# Patient Record
Sex: MALE | Race: WHITE | NOT HISPANIC OR LATINO | Employment: OTHER | URBAN - METROPOLITAN AREA
[De-identification: names, ages, dates, MRNs, and addresses within clinical notes are randomized per-mention and may not be internally consistent; named-entity substitution may affect disease eponyms.]

---

## 2023-05-31 ENCOUNTER — HOSPITAL ENCOUNTER (OUTPATIENT)
Dept: DATA CONVERSION | Facility: HOSPITAL | Age: 83
End: 2023-06-01
Attending: ORTHOPAEDIC SURGERY | Admitting: ORTHOPAEDIC SURGERY
Payer: COMMERCIAL

## 2023-05-31 DIAGNOSIS — E53.8 DEFICIENCY OF OTHER SPECIFIED B GROUP VITAMINS: ICD-10-CM

## 2023-05-31 DIAGNOSIS — J44.9 CHRONIC OBSTRUCTIVE PULMONARY DISEASE, UNSPECIFIED (MULTI): ICD-10-CM

## 2023-05-31 DIAGNOSIS — K21.9 GASTRO-ESOPHAGEAL REFLUX DISEASE WITHOUT ESOPHAGITIS: ICD-10-CM

## 2023-05-31 DIAGNOSIS — Z91.041 RADIOGRAPHIC DYE ALLERGY STATUS: ICD-10-CM

## 2023-05-31 DIAGNOSIS — I10 ESSENTIAL (PRIMARY) HYPERTENSION: ICD-10-CM

## 2023-05-31 DIAGNOSIS — E78.5 HYPERLIPIDEMIA, UNSPECIFIED: ICD-10-CM

## 2023-05-31 DIAGNOSIS — M16.12 UNILATERAL PRIMARY OSTEOARTHRITIS, LEFT HIP: ICD-10-CM

## 2023-05-31 DIAGNOSIS — Z87.891 PERSONAL HISTORY OF NICOTINE DEPENDENCE: ICD-10-CM

## 2023-06-01 LAB
ANION GAP IN SER/PLAS: 13 MMOL/L (ref 10–20)
BASOPHILS (10*3/UL) IN BLOOD BY AUTOMATED COUNT: 0.02 X10E9/L (ref 0–0.1)
BASOPHILS/100 LEUKOCYTES IN BLOOD BY AUTOMATED COUNT: 0.1 % (ref 0–2)
CALCIUM (MG/DL) IN SER/PLAS: 8.4 MG/DL (ref 8.6–10.3)
CARBON DIOXIDE, TOTAL (MMOL/L) IN SER/PLAS: 23 MMOL/L (ref 21–32)
CHLORIDE (MMOL/L) IN SER/PLAS: 105 MMOL/L (ref 98–107)
CREATININE (MG/DL) IN SER/PLAS: 1.11 MG/DL (ref 0.5–1.3)
ERYTHROCYTE DISTRIBUTION WIDTH (RATIO) BY AUTOMATED COUNT: 12.1 % (ref 11.5–14.5)
ERYTHROCYTE MEAN CORPUSCULAR HEMOGLOBIN CONCENTRATION (G/DL) BY AUTOMATED: 32.8 G/DL (ref 32–36)
ERYTHROCYTE MEAN CORPUSCULAR VOLUME (FL) BY AUTOMATED COUNT: 91 FL (ref 80–100)
ERYTHROCYTES (10*6/UL) IN BLOOD BY AUTOMATED COUNT: 4.33 X10E12/L (ref 4.5–5.9)
GFR MALE: 66 ML/MIN/1.73M2
GLUCOSE (MG/DL) IN SER/PLAS: 137 MG/DL (ref 74–99)
HEMATOCRIT (%) IN BLOOD BY AUTOMATED COUNT: 39.3 % (ref 41–52)
HEMOGLOBIN (G/DL) IN BLOOD: 12.9 G/DL (ref 13.5–17.5)
IMMATURE GRANULOCYTES/100 LEUKOCYTES IN BLOOD BY AUTOMATED COUNT: 0.8 % (ref 0–0.9)
LEUKOCYTES (10*3/UL) IN BLOOD BY AUTOMATED COUNT: 16.6 X10E9/L (ref 4.4–11.3)
LYMPHOCYTES (10*3/UL) IN BLOOD BY AUTOMATED COUNT: 1.09 X10E9/L (ref 0.8–3)
LYMPHOCYTES/100 LEUKOCYTES IN BLOOD BY AUTOMATED COUNT: 6.6 % (ref 13–44)
MONOCYTES (10*3/UL) IN BLOOD BY AUTOMATED COUNT: 2.09 X10E9/L (ref 0.05–0.8)
MONOCYTES/100 LEUKOCYTES IN BLOOD BY AUTOMATED COUNT: 12.6 % (ref 2–10)
NEUTROPHILS (10*3/UL) IN BLOOD BY AUTOMATED COUNT: 13.29 X10E9/L (ref 1.6–5.5)
NEUTROPHILS/100 LEUKOCYTES IN BLOOD BY AUTOMATED COUNT: 79.9 % (ref 40–80)
PLATELETS (10*3/UL) IN BLOOD AUTOMATED COUNT: 162 X10E9/L (ref 150–450)
POTASSIUM (MMOL/L) IN SER/PLAS: 3.8 MMOL/L (ref 3.5–5.3)
SODIUM (MMOL/L) IN SER/PLAS: 137 MMOL/L (ref 136–145)
UREA NITROGEN (MG/DL) IN SER/PLAS: 23 MG/DL (ref 6–23)

## 2023-06-02 LAB
ALANINE AMINOTRANSFERASE (SGPT) (U/L) IN SER/PLAS: 18 U/L (ref 10–52)
ALBUMIN (G/DL) IN SER/PLAS: 2.8 G/DL (ref 3.4–5)
ALKALINE PHOSPHATASE (U/L) IN SER/PLAS: 44 U/L (ref 33–136)
ANION GAP IN SER/PLAS: 9 MMOL/L (ref 10–20)
ANION GAP IN SER/PLAS: NORMAL
ASPARTATE AMINOTRANSFERASE (SGOT) (U/L) IN SER/PLAS: 23 U/L (ref 9–39)
BASOPHILS (10*3/UL) IN BLOOD BY AUTOMATED COUNT: NORMAL
BASOPHILS/100 LEUKOCYTES IN BLOOD BY AUTOMATED COUNT: NORMAL
BILIRUBIN TOTAL (MG/DL) IN SER/PLAS: 0.5 MG/DL (ref 0–1.2)
CALCIUM (MG/DL) IN SER/PLAS: 8.2 MG/DL (ref 8.6–10.3)
CALCIUM (MG/DL) IN SER/PLAS: NORMAL
CARBON DIOXIDE, TOTAL (MMOL/L) IN SER/PLAS: 25 MMOL/L (ref 21–32)
CARBON DIOXIDE, TOTAL (MMOL/L) IN SER/PLAS: NORMAL
CHLORIDE (MMOL/L) IN SER/PLAS: 105 MMOL/L (ref 98–107)
CHLORIDE (MMOL/L) IN SER/PLAS: NORMAL
CREATININE (MG/DL) IN SER/PLAS: 0.86 MG/DL (ref 0.5–1.3)
CREATININE (MG/DL) IN SER/PLAS: NORMAL
EOSINOPHILS (10*3/UL) IN BLOOD BY AUTOMATED COUNT: NORMAL
EOSINOPHILS/100 LEUKOCYTES IN BLOOD BY AUTOMATED COUNT: NORMAL
ERYTHROCYTE DISTRIBUTION WIDTH (RATIO) BY AUTOMATED COUNT: 12.3 % (ref 11.5–14.5)
ERYTHROCYTE DISTRIBUTION WIDTH (RATIO) BY AUTOMATED COUNT: NORMAL
ERYTHROCYTE MEAN CORPUSCULAR HEMOGLOBIN CONCENTRATION (G/DL) BY AUTOMATED: 33.9 G/DL (ref 32–36)
ERYTHROCYTE MEAN CORPUSCULAR HEMOGLOBIN CONCENTRATION (G/DL) BY AUTOMATED: NORMAL
ERYTHROCYTE MEAN CORPUSCULAR VOLUME (FL) BY AUTOMATED COUNT: 89 FL (ref 80–100)
ERYTHROCYTE MEAN CORPUSCULAR VOLUME (FL) BY AUTOMATED COUNT: NORMAL
ERYTHROCYTES (10*6/UL) IN BLOOD BY AUTOMATED COUNT: 3.94 X10E12/L (ref 4.5–5.9)
ERYTHROCYTES (10*6/UL) IN BLOOD BY AUTOMATED COUNT: NORMAL
GFR FEMALE: NORMAL
GFR MALE: 86 ML/MIN/1.73M2
GFR MALE: NORMAL
GLUCOSE (MG/DL) IN SER/PLAS: 97 MG/DL (ref 74–99)
GLUCOSE (MG/DL) IN SER/PLAS: NORMAL
HEMATOCRIT (%) IN BLOOD BY AUTOMATED COUNT: 35.1 % (ref 41–52)
HEMATOCRIT (%) IN BLOOD BY AUTOMATED COUNT: NORMAL
HEMOGLOBIN (G/DL) IN BLOOD: 11.9 G/DL (ref 13.5–17.5)
HEMOGLOBIN (G/DL) IN BLOOD: NORMAL
IMMATURE GRANULOCYTES/100 LEUKOCYTES IN BLOOD BY AUTOMATED COUNT: NORMAL
LEUKOCYTES (10*3/UL) IN BLOOD BY AUTOMATED COUNT: 13 X10E9/L (ref 4.4–11.3)
LEUKOCYTES (10*3/UL) IN BLOOD BY AUTOMATED COUNT: NORMAL
LYMPHOCYTES (10*3/UL) IN BLOOD BY AUTOMATED COUNT: NORMAL
LYMPHOCYTES/100 LEUKOCYTES IN BLOOD BY AUTOMATED COUNT: NORMAL
MANUAL DIFFERENTIAL Y/N: NORMAL
MONOCYTES (10*3/UL) IN BLOOD BY AUTOMATED COUNT: NORMAL
MONOCYTES/100 LEUKOCYTES IN BLOOD BY AUTOMATED COUNT: NORMAL
NEUTROPHILS (10*3/UL) IN BLOOD BY AUTOMATED COUNT: NORMAL
NEUTROPHILS/100 LEUKOCYTES IN BLOOD BY AUTOMATED COUNT: NORMAL
NRBC (PER 100 WBCS) BY AUTOMATED COUNT: NORMAL
PLATELETS (10*3/UL) IN BLOOD AUTOMATED COUNT: 151 X10E9/L (ref 150–450)
PLATELETS (10*3/UL) IN BLOOD AUTOMATED COUNT: NORMAL
POTASSIUM (MMOL/L) IN SER/PLAS: 4 MMOL/L (ref 3.5–5.3)
POTASSIUM (MMOL/L) IN SER/PLAS: NORMAL
PROTEIN TOTAL: 5.1 G/DL (ref 6.4–8.2)
SODIUM (MMOL/L) IN SER/PLAS: 135 MMOL/L (ref 136–145)
SODIUM (MMOL/L) IN SER/PLAS: NORMAL
UREA NITROGEN (MG/DL) IN SER/PLAS: 25 MG/DL (ref 6–23)
UREA NITROGEN (MG/DL) IN SER/PLAS: NORMAL

## 2023-06-08 LAB
APPEARANCE, URINE: CLEAR
BILIRUBIN, URINE: NEGATIVE
BLOOD, URINE: NEGATIVE
COLOR, URINE: NORMAL
GLUCOSE, URINE: NEGATIVE MG/DL
KETONES, URINE: NEGATIVE MG/DL
LEUKOCYTE ESTERASE, URINE: NEGATIVE
NITRITE, URINE: NEGATIVE
PH, URINE: 7 (ref 5–8)
PROTEIN, URINE: NEGATIVE MG/DL
SPECIFIC GRAVITY, URINE: 1.01 (ref 1–1.03)
UROBILINOGEN, URINE: <2 MG/DL (ref 0–1.9)

## 2023-09-07 VITALS — WEIGHT: 191.14 LBS | HEIGHT: 71 IN | BODY MASS INDEX: 26.76 KG/M2

## 2023-09-14 PROBLEM — N52.9 ORGANIC IMPOTENCE: Status: ACTIVE | Noted: 2023-09-14

## 2023-09-14 PROBLEM — Z79.01 CHRONIC ANTICOAGULATION: Status: ACTIVE | Noted: 2023-09-14

## 2023-09-14 PROBLEM — M54.42 LUMBAGO WITH SCIATICA, LEFT SIDE: Status: ACTIVE | Noted: 2023-09-14

## 2023-09-14 PROBLEM — M25.50 JOINT PAIN: Status: ACTIVE | Noted: 2023-09-14

## 2023-09-14 PROBLEM — I26.99 BILATERAL PULMONARY EMBOLISM (MULTI): Status: ACTIVE | Noted: 2023-09-14

## 2023-09-14 PROBLEM — N40.0 BENIGN PROSTATIC HYPERPLASIA: Status: ACTIVE | Noted: 2023-09-14

## 2023-09-14 PROBLEM — Q28.3 CAVERNOUS MALFORMATION (HHS-HCC): Status: ACTIVE | Noted: 2023-09-14

## 2023-09-14 PROBLEM — I10 PRIMARY HYPERTENSION: Status: ACTIVE | Noted: 2023-09-14

## 2023-09-14 PROBLEM — G89.29 CHRONIC BACK PAIN: Status: ACTIVE | Noted: 2023-09-14

## 2023-09-14 PROBLEM — R60.0 PEDAL EDEMA: Status: ACTIVE | Noted: 2023-09-14

## 2023-09-14 PROBLEM — I82.409 DVT (DEEP VENOUS THROMBOSIS) (MULTI): Status: ACTIVE | Noted: 2023-09-14

## 2023-09-14 PROBLEM — J34.2 DEVIATED SEPTUM: Status: ACTIVE | Noted: 2023-09-14

## 2023-09-14 PROBLEM — M79.605 PAIN OF LEFT LOWER EXTREMITY: Status: ACTIVE | Noted: 2023-09-14

## 2023-09-14 PROBLEM — M54.16 LUMBAR RADICULOPATHY: Status: ACTIVE | Noted: 2023-09-14

## 2023-09-14 PROBLEM — M47.816 LUMBAR SPONDYLOSIS: Status: ACTIVE | Noted: 2023-09-14

## 2023-09-14 PROBLEM — M54.12 RIGHT CERVICAL RADICULOPATHY: Status: ACTIVE | Noted: 2023-09-14

## 2023-09-14 PROBLEM — R23.4 FISSURE IN SKIN OF FOOT: Status: ACTIVE | Noted: 2023-09-14

## 2023-09-14 PROBLEM — M16.12 DEGENERATIVE JOINT DISEASE OF LEFT HIP: Status: ACTIVE | Noted: 2023-09-14

## 2023-09-14 PROBLEM — M75.122 COMPLETE TEAR OF LEFT ROTATOR CUFF: Status: ACTIVE | Noted: 2023-09-14

## 2023-09-14 PROBLEM — I25.10 CORONARY ATHEROSCLEROSIS OF NATIVE CORONARY ARTERY: Status: ACTIVE | Noted: 2023-09-14

## 2023-09-14 PROBLEM — R20.0 NUMBNESS: Status: ACTIVE | Noted: 2023-09-14

## 2023-09-14 PROBLEM — M46.1 SACROILIITIS (CMS-HCC): Status: ACTIVE | Noted: 2023-09-14

## 2023-09-14 PROBLEM — R60.9 EDEMA: Status: ACTIVE | Noted: 2023-09-14

## 2023-09-14 PROBLEM — M54.41 LUMBAGO WITH SCIATICA, RIGHT SIDE: Status: ACTIVE | Noted: 2023-09-14

## 2023-09-14 PROBLEM — M50.30 DDD (DEGENERATIVE DISC DISEASE), CERVICAL: Status: ACTIVE | Noted: 2023-09-14

## 2023-09-14 PROBLEM — M47.812 FACET ARTHROPATHY, CERVICAL: Status: ACTIVE | Noted: 2023-09-14

## 2023-09-14 PROBLEM — I50.30 (HFPEF) HEART FAILURE WITH PRESERVED EJECTION FRACTION (MULTI): Status: ACTIVE | Noted: 2023-09-14

## 2023-09-14 PROBLEM — M25.512 CHRONIC LEFT SHOULDER PAIN: Status: ACTIVE | Noted: 2023-09-14

## 2023-09-14 PROBLEM — L23.9 ALLERGIC DERMATITIS: Status: ACTIVE | Noted: 2023-09-14

## 2023-09-14 PROBLEM — G89.4 CHRONIC PAIN SYNDROME: Status: ACTIVE | Noted: 2023-09-14

## 2023-09-14 PROBLEM — M75.82 TENDINITIS OF LEFT ROTATOR CUFF: Status: ACTIVE | Noted: 2023-09-14

## 2023-09-14 PROBLEM — M20.5X1 HALLUX LIMITUS, RIGHT: Status: ACTIVE | Noted: 2023-09-14

## 2023-09-14 PROBLEM — R25.2 MUSCLE CRAMPING: Status: ACTIVE | Noted: 2023-09-14

## 2023-09-14 PROBLEM — M51.369 DEGENERATION OF INTERVERTEBRAL DISC OF LUMBAR REGION: Status: ACTIVE | Noted: 2023-09-14

## 2023-09-14 PROBLEM — M79.18 MYOFASCIAL PAIN: Status: ACTIVE | Noted: 2023-09-14

## 2023-09-14 PROBLEM — R91.8 LUNG INFILTRATE: Status: ACTIVE | Noted: 2023-09-14

## 2023-09-14 PROBLEM — L89.311 PRESSURE INJURY OF RIGHT BUTTOCK, STAGE 1: Status: ACTIVE | Noted: 2023-09-14

## 2023-09-14 PROBLEM — R20.0 NUMBNESS AND TINGLING OF LOWER EXTREMITY: Status: ACTIVE | Noted: 2023-09-14

## 2023-09-14 PROBLEM — M23.41 LOOSE BODY OF RIGHT KNEE: Status: ACTIVE | Noted: 2023-09-14

## 2023-09-14 PROBLEM — M25.561 RIGHT KNEE PAIN: Status: ACTIVE | Noted: 2023-09-14

## 2023-09-14 PROBLEM — R73.9 HYPERGLYCEMIA: Status: ACTIVE | Noted: 2023-09-14

## 2023-09-14 PROBLEM — J32.9 CHRONIC SINUSITIS: Status: ACTIVE | Noted: 2023-09-14

## 2023-09-14 PROBLEM — J61 ASBESTOSIS (MULTI): Status: ACTIVE | Noted: 2023-09-14

## 2023-09-14 PROBLEM — M25.562 KNEE PAIN, LEFT: Status: ACTIVE | Noted: 2023-09-14

## 2023-09-14 PROBLEM — M54.9 CHRONIC BACK PAIN: Status: ACTIVE | Noted: 2023-09-14

## 2023-09-14 PROBLEM — M25.552 HIP PAIN, LEFT: Status: ACTIVE | Noted: 2023-09-14

## 2023-09-14 PROBLEM — J44.9 COPD, MILD (MULTI): Status: ACTIVE | Noted: 2023-09-14

## 2023-09-14 PROBLEM — R20.2 NUMBNESS AND TINGLING OF LOWER EXTREMITY: Status: ACTIVE | Noted: 2023-09-14

## 2023-09-14 PROBLEM — M25.511 RIGHT SHOULDER PAIN: Status: ACTIVE | Noted: 2023-09-14

## 2023-09-14 PROBLEM — M65.4 DE QUERVAIN'S TENOSYNOVITIS, RIGHT: Status: ACTIVE | Noted: 2023-09-14

## 2023-09-14 PROBLEM — Z86.711 HISTORY OF PULMONARY EMBOLUS (PE): Status: ACTIVE | Noted: 2023-09-14

## 2023-09-14 PROBLEM — R06.09 DYSPNEA ON EXERTION: Status: ACTIVE | Noted: 2023-09-14

## 2023-09-14 PROBLEM — M51.36 DEGENERATION OF INTERVERTEBRAL DISC OF LUMBAR REGION: Status: ACTIVE | Noted: 2023-09-14

## 2023-09-14 PROBLEM — M79.89 RIGHT LEG SWELLING: Status: ACTIVE | Noted: 2023-09-14

## 2023-09-14 PROBLEM — M54.12 CERVICAL RADICULITIS: Status: ACTIVE | Noted: 2023-09-14

## 2023-09-14 PROBLEM — E78.5 DYSLIPIDEMIA: Status: ACTIVE | Noted: 2023-09-14

## 2023-09-14 PROBLEM — M54.30 SCIATICA: Status: ACTIVE | Noted: 2023-09-14

## 2023-09-14 PROBLEM — E53.8 VITAMIN B 12 DEFICIENCY: Status: ACTIVE | Noted: 2023-09-14

## 2023-09-14 PROBLEM — I87.2 VENOUS INSUFFICIENCY OF BOTH LOWER EXTREMITIES: Status: ACTIVE | Noted: 2023-09-14

## 2023-09-14 PROBLEM — Z96.641 HISTORY OF TOTAL RIGHT HIP ARTHROPLASTY: Status: ACTIVE | Noted: 2023-09-14

## 2023-09-14 PROBLEM — I10 BENIGN ESSENTIAL HYPERTENSION: Status: ACTIVE | Noted: 2023-09-14

## 2023-09-14 PROBLEM — M17.11 ARTHRITIS OF RIGHT KNEE: Status: ACTIVE | Noted: 2023-09-14

## 2023-09-14 PROBLEM — G89.29 CHRONIC LEFT SHOULDER PAIN: Status: ACTIVE | Noted: 2023-09-14

## 2023-09-14 PROBLEM — G60.9 IDIOPATHIC PERIPHERAL NEUROPATHY: Status: ACTIVE | Noted: 2023-09-14

## 2023-09-14 PROBLEM — Z96.641 STATUS POST RIGHT HIP REPLACEMENT: Status: ACTIVE | Noted: 2023-09-14

## 2023-09-14 PROBLEM — M17.11 PRIMARY OSTEOARTHRITIS OF RIGHT KNEE: Status: ACTIVE | Noted: 2023-09-14

## 2023-09-14 PROBLEM — M48.062 LUMBAR STENOSIS WITH NEUROGENIC CLAUDICATION: Status: ACTIVE | Noted: 2023-09-14

## 2023-09-14 PROBLEM — M12.9 ARTHRITIS, MULTIPLE JOINT INVOLVEMENT: Status: ACTIVE | Noted: 2023-09-14

## 2023-09-14 PROBLEM — M17.12 ARTHRITIS OF KNEE, LEFT: Status: ACTIVE | Noted: 2023-09-14

## 2023-09-14 PROBLEM — M54.50 LUMBAGO: Status: ACTIVE | Noted: 2023-09-14

## 2023-09-14 PROBLEM — M53.3 SACROILIAC JOINT DYSFUNCTION: Status: ACTIVE | Noted: 2023-09-14

## 2023-09-14 PROBLEM — M19.90 OSTEOARTHRITIS: Status: ACTIVE | Noted: 2023-09-14

## 2023-09-14 PROBLEM — M54.9 BACKACHE WITH RADIATION: Status: ACTIVE | Noted: 2023-09-14

## 2023-09-14 PROBLEM — K21.9 ESOPHAGEAL REFLUX: Status: ACTIVE | Noted: 2023-09-14

## 2023-09-14 PROBLEM — J30.9 ALLERGIC RHINITIS: Status: ACTIVE | Noted: 2023-09-14

## 2023-09-14 PROBLEM — M23.91 INTERNAL DERANGEMENT OF KNEE, RIGHT: Status: ACTIVE | Noted: 2023-09-14

## 2023-09-14 PROBLEM — G57.30 NEUROPATHY, PERONEAL NERVE: Status: ACTIVE | Noted: 2023-09-14

## 2023-09-14 PROBLEM — E78.2 MIXED HYPERLIPIDEMIA: Status: ACTIVE | Noted: 2023-09-14

## 2023-09-14 PROBLEM — M54.2 CERVICALGIA: Status: ACTIVE | Noted: 2023-09-14

## 2023-09-14 PROBLEM — I25.10 ASHD (ARTERIOSCLEROTIC HEART DISEASE): Status: ACTIVE | Noted: 2023-09-14

## 2023-09-14 PROBLEM — M54.16 LUMBAR RADICULITIS: Status: ACTIVE | Noted: 2023-09-14

## 2023-09-14 PROBLEM — M79.604 PAIN OF RIGHT LOWER EXTREMITY: Status: ACTIVE | Noted: 2023-09-14

## 2023-09-14 PROBLEM — M20.42 HAMMER TOE OF SECOND TOE OF LEFT FOOT: Status: ACTIVE | Noted: 2023-09-14

## 2023-09-14 PROBLEM — E87.6 HYPOKALEMIA: Status: ACTIVE | Noted: 2023-09-14

## 2023-09-14 PROBLEM — J98.4 RESTRICTIVE LUNG DISEASE: Status: ACTIVE | Noted: 2023-09-14

## 2023-09-14 RX ORDER — FINASTERIDE 5 MG/1
5 TABLET, FILM COATED ORAL DAILY
COMMUNITY
End: 2023-11-29 | Stop reason: ALTCHOICE

## 2023-09-14 RX ORDER — NALOXONE HYDROCHLORIDE 4 MG/.1ML
SPRAY NASAL AS NEEDED
COMMUNITY
Start: 2022-06-16

## 2023-09-14 RX ORDER — DEXLANSOPRAZOLE 60 MG/1
60 CAPSULE, DELAYED RELEASE ORAL DAILY
COMMUNITY

## 2023-09-14 RX ORDER — TORSEMIDE 20 MG/1
1 TABLET ORAL DAILY PRN
Status: ON HOLD | COMMUNITY
End: 2024-04-24 | Stop reason: WASHOUT

## 2023-09-14 RX ORDER — METOPROLOL SUCCINATE 50 MG/1
1 TABLET, EXTENDED RELEASE ORAL DAILY
COMMUNITY
End: 2024-05-01

## 2023-09-14 RX ORDER — FUROSEMIDE 20 MG/1
1 TABLET ORAL DAILY
COMMUNITY
End: 2023-11-21 | Stop reason: SDUPTHER

## 2023-09-14 RX ORDER — CHLORHEXIDINE GLUCONATE ORAL RINSE 1.2 MG/ML
15 SOLUTION DENTAL AS NEEDED
COMMUNITY
Start: 2023-05-10 | End: 2023-10-24 | Stop reason: WASHOUT

## 2023-09-14 RX ORDER — IRBESARTAN 150 MG/1
150 TABLET ORAL DAILY
COMMUNITY

## 2023-09-14 RX ORDER — TAMSULOSIN HYDROCHLORIDE 0.4 MG/1
0.4 CAPSULE ORAL
COMMUNITY
Start: 2016-05-13 | End: 2023-11-29 | Stop reason: ALTCHOICE

## 2023-09-14 RX ORDER — OXYCODONE AND ACETAMINOPHEN 7.5; 325 MG/1; MG/1
1 TABLET ORAL 3 TIMES DAILY PRN
COMMUNITY
Start: 2023-05-05 | End: 2023-10-11 | Stop reason: SDUPTHER

## 2023-09-30 NOTE — DISCHARGE SUMMARY
Send Summary:   Discharge Summary Providers:  Provider Role Provider Name   · Attending TRICIA BOSWELL   · Referring TRICIA BOSWELL   · Consulting Lynn Feliz   · Consulting Gil Paredes   · Primary Pearl Perez       Note Recipients: Pearl Perez,  - [phone]  []  TRICIA BOSWELL DO       Discharge:    Summary:   Admission Date: .31-May-2023 05:56:00   Discharge Date: 01-Jun-2023   Attending Physician at Discharge: TRICIA BOSWELL   Admission Reason: Left Hip Osteoarthritis (1)   Final Discharge Diagnoses: Osteoarthritis of hip   Procedures: Date: 31-May-2023 10:26:00  Procedure Name: Left DAA ISH   Condition at Discharge: Satisfactory   Disposition at Discharge: Inpatient Rehab Facility  (IRF)   Vital Signs:        T   P  R  BP   MAP  SpO2   Value  36  89  19  129/71   107  94%  Date/Time 6/1 5:12 6/1 5:12 6/1 5:12 6/1 5:12  5/31 18:04 6/1 5:12  Range  (36C - 36.1C )  (86 - 99 )  (14 - 19 )  (129 - 164 )/ (71 - 93 )  (107 - 113 )  (93% - 97% )   As of 31-May-2023 13:32:00, patient is on 2 L/min of oxygen via room air.    Date:            Weight/Scale Type:  Height:   31-May-2023 13:27  86.7  kg / standing  180.1  cm  Physical Exam:    Constitutional: NAD, resting comfortably in bed  Skin: Warm and dry, no rashes   Eyes: EOMI, clear sclera   ENMT: MMM   HEENT: Neck supple without apparent injury, EOMI, MMM  Respiratory: NWOB on RA   CV: RRR per peripheral pulses, limbs wwp  GI: soft, non-distended   Lymph: No apparent LAD  Neuro: KOENIG spontaneously, CNs II - XII grossly intact   Psych: Appropriate mood and behavior   MSK:   - SILT s/s/sp/dp/t  - fires PF/DF/EHL  - Toes WWP, 2+ DP pulses  - Calf soft and supple bilat  -dressing is c/d/i        A full secondary survey was conducted. Patient did not have any acute pain with ROM or palpation of other extremities other than that which is mentioned below.    Hospital Course:    82 year-old male who presented with left hip osteoarthritis. Patient is now s/p left  total hip arthroplasty on 5/31/23 by Dr. Arora. On the day of surgery, patient  was identified in the pre-operative holding area and agreeable to proceed with surgery. Written consent was obtained.  Please see operative note for further details of this procedure. Patient received 24 hours of conchis-operative antibiotics. Patient recovered  in the PACU before transfer to a regular nursing floor. Patient was started on oxycodone, tylenol, and tramadol for pain control and eliquis 2.5mg bid for DVT prophylaxis start the evening of POD1. Physical therapy recommended continued recovery at acute  rehab with continued physical therapy and wound care. On the day of discharge, patient was afebrile with stable vital signs. Patient was neurovascularly intact at time of discharge. Patient was discharged with prescription of eliquis 2.5mg bid for DVT  prophylaxis for 4 weeks. Patient will follow-up with Dr. Arora in 2 weeks for post-operative visit.      Immunizations:    Immunizations:  17-Apr-2020   .Influenza- Influenza Virus: Immunizations  17-Apr-2020   .Pneumonia- Pneumococcal polysaccharide vaccine: Immunizations      Discharge Information:    and Continuing Care:   Lab Results - Pending:    None  Radiology Results - Pending: None   Discharge Instructions:    Activity:           activity as tolerated.          May shower..            May not drive.            Weight-bearing Instructions: weight-bearing as tolerated left leg.            Total Hip Precautions Anterior approach: FOR 2 WEEKS: NO straight leg raises, NO backward kicks and NO bending over to put your shoes/socks or reach the floor. No crossing your legs or ankles.  Use ice frequently day and night for 2 weeks.   Continue to wear compression stockings everyday. May take stockings off at night to sleep but reapply each morning for the next 2 weeks until seen by your surgeon at your follow up appt.    Nutrition/Diet:           resume normal diet    Wound Care:            Wound Site:   Left Total Hip Replacement          Wound Type:   surgical incision          Cover With:   Mepilex          Instructions:   no lotions, creams, or tub soaks          Other Instructions:   Do not remove Mepilex AG dressing from the hip  until follow up visit in 2 weeks with doctor. If dressing becomes saturated and starts leaking  notify your surgeon.   Call the office if having increased redness, pain, swelling,  drainage at incision or if you have fever and chills.  May shower. pat dressing dry, no soap, no lotions and  no soaking.    Rehab Services:           Occupational Therapy Orders:   Eval and Treat ([facility])          Physical Therapy Orders:   Eval and Treat ([facility])    Home Care Certification:           Home Care Agency:    Home Team [phone]          Skilled Disciplines Ordered:   PT    Home Care Services:           Home Care Skilled Service:   Rehab (PT/OT/SP eval and treat)    Care Recommendation:           I recommend that INPATIENT care is required at::   Acute rehab          Estimated Stay:   Convalescent stay < 30 days    Discharge Medications: Home Medication   Dexilant 60 mg oral delayed release capsule - 1 cap(s) orally once a day  irbesartan 150 mg oral tablet - 1 tab(s) orally once a day  Metoprolol Succinate ER 50 mg oral tablet, extended release - 1 tab(s) orally once a day  dicyclomine 20 mg oral tablet - 1 tab(s) orally 4 times a day   ascorbic acid 500 mg oral tablet - 1 tab(s) orally 2 times a day  carisoprodol 350 mg oral tablet - 1 tab(s) orally every 8 hours as needed for muscles spasms  CeleBREX 200 mg oral capsule - 1 cap(s) orally 2 times a day   gabapentin 300 mg oral capsule - 1 cap(s) orally 3 times a day  apixaban 2.5 mg oral tablet - 1 tab(s) orally 2 times a day for 30 days for DVT ppx   oxycodone-acetaminophen 5 mg-325 mg oral tablet - 1 tab(s) orally every 4 to 6 hours as needed for pain x 7 days  Eliquis 2.5 mg  "oral tablet - 1 tab(s) orally 2 times a day starting POD1 in the evening for 30 days for dvt ppx   tamsulosin 0.4 mg oral capsule - 2 cap(s) orally once a day (at bedtime)  finasteride 5 mg oral tablet - 1 tab(s) orally once a day     PRN Medication   torsemide 20 mg oral tablet - 1 tab(s) orally once a day, As Needed  oxyCODONE 10 mg oral tablet - orally 3 times a day, As Needed  fluticasone 50 mcg/inh nasal spray - 2 spray(s) in each nostril 2 times a day, As Needed     DNR Status:   ·  Code Status Code Status order at time of discharge: Full Code     Attestation:   Note Completion:  I am a:  Resident/Fellow   Attending Attestation I saw and evaluated the patient.  I personally obtained the key and critical portions of the history and physical exam or was physically present for key and  critical portions performed by the resident/fellow. I reviewed the resident/fellow?s documentation and discussed the patient with the resident/fellow.  I agree with the resident/fellow?s medical decision making as documented in the note.     I personally evaluated the patient on 01-Jun-2023         Electronic Signatures:  TRICIA BOSWELL (DO)  (Signed 02-Jun-2023 19:15)   Authored: Note Completion   Co-Signer: Send Summary, Summary Content, Immunizations, Ongoing Care, DNR Status, Note Completion  Cody Tracy (DO (Resident))  (Signed 01-Jun-2023 13:39)   Authored: Send Summary, Summary Content, Immunizations,  Ongoing Care, DNR Status, Note Completion      Last Updated: 02-Jun-2023 19:15 by TRICIA BOSWELL (DO)    References:  1.  Data Referenced From \"Consult-Medicine\" 31-May-2023 14:25   "

## 2023-09-30 NOTE — H&P
History & Physical Reviewed:   I have reviewed the History and Physical dated:  04-May-2023   History and Physical reviewed and relevant findings noted. Patient examined to review pertinent physical  findings.: No significant changes   Home Medications Reviewed: no changes noted   Allergies Reviewed: no changes noted       ERAS (Enhanced Recovery After Surgery):  ·  ERAS Patient: no     Consent:   COVID-19 Consent:  ·  COVID-19 Risk Consent Surgeon has reviewed key risks related to the risk of anthony COVID-19 and if they contract COVID-19 what the risks are.     Attestation:   Note Completion:  I am a:  Resident/Fellow   Attending Attestation I saw and evaluated the patient.  I personally obtained the key and critical portions of the history and physical exam or was physically present for key and  critical portions performed by the resident/fellow. I reviewed the resident/fellow?s documentation and discussed the patient with the resident/fellow.  I agree with the resident/fellow?s medical decision making as documented in the note.     I personally evaluated the patient on 31-May-2023         Electronic Signatures:  TRICIA BOSWELL ()  (Signed 31-May-2023 07:35)   Authored: Note Completion   Co-Signer: History & Physical Reviewed, ERAS, Consent, Note Completion  Cody Tracy ( (Resident))  (Signed 31-May-2023 06:58)   Authored: History & Physical Reviewed, ERAS, Consent,  Note Completion      Last Updated: 31-May-2023 07:35 by TRICIA BOSWELL ()

## 2023-09-30 NOTE — PROGRESS NOTES
Consult Type: subsequent visit/care     Service: Medicine     Subjective Data:   ARTEMIO MORALES is a 82 year old Male who is Hospital Day # 2 and POD #1 for Left DAA ISH.     Seen and examined in his room this AM. Awake and alert. Up in chair. Pain adequately controlled at this time. Is constipated. No BM x 4 days. He denies chest pain, breathing difficulties, abdominal pain,  N/V/D. No fever or chills.    Objective Data:     Objective Information:      T   P  R  BP   MAP  SpO2   Value  36  89  19  129/71   107  94%  Date/Time 6/1 5:12 6/1 5:12 6/1 5:12 6/1 5:12  5/31 18:04 6/1 5:12  Range  (36C - 36.1C )  (86 - 99 )  (14 - 19 )  (129 - 164 )/ (71 - 93 )  (107 - 113 )  (93% - 97% )   As of 31-May-2023 13:32:00, patient is on 2 L/min of oxygen via room air.      Pain reported at 5/31 22:41: sleeping    Physical Exam Narrative:  ·  Physical Exam:    General: A&O x 3; NAD; calm and cooperative  Eyes: EOM's intact. Clear sclera.   HEENT: Atraumatic. Normocephalic. Mucous membranes moist.   Lungs: CTAB; respirations even and unlabored on room air.   Heart: Regular rate  Abdomen:  Soft, non-tender, non-distended; normoactive bowel sounds  Extremities: KOENIG with LLE pain/weakness; no edema; peripheral pulses intact. Left hip surgical incision covered with primary surgical dressing - C/D/I.   Neuro: A&O x 3; gross motor and sensation intact; no focal deficits  Skin: Warm, dry, and intact  Psych: Appropriate mood and behavior     Medication:    Medications:          Continuous Medications       --------------------------------    1. Lactated Ringers Infusion:  1000  mL  IntraVenous  <Continuous>         Scheduled Medications       --------------------------------    1. Acetaminophen:  975  mg  Oral  Every 8 Hours    2. Apixaban:  2.5  mg  Oral  Every 12 Hours    3. Ascorbic Acid:  500  mg  Oral  2 Times a Day    4. Dicyclomine:  20  mg  Oral  4 Times a Day Before Meals    5. Docusate:  100  mg  Oral  2 Times a Day    6.  Finasteride:  5  mg  Oral  Daily    7. Fluticasone 50 microgram/ Nasal Inhalation:  2  spray(s)  Each Nostril  Daily    8. Gabapentin:  300  mg  Oral  3 Times a Day    9. Ketorolac Injectable:  15  mg  IntraVenous Push  Every 6 Hours    10. Loratadine:  10  mg  Oral  Daily    11. Losartan:  50  mg  Oral  Daily    12. Metoprolol Succinate Extended Release:  50  mg  Oral  Daily    13. Pantoprazole:  40  mg  Oral  Daily    14. Polyethylene Glycol:  17  gram(s)  Oral  2 Times a Day    15. Tamsulosin:  0.8  mg  Oral  Daily         PRN Medications       --------------------------------    1. HYDROmorphone Injectable:  0.2  mg  IntraVenous Push  Every 2 Hours    2. Ondansetron Injectable:  4  mg  IntraVenous Push  Every 6 Hours    3. oxyCODONE Immediate Release:  5  mg  Oral  Every 4 Hours    4. oxyCODONE Immediate Release:  10  mg  Oral  Every 4 Hours    5. Sore Throat Lozenge:  1  lozenge(s)  Oral  Every 4 Hours    6. traMADol:  50  mg  Oral  Every 6 Hours        Recent Lab Results:    Results:    CBC: 6/1/2023 05:37              \     Hgb     /                              \     12.9 L    /  WBC  ----------------  Plt               16.6 H    ----------------    162              /     Hct     \                              /     39.3 L    \            RBC: 4.33 L    MCV: 91     Neutrophil %: 79.9      BMP: 6/1/2023 05:37  NA+        Cl-     BUN  /                         137    105    23  /  --------------------------------  Glucose                ---------------------------  137 H    K+     HCO3-   Creat \                         3.8  23    1.11  \  Calcium : 8.4 L    Anion Gap : 13      Radiology Results:    Results:        Impression:    Status post  total hip arthroplasty.     Xray Pelvis 1 or 2 View [May 31 2023 11:38AM]      Assessment and Plan:   Code Status:  ·  Code Status Full Code     Assessment:    82 year old Male with a medical history of OA, HTN, COPD, PE/DVT, who presented to [facility] [place] for an elective  left ISH by Dr. Arora. Operative course uneventful to this point.   ml.     CV: HTN, CAD  -Resumed home regimen: Metoprolol, Losartan (formulary interchange for Irbesartan)  -Hold Torsemide in perioperative state. May resume with discharge.   -Monitor BP and maintain hold parameters  -BP stable.     Left Hip Osteoarthritis  -s/p left ISH by Dr. Arora on 5/31.   -Incisional care, dressing changes, prophylactic antibiotics, and pain management per Orthopedics.   -PT/OT consulted. WBAT.   -Medicate for pain as needed.   -Advised IS use, mobilization.   -Maintain bowel regimen  -DVT prophylaxis per surgery  -Monitor H&H for ABLA. Hgb stable at 12.9.   -Afebrile. No leukocytosis.     Seasonal Allergies/Allergic Rhinitis  -Resumed Fluticasone  -Added Loratadine    Constipation  -On laxatives and stool softeners  -Added dose of Lactulose.     Hyperlipidemia  -Statin intolerance/allergy    GERD  -On PPI, Dicyclomine    BPH  -On Tamsulosin, Finasteride    DVT Prophylaxis   -Apixaban   -Discussed with orthopedic resident.     Fluids/Electrolytes/Nutrition  -Laboratory data reviewed.  -Electrolytes stable.   -No nutritional concerns at this time.     Disposition  -Plan of care discussed with medicine, Dr. Feliz.   -Discharge per surgery. Medically stable.         Attestation:   Note Completion:  I am a:  Advanced Practice Provider   Attending Only - Shared Visit with Advanced Practice Provider This is a shared visit.  I have reviewed the Advanced Practice Provider?s encounter note, approve the Advanced Practice Provider?s documentation,  and provide the following additional information from my personal encounter.    Comments/ Additional Findings    I saw and evaluated the patient and discussed the care with NP above on 6/1/23. I agree with the findings and plan as documented in the note above  with changes noted below     Pt says he still has pain. No overnight events.     Constitutional: Awake, alert, NAD.  Eyes: PERRLA,  EOMI. No erythema or exudate. No proptosis or lid lag.   ENMT: MMM, no nasal congestion, no oral lesions, oropharynx clear without tonsillar erythema or exudate.  Head/Neck: NCAT, neck supple. Full active ROM of the neck. No thyromegaly or mass. No JVP.  Respiratory/Thorax: CTAB, no increased work of breathing, no increased respiratory effort, no wheeze, rales, or rhonchi.  Cardiovascular: Regular rate and rhythm, normal S1 and S2, no murmurs, rubs, or gallops.  Gastrointestinal: Soft, nontender to palpation, nondistended, no guarding or rebound, normoactive bowel sounds  Musculoskeletal: left hip: surgical site: clean/dry   Neurological: Aox3, intact sensation, limited ROM of lower extremities due to pain  Lymphatic: No lymphadenopathy.  Skin: Warm and well perfused.     Left hip OA sp left ISH  -management per primary  - pain control  - bowel regimen  - start eliquis for OAC    BPH  - home meds    allergic rhinitis  - home meds    GERD  - ppi    HTN  - can consider resuming torsemide    continue home meds for chronic conditions    Dispo: monitor clinically   awaiting placement          Electronic Signatures:  Lynn Feliz)  (Signed 01-Jun-2023 13:08)   Authored: Note Completion   Co-Signer: Service, Subjective Data, Objective Data, Assessment and Plan, Note Completion  Senia Barrett (APRN-CNP)  (Signed 01-Jun-2023 12:52)   Authored: Service, Subjective Data, Objective Data, Assessment  and Plan, Note Completion      Last Updated: 01-Jun-2023 13:08 by Lynn Feliz)

## 2023-10-02 NOTE — OP NOTE
Post Operative Note:     PreOp Diagnosis: Left Hip DJD   Post-Procedure Diagnosis: same   Procedure: Left DAA ISH   Surgeon: Ulysses   Resident/Fellow/Other Assistant: CARMELO Mcclain/ DO Octavio (PGY-2 Resident)   Anesthesia: ET General   I.V. Fluids: See Anesthesia Record   Estimated Blood Loss (mL): 150cc   Blood Replacement: None   Specimen: no   Complications: None   Findings: See OP Report   Patient Returned To/Condition: Stable to PACU   Urine Output: See Anesthesia Record   Drains and/or Catheters: None   Tourniquet Times: None used   Implants: Sahra     Operative Report Dictated:  Dictation: yes   Date of Dictation: 31-May-2023     Attestation:   Note Completion:  Attending Attestation I was present for the entire procedure         Electronic Signatures:  TRICIA BOSWELL ()  (Signed 31-May-2023 10:28)   Authored: Post Operative Note, Note Completion      Last Updated: 31-May-2023 10:28 by TRICIA BOSWELL ()

## 2023-10-04 ENCOUNTER — APPOINTMENT (OUTPATIENT)
Dept: PRIMARY CARE | Facility: CLINIC | Age: 83
End: 2023-10-04
Payer: COMMERCIAL

## 2023-10-11 DIAGNOSIS — M51.36 DEGENERATION OF INTERVERTEBRAL DISC OF LUMBAR REGION: ICD-10-CM

## 2023-10-11 DIAGNOSIS — M54.16 LUMBAR RADICULITIS: ICD-10-CM

## 2023-10-11 RX ORDER — OXYCODONE AND ACETAMINOPHEN 7.5; 325 MG/1; MG/1
1 TABLET ORAL 3 TIMES DAILY PRN
Qty: 84 TABLET | Refills: 0 | Status: SHIPPED | OUTPATIENT
Start: 2023-10-11 | End: 2023-10-24 | Stop reason: SDUPTHER

## 2023-10-23 NOTE — PROGRESS NOTES
New Pt   Location of Pain:  Low Back, both legs  Pain worse with: walking  Pain better with: sitting  Pain medication prescribed by us:  Percocet 7.5 mg TID  Pain medication prescribed by other provider:none  Any adverse effects from medication: none  Average pain score with medication (0-10):   6       Percent effective: taking edge off  Do you take medicine exactly as prescribed? yes  Functional status (work, disability, retired, etc): Retired  Ability to manage activities of daily living: yes  Overall quality of family/social life with the current treatment (below average, average or above average):  avereage  ER visit since last office visit: 08/09/2023  to 08/13/2023  Colitis   New medical issues since last office visit: see above  Participating in (PT, Chiropractor, Tens Unit, Acupuncture, Aqua Therapy, Home Exercise): none    Last Controlled Substance Contract date: 01/10/2023  Last toxicology date:  01/10/2023                 Consistent with prescribed meds: yes  OARRS reviewed: yes  Daily MME:  33.75  Yearly Narcan prescribed: 2023

## 2023-10-24 ENCOUNTER — OFFICE VISIT (OUTPATIENT)
Dept: PAIN MEDICINE | Facility: CLINIC | Age: 83
End: 2023-10-24
Payer: MEDICARE

## 2023-10-24 VITALS — DIASTOLIC BLOOD PRESSURE: 69 MMHG | RESPIRATION RATE: 20 BRPM | SYSTOLIC BLOOD PRESSURE: 150 MMHG | HEART RATE: 71 BPM

## 2023-10-24 DIAGNOSIS — M17.11 ARTHRITIS OF RIGHT KNEE: ICD-10-CM

## 2023-10-24 DIAGNOSIS — M51.36 DEGENERATION OF INTERVERTEBRAL DISC OF LUMBAR REGION: ICD-10-CM

## 2023-10-24 DIAGNOSIS — M54.16 LUMBAR RADICULITIS: ICD-10-CM

## 2023-10-24 PROCEDURE — 99203 OFFICE O/P NEW LOW 30 MIN: CPT | Performed by: ANESTHESIOLOGY

## 2023-10-24 PROCEDURE — 99213 OFFICE O/P EST LOW 20 MIN: CPT | Performed by: ANESTHESIOLOGY

## 2023-10-24 PROCEDURE — 1125F AMNT PAIN NOTED PAIN PRSNT: CPT | Performed by: ANESTHESIOLOGY

## 2023-10-24 PROCEDURE — 3077F SYST BP >= 140 MM HG: CPT | Performed by: ANESTHESIOLOGY

## 2023-10-24 PROCEDURE — 1159F MED LIST DOCD IN RCRD: CPT | Performed by: ANESTHESIOLOGY

## 2023-10-24 PROCEDURE — 3078F DIAST BP <80 MM HG: CPT | Performed by: ANESTHESIOLOGY

## 2023-10-24 RX ORDER — OXYCODONE AND ACETAMINOPHEN 7.5; 325 MG/1; MG/1
1 TABLET ORAL 3 TIMES DAILY PRN
Qty: 84 TABLET | Refills: 0 | Status: SHIPPED | OUTPATIENT
Start: 2023-12-06 | End: 2023-11-29 | Stop reason: SDUPTHER

## 2023-10-24 RX ORDER — OXYCODONE AND ACETAMINOPHEN 7.5; 325 MG/1; MG/1
1 TABLET ORAL 3 TIMES DAILY PRN
Qty: 84 TABLET | Refills: 0 | Status: SHIPPED | OUTPATIENT
Start: 2023-11-08 | End: 2023-12-06 | Stop reason: SDUPTHER

## 2023-10-24 RX ORDER — OXYCODONE AND ACETAMINOPHEN 7.5; 325 MG/1; MG/1
1 TABLET ORAL 3 TIMES DAILY PRN
Qty: 84 TABLET | Refills: 0 | Status: SHIPPED | OUTPATIENT
Start: 2023-11-08 | End: 2023-11-29 | Stop reason: SDUPTHER

## 2023-10-24 ASSESSMENT — ENCOUNTER SYMPTOMS
DEPRESSION: 0
OCCASIONAL FEELINGS OF UNSTEADINESS: 0

## 2023-10-24 NOTE — PROGRESS NOTES
"Subjective   Patient is a 83-year-old male who presents as a new patient in pain clinic today for knee and leg pain on the right.  Patient states that he has deep aching throbbing pain in his right knee that is worse with ambulating and prolonged standing.  He also however states he does have numbness and tingling going down the anterior aspect of his thigh as well as his shin.  Patient does take Percocet 7.5 mg 3 times a day which he says \"takes the edge off\".  Patient retired [employer] .    Past Medical History:   Diagnosis Date    Allergic rhinitis due to pollen 10/23/2014    Hay fever    Encounter for other preprocedural examination 06/24/2014    Pre-procedural examination    Encounter for screening for malignant neoplasm of prostate     Encounter for screening for malignant neoplasm of prostate    Localized edema 05/11/2020    Pedal edema    Other conditions influencing health status     Carcinoma Of The Tongue    Pain in unspecified knee 12/22/2014    Joint pain, knee    Personal history of diseases of the skin and subcutaneous tissue 04/23/2013    History of eczema    Personal history of other diseases of the musculoskeletal system and connective tissue 06/19/2014    Personal history of arthritis    Personal history of other diseases of the nervous system and sense organs 08/10/2021    History of Bell's palsy    Personal history of other diseases of the respiratory system 11/12/2014    History of asbestosis    Personal history of other diseases of the respiratory system 08/13/2014    History of chronic obstructive lung disease    Personal history of other specified conditions 08/20/2013    History of edema    Plantar fascial fibromatosis 07/22/2013    Plantar fasciitis    Polyp of colon     Polyp of sigmoid colon    Syncope and collapse 01/23/2015    Syncope and collapse    Unspecified abdominal pain 12/22/2014    Stomach pain    Unspecified disorder of eyelid 10/23/2014    Eyelid disorder     Past Surgical " History:   Procedure Laterality Date    CHOLECYSTECTOMY  04/23/2013    Cholecystectomy Laparoscopic    OTHER SURGICAL HISTORY  04/27/2021    Meniscus repair    OTHER SURGICAL HISTORY  04/27/2021    Hip replacement    OTHER SURGICAL HISTORY  04/23/2013    Biopsy Tongue    SHOULDER SURGERY  04/23/2013    Shoulder Surgery       I have reviewed the nurses notes and am aware of family/social history.     Review of Systems  A 13 point comprehensive review of system was negative except for specific complaints as listed in the HPI.    Objective   Physical Exam  PHYSICAL EXAM  Vitals signs reviewed  Constitutional:    General: Not in acute distress   Appearance: Normal appearance. Not ill-appearing.  HENT:   Head: Normocephalic and atraumatic  Eyes:   Conjunctiva/sclera normal  Cardiovascular:  No jugular venous distention bilaterally  No gross edema in lower extremities  Pulmonary:   Effort: No respiratory distress  Abdominal:  Abdomen appears nondistended  Musculoskeletal:   Patellar grind test was negative on the right  Left and right knees were nonedematous, nonerythematous, nontender to palpation.  Anterior drawer test was negative bilaterally  Skin:   General: Skin is warm and dry  Neurological:   General: No focal deficit present  Psychiatric:    Mood and Affect: Mood normal    Behavior: Behavior normal    ASSESSMENT:   Patient is a 83-year-old male who presents as a new patient in pain clinic today for right-sided knee and right-sided leg pain.  MRI reviewed from 2022, notable for multilevel degenerative disc disease with loss of disc height and posterior osteophytes, causing mild/moderate neuroforaminal stenosis worse at L1/L2 and L2/L3 bilaterally, as well as mild neuroforaminal stenosis L3/L4, L4/L5, L5/S1; no significant central stenosis in the lumbar region.  Plain films of bilateral knees reviewed, overall mild degenerative changes but no substantive osteoarthritis bilaterally.  Counseled patient that we could  trial genicular nerve block on the right, to see if this alleviates his discomfort.     PLAN:   - Schedule for right-sided genicular nerve test block  - PDMP reviewed, will refill medications.  - If pain refractory to radiofrequency ablation, can consider right-sided transforaminal epidural injections given paresthesias.    The patient was invited to contact us back anytime with any questions or concerns and follow-up with us in the office as needed.    Mike Reyez MD

## 2023-11-01 NOTE — PROGRESS NOTES
Patient ID: Abraham Rodriguez is a 83 y.o. male.    Nerve block    Date/Time: 11/1/2023 8:04 AM    Performed by: Macho Elizabeth MD  Authorized by: Macho Elizabeth MD    Consent:     Consent obtained:  Written    Consent given by:  Patient    Risks, benefits, and alternatives were discussed: yes      Risks discussed:  Nerve damage, allergic reaction, infection, swelling, bleeding, pain and unsuccessful block  Universal protocol:     Procedure explained and questions answered to patient or proxy's satisfaction: yes      Relevant documents present and verified: yes      Test results available: yes      Imaging studies available: yes      Required blood products, implants, devices, and special equipment available: yes      Site/side marked: yes      Immediately prior to procedure, a time out was called: yes      Patient identity confirmed:  Arm band  Indications:     Indications:  Pain relief  Location:     Body area:  Lower extremity    Lower extremity nerve blocked: GENICULAR.    Laterality:  Right  Pre-procedure details:     Skin preparation:  Chlorhexidine    Preparation: Patient was prepped and draped in usual sterile fashion    Skin anesthesia:     Skin anesthesia method:  Local infiltration    Local anesthetic: LIDOCAINE 0.5%  Procedure details:     Block needle gauge:  25 G    Guidance: fluoroscopy      Block anesthetic: ROPIVICAINE 0.5%

## 2023-11-01 NOTE — PROGRESS NOTES
History Of Present Illness  Abraham Rodriguez is a 83 y.o. male presents for procedure state below. Endorses no changes in past medical history or medical health since last seen in clinic.     Past Medical History  He has a past medical history of Allergic rhinitis due to pollen (10/23/2014), Encounter for other preprocedural examination (06/24/2014), Encounter for screening for malignant neoplasm of prostate, Localized edema (05/11/2020), Other conditions influencing health status, Pain in unspecified knee (12/22/2014), Personal history of diseases of the skin and subcutaneous tissue (04/23/2013), Personal history of other diseases of the musculoskeletal system and connective tissue (06/19/2014), Personal history of other diseases of the nervous system and sense organs (08/10/2021), Personal history of other diseases of the respiratory system (11/12/2014), Personal history of other diseases of the respiratory system (08/13/2014), Personal history of other specified conditions (08/20/2013), Plantar fascial fibromatosis (07/22/2013), Polyp of colon, Syncope and collapse (01/23/2015), Unspecified abdominal pain (12/22/2014), and Unspecified disorder of eyelid (10/23/2014).    Surgical History  He has a past surgical history that includes Other surgical history (04/27/2021); Other surgical history (04/27/2021); Cholecystectomy (04/23/2013); Shoulder surgery (04/23/2013); and Other surgical history (04/23/2013).     Social History  He reports that he has never smoked. He does not have any smokeless tobacco history on file. He reports that he does not drink alcohol and does not use drugs.    Family History  Family History   Problem Relation Name Age of Onset    Hypothyroidism Brother          Allergies  Atorvastatin and Iodinated contrast media    Review of Symptoms:   Constitutional: Negative for chills, diaphoresis or fever  HENT: Negative for neck swelling  Eyes:.  Negative for eye pain  Respiratory:.  Negative for cough,  shortness of breath or wheezing    Cardiovascular:.  Negative for chest pain or palpitations  Gastrointestinal:.  Negative for abdominal pain, nausea and vomiting  Genitourinary:.  Negative for urgency  Musculoskeletal: Positive for right knee joint pain. Denies falls within the past 3 months.  Skin: Negative for wounds or itching   Neurological: Negative for dizziness, seizures, loss of consciousness and weakness  Endo/Heme/Allergies: Does not bruise/bleed easily  Psychiatric/Behavioral: Negative for depression. The patient does not appear anxious.       PHYSICAL EXAM  Vitals signs reviewed  Constitutional:       General: Not in acute distress     Appearance: Normal appearance. Not ill-appearing.  HENT:     Head: Normocephalic and atraumatic  Eyes:     Conjunctiva/sclera: Conjunctivae normal  Cardiovascular:     Rate and Rhythm: Normal rate and regular rhythm  Pulmonary:     Effort: No respiratory distress  Abdominal:     Palpations: Abdomen is soft  Musculoskeletal: KOENIG  Skin:     General: Skin is warm and dry  Neurological:     General: No focal deficit present  Psychiatric:         Mood and Affect: Mood normal         Behavior: Behavior normal     Last Recorded Vitals  /75   Pulse 65   Resp 20     Relevant Results  Current Outpatient Medications   Medication Instructions    dexlansoprazole (DEXILANT) 60 mg, oral, Daily    finasteride (PROSCAR) 5 mg, oral, Daily    furosemide (Lasix) 20 mg tablet 1 tablet, oral, Daily    irbesartan (AVAPRO) 150 mg, oral, Daily    metoprolol succinate XL (Toprol-XL) 50 mg 24 hr tablet 1 tablet, oral, Daily    naloxone (Narcan) 4 mg/0.1 mL nasal spray nasal, As needed,  ADMINISTER A SINGLE SPRAY IN ONE NOSTRIL UPON SIGNS OF OPIOID OVERDOSE. CALL 911. REPEAT AFTER 3 MINUTES IF NO RESPONSE.<BR>    [START ON 11/8/2023] oxyCODONE-acetaminophen (Percocet) 7.5-325 mg tablet 1 tablet, oral, 3 times daily PRN    [START ON 11/8/2023] oxyCODONE-acetaminophen (Percocet) 7.5-325 mg  tablet 1 tablet, oral, 3 times daily PRN    [START ON 12/6/2023] oxyCODONE-acetaminophen (Percocet) 7.5-325 mg tablet 1 tablet, oral, 3 times daily PRN    tamsulosin (FLOMAX) 0.4 mg, oral, Daily RT    torsemide (DEMADEX) 20 mg, oral, Daily         XR hip right 2 or 3 views     Narrative  PROCEDURE:         HIP RT 2 VIEW W OR WO AP PELVIS - OXR  0057  REASON FOR EXAM: pain, history of total replacement    RESULT: MRN: [id number]  Patient Name: ARTEMIO MORALES    STUDY:  HIP RT 2 VIEW W OR WO AP PELVIS; 8/18/2022 9:47 am    INDICATION:  pain, history of total replacement.    COMPARISON:  None.    ACCESSION NUMBER(S):  [id number]    ORDERING CLINICIAN:  JACKELIN LANGE    TECHNIQUE:  Right hip two views    FINDINGS:  No fractures or destructive lesions are identified. There is no evidence for  loosening or infection of the patient's right hip prosthesis.    Impression  No acute pathologic findings are identified.  Total right hip prosthesis.  Dictation workstation:   [id number]    Original Interpreting Physician:   JOSEP WEST M.D.  Original Transcribed by/Date: MMODAL Aug 18 2022  9:26A  Original Electronically Signed by/Date: JOSEP WEST M.D. Aug 18 2022  9:55A    Addendum Interpreting Physician:  Addendum Transcribed by/Date: NO ADDENDUM  Addendum Electronically Signed by/Date:      MR LUMBAR SPINE WO IV CONTRAST 01/28/2022    Narrative  MRN: [id number]  Patient Name: ARTEMIO MORALES    STUDY:  MRI L-SPINE WO;  1/28/2022 3:14 pm    INDICATION:  low back pain  M47.816: Lumbar spondylosis M54.16: Lumbar  radiculopathy.    COMPARISON:  10/08/2021 MR    ACCESSION NUMBER(S):  [id number]    ORDERING CLINICIAN:  KAYLEIGH MORSE    TECHNIQUE:  Sagittal T1, T2, STIR, and axial T1 weighted images of the lumbar  spine were acquired. Patient was unable to tolerate further images  due to severe pain.    FINDINGS:  Alignment: There are 5 lumbar type vertebrae. The lumbar spine is  straightened.    Vertebrae/Intervertebral Discs: The  vertebral bodies demonstrate  expected height.The marrow has patchy bands of fatty transformation  most prominent adjacent to the endplates anteriorly at L1-2 and along  the L5-S1 endplates. The discs have near complete loss of height  throughout the lumbar region with degenerative desiccation as well,  unchanged.    Conus: The lower thoracic cord appears unremarkable. The conus  terminates at T12-L1.    T10-11: No stenosis is noted on the sagittal images.    T11-12: The thecal sac is mildly indented by disc bulge on the  sagittal images.    T12-L1: The facet joints are moderately arthritic, unchanged. No  stenosis is noted.    L1-2: The lateral recesses are mildly stenosed more so on the right  by facet hypertrophy and ligament thickening as well as disc bulge  more so on the right. The facet joints are moderately arthritic.    L2-3: The spinal canal and lateral recesses are mildly stenosed by  ligament thickening and facet hypertrophy with disc bulge similar to  the prior study. The left neural foramen is mildly stenosed by  endplate spurring and disc bulge. The facet joints are moderately  arthritic.    L3-4: The lateral recesses are moderately stenosed more so on the  left with mild-to-moderate spinal canal stenosis secondary to disc  bulge, endplate spurring, ligament thickening and facet hypertrophy.  The left neural foramen is mildly stenosed. The facet joints are  moderately arthritic.    L4-5: The lateral recesses and spinal canal are moderately stenosed  secondary to ligament thickening, facet hypertrophy and disc  protrusion similar to the prior exam. The facet joints are moderately  arthritic. The neural foramina are mildly stenosed more so on the  left by endplate spurring, disc bulge and facet hypertrophy.    L5-S1: Left lateral/far lateral disc bulge abuts the exiting left L5  nerve, unchanged. The facet joints are mildly to moderately  arthritic. No central stenosis is noted.      The prevertebral  and posterior paraspinous soft tissues are  unremarkable.    Impression  1. The T2 axial images were not obtained as the patient could not  tolerate additional imaging due to pain.    2. Multilevel degenerative changes are present as noted similar to  the prior examination.      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    COMPARISON:  MRI lumbar spine dated 12/19/2016.    ACCESSION NUMBER(S):  [id number]    ORDERING CLINICIAN:  JOSE PHILIP    TECHNIQUE:  Multiplanar multisequence MR imaging of the lumbar spine performed  without intravenous contrast. Sagittal T1, T2, STIR, axial T1 and T2  weighted images of the lumbar spine were acquired.    FINDINGS:  Segmentation: Normal.    Conus: The lower thoracic cord appears unremarkable. The conus  terminates at the level of the superior endplate of L1. The cauda  equina within the upper lumbar regions are normal in appearance.  Within the lower lumbar regions (L4 level inferiorly), the cauda  equina appear adherent to the periphery of the thecal sac. Previously  the cauda equina were normal in appearance in these regions.  Epidural fluid: None.    Alignment: Slight rotatory levoscoliosis.  Vertebral bodies: Normal.  Marrow signal: Marrow heterogeneity and multilevel type 2 Modic  degenerative endplate signal change. No abnormal marrow edema.  Intervertebral discs: Severe degenerative intervertebral disc space  height loss at L2-L3 with additional moderate diffuse involvement of  the lumbar regions.    Degenerative change:    T12-L1: Mild facet arthropathy. Minimal disc bulge. No spinal canal  stenosis or neural foraminal narrowing.  L1-2: Mild facet arthropathy. Mild disc bulge and hypertrophic  endplate spurring. No spinal canal  stenosis. No substantial neural  foraminal narrowing.  L2-3: Mild facet arthropathy. Mild disc bulge with hypertrophic  endplate spurring, worse along the left lateral aspect. There is  encroachment on the left lateral recess and descending left L3 nerve  root. No additional spinal canal stenosis. Mild bilateral neural  foraminal narrowing.  L3-4: Mild-to-moderate facet arthropathy with slight ligamentum  flavum thickening. Mild to moderate disc bulge with hypertrophic  endplate spurring. No spinal canal stenosis. Mild bilateral neural  foraminal narrowing.  L4-5: Mild-to-moderate facet arthropathy with slight ligamentum  flavum thickening. Encroachment on the lateral recesses without  additional spinal canal stenosis. Mild-to-moderate neural foraminal  narrowing.  L5-S1: Mild facet arthropathy. Mild to moderate disc bulge and  hypertrophic endplate spurring. No spinal canal stenosis. Mild  bilateral neural foraminal narrowing.    Soft tissues: Diffuse fatty atrophy of the posterior paraspinal  musculature. Subcentimeter cystic appearing right renal lesion,  likely a simple renal cyst. Mild symmetric renal atrophy.    Impression  In comparison to previous examination there are findings suggestive  of arachnoiditis with the cauda equina appearing adherent to the  periphery of the thecal sac within the inferior lumbar regions.    Similar appearance of multilevel degenerative change. No spinal canal  stenosis. Mild-to-moderate neural foraminal narrowing as above.     No image results found.       1. Osteoarthritis of right knee, unspecified osteoarthritis type  FL pain management TC    Nerve block      2. Arthritis of right knee  Nerve block           ASSESSMENT/PLAN  Abraham Rodriguez is a 83 y.o. male presents for right knee genicular nerve block    Our plan is as follows:  - Follow In pain clinic  - Continue to participate in physical therapy as well as physician directed home exercises  - Continue pain medications as  prescribed       Sung Simmons MD

## 2023-11-02 ENCOUNTER — HOSPITAL ENCOUNTER (OUTPATIENT)
Dept: RADIOLOGY | Facility: HOSPITAL | Age: 83
Discharge: HOME | End: 2023-11-02
Payer: MEDICARE

## 2023-11-02 ENCOUNTER — OFFICE VISIT (OUTPATIENT)
Dept: PAIN MEDICINE | Facility: CLINIC | Age: 83
End: 2023-11-02
Payer: MEDICARE

## 2023-11-02 VITALS
OXYGEN SATURATION: 97 % | RESPIRATION RATE: 16 BRPM | SYSTOLIC BLOOD PRESSURE: 138 MMHG | HEART RATE: 67 BPM | DIASTOLIC BLOOD PRESSURE: 77 MMHG

## 2023-11-02 DIAGNOSIS — M17.11 OSTEOARTHRITIS OF RIGHT KNEE, UNSPECIFIED OSTEOARTHRITIS TYPE: ICD-10-CM

## 2023-11-02 DIAGNOSIS — M17.11 ARTHRITIS OF RIGHT KNEE: ICD-10-CM

## 2023-11-02 PROCEDURE — 3074F SYST BP LT 130 MM HG: CPT | Performed by: ANESTHESIOLOGY

## 2023-11-02 PROCEDURE — 1125F AMNT PAIN NOTED PAIN PRSNT: CPT | Performed by: ANESTHESIOLOGY

## 2023-11-02 PROCEDURE — 64454 NJX AA&/STRD GNCLR NRV BRNCH: CPT | Performed by: ANESTHESIOLOGY

## 2023-11-02 PROCEDURE — 1159F MED LIST DOCD IN RCRD: CPT | Performed by: ANESTHESIOLOGY

## 2023-11-02 PROCEDURE — 2500000004 HC RX 250 GENERAL PHARMACY W/ HCPCS (ALT 636 FOR OP/ED)

## 2023-11-02 PROCEDURE — 77003 FLUOROGUIDE FOR SPINE INJECT: CPT

## 2023-11-02 PROCEDURE — 3078F DIAST BP <80 MM HG: CPT | Performed by: ANESTHESIOLOGY

## 2023-11-02 PROCEDURE — 2500000005 HC RX 250 GENERAL PHARMACY W/O HCPCS

## 2023-11-02 ASSESSMENT — PAIN SCALES - GENERAL
PAINLEVEL_OUTOF10: 8
PAINLEVEL_OUTOF10: 8

## 2023-11-02 ASSESSMENT — PAIN DESCRIPTION - DESCRIPTORS: DESCRIPTORS: ACHING

## 2023-11-02 ASSESSMENT — ENCOUNTER SYMPTOMS
LOSS OF SENSATION IN FEET: 0
OCCASIONAL FEELINGS OF UNSTEADINESS: 1
DEPRESSION: 0

## 2023-11-02 ASSESSMENT — PAIN - FUNCTIONAL ASSESSMENT: PAIN_FUNCTIONAL_ASSESSMENT: 0-10

## 2023-11-02 NOTE — PROGRESS NOTES
Patient ID: Abraham Rodriguez is a 83 y.o. male.    Nerve block    Date/Time: 11/2/2023 1:41 PM    Performed by: Macho Elizabeth MD  Authorized by: Macho Elizabeth MD    Consent:     Consent obtained:  Written    Consent given by:  Patient    Risks, benefits, and alternatives were discussed: yes      Risks discussed:  Allergic reaction, infection, nerve damage, unsuccessful block and pain  Universal protocol:     Procedure explained and questions answered to patient or proxy's satisfaction: yes      Relevant documents present and verified: yes      Test results available: yes      Imaging studies available: yes      Required blood products, implants, devices, and special equipment available: yes      Site/side marked: yes      Immediately prior to procedure, a time out was called: yes      Patient identity confirmed:  Arm band  Indications:     Indications:  Pain relief  Location:     Laterality:  Right  Pre-procedure details:     Skin preparation:  Chlorhexidine  Skin anesthesia:     Skin anesthesia method:  Local infiltration    Local anesthetic:  Bupivacaine 0.5% w/o epi (lidocaine o.5%)  Procedure details:     Block needle gauge:  25 G    Guidance: fluoroscopy      Block anesthetic: ropivicaine 0.5%    Steroid injected:  None    Additive injected:  Sodium bicarbonate    Injection procedure:  Anatomic landmarks identified  Post-procedure details:     Dressing:  Sterile dressing    Procedure completion:  Tolerated  Comments:      History reviewed patient interviewed and examined.  Risks and benefits of procedure discussed patient agreed to proceed and consent was signed.  Second identification was done in the operating room.  With the patient in the supine position the right knee was propped in a 30 degrees flexed position with 2 pillows underneath.  The right knee prepped and draped in a sterile fashion.  Under fluoroscopic guidance and in an AP view the junction of superior lateral and medial epicondyles and the  distal femur were identified.  Moreover the junction of medial aspect of the tibial plateau and the tibia were identified.  Lidocaine 0.5% was used for local infiltration.  A  3-1/2 inch 25-gauge spinal needles were inserted towards the superior lateral and medial epicondyles and and inferior medial plateau.  Position was confirmed in a lateral view.  Ropivacaine 0.5% mixed with the dexamethasone 1 mg 0.5 cc were injected at each level.    The recurrent fibular nerve was blocked at the  1 cm inferior to Gerdi tubercle was infiltrated with 2 cc of 0.5% ropivacaine.  The infrapatellar branch of the saphanous nerve was also infiltrated along the lateral aspect of the inferior patella and tibial tuberosity with 4 cc of 0.5% ropivacaine.  Patient tolerated the procedure without any complication and was transferred in stable condition to the recovery room.

## 2023-11-02 NOTE — PATIENT INSTRUCTIONS
Written instructions provided to patient    Post-injection instructions:    Your pain may not be gone immediately after the procedure--it usually takes the steroid 3-5 days to start working.   It may take several weeks for the medicine to reach its' full effect.   Pay attention to how much pain relief (what percentage compared to before the procedure) you get and for how long it lasts. We will ask you for this at the follow up visit.     Activity: Avoid strenuous activity for 24 hours. After that return to your normal activity level.     Bandages: Remove after 24 hours     Showering/Bathing: You may shower after bandage is removed     Follow up: With Dr. Elizabeth over the phone in two weeks to discuss how you are doing  Call [facility] to Schedule.   Phone     Call the doctor immediately: if you notice:     Excessive bleeding from procedure site (brisk bright red bleeding from the site or     bleeding that soaks the bandages or does not stop)   Severe headache  Inability to walk, leg or arm weakness or numbness that is worse after the procedure   Uncontrolled pain   New urinary or fecal incontinence   Signs of infection: Fever above 101.5F, redness, swelling, pus or drainage from the site

## 2023-11-13 ENCOUNTER — TELEPHONE (OUTPATIENT)
Dept: PAIN MEDICINE | Facility: CLINIC | Age: 83
End: 2023-11-13
Payer: COMMERCIAL

## 2023-11-13 NOTE — TELEPHONE ENCOUNTER
Patient had a genicular knee injection 11-2. He has stated that the bone above the knee is in extreme pain and he cannot touch it. His FU injection is 11-20. Would like a call back before then. Thanks

## 2023-11-16 ENCOUNTER — OFFICE VISIT (OUTPATIENT)
Dept: PAIN MEDICINE | Facility: CLINIC | Age: 83
End: 2023-11-16
Payer: MEDICARE

## 2023-11-16 VITALS — SYSTOLIC BLOOD PRESSURE: 148 MMHG | DIASTOLIC BLOOD PRESSURE: 79 MMHG | RESPIRATION RATE: 20 BRPM | HEART RATE: 79 BPM

## 2023-11-16 DIAGNOSIS — M79.18 MYOFASCIAL PAIN ON LEFT SIDE: Primary | ICD-10-CM

## 2023-11-16 PROCEDURE — 99213 OFFICE O/P EST LOW 20 MIN: CPT | Performed by: ANESTHESIOLOGY

## 2023-11-16 PROCEDURE — 3077F SYST BP >= 140 MM HG: CPT | Performed by: ANESTHESIOLOGY

## 2023-11-16 PROCEDURE — 1125F AMNT PAIN NOTED PAIN PRSNT: CPT | Performed by: ANESTHESIOLOGY

## 2023-11-16 PROCEDURE — 3078F DIAST BP <80 MM HG: CPT | Performed by: ANESTHESIOLOGY

## 2023-11-16 PROCEDURE — 1159F MED LIST DOCD IN RCRD: CPT | Performed by: ANESTHESIOLOGY

## 2023-11-16 PROCEDURE — 1036F TOBACCO NON-USER: CPT | Performed by: ANESTHESIOLOGY

## 2023-11-16 ASSESSMENT — PAIN SCALES - GENERAL: PAINLEVEL_OUTOF10: 8

## 2023-11-16 ASSESSMENT — PAIN - FUNCTIONAL ASSESSMENT: PAIN_FUNCTIONAL_ASSESSMENT: 0-10

## 2023-11-16 ASSESSMENT — ENCOUNTER SYMPTOMS
OCCASIONAL FEELINGS OF UNSTEADINESS: 0
DEPRESSION: 0

## 2023-11-16 NOTE — PROGRESS NOTES
History Of Present Illness  Abraham Rodriguez is a 83 y.o. male presenting with right knee pain secondary to osteoarthritis.  He underwent right genicular nerve injections on November 2, 2023.  Patient did well after the injection however he felt that he got less than 50% relief of his knee pain.  Today patient is complaining of mid thigh pain localized at the medial aspect on the medial aspect and radiates to medial aspect below the knee.  Patient describes this pain as electrical in nature.  There is no history of any numbness or weakness in the right lower extremity.  Does not recall any incident that may have precipitated such pain.  No history of any swelling or edema.     Past Medical History  He has a past medical history of Allergic rhinitis due to pollen (10/23/2014), Encounter for other preprocedural examination (06/24/2014), Encounter for screening for malignant neoplasm of prostate, Localized edema (05/11/2020), Other conditions influencing health status, Pain in unspecified knee (12/22/2014), Personal history of diseases of the skin and subcutaneous tissue (04/23/2013), Personal history of other diseases of the musculoskeletal system and connective tissue (06/19/2014), Personal history of other diseases of the nervous system and sense organs (08/10/2021), Personal history of other diseases of the respiratory system (11/12/2014), Personal history of other diseases of the respiratory system (08/13/2014), Personal history of other specified conditions (08/20/2013), Plantar fascial fibromatosis (07/22/2013), Polyp of colon, Syncope and collapse (01/23/2015), Unspecified abdominal pain (12/22/2014), and Unspecified disorder of eyelid (10/23/2014).    Surgical History  He has a past surgical history that includes Other surgical history (04/27/2021); Other surgical history (04/27/2021); Cholecystectomy (04/23/2013); Shoulder surgery (04/23/2013); and Other surgical history (04/23/2013).     Social History  He  reports that he has never smoked. He has never used smokeless tobacco. He reports that he does not drink alcohol and does not use drugs.    Family History  Family History   Problem Relation Name Age of Onset    Hypothyroidism Brother          Allergies  Atorvastatin and Iodinated contrast media    Review of systems:   13-point review of systems done and negative except as noted in HPI      Physical Exam   Pertinent findings: Right thigh tenderness on palpation quadriceps muscle.  No erythema or edema noted.    Vital signs: Reviewed  Constitutional: No acute distress, well appearing and well nourished. Patient appears stated age.   Eyes: Conjunctiva non-icteric and eye lids are without obvious rash or drooping. Pupils are symmetric.   Ears, Nose, Mouth, and Throat: External ears and nose appear to be without deformity or rash. No lesions or masses noted. Hearing is grossly intact.   Neck:. No JVD noted, tracheal position is midline.   Head and Face: Examination of the head and face revealed no abnormalities.   Respiratory: No gasping or shortness of breath noted, no use of accessory muscles noted.   Cardiovascular: Examination for edema is normal.   GI: Abdomen nontender to palpation.   Skin: No rashes or open lesions/ulcers identified on skin.   Musk: Digits/nails show no clubbing or cyanosis. No asymmetry or masses noted of the musculature. Examination of the muscles/joints/bones show normal range of motion. Gait is grossly normal.  Able to walk on toes and heels.   Neurologic: Cranial nerves II-XII intact, motor strength 5/5 muscle strength of the lower extremities bilaterally and equal. 5/5 muscle strength of the upper extremities bilaterally and equal.   Reflexes: normal.   Sensation: Normal to touch and pinprick on medial aspect of thigh and leg         Last Recorded Vitals  /79   Pulse 79   Resp 20     Relevant Results            Last OARRS Review: No data recorded    I have personally reviewed the  OARRS report for Abraham Rodriguez I have considered the risks of abuse, dependence, addiction and diversion       Assessment/Plan   Patient was reassured that this is not related to the genicular nerve block.  I discussed the patient the most likely etiology of his pain which could be musculoskeletal in nature.  I recommended lidocaine patches and follow-up in 1 week    Plan  Lidocaine 5% patch  Follow-up in 1 week       Macho Elizabeth MD

## 2023-11-20 ENCOUNTER — APPOINTMENT (OUTPATIENT)
Dept: PAIN MEDICINE | Facility: CLINIC | Age: 83
End: 2023-11-20
Payer: MEDICARE

## 2023-11-21 ENCOUNTER — OFFICE VISIT (OUTPATIENT)
Dept: CARDIOLOGY | Facility: CLINIC | Age: 83
End: 2023-11-21
Payer: MEDICARE

## 2023-11-21 VITALS
BODY MASS INDEX: 27.12 KG/M2 | RESPIRATION RATE: 18 BRPM | OXYGEN SATURATION: 96 % | HEART RATE: 83 BPM | SYSTOLIC BLOOD PRESSURE: 143 MMHG | WEIGHT: 189 LBS | DIASTOLIC BLOOD PRESSURE: 78 MMHG

## 2023-11-21 DIAGNOSIS — I50.32 CHRONIC HEART FAILURE WITH PRESERVED EJECTION FRACTION (MULTI): Primary | ICD-10-CM

## 2023-11-21 DIAGNOSIS — E78.5 DYSLIPIDEMIA: ICD-10-CM

## 2023-11-21 DIAGNOSIS — I10 PRIMARY HYPERTENSION: ICD-10-CM

## 2023-11-21 PROBLEM — Z79.01 CHRONIC ANTICOAGULATION: Status: RESOLVED | Noted: 2023-09-14 | Resolved: 2023-11-21

## 2023-11-21 PROBLEM — I82.409 DVT (DEEP VENOUS THROMBOSIS) (MULTI): Status: RESOLVED | Noted: 2023-09-14 | Resolved: 2023-11-21

## 2023-11-21 PROBLEM — E78.2 MIXED HYPERLIPIDEMIA: Status: RESOLVED | Noted: 2023-09-14 | Resolved: 2023-11-21

## 2023-11-21 PROCEDURE — 1036F TOBACCO NON-USER: CPT | Performed by: NURSE PRACTITIONER

## 2023-11-21 PROCEDURE — 1159F MED LIST DOCD IN RCRD: CPT | Performed by: NURSE PRACTITIONER

## 2023-11-21 PROCEDURE — 1126F AMNT PAIN NOTED NONE PRSNT: CPT | Performed by: NURSE PRACTITIONER

## 2023-11-21 PROCEDURE — 3077F SYST BP >= 140 MM HG: CPT | Performed by: NURSE PRACTITIONER

## 2023-11-21 PROCEDURE — 3078F DIAST BP <80 MM HG: CPT | Performed by: NURSE PRACTITIONER

## 2023-11-21 PROCEDURE — 99213 OFFICE O/P EST LOW 20 MIN: CPT | Performed by: NURSE PRACTITIONER

## 2023-11-21 RX ORDER — ATORVASTATIN CALCIUM 40 MG/1
40 TABLET, FILM COATED ORAL DAILY
COMMUNITY
End: 2023-11-29 | Stop reason: ALTCHOICE

## 2023-11-21 ASSESSMENT — PAIN SCALES - GENERAL: PAINLEVEL: 0-NO PAIN

## 2023-11-21 NOTE — PROGRESS NOTES
Primary Care Physician: SHAD Piedra-CNP  Primary Cardiologist:       Date of Visit: 11/21/2023 10:00 AM EST  Location of visit:  [facility]   Type of Visit: Follow up             Chief Complaint   Patient presents with    Chest Pain     Year follow up per pt          HPI / Summary:   Abraham Rodriguez is a 83 y.o. male  with  with ASCVD s/p remote MI without PCI, HTN, HLD, remote VT/PE, Chronic lumbar radiculopathy and chronic asbestosis    who returns for routine follow up     Feels well and is staying active, despite osteoarthritis     12 system review is negative except as noted above        Medical History:   Past Medical History:   Diagnosis Date    Allergic rhinitis due to pollen 10/23/2014    Hay fever    Encounter for other preprocedural examination 06/24/2014    Pre-procedural examination    Encounter for screening for malignant neoplasm of prostate     Encounter for screening for malignant neoplasm of prostate    Localized edema 05/11/2020    Pedal edema    Other conditions influencing health status     Carcinoma Of The Tongue    Pain in unspecified knee 12/22/2014    Joint pain, knee    Personal history of diseases of the skin and subcutaneous tissue 04/23/2013    History of eczema    Personal history of other diseases of the musculoskeletal system and connective tissue 06/19/2014    Personal history of arthritis    Personal history of other diseases of the nervous system and sense organs 08/10/2021    History of Bell's palsy    Personal history of other diseases of the respiratory system 11/12/2014    History of asbestosis    Personal history of other diseases of the respiratory system 08/13/2014    History of chronic obstructive lung disease    Personal history of other specified conditions 08/20/2013    History of edema    Plantar fascial fibromatosis 07/22/2013    Plantar fasciitis    Polyp of colon     Polyp of sigmoid colon    Syncope and collapse 01/23/2015    Syncope and collapse     Unspecified abdominal pain 12/22/2014    Stomach pain    Unspecified disorder of eyelid 10/23/2014    Eyelid disorder       Social History:   Tobacco Use: Low Risk  (11/16/2023)    Patient History     Smoking Tobacco Use: Never     Smokeless Tobacco Use: Never     Passive Exposure: Not on file         MEDICATIONS:   Current Outpatient Medications   Medication Instructions    atorvastatin (LIPITOR) 40 mg, oral, Daily    dexlansoprazole (DEXILANT) 60 mg, oral, Daily    finasteride (PROSCAR) 5 mg, oral, Daily    irbesartan (AVAPRO) 150 mg, oral, Daily    metoprolol succinate XL (Toprol-XL) 50 mg 24 hr tablet 1 tablet, oral, Daily    naloxone (Narcan) 4 mg/0.1 mL nasal spray nasal, As needed,  ADMINISTER A SINGLE SPRAY IN ONE NOSTRIL UPON SIGNS OF OPIOID OVERDOSE. CALL 911. REPEAT AFTER 3 MINUTES IF NO RESPONSE.<BR>    oxyCODONE-acetaminophen (Percocet) 7.5-325 mg tablet 1 tablet, oral, 3 times daily PRN    oxyCODONE-acetaminophen (Percocet) 7.5-325 mg tablet 1 tablet, oral, 3 times daily PRN    [START ON 12/6/2023] oxyCODONE-acetaminophen (Percocet) 7.5-325 mg tablet 1 tablet, oral, 3 times daily PRN    tamsulosin (FLOMAX) 0.4 mg, oral, Daily RT    torsemide (DEMADEX) 20 mg, oral, Daily         IMAGING REVIEWED:           LABS:  CBC with Differential:    Lab Results   Component Value Date    WBC 7.4 08/13/2023    RBC 4.38 (L) 08/13/2023    HGB 13.4 (L) 08/13/2023    HCT 41.0 08/13/2023     08/13/2023    MCV 94 08/13/2023    MCH 31.0 10/20/2022    MCHC 32.7 08/13/2023    RDW 12.7 08/13/2023    NRBC CANCELED 06/02/2023    BANDSPCT 4.0 03/13/2020    LYMPHOPCT 9.7 08/09/2023    LYMPHOPCT 20.40 07/06/2021    MONOPCT 5.0 08/09/2023    MONOPCT 7.60 07/06/2021    EOSPCT 0.6 08/09/2023    EOSPCT 0.0 03/13/2020    BASOPCT 0.2 08/09/2023    BASOPCT 0.0 03/13/2020    MONOSABS 0.55 08/09/2023    LYMPHSABS 1.07 08/09/2023    EOSABS 0.07 08/09/2023    EOSABS 0.00 03/13/2020    BASOSABS 0.02 08/09/2023    BASOSABS 0.00  03/13/2020     CMP:    Lab Results   Component Value Date     08/13/2023    K 3.9 08/13/2023     (H) 08/13/2023    CO2 24 08/13/2023    BUN 13 08/13/2023    CREATININE 0.98 08/13/2023    GLUCOSE 91 08/13/2023    PROT 7.4 08/09/2023    CALCIUM 9.2 08/13/2023    BILITOT 0.7 08/09/2023    ALKPHOS 70 08/09/2023    AST 23 08/09/2023    ALT 8 (L) 08/09/2023     BMP:    Lab Results   Component Value Date     08/13/2023    K 3.9 08/13/2023     (H) 08/13/2023    CO2 24 08/13/2023    BUN 13 08/13/2023    CREATININE 0.98 08/13/2023    CALCIUM 9.2 08/13/2023    GLUCOSE 91 08/13/2023     Magnesium:  Lab Results   Component Value Date    MG 2.12 08/11/2023     Troponin:    Lab Results   Component Value Date    TROPHS 6 08/15/2022    TROPHS 7 08/15/2022    TROPHS 5 08/14/2022     BNP:   Lab Results   Component Value Date     (H) 07/03/2023         Lipid Panel:  Lab Results   Component Value Date    HDL 39 (L) 10/20/2022    CHHDL 4.4 10/20/2022    VLDL 10 08/16/2022    TRIG 116 10/20/2022        Lab work and imaging results independently reviewed by me     Visit Vitals  /78 (BP Location: Right arm)   Pulse 83   Resp 18   Wt 85.7 kg (189 lb)   SpO2 96%   BMI 27.12 kg/m²   Smoking Status Never   BSA 2.06 m²         Constitutional:       Appearance: Healthy appearance. Not in distress.   Eyes:      Conjunctiva/sclera: Conjunctivae normal.   Neck:      Vascular: JVD normal.   Pulmonary:      Effort: Pulmonary effort is normal.      Breath sounds: Normal breath sounds.   Cardiovascular:      PMI at left midclavicular line. Normal rate. Regular rhythm. Normal S1. Normal S2.       Murmurs: There is no murmur.      No rub.   Pulses:     Intact distal pulses.   Edema:     Peripheral edema absent.   Abdominal:      General: Bowel sounds are normal.   Musculoskeletal:      Cervical back: Neck supple. Skin:     General: Skin is warm and dry.   Neurological:      Mental Status: Alert and oriented to person,  place and time.           Problem List Items Addressed This Visit             ICD-10-CM    Primary hypertension I10       BP is in acceptable range on current RX which He is tolerating well and agrees to continue    Toprol XL 50 mg daily   Torsemide 20 mg daily            Dyslipidemia E78.5     Most recent  mg daily   Atorvastatin 40 mg HS          (HFpEF) heart failure with preserved ejection fraction (CMS/HCC) - Primary I50.30     Clinically euvolemic, NYHA class II                     11/21/23 at 10:22 AM - ARCHANA Restrepo      Orders:  No orders of the defined types were placed in this encounter.        Followup Appts:  Future Appointments   Date Time Provider Department Center   12/8/2023 10:00 AM ARCHANA Restrepo [id number]   12/28/2023  9:00 AM ARCHANA Cortés [id number] [facility]   1/29/2024  1:15 PM Macho Elizabeth MD [id number]   11/22/2024 10:00 AM ARCHANA Restrepo [id number] [facility]

## 2023-11-21 NOTE — ASSESSMENT & PLAN NOTE
BP is in acceptable range on current RX which He is tolerating well and agrees to continue    Toprol XL 50 mg daily   Torsemide 20 mg daily

## 2023-11-29 ENCOUNTER — OFFICE VISIT (OUTPATIENT)
Dept: PRIMARY CARE | Facility: CLINIC | Age: 83
End: 2023-11-29
Payer: MEDICARE

## 2023-11-29 ENCOUNTER — LAB (OUTPATIENT)
Dept: LAB | Facility: LAB | Age: 83
End: 2023-11-29
Payer: MEDICARE

## 2023-11-29 VITALS
HEART RATE: 83 BPM | HEIGHT: 70 IN | DIASTOLIC BLOOD PRESSURE: 72 MMHG | RESPIRATION RATE: 18 BRPM | BODY MASS INDEX: 26.37 KG/M2 | OXYGEN SATURATION: 98 % | SYSTOLIC BLOOD PRESSURE: 126 MMHG | WEIGHT: 184.2 LBS

## 2023-11-29 DIAGNOSIS — I25.118 ATHEROSCLEROSIS OF NATIVE CORONARY ARTERY OF NATIVE HEART WITH OTHER FORM OF ANGINA PECTORIS (CMS-HCC): ICD-10-CM

## 2023-11-29 DIAGNOSIS — E78.5 DYSLIPIDEMIA: ICD-10-CM

## 2023-11-29 DIAGNOSIS — G89.4 CHRONIC PAIN SYNDROME: ICD-10-CM

## 2023-11-29 DIAGNOSIS — J44.9 COPD, MILD (MULTI): ICD-10-CM

## 2023-11-29 DIAGNOSIS — I10 BENIGN ESSENTIAL HYPERTENSION: ICD-10-CM

## 2023-11-29 DIAGNOSIS — Z00.00 WELLNESS EXAMINATION: Primary | ICD-10-CM

## 2023-11-29 LAB
ALBUMIN SERPL BCP-MCNC: 4.5 G/DL (ref 3.4–5)
ALP SERPL-CCNC: 55 U/L (ref 33–136)
ALT SERPL W P-5'-P-CCNC: 10 U/L (ref 10–52)
ANION GAP SERPL CALC-SCNC: 12 MMOL/L (ref 10–20)
AST SERPL W P-5'-P-CCNC: 14 U/L (ref 9–39)
BASOPHILS # BLD AUTO: 0.04 X10*3/UL (ref 0–0.1)
BASOPHILS NFR BLD AUTO: 0.5 %
BILIRUB SERPL-MCNC: 0.9 MG/DL (ref 0–1.2)
BUN SERPL-MCNC: 20 MG/DL (ref 6–23)
CALCIUM SERPL-MCNC: 9.4 MG/DL (ref 8.6–10.3)
CHLORIDE SERPL-SCNC: 103 MMOL/L (ref 98–107)
CHOLEST SERPL-MCNC: 183 MG/DL (ref 0–199)
CHOLESTEROL/HDL RATIO: 4.8
CO2 SERPL-SCNC: 28 MMOL/L (ref 21–32)
CREAT SERPL-MCNC: 0.95 MG/DL (ref 0.5–1.3)
EOSINOPHIL # BLD AUTO: 0.1 X10*3/UL (ref 0–0.4)
EOSINOPHIL NFR BLD AUTO: 1.3 %
ERYTHROCYTE [DISTWIDTH] IN BLOOD BY AUTOMATED COUNT: 13 % (ref 11.5–14.5)
GFR SERPL CREATININE-BSD FRML MDRD: 79 ML/MIN/1.73M*2
GLUCOSE SERPL-MCNC: 85 MG/DL (ref 74–99)
HCT VFR BLD AUTO: 45.9 % (ref 41–52)
HDLC SERPL-MCNC: 38 MG/DL
HGB BLD-MCNC: 14.7 G/DL (ref 13.5–17.5)
IMM GRANULOCYTES # BLD AUTO: 0.01 X10*3/UL (ref 0–0.5)
IMM GRANULOCYTES NFR BLD AUTO: 0.1 % (ref 0–0.9)
LDLC SERPL CALC-MCNC: 123 MG/DL
LYMPHOCYTES # BLD AUTO: 1.52 X10*3/UL (ref 0.8–3)
LYMPHOCYTES NFR BLD AUTO: 20.4 %
MCH RBC QN AUTO: 29.9 PG (ref 26–34)
MCHC RBC AUTO-ENTMCNC: 32 G/DL (ref 32–36)
MCV RBC AUTO: 93 FL (ref 80–100)
MONOCYTES # BLD AUTO: 0.54 X10*3/UL (ref 0.05–0.8)
MONOCYTES NFR BLD AUTO: 7.2 %
NEUTROPHILS # BLD AUTO: 5.24 X10*3/UL (ref 1.6–5.5)
NEUTROPHILS NFR BLD AUTO: 70.5 %
NON HDL CHOLESTEROL: 145 MG/DL (ref 0–149)
NRBC BLD-RTO: 0 /100 WBCS (ref 0–0)
PLATELET # BLD AUTO: 206 X10*3/UL (ref 150–450)
POTASSIUM SERPL-SCNC: 4 MMOL/L (ref 3.5–5.3)
PROT SERPL-MCNC: 7 G/DL (ref 6.4–8.2)
RBC # BLD AUTO: 4.92 X10*6/UL (ref 4.5–5.9)
SODIUM SERPL-SCNC: 139 MMOL/L (ref 136–145)
TRIGL SERPL-MCNC: 111 MG/DL (ref 0–149)
VLDL: 22 MG/DL (ref 0–40)
WBC # BLD AUTO: 7.5 X10*3/UL (ref 4.4–11.3)

## 2023-11-29 PROCEDURE — 1036F TOBACCO NON-USER: CPT | Performed by: NURSE PRACTITIONER

## 2023-11-29 PROCEDURE — 80053 COMPREHEN METABOLIC PANEL: CPT

## 2023-11-29 PROCEDURE — 85025 COMPLETE CBC W/AUTO DIFF WBC: CPT

## 2023-11-29 PROCEDURE — 1160F RVW MEDS BY RX/DR IN RCRD: CPT | Performed by: NURSE PRACTITIONER

## 2023-11-29 PROCEDURE — 36415 COLL VENOUS BLD VENIPUNCTURE: CPT

## 2023-11-29 PROCEDURE — 3074F SYST BP LT 130 MM HG: CPT | Performed by: NURSE PRACTITIONER

## 2023-11-29 PROCEDURE — 3078F DIAST BP <80 MM HG: CPT | Performed by: NURSE PRACTITIONER

## 2023-11-29 PROCEDURE — 80061 LIPID PANEL: CPT

## 2023-11-29 PROCEDURE — 1159F MED LIST DOCD IN RCRD: CPT | Performed by: NURSE PRACTITIONER

## 2023-11-29 PROCEDURE — 1126F AMNT PAIN NOTED NONE PRSNT: CPT | Performed by: NURSE PRACTITIONER

## 2023-11-29 PROCEDURE — 99397 PER PM REEVAL EST PAT 65+ YR: CPT | Performed by: NURSE PRACTITIONER

## 2023-11-29 RX ORDER — DICYCLOMINE HYDROCHLORIDE 20 MG/1
TABLET ORAL
Status: ON HOLD | COMMUNITY
End: 2024-03-21 | Stop reason: SDUPTHER

## 2023-11-29 NOTE — PROGRESS NOTES
"Subjective   Patient ID: Abraham Rodriguez is a 83 y.o. male who presents for New Patient Visit (NP.OV. here today to [facility]. States that he was seeing Pearl Perez, but she left the practice. States that in April he had a total hip replacement (right) and left completed in August 2022.).    83-year-old retired but here to establish care with Vivi Tomlinson nurse practitioner however she is out on DWAINE and I am covering for her.  Patient here because his previous primary care provider is no longer seeing patients.  He has no acute concerns at this time pulm- Dr Leiva. Asbestos.   CV- Giselle Elizabeth- on PRN Demedex only for edema; HTN- BB and ARB  Chronic RT knee pain- ortho-Dr Arora, Pain management- on opiates. Pain in the knee, swelling and thigh pain. Right now its just in the knee.   IBS- dexilant and dicyclomine. Sees Dr Nguyen.  [facility].   Hearing ok  V[facility]- [facility]acts [facility]. Wears reading glasses  Dentist-  2 teeth extractions.   ETOH- none  Nicotene-none  Occasional MJ to help with pain.   No refills needed             Review of Systems   All other systems reviewed and are negative.      Objective   /72   Pulse 83   Resp 18   Ht 1.778 m (5' 10\")   Wt 83.6 kg (184 lb 3.2 oz)   SpO2 98%   BMI 26.43 kg/m²     Physical Exam  Vitals and nursing note reviewed.   Constitutional:       General: He is not in acute distress.     Appearance: Normal appearance.   HENT:      Head: Normocephalic and atraumatic.   Neck:      Vascular: No carotid bruit.   Cardiovascular:      Rate and Rhythm: Normal rate and regular rhythm.      Pulses: Normal pulses.      Heart sounds: Normal heart sounds.   Pulmonary:      Effort: Pulmonary effort is normal.      Breath sounds: Normal breath sounds.   Lymphadenopathy:      Cervical: No cervical adenopathy.   Skin:     General: Skin is warm and dry.   Neurological:      General: No focal deficit present.      Mental Status: He is alert and oriented to person, " place, and time.   Psychiatric:         Mood and Affect: Mood normal.         Behavior: Behavior normal.         Assessment/Plan   Diagnoses and all orders for this visit:  Wellness examination  Benign essential hypertension  Comments:  Well-controlled managed by cardiology he follows up with q. year.  Orders:  -     CBC and Auto Differential; Future  -     Comprehensive Metabolic Panel; Future  Atherosclerosis of native coronary artery of native heart with other form of angina pectoris (CMS/HCC)  Comments:  Doing well update CMP CBC and fasting lipid panel  Orders:  -     CBC and Auto Differential; Future  Dyslipidemia  Comments:  Not on statin and used to take atorvastatin no longer on this prescription  Orders:  -     Lipid Panel; Future  COPD, mild (CMS/HCC)  Comments:  Managed by pulmonology for asbestos exposure  Chronic pain syndrome  Comments:  Managed by pain management on chronic opiates.  Defer all treatment to pain management  Other orders  -     Follow Up In Primary Care - Other; Future

## 2023-12-04 ENCOUNTER — TELEPHONE (OUTPATIENT)
Dept: PAIN MEDICINE | Facility: CLINIC | Age: 83
End: 2023-12-04
Payer: COMMERCIAL

## 2023-12-04 NOTE — TELEPHONE ENCOUNTER
The lidocaine patches that were prescribed by Dr. Elizabeth,  for his knee have not provided relief at all. Please call patient and advise. Thanks

## 2023-12-05 NOTE — TELEPHONE ENCOUNTER
VM left for pt to advise him to come in earlier than end of January if this continues to be painful, to be evaluated by provider.

## 2023-12-06 DIAGNOSIS — M54.16 LUMBAR RADICULITIS: ICD-10-CM

## 2023-12-06 DIAGNOSIS — M51.36 DEGENERATION OF INTERVERTEBRAL DISC OF LUMBAR REGION: ICD-10-CM

## 2023-12-06 RX ORDER — OXYCODONE AND ACETAMINOPHEN 7.5; 325 MG/1; MG/1
1 TABLET ORAL 3 TIMES DAILY PRN
Qty: 84 TABLET | Refills: 0 | Status: SHIPPED | OUTPATIENT
Start: 2023-12-06 | End: 2023-12-08 | Stop reason: SDUPTHER

## 2023-12-08 DIAGNOSIS — M54.16 LUMBAR RADICULITIS: ICD-10-CM

## 2023-12-08 DIAGNOSIS — M51.36 DEGENERATION OF INTERVERTEBRAL DISC OF LUMBAR REGION: ICD-10-CM

## 2023-12-08 RX ORDER — OXYCODONE AND ACETAMINOPHEN 7.5; 325 MG/1; MG/1
1 TABLET ORAL 3 TIMES DAILY PRN
Qty: 84 TABLET | Refills: 0 | Status: SHIPPED | OUTPATIENT
Start: 2024-01-03 | End: 2024-01-29 | Stop reason: SDUPTHER

## 2023-12-28 ENCOUNTER — APPOINTMENT (OUTPATIENT)
Dept: PRIMARY CARE | Facility: CLINIC | Age: 83
End: 2023-12-28
Payer: COMMERCIAL

## 2024-01-18 ENCOUNTER — APPOINTMENT (OUTPATIENT)
Dept: RADIOLOGY | Facility: HOSPITAL | Age: 84
End: 2024-01-18
Payer: MEDICARE

## 2024-01-22 ENCOUNTER — HOSPITAL ENCOUNTER (OUTPATIENT)
Dept: RADIOLOGY | Facility: HOSPITAL | Age: 84
Discharge: HOME | End: 2024-01-22
Payer: MEDICARE

## 2024-01-22 DIAGNOSIS — M25.561 PAIN IN RIGHT KNEE: ICD-10-CM

## 2024-01-22 PROCEDURE — 73700 CT LOWER EXTREMITY W/O DYE: CPT | Mod: RIGHT SIDE | Performed by: RADIOLOGY

## 2024-01-22 PROCEDURE — 73700 CT LOWER EXTREMITY W/O DYE: CPT | Mod: RT

## 2024-01-29 ENCOUNTER — OFFICE VISIT (OUTPATIENT)
Dept: PAIN MEDICINE | Facility: CLINIC | Age: 84
End: 2024-01-29
Payer: COMMERCIAL

## 2024-01-29 DIAGNOSIS — M54.16 LUMBAR RADICULITIS: ICD-10-CM

## 2024-01-29 DIAGNOSIS — M51.36 DEGENERATION OF INTERVERTEBRAL DISC OF LUMBAR REGION: ICD-10-CM

## 2024-01-29 PROCEDURE — 1036F TOBACCO NON-USER: CPT | Performed by: ANESTHESIOLOGY

## 2024-01-29 PROCEDURE — 1126F AMNT PAIN NOTED NONE PRSNT: CPT | Performed by: ANESTHESIOLOGY

## 2024-01-29 PROCEDURE — 99213 OFFICE O/P EST LOW 20 MIN: CPT | Performed by: ANESTHESIOLOGY

## 2024-01-29 RX ORDER — OXYCODONE AND ACETAMINOPHEN 7.5; 325 MG/1; MG/1
1 TABLET ORAL 3 TIMES DAILY PRN
Qty: 84 TABLET | Refills: 0 | Status: SHIPPED | OUTPATIENT
Start: 2024-01-31 | End: 2024-03-22 | Stop reason: HOSPADM

## 2024-01-29 RX ORDER — OXYCODONE AND ACETAMINOPHEN 7.5; 325 MG/1; MG/1
1 TABLET ORAL 3 TIMES DAILY PRN
Qty: 84 TABLET | Refills: 0 | Status: SHIPPED | OUTPATIENT
Start: 2024-03-27 | End: 2024-03-22 | Stop reason: HOSPADM

## 2024-01-29 RX ORDER — OXYCODONE AND ACETAMINOPHEN 7.5; 325 MG/1; MG/1
1 TABLET ORAL 3 TIMES DAILY PRN
Qty: 84 TABLET | Refills: 0 | Status: SHIPPED | OUTPATIENT
Start: 2024-02-28 | End: 2024-03-12 | Stop reason: SDUPTHER

## 2024-01-29 NOTE — PROGRESS NOTES
Subjective   Patient ID: Abraham Rodriguez is a 83 y.o. male who presents for No chief complaint on file..  HPI        Abraham Rodriguez is a 83 y.o. male presenting with right knee pain secondary to osteoarthritis.  He underwent right genicular nerve injections on November 2, 2023.  Patient did well after the injection however he felt that he got less than 50% relief of his knee pain.  He is scheduled for total knee replacement on March 20 with his orthopedic surgeon.  Today he reports that his pain is moderately controlled with Percocet 7.5 mg 3 times daily and he would like to increase his dose.  We discussed the risks of increasing his dose and the possibility of hyperalgesia after surgery and possible side effects, the patient is agreeable to continue on the same dose he was advised but tried to titrate down his Percocet to 1 to 2 pills daily prior to surgery.  Patient reports no changes in his symptoms since last visit.                Review of Systems     Current Outpatient Medications   Medication Instructions    dexlansoprazole (DEXILANT) 60 mg, oral, Daily    dicyclomine (Bentyl) 20 mg tablet oral, 4 times daily before meals and nightly    irbesartan (AVAPRO) 150 mg, oral, Daily    metoprolol succinate XL (Toprol-XL) 50 mg 24 hr tablet 1 tablet, oral, Daily    naloxone (Narcan) 4 mg/0.1 mL nasal spray nasal, As needed,  ADMINISTER A SINGLE SPRAY IN ONE NOSTRIL UPON SIGNS OF OPIOID OVERDOSE. CALL 911. REPEAT AFTER 3 MINUTES IF NO RESPONSE.<BR>    oxyCODONE-acetaminophen (Percocet) 7.5-325 mg tablet 1 tablet, oral, 3 times daily PRN    torsemide (DEMADEX) 20 mg, oral, Daily        Past Medical History:   Diagnosis Date    Allergic rhinitis due to pollen 10/23/2014    Hay fever    Encounter for other preprocedural examination 06/24/2014    Pre-procedural examination    Encounter for screening for malignant neoplasm of prostate     Encounter for screening for malignant neoplasm of prostate    Localized edema  05/11/2020    Pedal edema    Other conditions influencing health status     Carcinoma Of The Tongue    Pain in unspecified knee 12/22/2014    Joint pain, knee    Personal history of diseases of the skin and subcutaneous tissue 04/23/2013    History of eczema    Personal history of other diseases of the musculoskeletal system and connective tissue 06/19/2014    Personal history of arthritis    Personal history of other diseases of the nervous system and sense organs 08/10/2021    History of Bell's palsy    Personal history of other diseases of the respiratory system 11/12/2014    History of asbestosis    Personal history of other diseases of the respiratory system 08/13/2014    History of chronic obstructive lung disease    Personal history of other specified conditions 08/20/2013    History of edema    Plantar fascial fibromatosis 07/22/2013    Plantar fasciitis    Polyp of colon     Polyp of sigmoid colon    Syncope and collapse 01/23/2015    Syncope and collapse    Unspecified abdominal pain 12/22/2014    Stomach pain    Unspecified disorder of eyelid 10/23/2014    Eyelid disorder        Past Surgical History:   Procedure Laterality Date    CHOLECYSTECTOMY  04/23/2013    Cholecystectomy Laparoscopic    OTHER SURGICAL HISTORY  04/27/2021    Meniscus repair    OTHER SURGICAL HISTORY  04/27/2021    Hip replacement    OTHER SURGICAL HISTORY  04/23/2013    Biopsy Tongue    SHOULDER SURGERY  04/23/2013    Shoulder Surgery        Family History   Problem Relation Name Age of Onset    Hypothyroidism Brother          Allergies   Allergen Reactions    Iodinated Contrast Media Unknown and Anaphylaxis    Tobramycin-Dexamethasone Itching and Rash    Lisinopril Unknown        Objective     There were no vitals filed for this visit.     Physical Exam    GENERAL EXAM  Vital Signs: Vital signs to include heart rate, respiration rate, blood pressure, and temperature were reviewed.  General Appearance:  Awake, alert, healthy  appearing, well developed, No acute distress.  Head: Normocephalic without evidence of head injury.  Neck: The appearance of the neck was normal without swelling with a midline trachea.  Eyes: The eyelids and eyebrows exhibited no abnormalities.  Pupils were not pin-point.  Sclera was without icterus.  Lungs: Respiration rhythm and depth was normal.  Respiratory movements were normal without labored breathing.  Cardiovascular: No peripheral edema was present.    Neurological: Patient was oriented to time, place, and person.  Speech was normal.  Balance, and stance were unremarkable.    Psychiatric: Appearance was normal with appropriate dress.  Mood was euthymic and affect was normal.  Skin: Affected regions were without ecchymosis or skin lesions.        Physical exam as above except:        Assessment/Plan     83-year-old male presenting with right knee pain secondary to osteoarthritis.  He is scheduled for right total knee replacement on March 20 May is here today for prescription refill.  Plan:  refill Percocet 7.5 mg 3 times daily.  Patient is invited to call us back or schedule an appointment for any changes in his symptoms.       This note was generated with the aid of dictation software, there may be typos despite my attempts at proofreading.

## 2024-03-06 NOTE — CONSULTS
Service:   Service: Medicine     Consult:  Consult requested by (Attending Name): TRICIA ARROA   Reason: co medical management     History of Present Illness:   Admission Reason: Left Hip Osteoarthritis   HPI:    ARTEMIO MORALES is a 82 year old Male with a medical history of OA, HTN, COPD, PE/DVT, who presented to [facility] for an elective left ISH by Dr. Arora. Operative course  uneventful to this point.  ml. Consulted for medical management. Seen and examined in his room this afternoon. Very uncomfortable. States has always had difficulty finding a pain medication to work for him. He denies chest pain, breathing difficulties,  abdominal pain, N/V/D/C. No fever or chills. Does have allergy symptoms of nasal/sinus congestion and asking for Fluticasone. He denies chest pain, breathing difficulties, abdominal pain, N/V/D/C. No fever or chills.     Past Medical History   Osteoarthritis  Hyperlipidemia  Hypertension  Obstructive Sleep Apnea  GERD  PE/DVT - 2020  Asbestosis  Myocardial Infarction  Coronary Artery Disease  Chronic Obstructive Pulmonary Disease/Former Smoker    Past Surgical History   Tongue Biopsy  Left Shoulder RTC  Right Knee Arthroscopy  Right Total Hip Arthroplasty  EGD  Cardiac Cath    Review Family/Social History and ROS:   Family History:  CAD: no   Diabetes: yes   Hyperlipidemia: no   Hypertension: no   Stroke: no     Social History:    Smoking Status: former smoker  (1)   Alcohol Use: denies   Drug Use: weekly  Marijuana - sleep aide   Occupation: Retired   Social History:     x 2. Lives alone. Independent in ADL's.     Constitutional: NEGATIVE: Fever, Chills     Eyes: NEGATIVE: Blurry Vision, Vision Loss/ Change     ENMT: POSITIVE: Nasal Discharge, Nasal Congestion ; NEGATIVE: Ear Pain, Mouth Pain, Throat Pain     Respiratory: NEGATIVE: Dry Cough, Productive Cough,  Hemoptysis, Wheezing, Shortness of Breath     Cardiac: NEGATIVE: Chest Pain, Dyspnea on Exertion,  Orthopnea,  Palpitations, Syncope     Gastrointestinal: NEGATIVE: Nausea, Vomiting, Diarrhea,  Constipation, Abdominal Pain     Genitourinary: NEGATIVE: Dysuria, Frequency, Hematuria     Musculoskeletal: COMMENTS: See HPI     Neurological: NEGATIVE: Dizziness, Confusion, Headache,  Seizures, Syncope     Psychiatric: NEGATIVE: Mood Changes, Anxiety     Skin: NEGATIVE: Rash, Ulcer     Endocrine: NEGATIVE: Sweat     Hematologic/Lymph: NEGATIVE: Anemia, Bruising              Allergies:  ·  Ionic contrasts : Other, Rash, Itching  ·  contrast  (specific type unknown): Unknown  ·  Lipitor : Unknown    Objective:   Physical Exam Narrative:  ·  Physical Exam:    General: A&O x 3; NAD; calm and cooperative  Eyes: EOM's intact. Clear sclera.   HEENT: Atraumatic. Normocephalic. Mucous membranes moist.   Lungs: CTAB; respirations even and unlabored on 2 liters.   Heart: Regular rate  Abdomen:  Soft, non-tender, non-distended; normoactive bowel sounds  Extremities: KOENIG with LLE pain/weakness; no edema; peripheral pulses intact. Left hip surgical incision covered with primary surgical dressing - C/D/I.   Neuro: A&O x 3; gross motor and sensation intact; no focal deficits  Skin: Warm, dry, and intact  Psych: Appropriate mood and behavior     Refresh Home Medications List:  ·  Select to Refresh Home Medication List Refresh Home Medications     Medications:    Medications:          Continuous Medications       --------------------------------    1. Lactated Ringers Infusion:  1000  mL  IntraVenous  <Continuous>    2. Lactated Ringers Infusion:  1000  mL  IntraVenous  <Continuous>         Scheduled Medications       --------------------------------    1. Acetaminophen:  975  mg  Oral  Every 8 Hours    2. Ascorbic Acid:  500  mg  Oral  2 Times a Day    3. Dicyclomine:  20  mg  Oral  4 Times a Day Before Meals    4. Docusate:  100  mg  Oral  2 Times a Day    5. Finasteride:  5  mg  Oral  Daily    6. Fluticasone 50 microgram/ Nasal Inhalation:  2   spray(s)  Each Nostril  Daily    7. Gabapentin:  300  mg  Oral  3 Times a Day    8. Ketorolac Injectable:  15  mg  IntraVenous Push  Every 6 Hours    9. Losartan:  50  mg  Oral  Daily    10. Metoprolol Succinate Extended Release:  50  mg  Oral  Daily    11. Pantoprazole:  40  mg  Oral  Daily    12. Polyethylene Glycol:  17  gram(s)  Oral  2 Times a Day    13. Tamsulosin:  0.8  mg  Oral  Daily         PRN Medications       --------------------------------    1. HYDROmorphone Injectable:  0.2  mg  IntraVenous Push  Every 2 Hours    2. Ondansetron Injectable:  4  mg  IntraVenous Push  Every 6 Hours    3. oxyCODONE Immediate Release:  5  mg  Oral  Every 4 Hours    4. oxyCODONE Immediate Release:  10  mg  Oral  Every 4 Hours    5. Sore Throat Lozenge:  1  lozenge(s)  Oral  Every 4 Hours    6. traMADol:  50  mg  Oral  Every 6 Hours        Radiology Results:    Results:        Impression:    Status post  total hip arthroplasty.     Xray Pelvis 1 or 2 View [May 31 2023 11:38AM]        Assessment:    82 year old Male with a medical history of OA, HTN, COPD, PE/DVT, who presented to [facility] for an elective left ISH by Dr. Arora. Operative course uneventful to this point.   ml.     CV: HTN, CAD  -Resume home regimen: Metoprolol, Losartan (formulary interchange for Irbesartan)  -Hold Torsemide in perioperative state  -Monitor BP and maintain hold parameters    Left Hip Osteoarthritis  -s/p left ISH by Dr. Arora today  -Incisional care, dressing changes, prophylactic antibiotics, and pain management per Orthopedics.   -PT/OT consulted. WBAT.   -Medicate for pain as needed.   -Advised IS use, mobilization.   -Maintain bowel regimen  -DVT prophylaxis per surgery  -Monitor H&H for ABLA.  -CBC in AM.     Seasonal Allergies/Allergic Rhinitis  -Resume Fluticasone  -Add Loratadine    Hyperlipidemia  -Statin intolerance/allergy    GERD  -On PPI, Dicyclomine    BPH  -On Tamsulosin, Finasteride    DVT Prophylaxis   -Defer to surgery but  would recommend Apixaban given his history of DVT/PE  -Message sent to orthopedic resident.     Disposition  -Plan of care discussed with medicine, Dr. Feliz.   -Discharge per surgery.     Thank you very much for the consult. Please call/doc halo with any medical issues.       Consult Status:  Consult Status    (select all that apply): initial  consult complete, will follow   Consult Order ID: [id number]     Attestation:   Note Completion:  I am a:  Advanced Practice Provider   Attending Only - Shared Visit with Advanced Practice Provider This is a shared visit.  I have reviewed the Advanced Practice Provider?s encounter note, approve the Advanced Practice Provider?s documentation,  and provide the following additional information from my personal encounter.   Comments/ Additional Findings    I saw and evaluated the patient and discussed the care with NP above on 5/31/23. I agree with the findings and plan as documented in the note above  with changes noted below     Pt says he has pain. He admits he has high pain tolerance.     Constitutional: Awake, alert, NAD.  Eyes: PERRLA, EOMI. No erythema or exudate. No proptosis or lid lag.   ENMT: MMM, no nasal congestion, no oral lesions, oropharynx clear without tonsillar erythema or exudate.  Head/Neck: NCAT, neck supple. Full active ROM of the neck. No thyromegaly or mass. No JVP.  Respiratory/Thorax: CTAB, no increased work of breathing, no increased respiratory effort, no wheeze, rales, or rhonchi.  Cardiovascular: Regular rate and rhythm, normal S1 and S2, no murmurs, rubs, or gallops.  Gastrointestinal: Soft, nontender to palpation, nondistended, no guarding or rebound, normoactive bowel sounds  Musculoskeletal: left hip: surgical site: clean/dry   Neurological: Aox3, intact sensation, limited ROM of lower extremities due to pain  Lymphatic: No lymphadenopathy.  Skin: Warm and well perfused.     Left hip OA sp left ISH  -management per primary  - pain control  - bowel  "regimen    BPH  - home meds    allergic rhinitis  - home meds    GERD  - ppi    HTN  - holding torsemide    Dispo: monitor clinically     55 minutes were spent on consult  Thank you for consult, please dochalo with any questions.         Electronic Signatures:  Lynn Feliz)  (Signed 31-May-2023 15:49)   Authored: Note Completion   Co-Signer: Service, History of Present Illness, Review Family/Social History and ROS, Allergies, Objective, Assessment/Recommendations,  Note Completion  Senia Barrett (APRN-CNP)  (Signed 31-May-2023 14:54)   Authored: Service, History of Present Illness, Review  Family/Social History and ROS, Allergies, Objective, Assessment/Recommendations, Note Completion      Last Updated: 31-May-2023 15:49 by Lynn Feliz)    References:  1.  Data Referenced From \"Patient Profile - Adult v2\" 31-May-2023 13:27   "

## 2024-03-12 ENCOUNTER — PRE-ADMISSION TESTING (OUTPATIENT)
Dept: PREADMISSION TESTING | Facility: HOSPITAL | Age: 84
End: 2024-03-12
Payer: MEDICARE

## 2024-03-12 VITALS
HEIGHT: 70 IN | RESPIRATION RATE: 16 BRPM | WEIGHT: 190.04 LBS | DIASTOLIC BLOOD PRESSURE: 73 MMHG | TEMPERATURE: 97.9 F | SYSTOLIC BLOOD PRESSURE: 160 MMHG | HEART RATE: 66 BPM | OXYGEN SATURATION: 96 % | BODY MASS INDEX: 27.21 KG/M2

## 2024-03-12 DIAGNOSIS — Z01.818 PRE-OPERATIVE EXAMINATION: Primary | ICD-10-CM

## 2024-03-12 LAB
ALBUMIN SERPL BCP-MCNC: 4 G/DL (ref 3.4–5)
ALP SERPL-CCNC: 49 U/L (ref 33–136)
ALT SERPL W P-5'-P-CCNC: 12 U/L (ref 10–52)
ANION GAP SERPL CALC-SCNC: 10 MMOL/L (ref 10–20)
APPEARANCE UR: CLEAR
AST SERPL W P-5'-P-CCNC: 12 U/L (ref 9–39)
BASOPHILS # BLD AUTO: 0.02 X10*3/UL (ref 0–0.1)
BASOPHILS NFR BLD AUTO: 0.4 %
BILIRUB SERPL-MCNC: 0.5 MG/DL (ref 0–1.2)
BILIRUB UR STRIP.AUTO-MCNC: NEGATIVE MG/DL
BUN SERPL-MCNC: 20 MG/DL (ref 6–23)
CALCIUM SERPL-MCNC: 8.8 MG/DL (ref 8.6–10.3)
CHLORIDE SERPL-SCNC: 105 MMOL/L (ref 98–107)
CO2 SERPL-SCNC: 28 MMOL/L (ref 21–32)
COLOR UR: YELLOW
CREAT SERPL-MCNC: 0.91 MG/DL (ref 0.5–1.3)
EGFRCR SERPLBLD CKD-EPI 2021: 84 ML/MIN/1.73M*2
EOSINOPHIL # BLD AUTO: 0.05 X10*3/UL (ref 0–0.4)
EOSINOPHIL NFR BLD AUTO: 0.9 %
ERYTHROCYTE [DISTWIDTH] IN BLOOD BY AUTOMATED COUNT: 12.2 % (ref 11.5–14.5)
GLUCOSE SERPL-MCNC: 142 MG/DL (ref 74–99)
GLUCOSE UR STRIP.AUTO-MCNC: NEGATIVE MG/DL
HCT VFR BLD AUTO: 43 % (ref 41–52)
HGB BLD-MCNC: 14.4 G/DL (ref 13.5–17.5)
IMM GRANULOCYTES # BLD AUTO: 0.01 X10*3/UL (ref 0–0.5)
IMM GRANULOCYTES NFR BLD AUTO: 0.2 % (ref 0–0.9)
KETONES UR STRIP.AUTO-MCNC: NEGATIVE MG/DL
LEUKOCYTE ESTERASE UR QL STRIP.AUTO: NEGATIVE
LYMPHOCYTES # BLD AUTO: 1.46 X10*3/UL (ref 0.8–3)
LYMPHOCYTES NFR BLD AUTO: 26.2 %
MCH RBC QN AUTO: 30.3 PG (ref 26–34)
MCHC RBC AUTO-ENTMCNC: 33.5 G/DL (ref 32–36)
MCV RBC AUTO: 91 FL (ref 80–100)
MONOCYTES # BLD AUTO: 0.44 X10*3/UL (ref 0.05–0.8)
MONOCYTES NFR BLD AUTO: 7.9 %
MUCOUS THREADS #/AREA URNS AUTO: NORMAL /LPF
NEUTROPHILS # BLD AUTO: 3.6 X10*3/UL (ref 1.6–5.5)
NEUTROPHILS NFR BLD AUTO: 64.4 %
NITRITE UR QL STRIP.AUTO: NEGATIVE
NRBC BLD-RTO: 0 /100 WBCS (ref 0–0)
PH UR STRIP.AUTO: 5 [PH]
PLATELET # BLD AUTO: 166 X10*3/UL (ref 150–450)
POTASSIUM SERPL-SCNC: 3.9 MMOL/L (ref 3.5–5.3)
PROT SERPL-MCNC: 6.3 G/DL (ref 6.4–8.2)
PROT UR STRIP.AUTO-MCNC: ABNORMAL MG/DL
RBC # BLD AUTO: 4.75 X10*6/UL (ref 4.5–5.9)
RBC # UR STRIP.AUTO: NEGATIVE /UL
RBC #/AREA URNS AUTO: NORMAL /HPF
SODIUM SERPL-SCNC: 139 MMOL/L (ref 136–145)
SP GR UR STRIP.AUTO: 1.03
UROBILINOGEN UR STRIP.AUTO-MCNC: <2 MG/DL
WBC # BLD AUTO: 5.6 X10*3/UL (ref 4.4–11.3)
WBC #/AREA URNS AUTO: NORMAL /HPF

## 2024-03-12 PROCEDURE — 93010 ELECTROCARDIOGRAM REPORT: CPT | Performed by: INTERNAL MEDICINE

## 2024-03-12 PROCEDURE — 87081 CULTURE SCREEN ONLY: CPT | Mod: GEALAB | Performed by: REGISTERED NURSE

## 2024-03-12 PROCEDURE — 80053 COMPREHEN METABOLIC PANEL: CPT | Performed by: REGISTERED NURSE

## 2024-03-12 PROCEDURE — 85025 COMPLETE CBC W/AUTO DIFF WBC: CPT | Performed by: REGISTERED NURSE

## 2024-03-12 PROCEDURE — 99214 OFFICE O/P EST MOD 30 MIN: CPT | Performed by: REGISTERED NURSE

## 2024-03-12 PROCEDURE — 81001 URINALYSIS AUTO W/SCOPE: CPT | Performed by: REGISTERED NURSE

## 2024-03-12 PROCEDURE — 93005 ELECTROCARDIOGRAM TRACING: CPT

## 2024-03-12 PROCEDURE — 36415 COLL VENOUS BLD VENIPUNCTURE: CPT

## 2024-03-12 RX ORDER — FINASTERIDE 5 MG/1
5 TABLET, FILM COATED ORAL EVERY EVENING
COMMUNITY

## 2024-03-12 RX ORDER — TAMSULOSIN HYDROCHLORIDE 0.4 MG/1
0.8 CAPSULE ORAL EVERY EVENING
COMMUNITY

## 2024-03-12 RX ORDER — LORATADINE 10 MG/1
10 TABLET ORAL DAILY
Status: ON HOLD | COMMUNITY
End: 2024-04-24 | Stop reason: WASHOUT

## 2024-03-12 RX ORDER — CHLORHEXIDINE GLUCONATE ORAL RINSE 1.2 MG/ML
15 SOLUTION DENTAL DAILY
Qty: 120 ML | Refills: 0 | Status: SHIPPED | OUTPATIENT
Start: 2024-03-12 | End: 2024-03-22 | Stop reason: HOSPADM

## 2024-03-12 RX ORDER — FLUTICASONE PROPIONATE 50 MCG
1 SPRAY, SUSPENSION (ML) NASAL DAILY PRN
Status: ON HOLD | COMMUNITY
End: 2024-04-24 | Stop reason: WASHOUT

## 2024-03-12 ASSESSMENT — ENCOUNTER SYMPTOMS
CARDIOVASCULAR NEGATIVE: 1
LIMITED RANGE OF MOTION: 1
EYES NEGATIVE: 1
NECK STIFFNESS: 1
RESPIRATORY NEGATIVE: 1
ARTHRALGIAS: 1
CONSTITUTIONAL NEGATIVE: 1
NEUROLOGICAL NEGATIVE: 1
GASTROINTESTINAL NEGATIVE: 1

## 2024-03-12 ASSESSMENT — DUKE ACTIVITY SCORE INDEX (DASI)
TOTAL_SCORE: 7.2
CAN YOU DO LIGHT WORK AROUND THE HOUSE LIKE DUSTING OR WASHING DISHES: YES
DASI METS SCORE: 3.6
CAN YOU HAVE SEXUAL RELATIONS: NO
CAN YOU TAKE CARE OF YOURSELF (EAT, DRESS, BATHE, OR USE TOILET): YES
CAN YOU WALK INDOORS, SUCH AS AROUND YOUR HOUSE: YES
CAN YOU DO HEAVY WORK AROUND THE HOUSE LIKE SCRUBBING FLOORS OR LIFTING AND MOVING HEAVY FURNITURE: NO
CAN YOU PARTICIPATE IN MODERATE RECREATIONAL ACTIVITIES LIKE GOLF, BOWLING, DANCING, DOUBLES TENNIS OR THROWING A BASEBALL OR FOOTBALL: NO
CAN YOU DO MODERATE WORK AROUND THE HOUSE LIKE VACUUMING, SWEEPING FLOORS OR CARRYING GROCERIES: NO
CAN YOU RUN A SHORT DISTANCE: NO
CAN YOU DO YARD WORK LIKE RAKING LEAVES, WEEDING OR PUSHING A MOWER: NO
CAN YOU PARTICIPATE IN STRENOUS SPORTS LIKE SWIMMING, SINGLES TENNIS, FOOTBALL, BASKETBALL, OR SKIING: NO
CAN YOU WALK A BLOCK OR TWO ON LEVEL GROUND: NO
CAN YOU CLIMB A FLIGHT OF STAIRS OR WALK UP A HILL: NO

## 2024-03-12 ASSESSMENT — CHADS2 SCORE
DIABETES: NO
AGE GREATER THAN OR EQUAL TO 75: YES
HYPERTENSION: YES
CHF: YES
CHADS2 SCORE: 5
PRIOR STROKE OR TIA OR THROMBOEMBOLISM: YES

## 2024-03-12 ASSESSMENT — PAIN - FUNCTIONAL ASSESSMENT: PAIN_FUNCTIONAL_ASSESSMENT: 0-10

## 2024-03-12 ASSESSMENT — LIFESTYLE VARIABLES: SMOKING_STATUS: NONSMOKER

## 2024-03-12 ASSESSMENT — PAIN SCALES - GENERAL: PAINLEVEL_OUTOF10: 8

## 2024-03-12 NOTE — H&P (VIEW-ONLY)
CPM/PAT Evaluation       Name: Abraham Rodriguez (Abraham Rodriguez)  /Age: 1940/ y.o.     In-Person       Chief Complaint: Evaluation prior to surgery    HPI  83 year old male scheduled for ARTHROPLASTY RESURFACING TOTAL KNEE - Right on 3/20/24 with Dr. Arora secondary to Primary osteoarthritis of right knee. PMHx includes HTN, HLD, heart failure with preserved ejection fraction ASCVD s/p remote MI without PCI, remote VT/PE (), COPD (mild), Chronic lumbar radiculopathy and chronic asbestosis, GERD, YOSEF. Presents to CPM today for preoperative risk stratification and optimization.   Past Medical History:   Diagnosis Date    Allergic rhinitis due to pollen 10/23/2014    Hay fever    Arthritis     Chronic pain disorder     Clotting disorder (CMS/HCC)     dvt 2020     P.E. 2020    COPD (chronic obstructive pulmonary disease) (CMS/HCC)     hx asbestos    Coronary artery disease     Encounter for other preprocedural examination 2014    Pre-procedural examination    Encounter for screening for malignant neoplasm of prostate     Encounter for screening for malignant neoplasm of prostate    Hyperlipidemia     Hypertension     Localized edema 2020    Pedal edema    Myocardial infarction (CMS/[facility])     Other conditions influencing health status     Carcinoma Of The Tongue    Pain in unspecified knee 2014    Joint pain, knee    Personal history of diseases of the skin and subcutaneous tissue 2013    History of eczema    Personal history of other diseases of the musculoskeletal system and connective tissue 2014    Personal history of arthritis    Personal history of other diseases of the nervous system and sense organs 08/10/2021    History of Bell's palsy    Personal history of other diseases of the respiratory system 2014    History of asbestosis    Personal history of other diseases of the respiratory system 2014    History of chronic obstructive lung disease     Personal history of other specified conditions 08/20/2013    History of edema    Plantar fascial fibromatosis 07/22/2013    Plantar fasciitis    Polyp of colon     Polyp of sigmoid colon    Syncope and collapse 01/23/2015    Syncope and collapse    Unspecified abdominal pain 12/22/2014    Stomach pain    Unspecified disorder of eyelid 10/23/2014    Eyelid disorder       Past Surgical History:   Procedure Laterality Date    CARDIAC CATHETERIZATION      CHOLECYSTECTOMY  04/23/2013    Cholecystectomy Laparoscopic    OTHER SURGICAL HISTORY  04/27/2021    Meniscus repair    OTHER SURGICAL HISTORY Left 05/31/2023    Hip replacement    OTHER SURGICAL HISTORY  04/23/2013    Biopsy Tongue    SHOULDER SURGERY  04/23/2013    Shoulder Surgery       Patient  has no history on file for sexual activity.    Family History   Problem Relation Name Age of Onset    Hypothyroidism Brother         Allergies   Allergen Reactions    Iodinated Contrast Media Unknown and Anaphylaxis    Tobramycin-Dexamethasone Itching and Rash    Lisinopril Unknown       Prior to Admission medications    Medication Sig Start Date End Date Taking? Authorizing Provider   dexlansoprazole (Dexilant) 60 mg DR capsule Take 1 capsule (60 mg) by mouth once daily.    Historical Provider, MD   dicyclomine (Bentyl) 20 mg tablet Take by mouth 4 times a day before meals.    Historical Provider, MD   irbesartan (Avapro) 150 mg tablet Take 1 tablet (150 mg) by mouth once daily.    Historical Provider, MD   metoprolol succinate XL (Toprol-XL) 50 mg 24 hr tablet Take 1 tablet (50 mg) by mouth once daily.    Historical Provider, MD   naloxone (Narcan) 4 mg/0.1 mL nasal spray Administer into affected nostril(s) if needed for opioid reversal or respiratory depression.  ADMINISTER A SINGLE SPRAY IN ONE NOSTRIL UPON SIGNS OF OPIOID OVERDOSE. CALL 911. REPEAT AFTER 3 MINUTES IF NO RESPONSE. 6/16/22   Historical Provider, MD   oxyCODONE-acetaminophen (Percocet) 7.5-325 mg tablet  Take 1 tablet by mouth 3 times a day as needed for moderate pain (4 - 6) or severe pain (7 - 10) for up to 28 days. Do not start before January 31, 2024. 1/31/24 2/28/24  Macho Elizabeth MD   oxyCODONE-acetaminophen (Percocet) 7.5-325 mg tablet Take 1 tablet by mouth 3 times a day as needed for severe pain (7 - 10) for up to 28 days. Do not start before February 28, 2024. 2/28/24 3/27/24  Macho Elizabeth MD   oxyCODONE-acetaminophen (Percocet) 7.5-325 mg tablet Take 1 tablet by mouth 3 times a day as needed for severe pain (7 - 10) for up to 28 days. Do not start before March 27, 2024. 3/27/24 4/24/24  Macho Elizabeth MD   torsemide (Demadex) 20 mg tablet Take 1 tablet (20 mg) by mouth once daily.    Historical Provider, MD RING ROS:   Constitutional:   neg    Neuro/Psych:   neg    Eyes:   neg    Ears:   neg    Nose:   Mouth:   neg    Throat:   neg    Neck:    Stiffness with ROM   neck stiffness  Cardio:   neg    Respiratory:   neg    Endocrine:   GI:   neg    :   neg    Musculoskeletal:    Pain right knee 8/10   arthralgias   decreased ROM  Hematologic:   neg    Skin:      Physical Exam  Vitals reviewed.   Constitutional:       Appearance: Normal appearance.   HENT:      Head: Normocephalic and atraumatic.      Nose: Nose normal.      Mouth/Throat:      Mouth: Mucous membranes are moist.   Eyes:      Extraocular Movements: Extraocular movements intact.      Pupils: Pupils are equal, round, and reactive to light.   Neck:      Vascular: No carotid bruit.      Comments: Stiffness with ROM  Cardiovascular:      Rate and Rhythm: Normal rate and regular rhythm.      Pulses: Normal pulses.      Heart sounds: Normal heart sounds.   Pulmonary:      Effort: Pulmonary effort is normal.      Breath sounds: Normal breath sounds.   Abdominal:      Palpations: Abdomen is soft.   Musculoskeletal:         General: Tenderness present.      Cervical back: Neck supple.      Comments: Right knee   Skin:     General: Skin  is warm and dry.      Capillary Refill: Capillary refill takes less than 2 seconds.   Neurological:      General: No focal deficit present.      Mental Status: He is alert and oriented to person, place, and time.   Psychiatric:         Mood and Affect: Mood normal.         Behavior: Behavior normal.         Thought Content: Thought content normal.         Judgment: Judgment normal.          PAT AIRWAY:   Airway:     Mallampati::  II    TM distance::  >3 FB    Neck ROM::  Limited  normal        There were no vitals taken for this visit.    DASI Risk Score    No data to display       Caprini DVT Assessment    No data to display       Modified Frailty Index    No data to display       CHADS2 Stroke Risk  Current as of today        N/A 3 - 100%: High Risk   2 - 3%: Medium Risk   0 - 2%: Low Risk     Last Change: N/A          This score determines the patient's risk of having a stroke if the patient has atrial fibrillation.        This score is not applicable to this patient. Components are not calculated.          Revised Cardiac Risk Index    No data to display       Apfel Simplified Score    No data to display       Risk Analysis Index Results This Encounter    No data found in the last 1 encounters.         Assessment and Plan:     Anesthesia:  The patient denies problems with anesthesia in the past such as PONV, prolonged sedation, awareness, dental damage, aspiration, cardiac arrest, difficult intubation, or unexpected hospital admissions.     Neuro:   The patient has no neurological diagnoses or significant findings on chart review, clinical presentation, and evaluation. No grossly apparent perioperative risk. The patient is at increased risk for postoperative delirium secondary to age 65 or older. The patient is at increased risk for perioperative stroke secondary to increased age, hyperlipidemia.    HEENT/Airway  The patient has diagnoses, significant findings on chart review, clinical presentation or evaluation  of YOSEF    Cardiovascular  HLD on atorvastatin. 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  RCRI  The patient meets 3 or more RCRI criteria and therefore is at high risk for major adverse cardiac complications.  METS  The patient's functional capacity capacity is less than 4 METS.  EKG  3/12/24  Sinus Rhythm with 1st degree AV block  Right bundle branch block  Left anterior fascicular block  Bifascicular block  Abnormal ECG  Rate 64    EKG  7/3/23  Normal Sinus Rhythm  Left Axis Deviation  Right Bundle Branch Block  Abnormal ECG    Heart Failure  heart failure with preserved ejection fraction  Hypertension Evaluation  The patient has a known history of hypertension that is controlled on toprol and torsemide.  Heart Rhythm Evaluation  The patient has no history of arrhythmias.    CARDS EVAL  The patient follows with cardiology, Elizabeth MAGAÑA. Patient was last seen 11/21/23.     EDWARD score which indicates a 0.9% risk of intraoperative or 30-day postoperative.    Pulmonary   The patient has findings on chart review, clinical presentation and evaluation significant for YOSEF. Patient educated to bring CPAP/BIPAP day of surgery. History of VT/PE, mild COPD, chronic asbestosis.    The patient has a stop bang score of 4, which places patient at intermediate risk for having YOSEF.    ARISCAT 16, low, 1.6% risk of in-hospital postoperative pulmonary complications  PRODIGY 36, high risk of respiratory depression episode.     Hematology  Antiplatelet management   The patient is not currently receiving antiplatelet therapy.  Anticoagulation management  The patient is not currently receiving anticoagulation therapy.    Caprini score 17, high risk of perioperative VTE.   Patient instructed to ambulate as soon as possible postoperatively to decrease thromboembolic risk. Initiate mechanical DVT prophylaxis as soon as possible and initiate chemical prophylaxis when deemed safe from a bleeding standpoint post surgery.     Gastrointestinal  The  patient has diagnoses or significant findings on chart review or clinical presentation and evaluation significant for GERD.  Eat 10- 0,  self-perceived oropharyngeal dysphagia scale (0-40)     Genitourinary  No diagnoses or significant findings on chart review or clinical presentation and evaluation.    Renal  The patient has no known history of chronic kidney disease..     Musculoskeletal  The patient has diagnoses or significant findings on chart review or clinical presentation and evaluation significant for Primary osteoarthritis of right knee, Chronic lumbar radiculopathy.  Sees Pain management     Endocrine  Diabetes Evaluation  The patient has no history of diabetes mellitus.  Thyroid Disease Evaluation  The patient has no history of thyroid disease.    ID  MRSA screening obtained. Instructions given for Hibiclens and Peridex. Prescription given for Peridex.    -Preoperative medication instructions were provided and reviewed with the patient.  Any additional testing or evaluation was explained to the patient.  NPO Instructions were discussed, and the patient's questions were answered prior to conclusion of this encounter.

## 2024-03-12 NOTE — PREPROCEDURE INSTRUCTIONS
Medication List            Accurate as of March 12, 2024  1:12 PM. Always use your most recent med list.                chlorhexidine 0.12 % solution  Commonly known as: Peridex  Use 15 mL in the mouth or throat once daily for 2 days. Use 15ml once the night before surgery and 15ml once the morning of surgery     Dexilant 60 mg DR capsule  Generic drug: dexlansoprazole  Medication Adjustments for Surgery: Take morning of surgery with sip of water, no other fluids     dicyclomine 20 mg tablet  Commonly known as: Bentyl  Medication Adjustments for Surgery: Take morning of surgery with sip of water, no other fluids     finasteride 5 mg tablet  Commonly known as: Proscar  Medication Adjustments for Surgery: Continue until night before surgery     fluticasone 50 mcg/actuation nasal spray  Commonly known as: Flonase  Medication Adjustments for Surgery: Take morning of surgery with sip of water, no other fluids     irbesartan 150 mg tablet  Commonly known as: Avapro  Medication Adjustments for Surgery: Stop 1 day before surgery     loratadine 10 mg tablet  Commonly known as: Claritin  Medication Adjustments for Surgery: Stop 1 day before surgery     metoprolol succinate XL 50 mg 24 hr tablet  Commonly known as: Toprol-XL  Medication Adjustments for Surgery: Take morning of surgery with sip of water, no other fluids     naloxone 4 mg/0.1 mL nasal spray  Commonly known as: Narcan     * oxyCODONE-acetaminophen 7.5-325 mg tablet  Commonly known as: Percocet  Take 1 tablet by mouth 3 times a day as needed for moderate pain (4 - 6) or severe pain (7 - 10) for up to 28 days. Do not start before January 31, 2024.  Notes to patient: If needed     * oxyCODONE-acetaminophen 7.5-325 mg tablet  Commonly known as: Percocet  Take 1 tablet by mouth 3 times a day as needed for severe pain (7 - 10) for up to 28 days. Do not start before March 27, 2024.  Start taking on: March 27, 2024  Medication Adjustments for Surgery: Take morning of  surgery with sip of water, no other fluids  Notes to patient: If needed     tamsulosin 0.4 mg 24 hr capsule  Commonly known as: Flomax  Medication Adjustments for Surgery: Continue until night before surgery     torsemide 20 mg tablet  Commonly known as: Demadex  Medication Adjustments for Surgery: Stop 1 day before surgery           * This list has 2 medication(s) that are the same as other medications prescribed for you. Read the directions carefully, and ask your doctor or other care provider to review them with you.                            SURGERY PRE-OPERATIVE INSTRUCTIONS    *You will receive a phone call the day before your procedure  after 2pm, (or the Friday before your surgery if scheduled on a Monday.) Generally the hospital will be calling you with this information after that time.    *You are not to eat after midnight the night before the surgery. You may have 8oz of a clear liquid up until 2 hours prior to arriving to the hospital. The exception is with medications you were instructed to take day of surgery.    *You may take tylenol for pain/discomfort as needed.     *Stop taking all aspirin products, ibuprofen (motrin/advil), naproxen (aleve/naprosyn) for one week prior to surgery.    *Stop taking all vitamins and supplements one week prior to surgery.     *You should not have alcoholic beverages for 24 hours before surgery.     *You should not smoke 24 hours prior to surgery.     *To help prevent surgical infections bathe/shower with Dial soap the evening before surgery.    *You can wear deodorant but no lotion, powder, or perfume/cologne. You should remove all make-up and nail polish at home.    *If you wear glasses, please bring a case for the glasses with you.    *You will be asked to remove dentures and contacts.     *Please leave all valuables at home.    *You should wear loose, comfortable clothing that will accommodate bandages and/or casts.    *You should notify your doctor of any change  in your condition (fever, cold, rash, etc). Surgery may need to be re-scheduled until a time you are in better health.    *A responsible adult is required to accompany you to and from the hospital if you are receiving anesthesia or a sedative. Patients are not permitted to drive for 24 hours after anesthesia.     *You can use the [employer] if you wish.     *If you have any further questions please call [phone].       NPO Instructions:        Additional Instructions:         CHG BODY WASH INSTRUCTIONS    *Begin using your CHG soap five days prior to your scheduled surgery.  Allow the CHG soap to sit on skin for 3 minutes. Do not wash with regular soap after you have used the CHG soap. Pat yourself dry with a clean, fresh towel.    *Wash your face with normal soap and water. Apply the CHG solution to a clean, wet washcloth. Firmly lather your entire body from the neck down. Do not use on your face.     *Do not apply powders, deodorants, or lotions after using CHG wash.    *Dress in clean, freshly laundered night clothes.    *Be sure to sleep with clean, freshly laundered sheets.     *Be aware CHG wash may cause stains on fabrics. Rinse your washcloth and other linens that come in contact with CHG completely. Use non-chlorine detergents to launder items used.     *The morning of surgery is the fifth day, repeat the CHG wash and wear fresh laundered clothes.     *If you have any questions about the CHG soap, call [phone].                  CHG oral rinse is used to kill a bacteria in the mouth known as Staphylococcus aureus. This reduces the risks of surgical site infections.         Using dental rinse: use the CHG oral rinse after you brush your teeth the night before and the morning of the surgery.     Use 1 capful (15ml), swish and gargle for at least 30 seconds. Do not swallow. Spit rinse out.         Do not rinse mouth with water, eat or drink after using CHG mouth rinse.         Possible side  effects: CHG rinse will stick to plaque on teeth. Brush and floss just before use. Teeth brushing will help to avoid staining of plaque during use.         Any questions, please call [phone]

## 2024-03-13 LAB
ATRIAL RATE: 64 BPM
HOLD SPECIMEN: NORMAL
P AXIS: 87 DEGREES
P OFFSET: 126 MS
P ONSET: 85 MS
PR INTERVAL: 244 MS
Q ONSET: 207 MS
QRS COUNT: 10 BEATS
QRS DURATION: 152 MS
QT INTERVAL: 456 MS
QTC CALCULATION(BAZETT): 470 MS
QTC FREDERICIA: 465 MS
R AXIS: -52 DEGREES
T AXIS: 25 DEGREES
T OFFSET: 435 MS
VENTRICULAR RATE: 64 BPM

## 2024-03-14 LAB — STAPHYLOCOCCUS SPEC CULT: NORMAL

## 2024-03-19 ENCOUNTER — TELEPHONE (OUTPATIENT)
Dept: INPATIENT UNIT | Facility: HOSPITAL | Age: 84
End: 2024-03-19
Payer: COMMERCIAL

## 2024-03-19 ENCOUNTER — ANESTHESIA EVENT (OUTPATIENT)
Dept: OPERATING ROOM | Facility: HOSPITAL | Age: 84
End: 2024-03-19
Payer: MEDICARE

## 2024-03-20 ENCOUNTER — ANESTHESIA (OUTPATIENT)
Dept: OPERATING ROOM | Facility: HOSPITAL | Age: 84
End: 2024-03-20
Payer: MEDICARE

## 2024-03-20 ENCOUNTER — HOSPITAL ENCOUNTER (OUTPATIENT)
Facility: HOSPITAL | Age: 84
Discharge: HOME | End: 2024-03-22
Attending: ORTHOPAEDIC SURGERY | Admitting: ORTHOPAEDIC SURGERY
Payer: MEDICARE

## 2024-03-20 ENCOUNTER — HOME HEALTH ADMISSION (OUTPATIENT)
Dept: HOME HEALTH SERVICES | Facility: HOME HEALTH | Age: 84
End: 2024-03-20
Payer: MEDICARE

## 2024-03-20 ENCOUNTER — APPOINTMENT (OUTPATIENT)
Dept: RADIOLOGY | Facility: HOSPITAL | Age: 84
End: 2024-03-20
Payer: MEDICARE

## 2024-03-20 DIAGNOSIS — Z96.651 STATUS POST TOTAL KNEE REPLACEMENT, RIGHT: Primary | ICD-10-CM

## 2024-03-20 DIAGNOSIS — K21.9 GASTROESOPHAGEAL REFLUX DISEASE WITHOUT ESOPHAGITIS: ICD-10-CM

## 2024-03-20 PROCEDURE — 7100000002 HC RECOVERY ROOM TIME - EACH INCREMENTAL 1 MINUTE: Performed by: ORTHOPAEDIC SURGERY

## 2024-03-20 PROCEDURE — 2500000004 HC RX 250 GENERAL PHARMACY W/ HCPCS (ALT 636 FOR OP/ED)

## 2024-03-20 PROCEDURE — 7100000011 HC EXTENDED STAY RECOVERY HOURLY - NURSING UNIT

## 2024-03-20 PROCEDURE — 2780000003 HC OR 278 NO HCPCS: Performed by: ORTHOPAEDIC SURGERY

## 2024-03-20 PROCEDURE — 2500000004 HC RX 250 GENERAL PHARMACY W/ HCPCS (ALT 636 FOR OP/ED): Performed by: ANESTHESIOLOGY

## 2024-03-20 PROCEDURE — 3600000005 HC OR TIME - INITIAL BASE CHARGE - PROCEDURE LEVEL FIVE: Performed by: ORTHOPAEDIC SURGERY

## 2024-03-20 PROCEDURE — 64447 NJX AA&/STRD FEMORAL NRV IMG: CPT | Performed by: NURSE ANESTHETIST, CERTIFIED REGISTERED

## 2024-03-20 PROCEDURE — 2500000005 HC RX 250 GENERAL PHARMACY W/O HCPCS: Performed by: NURSE ANESTHETIST, CERTIFIED REGISTERED

## 2024-03-20 PROCEDURE — 2500000001 HC RX 250 WO HCPCS SELF ADMINISTERED DRUGS (ALT 637 FOR MEDICARE OP)

## 2024-03-20 PROCEDURE — 2500000004 HC RX 250 GENERAL PHARMACY W/ HCPCS (ALT 636 FOR OP/ED): Performed by: NURSE ANESTHETIST, CERTIFIED REGISTERED

## 2024-03-20 PROCEDURE — A6213 FOAM DRG >16<=48 SQ IN W/BDR: HCPCS | Performed by: ORTHOPAEDIC SURGERY

## 2024-03-20 PROCEDURE — A27447 PR TOTAL KNEE ARTHROPLASTY: Performed by: NURSE ANESTHETIST, CERTIFIED REGISTERED

## 2024-03-20 PROCEDURE — 7100000001 HC RECOVERY ROOM TIME - INITIAL BASE CHARGE: Performed by: ORTHOPAEDIC SURGERY

## 2024-03-20 PROCEDURE — 3700000002 HC GENERAL ANESTHESIA TIME - EACH INCREMENTAL 1 MINUTE: Performed by: ORTHOPAEDIC SURGERY

## 2024-03-20 PROCEDURE — 9420000001 HC RT PATIENT EDUCATION 5 MIN

## 2024-03-20 PROCEDURE — 73560 X-RAY EXAM OF KNEE 1 OR 2: CPT | Mod: RIGHT SIDE | Performed by: RADIOLOGY

## 2024-03-20 PROCEDURE — 64447 NJX AA&/STRD FEMORAL NRV IMG: CPT | Performed by: ANESTHESIOLOGY

## 2024-03-20 PROCEDURE — 2720000007 HC OR 272 NO HCPCS: Performed by: ORTHOPAEDIC SURGERY

## 2024-03-20 PROCEDURE — C1776 JOINT DEVICE (IMPLANTABLE): HCPCS | Performed by: ORTHOPAEDIC SURGERY

## 2024-03-20 PROCEDURE — 73560 X-RAY EXAM OF KNEE 1 OR 2: CPT | Mod: RT

## 2024-03-20 PROCEDURE — C1713 ANCHOR/SCREW BN/BN,TIS/BN: HCPCS | Performed by: ORTHOPAEDIC SURGERY

## 2024-03-20 PROCEDURE — A4216 STERILE WATER/SALINE, 10 ML: HCPCS | Performed by: ORTHOPAEDIC SURGERY

## 2024-03-20 PROCEDURE — 99222 1ST HOSP IP/OBS MODERATE 55: CPT | Performed by: NURSE PRACTITIONER

## 2024-03-20 PROCEDURE — 3600000010 HC OR TIME - EACH INCREMENTAL 1 MINUTE - PROCEDURE LEVEL FIVE: Performed by: ORTHOPAEDIC SURGERY

## 2024-03-20 PROCEDURE — 2500000002 HC RX 250 W HCPCS SELF ADMINISTERED DRUGS (ALT 637 FOR MEDICARE OP, ALT 636 FOR OP/ED): Mod: MUE

## 2024-03-20 PROCEDURE — 3700000001 HC GENERAL ANESTHESIA TIME - INITIAL BASE CHARGE: Performed by: ORTHOPAEDIC SURGERY

## 2024-03-20 PROCEDURE — 2500000004 HC RX 250 GENERAL PHARMACY W/ HCPCS (ALT 636 FOR OP/ED): Performed by: ORTHOPAEDIC SURGERY

## 2024-03-20 DEVICE — CRUCIATE RETAINING FEMORAL
Type: IMPLANTABLE DEVICE | Site: KNEE | Status: FUNCTIONAL
Brand: TRIATHLON

## 2024-03-20 DEVICE — TIBIAL COMPONENT
Type: IMPLANTABLE DEVICE | Site: KNEE | Status: FUNCTIONAL
Brand: TRIATHLON

## 2024-03-20 DEVICE — PATELLA
Type: IMPLANTABLE DEVICE | Site: KNEE | Status: FUNCTIONAL
Brand: TRIATHLON

## 2024-03-20 DEVICE — TIBIAL BEARING INSERT - CS
Type: IMPLANTABLE DEVICE | Site: KNEE | Status: FUNCTIONAL
Brand: TRIATHLON

## 2024-03-20 RX ORDER — CEFAZOLIN 1 G/1
INJECTION, POWDER, FOR SOLUTION INTRAVENOUS AS NEEDED
Status: DISCONTINUED | OUTPATIENT
Start: 2024-03-20 | End: 2024-03-20

## 2024-03-20 RX ORDER — ONDANSETRON HYDROCHLORIDE 2 MG/ML
4 INJECTION, SOLUTION INTRAVENOUS EVERY 8 HOURS PRN
Status: DISCONTINUED | OUTPATIENT
Start: 2024-03-20 | End: 2024-03-22 | Stop reason: HOSPADM

## 2024-03-20 RX ORDER — LOSARTAN POTASSIUM 50 MG/1
50 TABLET ORAL DAILY
Status: DISCONTINUED | OUTPATIENT
Start: 2024-03-20 | End: 2024-03-22 | Stop reason: HOSPADM

## 2024-03-20 RX ORDER — PROPOFOL 10 MG/ML
INJECTION, EMULSION INTRAVENOUS CONTINUOUS PRN
Status: DISCONTINUED | OUTPATIENT
Start: 2024-03-20 | End: 2024-03-20

## 2024-03-20 RX ORDER — SODIUM CHLORIDE 9 MG/ML
INJECTION, SOLUTION INTRAMUSCULAR; INTRAVENOUS; SUBCUTANEOUS AS NEEDED
Status: DISCONTINUED | OUTPATIENT
Start: 2024-03-20 | End: 2024-03-20 | Stop reason: HOSPADM

## 2024-03-20 RX ORDER — CELECOXIB 200 MG/1
200 CAPSULE ORAL 2 TIMES DAILY PRN
Status: ON HOLD | COMMUNITY
Start: 2024-03-20 | End: 2024-04-24 | Stop reason: WASHOUT

## 2024-03-20 RX ORDER — HYDRALAZINE HYDROCHLORIDE 20 MG/ML
10 INJECTION INTRAMUSCULAR; INTRAVENOUS EVERY 6 HOURS PRN
Status: DISCONTINUED | OUTPATIENT
Start: 2024-03-20 | End: 2024-03-22 | Stop reason: HOSPADM

## 2024-03-20 RX ORDER — FINASTERIDE 5 MG/1
5 TABLET, FILM COATED ORAL EVERY EVENING
Status: DISCONTINUED | OUTPATIENT
Start: 2024-03-20 | End: 2024-03-22 | Stop reason: HOSPADM

## 2024-03-20 RX ORDER — SODIUM CHLORIDE, SODIUM LACTATE, POTASSIUM CHLORIDE, CALCIUM CHLORIDE 600; 310; 30; 20 MG/100ML; MG/100ML; MG/100ML; MG/100ML
100 INJECTION, SOLUTION INTRAVENOUS CONTINUOUS
Status: ACTIVE | OUTPATIENT
Start: 2024-03-20 | End: 2024-03-21

## 2024-03-20 RX ORDER — IPRATROPIUM BROMIDE AND ALBUTEROL SULFATE 2.5; .5 MG/3ML; MG/3ML
3 SOLUTION RESPIRATORY (INHALATION) EVERY 6 HOURS PRN
Status: DISCONTINUED | OUTPATIENT
Start: 2024-03-20 | End: 2024-03-20

## 2024-03-20 RX ORDER — TRAMADOL HYDROCHLORIDE 50 MG/1
50 TABLET ORAL EVERY 8 HOURS PRN
Status: DISCONTINUED | OUTPATIENT
Start: 2024-03-20 | End: 2024-03-22 | Stop reason: HOSPADM

## 2024-03-20 RX ORDER — DOCUSATE SODIUM 100 MG/1
100 CAPSULE, LIQUID FILLED ORAL 2 TIMES DAILY
Status: DISCONTINUED | OUTPATIENT
Start: 2024-03-20 | End: 2024-03-22 | Stop reason: HOSPADM

## 2024-03-20 RX ORDER — PANTOPRAZOLE SODIUM 40 MG/1
40 TABLET, DELAYED RELEASE ORAL
Status: DISCONTINUED | OUTPATIENT
Start: 2024-03-21 | End: 2024-03-22 | Stop reason: HOSPADM

## 2024-03-20 RX ORDER — ASPIRIN 325 MG
325 TABLET, DELAYED RELEASE (ENTERIC COATED) ORAL 2 TIMES DAILY
COMMUNITY
Start: 2024-03-20

## 2024-03-20 RX ORDER — MORPHINE SULFATE 0.5 MG/ML
INJECTION, SOLUTION EPIDURAL; INTRATHECAL; INTRAVENOUS AS NEEDED
Status: DISCONTINUED | OUTPATIENT
Start: 2024-03-20 | End: 2024-03-20 | Stop reason: HOSPADM

## 2024-03-20 RX ORDER — DIPHENHYDRAMINE HYDROCHLORIDE 50 MG/ML
12.5 INJECTION, SOLUTION INTRAMUSCULAR; INTRAVENOUS EVERY 6 HOURS PRN
Status: DISCONTINUED | OUTPATIENT
Start: 2024-03-20 | End: 2024-03-22 | Stop reason: HOSPADM

## 2024-03-20 RX ORDER — DICYCLOMINE HYDROCHLORIDE 20 MG/1
20 TABLET ORAL
Status: DISCONTINUED | OUTPATIENT
Start: 2024-03-20 | End: 2024-03-22 | Stop reason: HOSPADM

## 2024-03-20 RX ORDER — MIDAZOLAM HYDROCHLORIDE 1 MG/ML
INJECTION INTRAMUSCULAR; INTRAVENOUS AS NEEDED
Status: DISCONTINUED | OUTPATIENT
Start: 2024-03-20 | End: 2024-03-20

## 2024-03-20 RX ORDER — TRANEXAMIC ACID 10 MG/ML
INJECTION, SOLUTION INTRAVENOUS AS NEEDED
Status: DISCONTINUED | OUTPATIENT
Start: 2024-03-20 | End: 2024-03-20

## 2024-03-20 RX ORDER — KETOROLAC TROMETHAMINE 30 MG/ML
INJECTION, SOLUTION INTRAMUSCULAR; INTRAVENOUS AS NEEDED
Status: DISCONTINUED | OUTPATIENT
Start: 2024-03-20 | End: 2024-03-20 | Stop reason: HOSPADM

## 2024-03-20 RX ORDER — OXYCODONE AND ACETAMINOPHEN 5; 325 MG/1; MG/1
1 TABLET ORAL EVERY 6 HOURS PRN
Qty: 5 TABLET | Refills: 0 | COMMUNITY
Start: 2024-03-20 | End: 2024-03-29 | Stop reason: HOSPADM

## 2024-03-20 RX ORDER — EPINEPHRINE 1 MG/ML
INJECTION INTRAMUSCULAR; INTRAVENOUS; SUBCUTANEOUS AS NEEDED
Status: DISCONTINUED | OUTPATIENT
Start: 2024-03-20 | End: 2024-03-20 | Stop reason: HOSPADM

## 2024-03-20 RX ORDER — TAMSULOSIN HYDROCHLORIDE 0.4 MG/1
0.4 CAPSULE ORAL EVERY EVENING
Status: DISCONTINUED | OUTPATIENT
Start: 2024-03-20 | End: 2024-03-22 | Stop reason: HOSPADM

## 2024-03-20 RX ORDER — DIPHENHYDRAMINE HYDROCHLORIDE 50 MG/ML
INJECTION INTRAMUSCULAR; INTRAVENOUS AS NEEDED
Status: DISCONTINUED | OUTPATIENT
Start: 2024-03-20 | End: 2024-03-20

## 2024-03-20 RX ORDER — GABAPENTIN 300 MG/1
300 CAPSULE ORAL 3 TIMES DAILY PRN
Status: DISCONTINUED | OUTPATIENT
Start: 2024-03-20 | End: 2024-03-22 | Stop reason: HOSPADM

## 2024-03-20 RX ORDER — ONDANSETRON HYDROCHLORIDE 2 MG/ML
INJECTION, SOLUTION INTRAVENOUS AS NEEDED
Status: DISCONTINUED | OUTPATIENT
Start: 2024-03-20 | End: 2024-03-20

## 2024-03-20 RX ORDER — ACETAMINOPHEN 325 MG/1
650 TABLET ORAL EVERY 4 HOURS PRN
Status: DISCONTINUED | OUTPATIENT
Start: 2024-03-20 | End: 2024-03-20 | Stop reason: HOSPADM

## 2024-03-20 RX ORDER — CEFAZOLIN SODIUM 2 G/100ML
2 INJECTION, SOLUTION INTRAVENOUS ONCE
Status: CANCELLED | OUTPATIENT
Start: 2024-03-20 | End: 2024-03-20

## 2024-03-20 RX ORDER — IPRATROPIUM BROMIDE AND ALBUTEROL SULFATE 2.5; .5 MG/3ML; MG/3ML
3 SOLUTION RESPIRATORY (INHALATION) EVERY 2 HOUR PRN
Status: DISCONTINUED | OUTPATIENT
Start: 2024-03-20 | End: 2024-03-22 | Stop reason: HOSPADM

## 2024-03-20 RX ORDER — OXYCODONE HYDROCHLORIDE 5 MG/1
5 TABLET ORAL EVERY 6 HOURS PRN
Status: DISCONTINUED | OUTPATIENT
Start: 2024-03-20 | End: 2024-03-22

## 2024-03-20 RX ORDER — ASPIRIN 325 MG
325 TABLET, DELAYED RELEASE (ENTERIC COATED) ORAL 2 TIMES DAILY
Status: DISCONTINUED | OUTPATIENT
Start: 2024-03-21 | End: 2024-03-22 | Stop reason: HOSPADM

## 2024-03-20 RX ORDER — CEFAZOLIN SODIUM 2 G/100ML
2 INJECTION, SOLUTION INTRAVENOUS EVERY 8 HOURS
Status: COMPLETED | OUTPATIENT
Start: 2024-03-20 | End: 2024-03-21

## 2024-03-20 RX ORDER — ACETAMINOPHEN 325 MG/1
975 TABLET ORAL EVERY 6 HOURS SCHEDULED
Status: DISCONTINUED | OUTPATIENT
Start: 2024-03-20 | End: 2024-03-22 | Stop reason: HOSPADM

## 2024-03-20 RX ORDER — FENTANYL CITRATE 50 UG/ML
INJECTION, SOLUTION INTRAMUSCULAR; INTRAVENOUS AS NEEDED
Status: DISCONTINUED | OUTPATIENT
Start: 2024-03-20 | End: 2024-03-20

## 2024-03-20 RX ORDER — NALOXONE HYDROCHLORIDE 0.4 MG/ML
0.2 INJECTION, SOLUTION INTRAMUSCULAR; INTRAVENOUS; SUBCUTANEOUS EVERY 5 MIN PRN
Status: DISCONTINUED | OUTPATIENT
Start: 2024-03-20 | End: 2024-03-22 | Stop reason: HOSPADM

## 2024-03-20 RX ORDER — TORSEMIDE 20 MG/1
20 TABLET ORAL DAILY
Status: DISCONTINUED | OUTPATIENT
Start: 2024-03-20 | End: 2024-03-22 | Stop reason: HOSPADM

## 2024-03-20 RX ORDER — BUPIVACAINE HYDROCHLORIDE 7.5 MG/ML
INJECTION, SOLUTION INTRASPINAL AS NEEDED
Status: DISCONTINUED | OUTPATIENT
Start: 2024-03-20 | End: 2024-03-20

## 2024-03-20 RX ORDER — DOCUSATE SODIUM 100 MG/1
100 CAPSULE, LIQUID FILLED ORAL 2 TIMES DAILY PRN
COMMUNITY
Start: 2024-03-20

## 2024-03-20 RX ORDER — SODIUM CHLORIDE, SODIUM LACTATE, POTASSIUM CHLORIDE, CALCIUM CHLORIDE 600; 310; 30; 20 MG/100ML; MG/100ML; MG/100ML; MG/100ML
100 INJECTION, SOLUTION INTRAVENOUS CONTINUOUS
Status: DISCONTINUED | OUTPATIENT
Start: 2024-03-20 | End: 2024-03-22 | Stop reason: HOSPADM

## 2024-03-20 RX ORDER — CARISOPRODOL 350 MG/1
350 TABLET ORAL 3 TIMES DAILY PRN
Status: DISCONTINUED | OUTPATIENT
Start: 2024-03-20 | End: 2024-03-22 | Stop reason: HOSPADM

## 2024-03-20 RX ORDER — OXYCODONE HYDROCHLORIDE 10 MG/1
10 TABLET ORAL EVERY 6 HOURS PRN
Status: DISCONTINUED | OUTPATIENT
Start: 2024-03-20 | End: 2024-03-22

## 2024-03-20 RX ORDER — GABAPENTIN 300 MG/1
300 CAPSULE ORAL 3 TIMES DAILY PRN
Status: ON HOLD | COMMUNITY
Start: 2024-03-20 | End: 2024-04-15

## 2024-03-20 RX ORDER — ONDANSETRON HYDROCHLORIDE 2 MG/ML
8 INJECTION, SOLUTION INTRAVENOUS ONCE
Status: DISCONTINUED | OUTPATIENT
Start: 2024-03-20 | End: 2024-03-20 | Stop reason: HOSPADM

## 2024-03-20 RX ORDER — ONDANSETRON 4 MG/1
4 TABLET, ORALLY DISINTEGRATING ORAL EVERY 8 HOURS PRN
Status: DISCONTINUED | OUTPATIENT
Start: 2024-03-20 | End: 2024-03-22 | Stop reason: HOSPADM

## 2024-03-20 RX ORDER — CARISOPRODOL 350 MG/1
350 TABLET ORAL 3 TIMES DAILY PRN
Refills: 0 | Status: ON HOLD | COMMUNITY
Start: 2024-03-20 | End: 2024-04-23 | Stop reason: ALTCHOICE

## 2024-03-20 RX ORDER — METOPROLOL SUCCINATE 50 MG/1
50 TABLET, EXTENDED RELEASE ORAL DAILY
Status: DISCONTINUED | OUTPATIENT
Start: 2024-03-20 | End: 2024-03-22 | Stop reason: HOSPADM

## 2024-03-20 RX ORDER — MORPHINE SULFATE 2 MG/ML
2 INJECTION, SOLUTION INTRAMUSCULAR; INTRAVENOUS
Status: DISCONTINUED | OUTPATIENT
Start: 2024-03-20 | End: 2024-03-22

## 2024-03-20 RX ORDER — ALBUTEROL SULFATE 0.83 MG/ML
2.5 SOLUTION RESPIRATORY (INHALATION) ONCE AS NEEDED
Status: DISCONTINUED | OUTPATIENT
Start: 2024-03-20 | End: 2024-03-20 | Stop reason: HOSPADM

## 2024-03-20 RX ORDER — ASCORBIC ACID 500 MG
500 TABLET ORAL 2 TIMES DAILY
Status: ON HOLD | COMMUNITY
Start: 2024-03-20 | End: 2024-04-24 | Stop reason: WASHOUT

## 2024-03-20 RX ADMIN — DIPHENHYDRAMINE HYDROCHLORIDE 12.5 MG: 50 INJECTION, SOLUTION INTRAMUSCULAR; INTRAVENOUS at 11:09

## 2024-03-20 RX ADMIN — MORPHINE SULFATE 2 MG: 2 INJECTION, SOLUTION INTRAMUSCULAR; INTRAVENOUS at 17:27

## 2024-03-20 RX ADMIN — CEFAZOLIN 2 G: 1 INJECTION, POWDER, FOR SOLUTION INTRAMUSCULAR; INTRAVENOUS at 10:50

## 2024-03-20 RX ADMIN — TRANEXAMIC ACID 1000 MG: 10 INJECTION, SOLUTION INTRAVENOUS at 11:12

## 2024-03-20 RX ADMIN — PROPOFOL 50 MCG/KG/MIN: 10 INJECTION, EMULSION INTRAVENOUS at 11:04

## 2024-03-20 RX ADMIN — DICYCLOMINE HYDROCHLORIDE 20 MG: 20 TABLET ORAL at 15:59

## 2024-03-20 RX ADMIN — OXYCODONE HYDROCHLORIDE 10 MG: 10 TABLET ORAL at 20:56

## 2024-03-20 RX ADMIN — ACETAMINOPHEN 975 MG: 325 TABLET ORAL at 17:24

## 2024-03-20 RX ADMIN — SODIUM CHLORIDE, POTASSIUM CHLORIDE, SODIUM LACTATE AND CALCIUM CHLORIDE 100 ML/HR: 600; 310; 30; 20 INJECTION, SOLUTION INTRAVENOUS at 21:47

## 2024-03-20 RX ADMIN — TAMSULOSIN HYDROCHLORIDE 0.4 MG: 0.4 CAPSULE ORAL at 20:55

## 2024-03-20 RX ADMIN — MORPHINE SULFATE 2 MG: 2 INJECTION, SOLUTION INTRAMUSCULAR; INTRAVENOUS at 23:18

## 2024-03-20 RX ADMIN — BUPIVACAINE HYDROCHLORIDE IN DEXTROSE 15 MG: 7.5 INJECTION, SOLUTION SUBARACHNOID at 11:00

## 2024-03-20 RX ADMIN — CEFAZOLIN SODIUM 2 G: 2 INJECTION, SOLUTION INTRAVENOUS at 18:30

## 2024-03-20 RX ADMIN — ACETAMINOPHEN 975 MG: 325 TABLET ORAL at 23:39

## 2024-03-20 RX ADMIN — FENTANYL CITRATE 100 MCG: 50 INJECTION, SOLUTION INTRAMUSCULAR; INTRAVENOUS at 10:57

## 2024-03-20 RX ADMIN — FINASTERIDE 5 MG: 5 TABLET, FILM COATED ORAL at 20:56

## 2024-03-20 RX ADMIN — SODIUM CHLORIDE, POTASSIUM CHLORIDE, SODIUM LACTATE AND CALCIUM CHLORIDE: 600; 310; 30; 20 INJECTION, SOLUTION INTRAVENOUS at 12:11

## 2024-03-20 RX ADMIN — OXYCODONE HYDROCHLORIDE 10 MG: 10 TABLET ORAL at 14:53

## 2024-03-20 RX ADMIN — MIDAZOLAM HYDROCHLORIDE 2 MG: 1 INJECTION, SOLUTION INTRAMUSCULAR; INTRAVENOUS at 10:56

## 2024-03-20 RX ADMIN — ONDANSETRON 4 MG: 2 INJECTION, SOLUTION INTRAMUSCULAR; INTRAVENOUS at 13:20

## 2024-03-20 RX ADMIN — DICYCLOMINE HYDROCHLORIDE 20 MG: 20 TABLET ORAL at 21:35

## 2024-03-20 RX ADMIN — DOCUSATE SODIUM 100 MG: 100 CAPSULE, LIQUID FILLED ORAL at 23:43

## 2024-03-20 RX ADMIN — SODIUM CHLORIDE, POTASSIUM CHLORIDE, SODIUM LACTATE AND CALCIUM CHLORIDE 100 ML/HR: 600; 310; 30; 20 INJECTION, SOLUTION INTRAVENOUS at 08:58

## 2024-03-20 RX ADMIN — TRANEXAMIC ACID 1000 MG: 10 INJECTION, SOLUTION INTRAVENOUS at 12:47

## 2024-03-20 RX ADMIN — SODIUM CHLORIDE, POTASSIUM CHLORIDE, SODIUM LACTATE AND CALCIUM CHLORIDE 100 ML/HR: 600; 310; 30; 20 INJECTION, SOLUTION INTRAVENOUS at 14:59

## 2024-03-20 SDOH — SOCIAL STABILITY: SOCIAL INSECURITY: HAS ANYONE EVER THREATENED TO HURT YOUR FAMILY OR YOUR PETS?: NO

## 2024-03-20 SDOH — HEALTH STABILITY: MENTAL HEALTH: CURRENT SMOKER: 0

## 2024-03-20 SDOH — SOCIAL STABILITY: SOCIAL INSECURITY: ARE THERE ANY APPARENT SIGNS OF INJURIES/BEHAVIORS THAT COULD BE RELATED TO ABUSE/NEGLECT?: NO

## 2024-03-20 SDOH — SOCIAL STABILITY: SOCIAL INSECURITY: ARE YOU OR HAVE YOU BEEN THREATENED OR ABUSED PHYSICALLY, EMOTIONALLY, OR SEXUALLY BY ANYONE?: NO

## 2024-03-20 SDOH — SOCIAL STABILITY: SOCIAL INSECURITY: WERE YOU ABLE TO COMPLETE ALL THE BEHAVIORAL HEALTH SCREENINGS?: YES

## 2024-03-20 SDOH — SOCIAL STABILITY: SOCIAL INSECURITY: DO YOU FEEL UNSAFE GOING BACK TO THE PLACE WHERE YOU ARE LIVING?: NO

## 2024-03-20 SDOH — SOCIAL STABILITY: SOCIAL INSECURITY: DO YOU FEEL ANYONE HAS EXPLOITED OR TAKEN ADVANTAGE OF YOU FINANCIALLY OR OF YOUR PERSONAL PROPERTY?: NO

## 2024-03-20 SDOH — SOCIAL STABILITY: SOCIAL INSECURITY: HAVE YOU HAD THOUGHTS OF HARMING ANYONE ELSE?: NO

## 2024-03-20 SDOH — SOCIAL STABILITY: SOCIAL INSECURITY: ABUSE: ADULT

## 2024-03-20 SDOH — SOCIAL STABILITY: SOCIAL INSECURITY: DOES ANYONE TRY TO KEEP YOU FROM HAVING/CONTACTING OTHER FRIENDS OR DOING THINGS OUTSIDE YOUR HOME?: NO

## 2024-03-20 ASSESSMENT — COGNITIVE AND FUNCTIONAL STATUS - GENERAL
TOILETING: A LITTLE
MOVING FROM LYING ON BACK TO SITTING ON SIDE OF FLAT BED WITH BEDRAILS: A LITTLE
CLIMB 3 TO 5 STEPS WITH RAILING: A LITTLE
TOILETING: A LITTLE
HELP NEEDED FOR BATHING: A LITTLE
MOVING FROM LYING ON BACK TO SITTING ON SIDE OF FLAT BED WITH BEDRAILS: A LITTLE
DRESSING REGULAR UPPER BODY CLOTHING: A LITTLE
DAILY ACTIVITIY SCORE: 20
HELP NEEDED FOR BATHING: A LITTLE
MOVING TO AND FROM BED TO CHAIR: A LITTLE
CLIMB 3 TO 5 STEPS WITH RAILING: A LITTLE
MOBILITY SCORE: 18
MOVING TO AND FROM BED TO CHAIR: A LITTLE
TURNING FROM BACK TO SIDE WHILE IN FLAT BAD: A LITTLE
DRESSING REGULAR UPPER BODY CLOTHING: A LITTLE
MOBILITY SCORE: 18
DRESSING REGULAR LOWER BODY CLOTHING: A LITTLE
WALKING IN HOSPITAL ROOM: A LITTLE
WALKING IN HOSPITAL ROOM: A LITTLE
TURNING FROM BACK TO SIDE WHILE IN FLAT BAD: A LITTLE
DRESSING REGULAR LOWER BODY CLOTHING: A LITTLE
PATIENT BASELINE BEDBOUND: NO
STANDING UP FROM CHAIR USING ARMS: A LITTLE
STANDING UP FROM CHAIR USING ARMS: A LITTLE
DAILY ACTIVITIY SCORE: 20

## 2024-03-20 ASSESSMENT — LIFESTYLE VARIABLES
AUDIT-C TOTAL SCORE: 0
HOW OFTEN DO YOU HAVE 6 OR MORE DRINKS ON ONE OCCASION: NEVER
AUDIT-C TOTAL SCORE: 0
SKIP TO QUESTIONS 9-10: 1
HOW OFTEN DO YOU HAVE A DRINK CONTAINING ALCOHOL: NEVER
HOW MANY STANDARD DRINKS CONTAINING ALCOHOL DO YOU HAVE ON A TYPICAL DAY: PATIENT DOES NOT DRINK

## 2024-03-20 ASSESSMENT — ACTIVITIES OF DAILY LIVING (ADL)
TOILETING: NEEDS ASSISTANCE
ASSISTIVE_DEVICE: WALKER
PATIENT'S MEMORY ADEQUATE TO SAFELY COMPLETE DAILY ACTIVITIES?: YES
FEEDING YOURSELF: NEEDS ASSISTANCE
LACK_OF_TRANSPORTATION: NO
WALKS IN HOME: NEEDS ASSISTANCE
LACK_OF_TRANSPORTATION: NO
ADEQUATE_TO_COMPLETE_ADL: YES
BATHING: NEEDS ASSISTANCE
GROOMING: NEEDS ASSISTANCE
DRESSING YOURSELF: NEEDS ASSISTANCE
HEARING - RIGHT EAR: FUNCTIONAL
JUDGMENT_ADEQUATE_SAFELY_COMPLETE_DAILY_ACTIVITIES: YES
HEARING - LEFT EAR: FUNCTIONAL
LACK_OF_TRANSPORTATION: NO

## 2024-03-20 ASSESSMENT — PATIENT HEALTH QUESTIONNAIRE - PHQ9
2. FEELING DOWN, DEPRESSED OR HOPELESS: NOT AT ALL
SUM OF ALL RESPONSES TO PHQ9 QUESTIONS 1 & 2: 0
1. LITTLE INTEREST OR PLEASURE IN DOING THINGS: NOT AT ALL

## 2024-03-20 ASSESSMENT — COLUMBIA-SUICIDE SEVERITY RATING SCALE - C-SSRS
1. IN THE PAST MONTH, HAVE YOU WISHED YOU WERE DEAD OR WISHED YOU COULD GO TO SLEEP AND NOT WAKE UP?: NO
2. HAVE YOU ACTUALLY HAD ANY THOUGHTS OF KILLING YOURSELF?: NO
6. HAVE YOU EVER DONE ANYTHING, STARTED TO DO ANYTHING, OR PREPARED TO DO ANYTHING TO END YOUR LIFE?: NO

## 2024-03-20 ASSESSMENT — PAIN SCALES - GENERAL
PAINLEVEL_OUTOF10: 9
PAINLEVEL_OUTOF10: 9
PAINLEVEL_OUTOF10: 10 - WORST POSSIBLE PAIN
PAINLEVEL_OUTOF10: 0 - NO PAIN
PAINLEVEL_OUTOF10: 0 - NO PAIN
PAINLEVEL_OUTOF10: 10 - WORST POSSIBLE PAIN
PAINLEVEL_OUTOF10: 0 - NO PAIN
PAINLEVEL_OUTOF10: 7
PAINLEVEL_OUTOF10: 10 - WORST POSSIBLE PAIN
PAINLEVEL_OUTOF10: 7

## 2024-03-20 ASSESSMENT — PAIN DESCRIPTION - ORIENTATION: ORIENTATION: RIGHT

## 2024-03-20 ASSESSMENT — PAIN - FUNCTIONAL ASSESSMENT
PAIN_FUNCTIONAL_ASSESSMENT: 0-10

## 2024-03-20 ASSESSMENT — PAIN DESCRIPTION - LOCATION: LOCATION: KNEE

## 2024-03-20 NOTE — DISCHARGE INSTRUCTIONS
[facility]                          Scott Arora,   Phone: [phone]    POSTOPERATIVE INSTRUCTIONS: TOTAL HIP & TOTAL KNEE ARTHROPLASTY    General/highlights: Flex/tighten the muscles in the buttocks, thighs and calves often when in chair or bed.  Utilize the incentive spirometer often especially the next 3 days.  Walk at least 10 steps every hour that you are awake.  Take stairs 1 at a time, good leg leads up, bed leg legs down, use the handrails.  You may be weightbearing as tolerated, in general the walker is used for 2 weeks.    PAIN, SWELLING & BRUISING  Some pain, stiffness and swelling is normal for up to 1 year after surgery.  Pain will start to let up over time depending on your activity level.  It is preferable to rest for 24 hours following your surgery  Pain is often delayed for 24-48 hours after surgery.  Pain may be dull/achy, throbbing, or even sharp/nerve sensations  It is normal for swelling and bruising to worsen before it gets better.  These symptoms usually peak 1 week after surgery  Swelling and bruising may appear throughout the leg, all the way down to your toes  Wear your compression stockings every day during the day for 14 days and remove them at night.  This will help control swelling    WOUND CARE INSTRUCTIONS  Your surgical bandage will be removed 2 weeks after surgery at your post-operative visit.  If your bandage becomes compromised, begins to come off before then, or soaks through with drainage call the office immediately.  You may have sutures under the skin that dissolve on their own over time.    As the sutures absorb occasionally a small suture abscess can develop, this is not uncommon for up to 6 weeks, if this occurs please notify your surgeon immediately.    HYGIENE  You may shower 24 hours after your surgery, provided the Mepilex silver dressing is in place.  No tub bathing or submerging underwater.  Do not scrub directly over the  surgical bandage  Do not use any creams, lotions or ointments on the surgical leg for 4 weeks after surgery, or until you have been cleared to do so by your surgeon    GENERAL INSTRUCTIONS  Do not drink alcoholic beverages (beer and wine included) for 24 hours following your surgery, or while you are taking narcotic pain medications  Delay making important decisions until you are fully recovered  You cannot swim or submerge in water for at least 6 weeks after surgery, or until you are cleared by your surgeon.  You may start kneeling 3 months after knee replacement surgery, once you have been cleared by your surgeon.  This may not ever feel “normal” or comfortable    HOME DIET  Resume your normal diet after surgery. If you are on a specific type of diet for your condition, resume that instead.    Choose foods that help promote good bowel habits and prevent constipation, such as foods high in fiber.          POSTOPERATIVE MEDICATIONS    Pain medications have been ordered to help manage pain throughout recovery.  While you are using narcotic pain medication, you should be using a stool softener or laxative to prevent constipation.  My preference is parallax powder once or twice a day when taking the narcotic pain medicine  It is important to eat a small meal or snack before taking pain medications to avoid nausea or stomach upset.    MEDICATION REFILLS - [phone]    If you need to request a medication refill, please call the office between 8:30am-4:30pm, Monday through Friday.    Any calls received outside of this timeframe will be handled on the next business day.    Medication requests received on Saturday or Sunday will be handled on Monday.    Please allow 3-5 business days for all medication requests to be processed.    RESTARTING HOME MEDICATIONS  You may restart your home medications the following day after your surgery UNLESS you have been given alternate instructions.    Follow the instructions given to  "you on your hospital discharge instructions for more information regarding your home medications.    ASSISTIVE DEVICE & MOVEMENT  Initially, you will use a walker or crutches to walk for the first 2 weeks.  Once your therapist feels you are ready, you will wean to one crutch or cane followed by no assistive device.  It is important to keep moving throughout recovery.  Walk at least 10 steps every hour that you are awake.  Stairs are part of your recovery, the \"good \"leg leads on the way up, the \"bad \"leg leads on the way down to, use the handrails and not the walker or crutches.  You should be up and walking around several times per day as well as bending your knee and making sure your knee is going completely straight (for knee replacements).  For anterior hip replacements avoid straight leg raise.    NUMBNESS/CLICKING    Decreased sensation or numbness is common on or around the area of the incision due to sensory nerves that are affected at the time of surgery.  The area of numbness will be lateral to the incision  You might always have numbness but the size of the area of numbness should decrease with time.  It is common for patients to have a “click” in the knee with movement.  This is usually nothing to be concerned about.  There are several reasons why your knee can be making these noises, including the implant components rubbing against each other, or a tendons going over a bony prominence.  Grinding can also occur and is not out of the ordinary.  Grinding can be caused by scar tissue formation  Noises will often settle over time once muscle strength improves.    DIFFICULTY SLEEPING    It is very common for patients to have difficulty sleeping at night which can be caused pain, medication, or feeling of anxiety.  Sleep disturbance typically worsens 4-6 weeks after surgery.  You are permitted to sleep on your back or side with a  pillow between your legs for comfort            DRIVING & TRAVEL  Your surgeon " will address this at your post-op appointment.  You must not be taking narcotic pain medication to be cleared to drive.  LEFT LEG JOINT REPLACMENT:  You may drive once you have regained full control of your leg, typically around 2 week after surgery  RIGHT LEG JOINT REPLACEMENT:  You must speak with your surgeon before you resume driving.  Driving can typically be resumed by 4-6 weeks after surgery.  During long distance travel, you should attempt to change position or stand every hour.  You should complete ankle pumps throughout your travel if you are sitting for long periods of time.  If traveling within the first 2 weeks after surgery, you should wear your compression stockings.    DENTAL & OTHER PROCEDURES    All patients must wait a minimum of 3 months for elective procedures, including routine dental cleanings.  For any dental appointment - cleaning or dental procedures - patients must take a prophylactic antibiotic 1 hour before the appointment.  You will also need to call for an antibiotic prior to any other invasive test, procedure, or surgery.  These prophylactic antibiotics will be needed for the rest of your life, in order to prevent infections.  Please call your surgeons office at [phone] to request the antibiotic.      PHYSICAL THERAPY    Following surgery it is important to progress through recovery with in-home or outpatient physical therapy.  You should continue to complete home exercises provided from the hospital on days that you are not working with a physical therapist.    It is common to have a temporary increase in pain and swelling upon starting outpatient physical therapy and/or changing your exercise routine.  Continue to use ice to help with symptoms.     FOLLOW-UP APPOINTMENT - [phone]    Your post-surgical appointments will take place approximately 2 weeks, 6 weeks, 3 months and 1 year following surgery.  Joint replacements are monitored thereafter every 2-5 years for life.  If  you have any questions or need to make changes to this appointment, please contact the office:    EMERGENCIES & WHEN TO CALL YOUR SURGEON  When to contact our office immediately:  Any falls or injury to the joint replacement  Fever >101.5 for at least 48 hours after surgery or chills.  Excessive bleeding from incision(s). A small amount of drainage is normal and expected.  Signs of infection of incision(s)-excessive drainage that is soaking through your dressing (especially if it is pus-like), redness that is spreading out from the edges of your incision, or increased warmth around the area.  Excruciating pain for which the pain medication, taken as instructed, is not helping.  Severe calf pain.  Go directly to the emergency room or call 911, if you are experiencing chest pain or difficulty breathing.              ICE/COLD THERAPY  Ice is most important during the first 2 weeks after surgery, but should be used for several weeks as needed.  Never place ice, or cold therapy devices directly on the skin.  You should always have a protective layer between your skin and the cold.  You have been prescribed to ice your total joint at a minimum of twice per hour for 20 minutes while awake during the first 6 weeks after surgery if you are using ice packs. This will help with pain control.  If you are using an ice machine, please follow ice machine instructions.  After knee replacement is extremely important to elevate the leg straight on an incline, not flat.    COLD THERAPY MACHINE RECOMMENDATIONS      Cold therapy devices can be used before and after surgery to assist in comfort and help to reduce pain and swelling.  These devices differ from ice or ice packs whereas the mechanism circulates water through tubing and a pad to provide longer periods of cold therapy to the desired site.  While in the hospital, you can use your cold devices around the clock for optimal comfort.  We recommend using cold therapy after working  with therapy or completing exercises on your own.  Once you are discharged home, there is no set schedule in which you must follow while using cold therapy.  Below are a few points to remember when using a cold therapy device:    Read the 's instructions prior to first the use.  Follow instructions for filling the cooler (water first, then ice).  Always make sure there is a layer of protection between the cold pad and your skin (Clothing, Towel, Ace Bandage, etc.)  Allow the device to circulate cold water throughout the pad prior wrapping the pad around your leg (approximately 10 minutes).  Place the pad on your leg in the desired position to meet your pain management needs and use the wraps provided to secure the pad to your body.  The purpose of this device is to use consistently throughout the day.  You do not need to need to use the 20 on, 20 off method when using an ice machine.  During waking hours, remove the cold pad every 1-2 hours to perform a skin check  Detach the pad from the cooler and ambulate at least once every hour  After removing the pad, allow at least 30 minutes before resuming cold therapy  You may wear the cold therapy device during periods of sleep including overnight    If you wake up during the night, you can check the skin at this time.  You do not need to wake up specifically to perform skin checks.  Empty the cooler and pad when device is not in use.  Follow 's instructions for cleaning your cold therapy device.    Critical access hospital can assist with problems related to products purchased through the hospital  [phone] - [place]   or   [phone] - [place]    Sample Medication Schedule  [facility]    Medication Refills - [phone] - Monday through Friday 8:30am-4:30pm  Please allow 3-5 days for medication refill requests to be processed.

## 2024-03-20 NOTE — BRIEF OP NOTE
Date: 3/20/2024  OR Location: [facility] OR    Name: Abraham Rodriguez, : 1940, Age: 83 y.o., MRN: [id number], Sex: male    Diagnosis  Pre-op Diagnosis     * Primary osteoarthritis of right knee [M17.11] Post-op Diagnosis     * Primary osteoarthritis of right knee [M17.11]     Procedures  CHAD ARTHROPLASTY  TOTAL KNEE  75991 - IL ARTHRP KNE CONDYLE&PLATU MEDIAL&LAT COMPARTMENTS      Surgeons      * Scott Arora - Primary    Resident/Fellow/Other Assistant:  Surgeon(s) and Role:  Narendra Mcclain PA-C    Procedure Summary  Anesthesia: Regional Block, Spinal, MAC ASA: III  Anesthesia Staff: CRNA: Hakan Archuleta APRN-CRNA; SHAD Ann-CRNA  Estimated Blood Loss: 50mL  Intra-op Medications:   Administrations occurring from 1030 to 1330 on 24:   Medication Name Total Dose   sodium chloride (PF) 0.9% solution 20 mL   ketorolac (Toradol) injection 30 mg   EPINEPHrine (Adrenalin) injection 0.2 mg   morphine PF (Duramorph) injection 5 mg   lactated Ringer's infusion 131.67 mL              Anesthesia Record               Intraprocedure I/O Totals          Intake    Tranexamic Acid 0.00 mL    The total shown is the total volume documented since Anesthesia Start was filed.    Propofol Drip 0.00 mL    The total shown is the total volume documented since Anesthesia Start was filed.    Total Intake 0 mL       Output    Est. Blood Loss 50 mL    Total Output 50 mL       Net    Net Volume -50 mL          Specimen: No specimens collected     Staff:   Circulator: Ramon Baker RN; Norma Rosales RN  Scrub Person: Magali Vitale RN  RNFA: Vik Alvarado RN          Findings: see dictation    Complications:  None; patient tolerated the procedure well.     Disposition: PACU - hemodynamically stable.  Condition: stable  Specimens Collected: No specimens collected  Attending Attestation: I performed the procedure.    Scott Arora  Phone Number: [phone]

## 2024-03-20 NOTE — ANESTHESIA PREPROCEDURE EVALUATION
Patient: Abraham Rodriguez    Procedure Information       Date/Time: 03/20/24 1030    Procedure: CATRACHO ARTHROPLASTY  TOTAL KNEE (Right: Knee) - catracho right total knee arthroplasty    Location: [facility] / [facility]    Surgeons: Scott Arora DO          There were no vitals filed for this visit.    Past Surgical History:   Procedure Laterality Date    CARDIAC CATHETERIZATION      CHOLECYSTECTOMY  04/23/2013    Cholecystectomy Laparoscopic    OTHER SURGICAL HISTORY  04/27/2021    Meniscus repair    OTHER SURGICAL HISTORY Left 05/31/2023    Hip replacement    OTHER SURGICAL HISTORY  04/23/2013    Biopsy Tongue    SHOULDER SURGERY  04/23/2013    Shoulder Surgery     Past Medical History:   Diagnosis Date    Allergic rhinitis due to pollen 10/23/2014    Hay fever    Arthritis     Chronic pain disorder     Clotting disorder (CMS/HCC)     dvt april 2020     P.E. april 2020    COPD (chronic obstructive pulmonary disease) (CMS/HCC)     hx asbestos    Coronary artery disease     Encounter for other preprocedural examination 06/24/2014    Pre-procedural examination    Encounter for screening for malignant neoplasm of prostate     Encounter for screening for malignant neoplasm of prostate    Hyperlipidemia     Hypertension     Localized edema 05/11/2020    Pedal edema    Myocardial infarction (CMS/HCC)     Other conditions influencing health status     Carcinoma Of The Tongue    Pain in unspecified knee 12/22/2014    Joint pain, knee    Personal history of diseases of the skin and subcutaneous tissue 04/23/2013    History of eczema    Personal history of other diseases of the musculoskeletal system and connective tissue 06/19/2014    Personal history of arthritis    Personal history of other diseases of the nervous system and sense organs 08/10/2021    History of Bell's palsy    Personal history of other diseases of the respiratory system 11/12/2014    History of asbestosis    Personal history of other diseases of the  respiratory system 08/13/2014    History of chronic obstructive lung disease    Personal history of other specified conditions 08/20/2013    History of edema    Plantar fascial fibromatosis 07/22/2013    Plantar fasciitis    Polyp of colon     Polyp of sigmoid colon    Syncope and collapse 01/23/2015    Syncope and collapse    Unspecified abdominal pain 12/22/2014    Stomach pain    Unspecified disorder of eyelid 10/23/2014    Eyelid disorder       Current Facility-Administered Medications:     lactated Ringer's infusion, 100 mL/hr, intravenous, Continuous, Vik Ramirez MD  Prior to Admission medications    Medication Sig Start Date End Date Taking? Authorizing Provider   chlorhexidine (Peridex) 0.12 % solution Use 15 mL in the mouth or throat once daily for 2 days. Use 15ml once the night before surgery and 15ml once the morning of surgery 3/12/24 3/14/24  Marika Diaz, APRN-CNP   dexlansoprazole (Dexilant) 60 mg DR capsule Take 1 capsule (60 mg) by mouth once daily.    Historical Provider, MD   dicyclomine (Bentyl) 20 mg tablet Take by mouth 4 times a day before meals.    Historical Provider, MD   finasteride (Proscar) 5 mg tablet Take 1 tablet (5 mg) by mouth once daily in the evening. Do not crush, chew, or split.    Historical Provider, MD   fluticasone (Flonase) 50 mcg/actuation nasal spray Administer into each nostril once daily. Shake gently. Before first use, prime pump. After use, clean tip and replace cap. As needed    Historical Provider, MD   irbesartan (Avapro) 150 mg tablet Take 1 tablet (150 mg) by mouth once daily.    Historical Provider, MD   loratadine (Claritin) 10 mg tablet Take 1 tablet (10 mg) by mouth once daily.    Historical Provider, MD   metoprolol succinate XL (Toprol-XL) 50 mg 24 hr tablet Take 1 tablet (50 mg) by mouth once daily.    Historical Provider, MD   naloxone (Narcan) 4 mg/0.1 mL nasal spray Administer into affected nostril(s) if needed for opioid reversal or  respiratory depression.  ADMINISTER A SINGLE SPRAY IN ONE NOSTRIL UPON SIGNS OF OPIOID OVERDOSE. CALL 911. REPEAT AFTER 3 MINUTES IF NO RESPONSE. 22   Historical Provider, MD   oxyCODONE-acetaminophen (Percocet) 7.5-325 mg tablet Take 1 tablet by mouth 3 times a day as needed for moderate pain (4 - 6) or severe pain (7 - 10) for up to 28 days. Do not start before 2024. 24  Macho Elizabeth MD   oxyCODONE-acetaminophen (Percocet) 7.5-325 mg tablet Take 1 tablet by mouth 3 times a day as needed for severe pain (7 - 10) for up to 28 days. Do not start before 2024. 3/27/24 4/24/24  Macho Elizabeth MD   tamsulosin (Flomax) 0.4 mg 24 hr capsule Take by mouth once daily in the evening.    Historical Provider, MD   torsemide (Demadex) 20 mg tablet Take 1 tablet (20 mg) by mouth once daily.    Historical Provider, MD     Allergies   Allergen Reactions    Iodinated Contrast Media Unknown and Anaphylaxis    Tobramycin-Dexamethasone Itching and Rash    Lisinopril Unknown     Social History     Tobacco Use    Smoking status: Former     Types: Cigarettes     Quit date:      Years since quittin.2    Smokeless tobacco: Never   Substance Use Topics    Alcohol use: Never         Chemistry    Lab Results   Component Value Date/Time     2024 1333    K 3.9 2024 1333     2024 1333    CO2 28 2024 1333    BUN 20 2024 1333    CREATININE 0.91 2024 1333    Lab Results   Component Value Date/Time    CALCIUM 8.8 2024 1333    ALKPHOS 49 2024 1333    AST 12 2024 1333    ALT 12 2024 1333    BILITOT 0.5 2024 1333          Lab Results   Component Value Date/Time    WBC 5.6 2024 1333    HGB 14.4 2024 1333    HCT 43.0 2024 1333     2024 1333     Lab Results   Component Value Date/Time    PROTIME 15.1 (H) 08/15/2022 0506    INR 1.3 (H) 08/15/2022 0506       No results found for this or any  previous visit from the past 1095 days.    Relevant Problems   Cardiovascular   (+) (HFpEF) heart failure with preserved ejection fraction (CMS/HCC)   (+) ASHD (arteriosclerotic heart disease)   (+) Benign essential hypertension   (+) Bilateral pulmonary embolism (CMS/HCC)   (+) Coronary atherosclerosis of native coronary artery   (+) Primary hypertension      GI   (+) Esophageal reflux      Neuro/Psych   (+) Cervical radiculitis   (+) Idiopathic peripheral neuropathy   (+) Lumbago with sciatica, left side   (+) Lumbago with sciatica, right side   (+) Lumbar radiculitis   (+) Lumbar radiculopathy   (+) Neuropathy, peroneal nerve   (+) Right cervical radiculopathy   (+) Sciatica      Pulmonary   (+) Bilateral pulmonary embolism (CMS/HCC)   (+) COPD, mild (CMS/HCC)   (+) Dyspnea on exertion   (+) Restrictive lung disease      Musculoskeletal   (+) Chronic pain syndrome   (+) DDD (degenerative disc disease), cervical   (+) Degeneration of intervertebral disc of lumbar region   (+) Degenerative joint disease of left hip   (+) Lumbar spondylosis   (+) Lumbar stenosis with neurogenic claudication   (+) Osteoarthritis   (+) Primary osteoarthritis of right knee      Other   (+) Arthritis of knee, left   (+) Arthritis of right knee   (+) Sacroiliitis (CMS/HCC)       Clinical information reviewed:    Allergies                NPO Detail:  No data recorded     Physical Exam    Airway  Mallampati: II     Cardiovascular - normal exam     Dental    Pulmonary    Abdominal            Anesthesia Plan    History of general anesthesia?: yes  History of complications of general anesthesia?: no    ASA 3     spinal and regional     The patient is not a current smoker.  Patient was not previously instructed to abstain from smoking on day of procedure.  Patient did not smoke on day of procedure.  Education provided regarding risk of obstructive sleep apnea.  intravenous induction   Anesthetic plan and risks discussed with patient.    Plan  discussed with CRNA.

## 2024-03-20 NOTE — ANESTHESIA POSTPROCEDURE EVALUATION
Patient: Abraham Rodriguez    Procedure Summary       Date: 03/20/24 Room / Location: [facility]    Anesthesia Start: 1049 Anesthesia Stop: 1325    Procedure: CHAD ARTHROPLASTY  TOTAL KNEE (Right: Knee) Diagnosis:       Primary osteoarthritis of right knee      (Primary osteoarthritis of right knee [M17.11])    Surgeons: Scott Arora DO Responsible Provider: MAC Ann    Anesthesia Type: spinal, regional ASA Status: 3            Anesthesia Type: spinal, regional    Vitals Value Taken Time   /86 03/20/24 1410   Temp  03/20/24 1442   Pulse 77 03/20/24 1410   Resp 16 03/20/24 1410   SpO2 98 % 03/20/24 1340       Anesthesia Post Evaluation    Patient location during evaluation: PACU  Patient participation: complete - patient participated  Level of consciousness: awake  Pain management: adequate  Multimodal analgesia pain management approach  Airway patency: patent  Two or more strategies used to mitigate risk of obstructive sleep apnea  Cardiovascular status: acceptable  Respiratory status: acceptable  Hydration status: acceptable  Postoperative Nausea and Vomiting: none        No notable events documented.

## 2024-03-20 NOTE — CONSULTS
Inpatient consult to Medicine  Consult performed by: SHAD Rivera-CNP  Consult ordered by: Narendra Mcclain PA-C  Reason for consult: Medical management          Reason For Consult  Medical Management    History Of Present Illness  Abraham Rodriguez is a 83 y.o. male with a medical history of CAD, HTN, and HFpEF, who presented for an elective right TKA by Dr. Arora today. Has osteoarthritis and has been having progressive right knee pain for many years. Denies injury or trauma. Surgical course uneventful to this point. EBL 50ml. Consulted for medical management. Seen and examined in his room this afternoon. Awake and alert. Pain in his operative knee is increasing as nerve block wears off. He denies chest pain, breathing difficulties, abdominal pain, N/V/D/C, fever, or chills.       Past Medical History  Hypertension  Hyperlipidemia  Coronary Artery Disease  Myocardial Infarction  HFpEF  COPD  DVT/PE (patient denies)  Chronic Low Back Pain with Radiculopathy  Chronic Asbestosis  Obstructive Sleep Apnea  GERD  Tongue Cancer  Squamous Cell Carcinoma - Nose  Bell's Palsy  Planar Fascitis  Colon Polyps    Surgical History  Cholecystectomy  Cardiac Catheterization  Meniscus Repair - Right  Total Hip Replacement - bilateral  Tongue Biopsy  Shoulder Surgery x2 - left  Cataract Excision - Bilateral     Social History  He reports that he quit smoking about 41 years ago. His smoking use included cigarettes. He has never used smokeless tobacco. He reports that he does not currently use drugs after having used the following drugs: Marijuana. He reports that he does not drink alcohol.  Lives alone with family nearby. Independent in ADL's.     Family History  Family History   Problem Relation Name Age of Onset    Hypothyroidism Brother          Allergies  Iodinated contrast media, Tobramycin-dexamethasone, Lipitor [atorvastatin], and Lisinopril    Review of Systems  Constitutional: Denies fever, chills, fatigue, weight  loss/gain  HEENT: Denies ear ache, sore throat, nasal drainage  Eyes: Denies blurred or double vision  Respiratory: Denies cough, shortness or breath, wheezing  Cardiovascular: Denies chest pain, palpitations, shortness of breath with exertion, edema  GI: Denies abdominal pain, nausea, vomiting, diarrhea, constipation, bloody stools  : Denies urinary burning, urgency, frequency, hematuria  Musculoskeletal: See HPI. Chronic back pain.   Endocrine: Denies cold/heat intolerance, excessive thirst, excessive hunger  Neuro: Denies dizziness, lightheadedness, seizures, headaches  Psychiatric: Denies anxiety, depression, self-harm  Skin: Denies wounds, rashes, lesions  Hematologic: Denies easy bruising, easy bleeding, clotting disorder      Physical Exam  Constitutional: A&O x 3; NAD; calm and cooperative  Eyes: EOM's intact  HEENT: Normocephalic, Atraumatic. Oral mucosa moist.   Neck: Supple. No JVD, lymphadenopathy.   Lungs: CTAB with fair air movement. Respirations even and unlabored on room air.   Heart: RRR  Abdomen: Soft, non-tender, non-distended, +BS  MS/Extremities: KOENIG with RLE weakness/pain. No edema. Peripheral pulses intact bilaterally. Right knee incision is covered with primary/surgical dressing - C/D/I.   Neuro: A&O x3; no focal deficits; gross motor and sensation intact.   Skin: Warm and dry. No rashes or lesions  Psych: Normal affect.       Last Recorded Vitals  BP (!) 173/93   Pulse 71   Temp 35.9 °C (96.6 °F)   Resp 16   Wt 86.2 kg (190 lb 0.6 oz)   SpO2 93%     Relevant Results  Scheduled medications  acetaminophen, 975 mg, oral, q6h GUERO  [START ON 3/21/2024] aspirin, 325 mg, oral, BID  ceFAZolin, 2 g, intravenous, q8h  dicyclomine, 20 mg, oral, Before meals & nightly  docusate sodium, 100 mg, oral, BID  finasteride, 5 mg, oral, q PM  losartan, 50 mg, oral, Daily  metoprolol succinate XL, 50 mg, oral, Daily  [START ON 3/21/2024] pantoprazole, 40 mg, oral, Daily before breakfast  tamsulosin, 0.4  mg, oral, q PM  torsemide, 20 mg, oral, Daily      Continuous medications  lactated Ringer's, 100 mL/hr, Last Rate: 100 mL/hr (03/20/24 1459)  lactated Ringer's, 100 mL/hr  oxygen, 2 L/min      PRN medications  PRN medications: carisoprodol, diphenhydrAMINE, gabapentin, hydrALAZINE, ipratropium-albuteroL, morphine, naloxone, ondansetron ODT **OR** ondansetron, oxyCODONE, oxyCODONE, traMADol      Assessment/Plan   83 y.o. male with a medical history of CAD, HTN, and HFpEF, who presented for an elective right TKA secondary to osteoarthritis. Consulted for medical management.     CV: HTN, HFpEF, CAD  -Resume home regimen: Metoprolol, Losartan, Torsemide  -Accelerated HTN postoperatively - will monitor and add Hydralazine for SBP>160  -May be related to held medications, pain, surgery, etc.   -No clinical signs of volume overload    Right Knee Osteoarthritis  -S/P TKA  by Dr. Arora today  -Incisional care, antibiotics, activity restrictions per orthopedics surgery.  -PT/OT to follow. WBAT.   -Medicate for pain  -Maintain bowel regimen  -Advised IS use, mobilization.   -Monitor H&H for ABLA. EBL 50 ml. CBC in AM.  -DVT prophylaxis per surgery:     GERD  -On PPI/Bentyl    COPD/YOSEF  -No clinical signs of COPD exacerbation  -Will monitor  -Add Duonebs for PRN use    BPH  -On Tamsulosin, Finasteride    DVT Prophylaxis  -H/O DVT/PE  -Patient refutes diagnosis and is not on OAC.   -Defer to surgery    Disposition  -Plan of care discussed with medicine, Dr. Bradshaw.  -Discharge per attending/surgery,  ->60 minutes spent in evaluation of patient, including physical examination, review of medical history, medication reconciliation.      Thank you for the consult. Please call/message via secure chat with medical questions.     SHAD Rivera-CNP

## 2024-03-20 NOTE — DISCHARGE SUMMARY
Discharge Diagnosis  PO R TKA using CHAD    Issues Requiring Follow-Up  PO R TKA using CHAD    Test Results Pending At Discharge  Pending Labs       No current pending labs.            Hospital Course   The patient is a pleasant 83 year old male who underwent an elective right total knee arthroplasty using CHAD performed by Dr. Arora at [facility] on 3/20/2024.  Patient had a routine uncomplicated preoperative, intraoperative and immediate postoperative surgical course.  As planned postoperatively the patient was admitted to the regular nursing floor at [facility].  While in the regular nursing floor patient received consultations from both internal medicine as well as physical therapy.  Patient received preoperative and postoperative intravenous antibiotics.  Pain control is with a combination of both oral and IV narcotic and nonnarcotic medications.  DVT prophylaxis was with sequentials early ambulation and aspirin therapy.  Anticoagulation medications will be continued for minimum of 30 days postsurgery.  Patient was discharged home with home physical therapy and home health care outpatient follow-up is being arranged for 2 weeks in the outpatient clinic postdischarge.      Pertinent Physical Exam At Time of Discharge  Physical Exam  Cardiovascular:      Rate and Rhythm: Normal rate.   Abdominal:      Palpations: Abdomen is soft.   Musculoskeletal:         General: Swelling and tenderness present.      Right lower leg: Edema present.      Comments: PO bandages appreciated on operative side.   Neurological:      Mental Status: He is alert.         Home Medications     Medication List      CHANGE how you take these medications     oxyCODONE-acetaminophen 5-325 mg tablet; Commonly known as: Percocet;   What changed: Another medication with the same name was removed. Continue   taking this medication, and follow the directions you see here.     CONTINUE taking these medications     ascorbic acid  500 mg tablet; Commonly known as: Vitamin C   aspirin 325 mg EC tablet   carisoprodol 350 mg tablet; Commonly known as: Soma   celecoxib 200 mg capsule; Commonly known as: CeleBREX   Dexilant 60 mg DR capsule; Generic drug: dexlansoprazole   dicyclomine 20 mg tablet; Commonly known as: Bentyl   docusate sodium 100 mg capsule; Commonly known as: Colace   finasteride 5 mg tablet; Commonly known as: Proscar   fluticasone 50 mcg/actuation nasal spray; Commonly known as: Flonase   gabapentin 300 mg capsule; Commonly known as: Neurontin   irbesartan 150 mg tablet; Commonly known as: Avapro   loratadine 10 mg tablet; Commonly known as: Claritin   metoprolol succinate XL 50 mg 24 hr tablet; Commonly known as: Toprol-XL   naloxone 4 mg/0.1 mL nasal spray; Commonly known as: Narcan   tamsulosin 0.4 mg 24 hr capsule; Commonly known as: Flomax   torsemide 20 mg tablet; Commonly known as: Demadex     STOP taking these medications     chlorhexidine 0.12 % solution; Commonly known as: Peridex       Outpatient Follow-Up  Future Appointments   Date Time Provider Department Center   4/22/2024 11:45 AM Macho Elizabeth MD [email or web address]   5/30/2024 11:00 AM ARCHANA Roque [id number] [facility]   11/22/2024 10:00 AM ARCHANA Restrepo [id number] [facility]   FU in 2 weeks at [facility].    Narendra Mcclain PA-C

## 2024-03-20 NOTE — OP NOTE
CHAD ARTHROPLASTY  TOTAL KNEE (R) Operative Note     Date: 3/20/2024  OR Location: [facility] OR    Name: Abraham Rodriguez, : 1940, Age: 83 y.o., MRN: [id number], Sex: male    Diagnosis  Pre-op Diagnosis     * Primary osteoarthritis of right knee [M17.11] Post-op Diagnosis     * Primary osteoarthritis of right knee [M17.11]     Procedures  CHAD ARTHROPLASTY  TOTAL KNEE  85322 - [place] ARTHRP KNE CONDYLE&PLATU MEDIAL&LAT COMPARTMENTS      Surgeons      * Scott Arora - Primary    Resident/Fellow/Other Assistant:  Surgeon(s) and Role: Narendra Mcclain PA-C    Procedure Summary  Anesthesia: Regional nerve blocks, spinal anesthetic, MAC sedation ASA: III  Anesthesia Staff: CRNA: SHAD Aparicio-CRNA; SHAD Ann-CRNA  Estimated Blood Loss: 50 mL  Intra-op Medications:   Administrations occurring from 1030 to 1330 on 24:   Medication Name Total Dose   sodium chloride (PF) 0.9% solution 20 mL   ketorolac (Toradol) injection 30 mg   EPINEPHrine (Adrenalin) injection 0.2 mg   morphine PF (Duramorph) injection 5 mg   lactated Ringer's infusion 111.67 mL              Anesthesia Record               Intraprocedure I/O Totals          Intake    Tranexamic Acid 0.00 mL    The total shown is the total volume documented since Anesthesia Start was filed.    Propofol Drip 0.00 mL    The total shown is the total volume documented since Anesthesia Start was filed.    lactated Ringer's infusion 1400.00 mL    Total Intake 1400 mL       Output    Urine 200 mL    Est. Blood Loss 50 mL    Total Output 250 mL       Net    Net Volume 1150 mL          Specimen: No specimens collected     Staff:   Circulator: Ramon Baker RN; Norma Rosales RN  Scrub Person: Magali Vitale RN  RNFA: Vik Alvarado RN         Drains and/or Catheters: * None in log *    Tourniquet Times:   * Missing tourniquet times found for documented tourniquets in lo *     Implants:  Implants       Type Name Action Serial No.      Joint  BASEPLATE, TRIATHLON TRITANIUM, SIZE 6, 52MM - -E-460 - [id number] Implanted [id number]     Screw INSERT, TIBIAL, X3 TRIATHLON CS, SZ-6 11MM - -A-549-P - [id number] Implanted [id number]     Joint PATELLA, TRIATHLON, ASYMMETRIC,  SIZE A35 - -L-350 - [id number] Implanted [id number]     Joint COMPONENT, CR FEMORAL, P6, BEADED W/PA, RIGHT - -B-934 - [id number] Implanted [id number]              Findings: See dictation below    Indications: Abraham Rodriguez is an 83 y.o. male who is having surgery for Primary osteoarthritis of right knee [M17.11].  In the form of a right Cecil assisted total knee arthroplasty    The patient was seen in the preoperative area. The risks, benefits, complications, treatment options, non-operative alternatives, expected recovery and outcomes were discussed with the patient. The possibilities of reaction to medication, pulmonary aspiration, injury to surrounding structures, bleeding, recurrent infection, the need for additional procedures, failure to diagnose a condition, and creating a complication requiring transfusion or operation were discussed with the patient. The patient concurred with the proposed plan, giving informed consent.  The site of surgery was properly noted/marked if necessary per policy. The patient has been actively warmed in preoperative area. Preoperative antibiotics have been ordered and given within 1 hours of incision. Venous thrombosis prophylaxis have been ordered including unilateral sequential compression device    Procedure Details: Date of surgery: 3/20/2024    Location: [facility]    Pre-Operative Diagnosis: Right knee osteoarthritis    Post-Operative Diagnosis: Right knee osteoarthritis    Procedure: Right Cecil assisted total knee arthroplasty    Implants:  #1.  Picacho triathlon press-fit cruciate-retaining femur-size 6  2.  [place] triathlon TriTanium press-fit tibial baseplate-size 6  3.  Sahra X.3, cruciate stabilized  polyethylene-size 6 x 11 mm thickness  4.  Sahra X.3 TriTanium metal-backed press-fit patella-size A35    Surgeon: Scott Arora DO    First Assistant: Narendra Mcclain PA-C    Intraoperative pathology and findings/operative indications:  The patient is a 83-year-old male longstanding history of worsening right knee pain.  He has failed numerous attempts at conservative management over several years.  Pain progressed to the point that greatly interfered with his quality of life.  Preoperative radiographs revealed bone-on-bone apposition in the patellofemoral compartment calcific meniscal tissue medial and lateral with significant medial joint space narrowing bone-on-bone medial compartment within the flexion gap.  Physical exam revealed severely antalgic gait use of ambulatory devices recurrent effusion significant pain and limited range of motion of the right knee.  With continued pain and disability related to his right knee having failed conservative management ultimately the patient did choose to move forward with total knee arthroplasty.  At the time of the procedure exam under anesthesia revealed reasonably well-preserved arc of motion 0 degrees of extension flexion 135 degrees.  Intraoperatively patient was found to have hypertrophic arthritic related synovitis throughout the joint complete articular cartilage loss noted within the patellofemoral and medial compartments mucoid degeneration of the cruciates degenerative meniscus tissue medial and lateral.  Small marginal osteophytic formation tricompartmentally.  Bone quality reasonably well-preserved especially for his age of 83 years old.  There was no indication of infection at the time of the procedure.    Procedure in detail:  Patient was correctly identified and marked in preoperative holding risks benefits alternatives and reasonable expectations for outcomes have previously been discussed.  Also in preoperative holding the patient received a  regional block by the Department of Anesthesia.  The patient was escorted to operative suite #4 at [facility], once in the operative suite surgical pause/time-out was performed preoperative antibiotics were administered and spinal anesthetic was provided by the department of anesthesia.  Patient is positioned supine on a standard operative table bony prominences well padded nonsterile tourniquet applied to right proximal thigh.  The right lower extremity was then sterilely prepped and draped in standard fashion.  Anatomic landmarks were identified and marked with sterile marking pen.  Esmarch bandage was applied knee was flexed and tourniquet was raised to 250 millimeters of mercury.  A standard anterior incision was then created with a 10 blade beginning 3 fingerbreadths proximal to the superior pole of the patella carried distally towards the medial aspect of the tibial tubercle.  Careful dissection through the subcutaneous tissues deep to level of Stulberg's fascia was performed creating a medial flap only.  Once the extensor mechanism was adequately exposed a fresh 10 blade was used to make a standard medial parapatellar arthrotomy.  The deep MCL and medial capsule were then elevated off the medial tibial plateau with Bovie cautery and MCL retractor was inserted.  The knee was taken to full extension suprapatellar synovitis was then sharply excised with Bovie cautery.  The patella was everted and the knee was hyperflexed.  Hoffa's fat pad was sharply excised with a 10 blade.  The anterior cruciate ligament and posterior cruciate ligament were also sacrificed with a 10 blade.  Appropriate collateral retractors were then positioned.  At this time tibial and femoral checkpoints were inserted along with Cecil array pins.  Arrays were appropriately oriented.  Hip center was checked.  Registration was then performed on both the femur and the tibia in standard fashion.  Medial tibial plateau and medial  femoral condyle osteophytes were removed with a curved osteotome and rongeur.  Soft tissue balancing was then performed in extension and in flexion utilizing spoons.  Intraoperative Cecil templating and component positioning was then performed excellent balance was achieved in the simulation.  At this time the Cecil arm assist was brought into the operative field and sequential cuts were completed in standard fashion excess bone then removed with curved osteotome and rongeur.  Medial and lateral meniscus were sharply excised with Bovie cautery.  At this point the knee was taken to 90 degrees of flexion a lamina  was placed between the tibia and the femur and posterior femoral osteophytes were removed with a curved osteotome.  The tibia was once again subluxed anteriorly and sized a size 6 tibial trial was found to be most appropriate this was then provisionally pinned into place with rotation oriented towards the medial 1/3 of the tibial tubercle.  Trial polyethylene was inserted followed by trial femoral component.  The knee was then taken through range of motion varus valgus stability was assessed.  At this point the knee was found to achieve 0 degrees of extension to 142 degrees of flexion and the varus valgus stable at 0 degrees, 30 degrees, 60 degrees no pistoning noted at 90 degrees and the patella was found to track midline.  The knee was taken to full extension patellae everted native patella thickness was measured at 20 millimeters.  A patellar cutting clamp was then used to resect 8 millimeters of patellar bone 3 lug holes were created through the appropriate guide and a trial polyethylene button was attached.  The knee was again taken through range of motion the patella was again found to track midline.  At this point the trial polyethylene was removed 2 lug holes were created through the distal aspect of the trial femoral component which was then removed and the tibia was again subluxed anteriorly.   The smokestack guide was attached onto the trial tibial tray and a keel punch was passed trial tibial tray was then removed and the press-fit tibial lug hole guide was then attached and lug holes were drilled.  At this point the knee was taken to full extension and copiously irrigated with sterile saline via Simpulse lavage.  A pericapsular pain injection was then provided.  At this point implants were opened sequentially impacted into place and found of excellent fixation.  The knee was once again taken into full extension and copiously irrigated with dilute sterile betadine followed by repeat irrigation with sterile saline via Simpulse lavage.  The knee was taken through range of motion stability range of motion were found to be equal to that of the trial components.  At this point the knee was hyperflexed and the tourniquet was dropped at 59 minutes.  Hemostasis was achieved with Bovie cautery.  The knee was then taken into full extension in the medial parapatellar arthrotomy was closed with a 0 Vicryl ligature in interrupted figure-of-eight and running locked fashion.  Deep subcutaneous tissues were closed with 2-O Vicryl ligature in buried interrupted fashion skin was reapproximated with 4 Monocryl in running subcuticular fashion followed by application of Dermabond.  A self adherent Mepilex Silver dressing was applied applied over top of the wound after the Dermabond was completely dry.  A Thigh high compressive stocking was then applied along with a iceman cooler.  Patient was then woken transferred to a hospital bed and returned to PACU in stable condition.  Counts were correct case was clean elective complications were none, tourniquet time was 59 minutes, blood loss was 50 cc post tourniquet dropped.          Complications:  None; patient tolerated the procedure well.    Disposition: PACU - hemodynamically stable.  Condition: stable         Additional Details:     Attending Attestation: I performed the  procedure.    Scott Arora  Phone Number: [phone]

## 2024-03-20 NOTE — ANESTHESIA PROCEDURE NOTES
Peripheral Block    Patient location during procedure: pre-op  Reason for block: at surgeon's request and post-op pain management  Staffing  Performed: attending   Authorized by: Zander Anderson MD    Performed by: Zander Anderson MD  Preanesthetic Checklist  Completed: patient identified, IV checked, site marked, risks and benefits discussed, surgical consent, monitors and equipment checked, pre-op evaluation and timeout performed   Timeout performed at:   Peripheral Block  Patient position: laying flat  Prep: ChloraPrep and site prepped and draped  Patient monitoring: heart rate and continuous pulse ox  Block type: adductor canal  Laterality: right  Injection technique: single-shot  Guidance: ultrasound guided  Needle  Needle type: short-bevel   Needle gauge: 22 G  Needle length: 8 cm  Needle localization: anatomical landmarks, ultrasound guidance and nerve stimulator  Assessment  Injection assessment: negative aspiration for heme, no paresthesia on injection, incremental injection and local visualized surrounding nerve on ultrasound  Paresthesia pain: none  Heart rate change: no  Slow fractionated injection: yes  Additional Notes  30mL 0.5% Marcaine 1:300k epi 4mg decadron

## 2024-03-20 NOTE — ANESTHESIA PROCEDURE NOTES
Spinal Block    Patient location during procedure: OR  Start time: 3/20/2024 10:55 AM  End time: 3/20/2024 11:00 AM  Reason for block: primary anesthetic and at surgeon's request  Staffing  Performed: CRNA   Authorized by: MAC Ann    Performed by: MAC Ann    Preanesthetic Checklist  Completed: patient identified, IV checked, site marked, risks and benefits discussed, surgical consent, monitors and equipment checked, pre-op evaluation, timeout performed and sterile techniques followed  Block Timeout  RN/Licensed healthcare professional reads aloud to the Anesthesia provider and entire team: Patient identity, procedure with side and site, patient position, and as applicable the availability of implants/special equipment/special requirements.  Patient on coagulant treatment: no  Timeout performed at: 3/20/2024 10:48 AM  Spinal Block  Patient position: sitting  Prep: Betadine  Sterility prep: cap, drape, gloves, gown, hand hygiene and mask  Sedation level: moderate sedation  Patient monitoring: continuous pulse oximetry  Approach: midline  Vertebral space: L3-4  Injection technique: single-shot  Needle  Needle type: pencil-point   Needle gauge: 24 G  Needle length: 4 in  Free flowing CSF: yes    Assessment  Sensory level: T10  Block outcome: patient comfortable  Procedure assessment: patient tolerated procedure well with no immediate complications

## 2024-03-21 ENCOUNTER — DOCUMENTATION (OUTPATIENT)
Dept: HOME HEALTH SERVICES | Facility: HOME HEALTH | Age: 84
End: 2024-03-21
Payer: COMMERCIAL

## 2024-03-21 LAB
ANION GAP SERPL CALC-SCNC: 9 MMOL/L (ref 10–20)
BUN SERPL-MCNC: 20 MG/DL (ref 6–23)
CALCIUM SERPL-MCNC: 7.8 MG/DL (ref 8.6–10.3)
CHLORIDE SERPL-SCNC: 104 MMOL/L (ref 98–107)
CO2 SERPL-SCNC: 26 MMOL/L (ref 21–32)
CREAT SERPL-MCNC: 1.13 MG/DL (ref 0.5–1.3)
EGFRCR SERPLBLD CKD-EPI 2021: 64 ML/MIN/1.73M*2
ERYTHROCYTE [DISTWIDTH] IN BLOOD BY AUTOMATED COUNT: 12.1 % (ref 11.5–14.5)
GLUCOSE SERPL-MCNC: 127 MG/DL (ref 74–99)
HCT VFR BLD AUTO: 35.2 % (ref 41–52)
HGB BLD-MCNC: 11.9 G/DL (ref 13.5–17.5)
MCH RBC QN AUTO: 30.4 PG (ref 26–34)
MCHC RBC AUTO-ENTMCNC: 33.8 G/DL (ref 32–36)
MCV RBC AUTO: 90 FL (ref 80–100)
NRBC BLD-RTO: 0 /100 WBCS (ref 0–0)
PLATELET # BLD AUTO: 166 X10*3/UL (ref 150–450)
POTASSIUM SERPL-SCNC: 3.9 MMOL/L (ref 3.5–5.3)
RBC # BLD AUTO: 3.91 X10*6/UL (ref 4.5–5.9)
SODIUM SERPL-SCNC: 135 MMOL/L (ref 136–145)
WBC # BLD AUTO: 11.9 X10*3/UL (ref 4.4–11.3)

## 2024-03-21 PROCEDURE — 99232 SBSQ HOSP IP/OBS MODERATE 35: CPT | Performed by: NURSE PRACTITIONER

## 2024-03-21 PROCEDURE — 97161 PT EVAL LOW COMPLEX 20 MIN: CPT | Mod: GP

## 2024-03-21 PROCEDURE — 97116 GAIT TRAINING THERAPY: CPT | Mod: GP

## 2024-03-21 PROCEDURE — 80048 BASIC METABOLIC PNL TOTAL CA: CPT

## 2024-03-21 PROCEDURE — 7100000011 HC EXTENDED STAY RECOVERY HOURLY - NURSING UNIT

## 2024-03-21 PROCEDURE — 97110 THERAPEUTIC EXERCISES: CPT | Mod: GP

## 2024-03-21 PROCEDURE — RXMED WILLOW AMBULATORY MEDICATION CHARGE

## 2024-03-21 PROCEDURE — 36415 COLL VENOUS BLD VENIPUNCTURE: CPT

## 2024-03-21 PROCEDURE — 85027 COMPLETE CBC AUTOMATED: CPT

## 2024-03-21 PROCEDURE — 2500000004 HC RX 250 GENERAL PHARMACY W/ HCPCS (ALT 636 FOR OP/ED)

## 2024-03-21 PROCEDURE — 2500000001 HC RX 250 WO HCPCS SELF ADMINISTERED DRUGS (ALT 637 FOR MEDICARE OP)

## 2024-03-21 PROCEDURE — 97530 THERAPEUTIC ACTIVITIES: CPT | Mod: GP

## 2024-03-21 PROCEDURE — 2500000002 HC RX 250 W HCPCS SELF ADMINISTERED DRUGS (ALT 637 FOR MEDICARE OP, ALT 636 FOR OP/ED)

## 2024-03-21 RX ORDER — DICYCLOMINE HYDROCHLORIDE 20 MG/1
20 TABLET ORAL
Qty: 120 TABLET | Refills: 0 | Status: SHIPPED | OUTPATIENT
Start: 2024-03-21 | End: 2024-04-15 | Stop reason: HOSPADM

## 2024-03-21 RX ADMIN — OXYCODONE HYDROCHLORIDE 10 MG: 10 TABLET ORAL at 08:18

## 2024-03-21 RX ADMIN — OXYCODONE HYDROCHLORIDE 10 MG: 10 TABLET ORAL at 03:01

## 2024-03-21 RX ADMIN — ACETAMINOPHEN 975 MG: 325 TABLET ORAL at 12:17

## 2024-03-21 RX ADMIN — DICYCLOMINE HYDROCHLORIDE 20 MG: 20 TABLET ORAL at 06:29

## 2024-03-21 RX ADMIN — DOCUSATE SODIUM 100 MG: 100 CAPSULE, LIQUID FILLED ORAL at 08:19

## 2024-03-21 RX ADMIN — TORSEMIDE 20 MG: 20 TABLET ORAL at 08:18

## 2024-03-21 RX ADMIN — DICYCLOMINE HYDROCHLORIDE 20 MG: 20 TABLET ORAL at 16:23

## 2024-03-21 RX ADMIN — ACETAMINOPHEN 975 MG: 325 TABLET ORAL at 17:58

## 2024-03-21 RX ADMIN — DICYCLOMINE HYDROCHLORIDE 20 MG: 20 TABLET ORAL at 12:13

## 2024-03-21 RX ADMIN — ASPIRIN 325 MG: 325 TABLET, COATED ORAL at 20:23

## 2024-03-21 RX ADMIN — OXYCODONE HYDROCHLORIDE 10 MG: 10 TABLET ORAL at 16:25

## 2024-03-21 RX ADMIN — FINASTERIDE 5 MG: 5 TABLET, FILM COATED ORAL at 20:23

## 2024-03-21 RX ADMIN — DOCUSATE SODIUM 100 MG: 100 CAPSULE, LIQUID FILLED ORAL at 20:23

## 2024-03-21 RX ADMIN — ASPIRIN 325 MG: 325 TABLET, COATED ORAL at 08:18

## 2024-03-21 RX ADMIN — TAMSULOSIN HYDROCHLORIDE 0.4 MG: 0.4 CAPSULE ORAL at 20:23

## 2024-03-21 RX ADMIN — CEFAZOLIN SODIUM 2 G: 2 INJECTION, SOLUTION INTRAVENOUS at 02:13

## 2024-03-21 RX ADMIN — MORPHINE SULFATE 2 MG: 2 INJECTION, SOLUTION INTRAMUSCULAR; INTRAVENOUS at 18:34

## 2024-03-21 RX ADMIN — ACETAMINOPHEN 975 MG: 325 TABLET ORAL at 06:29

## 2024-03-21 RX ADMIN — DICYCLOMINE HYDROCHLORIDE 20 MG: 20 TABLET ORAL at 20:23

## 2024-03-21 RX ADMIN — PANTOPRAZOLE SODIUM 40 MG: 40 TABLET, DELAYED RELEASE ORAL at 06:29

## 2024-03-21 RX ADMIN — METOPROLOL SUCCINATE 50 MG: 50 TABLET, EXTENDED RELEASE ORAL at 08:18

## 2024-03-21 RX ADMIN — LOSARTAN POTASSIUM 50 MG: 50 TABLET, FILM COATED ORAL at 08:17

## 2024-03-21 ASSESSMENT — COGNITIVE AND FUNCTIONAL STATUS - GENERAL
MOBILITY SCORE: 18
MOVING FROM LYING ON BACK TO SITTING ON SIDE OF FLAT BED WITH BEDRAILS: A LITTLE
WALKING IN HOSPITAL ROOM: A LITTLE
WALKING IN HOSPITAL ROOM: A LITTLE
MOVING TO AND FROM BED TO CHAIR: A LITTLE
CLIMB 3 TO 5 STEPS WITH RAILING: A LITTLE
TURNING FROM BACK TO SIDE WHILE IN FLAT BAD: A LITTLE
CLIMB 3 TO 5 STEPS WITH RAILING: A LITTLE
MOBILITY SCORE: 18
MOVING TO AND FROM BED TO CHAIR: A LITTLE
STANDING UP FROM CHAIR USING ARMS: A LITTLE
STANDING UP FROM CHAIR USING ARMS: A LITTLE
TURNING FROM BACK TO SIDE WHILE IN FLAT BAD: A LITTLE
MOVING FROM LYING ON BACK TO SITTING ON SIDE OF FLAT BED WITH BEDRAILS: A LITTLE

## 2024-03-21 ASSESSMENT — ACTIVITIES OF DAILY LIVING (ADL)
ADLS_ADDRESSED: YES
LACK_OF_TRANSPORTATION: NO
ADL_ASSISTANCE: INDEPENDENT

## 2024-03-21 ASSESSMENT — PAIN DESCRIPTION - LOCATION
LOCATION: KNEE

## 2024-03-21 ASSESSMENT — PAIN SCALES - GENERAL
PAINLEVEL_OUTOF10: 8
PAINLEVEL_OUTOF10: 7
PAINLEVEL_OUTOF10: 10 - WORST POSSIBLE PAIN
PAINLEVEL_OUTOF10: 8
PAINLEVEL_OUTOF10: 10 - WORST POSSIBLE PAIN
PAINLEVEL_OUTOF10: 9
PAINLEVEL_OUTOF10: 8
PAINLEVEL_OUTOF10: 8

## 2024-03-21 ASSESSMENT — PAIN - FUNCTIONAL ASSESSMENT
PAIN_FUNCTIONAL_ASSESSMENT: 0-10

## 2024-03-21 ASSESSMENT — PAIN DESCRIPTION - ORIENTATION
ORIENTATION: RIGHT

## 2024-03-21 NOTE — CARE PLAN
The patient's goals for the shift include  pain management    Problem: Pain  Goal: Turns in bed with improved pain control throughout the shift  Outcome: Progressing     Problem: Safety  Goal: Patient will be injury free during hospitalization  Outcome: Progressing     Problem: Daily Care  Goal: Daily care needs are met  Outcome: Progressing     Problem: Psychosocial Needs  Goal: Demonstrates ability to cope with hospitalization/illness  Outcome: Progressing     Problem: Discharge Barriers  Goal: My discharge needs are met  Outcome: Progressing       The clinical goals for the shift include Pain management

## 2024-03-21 NOTE — PROGRESS NOTES
Physical Therapy    Physical Therapy Treatment    Patient Name: Abraham Rodriguez  MRN: [id number]  Today's Date: 3/21/2024  Time Calculation  Start Time: 1344  Stop Time: 1407  Time Calculation (min): 23 min       Assessment/Plan   PT Assessment  PT Assessment Results: Decreased strength, Decreased range of motion, Decreased endurance, Impaired balance, Decreased mobility  Rehab Prognosis: Excellent  Barriers to Discharge: Pain control  Evaluation/Treatment Tolerance: Patient limited by pain  Medical Staff Made Aware: Yes  Strengths: Ability to acquire knowledge, Housing layout, Rehab experience  Barriers to Participation:  (Pain control)  End of Session Communication: Bedside nurse  Assessment Comment: Patient pleasant, cooperative, continues to be limited d/t pain. Discussed with surgeon, plan for patient to remain overnight. Rec low intensity PT intervention.  End of Session Patient Position: Bed, 3 rail up, Alarm on (all needs within reach)     PT Plan  Treatment/Interventions: Bed mobility, Transfer training, Gait training, Stair training, Balance training, Strengthening, Endurance training  PT Plan: Skilled PT  PT Frequency: 3 times per week  PT Discharge Recommendations: Low intensity level of continued care  Equipment Recommended upon Discharge: Wheeled walker (owns)  PT Recommended Transfer Status: Assist x1  PT - OK to Discharge: Yes (per PT POC)      General Visit Information:   PT  Visit  PT Received On: 03/21/24  Response to Previous Treatment: Other (Comment) (Patient continues with high levels of pain)  General  Reason for Referral: Impaired functional mobility; R TKA  Referred By: Scott Arora  Past Medical History Relevant to Rehab: Hypertension  Hyperlipidemia  Coronary Artery Disease  Myocardial Infarction  HFpEF  COPD  DVT/PE (patient denies)  Chronic Low Back Pain with Radiculopathy  Chronic Asbestosis  Obstructive Sleep Apnea  GERD  Tongue Cancer  Squamous Cell Carcinoma - Nose  Bell's Palsy  Planar  Fascitis  Colon Polyps  Family/Caregiver Present: No  Prior to Session Communication: Bedside nurse  Patient Position Received: Up in chair, Alarm off, not on at start of session  General Comment: Patient cooperative, continues with high levels of pain, agreeable to PT tx    Subjective   Precautions:  Precautions  LE Weight Bearing Status: Weight Bearing as Tolerated  Medical Precautions: Fall precautions  Post-Surgical Precautions: Right total knee precautions  Vital Signs:       Objective   Pain:  Pain Assessment  Pain Assessment: 0-10  Pain Score: 10 - Worst possible pain  Pain Type: Surgical pain  Pain Location: Knee  Pain Orientation: Right  Pain Interventions: Cold applied, Repositioned  Cognition:  Cognition  Overall Cognitive Status: Within Functional Limits  Orientation Level: Oriented X4  Postural Control:  Postural Control  Postural Control: Within Functional Limits  Static Sitting Balance  Static Sitting-Balance Support: No upper extremity supported, Feet supported  Static Sitting-Level of Assistance: Distant supervision  Static Standing Balance  Static Standing-Balance Support: Bilateral upper extremity supported  Static Standing-Level of Assistance: Contact guard    Activity Tolerance:  Activity Tolerance  Endurance: Tolerates 10 - 20 min exercise with multiple rests  Treatments:  Therapeutic Exercise  Therapeutic Exercise Performed: Yes  Therapeutic Exercise Activity 1: Patient encouraged to complete supine ther ex 3x/day. Reviewed 15  reps each: ankle pumps, quad sets, SLR, hip abd/add, heel slides, LAQ. AA required for SLR and LAQ.   Encouraged to take short duration ambulation bouts hourly during waking hours with the use of 2WW, focusing on gait pattern.   Educated on the importance of avoiding remaining in one position for extended period of time. Encouraged pain and edema control with use of ice, elevation and activity pacing. Patient verbalized understanding.   Reiterated no pillows/towels etc  under the knee   Reviewed TKA precautions   Home going packet reviewed and provided to patient. Patient verbalized understanding.       Bed Mobility  Bed Mobility: Yes  Bed Mobility 1  Bed Mobility 1: Sitting to supine  Level of Assistance 1: Minimum assistance (R LE)    Ambulation/Gait Training  Ambulation/Gait Training Performed: Yes  Ambulation/Gait Training 1  Surface 1: Level tile  Device 1: Rolling walker  Assistance 1: Close supervision  Quality of Gait 1:  (decreased step length, decreased weight bearing RLE, decreased R kene flexion during swing)  Comments/Distance (ft) 1: 20'  Transfers  Transfer: Yes  Transfer 1  Transfer From 1: Sit to, Stand to  Transfer to 1: Sit, Stand  Technique 1: Sit to stand, Stand to sit  Transfer Device 1: Walker  Transfer Level of Assistance 1: Close supervision    Stairs  Stairs: Yes  Stairs  Curb Step 1: Yes  Device 1: Wheeled walker  Assistance 1: Contact guard  Comment/Number of Steps 1: 1 (demonstration provided prior to compleition, verbal cues for sequencing)    Outcome Measures:  [facility] Basic Mobility  Turning from your back to your side while in a flat bed without using bedrails: A little  Moving from lying on your back to sitting on the side of a flat bed without using bedrails: A little  Moving to and from bed to chair (including a wheelchair): A little  Standing up from a chair using your arms (e.g. wheelchair or bedside chair): A little  To walk in hospital room: A little  Climbing 3-5 steps with railing: A little  Basic Mobility - Total Score: 18      Encounter Problems       Encounter Problems (Active)       Mobility       Patient will demonstrate good understanding of bed mobility, transfers, ambulation, stair negotiation and HEP for safe home going.   (Progressing)       Start:  03/21/24    Expected End:  03/23/24

## 2024-03-21 NOTE — PROGRESS NOTES
03/21/24 1159   Discharge Planning   Living Arrangements Alone   Support Systems Children;Family members;Friends/neighbors   Assistance Needed Patient is A&O X3, on room air, independent with ADLs and uses no DME at baseline but has a rollator, walker, cane and crutches if they are needed.. Patient will use walker post-op. Patient was driving prior to surgery. Patient voices no other DC needs other than new [facility] that referral was previously sent for prior to surgery.   Type of Residence Private residence   Number of Stairs to Enter Residence 0   Number of Stairs Within Residence 0   Do you have animals or pets at home? No   Who is requesting discharge planning? Provider   Home or Post Acute Services In home services   Type of Home Care Services Home PT;Home OT   Patient expects to be discharged to: Home with new [facility]   Does the patient need discharge transport arranged? No   Financial Resource Strain   How hard is it for you to pay for the very basics like food, housing, medical care, and heating? Not hard   Housing Stability   In the last 12 months, was there a time when you were not able to pay the mortgage or rent on time? N   In the last 12 months, how many places have you lived? 1   In the last 12 months, was there a time when you did not have a steady place to sleep or slept in a shelter (including now)? N   Transportation Needs   In the past 12 months, has lack of transportation kept you from medical appointments or from getting medications? no   In the past 12 months, has lack of transportation kept you from meetings, work, or from getting things needed for daily living? No   Patient Choice   Provider Choice list and CMS website (https://medicare.gov/care-compare#search) for post-acute Quality and Resource Measure Data were provided and reviewed with: Patient   Patient / Family choosing to utilize agency / facility established prior to hospitalization No

## 2024-03-21 NOTE — HH CARE COORDINATION
Home Care received a Referral for Physical Therapy. We have processed the referral for a Start of Care on 03/22.     If you have any questions or concerns, please feel free to contact us at [phone]. Follow the prompts, enter your five digit zip code, and you will be directed to your care team on [place] [address].

## 2024-03-21 NOTE — PROGRESS NOTES
Physical Therapy    Physical Therapy Evaluation & Treatment    Patient Name: Abraham Rodriguez  MRN: [id number]  Today's Date: 3/21/2024   Time Calculation  Start Time: 0900  Stop Time: 0952  Time Calculation (min): 52 min    Assessment/Plan   PT Assessment  PT Assessment Results: Decreased strength, Decreased range of motion, Decreased endurance, Impaired balance, Decreased mobility  Rehab Prognosis: Excellent  Barriers to Discharge: Pain control  Evaluation/Treatment Tolerance: Patient limited by pain  Medical Staff Made Aware: Yes  Strengths: Ability to acquire knowledge, Housing layout, Support of Caregivers  Barriers to Participation:  (Pain control)  End of Session Communication: Bedside nurse, Charge Nurse, Physician  Assessment Comment: Patient completes assessment despite elevated pain levels. Discussed plan with surgeon and NP, anticipate patient to remain in house over night. Will tx BID while admitted. Rec low intensity upon d/c.  End of Session Patient Position: Up in chair, Alarm off, not on at start of session (B LE elevated, all needs within reach, confirmed no alarm necessry)      PT Plan  Treatment/Interventions: Bed mobility, Transfer training, Gait training, Stair training, Balance training, Strengthening, Endurance training, Range of motion, Therapeutic exercise  PT Plan: Skilled PT  PT Frequency: 3 times per week  PT Discharge Recommendations: Low intensity level of continued care  Equipment Recommended upon Discharge: Wheeled walker  PT Recommended Transfer Status: Assist x1  PT - OK to Discharge: Yes (per PT POC)      Subjective     General Visit Information:  General  Reason for Referral: Impaired functional mobility; R TKA  Referred By: Scott Arora  Past Medical History Relevant to Rehab: Hypertension  Hyperlipidemia  Coronary Artery Disease  Myocardial Infarction  HFpEF  COPD  DVT/PE (patient denies)  Chronic Low Back Pain with Radiculopathy  Chronic Asbestosis  Obstructive Sleep Apnea  GERD   Tongue Cancer  Squamous Cell Carcinoma - Nose  Bell's Palsy  Planar Fascitis  Colon Polyps  Family/Caregiver Present: No  Prior to Session Communication: Bedside nurse  Patient Position Received: Up in chair, Alarm off, not on at start of session  General Comment: Patient pleasant, cooperative and agreeable to PT eval  Home Living:  Home Living  Type of Home: House  Lives With: Alone (Dtr plans to stay with patient initially)  Home Adaptive Equipment: Walker rolling or standard, Cane, Crutches (2WW and rollator)  Home Layout: One level  Home Access: Stairs to enter without rails  Entrance Stairs-Rails: None  Entrance Stairs-Number of Steps: 1  Bathroom Shower/Tub: Tub/shower unit  Home Living Comments: (+) driving  Prior Level of Function:  Prior Function Per Pt/Caregiver Report  Level of Divide: Independent with ADLs and functional transfers, Independent with homemaking with ambulation  ADL Assistance: Independent  Homemaking Assistance: Independent  Ambulatory Assistance: Independent  Vocational: Retired  Precautions:  Precautions  LE Weight Bearing Status: Weight Bearing as Tolerated  Medical Precautions: Fall precautions  Vital Signs:       Objective   Pain:  Pain Assessment  Pain Assessment: 0-10  Pain Score: 10 - Worst possible pain  Pain Type: Surgical pain  Pain Location: Knee  Pain Orientation: Right  Pain Interventions: Cold applied, Repositioned  Cognition:  Cognition  Overall Cognitive Status: Within Functional Limits  Orientation Level: Oriented X4    General Assessments:    Activity Tolerance  Endurance: Tolerates 30 min exercise with multiple rests    Sensation  Light Touch:  (Patient reports no sensation over patella, all other sensation intact)    Strength  Strength Comments: L LE 5/5, R hip 4/5, R knee 3/5, R ankle 4/5    Coordination  Movements are Fluid and Coordinated: Yes    Postural Control  Postural Control: Within Functional Limits    Static Sitting Balance  Static Sitting-Balance  Support: No upper extremity supported, Feet supported  Static Sitting-Level of Assistance: Distant supervision    Static Standing Balance  Static Standing-Balance Support: Bilateral upper extremity supported  Static Standing-Level of Assistance: Contact guard  Functional Assessments:  ADL  ADL's Addressed: Yes (Restroom use: SBA for toilet transfer and clothing management)         Transfers  Transfer: Yes  Transfer 1  Transfer From 1: Sit to, Stand to  Transfer to 1: Sit, Stand  Technique 1: Sit to stand, Stand to sit  Transfer Device 1: Walker  Transfer Level of Assistance 1: Contact guard    Ambulation/Gait Training  Ambulation/Gait Training Performed: Yes  Ambulation/Gait Training 1  Surface 1: Level tile  Device 1: Rolling walker  Assistance 1: Contact guard  Quality of Gait 1:  (step to pattern, decreased weight bearing R LE, decreased R knee flexion during swing)  Comments/Distance (ft) 1: 15' x x 2; 10' x 2    Stairs  Stairs: Yes  Stairs  Curb Step 1: Yes  Device 1: Wheeled walker  Assistance 1: Contact guard  Comment/Number of Steps 1: 1 (demonstration provided prior to compleition, verbal cues for sequencing)    Treatments:   Patient encouraged to complete supine ther ex 3x/day. Reviewed 15  reps each: ankle pumps, quad sets, SLR, hip abd/add, heel slides, SAQ. Patient advised home care will advance ther ex as tolerated   Encouraged to take short duration ambulation bouts hourly during waking hours with the use of 2WW, focusing on gait pattern.   Educated on the importance of avoiding remaining in one position for extended period of time. Encouraged pain and edema control with use of ice, elevation and activity pacing. Patient verbalized understanding.   Reiterated no pillows/towels etc under the knee   Reviewed TKA precautions   Home going packet reviewed and provided to patient. Patient verbalized understanding.     Reviewed car transfer, patient verbalized understanding. Recommended pushing seat back as  far as possible, don't use door as support, recline seat to provide increased space for hip mobility. Sit first and then swivel into vehicle. Plans to d/c into a SUV. Completed min A with car simulator in gym.      Ambulation/Gait Training  Ambulation/Gait Training Performed: Yes  Ambulation/Gait Training 1  Surface 1: Level tile  Device 1: Rolling walker  Assistance 1: Contact guard  Quality of Gait 1:  (step to pattern, decreased weight bearing R LE, decreased R knee flexion during swing)  Comments/Distance (ft) 1: 15' x x 2; 10' x 2  Transfers  Transfer: Yes  Transfer 1  Transfer From 1: Sit to, Stand to  Transfer to 1: Sit, Stand  Technique 1: Sit to stand, Stand to sit  Transfer Device 1: Walker  Transfer Level of Assistance 1: Contact guard    Stairs  Stairs: Yes  Stairs  Curb Step 1: Yes  Device 1: Wheeled walker  Assistance 1: Contact guard  Comment/Number of Steps 1: 1 (demonstration provided prior to compleition, verbal cues for sequencing)  Outcome Measures:  [facility] Basic Mobility  Turning from your back to your side while in a flat bed without using bedrails: A little  Moving from lying on your back to sitting on the side of a flat bed without using bedrails: A little  Moving to and from bed to chair (including a wheelchair): A little  Standing up from a chair using your arms (e.g. wheelchair or bedside chair): A little  To walk in hospital room: A little  Climbing 3-5 steps with railing: A little  Basic Mobility - Total Score: 18    Encounter Problems       Encounter Problems (Active)       Mobility       Patient will demonstrate good understanding of bed mobility, transfers, ambulation, stair negotiation and HEP for safe home going.   (Progressing)       Start:  03/21/24    Expected End:  03/23/24                   Education Documentation  Handouts, taught by Claudette Barbosa PT at 3/21/2024 10:21 AM.  Learner: Patient  Readiness: Acceptance  Method: Explanation, Handout  Response: Verbalizes  Understanding, Demonstrated Understanding    Precautions, taught by Claudette Barbosa, PT at 3/21/2024 10:21 AM.  Learner: Patient  Readiness: Acceptance  Method: Explanation, Handout  Response: Verbalizes Understanding, Demonstrated Understanding    Body Mechanics, taught by Claudette Barbosa PT at 3/21/2024 10:21 AM.  Learner: Patient  Readiness: Acceptance  Method: Explanation, Handout  Response: Verbalizes Understanding, Demonstrated Understanding    Home Exercise Program, taught by Claudette Barbosa PT at 3/21/2024 10:21 AM.  Learner: Patient  Readiness: Acceptance  Method: Explanation, Handout  Response: Verbalizes Understanding, Demonstrated Understanding    Mobility Training, taught by Claudette Barbosa PT at 3/21/2024 10:21 AM.  Learner: Patient  Readiness: Acceptance  Method: Explanation, Handout  Response: Verbalizes Understanding, Demonstrated Understanding    Education Comments  No comments found.

## 2024-03-21 NOTE — PROGRESS NOTES
Patient: Abraham Rodriguez  Room/bed: 147/147-A  Admitted on: 3/20/2024    Age: 83 y.o.   Gender: male  Code Status:  Full Code   Admitting Dx: Primary osteoarthritis of right knee [M17.11]  Status post total knee replacement, right [Z96.651]    MRN: [id number]  PCP: Vivi Coto, APRN-CNP       Subjective   Seen and examined in his room this AM. Up in chair. States having a lot of postop pain and was limited in ability to work with PT/OT this AM due to intensity of pain. He denies chest pain, breathing difficulties, abdominal pain, N/V/D/C, fever, or chills.  Minimal sleep due to pain.     Objective    Physical Exam   Constitutional: A&O x 3; NAD; calm and cooperative  Eyes: EOM's intact  HEENT: Normocephalic, Atraumatic. Oral mucosa moist.   Neck: Supple. No JVD, lymphadenopathy.   Lungs: CTAB with fair air movement. Respirations even and unlabored on room air.   Heart: RRR  Abdomen: Soft, non-tender, non-distended, +BS  MS/Extremities: KOENIG with RLE weakness/pain. No edema. Peripheral pulses intact bilaterally. Right knee incision is covered with primary/surgical dressing - C/D/I.   Neuro: A&O x3; no focal deficits; gross motor and sensation intact.   Skin: Warm and dry. No rashes or lesions  Psych: Normal affect.      Temp:  [35.9 °C (96.6 °F)-36.7 °C (98.1 °F)] 36.3 °C (97.3 °F)  Heart Rate:  [67-84] 73  Resp:  [16-19] 19  BP: (129-184)/(66-93) 130/66    Vitals:    03/20/24 0822   Weight: 86.2 kg (190 lb 0.6 oz)     I/Os    Intake/Output Summary (Last 24 hours) at 3/21/2024 1324  Last data filed at 3/21/2024 0832  Gross per 24 hour   Intake 840 ml   Output --   Net 840 ml       Labs:   Results from last 72 hours   Lab Units 03/21/24  0545   SODIUM mmol/L 135*   POTASSIUM mmol/L 3.9   CHLORIDE mmol/L 104   CO2 mmol/L 26   BUN mg/dL 20   CREATININE mg/dL 1.13   GLUCOSE mg/dL 127*   CALCIUM mg/dL 7.8*   ANION GAP mmol/L 9*   EGFR mL/min/1.73m*2 64      Results from last 72 hours   Lab Units 03/21/24  0514    WBC AUTO x10*3/uL 11.9*   HEMOGLOBIN g/dL 11.9*   HEMATOCRIT % 35.2*   PLATELETS AUTO x10*3/uL 166      Lab Results   Component Value Date    CALCIUM 7.8 (L) 03/21/2024    PHOS 1.9 (L) 12/17/2020      Lab Results   Component Value Date    CRP 4.29 (A) 08/15/2022        Micro/ID:   No results found for the last 90 days.    .ID  Lab Results   Component Value Date    URINECULTURE NO GROWTH 07/06/2023       Images:  XR knee right 1-2 views  Narrative: Interpreted By:  Yennifer Jimenez,   STUDY:  Right knee, 2 views.      INDICATION:  Signs/Symptoms:right TKR      COMPARISON:  07/03/2023.      ACCESSION NUMBER(S):  [id number]      ORDERING CLINICIAN:  TRICIA BOSWELL      FINDINGS:  No acute fracture or malalignment.  Immediate postsurgical changes of right total knee arthroplasty.  Hardware is intact without perihardware fractures or lucencies.  Expected postsurgical soft tissue gas within the knee joint.      Impression: 1. Immediate postsurgical changes of right total knee arthroplasty  without hardware complication.      MACRO:  None.      Signed by: Yennifer Jimenez 3/20/2024 3:00 PM  Dictation workstation:   [id number]       Meds    Scheduled medications  acetaminophen, 975 mg, oral, q6h GUERO  aspirin, 325 mg, oral, BID  dicyclomine, 20 mg, oral, Before meals & nightly  docusate sodium, 100 mg, oral, BID  finasteride, 5 mg, oral, q PM  losartan, 50 mg, oral, Daily  metoprolol succinate XL, 50 mg, oral, Daily  pantoprazole, 40 mg, oral, Daily before breakfast  tamsulosin, 0.4 mg, oral, q PM  torsemide, 20 mg, oral, Daily      Continuous medications  lactated Ringer's, 100 mL/hr, Last Rate: 100 mL/hr (03/20/24 7629)  lactated Ringer's, 100 mL/hr, Last Rate: 100 mL/hr (03/20/24 8957)  oxygen, 2 L/min      PRN medications  PRN medications: carisoprodol, diphenhydrAMINE, gabapentin, hydrALAZINE, ipratropium-albuteroL, morphine, naloxone, ondansetron ODT **OR** ondansetron, oxyCODONE, oxyCODONE, traMADol     Assessment  and Plan    83 y.o. male with a medical history of CAD, HTN, and HFpEF, who presented for an elective right TKA secondary to osteoarthritis. Consulted for medical management.      CV: HTN, HFpEF, CAD  -Resumed home regimen: Metoprolol, Losartan, Torsemide  -Accelerated HTN postoperatively but has improved in past 12 hours  -May continue with Hydralazine for SBP>160  -No clinical signs of volume overload     Right Knee Osteoarthritis  -S/P TKA by Dr. Arora 3/20/24  -Incisional care, antibiotics, activity restrictions per orthopedics surgery.  -PT/OT to follow. WBAT.   -Medicate for pain  -Maintain bowel regimen  -Advised IS use, mobilization.   -Monitor H&H for ABLA. EBL 50 ml. Hgb stable at 11.9.  -DVT prophylaxis per surgery:  ASA 325mg BID  -Afebrile. No leukocytosis.      GERD  -On PPI/Bentyl     COPD/YOSEF  -No clinical signs of COPD exacerbation  -Will monitor  -Added Duonebs for PRN use     BPH  -On Tamsulosin, Finasteride     DVT Prophylaxis  -H/O DVT/PE  -Patient refutes diagnosis and is not on OAC.   -Defer to surgery    Fluids/Electrolytes/Nutrition  -Laboratory data reviewed.   -Electrolytes stable.   -No nutritional concerns at this time.       Disposition  -Plan of care discussed with medicine, Dr. Bradshaw.  -Discharge per attending/surgery. Stable from medical standpoint.       Senia Barrett APRN-CNP

## 2024-03-22 ENCOUNTER — PHARMACY VISIT (OUTPATIENT)
Dept: PHARMACY | Facility: CLINIC | Age: 84
End: 2024-03-22
Payer: COMMERCIAL

## 2024-03-22 VITALS
DIASTOLIC BLOOD PRESSURE: 73 MMHG | HEIGHT: 71 IN | BODY MASS INDEX: 26.6 KG/M2 | RESPIRATION RATE: 18 BRPM | SYSTOLIC BLOOD PRESSURE: 142 MMHG | TEMPERATURE: 97.7 F | OXYGEN SATURATION: 93 % | HEART RATE: 84 BPM | WEIGHT: 190.04 LBS

## 2024-03-22 LAB
ANION GAP SERPL CALC-SCNC: 8 MMOL/L (ref 10–20)
BUN SERPL-MCNC: 21 MG/DL (ref 6–23)
CALCIUM SERPL-MCNC: 7.9 MG/DL (ref 8.6–10.3)
CHLORIDE SERPL-SCNC: 104 MMOL/L (ref 98–107)
CO2 SERPL-SCNC: 30 MMOL/L (ref 21–32)
CREAT SERPL-MCNC: 1.05 MG/DL (ref 0.5–1.3)
EGFRCR SERPLBLD CKD-EPI 2021: 70 ML/MIN/1.73M*2
ERYTHROCYTE [DISTWIDTH] IN BLOOD BY AUTOMATED COUNT: 12.4 % (ref 11.5–14.5)
ERYTHROCYTE [DISTWIDTH] IN BLOOD BY AUTOMATED COUNT: 12.4 % (ref 11.5–14.5)
GLUCOSE SERPL-MCNC: 106 MG/DL (ref 74–99)
HCT VFR BLD AUTO: 31.7 % (ref 41–52)
HCT VFR BLD AUTO: 35.9 % (ref 41–52)
HGB BLD-MCNC: 10.6 G/DL (ref 13.5–17.5)
HGB BLD-MCNC: 12 G/DL (ref 13.5–17.5)
MCH RBC QN AUTO: 30.1 PG (ref 26–34)
MCH RBC QN AUTO: 30.5 PG (ref 26–34)
MCHC RBC AUTO-ENTMCNC: 33.4 G/DL (ref 32–36)
MCHC RBC AUTO-ENTMCNC: 33.4 G/DL (ref 32–36)
MCV RBC AUTO: 90 FL (ref 80–100)
MCV RBC AUTO: 91 FL (ref 80–100)
NRBC BLD-RTO: 0 /100 WBCS (ref 0–0)
NRBC BLD-RTO: 0 /100 WBCS (ref 0–0)
PLATELET # BLD AUTO: 132 X10*3/UL (ref 150–450)
PLATELET # BLD AUTO: 162 X10*3/UL (ref 150–450)
POTASSIUM SERPL-SCNC: 3.3 MMOL/L (ref 3.5–5.3)
RBC # BLD AUTO: 3.52 X10*6/UL (ref 4.5–5.9)
RBC # BLD AUTO: 3.93 X10*6/UL (ref 4.5–5.9)
SODIUM SERPL-SCNC: 139 MMOL/L (ref 136–145)
WBC # BLD AUTO: 8.4 X10*3/UL (ref 4.4–11.3)
WBC # BLD AUTO: 9.6 X10*3/UL (ref 4.4–11.3)

## 2024-03-22 PROCEDURE — 85027 COMPLETE CBC AUTOMATED: CPT | Performed by: NURSE PRACTITIONER

## 2024-03-22 PROCEDURE — 2500000004 HC RX 250 GENERAL PHARMACY W/ HCPCS (ALT 636 FOR OP/ED): Performed by: ORTHOPAEDIC SURGERY

## 2024-03-22 PROCEDURE — 80048 BASIC METABOLIC PNL TOTAL CA: CPT | Performed by: NURSE PRACTITIONER

## 2024-03-22 PROCEDURE — 86140 C-REACTIVE PROTEIN: CPT | Performed by: EMERGENCY MEDICINE

## 2024-03-22 PROCEDURE — 97530 THERAPEUTIC ACTIVITIES: CPT | Mod: GP

## 2024-03-22 PROCEDURE — 97110 THERAPEUTIC EXERCISES: CPT | Mod: GP

## 2024-03-22 PROCEDURE — 36415 COLL VENOUS BLD VENIPUNCTURE: CPT | Performed by: NURSE PRACTITIONER

## 2024-03-22 PROCEDURE — 2500000004 HC RX 250 GENERAL PHARMACY W/ HCPCS (ALT 636 FOR OP/ED)

## 2024-03-22 PROCEDURE — 2500000004 HC RX 250 GENERAL PHARMACY W/ HCPCS (ALT 636 FOR OP/ED): Performed by: NURSE PRACTITIONER

## 2024-03-22 PROCEDURE — 2500000002 HC RX 250 W HCPCS SELF ADMINISTERED DRUGS (ALT 637 FOR MEDICARE OP, ALT 636 FOR OP/ED): Performed by: NURSE PRACTITIONER

## 2024-03-22 PROCEDURE — 97165 OT EVAL LOW COMPLEX 30 MIN: CPT | Mod: GO

## 2024-03-22 PROCEDURE — 99232 SBSQ HOSP IP/OBS MODERATE 35: CPT | Performed by: NURSE PRACTITIONER

## 2024-03-22 PROCEDURE — 2500000001 HC RX 250 WO HCPCS SELF ADMINISTERED DRUGS (ALT 637 FOR MEDICARE OP): Performed by: NURSE PRACTITIONER

## 2024-03-22 PROCEDURE — 7100000011 HC EXTENDED STAY RECOVERY HOURLY - NURSING UNIT

## 2024-03-22 PROCEDURE — 2500000001 HC RX 250 WO HCPCS SELF ADMINISTERED DRUGS (ALT 637 FOR MEDICARE OP)

## 2024-03-22 RX ORDER — POTASSIUM CHLORIDE 20 MEQ/1
40 TABLET, EXTENDED RELEASE ORAL ONCE
Status: COMPLETED | OUTPATIENT
Start: 2024-03-22 | End: 2024-03-22

## 2024-03-22 RX ORDER — POLYETHYLENE GLYCOL 3350 17 G/17G
17 POWDER, FOR SOLUTION ORAL DAILY
Status: DISCONTINUED | OUTPATIENT
Start: 2024-03-22 | End: 2024-03-22 | Stop reason: HOSPADM

## 2024-03-22 RX ORDER — OXYCODONE HYDROCHLORIDE 5 MG/1
5 TABLET ORAL EVERY 4 HOURS PRN
Status: DISCONTINUED | OUTPATIENT
Start: 2024-03-22 | End: 2024-03-22 | Stop reason: HOSPADM

## 2024-03-22 RX ORDER — OXYCODONE HYDROCHLORIDE 10 MG/1
10 TABLET ORAL EVERY 4 HOURS PRN
Status: DISCONTINUED | OUTPATIENT
Start: 2024-03-22 | End: 2024-03-22 | Stop reason: HOSPADM

## 2024-03-22 RX ORDER — DEXAMETHASONE SODIUM PHOSPHATE 10 MG/ML
10 INJECTION INTRAMUSCULAR; INTRAVENOUS EVERY 6 HOURS
Status: DISCONTINUED | OUTPATIENT
Start: 2024-03-22 | End: 2024-03-22 | Stop reason: HOSPADM

## 2024-03-22 RX ADMIN — DICYCLOMINE HYDROCHLORIDE 20 MG: 20 TABLET ORAL at 11:25

## 2024-03-22 RX ADMIN — ACETAMINOPHEN 975 MG: 325 TABLET ORAL at 12:22

## 2024-03-22 RX ADMIN — OXYCODONE HYDROCHLORIDE 10 MG: 10 TABLET ORAL at 06:01

## 2024-03-22 RX ADMIN — ACETAMINOPHEN 975 MG: 325 TABLET ORAL at 00:00

## 2024-03-22 RX ADMIN — POTASSIUM CHLORIDE 40 MEQ: 1500 TABLET, EXTENDED RELEASE ORAL at 08:31

## 2024-03-22 RX ADMIN — ASPIRIN 325 MG: 325 TABLET, COATED ORAL at 08:31

## 2024-03-22 RX ADMIN — DEXAMETHASONE SODIUM PHOSPHATE 10 MG: 10 INJECTION INTRAMUSCULAR; INTRAVENOUS at 13:31

## 2024-03-22 RX ADMIN — GABAPENTIN 300 MG: 300 CAPSULE ORAL at 14:34

## 2024-03-22 RX ADMIN — DOCUSATE SODIUM 100 MG: 100 CAPSULE, LIQUID FILLED ORAL at 08:31

## 2024-03-22 RX ADMIN — PANTOPRAZOLE SODIUM 40 MG: 40 TABLET, DELAYED RELEASE ORAL at 05:54

## 2024-03-22 RX ADMIN — ACETAMINOPHEN 975 MG: 325 TABLET ORAL at 05:54

## 2024-03-22 RX ADMIN — TORSEMIDE 20 MG: 20 TABLET ORAL at 08:31

## 2024-03-22 RX ADMIN — OXYCODONE HYDROCHLORIDE 10 MG: 10 TABLET ORAL at 16:53

## 2024-03-22 RX ADMIN — LOSARTAN POTASSIUM 50 MG: 50 TABLET, FILM COATED ORAL at 08:31

## 2024-03-22 RX ADMIN — OXYCODONE HYDROCHLORIDE 10 MG: 10 TABLET ORAL at 00:00

## 2024-03-22 RX ADMIN — DICYCLOMINE HYDROCHLORIDE 20 MG: 20 TABLET ORAL at 05:54

## 2024-03-22 RX ADMIN — METOPROLOL SUCCINATE 50 MG: 50 TABLET, EXTENDED RELEASE ORAL at 08:31

## 2024-03-22 RX ADMIN — OXYCODONE HYDROCHLORIDE 10 MG: 10 TABLET ORAL at 12:24

## 2024-03-22 RX ADMIN — POLYETHYLENE GLYCOL 3350 17 G: 17 POWDER, FOR SOLUTION ORAL at 08:31

## 2024-03-22 RX ADMIN — POTASSIUM CHLORIDE 40 MEQ: 1500 TABLET, EXTENDED RELEASE ORAL at 09:22

## 2024-03-22 RX ADMIN — DEXAMETHASONE SODIUM PHOSPHATE 10 MG: 10 INJECTION INTRAMUSCULAR; INTRAVENOUS at 08:31

## 2024-03-22 RX ADMIN — MORPHINE SULFATE 2 MG: 2 INJECTION, SOLUTION INTRAMUSCULAR; INTRAVENOUS at 03:50

## 2024-03-22 ASSESSMENT — COGNITIVE AND FUNCTIONAL STATUS - GENERAL
TOILETING: A LITTLE
MOBILITY SCORE: 17
CLIMB 3 TO 5 STEPS WITH RAILING: A LITTLE
STANDING UP FROM CHAIR USING ARMS: A LITTLE
CLIMB 3 TO 5 STEPS WITH RAILING: A LOT
TURNING FROM BACK TO SIDE WHILE IN FLAT BAD: A LITTLE
HELP NEEDED FOR BATHING: A LOT
WALKING IN HOSPITAL ROOM: A LITTLE
MOVING FROM LYING ON BACK TO SITTING ON SIDE OF FLAT BED WITH BEDRAILS: A LITTLE
MOBILITY SCORE: 18
PERSONAL GROOMING: A LITTLE
TURNING FROM BACK TO SIDE WHILE IN FLAT BAD: A LITTLE
DRESSING REGULAR LOWER BODY CLOTHING: A LOT
MOVING TO AND FROM BED TO CHAIR: A LITTLE
DRESSING REGULAR LOWER BODY CLOTHING: A LITTLE
TOILETING: A LITTLE
DAILY ACTIVITIY SCORE: 18
WALKING IN HOSPITAL ROOM: A LITTLE
HELP NEEDED FOR BATHING: A LITTLE
STANDING UP FROM CHAIR USING ARMS: A LITTLE
MOVING TO AND FROM BED TO CHAIR: A LITTLE
MOVING FROM LYING ON BACK TO SITTING ON SIDE OF FLAT BED WITH BEDRAILS: A LITTLE
DAILY ACTIVITIY SCORE: 21

## 2024-03-22 ASSESSMENT — PAIN SCALES - GENERAL
PAINLEVEL_OUTOF10: 8
PAINLEVEL_OUTOF10: 10 - WORST POSSIBLE PAIN
PAINLEVEL_OUTOF10: 8
PAINLEVEL_OUTOF10: 10 - WORST POSSIBLE PAIN
PAINLEVEL_OUTOF10: 9
PAINLEVEL_OUTOF10: 8
PAINLEVEL_OUTOF10: 9

## 2024-03-22 ASSESSMENT — PAIN - FUNCTIONAL ASSESSMENT
PAIN_FUNCTIONAL_ASSESSMENT: 0-10

## 2024-03-22 ASSESSMENT — PAIN DESCRIPTION - LOCATION
LOCATION: KNEE

## 2024-03-22 ASSESSMENT — PAIN DESCRIPTION - ORIENTATION
ORIENTATION: RIGHT

## 2024-03-22 ASSESSMENT — ACTIVITIES OF DAILY LIVING (ADL): ADL_ASSISTANCE: INDEPENDENT

## 2024-03-22 NOTE — PROGRESS NOTES
Patient: Abraham Rodriguez  Room/bed: 147/147-A  Admitted on: 3/20/2024    Age: 83 y.o.   Gender: male  Code Status:  Full Code   Admitting Dx: Primary osteoarthritis of right knee [M17.11]  Status post total knee replacement, right [Z96.651]    MRN: [id number]  PCP: Vivi Coto, APRN-CNP       Subjective   Seen and examined in his room this AM. Up in chair. Still reporting pain 10/10 with movement and increased swelling that he feels up to his hip. He denies chest pain, breathing difficulties, abdominal pain, N/V/D/C, fever, or chills.  Minimal sleep due to pain. BM x 3 this AM.     Objective    Physical Exam   Constitutional: A&O x 3; NAD; calm and cooperative  Eyes: EOM's intact  HEENT: Normocephalic, Atraumatic. Oral mucosa moist.   Neck: Supple. No JVD, lymphadenopathy.   Lungs: CTAB with fair air movement. Respirations even and unlabored on room air.   Heart: RRR  Abdomen: Softly distended, + tenderness,+BS  MS/Extremities: KOENIG with RLE weakness/pain. No edema. Peripheral pulses intact bilaterally. Right knee incision is covered with primary/surgical dressing - C/D/I.   Neuro: A&O x3; no focal deficits; gross motor and sensation intact.   Skin: Warm and dry. No rashes or lesions  Psych: Normal affect.      Temp:  [36.2 °C (97.2 °F)-36.7 °C (98.1 °F)] 36.2 °C (97.2 °F)  Heart Rate:  [65-87] 87  Resp:  [16-19] 18  BP: (129-162)/(64-78) 162/78    Vitals:    03/20/24 0822   Weight: 86.2 kg (190 lb 0.6 oz)     I/Os    Intake/Output Summary (Last 24 hours) at 3/22/2024 1217  Last data filed at 3/22/2024 0411  Gross per 24 hour   Intake 1640 ml   Output --   Net 1640 ml         Labs:   Results from last 72 hours   Lab Units 03/22/24  0554 03/21/24  0545   SODIUM mmol/L 139 135*   POTASSIUM mmol/L 3.3* 3.9   CHLORIDE mmol/L 104 104   CO2 mmol/L 30 26   BUN mg/dL 21 20   CREATININE mg/dL 1.05 1.13   GLUCOSE mg/dL 106* 127*   CALCIUM mg/dL 7.9* 7.8*   ANION GAP mmol/L 8* 9*   EGFR mL/min/1.73m*2 70 64         Results from last 72 hours   Lab Units 03/22/24  1147 03/22/24  0554 03/21/24  0545   WBC AUTO x10*3/uL 9.6 8.4 11.9*   HEMOGLOBIN g/dL 12.0* 10.6* 11.9*   HEMATOCRIT % 35.9* 31.7* 35.2*   PLATELETS AUTO x10*3/uL 162 132* 166        Lab Results   Component Value Date    CALCIUM 7.9 (L) 03/22/2024    PHOS 1.9 (L) 12/17/2020      Lab Results   Component Value Date    CRP 4.29 (A) 08/15/2022        Micro/ID:   No results found for the last 90 days.    .ID  Lab Results   Component Value Date    URINECULTURE NO GROWTH 07/06/2023       Images:  XR knee right 1-2 views  Narrative: Interpreted By:  Yennifer Jimenez,   STUDY:  Right knee, 2 views.      INDICATION:  Signs/Symptoms:right TKR      COMPARISON:  07/03/2023.      ACCESSION NUMBER(S):  [id number]      ORDERING CLINICIAN:  TRICIA BOSWELL      FINDINGS:  No acute fracture or malalignment.  Immediate postsurgical changes of right total knee arthroplasty.  Hardware is intact without perihardware fractures or lucencies.  Expected postsurgical soft tissue gas within the knee joint.      Impression: 1. Immediate postsurgical changes of right total knee arthroplasty  without hardware complication.      MACRO:  None.      Signed by: Yennifer Jimenez 3/20/2024 3:00 PM  Dictation workstation:   [id number]       Meds    Scheduled medications  acetaminophen, 975 mg, oral, q6h GUERO  aspirin, 325 mg, oral, BID  dexAMETHasone, 10 mg, intravenous, q6h  dicyclomine, 20 mg, oral, Before meals & nightly  docusate sodium, 100 mg, oral, BID  finasteride, 5 mg, oral, q PM  losartan, 50 mg, oral, Daily  metoprolol succinate XL, 50 mg, oral, Daily  pantoprazole, 40 mg, oral, Daily before breakfast  polyethylene glycol, 17 g, oral, Daily  tamsulosin, 0.4 mg, oral, q PM  torsemide, 20 mg, oral, Daily      Continuous medications  lactated Ringer's, 100 mL/hr, Last Rate: 100 mL/hr (03/22/24 0411)  oxygen, 2 L/min      PRN medications  PRN medications: carisoprodol, diphenhydrAMINE,  gabapentin, hydrALAZINE, ipratropium-albuteroL, morphine, naloxone, ondansetron ODT **OR** ondansetron, oxyCODONE, oxyCODONE, traMADol     Assessment and Plan    83 y.o. male with a medical history of CAD, HTN, and HFpEF, who presented for an elective right TKA secondary to osteoarthritis. Consulted for medical management.      CV: HTN, HFpEF, CAD  -Resumed home regimen: Metoprolol, Losartan, Torsemide  -Accelerated HTN postoperatively but has improved in past 24 hours  -May continue with Hydralazine for SBP>160  -No clinical signs of volume overload     Right Knee Osteoarthritis  -S/P TKA by Dr. Arora 3/20/24  -Incisional care, antibiotics, activity restrictions per orthopedics surgery.  -PT/OT to follow. WBAT.   -Medicate for pain  -Maintain bowel regimen  -Advised IS use, mobilization.   -Monitor H&H for ABLA. EBL 50 ml. Hgb stable at 11.9.  -DVT prophylaxis per surgery:  ASA 325mg BID  -Afebrile. No leukocytosis.   -Pain is not controlled which is limiting his mobility. PT/OT following.      GERD  -On PPI/Bentyl     COPD/YOSEF  -No clinical signs of COPD exacerbation  -Will monitor  -Added Duonebs for PRN use     BPH  -On Tamsulosin, Finasteride     DVT Prophylaxis  -H/O DVT/PE  -Patient refutes diagnosis and is not on OAC.   -Defer to surgery    Fluids/Electrolytes/Nutrition  -Laboratory data reviewed.   -Electrolytes stable. Hypokalemia - replaced.   -No nutritional concerns at this time.       Disposition  -Plan of care discussed with medicine, Dr. Bradshaw.  -Discharge per attending/surgery. Stable from medical standpoint.       Senia Barrett, APRN-CNP

## 2024-03-22 NOTE — PROGRESS NOTES
Physical Therapy    Physical Therapy Treatment    Patient Name: Abraham Rodriguez  MRN: [id number]  Today's Date: 3/22/2024  Time Calculation  Start Time: 1054  Stop Time: 1122  Time Calculation (min): 28 min       Assessment/Plan   PT Assessment  PT Assessment Results: Decreased strength, Decreased range of motion, Decreased endurance, Impaired balance, Decreased mobility  Rehab Prognosis: Good  Barriers to Discharge: Pain control  Evaluation/Treatment Tolerance: Patient limited by pain  Medical Staff Made Aware: Yes  Strengths: Ability to acquire knowledge, Housing layout, Support of Caregivers  Barriers to Participation: Comorbidities (Pain control)  End of Session Communication: Bedside nurse  Assessment Comment: Patient pleasant, cooperative, continues to be limited d/t pain. Was seen for ortho NP and primary care team, no restrictions at this time, no additional testing required. OT will plan to see following PT tx. Frequency increased to 5x/week to accomodate ortho need.  End of Session Patient Position: Up in chair, Alarm off, not on at start of session (no alarm necessary per staff, B LE elevated, ice to R knee and hip)     PT Plan  Treatment/Interventions: Bed mobility, Transfer training, Gait training, Stair training, Balance training, Strengthening, Range of motion, Endurance training  PT Plan: Skilled PT  PT Frequency: 5 times per week  PT Discharge Recommendations: Low intensity level of continued care  Equipment Recommended upon Discharge: Wheeled walker (owns)  PT Recommended Transfer Status: Assist x1  PT - OK to Discharge: Yes (per PT POC)      General Visit Information:   PT  Visit  PT Received On: 03/22/24  Response to Previous Treatment:  (Patient with continuous high levels of R knee pain)  General  Reason for Referral: Impaired functional mobility; R TKA  Referred By: Scott Arora  Past Medical History Relevant to Rehab: Hypertension  Hyperlipidemia  Coronary Artery Disease  Myocardial Infarction   "HFpEF  COPD  DVT/PE (patient denies)  Chronic Low Back Pain with Radiculopathy  Chronic Asbestosis  Obstructive Sleep Apnea  GERD  Tongue Cancer  Squamous Cell Carcinoma - Nose  Bell's Palsy  Planar Fascitis  Colon Polyps  Family/Caregiver Present: No  Prior to Session Communication: Bedside nurse  Patient Position Received: Up in chair, Alarm off, not on at start of session  General Comment: Patient cooperative, continues with high levels of pain, agreeable to PT tx    Subjective   Precautions:  Precautions  LE Weight Bearing Status: Weight Bearing as Tolerated  Medical Precautions: Fall precautions  Post-Surgical Precautions: Right total knee precautions    Objective   Pain:  Pain Assessment  Pain Assessment: 0-10  Pain Score: 10 - Worst possible pain  Pain Type: Acute pain, Surgical pain  Pain Location: Knee (R hip)  Pain Orientation: Right  Pain Interventions: Cold applied, Repositioned (Patient encouraged to have R LE elevated throughout day, no towels/pillows under knee)  Cognition:  Cognition  Overall Cognitive Status: Within Functional Limits  Orientation Level: Oriented X4  Postural Control:  Postural Control  Postural Control: Within Functional Limits  Static Sitting Balance  Static Sitting-Balance Support: No upper extremity supported, Feet supported  Static Sitting-Level of Assistance: Distant supervision  Static Standing Balance  Static Standing-Balance Support: Bilateral upper extremity supported  Static Standing-Level of Assistance: Close supervision    Activity Tolerance:  Activity Tolerance  Endurance: Tolerates 10 - 20 min exercise with multiple rests  Treatments:  Therapeutic Exercise  Therapeutic Exercise Performed: Yes  Therapeutic Exercise Activity 1: long sitting: ankle pumps B x 20; quad set R with 2\" hold x 20. Ther ex terminated d/t need for restroom use    Bed Mobility  Bed Mobility: Yes  Bed Mobility 1  Bed Mobility 1: Sitting to supine  Level of Assistance 1: Minimum assistance (R " LE)    Ambulation/Gait Training  Ambulation/Gait Training Performed: Yes  Ambulation/Gait Training 1  Surface 1: Level tile  Device 1: Rolling walker  Assistance 1: Close supervision  Quality of Gait 1:  (decreased step length, decreased weight bearing RLE, decreased R kene flexion during swing)  Comments/Distance (ft) 1: 20'  Transfers  Transfer: Yes  Transfer 1  Transfer From 1: Sit to, Stand to  Transfer to 1: Sit, Stand  Technique 1: Sit to stand, Stand to sit  Transfer Device 1: Walker  Transfer Level of Assistance 1: Close supervision         Outcome Measures:  [facility] Basic Mobility  Turning from your back to your side while in a flat bed without using bedrails: A little  Moving from lying on your back to sitting on the side of a flat bed without using bedrails: A little  Moving to and from bed to chair (including a wheelchair): A little  Standing up from a chair using your arms (e.g. wheelchair or bedside chair): A little  To walk in hospital room: A little  Climbing 3-5 steps with railing: A lot  Basic Mobility - Total Score: 17    Education Documentation  Handouts, taught by Claudette Barbosa PT at 3/22/2024 11:43 AM.  Learner: Patient  Readiness: Acceptance  Method: Explanation  Response: Verbalizes Understanding    Precautions, taught by Claudette Barbosa PT at 3/22/2024 11:43 AM.  Learner: Patient  Readiness: Acceptance  Method: Explanation  Response: Verbalizes Understanding    Body Mechanics, taught by Claudette Barbosa PT at 3/22/2024 11:43 AM.  Learner: Patient  Readiness: Acceptance  Method: Explanation  Response: Verbalizes Understanding    Home Exercise Program, taught by Claudette Barbosa PT at 3/22/2024 11:43 AM.  Learner: Patient  Readiness: Acceptance  Method: Explanation  Response: Verbalizes Understanding    Mobility Training, taught by Claudette Barbosa PT at 3/22/2024 11:43 AM.  Learner: Patient  Readiness: Acceptance  Method: Explanation  Response: Verbalizes Understanding    Education Comments  No  comments found.        OP EDUCATION:       Encounter Problems       Encounter Problems (Active)       Mobility       Patient will demonstrate good understanding of bed mobility, transfers, ambulation, stair negotiation and HEP for safe home going.   (Progressing)       Start:  03/21/24    Expected End:  03/22/24

## 2024-03-22 NOTE — PROGRESS NOTES
Occupational Therapy                 Therapy Communication Note    Patient Name: Abraham Rodriguez  MRN: [id number]  Today's Date: 3/22/2024     Discipline: Occupational Therapy    Missed Visit Reason: Missed Visit Reason: Cancel (Per PT pt doesn't have any OT needs as he has support from family and is mobilizing well. Cancel OT orders)    Missed Time: Cancel

## 2024-03-22 NOTE — CARE PLAN
Uneventful night. Pt continues to complain of constant pain. Received oxycodone and morphine throughout the shift. Pt appeared to sleep for a quite a few hours as well. Pt continues to progress towards meeting goals. Probable DC home today. VSS. Will continue to monitor.

## 2024-03-22 NOTE — PROGRESS NOTES
03/22/24 0949   Discharge Planning   Living Arrangements Alone   Support Systems Children;Family members;Friends/neighbors   Assistance Needed A&Ox3, independent with ADLs, does not use DME at baseline but has a rollator, walker, cane, and crutches if needed; drives, room air at baseline   Type of Residence Private residence   Home or Post Acute Services Post acute facilities (Rehab/SNF/etc)   Type of Post Acute Facility Services Skilled nursing   Patient expects to be discharged to: new [facility] vs new acute rehab, patient feels he is too weak to return home. Referrals sent to 1. [facility] 2. [facility] in [place]   Does the patient need discharge transport arranged? Yes   RoundTrip coordination needed? Yes   Has discharge transport been arranged? No   Patient Choice   Provider Choice list and CMS website (https://medicare.gov/care-compare#search) for post-acute Quality and Resource Measure Data were provided and reviewed with: Patient     03/22/2024 1253: [facility] and  Acute Rehab in [place] are unable to accept the patient.

## 2024-03-22 NOTE — PROGRESS NOTES
"Abraham Rodriguez is a 83 y.o. male on day 0 of admission presenting with Status post total knee replacement, right.    Subjective   No acute overnight events.  Pain not well controlled.  Patient seen and examined sitting up in chair.  Rates pain 9/10.      Objective     Physical Exam  Vitals reviewed.   HENT:      Head: Normocephalic and atraumatic.   Eyes:      Extraocular Movements: Extraocular movements intact.      Conjunctiva/sclera: Conjunctivae normal.      Pupils: Pupils are equal, round, and reactive to light.   Cardiovascular:      Rate and Rhythm: Normal rate and regular rhythm.      Pulses: Normal pulses.   Pulmonary:      Breath sounds: Normal breath sounds.   Abdominal:      General: Bowel sounds are normal.      Palpations: Abdomen is soft.   Musculoskeletal:      Comments: Right knee dressing C/D/I, calf soft and compressible, leg and foot warm and well perfused, 4/5 RLE strength, sensations intact distally   Skin:     General: Skin is warm and dry.      Capillary Refill: Capillary refill takes less than 2 seconds.   Neurological:      General: No focal deficit present.      Mental Status: He is alert and oriented to person, place, and time.         Last Recorded Vitals  Blood pressure 133/67, pulse 78, temperature 36.3 °C (97.3 °F), temperature source Temporal, resp. rate 18, height 1.803 m (5' 11\"), weight 86.2 kg (190 lb 0.6 oz), SpO2 93 %.  Intake/Output last 3 Shifts:  I/O last 3 completed shifts:  In: 2280 (26.5 mL/kg) [P.O.:680; I.V.:1400 (16.2 mL/kg); IV Piggyback:200]  Out: - (0 mL/kg)   Weight: 86.2 kg     Relevant Results  XR knee right 1-2 views    Result Date: 3/20/2024  Interpreted By:  Yennifer Jimenez, STUDY: Right knee, 2 views.   INDICATION: Signs/Symptoms:right TKR   COMPARISON: 07/03/2023.   ACCESSION NUMBER(S): [id number]   ORDERING CLINICIAN: TRICIA BOSWELL   FINDINGS: No acute fracture or malalignment. Immediate postsurgical changes of right total knee arthroplasty. Hardware is " intact without perihardware fractures or lucencies. Expected postsurgical soft tissue gas within the knee joint.       1. Immediate postsurgical changes of right total knee arthroplasty without hardware complication.   MACRO: None.   Signed by: Yennifer Jimenez 3/20/2024 3:00 PM Dictation workstation:   [id number]    Scheduled medications  acetaminophen, 975 mg, oral, q6h GUERO  aspirin, 325 mg, oral, BID  dexAMETHasone, 10 mg, intravenous, q6h  dicyclomine, 20 mg, oral, Before meals & nightly  docusate sodium, 100 mg, oral, BID  finasteride, 5 mg, oral, q PM  losartan, 50 mg, oral, Daily  metoprolol succinate XL, 50 mg, oral, Daily  pantoprazole, 40 mg, oral, Daily before breakfast  tamsulosin, 0.4 mg, oral, q PM  torsemide, 20 mg, oral, Daily      Continuous medications  lactated Ringer's, 100 mL/hr, Last Rate: 100 mL/hr (03/22/24 0411)  oxygen, 2 L/min      PRN medications  PRN medications: carisoprodol, diphenhydrAMINE, gabapentin, hydrALAZINE, ipratropium-albuteroL, morphine, naloxone, ondansetron ODT **OR** ondansetron, oxyCODONE, oxyCODONE, traMADol  Results for orders placed or performed during the hospital encounter of 03/20/24 (from the past 24 hour(s))   CBC   Result Value Ref Range    WBC 8.4 4.4 - 11.3 x10*3/uL    nRBC 0.0 0.0 - 0.0 /100 WBCs    RBC 3.52 (L) 4.50 - 5.90 x10*6/uL    Hemoglobin 10.6 (L) 13.5 - 17.5 g/dL    Hematocrit 31.7 (L) 41.0 - 52.0 %    MCV 90 80 - 100 fL    MCH 30.1 26.0 - 34.0 pg    MCHC 33.4 32.0 - 36.0 g/dL    RDW 12.4 11.5 - 14.5 %    Platelets 132 (L) 150 - 450 x10*3/uL   Basic Metabolic Panel   Result Value Ref Range    Glucose 106 (H) 74 - 99 mg/dL    Sodium 139 136 - 145 mmol/L    Potassium 3.3 (L) 3.5 - 5.3 mmol/L    Chloride 104 98 - 107 mmol/L    Bicarbonate 30 21 - 32 mmol/L    Anion Gap 8 (L) 10 - 20 mmol/L    Urea Nitrogen 21 6 - 23 mg/dL    Creatinine 1.05 0.50 - 1.30 mg/dL    eGFR 70 >60 mL/min/1.73m*2    Calcium 7.9 (L) 8.6 - 10.3 mg/dL     Assessment/Plan    Principal Problem:    Status post total knee replacement, right    83 year old male POD 1 from right total knee arthroplasty  - AM labs reviewed - HgB stable  - WBAT RLE, pending PT evaluation  - post op dressing to remain on until follow up with Dr. Arora  - regular diet with BR  - On Oxycodone, Tylenol, Morphine for breakthrough pain, and Tramadol for which patient continues to endorse 9/10 pain - continue to monitor  - hep-lock IVF    Discussed with Dr. Arora           I spent 30 minutes in the professional and overall care of this patient.      Vivi Garcias, APRN-CNP

## 2024-03-22 NOTE — CARE PLAN
The patient's goals for the shift include pain control and sleep    The clinical goals for the shift include pain control and sleep

## 2024-03-22 NOTE — PROGRESS NOTES
"Abraham Rodriguez is a 83 y.o. male on day 0 of admission presenting with Status post total knee replacement, right.    Subjective   Reports pain levels 9/10 overnight despite medication.  Describes pain as constant burning pain in thigh and knee.  Denies numbness/tingling of leg or foot.        Objective     Physical Exam  Vitals reviewed.   HENT:      Head: Normocephalic and atraumatic.   Eyes:      Extraocular Movements: Extraocular movements intact.      Conjunctiva/sclera: Conjunctivae normal.      Pupils: Pupils are equal, round, and reactive to light.   Cardiovascular:      Rate and Rhythm: Normal rate and regular rhythm.      Pulses: Normal pulses.   Pulmonary:      Breath sounds: Normal breath sounds.   Abdominal:      General: Bowel sounds are normal.      Palpations: Abdomen is soft.   Musculoskeletal:      Comments: Right knee dressing C/D/I, some firmness to right lateral distal thigh but compressible, calf soft and compressible, moderate swelling to right posterior knee, leg and foot warm and well perfused, 4/5 RLE strength, sensations intact distally   Skin:     General: Skin is warm and dry.      Capillary Refill: Capillary refill takes less than 2 seconds.   Neurological:      General: No focal deficit present.      Mental Status: He is alert and oriented to person, place, and time.         Last Recorded Vitals  Blood pressure 133/67, pulse 78, temperature 36.3 °C (97.3 °F), temperature source Temporal, resp. rate 18, height 1.803 m (5' 11\"), weight 86.2 kg (190 lb 0.6 oz), SpO2 93 %.  Intake/Output last 3 Shifts:  I/O last 3 completed shifts:  In: 2280 (26.5 mL/kg) [P.O.:680; I.V.:1400 (16.2 mL/kg); IV Piggyback:200]  Out: - (0 mL/kg)   Weight: 86.2 kg     Relevant Results  XR knee right 1-2 views    Result Date: 3/20/2024  Interpreted By:  Yennifer Jimenez, STUDY: Right knee, 2 views.   INDICATION: Signs/Symptoms:right TKR   COMPARISON: 07/03/2023.   ACCESSION NUMBER(S): [id number]   ORDERING " CLINICIAN: TRICIA BOSWELL   FINDINGS: No acute fracture or malalignment. Immediate postsurgical changes of right total knee arthroplasty. Hardware is intact without perihardware fractures or lucencies. Expected postsurgical soft tissue gas within the knee joint.       1. Immediate postsurgical changes of right total knee arthroplasty without hardware complication.   MACRO: None.   Signed by: Yennifer Jimenez 3/20/2024 3:00 PM Dictation workstation:   [id number]    Scheduled medications  acetaminophen, 975 mg, oral, q6h GUERO  aspirin, 325 mg, oral, BID  dexAMETHasone, 10 mg, intravenous, q6h  dicyclomine, 20 mg, oral, Before meals & nightly  docusate sodium, 100 mg, oral, BID  finasteride, 5 mg, oral, q PM  losartan, 50 mg, oral, Daily  metoprolol succinate XL, 50 mg, oral, Daily  pantoprazole, 40 mg, oral, Daily before breakfast  polyethylene glycol, 17 g, oral, Daily  potassium chloride CR, 40 mEq, oral, Once  tamsulosin, 0.4 mg, oral, q PM  torsemide, 20 mg, oral, Daily      Continuous medications  lactated Ringer's, 100 mL/hr, Last Rate: 100 mL/hr (03/22/24 0411)  oxygen, 2 L/min      PRN medications  PRN medications: carisoprodol, diphenhydrAMINE, gabapentin, hydrALAZINE, ipratropium-albuteroL, morphine, naloxone, ondansetron ODT **OR** ondansetron, oxyCODONE, oxyCODONE, traMADol  Results for orders placed or performed during the hospital encounter of 03/20/24 (from the past 24 hour(s))   CBC   Result Value Ref Range    WBC 8.4 4.4 - 11.3 x10*3/uL    nRBC 0.0 0.0 - 0.0 /100 WBCs    RBC 3.52 (L) 4.50 - 5.90 x10*6/uL    Hemoglobin 10.6 (L) 13.5 - 17.5 g/dL    Hematocrit 31.7 (L) 41.0 - 52.0 %    MCV 90 80 - 100 fL    MCH 30.1 26.0 - 34.0 pg    MCHC 33.4 32.0 - 36.0 g/dL    RDW 12.4 11.5 - 14.5 %    Platelets 132 (L) 150 - 450 x10*3/uL   Basic Metabolic Panel   Result Value Ref Range    Glucose 106 (H) 74 - 99 mg/dL    Sodium 139 136 - 145 mmol/L    Potassium 3.3 (L) 3.5 - 5.3 mmol/L    Chloride 104 98 - 107 mmol/L     Bicarbonate 30 21 - 32 mmol/L    Anion Gap 8 (L) 10 - 20 mmol/L    Urea Nitrogen 21 6 - 23 mg/dL    Creatinine 1.05 0.50 - 1.30 mg/dL    eGFR 70 >60 mL/min/1.73m*2    Calcium 7.9 (L) 8.6 - 10.3 mg/dL     Assessment/Plan   Principal Problem:    Status post total knee replacement, right    83 year old male POD 2 from right total knee arthroplasty  - AM labs reviewed - HgB drop from 11.9 to 10.6 this AM, thrombocytopenia noted 132.  Will recheck CBC this afternoon  - WBAT RLE, will work with PT again this morning  - post op dressing to remain on until follow up with Dr. Arora  - regular diet with BR  - On Oxycodone, Tylenol, Morphine for breakthrough pain, and Tramadol for which patient continues to endorse 9/10 pain - continue to monitor  - added Decadron 10mg q8h x 3 doses for pain and swelling  - hep-lock IVF    Discussed with Dr. Arora           I spent 30 minutes in the professional and overall care of this patient.      Vivi Garcias, APRN-CNP

## 2024-03-22 NOTE — PROGRESS NOTES
Occupational Therapy    Evaluation    Patient Name: Abraham Rodriguez  MRN: [id number]  Today's Date: 3/22/2024  Time Calculation  Start Time: 1125  Stop Time: 1144  Time Calculation (min): 19 min    Assessment  IP OT Assessment  OT Assessment: Pt presents with pain, decreased balance, and endurance. Pt to benefit from skilled OT services to increase independence with ADL's, transfers, and mobility  Prognosis: Excellent  Barriers to Discharge: Decreased caregiver support  Evaluation/Treatment Tolerance: Patient limited by pain  Medical Staff Made Aware: Yes  End of Session Communication: Bedside nurse  End of Session Patient Position: Up in chair, Alarm off, not on at start of session (No alarm necessary per staff)  Plan:  Treatment Interventions: ADL retraining, Functional transfer training, Endurance training  OT Frequency: 4 times per week  OT Discharge Recommendations: High intensity level of continued care  Equipment Recommended upon Discharge: Wheeled walker  OT Recommended Transfer Status: Minimal assist, Assist of 1  OT - OK to Discharge: Yes (per OT POC)    Subjective   Current Problem:  1. Status post total knee replacement, right  Referral to Home Care      2. Gastroesophageal reflux disease without esophagitis  dicyclomine (Bentyl) 20 mg tablet        General:  General  Reason for Referral: Pt is POD #2 from a R TKA with [facility]  Past Medical History Relevant to Rehab: Hypertension  Hyperlipidemia  Coronary Artery Disease  Myocardial Infarction  HFpEF  COPD  DVT/PE (patient denies)  Chronic Low Back Pain with Radiculopathy  Chronic Asbestosis  Obstructive Sleep Apnea  GERD  Tongue Cancer  Squamous Cell Carcinoma - Nose  Bell's Palsy  Planar Fascitis  Colon Polyps  Family/Caregiver Present: No  Prior to Session Communication: Bedside nurse  Patient Position Received: Up in chair, Alarm off, not on at start of session  General Comment: Pt pleasant and agreeable to OT eval. Pt in a lot of pain which limited the  session.  Precautions:  LE Weight Bearing Status: Weight Bearing as Tolerated  Medical Precautions: Fall precautions  Post-Surgical Precautions: Right total knee precautions     Pain:  Pain Assessment  Pain Assessment: 0-10  Pain Score: 10 - Worst possible pain  Pain Type: Acute pain, Surgical pain  Pain Location: Knee  Pain Orientation: Right  Pain Interventions: Cold applied, Elevated, Repositioned    Objective   Cognition:  Overall Cognitive Status: Within Functional Limits  Orientation Level: Oriented X4     Home Living:  Type of Home: House  Lives With: Alone  Home Adaptive Equipment: Walker rolling or standard, Cane, Crutches (rollator)  Home Layout: One level  Home Access: Stairs to enter without rails  Entrance Stairs-Rails: None  Entrance Stairs-Number of Steps: 1  Bathroom Shower/Tub: Tub/shower unit  Bathroom Equipment: Grab bars in shower, Shower chair with back   Prior Function:  Level of [place]: Independent with ADLs and functional transfers, Independent with homemaking with ambulation  ADL Assistance: Independent  Homemaking Assistance: Independent  Ambulatory Assistance: Independent  Prior Function Comments: Pt was using a rollator. +driving     ADL:  Toileting Assistance with Device:  (CGA)  Toileting Deficit: Supervison/safety, Steadying  ADL Comments: Anticipate min-mod A for LB dressing/bathing d/t pain  Activity Tolerance:  Endurance: Tolerates 10 - 20 min exercise with multiple rests  Bed Mobility/Transfers:   Transfers  Transfer: Yes  Transfer 1  Transfer From 1: Sit to  Transfer to 1: Stand  Technique 1: Sit to stand  Transfer Device 1: Walker  Transfer Level of Assistance 1: Minimum assistance  Trials/Comments 1: from chair  Transfers 2  Transfer From 2: Sit to, Stand to  Transfer to 2: Sit, Stand  Technique 2: Sit to stand, Stand to sit  Transfer Device 2: Walker  Transfer Level of Assistance 2: Contact guard  Trials/Comments 2: from toilet with use of rails on both  sides    Functional Mobility:  Functional Mobility  Functional Mobility Performed: Yes  Functional Mobility 1  Surface 1: Level tile  Device 1: Rolling walker  Assistance 1: Contact guard  Comments 1: Pt ambulated to/from the bathroom with FWW at [facility]  Sitting Balance:  Static Sitting Balance  Static Sitting-Balance Support: Feet supported, No upper extremity supported  Static Sitting-Level of Assistance: Independent  Dynamic Sitting Balance  Dynamic Sitting-Balance Support: Feet supported, No upper extremity supported  Dynamic Sitting-Comments: supervision  Standing Balance:  Static Standing Balance  Static Standing-Balance Support: Bilateral upper extremity supported  Static Standing-Level of Assistance: Contact guard  Dynamic Standing Balance  Dynamic Standing-Balance Support: Bilateral upper extremity supported  Dynamic Standing-Comments: CGA with FWW    Sensation:  Light Touch: No apparent deficits  Strength:  Strength Comments: B UE's: 5/5     Extremities: RUE   RUE : Within Functional Limits and LUE   LUE: Within Functional Limits    Outcome Measures: [facility] Daily Activity  Putting on and taking off regular lower body clothing: A lot  Bathing (including washing, rinsing, drying): A lot  Putting on and taking off regular upper body clothing: None  Toileting, which includes using toilet, bedpan or urinal: A little  Taking care of personal grooming such as brushing teeth: A little  Eating Meals: None  Daily Activity - Total Score: 18      Education Documentation  Body Mechanics, taught by Corazon Jamil OT at 3/22/2024 12:07 PM.  Learner: Patient  Readiness: Acceptance  Method: Explanation  Response: Verbalizes Understanding, Demonstrated Understanding    ADL Training, taught by Corazon Jamil OT at 3/22/2024 12:07 PM.  Learner: Patient  Readiness: Acceptance  Method: Explanation  Response: Verbalizes Understanding, Demonstrated Understanding    Education Comments  No comments found.      Goals:    Encounter Problems       Encounter Problems (Active)       OT Goals       Pt to demonstrate transfers with FWW at [facility]        Start:  03/22/24    Expected End:  04/05/24            Pt will demo increased functional mobility to tolerate tasks necessary to complete ADL routine with FWW at [facility]        Start:  03/22/24    Expected End:  04/05/24            Pt will increase endurance to tolerate 15 min of activity with no more than 1 rest break in order to increase ability to engage in ADL completion.        Start:  03/22/24    Expected End:  04/05/24            Pt to demonstrate LB dressing, bathing, and toileting with AE as needed at [facility]        Start:  03/22/24    Expected End:  04/05/24            Pt will demo good static/dynamic standing balance during ADL and functional activity with FWW for improved independence and participation in ADL        Start:  03/22/24    Expected End:  04/05/24

## 2024-03-23 ENCOUNTER — HOME CARE VISIT (OUTPATIENT)
Dept: HOME HEALTH SERVICES | Facility: HOME HEALTH | Age: 84
End: 2024-03-23
Payer: MEDICARE

## 2024-03-23 VITALS — OXYGEN SATURATION: 95 % | HEART RATE: 81 BPM

## 2024-03-23 PROCEDURE — 1090000001 HH PPS REVENUE CREDIT

## 2024-03-23 PROCEDURE — 1090000002 HH PPS REVENUE DEBIT

## 2024-03-23 PROCEDURE — 0023 HH SOC

## 2024-03-23 PROCEDURE — 169592 NO-PAY CLAIM PROCEDURE

## 2024-03-23 PROCEDURE — G0151 HHCP-SERV OF PT,EA 15 MIN: HCPCS | Mod: HHH

## 2024-03-23 ASSESSMENT — ENCOUNTER SYMPTOMS
LOWEST PAIN SEVERITY IN PAST 24 HOURS: 10/10
PAIN LOCATION: RIGHT KNEE
HIGHEST PAIN SEVERITY IN PAST 24 HOURS: 10/10
PAIN: 1
PAIN SEVERITY GOAL: 4/10
SUBJECTIVE PAIN PROGRESSION: GRADUALLY IMPROVING
PERSON REPORTING PAIN: PATIENT
PAIN LOCATION - PAIN SEVERITY: 10/10

## 2024-03-23 ASSESSMENT — ACTIVITIES OF DAILY LIVING (ADL)
OASIS_M1830: 02
AMBULATION ASSISTANCE: 1
TRANSPORTATION: NEEDS ASSISTANCE
AMBULATION ASSISTANCE: ONE PERSON
TRANSPORTATION ASSESSED: 1
AMBULATION ASSISTANCE: MINIMUM ASSIST

## 2024-03-23 NOTE — HOME HEALTH
3 20 24.  R TKA Dr. Scott Arora M.D.  Will call for follow up in about 2 weeks.  Bandage will remain on until follow up with [facility].  Left [facility] by [facility] last year.  R [facility] August 2020.    Taught patient written copy of exercises of  1.  Ankle pumps  2.  Quad sets  3.  Gluteal sets  4.  Heel slides  5.  Hip abduction  6.  Straight leg raises  7.  Short arc quads over the recliner legs which were lowered to about 45 degrees.         Patient performed all of these while seated and somewhat reclined back on his recliner.

## 2024-03-24 PROCEDURE — 1090000002 HH PPS REVENUE DEBIT

## 2024-03-24 PROCEDURE — 1090000001 HH PPS REVENUE CREDIT

## 2024-03-25 ENCOUNTER — HOME CARE VISIT (OUTPATIENT)
Dept: HOME HEALTH SERVICES | Facility: HOME HEALTH | Age: 84
End: 2024-03-25
Payer: MEDICARE

## 2024-03-25 VITALS
SYSTOLIC BLOOD PRESSURE: 132 MMHG | DIASTOLIC BLOOD PRESSURE: 58 MMHG | TEMPERATURE: 99.3 F | HEART RATE: 93 BPM | OXYGEN SATURATION: 96 %

## 2024-03-25 PROCEDURE — 1090000001 HH PPS REVENUE CREDIT

## 2024-03-25 PROCEDURE — 1090000002 HH PPS REVENUE DEBIT

## 2024-03-25 PROCEDURE — G0157 HHC PT ASSISTANT EA 15: HCPCS | Mod: HHH

## 2024-03-25 PROCEDURE — 1090000003 HH PPS REVENUE ADJ

## 2024-03-25 ASSESSMENT — ENCOUNTER SYMPTOMS
LOWEST PAIN SEVERITY IN PAST 24 HOURS: 10/10
PAIN: 1
PAIN LOCATION: RIGHT KNEE
PAIN LOCATION - PAIN QUALITY: BURNING
PAIN LOCATION - EXACERBATING FACTORS: ACTIVITY
PAIN LOCATION - RELIEVING FACTORS: MEDICATION, REST
PAIN LOCATION - PAIN SEVERITY: 10/10
HIGHEST PAIN SEVERITY IN PAST 24 HOURS: 10/10
PAIN LOCATION - PAIN FREQUENCY: CONSTANT

## 2024-03-26 ENCOUNTER — APPOINTMENT (OUTPATIENT)
Dept: RADIOLOGY | Facility: HOSPITAL | Age: 84
DRG: 605 | End: 2024-03-26
Payer: MEDICARE

## 2024-03-26 ENCOUNTER — HOSPITAL ENCOUNTER (INPATIENT)
Facility: HOSPITAL | Age: 84
LOS: 1 days | Discharge: NURSING, REHAB OR LONG-TERM CARE | DRG: 605 | End: 2024-03-29
Attending: EMERGENCY MEDICINE | Admitting: FAMILY MEDICINE
Payer: MEDICARE

## 2024-03-26 DIAGNOSIS — J61 ASBESTOSIS (MULTI): ICD-10-CM

## 2024-03-26 DIAGNOSIS — E78.5 DYSLIPIDEMIA: ICD-10-CM

## 2024-03-26 DIAGNOSIS — M65.4 DE QUERVAIN'S TENOSYNOVITIS, RIGHT: ICD-10-CM

## 2024-03-26 DIAGNOSIS — I50.30 HEART FAILURE WITH PRESERVED EJECTION FRACTION, UNSPECIFIED HF CHRONICITY (MULTI): ICD-10-CM

## 2024-03-26 DIAGNOSIS — M54.50 LOW BACK PAIN, UNSPECIFIED BACK PAIN LATERALITY, UNSPECIFIED CHRONICITY, UNSPECIFIED WHETHER SCIATICA PRESENT: ICD-10-CM

## 2024-03-26 DIAGNOSIS — G89.4 CHRONIC PAIN SYNDROME: ICD-10-CM

## 2024-03-26 DIAGNOSIS — M54.9 BACKACHE WITH RADIATION: ICD-10-CM

## 2024-03-26 DIAGNOSIS — R23.4 FISSURE IN SKIN OF FOOT: ICD-10-CM

## 2024-03-26 DIAGNOSIS — E53.8 VITAMIN B 12 DEFICIENCY: ICD-10-CM

## 2024-03-26 DIAGNOSIS — R20.2 NUMBNESS AND TINGLING OF LOWER EXTREMITY: ICD-10-CM

## 2024-03-26 DIAGNOSIS — I25.10 ATHEROSCLEROSIS OF NATIVE CORONARY ARTERY, UNSPECIFIED WHETHER ANGINA PRESENT, UNSPECIFIED WHETHER NATIVE OR TRANSPLANTED HEART: ICD-10-CM

## 2024-03-26 DIAGNOSIS — I10 PRIMARY HYPERTENSION: ICD-10-CM

## 2024-03-26 DIAGNOSIS — Z86.711 HISTORY OF PULMONARY EMBOLUS (PE): ICD-10-CM

## 2024-03-26 DIAGNOSIS — G60.9 IDIOPATHIC PERIPHERAL NEUROPATHY: ICD-10-CM

## 2024-03-26 DIAGNOSIS — Q28.3 CAVERNOUS MALFORMATION (HHS-HCC): ICD-10-CM

## 2024-03-26 DIAGNOSIS — M47.812 FACET ARTHROPATHY, CERVICAL: ICD-10-CM

## 2024-03-26 DIAGNOSIS — Z96.641 HISTORY OF TOTAL RIGHT HIP ARTHROPLASTY: ICD-10-CM

## 2024-03-26 DIAGNOSIS — M54.16 LUMBAR RADICULITIS: ICD-10-CM

## 2024-03-26 DIAGNOSIS — M79.605 PAIN OF LEFT LOWER EXTREMITY: ICD-10-CM

## 2024-03-26 DIAGNOSIS — I87.2 VENOUS INSUFFICIENCY OF BOTH LOWER EXTREMITIES: ICD-10-CM

## 2024-03-26 DIAGNOSIS — M23.41 LOOSE BODY OF RIGHT KNEE: ICD-10-CM

## 2024-03-26 DIAGNOSIS — M17.11 PRIMARY OSTEOARTHRITIS OF RIGHT KNEE: ICD-10-CM

## 2024-03-26 DIAGNOSIS — N52.9 ORGANIC IMPOTENCE: ICD-10-CM

## 2024-03-26 DIAGNOSIS — L23.9 ALLERGIC DERMATITIS: ICD-10-CM

## 2024-03-26 DIAGNOSIS — N40.0 BENIGN PROSTATIC HYPERPLASIA, UNSPECIFIED WHETHER LOWER URINARY TRACT SYMPTOMS PRESENT: ICD-10-CM

## 2024-03-26 DIAGNOSIS — G57.30 PERONEAL NEUROPATHY, UNSPECIFIED LATERALITY: ICD-10-CM

## 2024-03-26 DIAGNOSIS — M46.1 SACROILIITIS (CMS-HCC): ICD-10-CM

## 2024-03-26 DIAGNOSIS — M54.41 LOW BACK PAIN WITH RIGHT-SIDED SCIATICA, UNSPECIFIED BACK PAIN LATERALITY, UNSPECIFIED CHRONICITY: ICD-10-CM

## 2024-03-26 DIAGNOSIS — M47.816 LUMBAR SPONDYLOSIS: ICD-10-CM

## 2024-03-26 DIAGNOSIS — J34.2 DEVIATED SEPTUM: ICD-10-CM

## 2024-03-26 DIAGNOSIS — M19.90 OSTEOARTHRITIS, UNSPECIFIED OSTEOARTHRITIS TYPE, UNSPECIFIED SITE: ICD-10-CM

## 2024-03-26 DIAGNOSIS — M54.30 SCIATICA, UNSPECIFIED LATERALITY: ICD-10-CM

## 2024-03-26 DIAGNOSIS — J98.4 RESTRICTIVE LUNG DISEASE: ICD-10-CM

## 2024-03-26 DIAGNOSIS — M53.3 SACROILIAC JOINT DYSFUNCTION: ICD-10-CM

## 2024-03-26 DIAGNOSIS — M20.42 HAMMER TOE OF SECOND TOE OF LEFT FOOT: ICD-10-CM

## 2024-03-26 DIAGNOSIS — M23.91 INTERNAL DERANGEMENT OF KNEE, RIGHT: ICD-10-CM

## 2024-03-26 DIAGNOSIS — R06.09 DYSPNEA ON EXERTION: ICD-10-CM

## 2024-03-26 DIAGNOSIS — M25.50 ARTHRALGIA, UNSPECIFIED JOINT: ICD-10-CM

## 2024-03-26 DIAGNOSIS — S80.11XA HEMATOMA OF RIGHT LOWER LEG: ICD-10-CM

## 2024-03-26 DIAGNOSIS — R25.2 MUSCLE CRAMPING: ICD-10-CM

## 2024-03-26 DIAGNOSIS — M20.5X1 HALLUX LIMITUS, RIGHT: ICD-10-CM

## 2024-03-26 DIAGNOSIS — I10 BENIGN ESSENTIAL HYPERTENSION: ICD-10-CM

## 2024-03-26 DIAGNOSIS — M79.604 PAIN OF RIGHT LOWER EXTREMITY: ICD-10-CM

## 2024-03-26 DIAGNOSIS — M54.9 CHRONIC BACK PAIN, UNSPECIFIED BACK LOCATION, UNSPECIFIED BACK PAIN LATERALITY: ICD-10-CM

## 2024-03-26 DIAGNOSIS — R20.0 NUMBNESS: ICD-10-CM

## 2024-03-26 DIAGNOSIS — M79.18 MYOFASCIAL PAIN: ICD-10-CM

## 2024-03-26 DIAGNOSIS — M54.42 LOW BACK PAIN WITH LEFT-SIDED SCIATICA, UNSPECIFIED BACK PAIN LATERALITY, UNSPECIFIED CHRONICITY: ICD-10-CM

## 2024-03-26 DIAGNOSIS — R20.0 NUMBNESS AND TINGLING OF LOWER EXTREMITY: ICD-10-CM

## 2024-03-26 DIAGNOSIS — J32.9 CHRONIC SINUSITIS, UNSPECIFIED LOCATION: ICD-10-CM

## 2024-03-26 DIAGNOSIS — G89.29 CHRONIC LEFT SHOULDER PAIN: ICD-10-CM

## 2024-03-26 DIAGNOSIS — M54.2 CERVICALGIA: ICD-10-CM

## 2024-03-26 DIAGNOSIS — M51.36 DEGENERATION OF INTERVERTEBRAL DISC OF LUMBAR REGION: ICD-10-CM

## 2024-03-26 DIAGNOSIS — J30.9 ALLERGIC RHINITIS, UNSPECIFIED SEASONALITY, UNSPECIFIED TRIGGER: ICD-10-CM

## 2024-03-26 DIAGNOSIS — M54.12 CERVICAL RADICULITIS: ICD-10-CM

## 2024-03-26 DIAGNOSIS — M50.30 DDD (DEGENERATIVE DISC DISEASE), CERVICAL: ICD-10-CM

## 2024-03-26 DIAGNOSIS — R73.9 HYPERGLYCEMIA: ICD-10-CM

## 2024-03-26 DIAGNOSIS — M17.11 ARTHRITIS OF RIGHT KNEE: ICD-10-CM

## 2024-03-26 DIAGNOSIS — M12.9 ARTHRITIS, MULTIPLE JOINT INVOLVEMENT: ICD-10-CM

## 2024-03-26 DIAGNOSIS — M25.561 RIGHT KNEE PAIN, UNSPECIFIED CHRONICITY: ICD-10-CM

## 2024-03-26 DIAGNOSIS — I26.99 BILATERAL PULMONARY EMBOLISM (MULTI): ICD-10-CM

## 2024-03-26 DIAGNOSIS — M54.12 RIGHT CERVICAL RADICULOPATHY: ICD-10-CM

## 2024-03-26 DIAGNOSIS — E87.6 HYPOKALEMIA: ICD-10-CM

## 2024-03-26 DIAGNOSIS — R60.9 EDEMA, UNSPECIFIED TYPE: ICD-10-CM

## 2024-03-26 DIAGNOSIS — R91.8 LUNG INFILTRATE: ICD-10-CM

## 2024-03-26 DIAGNOSIS — J44.9 COPD, MILD (MULTI): ICD-10-CM

## 2024-03-26 DIAGNOSIS — M16.12 PRIMARY OSTEOARTHRITIS OF LEFT HIP: ICD-10-CM

## 2024-03-26 DIAGNOSIS — M25.511 RIGHT SHOULDER PAIN, UNSPECIFIED CHRONICITY: ICD-10-CM

## 2024-03-26 DIAGNOSIS — M79.89 RIGHT LEG SWELLING: Primary | ICD-10-CM

## 2024-03-26 DIAGNOSIS — M25.562 LEFT KNEE PAIN, UNSPECIFIED CHRONICITY: ICD-10-CM

## 2024-03-26 DIAGNOSIS — M75.122 COMPLETE TEAR OF LEFT ROTATOR CUFF, UNSPECIFIED WHETHER TRAUMATIC: ICD-10-CM

## 2024-03-26 DIAGNOSIS — Z96.641 STATUS POST RIGHT HIP REPLACEMENT: ICD-10-CM

## 2024-03-26 DIAGNOSIS — G89.29 CHRONIC BACK PAIN, UNSPECIFIED BACK LOCATION, UNSPECIFIED BACK PAIN LATERALITY: ICD-10-CM

## 2024-03-26 DIAGNOSIS — M25.552 HIP PAIN, LEFT: ICD-10-CM

## 2024-03-26 DIAGNOSIS — K21.00 GASTROESOPHAGEAL REFLUX DISEASE WITH ESOPHAGITIS, UNSPECIFIED WHETHER HEMORRHAGE: ICD-10-CM

## 2024-03-26 DIAGNOSIS — M75.82 TENDINITIS OF LEFT ROTATOR CUFF: ICD-10-CM

## 2024-03-26 DIAGNOSIS — M48.062 LUMBAR STENOSIS WITH NEUROGENIC CLAUDICATION: ICD-10-CM

## 2024-03-26 DIAGNOSIS — M25.512 CHRONIC LEFT SHOULDER PAIN: ICD-10-CM

## 2024-03-26 DIAGNOSIS — M54.16 LUMBAR RADICULOPATHY: ICD-10-CM

## 2024-03-26 DIAGNOSIS — I25.10 ASHD (ARTERIOSCLEROTIC HEART DISEASE): ICD-10-CM

## 2024-03-26 DIAGNOSIS — L89.311 PRESSURE INJURY OF RIGHT BUTTOCK, STAGE 1: ICD-10-CM

## 2024-03-26 DIAGNOSIS — R60.0 PEDAL EDEMA: ICD-10-CM

## 2024-03-26 DIAGNOSIS — M17.12 ARTHRITIS OF KNEE, LEFT: ICD-10-CM

## 2024-03-26 DIAGNOSIS — Z96.651 STATUS POST TOTAL KNEE REPLACEMENT, RIGHT: ICD-10-CM

## 2024-03-26 LAB
ALBUMIN SERPL BCP-MCNC: 3.5 G/DL (ref 3.4–5)
ALP SERPL-CCNC: 51 U/L (ref 33–136)
ALT SERPL W P-5'-P-CCNC: 14 U/L (ref 10–52)
ANION GAP SERPL CALC-SCNC: 14 MMOL/L (ref 10–20)
AST SERPL W P-5'-P-CCNC: 11 U/L (ref 9–39)
BASOPHILS # BLD AUTO: 0.02 X10*3/UL (ref 0–0.1)
BASOPHILS NFR BLD AUTO: 0.2 %
BILIRUB SERPL-MCNC: 1.1 MG/DL (ref 0–1.2)
BUN SERPL-MCNC: 18 MG/DL (ref 6–23)
CALCIUM SERPL-MCNC: 8.9 MG/DL (ref 8.6–10.3)
CHLORIDE SERPL-SCNC: 101 MMOL/L (ref 98–107)
CO2 SERPL-SCNC: 28 MMOL/L (ref 21–32)
CREAT SERPL-MCNC: 0.89 MG/DL (ref 0.5–1.3)
CRP SERPL-MCNC: 12.98 MG/DL
EGFRCR SERPLBLD CKD-EPI 2021: 85 ML/MIN/1.73M*2
EOSINOPHIL # BLD AUTO: 0.08 X10*3/UL (ref 0–0.4)
EOSINOPHIL NFR BLD AUTO: 0.9 %
ERYTHROCYTE [DISTWIDTH] IN BLOOD BY AUTOMATED COUNT: 13 % (ref 11.5–14.5)
ERYTHROCYTE [SEDIMENTATION RATE] IN BLOOD BY WESTERGREN METHOD: 24 MM/H (ref 0–20)
GLUCOSE SERPL-MCNC: 119 MG/DL (ref 74–99)
HCT VFR BLD AUTO: 37 % (ref 41–52)
HGB BLD-MCNC: 12.2 G/DL (ref 13.5–17.5)
IMM GRANULOCYTES # BLD AUTO: 0.06 X10*3/UL (ref 0–0.5)
IMM GRANULOCYTES NFR BLD AUTO: 0.7 % (ref 0–0.9)
LYMPHOCYTES # BLD AUTO: 1.43 X10*3/UL (ref 0.8–3)
LYMPHOCYTES NFR BLD AUTO: 15.9 %
MCH RBC QN AUTO: 30.4 PG (ref 26–34)
MCHC RBC AUTO-ENTMCNC: 33 G/DL (ref 32–36)
MCV RBC AUTO: 92 FL (ref 80–100)
MONOCYTES # BLD AUTO: 0.92 X10*3/UL (ref 0.05–0.8)
MONOCYTES NFR BLD AUTO: 10.2 %
NEUTROPHILS # BLD AUTO: 6.5 X10*3/UL (ref 1.6–5.5)
NEUTROPHILS NFR BLD AUTO: 72.1 %
NRBC BLD-RTO: 0 /100 WBCS (ref 0–0)
PLATELET # BLD AUTO: 254 X10*3/UL (ref 150–450)
POTASSIUM SERPL-SCNC: 4 MMOL/L (ref 3.5–5.3)
PROT SERPL-MCNC: 6.8 G/DL (ref 6.4–8.2)
RBC # BLD AUTO: 4.01 X10*6/UL (ref 4.5–5.9)
SODIUM SERPL-SCNC: 139 MMOL/L (ref 136–145)
WBC # BLD AUTO: 9 X10*3/UL (ref 4.4–11.3)

## 2024-03-26 PROCEDURE — 93971 EXTREMITY STUDY: CPT | Performed by: RADIOLOGY

## 2024-03-26 PROCEDURE — 2500000002 HC RX 250 W HCPCS SELF ADMINISTERED DRUGS (ALT 637 FOR MEDICARE OP, ALT 636 FOR OP/ED): Mod: MUE | Performed by: INTERNAL MEDICINE

## 2024-03-26 PROCEDURE — 2500000004 HC RX 250 GENERAL PHARMACY W/ HCPCS (ALT 636 FOR OP/ED): Performed by: INTERNAL MEDICINE

## 2024-03-26 PROCEDURE — 1090000001 HH PPS REVENUE CREDIT

## 2024-03-26 PROCEDURE — 85025 COMPLETE CBC W/AUTO DIFF WBC: CPT | Performed by: EMERGENCY MEDICINE

## 2024-03-26 PROCEDURE — 36415 COLL VENOUS BLD VENIPUNCTURE: CPT | Performed by: EMERGENCY MEDICINE

## 2024-03-26 PROCEDURE — 85652 RBC SED RATE AUTOMATED: CPT | Performed by: EMERGENCY MEDICINE

## 2024-03-26 PROCEDURE — 99222 1ST HOSP IP/OBS MODERATE 55: CPT | Performed by: NURSE PRACTITIONER

## 2024-03-26 PROCEDURE — 80053 COMPREHEN METABOLIC PANEL: CPT | Performed by: EMERGENCY MEDICINE

## 2024-03-26 PROCEDURE — 87040 BLOOD CULTURE FOR BACTERIA: CPT | Mod: GEALAB | Performed by: EMERGENCY MEDICINE

## 2024-03-26 PROCEDURE — 99222 1ST HOSP IP/OBS MODERATE 55: CPT | Performed by: INTERNAL MEDICINE

## 2024-03-26 PROCEDURE — 93971 EXTREMITY STUDY: CPT

## 2024-03-26 PROCEDURE — G0378 HOSPITAL OBSERVATION PER HR: HCPCS

## 2024-03-26 PROCEDURE — 99285 EMERGENCY DEPT VISIT HI MDM: CPT | Mod: 25

## 2024-03-26 PROCEDURE — 1090000002 HH PPS REVENUE DEBIT

## 2024-03-26 PROCEDURE — 2500000001 HC RX 250 WO HCPCS SELF ADMINISTERED DRUGS (ALT 637 FOR MEDICARE OP): Performed by: INTERNAL MEDICINE

## 2024-03-26 RX ORDER — OXYCODONE HYDROCHLORIDE 5 MG/1
2.5 TABLET ORAL EVERY 6 HOURS PRN
Status: DISCONTINUED | OUTPATIENT
Start: 2024-03-26 | End: 2024-03-26

## 2024-03-26 RX ORDER — IPRATROPIUM BROMIDE AND ALBUTEROL SULFATE 2.5; .5 MG/3ML; MG/3ML
3 SOLUTION RESPIRATORY (INHALATION) EVERY 6 HOURS PRN
Status: DISCONTINUED | OUTPATIENT
Start: 2024-03-26 | End: 2024-03-27

## 2024-03-26 RX ORDER — FINASTERIDE 5 MG/1
5 TABLET, FILM COATED ORAL EVERY EVENING
Status: DISCONTINUED | OUTPATIENT
Start: 2024-03-26 | End: 2024-03-29 | Stop reason: HOSPADM

## 2024-03-26 RX ORDER — OXYCODONE HYDROCHLORIDE 10 MG/1
10 TABLET ORAL EVERY 6 HOURS PRN
Status: DISCONTINUED | OUTPATIENT
Start: 2024-03-26 | End: 2024-03-27

## 2024-03-26 RX ORDER — ONDANSETRON HYDROCHLORIDE 2 MG/ML
4 INJECTION, SOLUTION INTRAVENOUS EVERY 4 HOURS PRN
Status: DISCONTINUED | OUTPATIENT
Start: 2024-03-26 | End: 2024-03-29 | Stop reason: HOSPADM

## 2024-03-26 RX ORDER — ASPIRIN 325 MG
325 TABLET, DELAYED RELEASE (ENTERIC COATED) ORAL 2 TIMES DAILY
Status: DISCONTINUED | OUTPATIENT
Start: 2024-03-26 | End: 2024-03-26

## 2024-03-26 RX ORDER — IRBESARTAN 150 MG/1
150 TABLET ORAL DAILY
Status: DISCONTINUED | OUTPATIENT
Start: 2024-03-27 | End: 2024-03-26

## 2024-03-26 RX ORDER — CARISOPRODOL 350 MG/1
350 TABLET ORAL 3 TIMES DAILY PRN
Status: DISCONTINUED | OUTPATIENT
Start: 2024-03-26 | End: 2024-03-29 | Stop reason: HOSPADM

## 2024-03-26 RX ORDER — DICYCLOMINE HYDROCHLORIDE 10 MG/1
10 CAPSULE ORAL 4 TIMES DAILY PRN
Status: DISCONTINUED | OUTPATIENT
Start: 2024-03-26 | End: 2024-03-29 | Stop reason: HOSPADM

## 2024-03-26 RX ORDER — PANTOPRAZOLE SODIUM 40 MG/1
40 TABLET, DELAYED RELEASE ORAL
Status: DISCONTINUED | OUTPATIENT
Start: 2024-03-27 | End: 2024-03-29 | Stop reason: HOSPADM

## 2024-03-26 RX ORDER — TAMSULOSIN HYDROCHLORIDE 0.4 MG/1
0.4 CAPSULE ORAL EVERY EVENING
Status: DISCONTINUED | OUTPATIENT
Start: 2024-03-26 | End: 2024-03-29 | Stop reason: HOSPADM

## 2024-03-26 RX ORDER — OXYCODONE HYDROCHLORIDE 5 MG/1
7.5 CAPSULE ORAL EVERY 6 HOURS PRN
COMMUNITY
End: 2024-03-29 | Stop reason: HOSPADM

## 2024-03-26 RX ORDER — AMOXICILLIN 250 MG
2 CAPSULE ORAL 2 TIMES DAILY
Status: DISCONTINUED | OUTPATIENT
Start: 2024-03-26 | End: 2024-03-29 | Stop reason: HOSPADM

## 2024-03-26 RX ORDER — METOPROLOL SUCCINATE 50 MG/1
50 TABLET, EXTENDED RELEASE ORAL DAILY
Status: DISCONTINUED | OUTPATIENT
Start: 2024-03-27 | End: 2024-03-29 | Stop reason: HOSPADM

## 2024-03-26 RX ORDER — ACETAMINOPHEN 325 MG/1
975 TABLET ORAL EVERY 8 HOURS
Status: DISCONTINUED | OUTPATIENT
Start: 2024-03-26 | End: 2024-03-29 | Stop reason: HOSPADM

## 2024-03-26 RX ORDER — GABAPENTIN 300 MG/1
300 CAPSULE ORAL 3 TIMES DAILY PRN
Status: DISCONTINUED | OUTPATIENT
Start: 2024-03-26 | End: 2024-03-29 | Stop reason: HOSPADM

## 2024-03-26 RX ORDER — SIMETHICONE 80 MG
80 TABLET,CHEWABLE ORAL 4 TIMES DAILY PRN
Status: DISCONTINUED | OUTPATIENT
Start: 2024-03-26 | End: 2024-03-29 | Stop reason: HOSPADM

## 2024-03-26 RX ORDER — POLYETHYLENE GLYCOL 3350 17 G/17G
17 POWDER, FOR SOLUTION ORAL DAILY
Status: DISCONTINUED | OUTPATIENT
Start: 2024-03-26 | End: 2024-03-26

## 2024-03-26 RX ORDER — OXYCODONE HYDROCHLORIDE 5 MG/1
5 TABLET ORAL EVERY 6 HOURS PRN
Status: DISCONTINUED | OUTPATIENT
Start: 2024-03-26 | End: 2024-03-27

## 2024-03-26 RX ORDER — OXYCODONE HYDROCHLORIDE 5 MG/1
5 TABLET ORAL EVERY 6 HOURS PRN
Status: DISCONTINUED | OUTPATIENT
Start: 2024-03-26 | End: 2024-03-26

## 2024-03-26 RX ORDER — LORATADINE 10 MG/1
10 TABLET ORAL DAILY
Status: DISCONTINUED | OUTPATIENT
Start: 2024-03-27 | End: 2024-03-29 | Stop reason: HOSPADM

## 2024-03-26 RX ORDER — LOSARTAN POTASSIUM 50 MG/1
50 TABLET ORAL DAILY
Status: DISCONTINUED | OUTPATIENT
Start: 2024-03-27 | End: 2024-03-29 | Stop reason: HOSPADM

## 2024-03-26 RX ORDER — ACETAMINOPHEN 500 MG
5 TABLET ORAL NIGHTLY
Status: DISCONTINUED | OUTPATIENT
Start: 2024-03-26 | End: 2024-03-29 | Stop reason: HOSPADM

## 2024-03-26 RX ORDER — POLYETHYLENE GLYCOL 3350 17 G/17G
17 POWDER, FOR SOLUTION ORAL 2 TIMES DAILY
Status: DISCONTINUED | OUTPATIENT
Start: 2024-03-26 | End: 2024-03-29 | Stop reason: HOSPADM

## 2024-03-26 RX ORDER — ASPIRIN 325 MG
325 TABLET, DELAYED RELEASE (ENTERIC COATED) ORAL 2 TIMES DAILY
Status: DISCONTINUED | OUTPATIENT
Start: 2024-03-26 | End: 2024-03-29 | Stop reason: HOSPADM

## 2024-03-26 RX ADMIN — HYDROMORPHONE HYDROCHLORIDE 0.2 MG: 1 INJECTION, SOLUTION INTRAMUSCULAR; INTRAVENOUS; SUBCUTANEOUS at 22:52

## 2024-03-26 RX ADMIN — SIMETHICONE 80 MG: 80 TABLET, CHEWABLE ORAL at 23:33

## 2024-03-26 RX ADMIN — OXYCODONE HYDROCHLORIDE 10 MG: 10 TABLET ORAL at 21:48

## 2024-03-26 RX ADMIN — ONDANSETRON 4 MG: 2 INJECTION INTRAMUSCULAR; INTRAVENOUS at 21:48

## 2024-03-26 RX ADMIN — TAMSULOSIN HYDROCHLORIDE 0.4 MG: 0.4 CAPSULE ORAL at 22:54

## 2024-03-26 RX ADMIN — Medication 5 MG: at 21:48

## 2024-03-26 RX ADMIN — POLYETHYLENE GLYCOL 3350 17 G: 17 POWDER, FOR SOLUTION ORAL at 23:34

## 2024-03-26 RX ADMIN — SENNOSIDES AND DOCUSATE SODIUM 2 TABLET: 8.6; 5 TABLET ORAL at 21:48

## 2024-03-26 RX ADMIN — FINASTERIDE 5 MG: 5 TABLET, FILM COATED ORAL at 22:54

## 2024-03-26 RX ADMIN — DICYCLOMINE HYDROCHLORIDE 10 MG: 10 CAPSULE ORAL at 22:55

## 2024-03-26 SDOH — SOCIAL STABILITY: SOCIAL INSECURITY: ARE THERE ANY APPARENT SIGNS OF INJURIES/BEHAVIORS THAT COULD BE RELATED TO ABUSE/NEGLECT?: NO

## 2024-03-26 SDOH — SOCIAL STABILITY: SOCIAL INSECURITY: ABUSE: ADULT

## 2024-03-26 SDOH — SOCIAL STABILITY: SOCIAL INSECURITY: WERE YOU ABLE TO COMPLETE ALL THE BEHAVIORAL HEALTH SCREENINGS?: YES

## 2024-03-26 SDOH — SOCIAL STABILITY: SOCIAL INSECURITY: HAVE YOU HAD THOUGHTS OF HARMING ANYONE ELSE?: NO

## 2024-03-26 SDOH — SOCIAL STABILITY: SOCIAL INSECURITY: ARE YOU OR HAVE YOU BEEN THREATENED OR ABUSED PHYSICALLY, EMOTIONALLY, OR SEXUALLY BY ANYONE?: NO

## 2024-03-26 SDOH — SOCIAL STABILITY: SOCIAL INSECURITY: DOES ANYONE TRY TO KEEP YOU FROM HAVING/CONTACTING OTHER FRIENDS OR DOING THINGS OUTSIDE YOUR HOME?: NO

## 2024-03-26 SDOH — SOCIAL STABILITY: SOCIAL INSECURITY: DO YOU FEEL ANYONE HAS EXPLOITED OR TAKEN ADVANTAGE OF YOU FINANCIALLY OR OF YOUR PERSONAL PROPERTY?: NO

## 2024-03-26 SDOH — SOCIAL STABILITY: SOCIAL INSECURITY: HAS ANYONE EVER THREATENED TO HURT YOUR FAMILY OR YOUR PETS?: NO

## 2024-03-26 SDOH — SOCIAL STABILITY: SOCIAL INSECURITY: DO YOU FEEL UNSAFE GOING BACK TO THE PLACE WHERE YOU ARE LIVING?: NO

## 2024-03-26 ASSESSMENT — PAIN DESCRIPTION - LOCATION
LOCATION: LEG
LOCATION: LEG
LOCATION: KNEE

## 2024-03-26 ASSESSMENT — ACTIVITIES OF DAILY LIVING (ADL)
FEEDING YOURSELF: INDEPENDENT
HEARING - RIGHT EAR: FUNCTIONAL
JUDGMENT_ADEQUATE_SAFELY_COMPLETE_DAILY_ACTIVITIES: YES
GROOMING: INDEPENDENT
HEARING - LEFT EAR: FUNCTIONAL
ADEQUATE_TO_COMPLETE_ADL: YES
TOILETING: INDEPENDENT
ASSISTIVE_DEVICE: WALKER
LACK_OF_TRANSPORTATION: NO
DRESSING YOURSELF: INDEPENDENT
BATHING: INDEPENDENT
WALKS IN HOME: INDEPENDENT
PATIENT'S MEMORY ADEQUATE TO SAFELY COMPLETE DAILY ACTIVITIES?: YES

## 2024-03-26 ASSESSMENT — COGNITIVE AND FUNCTIONAL STATUS - GENERAL
WALKING IN HOSPITAL ROOM: A LITTLE
DRESSING REGULAR LOWER BODY CLOTHING: A LITTLE
CLIMB 3 TO 5 STEPS WITH RAILING: A LOT
MOVING TO AND FROM BED TO CHAIR: A LITTLE
MOBILITY SCORE: 19
DAILY ACTIVITIY SCORE: 23
PATIENT BASELINE BEDBOUND: NO
STANDING UP FROM CHAIR USING ARMS: A LITTLE

## 2024-03-26 ASSESSMENT — PAIN DESCRIPTION - ORIENTATION
ORIENTATION: RIGHT

## 2024-03-26 ASSESSMENT — LIFESTYLE VARIABLES
SKIP TO QUESTIONS 9-10: 1
HOW OFTEN DO YOU HAVE A DRINK CONTAINING ALCOHOL: NEVER
HAVE YOU EVER FELT YOU SHOULD CUT DOWN ON YOUR DRINKING: NO
SUBSTANCE_ABUSE_PAST_12_MONTHS: NO
AUDIT-C TOTAL SCORE: 0
HOW MANY STANDARD DRINKS CONTAINING ALCOHOL DO YOU HAVE ON A TYPICAL DAY: PATIENT DOES NOT DRINK
SKIP TO QUESTIONS 9-10: 1
PRESCIPTION_ABUSE_PAST_12_MONTHS: NO
HOW MANY STANDARD DRINKS CONTAINING ALCOHOL DO YOU HAVE ON A TYPICAL DAY: PATIENT DOES NOT DRINK
AUDIT-C TOTAL SCORE: 0
EVER HAD A DRINK FIRST THING IN THE MORNING TO STEADY YOUR NERVES TO GET RID OF A HANGOVER: NO
AUDIT-C TOTAL SCORE: 0
SUBSTANCE_ABUSE_PAST_12_MONTHS: NO
HOW OFTEN DO YOU HAVE 6 OR MORE DRINKS ON ONE OCCASION: NEVER
TOTAL SCORE: 0
AUDIT-C TOTAL SCORE: 0
PRESCIPTION_ABUSE_PAST_12_MONTHS: NO
HOW OFTEN DO YOU HAVE A DRINK CONTAINING ALCOHOL: NEVER
EVER FELT BAD OR GUILTY ABOUT YOUR DRINKING: NO
HAVE PEOPLE ANNOYED YOU BY CRITICIZING YOUR DRINKING: NO
HOW OFTEN DO YOU HAVE 6 OR MORE DRINKS ON ONE OCCASION: NEVER

## 2024-03-26 ASSESSMENT — PAIN SCALES - GENERAL
PAINLEVEL_OUTOF10: 10 - WORST POSSIBLE PAIN
PAINLEVEL_OUTOF10: 9
PAINLEVEL_OUTOF10: 10 - WORST POSSIBLE PAIN

## 2024-03-26 ASSESSMENT — PAIN DESCRIPTION - PAIN TYPE: TYPE: ACUTE PAIN

## 2024-03-26 ASSESSMENT — COLUMBIA-SUICIDE SEVERITY RATING SCALE - C-SSRS
2. HAVE YOU ACTUALLY HAD ANY THOUGHTS OF KILLING YOURSELF?: NO
1. IN THE PAST MONTH, HAVE YOU WISHED YOU WERE DEAD OR WISHED YOU COULD GO TO SLEEP AND NOT WAKE UP?: NO
6. HAVE YOU EVER DONE ANYTHING, STARTED TO DO ANYTHING, OR PREPARED TO DO ANYTHING TO END YOUR LIFE?: NO

## 2024-03-26 ASSESSMENT — PATIENT HEALTH QUESTIONNAIRE - PHQ9
1. LITTLE INTEREST OR PLEASURE IN DOING THINGS: NOT AT ALL
2. FEELING DOWN, DEPRESSED OR HOPELESS: NOT AT ALL
SUM OF ALL RESPONSES TO PHQ9 QUESTIONS 1 & 2: 0

## 2024-03-26 ASSESSMENT — PAIN - FUNCTIONAL ASSESSMENT
PAIN_FUNCTIONAL_ASSESSMENT: 0-10

## 2024-03-26 NOTE — CONSULTS
Reason For Consult  Right leg swelling and pain, post-op day 6 from right total knee arthroplasty    History Of Present Illness  Abraham Rodriguez is a 83 y.o. male with a medical history of CAD, HTN, and HFpEF presenting with right leg pain, swelling and ecchymosis.  Patient underwent a right total knee arthroplasty on 3/20/24 with Dr. Arora.  There were no intra-operative complications but post operative course was complicated by pain.  He was essentially discharged home on POD 2 with daughter.  He states he has been taking Aspirin 325 mg PO BID for DVT prophylaxis but also has not been moving much due to pain in right knee.  He denies paresthesia pain in right leg/foot.  He is also endorsing constipation and has not moved his bowels since his surgery and has abdominal cramping.  Rates pain in right knee and leg 10/10.  Denies fever/chills.       Past Medical History  He has a past medical history of Allergic rhinitis due to pollen (10/23/2014), Arthritis, Chronic pain disorder, Clotting disorder (CMS/[facility]), COPD (chronic obstructive pulmonary disease) (CMS/[facility]), Coronary artery disease, Encounter for other preprocedural examination (06/24/2014), Encounter for screening for malignant neoplasm of prostate, Hyperlipidemia, Hypertension, Localized edema (05/11/2020), Myocardial infarction (CMS/[facility]), Other conditions influencing health status, Pain in unspecified knee (12/22/2014), Personal history of diseases of the skin and subcutaneous tissue (04/23/2013), Personal history of other diseases of the musculoskeletal system and connective tissue (06/19/2014), Personal history of other diseases of the nervous system and sense organs (08/10/2021), Personal history of other diseases of the respiratory system (11/12/2014), Personal history of other diseases of the respiratory system (08/13/2014), Personal history of other specified conditions (08/20/2013), Plantar fascial fibromatosis (07/22/2013), Polyp of colon, Syncope and  collapse (01/23/2015), Unspecified abdominal pain (12/22/2014), and Unspecified disorder of eyelid (10/23/2014).    Surgical History  He has a past surgical history that includes Other surgical history (04/27/2021); Other surgical history (Left, 05/31/2023); Cholecystectomy (04/23/2013); Shoulder surgery (04/23/2013); Other surgical history (04/23/2013); and Cardiac catheterization.     Social History  He reports that he quit smoking about 41 years ago. His smoking use included cigarettes. He has never used smokeless tobacco. He reports that he does not currently use drugs after having used the following drugs: Marijuana. He reports that he does not drink alcohol.    Family History  Family History   Problem Relation Name Age of Onset    Hypothyroidism Brother          Allergies  Iodinated contrast media, Tobramycin-dexamethasone, Lipitor [atorvastatin], and Lisinopril    Review of Systems  12 point ROS completed and no additional findings noted aside from what is listed in the HPI     Physical Exam  Physical Exam  HENT:      Head: Normocephalic and atraumatic.   Eyes:      Extraocular Movements: Extraocular movements intact.      Conjunctiva/sclera: Conjunctivae normal.      Pupils: Pupils are equal, round, and reactive to light.   Cardiovascular:      Rate and Rhythm: Normal rate and regular rhythm.      Pulses: Normal pulses.   Pulmonary:      Effort: Pulmonary effort is normal.   Abdominal:      General: There is distension.      Palpations: Abdomen is soft.   Musculoskeletal:      Comments: Right knee post op dressing C/D/I, moderate swelling of right knee that extends distally to toes.  There is moderate ecchymosis that extends from distal medial thigh to toes, 2+ DP/PT pulses on right.  Right knee extension 0 deg, flexion 20 degrees, no pain with passive flexion of toes, leg and foot are warm and well perfused, negative neetu's   Skin:     General: Skin is warm and dry.      Capillary Refill: Capillary refill  "takes less than 2 seconds.   Neurological:      General: No focal deficit present.      Mental Status: He is alert and oriented to person, place, and time.            Last Recorded Vitals  Blood pressure 144/80, pulse 97, temperature 37.1 °C (98.8 °F), temperature source Temporal, resp. rate 18, height 1.803 m (5' 11\"), weight 86.2 kg (190 lb), SpO2 98 %.    Relevant Results      Scheduled medications    Continuous medications    PRN medications    No results found for this or any previous visit (from the past 24 hour(s)).     Assessment/Plan   83 year old male, POD 6 from right total knee arthroplasty presenting to the ER for increased right knee/leg pain, swelling, and ecchymosis, rule out DVT versus hematoma   - insert PIV  - obtain CBC, CMP, coags  - obtain duplex US to rule out DVT  - pain control per ED  - ortho will continue to follow   - further orders pending results of labs and US    Discussed with Dr. Ulysses Garcias, APRN-CNP    "

## 2024-03-27 ENCOUNTER — HOME CARE VISIT (OUTPATIENT)
Dept: HOME HEALTH SERVICES | Facility: HOME HEALTH | Age: 84
End: 2024-03-27
Payer: MEDICARE

## 2024-03-27 ENCOUNTER — APPOINTMENT (OUTPATIENT)
Dept: RADIOLOGY | Facility: HOSPITAL | Age: 84
DRG: 605 | End: 2024-03-27
Payer: MEDICARE

## 2024-03-27 LAB
ALBUMIN SERPL BCP-MCNC: 3.1 G/DL (ref 3.4–5)
ANION GAP SERPL CALC-SCNC: 12 MMOL/L (ref 10–20)
APTT PPP: 29 SECONDS (ref 27–38)
BUN SERPL-MCNC: 21 MG/DL (ref 6–23)
CALCIUM SERPL-MCNC: 8.4 MG/DL (ref 8.6–10.3)
CHLORIDE SERPL-SCNC: 103 MMOL/L (ref 98–107)
CO2 SERPL-SCNC: 25 MMOL/L (ref 21–32)
CREAT SERPL-MCNC: 0.87 MG/DL (ref 0.5–1.3)
EGFRCR SERPLBLD CKD-EPI 2021: 86 ML/MIN/1.73M*2
ERYTHROCYTE [DISTWIDTH] IN BLOOD BY AUTOMATED COUNT: 13 % (ref 11.5–14.5)
GLUCOSE SERPL-MCNC: 110 MG/DL (ref 74–99)
HCT VFR BLD AUTO: 32.3 % (ref 41–52)
HGB BLD-MCNC: 10.7 G/DL (ref 13.5–17.5)
INR PPP: 1.1 (ref 0.9–1.1)
MAGNESIUM SERPL-MCNC: 2.11 MG/DL (ref 1.6–2.4)
MCH RBC QN AUTO: 30.5 PG (ref 26–34)
MCHC RBC AUTO-ENTMCNC: 33.1 G/DL (ref 32–36)
MCV RBC AUTO: 92 FL (ref 80–100)
NRBC BLD-RTO: 0 /100 WBCS (ref 0–0)
PHOSPHATE SERPL-MCNC: 3.5 MG/DL (ref 2.5–4.9)
PLATELET # BLD AUTO: 222 X10*3/UL (ref 150–450)
POTASSIUM SERPL-SCNC: 3.9 MMOL/L (ref 3.5–5.3)
PROTHROMBIN TIME: 12.6 SECONDS (ref 9.8–12.8)
RBC # BLD AUTO: 3.51 X10*6/UL (ref 4.5–5.9)
SODIUM SERPL-SCNC: 136 MMOL/L (ref 136–145)
WBC # BLD AUTO: 7.8 X10*3/UL (ref 4.4–11.3)

## 2024-03-27 PROCEDURE — 2500000001 HC RX 250 WO HCPCS SELF ADMINISTERED DRUGS (ALT 637 FOR MEDICARE OP): Performed by: INTERNAL MEDICINE

## 2024-03-27 PROCEDURE — 99233 SBSQ HOSP IP/OBS HIGH 50: CPT | Performed by: NURSE PRACTITIONER

## 2024-03-27 PROCEDURE — G0378 HOSPITAL OBSERVATION PER HR: HCPCS

## 2024-03-27 PROCEDURE — 83735 ASSAY OF MAGNESIUM: CPT | Performed by: INTERNAL MEDICINE

## 2024-03-27 PROCEDURE — 2500000004 HC RX 250 GENERAL PHARMACY W/ HCPCS (ALT 636 FOR OP/ED): Performed by: NURSE PRACTITIONER

## 2024-03-27 PROCEDURE — 80069 RENAL FUNCTION PANEL: CPT | Performed by: INTERNAL MEDICINE

## 2024-03-27 PROCEDURE — 99232 SBSQ HOSP IP/OBS MODERATE 35: CPT | Performed by: NURSE PRACTITIONER

## 2024-03-27 PROCEDURE — 36415 COLL VENOUS BLD VENIPUNCTURE: CPT | Performed by: INTERNAL MEDICINE

## 2024-03-27 PROCEDURE — 2500000004 HC RX 250 GENERAL PHARMACY W/ HCPCS (ALT 636 FOR OP/ED): Performed by: INTERNAL MEDICINE

## 2024-03-27 PROCEDURE — 1090000001 HH PPS REVENUE CREDIT

## 2024-03-27 PROCEDURE — 85610 PROTHROMBIN TIME: CPT | Performed by: INTERNAL MEDICINE

## 2024-03-27 PROCEDURE — 2500000001 HC RX 250 WO HCPCS SELF ADMINISTERED DRUGS (ALT 637 FOR MEDICARE OP): Performed by: NURSE PRACTITIONER

## 2024-03-27 PROCEDURE — 2500000002 HC RX 250 W HCPCS SELF ADMINISTERED DRUGS (ALT 637 FOR MEDICARE OP, ALT 636 FOR OP/ED): Mod: MUE | Performed by: INTERNAL MEDICINE

## 2024-03-27 PROCEDURE — 85027 COMPLETE CBC AUTOMATED: CPT | Performed by: INTERNAL MEDICINE

## 2024-03-27 PROCEDURE — 1090000002 HH PPS REVENUE DEBIT

## 2024-03-27 PROCEDURE — 73700 CT LOWER EXTREMITY W/O DYE: CPT | Mod: RT

## 2024-03-27 RX ORDER — PREDNISONE 20 MG/1
20 TABLET ORAL DAILY
Status: DISCONTINUED | OUTPATIENT
Start: 2024-04-10 | End: 2024-03-29 | Stop reason: HOSPADM

## 2024-03-27 RX ORDER — PREDNISONE 20 MG/1
60 TABLET ORAL DAILY
Status: DISCONTINUED | OUTPATIENT
Start: 2024-03-27 | End: 2024-03-29 | Stop reason: HOSPADM

## 2024-03-27 RX ORDER — PREDNISONE 20 MG/1
40 TABLET ORAL DAILY
Status: DISCONTINUED | OUTPATIENT
Start: 2024-04-03 | End: 2024-03-29 | Stop reason: HOSPADM

## 2024-03-27 RX ORDER — HYDROMORPHONE HYDROCHLORIDE 1 MG/ML
1 INJECTION, SOLUTION INTRAMUSCULAR; INTRAVENOUS; SUBCUTANEOUS EVERY 6 HOURS PRN
Status: DISCONTINUED | OUTPATIENT
Start: 2024-03-27 | End: 2024-03-29

## 2024-03-27 RX ORDER — LACTULOSE 10 G/15ML
20 SOLUTION ORAL DAILY
Status: DISCONTINUED | OUTPATIENT
Start: 2024-03-27 | End: 2024-03-29 | Stop reason: HOSPADM

## 2024-03-27 RX ORDER — BISACODYL 10 MG/1
10 SUPPOSITORY RECTAL DAILY
Status: DISCONTINUED | OUTPATIENT
Start: 2024-03-27 | End: 2024-03-29 | Stop reason: HOSPADM

## 2024-03-27 RX ORDER — HYDROMORPHONE HYDROCHLORIDE 2 MG/1
4 TABLET ORAL EVERY 6 HOURS PRN
Status: DISCONTINUED | OUTPATIENT
Start: 2024-03-27 | End: 2024-03-29

## 2024-03-27 RX ORDER — IPRATROPIUM BROMIDE AND ALBUTEROL SULFATE 2.5; .5 MG/3ML; MG/3ML
3 SOLUTION RESPIRATORY (INHALATION) EVERY 2 HOUR PRN
Status: DISCONTINUED | OUTPATIENT
Start: 2024-03-27 | End: 2024-03-29 | Stop reason: HOSPADM

## 2024-03-27 RX ADMIN — METOPROLOL SUCCINATE 50 MG: 50 TABLET, EXTENDED RELEASE ORAL at 14:39

## 2024-03-27 RX ADMIN — POLYETHYLENE GLYCOL 3350 17 G: 17 POWDER, FOR SOLUTION ORAL at 20:05

## 2024-03-27 RX ADMIN — LOSARTAN POTASSIUM 50 MG: 50 TABLET, FILM COATED ORAL at 14:39

## 2024-03-27 RX ADMIN — OXYCODONE HYDROCHLORIDE 10 MG: 10 TABLET ORAL at 04:00

## 2024-03-27 RX ADMIN — CARISOPRODOL 350 MG: 350 TABLET ORAL at 14:39

## 2024-03-27 RX ADMIN — OXYCODONE HYDROCHLORIDE 10 MG: 10 TABLET ORAL at 11:48

## 2024-03-27 RX ADMIN — SIMETHICONE 80 MG: 80 TABLET, CHEWABLE ORAL at 14:35

## 2024-03-27 RX ADMIN — SENNOSIDES AND DOCUSATE SODIUM 2 TABLET: 8.6; 5 TABLET ORAL at 20:04

## 2024-03-27 RX ADMIN — HYDROMORPHONE HYDROCHLORIDE 0.5 MG: 1 INJECTION, SOLUTION INTRAMUSCULAR; INTRAVENOUS; SUBCUTANEOUS at 10:01

## 2024-03-27 RX ADMIN — LACTULOSE 20 G: 20 SOLUTION ORAL at 11:42

## 2024-03-27 RX ADMIN — TAMSULOSIN HYDROCHLORIDE 0.4 MG: 0.4 CAPSULE ORAL at 20:04

## 2024-03-27 RX ADMIN — ACETAMINOPHEN 975 MG: 325 TABLET ORAL at 14:39

## 2024-03-27 RX ADMIN — HYDROMORPHONE HYDROCHLORIDE 0.2 MG: 1 INJECTION, SOLUTION INTRAMUSCULAR; INTRAVENOUS; SUBCUTANEOUS at 05:01

## 2024-03-27 RX ADMIN — ACETAMINOPHEN 975 MG: 325 TABLET ORAL at 04:00

## 2024-03-27 RX ADMIN — FINASTERIDE 5 MG: 5 TABLET, FILM COATED ORAL at 20:05

## 2024-03-27 RX ADMIN — HYDROMORPHONE HYDROCHLORIDE 4 MG: 2 TABLET ORAL at 18:33

## 2024-03-27 RX ADMIN — PREDNISONE 60 MG: 20 TABLET ORAL at 18:33

## 2024-03-27 RX ADMIN — HYDROMORPHONE HYDROCHLORIDE 1 MG: 1 INJECTION, SOLUTION INTRAMUSCULAR; INTRAVENOUS; SUBCUTANEOUS at 14:35

## 2024-03-27 RX ADMIN — BISACODYL 10 MG: 10 SUPPOSITORY RECTAL at 11:42

## 2024-03-27 RX ADMIN — LORATADINE 10 MG: 10 TABLET ORAL at 14:39

## 2024-03-27 ASSESSMENT — COGNITIVE AND FUNCTIONAL STATUS - GENERAL
WALKING IN HOSPITAL ROOM: A LOT
STANDING UP FROM CHAIR USING ARMS: A LITTLE
HELP NEEDED FOR BATHING: A LITTLE
TOILETING: A LOT
DAILY ACTIVITIY SCORE: 19
CLIMB 3 TO 5 STEPS WITH RAILING: A LOT
MOBILITY SCORE: 19
DRESSING REGULAR LOWER BODY CLOTHING: A LOT

## 2024-03-27 ASSESSMENT — PAIN SCALES - GENERAL
PAINLEVEL_OUTOF10: 9
PAINLEVEL_OUTOF10: 10 - WORST POSSIBLE PAIN
PAINLEVEL_OUTOF10: 8
PAINLEVEL_OUTOF10: 8
PAINLEVEL_OUTOF10: 10 - WORST POSSIBLE PAIN
PAINLEVEL_OUTOF10: 9
PAINLEVEL_OUTOF10: 10 - WORST POSSIBLE PAIN

## 2024-03-27 ASSESSMENT — PAIN - FUNCTIONAL ASSESSMENT
PAIN_FUNCTIONAL_ASSESSMENT: 0-10

## 2024-03-27 ASSESSMENT — PAIN DESCRIPTION - ORIENTATION
ORIENTATION: RIGHT
ORIENTATION: RIGHT

## 2024-03-27 ASSESSMENT — PAIN DESCRIPTION - LOCATION
LOCATION: LEG
LOCATION: LEG

## 2024-03-27 ASSESSMENT — ACTIVITIES OF DAILY LIVING (ADL): ENTERING_EXITING_HOME: MINIMUM ASSIST

## 2024-03-27 NOTE — PROGRESS NOTES
03/27/24 1001   Discharge Planning   Living Arrangements Alone   Support Systems Children;Family members   Assistance Needed A&Ox3, prior to knee replacement independent with ADLs, has a rollator, walker, cane, and crutches if needed; room air at baseline, active with [facility] (SN/PT/OT)   Type of Residence Private residence   Number of Stairs to Enter Residence 0   Number of Stairs Within Residence 0   Do you have animals or pets at home? No   Home or Post Acute Services In home services   Type of Home Care Services Home nursing visits;Home OT;Home PT   Patient expects to be discharged to: Home with resumed [facility] (SN/PT/OT)   Does the patient need discharge transport arranged? No

## 2024-03-27 NOTE — PROGRESS NOTES
"Abraham Rodriguez is a 83 y.o. male on day 0 of admission presenting with Hematoma of right lower leg.      Subjective   The patient is awaiting right knee CT. There is no plan for surgical intervention today. He complains of severe right knee pain and abdominal \"gas\". No BM for 6 days per patient.       Objective     Last Recorded Vitals  /76   Pulse 90   Temp 35.8 °C (96.4 °F) (Temporal)   Resp 18   Wt 86.2 kg (190 lb)   SpO2 91%   Intake/Output last 3 Shifts:    Intake/Output Summary (Last 24 hours) at 3/27/2024 1027  Last data filed at 3/27/2024 0431  Gross per 24 hour   Intake 0 ml   Output 200 ml   Net -200 ml       Admission Weight  Weight: 86.2 kg (190 lb) (03/26/24 1402)    Daily Weight  03/26/24 : 86.2 kg (190 lb)    Image Results  Lower extremity venous duplex right  Narrative: Interpreted By:  Jose G Mariscal,   STUDY:  [facility] US LOWER EXTREMITY VENOUS DUPLEX RIGHT  3/26/2024 6:13 pm      INDICATION:  84 y/o   M with  Signs/Symptoms:swelling, post op. LMP:  Unknown.      COMPARISON:  None.      ACCESSION NUMBER(S):  [id number]      ORDERING CLINICIAN:  SHYANNE CHAMPION      TECHNIQUE:  Routine ultrasound of the  right lower extremity was performed with  duplex Doppler (color and spectral) evaluation.   Static images were  obtained for remote interpretation.      FINDINGS:  THIGH VEINS:  The common femoral, femoral, popliteal, proximal medial  saphenous, and deep femoral veins are patent and free of thrombus.  The veins are normally compressible.  They demonstrate normal phasic  flow and augmentation response.      CALF VEINS:  The paired peroneal and posterior tibial calf veins are  patent.      Impression: No deep venous thrombosis of the  right lower extremity.      MACRO:  None      Signed by: Jose G Mariscal 3/26/2024 6:45 PM  Dictation workstation:   [id number]      Physical Exam  Constitutional:       Appearance: He is ill-appearing.   HENT:      Mouth/Throat:      Mouth: Mucous " membranes are dry.   Eyes:      Extraocular Movements: Extraocular movements intact.   Pulmonary:      Breath sounds: Normal breath sounds.   Abdominal:      General: Abdomen is flat. There is no distension.      Tenderness: There is no abdominal tenderness.   Musculoskeletal:         General: Swelling present.      Comments: RLE +3   Skin:     Coloration: Skin is pale.   Neurological:      Mental Status: He is alert and oriented to person, place, and time.   Psychiatric:         Mood and Affect: Mood is anxious.         Relevant Results             Results for orders placed or performed during the hospital encounter of 03/26/24 (from the past 24 hour(s))   Comprehensive metabolic panel   Result Value Ref Range    Glucose 119 (H) 74 - 99 mg/dL    Sodium 139 136 - 145 mmol/L    Potassium 4.0 3.5 - 5.3 mmol/L    Chloride 101 98 - 107 mmol/L    Bicarbonate 28 21 - 32 mmol/L    Anion Gap 14 10 - 20 mmol/L    Urea Nitrogen 18 6 - 23 mg/dL    Creatinine 0.89 0.50 - 1.30 mg/dL    eGFR 85 >60 mL/min/1.73m*2    Calcium 8.9 8.6 - 10.3 mg/dL    Albumin 3.5 3.4 - 5.0 g/dL    Alkaline Phosphatase 51 33 - 136 U/L    Total Protein 6.8 6.4 - 8.2 g/dL    AST 11 9 - 39 U/L    Bilirubin, Total 1.1 0.0 - 1.2 mg/dL    ALT 14 10 - 52 U/L   CBC and Auto Differential   Result Value Ref Range    WBC 9.0 4.4 - 11.3 x10*3/uL    nRBC 0.0 0.0 - 0.0 /100 WBCs    RBC 4.01 (L) 4.50 - 5.90 x10*6/uL    Hemoglobin 12.2 (L) 13.5 - 17.5 g/dL    Hematocrit 37.0 (L) 41.0 - 52.0 %    MCV 92 80 - 100 fL    MCH 30.4 26.0 - 34.0 pg    MCHC 33.0 32.0 - 36.0 g/dL    RDW 13.0 11.5 - 14.5 %    Platelets 254 150 - 450 x10*3/uL    Neutrophils % 72.1 40.0 - 80.0 %    Immature Granulocytes %, Automated 0.7 0.0 - 0.9 %    Lymphocytes % 15.9 13.0 - 44.0 %    Monocytes % 10.2 2.0 - 10.0 %    Eosinophils % 0.9 0.0 - 6.0 %    Basophils % 0.2 0.0 - 2.0 %    Neutrophils Absolute 6.50 (H) 1.60 - 5.50 x10*3/uL    Immature Granulocytes Absolute, Automated 0.06 0.00 - 0.50  x10*3/uL    Lymphocytes Absolute 1.43 0.80 - 3.00 x10*3/uL    Monocytes Absolute 0.92 (H) 0.05 - 0.80 x10*3/uL    Eosinophils Absolute 0.08 0.00 - 0.40 x10*3/uL    Basophils Absolute 0.02 0.00 - 0.10 x10*3/uL   Sedimentation rate, automated   Result Value Ref Range    Sedimentation Rate 24 (H) 0 - 20 mm/h   Blood Culture    Specimen: Peripheral Venipuncture; Blood culture   Result Value Ref Range    Blood Culture Loaded on Instrument - Culture in progress    CBC   Result Value Ref Range    WBC 7.8 4.4 - 11.3 x10*3/uL    nRBC 0.0 0.0 - 0.0 /100 WBCs    RBC 3.51 (L) 4.50 - 5.90 x10*6/uL    Hemoglobin 10.7 (L) 13.5 - 17.5 g/dL    Hematocrit 32.3 (L) 41.0 - 52.0 %    MCV 92 80 - 100 fL    MCH 30.5 26.0 - 34.0 pg    MCHC 33.1 32.0 - 36.0 g/dL    RDW 13.0 11.5 - 14.5 %    Platelets 222 150 - 450 x10*3/uL   Renal Function Panel   Result Value Ref Range    Glucose 110 (H) 74 - 99 mg/dL    Sodium 136 136 - 145 mmol/L    Potassium 3.9 3.5 - 5.3 mmol/L    Chloride 103 98 - 107 mmol/L    Bicarbonate 25 21 - 32 mmol/L    Anion Gap 12 10 - 20 mmol/L    Urea Nitrogen 21 6 - 23 mg/dL    Creatinine 0.87 0.50 - 1.30 mg/dL    eGFR 86 >60 mL/min/1.73m*2    Calcium 8.4 (L) 8.6 - 10.3 mg/dL    Phosphorus 3.5 2.5 - 4.9 mg/dL    Albumin 3.1 (L) 3.4 - 5.0 g/dL   Magnesium   Result Value Ref Range    Magnesium 2.11 1.60 - 2.40 mg/dL   Coagulation Screen   Result Value Ref Range    Protime 12.6 9.8 - 12.8 seconds    INR 1.1 0.9 - 1.1    aPTT 29 27 - 38 seconds      Assessment/Plan        Principal Problem:    Hematoma of right lower leg    Abraham Rodriguez is a 83 y.o. male with PMHx of CAD, HTN, who presented to [facility] ED due to worsening right lower extremity pain, swelling, ecchymosis.  Patient underwent a right total knee arthroplasty on 3- with Dr. Arora.     # Right Knee/Leg Pain s/p Right TKA  # Post-op RLE Hematoma vs ecchymosis  - POD #6 Right TKA performed on 3/20/24 by Dr. Arora  - No signs of compartment syndrome, distal pulses  intact & intact ROM  - RLE Doppler: Negative  - Senna-Tea BID, Miralax daily, PO Oxy PRN  - IS, Bronchial Hygiene, DuoNebs PRN  - Monitor H&H, AM Coags  - Regular diet  - HOLD  mg BID (ortho recs prophylaxis) given concerns  - Ortho consulted (Dr. Arora)  -There is no plan for surgical intervention today.   -Pending right knee CT this morning     #GERD: PPI    #COPD/YOSEF: No signs of exacerbation, DuoNebs PRN    #BPH: Tamsulosin & Finasteride     Dispo: Pending CT right knee. Surgery to reevaluate the plan after receiving the results.             Ann Gonzalez, APRN-CNP

## 2024-03-27 NOTE — PROGRESS NOTES
Abraham Rodriguez is a 83 y.o. male on day 0 of admission presenting with Hematoma of right lower leg.    Subjective   Patient seen and examined.  Patient has 7 days postoperative from right total knee arthroplasty.  Patient had significant pain and limited mobility postoperative however he did perform reasonably well with physical therapy despite complaints of significant pain.  He was kept in the hospital for 2 postoperative nights and discharged to home the past Friday.  Patient admits while at home he had significant pain severe difficulty ambulating.  He is staying with his daughter was unable to help him mobilize.  He also complains of not having a bowel movement since before his surgical procedure.  Patient was sent to the emergency department yesterday when his daughter called stating severe abdominal pain and inability to have a bowel movement.  He was evaluated ultrasound obtained negative for DVT subsequently admitted for constipation and severe pain.  Constipation has not improved he has had a significant bowel movement earlier today abdominal pain is significantly improved.  He continues to have profound knee pain and very limited mobility he has not yet worked with physical therapy he is a very heavy maximum two-person assist to mobilize out of bed.  CT scan with metal subtraction was obtained to rule out hematoma.  Patient does have postoperative hemarthrosis which is to be expected only 1 week postoperative soft tissue stranding consistent with postoperative edema.  No indication of extra-articular hematoma formation no indication of periprosthetic fracture components well-fixed well aligned.  Pain medication has been adjusted [facility] he does have an extraordinarily high narcotic tolerance.  Oral prednisone was started today and transition to oral Dilaudid which has yielded very minimal improvement thus far but he just started taking those.       Objective  "    Physical Exam  On physical exam he is a 83-year-old male alert and oriented x 3 no apparent distress.  Right lower extremity demonstrating postoperative swelling bruising ecchymosis as to be expected dependent edema down to the ankle is noted.  He is able to dorsi and plantarflex his foot however complains of his toes \"locking up\" with dorsiflexion.  Active assisted heel slide to 45 degrees patient does complain of pain.  Calf is soft and supple.  Essentially normal postoperative appearance at 1 week status post total knee arthroplasty.    Last Recorded Vitals  Blood pressure 132/63, pulse 88, temperature 36.3 °C (97.3 °F), temperature source Temporal, resp. rate 14, height 1.803 m (5' 11\"), weight 86.2 kg (190 lb), SpO2 95 %.  Intake/Output last 3 Shifts:  I/O last 3 completed shifts:  In: 0 (0 mL/kg)   Out: 900 (10.4 mL/kg) [Urine:900 (0.3 mL/kg/hr)]  Weight: 86.2 kg     Relevant Results  Duplex ultrasound is reviewed: No indication of DVT right lower extremity    CT scan with metal subtraction right lower extremity is reviewed: Status post right total knee arthroplasty components well-fixed well aligned no indication periprosthetic fracture.  Postoperative hemarthrosis appreciated which is to be expected only 1 week postoperative.  Soft tissue stranding and edema also appreciated, also to be expected only 1 week postoperative.    Scheduled medications  acetaminophen, 975 mg, oral, q8h  [Held by provider] aspirin, 325 mg, oral, BID  bisacodyl, 10 mg, rectal, Daily  finasteride, 5 mg, oral, q PM  lactulose, 20 g, oral, Daily  loratadine, 10 mg, oral, Daily  losartan, 50 mg, oral, Daily  melatonin, 5 mg, oral, Nightly  metoprolol succinate XL, 50 mg, oral, Daily  pantoprazole, 40 mg, oral, Daily before breakfast  polyethylene glycol, 17 g, oral, BID  predniSONE, 60 mg, oral, Daily   Followed by  [START ON 4/3/2024] predniSONE, 40 mg, oral, Daily   Followed by  [START ON 4/10/2024] predniSONE, 20 mg, oral, " Daily  sennosides-docusate sodium, 2 tablet, oral, BID  tamsulosin, 0.4 mg, oral, q PM      Continuous medications     PRN medications  PRN medications: carisoprodol, dicyclomine, gabapentin, HYDROmorphone, HYDROmorphone, ipratropium-albuteroL, ondansetron, simethicone  Results for orders placed or performed during the hospital encounter of 03/26/24 (from the past 24 hour(s))   CBC   Result Value Ref Range    WBC 7.8 4.4 - 11.3 x10*3/uL    nRBC 0.0 0.0 - 0.0 /100 WBCs    RBC 3.51 (L) 4.50 - 5.90 x10*6/uL    Hemoglobin 10.7 (L) 13.5 - 17.5 g/dL    Hematocrit 32.3 (L) 41.0 - 52.0 %    MCV 92 80 - 100 fL    MCH 30.5 26.0 - 34.0 pg    MCHC 33.1 32.0 - 36.0 g/dL    RDW 13.0 11.5 - 14.5 %    Platelets 222 150 - 450 x10*3/uL   Renal Function Panel   Result Value Ref Range    Glucose 110 (H) 74 - 99 mg/dL    Sodium 136 136 - 145 mmol/L    Potassium 3.9 3.5 - 5.3 mmol/L    Chloride 103 98 - 107 mmol/L    Bicarbonate 25 21 - 32 mmol/L    Anion Gap 12 10 - 20 mmol/L    Urea Nitrogen 21 6 - 23 mg/dL    Creatinine 0.87 0.50 - 1.30 mg/dL    eGFR 86 >60 mL/min/1.73m*2    Calcium 8.4 (L) 8.6 - 10.3 mg/dL    Phosphorus 3.5 2.5 - 4.9 mg/dL    Albumin 3.1 (L) 3.4 - 5.0 g/dL   Magnesium   Result Value Ref Range    Magnesium 2.11 1.60 - 2.40 mg/dL   Coagulation Screen   Result Value Ref Range    Protime 12.6 9.8 - 12.8 seconds    INR 1.1 0.9 - 1.1    aPTT 29 27 - 38 seconds                            Assessment/Plan   Principal Problem:    Hematoma of right lower leg    #1.  1 week status post right total knee arthroplasty  2.  Inability to ambulate  3.  Poor pain control secondary to significant narcotic tolerance    Recommendations:  Patient had a total hip arthroplasty performed by myself last year after which she was placed in a rehab facility for roughly 1 week after the first week he was mobile by himself discharged home and went on to do quite well.  We attempted to have him placed in a rehab facility postoperative from his total  knee unfortunately he did not qualify.  My suspicion however is that if he were to reassess right now he would certainly qualify as he is a maximum two-person assist for mobilization.  I have consulted social work, Occupational Therapy and physical therapy for reassessment of placement into rehab facility.  Currently it is not medically appropriate for this patient to return to home as they do not have the capacity for mobilization and he would be at extremely high risk for other issues.  I have ordered a CPM to begin tonight it should run continuously while he is in bed range of motion 0 to 30 degrees overnight starting at 7 AM increase by 10 degrees every 4 hours until 90 degrees and maintain 0 to 90 degrees.  Continue with prednisone taper continue with oral Dilaudid medications.  Patient is currently under the medicine service as observation conversion to inpatient due to inability to ambulate will be my recommendation and attempted placement in skilled rehab center.  Orthopedically he is stable however rather deconditioned.  We will continue to follow.       I spent 60 minutes in the professional and overall care of this patient.      Scott Arora, DO

## 2024-03-27 NOTE — CARE PLAN
Problem: Pain  Goal: My pain/discomfort is manageable  Outcome: Progressing     Problem: Safety  Goal: Patient will be injury free during hospitalization  Outcome: Progressing  Goal: I will remain free of falls  Outcome: Progressing     Problem: Daily Care  Goal: Daily care needs are met  Outcome: Progressing     Problem: Psychosocial Needs  Goal: Demonstrates ability to cope with hospitalization/illness  Outcome: Progressing  Goal: Collaborate with me, my family, and caregiver to identify my specific goals  Outcome: Progressing  Flowsheets  Taken 3/26/2024 2231  Cultural Requests During Hospitalization: none  Spiritual Requests During Hospitalization: none  Taken 3/26/2024 2230  Cultural Requests During Hospitalization: none  Spiritual Requests During Hospitalization: none     Problem: Discharge Barriers  Goal: My discharge needs are met  Outcome: Progressing   The patient's goals for the shift include      The clinical goals for the shift include pain control

## 2024-03-27 NOTE — CARE PLAN
The patient's goals for the shift include pain control and have bowel movement    The clinical goals for the shift include pain control, work with providers for plan of care, promote bowel movement      Problem: Pain  Goal: My pain/discomfort is manageable  Outcome: Progressing     Problem: Safety  Goal: Patient will be injury free during hospitalization  Outcome: Progressing  Goal: I will remain free of falls  Outcome: Progressing     Problem: Daily Care  Goal: Daily care needs are met  Outcome: Progressing

## 2024-03-27 NOTE — H&P
HPI    Abraham Rodriguez is a 83 y.o. male with PMHx of CAD, HTN, who presented to [facility] ED due to worsening right lower extremity pain, swelling, ecchymosis.  Patient underwent a right total knee arthroplasty on 3- with Dr. Arora.  There were no intraoperative complications but postoperatively was complicated by severe pain.  Patient was discharged on postop day 2 with daughter.  Patient has been compliant with aspirin 325 mg p.o. twice daily for DVT prophylaxis but also has not been ambulating very well and more sedentary due to constant right knee pain.  He denies paresthesias or loss of sensation in the right lower extremity.  He also endorses constipation and has not moved his bowels since the surgery on 3- and mild abdominal cramping.  Rates the knee pain 10 out of 10, constant, worsened upon movement.    ED Course   Vitals - /80   Pulse 97   Temp 37.1 °C (98.8 °F) (Temporal)   Resp 18   Wt 86.2 kg (190 lb)   SpO2 94%     Labs:  CBC: Hgn 12.2  CMP: WNL  Trop: 6  BNP: 135  D-Dimer: 643    Imaging:  RLE Doppler; Negative for DVT     Interventions - None    ROS: 12 points review of system is negative except as stated in the HPI above.     Past Medical History   He has a past medical history of Allergic rhinitis due to pollen (10/23/2014), Arthritis, Chronic pain disorder, Clotting disorder (CMS/[facility]), COPD (chronic obstructive pulmonary disease) (CMS/[facility]), Coronary artery disease, Encounter for other preprocedural examination (06/24/2014), Encounter for screening for malignant neoplasm of prostate, Hyperlipidemia, Hypertension, Localized edema (05/11/2020), Myocardial infarction (CMS/[facility]), Other conditions influencing health status, Pain in unspecified knee (12/22/2014), Personal history of diseases of the skin and subcutaneous tissue (04/23/2013), Personal history of other diseases of the musculoskeletal system and connective tissue (06/19/2014), Personal history of other diseases of the  "nervous system and sense organs (08/10/2021), Personal history of other diseases of the respiratory system (11/12/2014), Personal history of other diseases of the respiratory system (08/13/2014), Personal history of other specified conditions (08/20/2013), Plantar fascial fibromatosis (07/22/2013), Polyp of colon, Syncope and collapse (01/23/2015), Unspecified abdominal pain (12/22/2014), and Unspecified disorder of eyelid (10/23/2014).  Surgical History     Past Surgical History:   Procedure Laterality Date    CARDIAC CATHETERIZATION      CHOLECYSTECTOMY  04/23/2013    Cholecystectomy Laparoscopic    OTHER SURGICAL HISTORY  04/27/2021    Meniscus repair    OTHER SURGICAL HISTORY Left 05/31/2023    Hip replacement    OTHER SURGICAL HISTORY  04/23/2013    Biopsy Tongue    SHOULDER SURGERY  04/23/2013    Shoulder Surgery     Family History     Family History   Problem Relation Name Age of Onset    Hypothyroidism Brother       Social History       Tobacco Use: Medium Risk (3/20/2024)    Patient History     Smoking Tobacco Use: Former     Smokeless Tobacco Use: Never     Passive Exposure: Not on file        Social History     Substance and Sexual Activity   Alcohol Use Never      Allergies     Allergies   Allergen Reactions    Iodinated Contrast Media Unknown and Anaphylaxis    Tobramycin-Dexamethasone Itching and Rash    Lipitor [Atorvastatin] Other and Unknown     Felt like I was floating after taking    Lisinopril Unknown      Meds    Scheduled medications    Continuous medications    PRN medications     Objective     Vitals  Visit Vitals  /80   Pulse 97   Temp 37.1 °C (98.8 °F) (Temporal)   Resp 18   Ht 1.803 m (5' 11\")   Wt 86.2 kg (190 lb)   SpO2 94%   BMI 26.50 kg/m²   Smoking Status Former   BSA 2.08 m²        Physical Examination:  Gen: well appearing, in NAD  HEENT: AT/NC, no conjunctival pallor, anicteric sclera, EOMI   Neck: supple, no LAD/JVD  Chest: CTAB, no wheezing / labored respirations  CVS: RRR, " no murmurs  Abd: soft, flat, NT, BS+. Distension present  Ext: no cyanosis/clubbing or pedal edema. Right knee post op dressing C/D/I, moderate swelling of right knee that extends distally to toes. There is moderate ecchymosis that extends from distal medial thigh to toes, 2+ DP/PT pulses on right. Leg & foot are warm and well perfused.   Neuro: AOx3, motor 5/5 globally, sensation intact    Labs:   Results from last 72 hours   Lab Units 03/26/24  1458   SODIUM mmol/L 139   POTASSIUM mmol/L 4.0   CHLORIDE mmol/L 101   CO2 mmol/L 28   BUN mg/dL 18   CREATININE mg/dL 0.89   GLUCOSE mg/dL 119*   CALCIUM mg/dL 8.9   ANION GAP mmol/L 14   EGFR mL/min/1.73m*2 85      Results from last 72 hours   Lab Units 03/26/24  1458   WBC AUTO x10*3/uL 9.0   HEMOGLOBIN g/dL 12.2*   HEMATOCRIT % 37.0*   PLATELETS AUTO x10*3/uL 254   NEUTROS PCT AUTO % 72.1   LYMPHS PCT AUTO % 15.9   MONOS PCT AUTO % 10.2   EOS PCT AUTO % 0.9      Lab Results   Component Value Date    CALCIUM 8.9 03/26/2024    PHOS 1.9 (L) 12/17/2020      Lab Results   Component Value Date    CRP 12.98 (H) 03/22/2024        Lab Results   Component Value Date    URINECULTURE NO GROWTH 07/06/2023     Images    Lower extremity venous duplex right  Narrative: Interpreted By:  Jose G Mariscal,   STUDY:  [facility] US LOWER EXTREMITY VENOUS DUPLEX RIGHT  3/26/2024 6:13 pm      INDICATION:  84 y/o   M with  Signs/Symptoms:swelling, post op. LMP:  Unknown.      COMPARISON:  None.      ACCESSION NUMBER(S):  [id number]      ORDERING CLINICIAN:  SHYANNE CHAMPION      TECHNIQUE:  Routine ultrasound of the  right lower extremity was performed with  duplex Doppler (color and spectral) evaluation.   Static images were  obtained for remote interpretation.      FINDINGS:  THIGH VEINS:  The common femoral, femoral, popliteal, proximal medial  saphenous, and deep femoral veins are patent and free of thrombus.  The veins are normally compressible.  They demonstrate normal phasic  flow and  augmentation response.      CALF VEINS:  The paired peroneal and posterior tibial calf veins are  patent.      Impression: No deep venous thrombosis of the  right lower extremity.      MACRO:  None      Signed by: Jose G Mariscal 3/26/2024 6:45 PM  Dictation workstation:   [id number]    Assessment and Plan      Acute Medical Issues   # Right Knee/Leg Pain s/p Right TKA  # Post-op RLE Hematoma vs ecchymosis  - POD #6 Right TKA performed on 3/20/24 by Dr. Arora  - No signs of compartment syndrome, distal pulses intact & intact ROM  - RLE Doppler: Negative  - Senna-Tea BID, Miralax daily, PO Oxy PRN  - IS, Bronchial Hygiene, DuoNebs PRN  - Monitor H&H, AM Coags  - NPO @ midnight  - HOLD  mg BID (ortho recs prophylaxis) given concerns  - Ortho consulted (Dr. Arora)-> possible OR 3/27    Chronic Medical Issues   #GERD: PPI  #COPD/YOSEF: No signs of exacerbation, DuoNebs PRN  #BPH: Tamsulosin & Finasteride    F: PO intake & IVF PRN   E: Replete PRN  N: NPO @ midnight  GI ppx: Protonix 40 mg daily  DVT ppx:  mg BID (per Ortho recs)  Antibiotics: None  Tubes/Lines/Drains: PIVs  Code Status: Full Code    Disposition: 83 y.o.male admitted for RLE ecchymosis vs hematoma. Ortho consulted, potential OR 3/27. NPO @ midnight. Anticipate LOS > 2 Midnights.    Lars Garcia MD  Internal Medicine, PGY-3      Disclaimer: Documentation completed with the information available at the time of input. The times in the chart may not be reflective of actual patient care times, interventions, or procedures. Documentation occurs after the physical care of the patient. Dictation technology was used during documentation.

## 2024-03-27 NOTE — PROGRESS NOTES
"Abraham Rodriguez is a 83 y.o. male on day 0 of admission presenting with Hematoma of right lower leg.    Subjective   83 year old male POD 7 from right total knee arthoplasty now admitted for hematoma of right lower extremity and constipation.  Rates pain RLE 10/10.  Medication reduces pain to 8/10.  Now reporting numbness of right foot (new overnight) but sensations intact when examined.  Denies nausea/vomiting.      Objective     Physical Exam  Vitals reviewed.   HENT:      Head: Normocephalic and atraumatic.   Eyes:      Extraocular Movements: Extraocular movements intact.      Conjunctiva/sclera: Conjunctivae normal.      Pupils: Pupils are equal, round, and reactive to light.   Cardiovascular:      Rate and Rhythm: Normal rate and regular rhythm.      Pulses: Normal pulses.   Pulmonary:      Effort: Pulmonary effort is normal.   Abdominal:      General: There is distension (mild).      Palpations: Abdomen is soft.   Musculoskeletal:      Comments: Right knee post op dressing C/D/I, moderate swelling of right knee that extends distally to toes.  There is moderate ecchymosis that extends from distal medial thigh to toes, 2+ DP/PT pulses on right.  Right knee extension 0 deg, flexion 20 degrees, no pain with passive flexion of toes, leg and foot are warm and well perfused, negative neetu's    Skin:     General: Skin is warm and dry.      Capillary Refill: Capillary refill takes less than 2 seconds.   Neurological:      General: No focal deficit present.      Mental Status: He is alert and oriented to person, place, and time.       Last Recorded Vitals  Blood pressure 137/71, pulse 90, temperature 35.8 °C (96.4 °F), temperature source Temporal, resp. rate 18, height 1.803 m (5' 11\"), weight 86.2 kg (190 lb), SpO2 91 %.  Intake/Output last 3 Shifts:  I/O last 3 completed shifts:  In: 0 (0 mL/kg)   Out: 200 (2.3 mL/kg) [Urine:200 (0.1 mL/kg/hr)]  Weight: 86.2 kg     Relevant Results  Lower extremity venous duplex " right    Result Date: 3/26/2024  Interpreted By:  Jose G Mariscal, STUDY: VAS US LOWER EXTREMITY VENOUS DUPLEX RIGHT  3/26/2024 6:13 pm   INDICATION: 82 y/o   M with  Signs/Symptoms:swelling, post op. LMP:  Unknown.   COMPARISON: None.   ACCESSION NUMBER(S): [id number]   ORDERING CLINICIAN: SHYANNE CHAMPION   TECHNIQUE: Routine ultrasound of the  right lower extremity was performed with duplex Doppler (color and spectral) evaluation.   Static images were obtained for remote interpretation.   FINDINGS: THIGH VEINS:  The common femoral, femoral, popliteal, proximal medial saphenous, and deep femoral veins are patent and free of thrombus. The veins are normally compressible.  They demonstrate normal phasic flow and augmentation response.   CALF VEINS:  The paired peroneal and posterior tibial calf veins are patent.       No deep venous thrombosis of the  right lower extremity.   MACRO: None   Signed by: Jose G Mariscal 3/26/2024 6:45 PM Dictation workstation:   [id number]    XR knee right 1-2 views    Result Date: 3/20/2024  Interpreted By:  Yennifer Jimenez, STUDY: Right knee, 2 views.   INDICATION: Signs/Symptoms:right TKR   COMPARISON: 07/03/2023.   ACCESSION NUMBER(S): [id number]   ORDERING CLINICIAN: TRICIA BOSWELL   FINDINGS: No acute fracture or malalignment. Immediate postsurgical changes of right total knee arthroplasty. Hardware is intact without perihardware fractures or lucencies. Expected postsurgical soft tissue gas within the knee joint.       1. Immediate postsurgical changes of right total knee arthroplasty without hardware complication.   MACRO: None.   Signed by: Yennifer Jimenez 3/20/2024 3:00 PM Dictation workstation:   [id number]    Scheduled medications  acetaminophen, 975 mg, oral, q8h  [Held by provider] aspirin, 325 mg, oral, BID  finasteride, 5 mg, oral, q PM  loratadine, 10 mg, oral, Daily  losartan, 50 mg, oral, Daily  melatonin, 5 mg, oral, Nightly  metoprolol succinate XL, 50 mg,  oral, Daily  pantoprazole, 40 mg, oral, Daily before breakfast  polyethylene glycol, 17 g, oral, BID  sennosides-docusate sodium, 2 tablet, oral, BID  tamsulosin, 0.4 mg, oral, q PM      Continuous medications     PRN medications  PRN medications: carisoprodol, dicyclomine, gabapentin, HYDROmorphone, ipratropium-albuteroL, ondansetron, oxyCODONE, oxyCODONE, simethicone  Results for orders placed or performed during the hospital encounter of 03/26/24 (from the past 24 hour(s))   Comprehensive metabolic panel   Result Value Ref Range    Glucose 119 (H) 74 - 99 mg/dL    Sodium 139 136 - 145 mmol/L    Potassium 4.0 3.5 - 5.3 mmol/L    Chloride 101 98 - 107 mmol/L    Bicarbonate 28 21 - 32 mmol/L    Anion Gap 14 10 - 20 mmol/L    Urea Nitrogen 18 6 - 23 mg/dL    Creatinine 0.89 0.50 - 1.30 mg/dL    eGFR 85 >60 mL/min/1.73m*2    Calcium 8.9 8.6 - 10.3 mg/dL    Albumin 3.5 3.4 - 5.0 g/dL    Alkaline Phosphatase 51 33 - 136 U/L    Total Protein 6.8 6.4 - 8.2 g/dL    AST 11 9 - 39 U/L    Bilirubin, Total 1.1 0.0 - 1.2 mg/dL    ALT 14 10 - 52 U/L   CBC and Auto Differential   Result Value Ref Range    WBC 9.0 4.4 - 11.3 x10*3/uL    nRBC 0.0 0.0 - 0.0 /100 WBCs    RBC 4.01 (L) 4.50 - 5.90 x10*6/uL    Hemoglobin 12.2 (L) 13.5 - 17.5 g/dL    Hematocrit 37.0 (L) 41.0 - 52.0 %    MCV 92 80 - 100 fL    MCH 30.4 26.0 - 34.0 pg    MCHC 33.0 32.0 - 36.0 g/dL    RDW 13.0 11.5 - 14.5 %    Platelets 254 150 - 450 x10*3/uL    Neutrophils % 72.1 40.0 - 80.0 %    Immature Granulocytes %, Automated 0.7 0.0 - 0.9 %    Lymphocytes % 15.9 13.0 - 44.0 %    Monocytes % 10.2 2.0 - 10.0 %    Eosinophils % 0.9 0.0 - 6.0 %    Basophils % 0.2 0.0 - 2.0 %    Neutrophils Absolute 6.50 (H) 1.60 - 5.50 x10*3/uL    Immature Granulocytes Absolute, Automated 0.06 0.00 - 0.50 x10*3/uL    Lymphocytes Absolute 1.43 0.80 - 3.00 x10*3/uL    Monocytes Absolute 0.92 (H) 0.05 - 0.80 x10*3/uL    Eosinophils Absolute 0.08 0.00 - 0.40 x10*3/uL    Basophils Absolute  0.02 0.00 - 0.10 x10*3/uL   Sedimentation rate, automated   Result Value Ref Range    Sedimentation Rate 24 (H) 0 - 20 mm/h   Blood Culture    Specimen: Peripheral Venipuncture; Blood culture   Result Value Ref Range    Blood Culture Loaded on Instrument - Culture in progress    CBC   Result Value Ref Range    WBC 7.8 4.4 - 11.3 x10*3/uL    nRBC 0.0 0.0 - 0.0 /100 WBCs    RBC 3.51 (L) 4.50 - 5.90 x10*6/uL    Hemoglobin 10.7 (L) 13.5 - 17.5 g/dL    Hematocrit 32.3 (L) 41.0 - 52.0 %    MCV 92 80 - 100 fL    MCH 30.5 26.0 - 34.0 pg    MCHC 33.1 32.0 - 36.0 g/dL    RDW 13.0 11.5 - 14.5 %    Platelets 222 150 - 450 x10*3/uL   Renal Function Panel   Result Value Ref Range    Glucose 110 (H) 74 - 99 mg/dL    Sodium 136 136 - 145 mmol/L    Potassium 3.9 3.5 - 5.3 mmol/L    Chloride 103 98 - 107 mmol/L    Bicarbonate 25 21 - 32 mmol/L    Anion Gap 12 10 - 20 mmol/L    Urea Nitrogen 21 6 - 23 mg/dL    Creatinine 0.87 0.50 - 1.30 mg/dL    eGFR 86 >60 mL/min/1.73m*2    Calcium 8.4 (L) 8.6 - 10.3 mg/dL    Phosphorus 3.5 2.5 - 4.9 mg/dL    Albumin 3.1 (L) 3.4 - 5.0 g/dL   Magnesium   Result Value Ref Range    Magnesium 2.11 1.60 - 2.40 mg/dL   Coagulation Screen   Result Value Ref Range    Protime 12.6 9.8 - 12.8 seconds    INR 1.1 0.9 - 1.1    aPTT 29 27 - 38 seconds       Assessment/Plan   Principal Problem:    Hematoma of right lower leg    83 year old male, POD 7 from right total knee arthroplasty with right lower extremity pain, swelling, and ecchymosis and constipation    - imaging and labs reviewed, HgB 10.7 from 12.2, recommend continue to hold ASA  - Duplex US negative for DVT  -  no acute orthopedic intervention at this time  - CT scan right knee to further evaluate posterior knee swelling, rule out hematoma - no contrast due to anaphylactic reaction in past   - added Lactulose to bowel regimen  - can have diet today no plan for OR at this time  - PT/OT evaluation   - ortho will continue to follow    Discussed with   Ulysses SO spent 30 minutes in the professional and overall care of this patient.    Vivi Garcias, APRN-CNP

## 2024-03-27 NOTE — ED PROVIDER NOTES
HPI   Chief Complaint   Patient presents with    Knee Pain     Right knee replaced last Wednesday and is having increased pain and has not had BM since Wednesday.        83-year-old male here for chief complaint of right leg pain and swelling.  Patient had a total knee replacement done by Dr. Arora and is now having severe pain and swelling.  He denies any shortness of breath fevers chills or night sweats.  He denies any other numbness weakness or tingling or complaints at this time.  He was sent in by Dr. Arora.    10 point systems reviewed and is negative unless otherwise stated                          [place] Coma Scale Score: 15                     Patient History   Past Medical History:   Diagnosis Date    Allergic rhinitis due to pollen 10/23/2014    Hay fever    Arthritis     Chronic pain disorder     Clotting disorder (CMS/HCC)     dvt april 2020     P.E. april 2020    COPD (chronic obstructive pulmonary disease) (CMS/[facility])     hx asbestos    Coronary artery disease     Encounter for other preprocedural examination 06/24/2014    Pre-procedural examination    Encounter for screening for malignant neoplasm of prostate     Encounter for screening for malignant neoplasm of prostate    Hyperlipidemia     Hypertension     Localized edema 05/11/2020    Pedal edema    Myocardial infarction (CMS/[facility])     Other conditions influencing health status     Carcinoma Of The Tongue    Pain in unspecified knee 12/22/2014    Joint pain, knee    Personal history of diseases of the skin and subcutaneous tissue 04/23/2013    History of eczema    Personal history of other diseases of the musculoskeletal system and connective tissue 06/19/2014    Personal history of arthritis    Personal history of other diseases of the nervous system and sense organs 08/10/2021    History of Bell's palsy    Personal history of other diseases of the respiratory system 11/12/2014    History of asbestosis    Personal history of other diseases of the  respiratory system 2014    History of chronic obstructive lung disease    Personal history of other specified conditions 2013    History of edema    Plantar fascial fibromatosis 2013    Plantar fasciitis    Polyp of colon     Polyp of sigmoid colon    Syncope and collapse 2015    Syncope and collapse    Unspecified abdominal pain 2014    Stomach pain    Unspecified disorder of eyelid 10/23/2014    Eyelid disorder     Past Surgical History:   Procedure Laterality Date    CARDIAC CATHETERIZATION      CHOLECYSTECTOMY  2013    Cholecystectomy Laparoscopic    OTHER SURGICAL HISTORY  2021    Meniscus repair    OTHER SURGICAL HISTORY Left 2023    Hip replacement    OTHER SURGICAL HISTORY  2013    Biopsy Tongue    SHOULDER SURGERY  2013    Shoulder Surgery     Family History   Problem Relation Name Age of Onset    Hypothyroidism Brother       Social History     Tobacco Use    Smoking status: Former     Types: Cigarettes     Quit date:      Years since quittin.2    Smokeless tobacco: Never   Vaping Use    Vaping Use: Never used   Substance Use Topics    Alcohol use: Never    Drug use: Not Currently     Types: Marijuana     Comment: smokes 1-2 x's per week       Physical Exam   ED Triage Vitals   Temp Heart Rate Resp BP   24 1402 24 1402 24 1402 24 1402   37.1 °C (98.8 °F) 97 18 144/80      SpO2 Temp Source Heart Rate Source Patient Position   24 1402 24 1402 24 2144 24 2144   98 % Temporal Monitor Lying      BP Location FiO2 (%)     24 2144 --     Right arm        Physical Exam  Vitals and nursing note reviewed.   Constitutional:       Appearance: Normal appearance.   HENT:      Head: Normocephalic and atraumatic.      Nose: Nose normal.      Mouth/Throat:      Mouth: Mucous membranes are moist.   Eyes:      Extraocular Movements: Extraocular movements intact.      Pupils: Pupils are equal, round, and  reactive to light.   Cardiovascular:      Rate and Rhythm: Normal rate and regular rhythm.   Pulmonary:      Effort: Pulmonary effort is normal.      Breath sounds: Normal breath sounds.   Abdominal:      General: Abdomen is flat.      Palpations: Abdomen is soft.   Musculoskeletal:         General: Swelling and tenderness present.      Cervical back: Normal range of motion.      Comments: The right lower extremity is very swollen with what lower appears to be a possible hematoma present from the knee down.  There are 2+ pulses bilaterally in the lower extremities.   Skin:     General: Skin is warm and dry.      Capillary Refill: Capillary refill takes less than 2 seconds.   Neurological:      General: No focal deficit present.      Mental Status: He is alert.   Psychiatric:         Mood and Affect: Mood normal.         ED Course & MDM   Diagnoses as of 03/27/24 1333   Right leg swelling       Medical Decision Making      Medical Decision Making: Patient was worked up with lab work which is reassuring at this time and ultrasound shows no DVT.  Patient was given pain control and will be hospitalized for further management and treatment at this time.      [unfilled]     Differential Diagnoses Considered: DVT hematoma    Chronic Medical Conditions Significantly Affecting Care: Patient has chronic debilitation    External Records Reviewed: I reviewed recent and relevant outside records including: Previous x-rays    Social Determinants of Health Significantly Affecting Care: Lives with family    Diagnostic testing considered: CT leg    Angella Castillo D.O.  Emergency Medicine          Procedure  Procedures     Angella Castillo,   03/27/24 [id number]

## 2024-03-28 LAB
ALBUMIN SERPL BCP-MCNC: 3.8 G/DL (ref 3.4–5)
ANION GAP SERPL CALC-SCNC: 13 MMOL/L (ref 10–20)
BUN SERPL-MCNC: 27 MG/DL (ref 6–23)
CALCIUM SERPL-MCNC: 9.4 MG/DL (ref 8.6–10.3)
CHLORIDE SERPL-SCNC: 101 MMOL/L (ref 98–107)
CO2 SERPL-SCNC: 24 MMOL/L (ref 21–32)
CREAT SERPL-MCNC: 0.88 MG/DL (ref 0.5–1.3)
EGFRCR SERPLBLD CKD-EPI 2021: 85 ML/MIN/1.73M*2
ERYTHROCYTE [DISTWIDTH] IN BLOOD BY AUTOMATED COUNT: 12.9 % (ref 11.5–14.5)
GLUCOSE SERPL-MCNC: 144 MG/DL (ref 74–99)
HCT VFR BLD AUTO: 40.8 % (ref 41–52)
HGB BLD-MCNC: 13.1 G/DL (ref 13.5–17.5)
MAGNESIUM SERPL-MCNC: 2.32 MG/DL (ref 1.6–2.4)
MCH RBC QN AUTO: 30.6 PG (ref 26–34)
MCHC RBC AUTO-ENTMCNC: 32.1 G/DL (ref 32–36)
MCV RBC AUTO: 95 FL (ref 80–100)
NRBC BLD-RTO: 0 /100 WBCS (ref 0–0)
PHOSPHATE SERPL-MCNC: 3.6 MG/DL (ref 2.5–4.9)
PLATELET # BLD AUTO: 300 X10*3/UL (ref 150–450)
POTASSIUM SERPL-SCNC: 4.4 MMOL/L (ref 3.5–5.3)
RBC # BLD AUTO: 4.28 X10*6/UL (ref 4.5–5.9)
SODIUM SERPL-SCNC: 134 MMOL/L (ref 136–145)
WBC # BLD AUTO: 9.5 X10*3/UL (ref 4.4–11.3)

## 2024-03-28 PROCEDURE — 2500000001 HC RX 250 WO HCPCS SELF ADMINISTERED DRUGS (ALT 637 FOR MEDICARE OP): Performed by: NURSE PRACTITIONER

## 2024-03-28 PROCEDURE — 1100000001 HC PRIVATE ROOM DAILY

## 2024-03-28 PROCEDURE — 85027 COMPLETE CBC AUTOMATED: CPT | Performed by: INTERNAL MEDICINE

## 2024-03-28 PROCEDURE — 2500000001 HC RX 250 WO HCPCS SELF ADMINISTERED DRUGS (ALT 637 FOR MEDICARE OP): Performed by: INTERNAL MEDICINE

## 2024-03-28 PROCEDURE — 36415 COLL VENOUS BLD VENIPUNCTURE: CPT | Performed by: INTERNAL MEDICINE

## 2024-03-28 PROCEDURE — 97535 SELF CARE MNGMENT TRAINING: CPT | Mod: GO

## 2024-03-28 PROCEDURE — 99232 SBSQ HOSP IP/OBS MODERATE 35: CPT | Performed by: FAMILY MEDICINE

## 2024-03-28 PROCEDURE — 1090000002 HH PPS REVENUE DEBIT

## 2024-03-28 PROCEDURE — 97110 THERAPEUTIC EXERCISES: CPT | Mod: GP

## 2024-03-28 PROCEDURE — 97161 PT EVAL LOW COMPLEX 20 MIN: CPT | Mod: GP

## 2024-03-28 PROCEDURE — 97116 GAIT TRAINING THERAPY: CPT | Mod: GP

## 2024-03-28 PROCEDURE — 1090000001 HH PPS REVENUE CREDIT

## 2024-03-28 PROCEDURE — 2500000004 HC RX 250 GENERAL PHARMACY W/ HCPCS (ALT 636 FOR OP/ED): Performed by: NURSE PRACTITIONER

## 2024-03-28 PROCEDURE — 2500000002 HC RX 250 W HCPCS SELF ADMINISTERED DRUGS (ALT 637 FOR MEDICARE OP, ALT 636 FOR OP/ED): Performed by: INTERNAL MEDICINE

## 2024-03-28 PROCEDURE — 97165 OT EVAL LOW COMPLEX 30 MIN: CPT | Mod: GO

## 2024-03-28 PROCEDURE — 99232 SBSQ HOSP IP/OBS MODERATE 35: CPT | Performed by: NURSE PRACTITIONER

## 2024-03-28 PROCEDURE — 80069 RENAL FUNCTION PANEL: CPT | Performed by: INTERNAL MEDICINE

## 2024-03-28 PROCEDURE — 2500000004 HC RX 250 GENERAL PHARMACY W/ HCPCS (ALT 636 FOR OP/ED): Performed by: INTERNAL MEDICINE

## 2024-03-28 PROCEDURE — 83735 ASSAY OF MAGNESIUM: CPT | Performed by: INTERNAL MEDICINE

## 2024-03-28 RX ADMIN — SIMETHICONE 80 MG: 80 TABLET, CHEWABLE ORAL at 00:14

## 2024-03-28 RX ADMIN — LOSARTAN POTASSIUM 50 MG: 50 TABLET, FILM COATED ORAL at 08:34

## 2024-03-28 RX ADMIN — ASPIRIN 325 MG: 325 TABLET, COATED ORAL at 20:28

## 2024-03-28 RX ADMIN — HYDROMORPHONE HYDROCHLORIDE 4 MG: 2 TABLET ORAL at 23:07

## 2024-03-28 RX ADMIN — PREDNISONE 60 MG: 20 TABLET ORAL at 08:35

## 2024-03-28 RX ADMIN — HYDROMORPHONE HYDROCHLORIDE 1 MG: 1 INJECTION, SOLUTION INTRAMUSCULAR; INTRAVENOUS; SUBCUTANEOUS at 20:28

## 2024-03-28 RX ADMIN — HYDROMORPHONE HYDROCHLORIDE 1 MG: 1 INJECTION, SOLUTION INTRAMUSCULAR; INTRAVENOUS; SUBCUTANEOUS at 12:51

## 2024-03-28 RX ADMIN — ACETAMINOPHEN 975 MG: 325 TABLET ORAL at 03:34

## 2024-03-28 RX ADMIN — ACETAMINOPHEN 975 MG: 325 TABLET ORAL at 12:50

## 2024-03-28 RX ADMIN — Medication 5 MG: at 00:08

## 2024-03-28 RX ADMIN — HYDROMORPHONE HYDROCHLORIDE 4 MG: 2 TABLET ORAL at 16:50

## 2024-03-28 RX ADMIN — ACETAMINOPHEN 975 MG: 325 TABLET ORAL at 20:28

## 2024-03-28 RX ADMIN — METOPROLOL SUCCINATE 50 MG: 50 TABLET, EXTENDED RELEASE ORAL at 08:34

## 2024-03-28 RX ADMIN — SENNOSIDES AND DOCUSATE SODIUM 2 TABLET: 8.6; 5 TABLET ORAL at 20:28

## 2024-03-28 RX ADMIN — HYDROMORPHONE HYDROCHLORIDE 4 MG: 2 TABLET ORAL at 10:31

## 2024-03-28 RX ADMIN — HYDROMORPHONE HYDROCHLORIDE 1 MG: 1 INJECTION, SOLUTION INTRAMUSCULAR; INTRAVENOUS; SUBCUTANEOUS at 06:09

## 2024-03-28 RX ADMIN — SIMETHICONE 80 MG: 80 TABLET, CHEWABLE ORAL at 10:32

## 2024-03-28 RX ADMIN — HYDROMORPHONE HYDROCHLORIDE 4 MG: 2 TABLET ORAL at 03:34

## 2024-03-28 RX ADMIN — LORATADINE 10 MG: 10 TABLET ORAL at 08:34

## 2024-03-28 RX ADMIN — SENNOSIDES AND DOCUSATE SODIUM 2 TABLET: 8.6; 5 TABLET ORAL at 08:36

## 2024-03-28 RX ADMIN — HYDROMORPHONE HYDROCHLORIDE 1 MG: 1 INJECTION, SOLUTION INTRAMUSCULAR; INTRAVENOUS; SUBCUTANEOUS at 00:07

## 2024-03-28 RX ADMIN — FINASTERIDE 5 MG: 5 TABLET, FILM COATED ORAL at 20:28

## 2024-03-28 RX ADMIN — POLYETHYLENE GLYCOL 3350 17 G: 17 POWDER, FOR SOLUTION ORAL at 20:28

## 2024-03-28 RX ADMIN — Medication 5 MG: at 20:29

## 2024-03-28 RX ADMIN — TAMSULOSIN HYDROCHLORIDE 0.4 MG: 0.4 CAPSULE ORAL at 20:28

## 2024-03-28 RX ADMIN — PANTOPRAZOLE SODIUM 40 MG: 40 TABLET, DELAYED RELEASE ORAL at 06:09

## 2024-03-28 ASSESSMENT — COGNITIVE AND FUNCTIONAL STATUS - GENERAL
DAILY ACTIVITIY SCORE: 18
DRESSING REGULAR LOWER BODY CLOTHING: A LOT
MOVING TO AND FROM BED TO CHAIR: A LITTLE
TOILETING: A LOT
TOILETING: A LOT
HELP NEEDED FOR BATHING: A LOT
WALKING IN HOSPITAL ROOM: A LITTLE
STANDING UP FROM CHAIR USING ARMS: A LITTLE
HELP NEEDED FOR BATHING: A LITTLE
MOVING TO AND FROM BED TO CHAIR: A LITTLE
DRESSING REGULAR LOWER BODY CLOTHING: A LOT
CLIMB 3 TO 5 STEPS WITH RAILING: A LOT
MOVING TO AND FROM BED TO CHAIR: A LITTLE
MOBILITY SCORE: 19
TOILETING: A LOT
CLIMB 3 TO 5 STEPS WITH RAILING: A LITTLE
DAILY ACTIVITIY SCORE: 19
MOBILITY SCORE: 20
CLIMB 3 TO 5 STEPS WITH RAILING: A LOT
MOBILITY SCORE: 19
WALKING IN HOSPITAL ROOM: A LITTLE
CLIMB 3 TO 5 STEPS WITH RAILING: A LOT
STANDING UP FROM CHAIR USING ARMS: A LITTLE
WALKING IN HOSPITAL ROOM: A LOT
DAILY ACTIVITIY SCORE: 18
DRESSING REGULAR LOWER BODY CLOTHING: A LOT
MOBILITY SCORE: 19
WALKING IN HOSPITAL ROOM: A LITTLE
STANDING UP FROM CHAIR USING ARMS: A LITTLE
STANDING UP FROM CHAIR USING ARMS: A LITTLE
HELP NEEDED FOR BATHING: A LOT

## 2024-03-28 ASSESSMENT — PAIN SCALES - GENERAL
PAINLEVEL_OUTOF10: 8
PAINLEVEL_OUTOF10: 7
PAINLEVEL_OUTOF10: 9
PAINLEVEL_OUTOF10: 8
PAINLEVEL_OUTOF10: 10 - WORST POSSIBLE PAIN
PAINLEVEL_OUTOF10: 10 - WORST POSSIBLE PAIN
PAINLEVEL_OUTOF10: 5 - MODERATE PAIN
PAINLEVEL_OUTOF10: 9
PAINLEVEL_OUTOF10: 8
PAINLEVEL_OUTOF10: 10 - WORST POSSIBLE PAIN
PAINLEVEL_OUTOF10: 8
PAINLEVEL_OUTOF10: 10 - WORST POSSIBLE PAIN

## 2024-03-28 ASSESSMENT — PAIN DESCRIPTION - ORIENTATION
ORIENTATION: RIGHT

## 2024-03-28 ASSESSMENT — PAIN - FUNCTIONAL ASSESSMENT
PAIN_FUNCTIONAL_ASSESSMENT: 0-10

## 2024-03-28 ASSESSMENT — PAIN DESCRIPTION - LOCATION
LOCATION: KNEE

## 2024-03-28 ASSESSMENT — ACTIVITIES OF DAILY LIVING (ADL)
ADL_ASSISTANCE: INDEPENDENT
HOME_MANAGEMENT_TIME_ENTRY: 15
BATHING_ASSISTANCE: MODERATE
ADL_ASSISTANCE: INDEPENDENT

## 2024-03-28 NOTE — PROGRESS NOTES
"Abraham Rodriguez is a 83 y.o. male on day 0 of admission presenting with Hematoma of right lower leg.    Subjective   No acute overnight events.  Rates pain 8/10.  States CPM machine has caused him more pain and he is declining to use it.  He states his ROM of right knee is slowly improving and he appears to be in less pain today.    Objective     Physical Exam  Vitals reviewed.   HENT:      Head: Normocephalic and atraumatic.   Eyes:      Extraocular Movements: Extraocular movements intact.      Conjunctiva/sclera: Conjunctivae normal.      Pupils: Pupils are equal, round, and reactive to light.   Cardiovascular:      Rate and Rhythm: Normal rate and regular rhythm.      Pulses: Normal pulses.   Pulmonary:      Effort: Pulmonary effort is normal.   Abdominal:      General: Bowel sounds are normal.      Palpations: Abdomen is soft.   Musculoskeletal:      Comments: Right knee post op dressing C/D/I, moderate swelling of right knee that extends distally to toes.  There is moderate ecchymosis that extends from distal medial thigh to toes, 2+ DP/PT pulses on right.  Right knee extension 0 deg, active flexion 30 degrees, no pain with passive flexion of toes, leg and foot are warm and well perfused, negative neetu's     Skin:     General: Skin is warm and dry.      Capillary Refill: Capillary refill takes less than 2 seconds.   Neurological:      General: No focal deficit present.      Mental Status: He is alert and oriented to person, place, and time.       Last Recorded Vitals  Blood pressure 139/76, pulse 84, temperature 36.2 °C (97.2 °F), resp. rate 18, height 1.803 m (5' 11\"), weight 86.2 kg (190 lb), SpO2 96 %.  Intake/Output last 3 Shifts:  I/O last 3 completed shifts:  In: 60 (0.7 mL/kg) [P.O.:60]  Out: 1700 (19.7 mL/kg) [Urine:1700 (0.5 mL/kg/hr)]  Weight: 86.2 kg     Relevant Results  CT knee right wo IV contrast    Result Date: 3/27/2024  Interpreted By:  Rima Lemus, STUDY: CT KNEE RIGHT WO IV CONTRAST   " INDICATION: Signs/Symptoms:with metal reduction - swelling posterior knee, pain, ecchymosis POD 7 from right total knee arthroplasty   COMPARISON: CT right knee without contrast dated 01/22/2024.   ACCESSION NUMBER(S): [id number]   ORDERING CLINICIAN: GRAHAM BARNETT   TECHNIQUE: Contiguous axial CT images were obtained at  2 mm slice thickness through the right knee without intravenous contrast administration. Coronal and sagittal reformatted images were performed. 3D reformatted images were created and reviewed.   FINDINGS: OSSEOUS STRUCTURES: Postsurgical changes of right total knee arthroplasty are noted. Hardware appears grossly intact without significant lucency at the bone metal interface within limits of background metal related artifacts. There is no evidence of periprosthetic fractures. Visualized distal femur and proximal tibia and fibula are grossly intact without evidence of displaced fracture deformities. There is an oval circumscribed peripherally calcified osseous fragment along the posterolateral aspect of the joint space measuring approximately 1.8 x 1.6 x 1 cm in transverse, cc and AP dimensions respectively (best seen on sagittal image 37/100). 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   SOFT TISSUES: Evaluation is limited by the lack of intravenous contrast. Within this limitation, there is large volume suprapatellar joint effusion with layering hyperdense contents, likely favored to represent hemarthrosis and could be related to recent postsurgical status. There is also linearly oriented calcific densities along the anterior aspect of the suprapatellar fossa just above the level of superior margin of patella, likely favored to be sequela of prior synovitis with residual synovial calcifications. There is mild reticular subcutaneous soft tissue edema about the knee joint, predominantly involving the prepatellar soft tissues and extending along the  medial and lateral aspect of the knee. This is most likely favored to be reactive secondary to recent postsurgical status. No large confluent fluid collection is noted in the posterior soft tissues of the knee and proximal leg included in the visualized field of view. Visualized musculature appears grossly unremarkable without evidence of intramuscular collections or mass lesions within limits of noncontrast technique.       1. Status post recent right total knee arthroplasty with intact hardware. Redemonstration of a peripherally calcified intracapsular synovial body projecting over the posterolateral joint space. 2. Large volume suprapatellar joint effusion with layering densities/hemarthrosis is most likely favored to be related to recent postsurgical status. However this is of indeterminate sterility and superimposed infection can not be excluded in appropriate clinical setting. Recommend surgical correlation. 3. Reticular subcutaneous soft tissue edema about the knee joint is most likely favored to be reactive related to recent postsurgical status.         MACRO: None.   Signed by: Rima Lemus 3/27/2024 1:23 PM Dictation workstation:   [id number]    Lower extremity venous duplex right    Result Date: 3/26/2024  Interpreted By:  Jose G Mariscal, STUDY: [facility] LOWER EXTREMITY VENOUS DUPLEX RIGHT  3/26/2024 6:13 pm   INDICATION: 82 y/o   M with  Signs/Symptoms:swelling, post op. LMP:  Unknown.   COMPARISON: None.   ACCESSION NUMBER(S): [id number]   ORDERING CLINICIAN: SHYANNE CHAMPION   TECHNIQUE: Routine ultrasound of the  right lower extremity was performed with duplex Doppler (color and spectral) evaluation.   Static images were obtained for remote interpretation.   FINDINGS: THIGH VEINS:  The common femoral, femoral, popliteal, proximal medial saphenous, and deep femoral veins are patent and free of thrombus. The veins are normally compressible.  They demonstrate normal phasic flow and  augmentation response.   CALF VEINS:  The paired peroneal and posterior tibial calf veins are patent.       No deep venous thrombosis of the  right lower extremity.   MACRO: None   Signed by: Jose G Mariscal 3/26/2024 6:45 PM Dictation workstation:   [id number]    XR knee right 1-2 views    Result Date: 3/20/2024  Interpreted By:  Yennifer Jimenez, STUDY: Right knee, 2 views.   INDICATION: Signs/Symptoms:right TKR   COMPARISON: 07/03/2023.   ACCESSION NUMBER(S): [id number]   ORDERING CLINICIAN: TRICIA BOSWELL   FINDINGS: No acute fracture or malalignment. Immediate postsurgical changes of right total knee arthroplasty. Hardware is intact without perihardware fractures or lucencies. Expected postsurgical soft tissue gas within the knee joint.       1. Immediate postsurgical changes of right total knee arthroplasty without hardware complication.   MACRO: None.   Signed by: Yennifer Jimenez 3/20/2024 3:00 PM Dictation workstation:   [id number]     Scheduled medications  acetaminophen, 975 mg, oral, q8h  [Held by provider] aspirin, 325 mg, oral, BID  bisacodyl, 10 mg, rectal, Daily  finasteride, 5 mg, oral, q PM  lactulose, 20 g, oral, Daily  loratadine, 10 mg, oral, Daily  losartan, 50 mg, oral, Daily  melatonin, 5 mg, oral, Nightly  metoprolol succinate XL, 50 mg, oral, Daily  pantoprazole, 40 mg, oral, Daily before breakfast  polyethylene glycol, 17 g, oral, BID  predniSONE, 60 mg, oral, Daily   Followed by  [START ON 4/3/2024] predniSONE, 40 mg, oral, Daily   Followed by  [START ON 4/10/2024] predniSONE, 20 mg, oral, Daily  sennosides-docusate sodium, 2 tablet, oral, BID  tamsulosin, 0.4 mg, oral, q PM      Continuous medications     PRN medications  PRN medications: carisoprodol, dicyclomine, gabapentin, HYDROmorphone, HYDROmorphone, ipratropium-albuteroL, ondansetron, simethicone  Results for orders placed or performed during the hospital encounter of 03/26/24 (from the past 24 hour(s))   CBC   Result Value Ref  Range    WBC 9.5 4.4 - 11.3 x10*3/uL    nRBC 0.0 0.0 - 0.0 /100 WBCs    RBC 4.28 (L) 4.50 - 5.90 x10*6/uL    Hemoglobin 13.1 (L) 13.5 - 17.5 g/dL    Hematocrit 40.8 (L) 41.0 - 52.0 %    MCV 95 80 - 100 fL    MCH 30.6 26.0 - 34.0 pg    MCHC 32.1 32.0 - 36.0 g/dL    RDW 12.9 11.5 - 14.5 %    Platelets 300 150 - 450 x10*3/uL   Renal Function Panel   Result Value Ref Range    Glucose 144 (H) 74 - 99 mg/dL    Sodium 134 (L) 136 - 145 mmol/L    Potassium 4.4 3.5 - 5.3 mmol/L    Chloride 101 98 - 107 mmol/L    Bicarbonate 24 21 - 32 mmol/L    Anion Gap 13 10 - 20 mmol/L    Urea Nitrogen 27 (H) 6 - 23 mg/dL    Creatinine 0.88 0.50 - 1.30 mg/dL    eGFR 85 >60 mL/min/1.73m*2    Calcium 9.4 8.6 - 10.3 mg/dL    Phosphorus 3.6 2.5 - 4.9 mg/dL    Albumin 3.8 3.4 - 5.0 g/dL   Magnesium   Result Value Ref Range    Magnesium 2.32 1.60 - 2.40 mg/dL       Assessment/Plan   Principal Problem:    Hematoma of right lower leg    83 year old male, POD 8 from right total knee arthroplasty with right lower extremity pain, swelling, and ecchymosis and constipation (resolved)    - no acute orthopedic intervention  - imaging and labs reviewed:  HgB this AM 13.1  - was ordered CPM but patient is declining to have machine put back on  - on Prednisone taper course  - Oxycodone changed to oral Dilaudid, some improvement in pain  - WBAT RLE with walker and PT.  He is currently a two person assist due to progressive pain and weakness of right leg for mobilization  - Ok to resume Aspirin for DVT prophylaxis   - Patient will benefit from rehab stay due to inability to mobilize due to severity of pain and daughter at home is unable to assist    Discussed with Dr. Arora         I spent 30 minutes in the professional and overall care of this patient.    Vivi Garcias, APRN-CNP

## 2024-03-28 NOTE — CARE PLAN
The patient's goals for the shift include      The clinical goals for the shift include pain control      Problem: Pain  Goal: My pain/discomfort is manageable  Outcome: Progressing     Problem: Safety  Goal: Patient will be injury free during hospitalization  Outcome: Progressing     Problem: Daily Care  Goal: Daily care needs are met  Outcome: Progressing

## 2024-03-28 NOTE — CARE PLAN
Problem: Pain  Goal: My pain/discomfort is manageable  Outcome: Progressing     Problem: Safety  Goal: Patient will be injury free during hospitalization  Outcome: Progressing     Problem: Daily Care  Goal: Daily care needs are met  Outcome: Progressing     Problem: Psychosocial Needs  Goal: Demonstrates ability to cope with hospitalization/illness  Outcome: Progressing     Problem: Discharge Barriers  Goal: My discharge needs are met  Outcome: Progressing   The patient's goals for the shift include      The clinical goals for the shift include pain control and have a bowel movement

## 2024-03-28 NOTE — PROGRESS NOTES
Physical Therapy    Physical Therapy Treatment    Patient Name: Abraham Rodriguez  MRN: [id number]  Today's Date: 3/28/2024  Time Calculation  Start Time: 1249  Stop Time: 1312  Time Calculation (min): 23 min       Assessment/Plan   PT Assessment  PT Assessment Results: Decreased strength, Decreased endurance, Decreased range of motion, Impaired balance, Decreased mobility, Decreased coordination  Rehab Prognosis: Good  Barriers to Discharge: Pain control  Evaluation/Treatment Tolerance: Patient tolerated treatment well  Medical Staff Made Aware: Yes  Strengths: Ability to acquire knowledge, Housing layout, Support of Caregivers  Barriers to Participation: Comorbidities  End of Session Communication: Bedside nurse  Assessment Comment: Patient tolerated session well, encouraged to utilize restroom in room with 2WW and staff assist. Rec mod intensity PT intervention d/t recently failed attempt to d/c home.  End of Session Patient Position: Bed, 3 rail up, Alarm on  PT Plan  Inpatient/Swing Bed or Outpatient: Inpatient  PT Plan  Treatment/Interventions: Bed mobility, Transfer training, Gait training, Stair training, Balance training, Strengthening, Endurance training, Range of motion, Therapeutic exercise  PT Plan: Skilled PT  PT Frequency: 5 times per week  PT Discharge Recommendations: Moderate intensity level of continued care  Equipment Recommended upon Discharge: Wheeled walker (owns)  PT Recommended Transfer Status: Assist x1  PT - OK to Discharge: Yes (per PT POC)      General Visit Information:   PT  Visit  PT Received On: 03/28/24  Response to Previous Treatment: Patient with no complaints from previous session.  General  Reason for Referral: Impaired functional mobility; R TKA  Referred By: Scott Arora  Past Medical History Relevant to Rehab: CAD, HTN  Family/Caregiver Present: No  Prior to Session Communication: Bedside nurse  Patient Position Received: Up in chair, Alarm on  General Comment: Patient pleasant,  "cooperative and agreeable to tx    Subjective   Precautions:  Precautions  LE Weight Bearing Status: Weight Bearing as Tolerated  Medical Precautions: Fall precautions  Post-Surgical Precautions: Right total knee precautions  Precautions Comment: Ultarsound (-) DVT, all imagining typical 1 week post op    Objective   Pain:  Pain Assessment  Pain Assessment: 0-10  Pain Score: 8  Pain Type: Surgical pain  Pain Location: Knee  Pain Orientation: Right  Pain Interventions: Cold applied, Repositioned  Cognition:  Cognition  Overall Cognitive Status: Within Functional Limits  Orientation Level: Oriented X4  Postural Control:  Postural Control  Postural Control: Within Functional Limits  Static Sitting Balance  Static Sitting-Balance Support: No upper extremity supported, Feet supported  Static Sitting-Level of Assistance: Independent  Static Standing Balance  Static Standing-Balance Support: Bilateral upper extremity supported  Static Standing-Level of Assistance: Close supervision  Extremity/Trunk Assessments:  RLE   RLE :  (R knee AROM in supone 0-65 deg)  Activity Tolerance:  Activity Tolerance  Endurance: Tolerates 10 - 20 min exercise with multiple rests  Treatments:  Therapeutic Exercise  Therapeutic Exercise Performed: Yes  Therapeutic Exercise Activity 1: Long sitting in bed: R knee flexion with 2\" hold at end range x 10, R quad sets with 2\" hold x 10 (Patient reports he has been completing HEP (ankle pumps, quad sets, heel slides, hip abd, SLR, LAQ) 3x/day)         Bed Mobility  Bed Mobility: Yes  Bed Mobility 1  Bed Mobility 1: Sitting to supine  Level of Assistance 1: Modified independent  Bed Mobility Comments 1: with use of rail    Ambulation/Gait Training  Ambulation/Gait Training Performed: Yes  Ambulation/Gait Training 1  Surface 1: Level tile  Device 1: Rolling walker  Assistance 1: Close supervision  Quality of Gait 1:  (verbal cues for R knee flexion during swing and R heel srike in " stance)  Comments/Distance (ft) 1: 15' x 2  Transfers  Transfer: Yes  Transfer 1  Transfer From 1: Sit to, Stand to  Transfer to 1: Sit, Stand  Technique 1: Sit to stand, Stand to sit  Transfer Device 1: Walker  Transfer Level of Assistance 1: Close supervision         Outcome Measures:  [facility] Basic Mobility  Turning from your back to your side while in a flat bed without using bedrails: None  Moving from lying on your back to sitting on the side of a flat bed without using bedrails: None  Moving to and from bed to chair (including a wheelchair): A little  Standing up from a chair using your arms (e.g. wheelchair or bedside chair): A little  To walk in hospital room: A little  Climbing 3-5 steps with railing: A little  Basic Mobility - Total Score: 20      Encounter Problems       Encounter Problems (Active)       Mobility       STG - Patient will ambulate with 2WW 50' mod I  (Progressing)       Start:  03/28/24    Expected End:  04/11/24            STG - Patient will ambulate up and down a curb/step with 2WW CGA  (Progressing)       Start:  03/28/24    Expected End:  04/11/24            Patient will achieve R knee ROM in supine 0-90 deg  (Progressing)       Start:  03/28/24    Expected End:  04/11/24               PT Transfers       STG - Patient will perform bed mobility independently with HOB flat  (Progressing)       Start:  03/28/24    Expected End:  04/11/24            STG - Patient will transfer sit to and from stand mod I  (Progressing)       Start:  03/28/24    Expected End:  04/11/24               Pain - Adult

## 2024-03-28 NOTE — PROGRESS NOTES
Abraham Rodriguez is a 83 y.o. male on day 0 of admission presenting with Hematoma of right lower leg.      Subjective   Patient resting in chair, reports right knee pain but responding to pain meds    Objective     Last Recorded Vitals  /82   Pulse 93   Temp 36.4 °C (97.5 °F)   Resp 18   Wt 86.2 kg (190 lb)   SpO2 94%   Intake/Output last 3 Shifts:    Intake/Output Summary (Last 24 hours) at 3/28/2024 1411  Last data filed at 3/28/2024 1021  Gross per 24 hour   Intake 300 ml   Output 1500 ml   Net -1200 ml       Admission Weight  Weight: 86.2 kg (190 lb) (03/26/24 1402)    Daily Weight  03/26/24 : 86.2 kg (190 lb)      Physical Exam    Relevant Results  Scheduled medications  acetaminophen, 975 mg, oral, q8h  [Held by provider] aspirin, 325 mg, oral, BID  bisacodyl, 10 mg, rectal, Daily  finasteride, 5 mg, oral, q PM  lactulose, 20 g, oral, Daily  loratadine, 10 mg, oral, Daily  losartan, 50 mg, oral, Daily  melatonin, 5 mg, oral, Nightly  metoprolol succinate XL, 50 mg, oral, Daily  pantoprazole, 40 mg, oral, Daily before breakfast  polyethylene glycol, 17 g, oral, BID  predniSONE, 60 mg, oral, Daily   Followed by  [START ON 4/3/2024] predniSONE, 40 mg, oral, Daily   Followed by  [START ON 4/10/2024] predniSONE, 20 mg, oral, Daily  sennosides-docusate sodium, 2 tablet, oral, BID  tamsulosin, 0.4 mg, oral, q PM      Continuous medications     PRN medications  PRN medications: carisoprodol, dicyclomine, gabapentin, HYDROmorphone, HYDROmorphone, ipratropium-albuteroL, ondansetron, simethicone    Results for orders placed or performed during the hospital encounter of 03/26/24 (from the past 24 hour(s))   CBC   Result Value Ref Range    WBC 9.5 4.4 - 11.3 x10*3/uL    nRBC 0.0 0.0 - 0.0 /100 WBCs    RBC 4.28 (L) 4.50 - 5.90 x10*6/uL    Hemoglobin 13.1 (L) 13.5 - 17.5 g/dL    Hematocrit 40.8 (L) 41.0 - 52.0 %    MCV 95 80 - 100 fL    MCH 30.6 26.0 - 34.0 pg    MCHC 32.1 32.0 - 36.0 g/dL    RDW 12.9 11.5 - 14.5 %     Platelets 300 150 - 450 x10*3/uL   Renal Function Panel   Result Value Ref Range    Glucose 144 (H) 74 - 99 mg/dL    Sodium 134 (L) 136 - 145 mmol/L    Potassium 4.4 3.5 - 5.3 mmol/L    Chloride 101 98 - 107 mmol/L    Bicarbonate 24 21 - 32 mmol/L    Anion Gap 13 10 - 20 mmol/L    Urea Nitrogen 27 (H) 6 - 23 mg/dL    Creatinine 0.88 0.50 - 1.30 mg/dL    eGFR 85 >60 mL/min/1.73m*2    Calcium 9.4 8.6 - 10.3 mg/dL    Phosphorus 3.6 2.5 - 4.9 mg/dL    Albumin 3.8 3.4 - 5.0 g/dL   Magnesium   Result Value Ref Range    Magnesium 2.32 1.60 - 2.40 mg/dL     CT knee right wo IV contrast    1. Status post recent right total knee arthroplasty with intact hardware. Redemonstration of a peripherally calcified intracapsular synovial body projecting over the posterolateral joint space.   2. Large volume suprapatellar joint effusion with layering densities/hemarthrosis is most likely favored to be related to recent postsurgical status. However this is of indeterminate sterility and superimposed infection can not be excluded in appropriate clinical setting. Recommend surgical correlation. 3. Reticular subcutaneous soft tissue edema about the knee joint is most likely favored to be reactive related to recent postsurgical status.           Signed by: Rima Lemus 3/27/2024 1:23 PM         Assessment/Plan   84yo CM with PMH of CAD, HTN, GERD, BPH, and COPD/YOSEF, and recent right TKA on 3/20 that presented to the ED with right knee pain and was admitted for same reason.    Right knee pain with hematoma  - s/p right TKA on 3/20/24 with Dr. Arora  - CT extremity  - ortho following, rec steroid taper, oral dilaudid  - ok for WBAT to RLE with walker  - resume ASA for DVT prophylaxis  - PT/OT consulted, appreciate recs    CAD, HTN  - continue home ARB, metoprolol, and torsemide    GERD  - continue home PPI    BPH  - continue home tamsulosin and finasteride    DVT Proph: ASA    Dispo: Patient requires close inpatient monitoring in setting  of right knee pain, unable to care for self at home and needs placement, discharge planning pending PT/OT recommendations.             Charlee Davis, DO

## 2024-03-28 NOTE — PROGRESS NOTES
Physical Therapy    Physical Therapy Evaluation    Patient Name: Abraham Rodriguez  MRN: [id number]  Today's Date: 3/28/2024   Time Calculation  Start Time: 0849  Stop Time: 0906  Time Calculation (min): 17 min    Assessment/Plan   PT Assessment  PT Assessment Results: Decreased strength, Decreased endurance, Decreased range of motion, Impaired balance, Decreased mobility, Decreased coordination  Rehab Prognosis: Good  Barriers to Discharge: Pain control  Evaluation/Treatment Tolerance: Patient limited by pain  Medical Staff Made Aware: Yes  Strengths: Ability to acquire knowledge, Housing layout, Support of Caregivers  Barriers to Participation: Comorbidities  End of Session Communication: Bedside nurse  Assessment Comment: Patient with limited tolerance for weight bearing activities secondary to pain. Pt with failed d/c home d/t decreased activity tolerance and increased care needs. Patient would benefit from mod intensity PT intervention.  End of Session Patient Position: Up in chair, Alarm on (B LE elevated, ice to R knee)  IP OR SWING BED PT PLAN  Inpatient or Swing Bed: Inpatient  PT Plan  Treatment/Interventions: Bed mobility, Transfer training, Gait training, Stair training, Balance training, Strengthening, Endurance training, Range of motion  PT Plan: Skilled PT  PT Frequency: 5 times per week  PT Discharge Recommendations: Moderate intensity level of continued care  Equipment Recommended upon Discharge: Wheeled walker  PT Recommended Transfer Status: Assist x1  PT - OK to Discharge: Yes (per PT POC)      Subjective   General Visit Information:  General  Reason for Referral: Impaired functional mobility; R TKA 3/20  Referred By: Scott Arora  Past Medical History Relevant to Rehab: CAD, HTN,  Family/Caregiver Present: No  Prior to Session Communication: Bedside nurse  Patient Position Received: Bed, 3 rail up, Alarm off, not on at start of session  General Comment: Patient cooperative, agreeable to PT eval  Home  Living:  Home Living  Type of Home: House  Lives With: Alone  Home Adaptive Equipment: Walker rolling or standard  Home Layout: One level  Home Access: Stairs to enter without rails  Entrance Stairs-Rails: None  Entrance Stairs-Number of Steps: 1  Home Living Comments: (+) driving  Prior Level of Function:  Prior Function Per Pt/Caregiver Report  Level of [place]: Independent with ADLs and functional transfers, Independent with homemaking with ambulation  Receives Help From: Family  ADL Assistance: Independent  Homemaking Assistance: Independent  Ambulatory Assistance: Independent  Vocational: Retired  Precautions:  Precautions  LE Weight Bearing Status: Weight Bearing as Tolerated  Medical Precautions: Fall precautions  Post-Surgical Precautions: Right knee precautions  Precautions Comment: Ultarsound (-) DVT, all imagining typical 1 week post op  Vital Signs:       Objective   Pain:  Pain Assessment  Pain Assessment: 0-10  Pain Score: 8  Pain Type: Surgical pain  Pain Location: Knee  Pain Orientation: Right  Pain Interventions: Repositioned, Cold applied  Cognition:  Cognition  Overall Cognitive Status: Within Functional Limits  Orientation Level: Oriented X4    General Assessments:  General Observation  General Observation: Distal edema noted in R ankle and foot               Activity Tolerance  Endurance: Tolerates less than 10 min exercise, no significant change in vital signs    Sensation  Light Touch: No apparent deficits    Strength  Strength Comments: L LE 4+/5, R hip 4-/5, R knee 3+/5, R ankle 3+/5  Strength  Strength Comments: L LE 4+/5, R hip 4-/5, R knee 3+/5, R ankle 3+/5           Coordination  Movements are Fluid and Coordinated: Yes    Postural Control  Postural Control: Within Functional Limits    Static Sitting Balance  Static Sitting-Balance Support: No upper extremity supported, Feet supported  Static Sitting-Level of Assistance: Independent    Static Standing Balance  Static  Standing-Balance Support: Bilateral upper extremity supported  Static Standing-Level of Assistance: Contact guard  Functional Assessments:       Bed Mobility  Bed Mobility: Yes  Bed Mobility 1  Bed Mobility 1: Supine to sitting  Level of Assistance 1: Modified independent (HOB elevated and use of rail)    Transfers  Transfer: Yes  Transfer 1  Transfer From 1: Sit to, Stand to  Transfer to 1: Sit, Stand  Technique 1: Sit to stand, Stand to sit  Transfer Device 1: Walker  Transfer Level of Assistance 1: Contact guard    Ambulation/Gait Training  Ambulation/Gait Training Performed: Yes  Ambulation/Gait Training 1  Surface 1: Level tile  Device 1: Rolling walker  Assistance 1: Contact guard  Quality of Gait 1:  (limited weight bearing R LE, verbal cues for heel strike in stance and knee flexion during swing)  Comments/Distance (ft) 1: 5'    Outcome Measures:  [facility] Basic Mobility  Turning from your back to your side while in a flat bed without using bedrails: None  Moving from lying on your back to sitting on the side of a flat bed without using bedrails: None  Moving to and from bed to chair (including a wheelchair): A little  Standing up from a chair using your arms (e.g. wheelchair or bedside chair): A little  To walk in hospital room: A little  Climbing 3-5 steps with railing: A lot  Basic Mobility - Total Score: 19    Encounter Problems       Encounter Problems (Active)       Mobility       STG - Patient will ambulate with 2WW 50' mod I  (Progressing)       Start:  03/28/24    Expected End:  04/11/24            STG - Patient will ambulate up and down a curb/step with 2WW CGA  (Progressing)       Start:  03/28/24    Expected End:  04/11/24            Patient will achieve R knee ROM in supine 0-90 deg  (Progressing)       Start:  03/28/24    Expected End:  04/11/24               PT Transfers       STG - Patient will perform bed mobility independently with HOB flat  (Progressing)       Start:  03/28/24    Expected  End:  04/11/24            STG - Patient will transfer sit to and from stand mod I  (Progressing)       Start:  03/28/24    Expected End:  04/11/24               Pain - Adult              Education Documentation  Handouts, taught by Claudette Barbosa PT at 3/28/2024  9:21 AM.  Learner: Patient  Readiness: Acceptance  Method: Explanation, Handout  Response: Verbalizes Understanding, Demonstrated Understanding    Precautions, taught by Claudette Barbosa PT at 3/28/2024  9:21 AM.  Learner: Patient  Readiness: Acceptance  Method: Explanation, Handout  Response: Verbalizes Understanding, Demonstrated Understanding    Body Mechanics, taught by Claudette Barbosa PT at 3/28/2024  9:21 AM.  Learner: Patient  Readiness: Acceptance  Method: Explanation, Handout  Response: Verbalizes Understanding, Demonstrated Understanding    Home Exercise Program, taught by Claudette Barbosa PT at 3/28/2024  9:21 AM.  Learner: Patient  Readiness: Acceptance  Method: Explanation, Handout  Response: Verbalizes Understanding, Demonstrated Understanding    Mobility Training, taught by Claudette Barbosa PT at 3/28/2024  9:21 AM.  Learner: Patient  Readiness: Acceptance  Method: Explanation, Handout  Response: Verbalizes Understanding, Demonstrated Understanding    Education Comments  No comments found.

## 2024-03-28 NOTE — PROGRESS NOTES
Occupational Therapy    Evaluation/Treatment    Patient Name: Abraham Rodriguez  MRN: [id number]  : 1940  Today's Date: 24  Time Calculation  Start Time: 953  Stop Time:   Time Calculation (min): 35 min       Assessment:  OT Assessment: Pt presents with decreased ADL, decreased functional transfers, decreased endurance secondary to R TKA/pain. Pt unsuccessful with home-going previously, would benefit from continued OT to maximize pt safety and independence.  Prognosis: Good  Barriers to Discharge: None  Evaluation/Treatment Tolerance: Patient limited by pain  Medical Staff Made Aware: Yes  End of Session Communication: Bedside nurse  End of Session Patient Position: Up in chair, Alarm on (B LE elevated, ice to R knee)  OT Assessment Results: Decreased ADL status, Decreased safe judgment during ADL, Decreased endurance, Decreased functional mobility  Prognosis: Good  Barriers to Discharge: None  Evaluation/Treatment Tolerance: Patient limited by pain  Medical Staff Made Aware: Yes  Strengths: Ability to acquire knowledge, Housing layout, Support of Caregivers  Barriers to Participation: Comorbidities  Plan:  Treatment Interventions: ADL retraining, Functional transfer training, Equipment evaluation/education, Compensatory technique education, Endurance training  OT Frequency: 3 times per week  OT Discharge Recommendations: Moderate intensity level of continued care  OT Recommended Transfer Status: Assist of 1  OT - OK to Discharge: Yes (per OT POC)  Treatment Interventions: ADL retraining, Functional transfer training, Equipment evaluation/education, Compensatory technique education, Endurance training    Subjective   Current Problem:  1. Right leg swelling          General:   OT Received On: 24  General  Reason for Referral: 84 yo male referred to OT for R TKA, impaired ADL, impaired mobility  Referred By: Scott Arora  Past Medical History Relevant to Rehab: CAD, HTN,  Prior to Session  Communication: Bedside nurse  Patient Position Received: Up in chair, Alarm on  General Comment: Patient cooperative, agreeable to OT eval  Precautions:  LE Weight Bearing Status: Weight Bearing as Tolerated  Medical Precautions: Fall precautions  Post-Surgical Precautions: Right knee precautions  Precautions Comment: Ultarsound (-) DVT, all imagining typical 1 week post op     Pain:  Pain Assessment  Pain Assessment: 0-10  Pain Score: 8  Pain Type: Surgical pain  Pain Location: Knee  Pain Orientation: Right  Pain Interventions: Cold applied    Objective   Cognition:  Overall Cognitive Status: Within Functional Limits  Orientation Level: Oriented X4     Home Living:  Type of Home: House  Lives With: Alone  Home Adaptive Equipment: Walker rolling or standard  Home Layout: One level  Home Access: Stairs to enter without rails  Entrance Stairs-Rails: None  Entrance Stairs-Number of Steps: 1  Bathroom Shower/Tub: Tub/shower unit  Bathroom Equipment: Grab bars in shower, Shower chair with back  Home Living Comments: (+) driving  Prior Function:  Level of [place]: Independent with ADLs and functional transfers, Independent with homemaking with ambulation  Receives Help From: Family  ADL Assistance: Independent  Homemaking Assistance: Independent  Ambulatory Assistance: Independent     ADL:  Eating Assistance: Independent  Grooming Assistance: Independent  Bathing Assistance: Moderate  UE Dressing Assistance: Independent  LE Dressing Assistance: Moderate  Toileting Assistance with Device: Minimal  Functional Assistance: Minimal  ADL Comments: Pt able to manage feet through pant legs and arrange over hips with increased time/CGA. Other ADL performance anticipated d/t current clinical presentation, pain  Activities of Daily Living: LE Dressing  LE Dressing: Yes  Pants Level of Assistance: Contact guard  LE Dressing Where Assessed: Chair  Activity Tolerance:  Endurance: Tolerates less than 10 min exercise, no significant  change in vital signs  Functional Standing Tolerance:  Time: ~10 mins  Activity: during ADL and MMT/AROM assessment  Functional Standing Tolerance Comments: pt demo'd ability to shift weight to RLE with good balance  Bed Mobility/Transfers: Transfers  Transfer: Yes  Transfer 1  Transfer From 1: Sit to  Transfer to 1: Stand  Technique 1: Sit to stand, Stand to sit  Transfer Device 1: Walker  Transfer Level of Assistance 1: Contact guard  Trials/Comments 1: 2 trials during ADL, assist for safety with increased time, decreased endurance    Sitting Balance:  Static Sitting Balance  Static Sitting-Balance Support: Feet supported  Static Sitting-Level of Assistance: Independent  Standing Balance:  Static Standing Balance  Static Standing-Balance Support: Bilateral upper extremity supported  Static Standing-Level of Assistance: Contact guard    Strength:  Strength Comments: TAVO 5/5    Hand Function:  Hand Function  Gross Grasp: Functional  Coordination: Functional  Extremities: RUE   RUE : Within Functional Limits and LUE   LUE: Within Functional Limits    Outcome Measures: [facility] Daily Activity  Putting on and taking off regular lower body clothing: A lot  Bathing (including washing, rinsing, drying): A lot  Putting on and taking off regular upper body clothing: None  Toileting, which includes using toilet, bedpan or urinal: A lot  Taking care of personal grooming such as brushing teeth: None  Eating Meals: None  Daily Activity - Total Score: 18      Education Documentation  Handouts, taught by Gordon Joshau OT at 3/28/2024 11:58 AM.  Learner: Patient  Readiness: Acceptance  Method: Explanation, Demonstration, Handout  Response: Verbalizes Understanding, Demonstrated Understanding  Comment: Handout provided on types of AE/DME, where to purchase, benefits of owning for post-discharge use    Body Mechanics, taught by Gordon Joshua OT at 3/28/2024 11:58 AM.  Learner: Patient  Readiness: Acceptance  Method:  Explanation, Demonstration, Handout  Response: Verbalizes Understanding, Demonstrated Understanding  Comment: Handout provided on types of AE/DME, where to purchase, benefits of owning for post-discharge use    Precautions, taught by Gordon Joshua OT at 3/28/2024 11:58 AM.  Learner: Patient  Readiness: Acceptance  Method: Explanation, Demonstration, Handout  Response: Verbalizes Understanding, Demonstrated Understanding  Comment: Handout provided on types of AE/DME, where to purchase, benefits of owning for post-discharge use    ADL Training, taught by Gordon Joshua OT at 3/28/2024 11:58 AM.  Learner: Patient  Readiness: Acceptance  Method: Explanation, Demonstration, Handout  Response: Verbalizes Understanding, Demonstrated Understanding  Comment: Handout provided on types of AE/DME, where to purchase, benefits of owning for post-discharge use    Body Mechanics, taught by Gordon Joshua OT at 3/28/2024 11:40 AM.  Learner: Patient  Readiness: Acceptance  Method: Explanation  Response: Verbalizes Understanding    Precautions, taught by Gordon Joshua OT at 3/28/2024 11:40 AM.  Learner: Patient  Readiness: Acceptance  Method: Explanation  Response: Verbalizes Understanding    ADL Training, taught by Gordon Joshua OT at 3/28/2024 11:40 AM.  Learner: Patient  Readiness: Acceptance  Method: Explanation  Response: Verbalizes Understanding    Education Comments  No comments found.        Goals:  Encounter Problems       Encounter Problems (Active)       OT Goals       Pt will demo LE ADL completion with modified independence, using AE if needed.  (Progressing)       Start:  03/28/24    Expected End:  04/11/24            Pt will complete [id number] transfers using LRD in preparation for ADLs with supervision  (Progressing)       Start:  03/28/24    Expected End:  04/11/24            Pt will increase endurance to tolerate 20 min of OOB activity with no more than 1 rest break in order to increase  ability to engage in ADL completion.  (Progressing)       Start:  03/28/24    Expected End:  04/11/24            Pt will tolerate 15 min stand during functional task completion with no more than 1 rest break in order to increase endurance for functional task completion.  (Progressing)       Start:  03/28/24    Expected End:  04/11/24            Pt will demo and/or verbalize 2-3 energy conservation techniques to incorporate into functional mobility or ADL to improve performance and increase independence.  (Progressing)       Start:  03/28/24    Expected End:  04/11/24

## 2024-03-28 NOTE — PROGRESS NOTES
03/28/24 1623   Discharge Planning   Patient expects to be discharged to: [facility], plan for dc 3/29. Call placed to patient's daughter, Keila, she is agreeable.

## 2024-03-29 ENCOUNTER — HOME CARE VISIT (OUTPATIENT)
Dept: HOME HEALTH SERVICES | Facility: HOME HEALTH | Age: 84
End: 2024-03-29
Payer: MEDICARE

## 2024-03-29 VITALS
TEMPERATURE: 97.6 F | OXYGEN SATURATION: 93 % | RESPIRATION RATE: 18 BRPM | BODY MASS INDEX: 26.6 KG/M2 | WEIGHT: 190 LBS | HEART RATE: 89 BPM | SYSTOLIC BLOOD PRESSURE: 158 MMHG | HEIGHT: 71 IN | DIASTOLIC BLOOD PRESSURE: 75 MMHG

## 2024-03-29 PROBLEM — S80.11XA HEMATOMA OF RIGHT LOWER LEG: Status: RESOLVED | Noted: 2024-03-26 | Resolved: 2024-03-29

## 2024-03-29 PROBLEM — M79.89 RIGHT LEG SWELLING: Status: RESOLVED | Noted: 2023-09-14 | Resolved: 2024-03-29

## 2024-03-29 LAB
ALBUMIN SERPL BCP-MCNC: 3.5 G/DL (ref 3.4–5)
ANION GAP SERPL CALC-SCNC: 11 MMOL/L (ref 10–20)
BUN SERPL-MCNC: 33 MG/DL (ref 6–23)
CALCIUM SERPL-MCNC: 9.1 MG/DL (ref 8.6–10.3)
CHLORIDE SERPL-SCNC: 103 MMOL/L (ref 98–107)
CO2 SERPL-SCNC: 27 MMOL/L (ref 21–32)
CREAT SERPL-MCNC: 0.8 MG/DL (ref 0.5–1.3)
EGFRCR SERPLBLD CKD-EPI 2021: 88 ML/MIN/1.73M*2
ERYTHROCYTE [DISTWIDTH] IN BLOOD BY AUTOMATED COUNT: 13.1 % (ref 11.5–14.5)
GLUCOSE SERPL-MCNC: 106 MG/DL (ref 74–99)
HCT VFR BLD AUTO: 35.5 % (ref 41–52)
HGB BLD-MCNC: 11.7 G/DL (ref 13.5–17.5)
MAGNESIUM SERPL-MCNC: 2.31 MG/DL (ref 1.6–2.4)
MCH RBC QN AUTO: 30.9 PG (ref 26–34)
MCHC RBC AUTO-ENTMCNC: 33 G/DL (ref 32–36)
MCV RBC AUTO: 94 FL (ref 80–100)
NRBC BLD-RTO: 0 /100 WBCS (ref 0–0)
PHOSPHATE SERPL-MCNC: 3 MG/DL (ref 2.5–4.9)
PLATELET # BLD AUTO: 278 X10*3/UL (ref 150–450)
POTASSIUM SERPL-SCNC: 3.7 MMOL/L (ref 3.5–5.3)
RBC # BLD AUTO: 3.79 X10*6/UL (ref 4.5–5.9)
SODIUM SERPL-SCNC: 137 MMOL/L (ref 136–145)
WBC # BLD AUTO: 12.7 X10*3/UL (ref 4.4–11.3)

## 2024-03-29 PROCEDURE — 1090000001 HH PPS REVENUE CREDIT

## 2024-03-29 PROCEDURE — 2500000004 HC RX 250 GENERAL PHARMACY W/ HCPCS (ALT 636 FOR OP/ED): Performed by: NURSE PRACTITIONER

## 2024-03-29 PROCEDURE — 2500000001 HC RX 250 WO HCPCS SELF ADMINISTERED DRUGS (ALT 637 FOR MEDICARE OP): Performed by: NURSE PRACTITIONER

## 2024-03-29 PROCEDURE — 2500000001 HC RX 250 WO HCPCS SELF ADMINISTERED DRUGS (ALT 637 FOR MEDICARE OP): Performed by: INTERNAL MEDICINE

## 2024-03-29 PROCEDURE — 83735 ASSAY OF MAGNESIUM: CPT | Performed by: INTERNAL MEDICINE

## 2024-03-29 PROCEDURE — 2500000004 HC RX 250 GENERAL PHARMACY W/ HCPCS (ALT 636 FOR OP/ED): Performed by: INTERNAL MEDICINE

## 2024-03-29 PROCEDURE — 84100 ASSAY OF PHOSPHORUS: CPT | Performed by: INTERNAL MEDICINE

## 2024-03-29 PROCEDURE — 1090000002 HH PPS REVENUE DEBIT

## 2024-03-29 PROCEDURE — 99232 SBSQ HOSP IP/OBS MODERATE 35: CPT | Performed by: NURSE PRACTITIONER

## 2024-03-29 PROCEDURE — 99239 HOSP IP/OBS DSCHRG MGMT >30: CPT | Performed by: NURSE PRACTITIONER

## 2024-03-29 PROCEDURE — 85027 COMPLETE CBC AUTOMATED: CPT | Performed by: INTERNAL MEDICINE

## 2024-03-29 PROCEDURE — 36415 COLL VENOUS BLD VENIPUNCTURE: CPT | Performed by: INTERNAL MEDICINE

## 2024-03-29 RX ORDER — ACETAMINOPHEN 325 MG/1
975 TABLET ORAL EVERY 8 HOURS
Qty: 126 TABLET | Refills: 0
Start: 2024-03-29 | End: 2024-04-15 | Stop reason: HOSPADM

## 2024-03-29 RX ORDER — PREDNISONE 20 MG/1
60 TABLET ORAL DAILY
Qty: 15 TABLET | Refills: 0
Start: 2024-03-29 | End: 2024-04-15 | Stop reason: HOSPADM

## 2024-03-29 RX ORDER — HYDROMORPHONE HYDROCHLORIDE 4 MG/1
2 TABLET ORAL EVERY 4 HOURS PRN
Qty: 10 TABLET | Refills: 0 | Status: SHIPPED | OUTPATIENT
Start: 2024-03-29 | End: 2024-04-01

## 2024-03-29 RX ORDER — PREDNISONE 20 MG/1
20 TABLET ORAL DAILY
Qty: 7 TABLET | Refills: 0
Start: 2024-04-10 | End: 2024-04-15 | Stop reason: HOSPADM

## 2024-03-29 RX ORDER — POLYETHYLENE GLYCOL 3350 17 G/17G
17 POWDER, FOR SOLUTION ORAL 2 TIMES DAILY
Qty: 60 PACKET | Refills: 0 | Status: ON HOLD | OUTPATIENT
Start: 2024-03-29 | End: 2024-04-24 | Stop reason: WASHOUT

## 2024-03-29 RX ORDER — ACETAMINOPHEN 500 MG
5 TABLET ORAL NIGHTLY
Qty: 30 TABLET | Refills: 0 | Status: ON HOLD
Start: 2024-03-29 | End: 2024-04-24 | Stop reason: WASHOUT

## 2024-03-29 RX ORDER — HYDROMORPHONE HYDROCHLORIDE 4 MG/1
4 TABLET ORAL EVERY 6 HOURS PRN
Qty: 10 TABLET | Refills: 0 | Status: SHIPPED | OUTPATIENT
Start: 2024-03-29 | End: 2024-03-29 | Stop reason: SDUPTHER

## 2024-03-29 RX ORDER — DICYCLOMINE HYDROCHLORIDE 10 MG/1
10 CAPSULE ORAL 4 TIMES DAILY PRN
Qty: 120 CAPSULE | Refills: 0
Start: 2024-03-29 | End: 2024-04-15 | Stop reason: HOSPADM

## 2024-03-29 RX ORDER — HYDROMORPHONE HYDROCHLORIDE 4 MG/1
4 TABLET ORAL EVERY 6 HOURS PRN
Qty: 10 TABLET | Refills: 0 | Status: SHIPPED | OUTPATIENT
Start: 2024-03-29 | End: 2024-03-29 | Stop reason: HOSPADM

## 2024-03-29 RX ORDER — SIMETHICONE 80 MG
80 TABLET,CHEWABLE ORAL 4 TIMES DAILY PRN
Qty: 30 TABLET | Refills: 0 | Status: ON HOLD | OUTPATIENT
Start: 2024-03-29 | End: 2024-04-24 | Stop reason: WASHOUT

## 2024-03-29 RX ORDER — AMOXICILLIN 250 MG
2 CAPSULE ORAL 2 TIMES DAILY
Qty: 120 TABLET | Refills: 0 | Status: ON HOLD | OUTPATIENT
Start: 2024-03-29 | End: 2024-04-24 | Stop reason: WASHOUT

## 2024-03-29 RX ORDER — PANTOPRAZOLE SODIUM 40 MG/1
40 TABLET, DELAYED RELEASE ORAL
Qty: 30 TABLET | Refills: 0 | Status: ON HOLD | OUTPATIENT
Start: 2024-03-29 | End: 2024-04-24 | Stop reason: WASHOUT

## 2024-03-29 RX ORDER — OXYCODONE HYDROCHLORIDE 5 MG/1
5 TABLET ORAL EVERY 4 HOURS PRN
Status: DISCONTINUED | OUTPATIENT
Start: 2024-03-29 | End: 2024-03-29 | Stop reason: HOSPADM

## 2024-03-29 RX ORDER — OXYCODONE HYDROCHLORIDE 10 MG/1
10 TABLET ORAL EVERY 4 HOURS PRN
Status: DISCONTINUED | OUTPATIENT
Start: 2024-03-29 | End: 2024-03-29 | Stop reason: HOSPADM

## 2024-03-29 RX ORDER — PREDNISONE 20 MG/1
40 TABLET ORAL DAILY
Qty: 14 TABLET | Refills: 0
Start: 2024-04-03 | End: 2024-04-15 | Stop reason: HOSPADM

## 2024-03-29 RX ORDER — BISACODYL 10 MG/1
10 SUPPOSITORY RECTAL DAILY
Qty: 30 SUPPOSITORY | Refills: 0 | Status: ON HOLD
Start: 2024-03-29 | End: 2024-04-24 | Stop reason: WASHOUT

## 2024-03-29 RX ADMIN — ACETAMINOPHEN 975 MG: 325 TABLET ORAL at 04:04

## 2024-03-29 RX ADMIN — PANTOPRAZOLE SODIUM 40 MG: 40 TABLET, DELAYED RELEASE ORAL at 09:06

## 2024-03-29 RX ADMIN — SIMETHICONE 80 MG: 80 TABLET, CHEWABLE ORAL at 04:11

## 2024-03-29 RX ADMIN — OXYCODONE HYDROCHLORIDE 10 MG: 10 TABLET ORAL at 09:05

## 2024-03-29 RX ADMIN — ACETAMINOPHEN 975 MG: 325 TABLET ORAL at 12:16

## 2024-03-29 RX ADMIN — SENNOSIDES AND DOCUSATE SODIUM 2 TABLET: 8.6; 5 TABLET ORAL at 09:11

## 2024-03-29 RX ADMIN — METOPROLOL SUCCINATE 50 MG: 50 TABLET, EXTENDED RELEASE ORAL at 09:07

## 2024-03-29 RX ADMIN — LORATADINE 10 MG: 10 TABLET ORAL at 09:07

## 2024-03-29 RX ADMIN — ASPIRIN 325 MG: 325 TABLET, COATED ORAL at 09:00

## 2024-03-29 RX ADMIN — PREDNISONE 60 MG: 20 TABLET ORAL at 09:07

## 2024-03-29 RX ADMIN — LOSARTAN POTASSIUM 50 MG: 50 TABLET, FILM COATED ORAL at 09:07

## 2024-03-29 RX ADMIN — HYDROMORPHONE HYDROCHLORIDE 1 MG: 1 INJECTION, SOLUTION INTRAMUSCULAR; INTRAVENOUS; SUBCUTANEOUS at 04:04

## 2024-03-29 ASSESSMENT — PAIN - FUNCTIONAL ASSESSMENT
PAIN_FUNCTIONAL_ASSESSMENT: 0-10
PAIN_FUNCTIONAL_ASSESSMENT: 0-10

## 2024-03-29 ASSESSMENT — COGNITIVE AND FUNCTIONAL STATUS - GENERAL
WALKING IN HOSPITAL ROOM: A LITTLE
HELP NEEDED FOR BATHING: A LOT
DAILY ACTIVITIY SCORE: 18
CLIMB 3 TO 5 STEPS WITH RAILING: A LOT
TOILETING: A LOT
MOBILITY SCORE: 19
STANDING UP FROM CHAIR USING ARMS: A LITTLE
DRESSING REGULAR LOWER BODY CLOTHING: A LOT
MOVING TO AND FROM BED TO CHAIR: A LITTLE

## 2024-03-29 ASSESSMENT — PAIN SCALES - GENERAL
PAINLEVEL_OUTOF10: 8
PAINLEVEL_OUTOF10: 9
PAINLEVEL_OUTOF10: 10 - WORST POSSIBLE PAIN

## 2024-03-29 ASSESSMENT — PAIN DESCRIPTION - ORIENTATION: ORIENTATION: RIGHT

## 2024-03-29 ASSESSMENT — PAIN DESCRIPTION - LOCATION: LOCATION: KNEE

## 2024-03-29 NOTE — CARE PLAN
Problem: Pain  Goal: My pain/discomfort is manageable  Outcome: Progressing     Problem: Safety  Goal: Patient will be injury free during hospitalization  Outcome: Progressing     Problem: Daily Care  Goal: Daily care needs are met  Outcome: Progressing     Problem: Psychosocial Needs  Goal: Demonstrates ability to cope with hospitalization/illness  Outcome: Progressing     Problem: Pain - Adult  Goal: Verbalizes/displays adequate comfort level or baseline comfort level  Outcome: Progressing   The patient's goals for the shift include      The clinical goals for the shift include pain control

## 2024-03-29 NOTE — PROGRESS NOTES
"Abraham Rodriguez is a 83 y.o. male on day 1 of admission presenting with Hematoma of right lower leg.    Subjective   No acute overnight events.  Rates pain 7/10.     Objective     Physical Exam  Vitals reviewed.   HENT:      Head: Normocephalic and atraumatic.   Eyes:      Extraocular Movements: Extraocular movements intact.      Conjunctiva/sclera: Conjunctivae normal.      Pupils: Pupils are equal, round, and reactive to light.   Cardiovascular:      Rate and Rhythm: Normal rate and regular rhythm.      Pulses: Normal pulses.   Pulmonary:      Effort: Pulmonary effort is normal.      Breath sounds: Normal breath sounds.   Abdominal:      General: Bowel sounds are normal.      Palpations: Abdomen is soft.   Musculoskeletal:      Comments: Right knee post op dressing C/D/I, moderate swelling of right knee that extends distally to toes, improving.  There is moderate ecchymosis that extends from distal medial thigh to toes, 2+ DP/PT pulses on right.  Right knee extension 0 deg, active flexion 40 degrees, no pain with passive flexion of toes, leg and foot are warm and well perfused, negative neetu's     Skin:     General: Skin is warm and dry.      Capillary Refill: Capillary refill takes less than 2 seconds.   Neurological:      General: No focal deficit present.      Mental Status: He is alert and oriented to person, place, and time.       Last Recorded Vitals  Blood pressure 158/75, pulse 89, temperature 36.4 °C (97.6 °F), resp. rate 18, height 1.803 m (5' 11\"), weight 86.2 kg (190 lb), SpO2 93 %.  Intake/Output last 3 Shifts:  I/O last 3 completed shifts:  In: 540 (6.3 mL/kg) [P.O.:540]  Out: 800 (9.3 mL/kg) [Urine:800 (0.3 mL/kg/hr)]  Weight: 86.2 kg     Relevant Results  CT knee right wo IV contrast    Result Date: 3/27/2024  Interpreted By:  Rima Lemus, STUDY: CT KNEE RIGHT WO IV CONTRAST   INDICATION: Signs/Symptoms:with metal reduction - swelling posterior knee, pain, ecchymosis POD 7 from right total knee " arthroplasty   COMPARISON: CT right knee without contrast dated 01/22/2024.   ACCESSION NUMBER(S): [id number]   ORDERING CLINICIAN: GRAHAM BARNETT   TECHNIQUE: Contiguous axial CT images were obtained at  2 mm slice thickness through the right knee without intravenous contrast administration. Coronal and sagittal reformatted images were performed. 3D reformatted images were created and reviewed.   FINDINGS: OSSEOUS STRUCTURES: Postsurgical changes of right total knee arthroplasty are noted. Hardware appears grossly intact without significant lucency at the bone metal interface within limits of background metal related artifacts. There is no evidence of periprosthetic fractures. Visualized distal femur and proximal tibia and fibula are grossly intact without evidence of displaced fracture deformities. There is an oval circumscribed peripherally calcified osseous fragment along the posterolateral aspect of the joint space measuring approximately 1.8 x 1.6 x 1 cm in transverse, cc and AP dimensions respectively (best seen on sagittal image 37/100). 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   SOFT TISSUES: Evaluation is limited by the lack of intravenous contrast. Within this limitation, there is large volume suprapatellar joint effusion with layering hyperdense contents, likely favored to represent hemarthrosis and could be related to recent postsurgical status. There is also linearly oriented calcific densities along the anterior aspect of the suprapatellar fossa just above the level of superior margin of patella, likely favored to be sequela of prior synovitis with residual synovial calcifications. There is mild reticular subcutaneous soft tissue edema about the knee joint, predominantly involving the prepatellar soft tissues and extending along the medial and lateral aspect of the knee. This is most likely favored to be reactive secondary to recent postsurgical status.  No large confluent fluid collection is noted in the posterior soft tissues of the knee and proximal leg included in the visualized field of view. Visualized musculature appears grossly unremarkable without evidence of intramuscular collections or mass lesions within limits of noncontrast technique.       1. Status post recent right total knee arthroplasty with intact hardware. Redemonstration of a peripherally calcified intracapsular synovial body projecting over the posterolateral joint space. 2. Large volume suprapatellar joint effusion with layering densities/hemarthrosis is most likely favored to be related to recent postsurgical status. However this is of indeterminate sterility and superimposed infection can not be excluded in appropriate clinical setting. Recommend surgical correlation. 3. Reticular subcutaneous soft tissue edema about the knee joint is most likely favored to be reactive related to recent postsurgical status.         MACRO: None.   Signed by: Rima Lemus 3/27/2024 1:23 PM Dictation workstation:   [id number]    Lower extremity venous duplex right    Result Date: 3/26/2024  Interpreted By:  Jose G Mariscal, STUDY: [facility] LOWER EXTREMITY VENOUS DUPLEX RIGHT  3/26/2024 6:13 pm   INDICATION: 82 y/o   M with  Signs/Symptoms:swelling, post op. LMP:  Unknown.   COMPARISON: None.   ACCESSION NUMBER(S): [id number]   ORDERING CLINICIAN: SHYANNE CHAMPION   TECHNIQUE: Routine ultrasound of the  right lower extremity was performed with duplex Doppler (color and spectral) evaluation.   Static images were obtained for remote interpretation.   FINDINGS: THIGH VEINS:  The common femoral, femoral, popliteal, proximal medial saphenous, and deep femoral veins are patent and free of thrombus. The veins are normally compressible.  They demonstrate normal phasic flow and augmentation response.   CALF VEINS:  The paired peroneal and posterior tibial calf veins are patent.       No deep venous  thrombosis of the  right lower extremity.   MACRO: None   Signed by: Jose G Mariscal 3/26/2024 6:45 PM Dictation workstation:   [id number]    XR knee right 1-2 views    Result Date: 3/20/2024  Interpreted By:  Yennifer Jimenez, STUDY: Right knee, 2 views.   INDICATION: Signs/Symptoms:right TKR   COMPARISON: 07/03/2023.   ACCESSION NUMBER(S): [id number]   ORDERING CLINICIAN: TRICIA BOSWELL   FINDINGS: No acute fracture or malalignment. Immediate postsurgical changes of right total knee arthroplasty. Hardware is intact without perihardware fractures or lucencies. Expected postsurgical soft tissue gas within the knee joint.       1. Immediate postsurgical changes of right total knee arthroplasty without hardware complication.   MACRO: None.   Signed by: Yennifer Jimenez 3/20/2024 3:00 PM Dictation workstation:   [id number]     Scheduled medications  acetaminophen, 975 mg, oral, q8h  aspirin, 325 mg, oral, BID  bisacodyl, 10 mg, rectal, Daily  finasteride, 5 mg, oral, q PM  lactulose, 20 g, oral, Daily  loratadine, 10 mg, oral, Daily  losartan, 50 mg, oral, Daily  melatonin, 5 mg, oral, Nightly  metoprolol succinate XL, 50 mg, oral, Daily  pantoprazole, 40 mg, oral, Daily before breakfast  polyethylene glycol, 17 g, oral, BID  predniSONE, 60 mg, oral, Daily   Followed by  [START ON 4/3/2024] predniSONE, 40 mg, oral, Daily   Followed by  [START ON 4/10/2024] predniSONE, 20 mg, oral, Daily  sennosides-docusate sodium, 2 tablet, oral, BID  tamsulosin, 0.4 mg, oral, q PM      Continuous medications     PRN medications  PRN medications: carisoprodol, dicyclomine, gabapentin, HYDROmorphone, HYDROmorphone, ipratropium-albuteroL, ondansetron, simethicone  Results for orders placed or performed during the hospital encounter of 03/26/24 (from the past 24 hour(s))   CBC   Result Value Ref Range    WBC 12.7 (H) 4.4 - 11.3 x10*3/uL    nRBC 0.0 0.0 - 0.0 /100 WBCs    RBC 3.79 (L) 4.50 - 5.90 x10*6/uL    Hemoglobin 11.7 (L) 13.5 -  17.5 g/dL    Hematocrit 35.5 (L) 41.0 - 52.0 %    MCV 94 80 - 100 fL    MCH 30.9 26.0 - 34.0 pg    MCHC 33.0 32.0 - 36.0 g/dL    RDW 13.1 11.5 - 14.5 %    Platelets 278 150 - 450 x10*3/uL   Renal Function Panel   Result Value Ref Range    Glucose 106 (H) 74 - 99 mg/dL    Sodium 137 136 - 145 mmol/L    Potassium 3.7 3.5 - 5.3 mmol/L    Chloride 103 98 - 107 mmol/L    Bicarbonate 27 21 - 32 mmol/L    Anion Gap 11 10 - 20 mmol/L    Urea Nitrogen 33 (H) 6 - 23 mg/dL    Creatinine 0.80 0.50 - 1.30 mg/dL    eGFR 88 >60 mL/min/1.73m*2    Calcium 9.1 8.6 - 10.3 mg/dL    Phosphorus 3.0 2.5 - 4.9 mg/dL    Albumin 3.5 3.4 - 5.0 g/dL   Magnesium   Result Value Ref Range    Magnesium 2.31 1.60 - 2.40 mg/dL       Assessment/Plan   Principal Problem:    Hematoma of right lower leg  Active Problems:    Right leg swelling    83 year old male, POD 9 from right total knee arthroplasty with right lower extremity pain, swelling, and ecchymosis and constipation (resolved)    - no acute orthopedic intervention  - imaging and labs reviewed:  HgB this AM 11.7  - on Prednisone taper course  - Oxycodone changed to oral Dilaudid, some improvement in pain  - WBAT RLE with walker and PT.  He is currently a two person assist due to progressive pain and weakness of right leg for mobilization  - Aspirin 325 mg oral BID x 4 weeks for DVT prophylaxis   - Patient will benefit from rehab stay due to inability to mobilize due to severity of pain and daughter at home is unable to assist   - orthopaedically clear for DC to rehab    Discussed with Dr. Arora         I spent 30 minutes in the professional and overall care of this patient.    Vivi Garcias, APRN-CNP

## 2024-03-29 NOTE — PROGRESS NOTES
03/29/24 1103   Discharge Planning   Living Arrangements Alone   Support Systems Children;Family members   Assistance Needed A&Ox3, prior to knee replacement independent with ADLs, has a rollator, walker, cane, and crutches if needed; room air at baseline, active with  HHC (SN/PT/OT)   Type of Residence Private residence   Home or Post Acute Services Post acute facilities (Rehab/SNF/etc)   Type of Post Acute Facility Services Skilled nursing   Patient expects to be discharged to: new  Acute Rehab, patient to discharge 3/29, transport confirmed for 1400; CNC made aware to send final documents; call placed to daughter, no answer VM left   Does the patient need discharge transport arranged? Yes   RoundTrip coordination needed? Yes   Has discharge transport been arranged? Yes   What day is the transport expected? 03/29/24   What time is the transport expected? 1400

## 2024-03-29 NOTE — PROGRESS NOTES
Occupational Therapy                 Therapy Communication Note    Patient Name: Abraham Rodriguez  MRN: [id number]  Today's Date: 3/29/2024     Discipline: Occupational Therapy    Missed Visit Reason: Missed Visit Reason: Patient refused (Pt declined OT this date, stating he is discharging later today and prefers to rest at this time.)    Missed Time: Attempt    Comment: [id number]

## 2024-03-29 NOTE — DISCHARGE SUMMARY
Discharge Diagnosis  Hematoma of right lower leg    Issues Requiring Follow-Up  Right knee post op follow up    Discharge Meds     Your medication list        START taking these medications        Instructions Last Dose Given Next Dose Due   acetaminophen 325 mg tablet  Commonly known as: Tylenol      Take 3 tablets (975 mg) by mouth every 8 hours for 14 days.       bisacodyl 10 mg suppository  Commonly known as: Dulcolax      Insert 1 suppository (10 mg) into the rectum once daily.       HYDROmorphone 4 mg tablet  Commonly known as: Dilaudid      Take 0.5 tablets (2 mg) by mouth every 4 hours if needed for severe pain (7 - 10) for up to 3 days.       melatonin 5 mg tablet      Take 1 tablet (5 mg) by mouth once daily at bedtime.       pantoprazole 40 mg EC tablet  Commonly known as: ProtoNix      Take 1 tablet (40 mg) by mouth once daily in the morning. Take before meals. Do not crush, chew, or split.       polyethylene glycol 17 gram packet  Commonly known as: Glycolax, Miralax      Take 17 g by mouth 2 times a day.       predniSONE 20 mg tablet  Commonly known as: Deltasone      Take 3 tablets (60 mg) by mouth once daily for 5 doses.       predniSONE 20 mg tablet  Commonly known as: Deltasone  Start taking on: April 3, 2024      Take 2 tablets (40 mg) by mouth once daily for 7 doses. Do not start before April 3, 2024.       predniSONE 20 mg tablet  Commonly known as: Deltasone  Start taking on: April 10, 2024      Take 1 tablet (20 mg) by mouth once daily for 7 doses. Do not start before April 10, 2024.       sennosides-docusate sodium 8.6-50 mg tablet  Commonly known as: Jesenia-Colace      Take 2 tablets by mouth 2 times a day.       simethicone 80 mg chewable tablet  Commonly known as: Mylicon      Chew 1 tablet (80 mg) 4 times a day as needed for flatulence.              CHANGE how you take these medications        Instructions Last Dose Given Next Dose Due   dicyclomine 20 mg tablet  Commonly known as:  Bentyl  What changed: Another medication with the same name was added. Make sure you understand how and when to take each.      Take 1 tablet (20 mg) by mouth 4 times a day before meals.       dicyclomine 10 mg capsule  Commonly known as: Bentyl  What changed: You were already taking a medication with the same name, and this prescription was added. Make sure you understand how and when to take each.      Take 1 capsule (10 mg) by mouth 4 times a day as needed (Abdominal cramping).              CONTINUE taking these medications        Instructions Last Dose Given Next Dose Due   ascorbic acid 500 mg tablet  Commonly known as: Vitamin C           aspirin 325 mg EC tablet           carisoprodol 350 mg tablet  Commonly known as: Soma           celecoxib 200 mg capsule  Commonly known as: CeleBREX           Dexilant 60 mg DR capsule  Generic drug: dexlansoprazole           docusate sodium 100 mg capsule  Commonly known as: Colace           finasteride 5 mg tablet  Commonly known as: Proscar           fluticasone 50 mcg/actuation nasal spray  Commonly known as: Flonase           gabapentin 300 mg capsule  Commonly known as: Neurontin           irbesartan 150 mg tablet  Commonly known as: Avapro           loratadine 10 mg tablet  Commonly known as: Claritin           metoprolol succinate XL 50 mg 24 hr tablet  Commonly known as: Toprol-XL           naloxone 4 mg/0.1 mL nasal spray  Commonly known as: Narcan           tamsulosin 0.4 mg 24 hr capsule  Commonly known as: Flomax           torsemide 20 mg tablet  Commonly known as: Demadex                  STOP taking these medications      oxyCODONE 5 mg immediate release capsule  Commonly known as: Oxy-IR        oxyCODONE-acetaminophen 5-325 mg tablet  Commonly known as: Percocet                  Where to Get Your Medications        These medications were sent to [id number], [place] - [address] [facility]  [address] [facility] [address]      Phone:  [phone]   pantoprazole 40 mg EC tablet  polyethylene glycol 17 gram packet  sennosides-docusate sodium 8.6-50 mg tablet  simethicone 80 mg chewable tablet       You can get these medications from any pharmacy    Bring a paper prescription for each of these medications  HYDROmorphone 4 mg tablet       Information about where to get these medications is not yet available    Ask your nurse or doctor about these medications  acetaminophen 325 mg tablet  bisacodyl 10 mg suppository  dicyclomine 10 mg capsule  melatonin 5 mg tablet  predniSONE 20 mg tablet  predniSONE 20 mg tablet  predniSONE 20 mg tablet         Test Results Pending At Discharge  Pending Labs       Order Current Status    Blood Culture Preliminary result            Hospital Course   82yo CM with PMH of CAD, HTN, GERD, BPH, and COPD/YOSEF, and recent right TKA on 3/20 that presented to the ED with right knee pain and was admitted for same reason.     Right knee pain with hematoma  - s/p right TKA on 3/20/24 with Dr. Arora  - CT extremity  - ortho following, rec steroid taper, oral dilaudid  - ok for WBAT to RLE with walker  - resume ASA for DVT prophylaxis  - PT/OT consulted, appreciate recs     CAD, HTN  - continue home ARB, metoprolol, and torsemide     BPH  - continue home tamsulosin and finasteride     DVT Proph: ASA BID    Dispo: The patient is medically stable for discharge to SNF.       Pertinent Physical Exam At Time of Discharge  Physical Exam  HENT:      Nose: Nose normal.      Mouth/Throat:      Mouth: Mucous membranes are moist.   Eyes:      Extraocular Movements: Extraocular movements intact.   Cardiovascular:      Rate and Rhythm: Regular rhythm.   Pulmonary:      Breath sounds: Normal breath sounds.   Chest:      Chest wall: No tenderness.   Abdominal:      Palpations: Abdomen is soft.   Musculoskeletal:         General: Normal range of motion.   Skin:     General: Skin is dry.   Neurological:      Mental Status: He is alert and oriented  to person, place, and time.      Comments: [facility]   Psychiatric:         Mood and Affect: Mood normal.         Outpatient Follow-Up  Future Appointments   Date Time Provider Department Center   4/1/2024 To Be Determined Blaire Araujo, [facility]   4/3/2024 To Be Determined Nisha Ruth, PT [facility]   4/8/2024 To Be Determined Blaire Araujo, [facility]   4/10/2024 To Be Determined Nisha Ruth, PT [facility]   4/22/2024 11:45 AM Macho Elizabeth MD [facility]   6/12/2024 11:20 AM Vivi Coto, APRN-CNP [id number] [facility]   11/22/2024 10:00 AM Giselle Johnson, APRN-CNP [id number] [place]         Ann Gonzalez APRN-CNP

## 2024-03-29 NOTE — PROGRESS NOTES
"Physical Therapy                 Therapy Communication Note    Patient Name: Abraham Rodriguez  MRN: [id number]  Today's Date: 3/29/2024     Discipline: Physical Therapy    Missed Visit Reason: Missed Visit Reason: Patient refused    Missed Time: Attempt @0835    Comment: pt politely declined PT.  Pt stated that he has been up and moving and is feeling good.  Pt reports that he is leaving today and does not feel he needs tx, but \"appreciates being checked in on\".  Nursing staff made aware of pt's decision.   "

## 2024-03-29 NOTE — DISCHARGE SUMMARY
Discharge Diagnosis  Hematoma of right lower leg    Issues Requiring Follow-Up  Post op right knee    Discharge Meds     Your medication list        START taking these medications        Instructions Last Dose Given Next Dose Due   acetaminophen 325 mg tablet  Commonly known as: Tylenol      Take 3 tablets (975 mg) by mouth every 8 hours for 14 days.       bisacodyl 10 mg suppository  Commonly known as: Dulcolax      Insert 1 suppository (10 mg) into the rectum once daily.       HYDROmorphone 4 mg tablet  Commonly known as: Dilaudid      Take 0.5 tablets (2 mg) by mouth every 4 hours if needed for severe pain (7 - 10) for up to 3 days.       melatonin 5 mg tablet      Take 1 tablet (5 mg) by mouth once daily at bedtime.       pantoprazole 40 mg EC tablet  Commonly known as: ProtoNix      Take 1 tablet (40 mg) by mouth once daily in the morning. Take before meals. Do not crush, chew, or split.       polyethylene glycol 17 gram packet  Commonly known as: Glycolax, Miralax      Take 17 g by mouth 2 times a day.       predniSONE 20 mg tablet  Commonly known as: Deltasone      Take 3 tablets (60 mg) by mouth once daily for 5 doses.       predniSONE 20 mg tablet  Commonly known as: Deltasone  Start taking on: April 3, 2024      Take 2 tablets (40 mg) by mouth once daily for 7 doses. Do not start before April 3, 2024.       predniSONE 20 mg tablet  Commonly known as: Deltasone  Start taking on: April 10, 2024      Take 1 tablet (20 mg) by mouth once daily for 7 doses. Do not start before April 10, 2024.       sennosides-docusate sodium 8.6-50 mg tablet  Commonly known as: Jesenia-Colace      Take 2 tablets by mouth 2 times a day.       simethicone 80 mg chewable tablet  Commonly known as: Mylicon      Chew 1 tablet (80 mg) 4 times a day as needed for flatulence.              CHANGE how you take these medications        Instructions Last Dose Given Next Dose Due   dicyclomine 20 mg tablet  Commonly known as: Bentyl  What  changed: Another medication with the same name was added. Make sure you understand how and when to take each.      Take 1 tablet (20 mg) by mouth 4 times a day before meals.       dicyclomine 10 mg capsule  Commonly known as: Bentyl  What changed: You were already taking a medication with the same name, and this prescription was added. Make sure you understand how and when to take each.      Take 1 capsule (10 mg) by mouth 4 times a day as needed (Abdominal cramping).              CONTINUE taking these medications        Instructions Last Dose Given Next Dose Due   ascorbic acid 500 mg tablet  Commonly known as: Vitamin C           aspirin 325 mg EC tablet           carisoprodol 350 mg tablet  Commonly known as: Soma           celecoxib 200 mg capsule  Commonly known as: CeleBREX           Dexilant 60 mg DR capsule  Generic drug: dexlansoprazole           docusate sodium 100 mg capsule  Commonly known as: Colace           finasteride 5 mg tablet  Commonly known as: Proscar           fluticasone 50 mcg/actuation nasal spray  Commonly known as: Flonase           gabapentin 300 mg capsule  Commonly known as: Neurontin           irbesartan 150 mg tablet  Commonly known as: Avapro           loratadine 10 mg tablet  Commonly known as: Claritin           metoprolol succinate XL 50 mg 24 hr tablet  Commonly known as: Toprol-XL           naloxone 4 mg/0.1 mL nasal spray  Commonly known as: Narcan           tamsulosin 0.4 mg 24 hr capsule  Commonly known as: Flomax           torsemide 20 mg tablet  Commonly known as: Demadex                  STOP taking these medications      oxyCODONE 5 mg immediate release capsule  Commonly known as: Oxy-IR        oxyCODONE-acetaminophen 5-325 mg tablet  Commonly known as: Percocet                  Where to Get Your Medications        These medications were sent to [id number] [place] [id number] [address] [address]      Phone: [phone]    pantoprazole 40 mg EC tablet  polyethylene glycol 17 gram packet  sennosides-docusate sodium 8.6-50 mg tablet  simethicone 80 mg chewable tablet       You can get these medications from any pharmacy    Bring a paper prescription for each of these medications  HYDROmorphone 4 mg tablet       Information about where to get these medications is not yet available    Ask your nurse or doctor about these medications  acetaminophen 325 mg tablet  bisacodyl 10 mg suppository  dicyclomine 10 mg capsule  melatonin 5 mg tablet  predniSONE 20 mg tablet  predniSONE 20 mg tablet  predniSONE 20 mg tablet         Test Results Pending At Discharge  Pending Labs       Order Current Status    Blood Culture Preliminary result            Hospital Course   82yo CM with PMH of CAD, HTN, GERD, BPH, and COPD/YOSEF, and recent right TKA on 3/20 that presented to the ED with right knee pain and was admitted for same reason.     Right knee pain with hematoma  - s/p right TKA on 3/20/24 with Dr. Arora  - CT right lower extremity reads: Status post recent right total knee arthroplasty with intact hardware. Redemonstration of a peripherally calcified intracapsular synovial body projecting over the posterolateral joint space. 2. Large volume suprapatellar joint effusion with layering densities/hemarthrosis is most likely favored to be related to recent postsurgical status. However this is of indeterminate sterility and superimposed infection can not be excluded in appropriate clinical setting. Recommend surgical correlation. 3. Reticular subcutaneous soft tissue edema about the knee joint is most likely favored to be reactive related to recent postsurgical status.   - ortho following, rec steroid taper, oral dilaudid  - ok for WBAT to RLE with walker  - resume ASA for DVT prophylaxis  -PT/OT rec moderate intensity therapy  -No surgical intervention required     CAD, HTN  - continue home ARB, metoprolol, and torsemide     GERD  - continue home PPI      BPH  - continue home tamsulosin and finasteride     DVT Proph: ASA    Patient is stable for discharge to acute rehab at this time.        Pertinent Physical Exam At Time of Discharge  Physical Exam    Outpatient Follow-Up  Future Appointments   Date Time Provider Department Center   4/1/2024 To Be Determined Blaire Araujo, [facility]   4/3/2024 To Be Determined Nisha Ruth, PT [facility]   4/8/2024 To Be Determined Blaire Araujo, [facility]   4/10/2024 To Be Determined Nisha Ruth, PT [facility]   4/22/2024 11:45 AM Macho Elizabeth MD [facility]   6/12/2024 11:20 AM Vivi Coto APRN-CNP [id number] [facility]   11/22/2024 10:00 AM Giselle Johnson APRN-CNP [id number] [place]         Ann Gonzalez APRN-CNP

## 2024-03-30 ENCOUNTER — TELEPHONE (OUTPATIENT)
Dept: PHYSICAL MEDICINE AND REHAB | Facility: CLINIC | Age: 84
End: 2024-03-30
Payer: MEDICARE

## 2024-03-30 DIAGNOSIS — M79.89 RIGHT LEG SWELLING: Primary | ICD-10-CM

## 2024-03-30 DIAGNOSIS — M79.604 PAIN AND SWELLING OF RIGHT LOWER EXTREMITY: ICD-10-CM

## 2024-03-30 DIAGNOSIS — M79.89 PAIN AND SWELLING OF RIGHT LOWER EXTREMITY: ICD-10-CM

## 2024-03-30 DIAGNOSIS — Z96.651 S/P TOTAL KNEE ARTHROPLASTY, RIGHT: ICD-10-CM

## 2024-03-30 PROCEDURE — 1090000001 HH PPS REVENUE CREDIT

## 2024-03-30 PROCEDURE — 99232 SBSQ HOSP IP/OBS MODERATE 35: CPT | Performed by: PHYSICAL MEDICINE & REHABILITATION

## 2024-03-30 PROCEDURE — 1090000002 HH PPS REVENUE DEBIT

## 2024-03-31 ENCOUNTER — LAB REQUISITION (OUTPATIENT)
Dept: LAB | Facility: HOSPITAL | Age: 84
End: 2024-03-31

## 2024-03-31 DIAGNOSIS — Z51.89 ENCOUNTER FOR OTHER SPECIFIED AFTERCARE: ICD-10-CM

## 2024-03-31 LAB
ANION GAP SERPL CALC-SCNC: 12 MMOL/L (ref 10–20)
BACTERIA BLD CULT: NORMAL
BUN SERPL-MCNC: 30 MG/DL (ref 6–23)
CALCIUM SERPL-MCNC: 8.9 MG/DL (ref 8.6–10.3)
CHLORIDE SERPL-SCNC: 106 MMOL/L (ref 98–107)
CO2 SERPL-SCNC: 25 MMOL/L (ref 21–32)
CREAT SERPL-MCNC: 0.93 MG/DL (ref 0.5–1.3)
EGFRCR SERPLBLD CKD-EPI 2021: 81 ML/MIN/1.73M*2
ERYTHROCYTE [DISTWIDTH] IN BLOOD BY AUTOMATED COUNT: 13.6 % (ref 11.5–14.5)
GLUCOSE SERPL-MCNC: 103 MG/DL (ref 74–99)
HCT VFR BLD AUTO: 34.4 % (ref 41–52)
HGB BLD-MCNC: 11.2 G/DL (ref 13.5–17.5)
MCH RBC QN AUTO: 30.5 PG (ref 26–34)
MCHC RBC AUTO-ENTMCNC: 32.6 G/DL (ref 32–36)
MCV RBC AUTO: 94 FL (ref 80–100)
NRBC BLD-RTO: 0 /100 WBCS (ref 0–0)
PLATELET # BLD AUTO: 280 X10*3/UL (ref 150–450)
POTASSIUM SERPL-SCNC: 3.9 MMOL/L (ref 3.5–5.3)
RBC # BLD AUTO: 3.67 X10*6/UL (ref 4.5–5.9)
SODIUM SERPL-SCNC: 139 MMOL/L (ref 136–145)
WBC # BLD AUTO: 10.3 X10*3/UL (ref 4.4–11.3)

## 2024-03-31 PROCEDURE — 85027 COMPLETE CBC AUTOMATED: CPT

## 2024-03-31 PROCEDURE — 1090000002 HH PPS REVENUE DEBIT

## 2024-03-31 PROCEDURE — 1090000001 HH PPS REVENUE CREDIT

## 2024-03-31 PROCEDURE — 80048 BASIC METABOLIC PNL TOTAL CA: CPT

## 2024-04-01 ENCOUNTER — LAB REQUISITION (OUTPATIENT)
Dept: LAB | Facility: LAB | Age: 84
End: 2024-04-01
Payer: COMMERCIAL

## 2024-04-01 ENCOUNTER — HOME CARE VISIT (OUTPATIENT)
Dept: HOME HEALTH SERVICES | Facility: HOME HEALTH | Age: 84
End: 2024-04-01
Payer: MEDICARE

## 2024-04-01 DIAGNOSIS — M54.16 LUMBAR RADICULOPATHY: ICD-10-CM

## 2024-04-01 DIAGNOSIS — M47.816 LUMBAR SPONDYLOSIS: ICD-10-CM

## 2024-04-01 DIAGNOSIS — Z51.89 ENCOUNTER FOR OTHER SPECIFIED AFTERCARE: ICD-10-CM

## 2024-04-01 DIAGNOSIS — M54.16 LUMBAR RADICULITIS: ICD-10-CM

## 2024-04-01 DIAGNOSIS — M25.562 LEFT KNEE PAIN, UNSPECIFIED CHRONICITY: ICD-10-CM

## 2024-04-01 LAB
ALBUMIN SERPL BCP-MCNC: 3.6 G/DL (ref 3.4–5)
ALP SERPL-CCNC: 48 U/L (ref 33–136)
ALT SERPL W P-5'-P-CCNC: 9 U/L (ref 10–52)
ANION GAP SERPL CALC-SCNC: 15 MMOL/L (ref 10–20)
AST SERPL W P-5'-P-CCNC: 11 U/L (ref 9–39)
BASOPHILS # BLD AUTO: 0.02 X10*3/UL (ref 0–0.1)
BASOPHILS NFR BLD AUTO: 0.2 %
BILIRUB SERPL-MCNC: 0.7 MG/DL (ref 0–1.2)
BUN SERPL-MCNC: 37 MG/DL (ref 6–23)
CALCIUM SERPL-MCNC: 8.9 MG/DL (ref 8.6–10.3)
CHLORIDE SERPL-SCNC: 101 MMOL/L (ref 98–107)
CO2 SERPL-SCNC: 27 MMOL/L (ref 21–32)
CREAT SERPL-MCNC: 1.18 MG/DL (ref 0.5–1.3)
EGFRCR SERPLBLD CKD-EPI 2021: 61 ML/MIN/1.73M*2
EOSINOPHIL # BLD AUTO: 0.03 X10*3/UL (ref 0–0.4)
EOSINOPHIL NFR BLD AUTO: 0.2 %
ERYTHROCYTE [DISTWIDTH] IN BLOOD BY AUTOMATED COUNT: 13.6 % (ref 11.5–14.5)
GLUCOSE SERPL-MCNC: 74 MG/DL (ref 74–99)
HCT VFR BLD AUTO: 35.7 % (ref 41–52)
HGB BLD-MCNC: 11.6 G/DL (ref 13.5–17.5)
IMM GRANULOCYTES # BLD AUTO: 0.16 X10*3/UL (ref 0–0.5)
IMM GRANULOCYTES NFR BLD AUTO: 1.3 % (ref 0–0.9)
LYMPHOCYTES # BLD AUTO: 2.43 X10*3/UL (ref 0.8–3)
LYMPHOCYTES NFR BLD AUTO: 19.3 %
MCH RBC QN AUTO: 30.5 PG (ref 26–34)
MCHC RBC AUTO-ENTMCNC: 32.5 G/DL (ref 32–36)
MCV RBC AUTO: 94 FL (ref 80–100)
MONOCYTES # BLD AUTO: 0.99 X10*3/UL (ref 0.05–0.8)
MONOCYTES NFR BLD AUTO: 7.9 %
NEUTROPHILS # BLD AUTO: 8.94 X10*3/UL (ref 1.6–5.5)
NEUTROPHILS NFR BLD AUTO: 71.1 %
NRBC BLD-RTO: 0 /100 WBCS (ref 0–0)
PLATELET # BLD AUTO: 287 X10*3/UL (ref 150–450)
POTASSIUM SERPL-SCNC: 4.1 MMOL/L (ref 3.5–5.3)
PROT SERPL-MCNC: 6.1 G/DL (ref 6.4–8.2)
RBC # BLD AUTO: 3.8 X10*6/UL (ref 4.5–5.9)
SODIUM SERPL-SCNC: 139 MMOL/L (ref 136–145)
WBC # BLD AUTO: 12.6 X10*3/UL (ref 4.4–11.3)

## 2024-04-01 PROCEDURE — 85025 COMPLETE CBC W/AUTO DIFF WBC: CPT

## 2024-04-01 PROCEDURE — 1090000001 HH PPS REVENUE CREDIT

## 2024-04-01 PROCEDURE — 80053 COMPREHEN METABOLIC PANEL: CPT

## 2024-04-01 PROCEDURE — 1090000002 HH PPS REVENUE DEBIT

## 2024-04-01 RX ORDER — OXYCODONE AND ACETAMINOPHEN 7.5; 325 MG/1; MG/1
1 TABLET ORAL 3 TIMES DAILY PRN
Qty: 84 TABLET | Refills: 0 | Status: ON HOLD | OUTPATIENT
Start: 2024-04-01 | End: 2024-04-15

## 2024-04-01 NOTE — TELEPHONE ENCOUNTER
Patient's med was discontinued in error by another provider.  Will re-send to [facility].

## 2024-04-02 ENCOUNTER — TELEPHONE (OUTPATIENT)
Dept: PRIMARY CARE | Facility: CLINIC | Age: 84
End: 2024-04-02
Payer: COMMERCIAL

## 2024-04-02 PROCEDURE — 1090000001 HH PPS REVENUE CREDIT

## 2024-04-02 PROCEDURE — 1090000002 HH PPS REVENUE DEBIT

## 2024-04-03 PROCEDURE — 1090000002 HH PPS REVENUE DEBIT

## 2024-04-03 PROCEDURE — 1090000001 HH PPS REVENUE CREDIT

## 2024-04-04 ENCOUNTER — LAB REQUISITION (OUTPATIENT)
Dept: LAB | Facility: LAB | Age: 84
End: 2024-04-04
Payer: COMMERCIAL

## 2024-04-04 DIAGNOSIS — Z51.89 ENCOUNTER FOR OTHER SPECIFIED AFTERCARE: ICD-10-CM

## 2024-04-04 LAB
ANION GAP SERPL CALC-SCNC: 13 MMOL/L (ref 10–20)
BUN SERPL-MCNC: 34 MG/DL (ref 6–23)
CALCIUM SERPL-MCNC: 8.3 MG/DL (ref 8.6–10.3)
CHLORIDE SERPL-SCNC: 104 MMOL/L (ref 98–107)
CO2 SERPL-SCNC: 24 MMOL/L (ref 21–32)
CREAT SERPL-MCNC: 0.9 MG/DL (ref 0.5–1.3)
EGFRCR SERPLBLD CKD-EPI 2021: 85 ML/MIN/1.73M*2
GLUCOSE SERPL-MCNC: 61 MG/DL (ref 74–99)
POTASSIUM SERPL-SCNC: 4 MMOL/L (ref 3.5–5.3)
SODIUM SERPL-SCNC: 137 MMOL/L (ref 136–145)

## 2024-04-04 PROCEDURE — 80048 BASIC METABOLIC PNL TOTAL CA: CPT

## 2024-04-04 PROCEDURE — 1090000001 HH PPS REVENUE CREDIT

## 2024-04-04 PROCEDURE — 1090000002 HH PPS REVENUE DEBIT

## 2024-04-05 PROCEDURE — 1090000002 HH PPS REVENUE DEBIT

## 2024-04-05 PROCEDURE — 1090000001 HH PPS REVENUE CREDIT

## 2024-04-06 PROCEDURE — 1090000001 HH PPS REVENUE CREDIT

## 2024-04-06 PROCEDURE — 1090000002 HH PPS REVENUE DEBIT

## 2024-04-07 PROCEDURE — 1090000002 HH PPS REVENUE DEBIT

## 2024-04-07 PROCEDURE — 1090000001 HH PPS REVENUE CREDIT

## 2024-04-08 PROCEDURE — 1090000002 HH PPS REVENUE DEBIT

## 2024-04-08 PROCEDURE — 1090000001 HH PPS REVENUE CREDIT

## 2024-04-09 ENCOUNTER — APPOINTMENT (OUTPATIENT)
Dept: RADIOLOGY | Facility: HOSPITAL | Age: 84
DRG: 603 | End: 2024-04-09
Payer: MEDICARE

## 2024-04-09 ENCOUNTER — HOSPITAL ENCOUNTER (INPATIENT)
Facility: HOSPITAL | Age: 84
LOS: 4 days | Discharge: NURSING, REHAB OR LONG-TERM CARE | DRG: 603 | End: 2024-04-15
Attending: EMERGENCY MEDICINE | Admitting: INTERNAL MEDICINE
Payer: MEDICARE

## 2024-04-09 DIAGNOSIS — M54.50 LOW BACK PAIN, UNSPECIFIED BACK PAIN LATERALITY, UNSPECIFIED CHRONICITY, UNSPECIFIED WHETHER SCIATICA PRESENT: ICD-10-CM

## 2024-04-09 DIAGNOSIS — J32.9 CHRONIC SINUSITIS, UNSPECIFIED LOCATION: ICD-10-CM

## 2024-04-09 DIAGNOSIS — M20.5X1 HALLUX LIMITUS, RIGHT: ICD-10-CM

## 2024-04-09 DIAGNOSIS — R23.4 FISSURE IN SKIN OF FOOT: ICD-10-CM

## 2024-04-09 DIAGNOSIS — J44.9 COPD, MILD (MULTI): ICD-10-CM

## 2024-04-09 DIAGNOSIS — N40.0 BENIGN PROSTATIC HYPERPLASIA, UNSPECIFIED WHETHER LOWER URINARY TRACT SYMPTOMS PRESENT: ICD-10-CM

## 2024-04-09 DIAGNOSIS — M25.511 RIGHT SHOULDER PAIN, UNSPECIFIED CHRONICITY: ICD-10-CM

## 2024-04-09 DIAGNOSIS — L03.115 CELLULITIS OF RIGHT LOWER EXTREMITY: Primary | ICD-10-CM

## 2024-04-09 DIAGNOSIS — M17.11 ARTHRITIS OF RIGHT KNEE: ICD-10-CM

## 2024-04-09 DIAGNOSIS — M54.9 CHRONIC BACK PAIN, UNSPECIFIED BACK LOCATION, UNSPECIFIED BACK PAIN LATERALITY: ICD-10-CM

## 2024-04-09 DIAGNOSIS — M25.561 RIGHT KNEE PAIN, UNSPECIFIED CHRONICITY: ICD-10-CM

## 2024-04-09 DIAGNOSIS — J61 ASBESTOSIS (MULTI): ICD-10-CM

## 2024-04-09 DIAGNOSIS — L89.311 PRESSURE INJURY OF RIGHT BUTTOCK, STAGE 1: ICD-10-CM

## 2024-04-09 DIAGNOSIS — Q28.3 CAVERNOUS MALFORMATION (HHS-HCC): ICD-10-CM

## 2024-04-09 DIAGNOSIS — M25.512 CHRONIC LEFT SHOULDER PAIN: ICD-10-CM

## 2024-04-09 DIAGNOSIS — M54.42 LOW BACK PAIN WITH LEFT-SIDED SCIATICA, UNSPECIFIED BACK PAIN LATERALITY, UNSPECIFIED CHRONICITY: ICD-10-CM

## 2024-04-09 DIAGNOSIS — M54.12 RIGHT CERVICAL RADICULOPATHY: ICD-10-CM

## 2024-04-09 DIAGNOSIS — M12.9 ARTHRITIS, MULTIPLE JOINT INVOLVEMENT: ICD-10-CM

## 2024-04-09 DIAGNOSIS — M25.552 HIP PAIN, LEFT: ICD-10-CM

## 2024-04-09 DIAGNOSIS — R20.0 NUMBNESS: ICD-10-CM

## 2024-04-09 DIAGNOSIS — Z96.651 STATUS POST TOTAL KNEE REPLACEMENT, RIGHT: ICD-10-CM

## 2024-04-09 DIAGNOSIS — S80.11XA HEMATOMA OF RIGHT LOWER LEG: ICD-10-CM

## 2024-04-09 DIAGNOSIS — E78.5 DYSLIPIDEMIA: ICD-10-CM

## 2024-04-09 DIAGNOSIS — J30.9 ALLERGIC RHINITIS, UNSPECIFIED SEASONALITY, UNSPECIFIED TRIGGER: ICD-10-CM

## 2024-04-09 DIAGNOSIS — K21.00 GASTROESOPHAGEAL REFLUX DISEASE WITH ESOPHAGITIS, UNSPECIFIED WHETHER HEMORRHAGE: ICD-10-CM

## 2024-04-09 DIAGNOSIS — I10 PRIMARY HYPERTENSION: ICD-10-CM

## 2024-04-09 DIAGNOSIS — M23.91 INTERNAL DERANGEMENT OF KNEE, RIGHT: ICD-10-CM

## 2024-04-09 DIAGNOSIS — R25.2 MUSCLE CRAMPING: ICD-10-CM

## 2024-04-09 DIAGNOSIS — M54.30 SCIATICA, UNSPECIFIED LATERALITY: ICD-10-CM

## 2024-04-09 DIAGNOSIS — R06.09 DYSPNEA ON EXERTION: ICD-10-CM

## 2024-04-09 DIAGNOSIS — M48.062 LUMBAR STENOSIS WITH NEUROGENIC CLAUDICATION: ICD-10-CM

## 2024-04-09 DIAGNOSIS — Z96.641 STATUS POST RIGHT HIP REPLACEMENT: ICD-10-CM

## 2024-04-09 DIAGNOSIS — G60.9 IDIOPATHIC PERIPHERAL NEUROPATHY: ICD-10-CM

## 2024-04-09 DIAGNOSIS — M65.4 DE QUERVAIN'S TENOSYNOVITIS, RIGHT: ICD-10-CM

## 2024-04-09 DIAGNOSIS — M17.12 ARTHRITIS OF KNEE, LEFT: ICD-10-CM

## 2024-04-09 DIAGNOSIS — I10 BENIGN ESSENTIAL HYPERTENSION: ICD-10-CM

## 2024-04-09 DIAGNOSIS — M20.42 HAMMER TOE OF SECOND TOE OF LEFT FOOT: ICD-10-CM

## 2024-04-09 DIAGNOSIS — I25.10 ATHEROSCLEROSIS OF NATIVE CORONARY ARTERY, UNSPECIFIED WHETHER ANGINA PRESENT, UNSPECIFIED WHETHER NATIVE OR TRANSPLANTED HEART: ICD-10-CM

## 2024-04-09 DIAGNOSIS — R91.8 LUNG INFILTRATE: ICD-10-CM

## 2024-04-09 DIAGNOSIS — E53.8 VITAMIN B 12 DEFICIENCY: ICD-10-CM

## 2024-04-09 DIAGNOSIS — M75.122 COMPLETE TEAR OF LEFT ROTATOR CUFF, UNSPECIFIED WHETHER TRAUMATIC: ICD-10-CM

## 2024-04-09 DIAGNOSIS — I50.30 HEART FAILURE WITH PRESERVED EJECTION FRACTION, UNSPECIFIED HF CHRONICITY (MULTI): ICD-10-CM

## 2024-04-09 DIAGNOSIS — M79.605 PAIN OF LEFT LOWER EXTREMITY: ICD-10-CM

## 2024-04-09 DIAGNOSIS — E87.6 HYPOKALEMIA: ICD-10-CM

## 2024-04-09 DIAGNOSIS — G89.29 CHRONIC BACK PAIN, UNSPECIFIED BACK LOCATION, UNSPECIFIED BACK PAIN LATERALITY: ICD-10-CM

## 2024-04-09 DIAGNOSIS — J98.4 RESTRICTIVE LUNG DISEASE: ICD-10-CM

## 2024-04-09 DIAGNOSIS — R20.0 NUMBNESS AND TINGLING OF LOWER EXTREMITY: ICD-10-CM

## 2024-04-09 DIAGNOSIS — M16.12 PRIMARY OSTEOARTHRITIS OF LEFT HIP: ICD-10-CM

## 2024-04-09 DIAGNOSIS — M54.12 CERVICAL RADICULITIS: ICD-10-CM

## 2024-04-09 DIAGNOSIS — J34.2 DEVIATED SEPTUM: ICD-10-CM

## 2024-04-09 DIAGNOSIS — M50.30 DDD (DEGENERATIVE DISC DISEASE), CERVICAL: ICD-10-CM

## 2024-04-09 DIAGNOSIS — M47.812 FACET ARTHROPATHY, CERVICAL: ICD-10-CM

## 2024-04-09 DIAGNOSIS — R73.9 HYPERGLYCEMIA: ICD-10-CM

## 2024-04-09 DIAGNOSIS — M54.2 CERVICALGIA: ICD-10-CM

## 2024-04-09 DIAGNOSIS — M79.18 MYOFASCIAL PAIN: ICD-10-CM

## 2024-04-09 DIAGNOSIS — M46.1 SACROILIITIS (CMS-HCC): ICD-10-CM

## 2024-04-09 DIAGNOSIS — M79.89 RIGHT LEG SWELLING: ICD-10-CM

## 2024-04-09 DIAGNOSIS — N52.9 ORGANIC IMPOTENCE: ICD-10-CM

## 2024-04-09 DIAGNOSIS — M25.50 ARTHRALGIA, UNSPECIFIED JOINT: ICD-10-CM

## 2024-04-09 DIAGNOSIS — M23.41 LOOSE BODY OF RIGHT KNEE: ICD-10-CM

## 2024-04-09 DIAGNOSIS — M54.41 LOW BACK PAIN WITH RIGHT-SIDED SCIATICA, UNSPECIFIED BACK PAIN LATERALITY, UNSPECIFIED CHRONICITY: ICD-10-CM

## 2024-04-09 DIAGNOSIS — M79.604 PAIN OF RIGHT LOWER EXTREMITY: ICD-10-CM

## 2024-04-09 DIAGNOSIS — M25.562 LEFT KNEE PAIN, UNSPECIFIED CHRONICITY: ICD-10-CM

## 2024-04-09 DIAGNOSIS — Z86.711 HISTORY OF PULMONARY EMBOLUS (PE): ICD-10-CM

## 2024-04-09 DIAGNOSIS — M51.36 DEGENERATION OF INTERVERTEBRAL DISC OF LUMBAR REGION: ICD-10-CM

## 2024-04-09 DIAGNOSIS — I26.99 BILATERAL PULMONARY EMBOLISM (MULTI): ICD-10-CM

## 2024-04-09 DIAGNOSIS — R19.5 LOOSE STOOLS: ICD-10-CM

## 2024-04-09 DIAGNOSIS — R60.9 EDEMA, UNSPECIFIED TYPE: ICD-10-CM

## 2024-04-09 DIAGNOSIS — M54.16 LUMBAR RADICULITIS: ICD-10-CM

## 2024-04-09 DIAGNOSIS — M19.90 OSTEOARTHRITIS, UNSPECIFIED OSTEOARTHRITIS TYPE, UNSPECIFIED SITE: ICD-10-CM

## 2024-04-09 DIAGNOSIS — M75.82 TENDINITIS OF LEFT ROTATOR CUFF: ICD-10-CM

## 2024-04-09 DIAGNOSIS — M17.11 PRIMARY OSTEOARTHRITIS OF RIGHT KNEE: ICD-10-CM

## 2024-04-09 DIAGNOSIS — M54.16 LUMBAR RADICULOPATHY: ICD-10-CM

## 2024-04-09 DIAGNOSIS — M47.816 LUMBAR SPONDYLOSIS: ICD-10-CM

## 2024-04-09 DIAGNOSIS — R20.2 NUMBNESS AND TINGLING OF LOWER EXTREMITY: ICD-10-CM

## 2024-04-09 DIAGNOSIS — Z96.641 HISTORY OF TOTAL RIGHT HIP ARTHROPLASTY: ICD-10-CM

## 2024-04-09 DIAGNOSIS — I25.10 ASHD (ARTERIOSCLEROTIC HEART DISEASE): ICD-10-CM

## 2024-04-09 DIAGNOSIS — M53.3 SACROILIAC JOINT DYSFUNCTION: ICD-10-CM

## 2024-04-09 DIAGNOSIS — M54.9 BACKACHE WITH RADIATION: ICD-10-CM

## 2024-04-09 DIAGNOSIS — L23.9 ALLERGIC DERMATITIS: ICD-10-CM

## 2024-04-09 DIAGNOSIS — I87.2 VENOUS INSUFFICIENCY OF BOTH LOWER EXTREMITIES: ICD-10-CM

## 2024-04-09 DIAGNOSIS — G89.29 CHRONIC LEFT SHOULDER PAIN: ICD-10-CM

## 2024-04-09 DIAGNOSIS — G57.30 PERONEAL NEUROPATHY, UNSPECIFIED LATERALITY: ICD-10-CM

## 2024-04-09 DIAGNOSIS — G89.4 CHRONIC PAIN SYNDROME: ICD-10-CM

## 2024-04-09 DIAGNOSIS — R60.0 PEDAL EDEMA: ICD-10-CM

## 2024-04-09 LAB
ALBUMIN SERPL BCP-MCNC: 3.3 G/DL (ref 3.4–5)
ALP SERPL-CCNC: 59 U/L (ref 33–136)
ALT SERPL W P-5'-P-CCNC: 10 U/L (ref 10–52)
ANION GAP SERPL CALC-SCNC: 11 MMOL/L (ref 10–20)
AST SERPL W P-5'-P-CCNC: 13 U/L (ref 9–39)
BASOPHILS # BLD AUTO: 0.01 X10*3/UL (ref 0–0.1)
BASOPHILS NFR BLD AUTO: 0.1 %
BILIRUB SERPL-MCNC: 0.5 MG/DL (ref 0–1.2)
BUN SERPL-MCNC: 20 MG/DL (ref 6–23)
CALCIUM SERPL-MCNC: 8.4 MG/DL (ref 8.6–10.3)
CHLORIDE SERPL-SCNC: 107 MMOL/L (ref 98–107)
CO2 SERPL-SCNC: 26 MMOL/L (ref 21–32)
CREAT SERPL-MCNC: 0.8 MG/DL (ref 0.5–1.3)
CRP SERPL-MCNC: 4.81 MG/DL
EGFRCR SERPLBLD CKD-EPI 2021: 88 ML/MIN/1.73M*2
EOSINOPHIL # BLD AUTO: 0.11 X10*3/UL (ref 0–0.4)
EOSINOPHIL NFR BLD AUTO: 1.3 %
ERYTHROCYTE [DISTWIDTH] IN BLOOD BY AUTOMATED COUNT: 14.5 % (ref 11.5–14.5)
ERYTHROCYTE [SEDIMENTATION RATE] IN BLOOD BY WESTERGREN METHOD: 2 MM/H (ref 0–20)
GLUCOSE SERPL-MCNC: 102 MG/DL (ref 74–99)
HCT VFR BLD AUTO: 31.1 % (ref 41–52)
HGB BLD-MCNC: 10 G/DL (ref 13.5–17.5)
IMM GRANULOCYTES # BLD AUTO: 0.05 X10*3/UL (ref 0–0.5)
IMM GRANULOCYTES NFR BLD AUTO: 0.6 % (ref 0–0.9)
LYMPHOCYTES # BLD AUTO: 1.11 X10*3/UL (ref 0.8–3)
LYMPHOCYTES NFR BLD AUTO: 13.4 %
MCH RBC QN AUTO: 30.9 PG (ref 26–34)
MCHC RBC AUTO-ENTMCNC: 32.2 G/DL (ref 32–36)
MCV RBC AUTO: 96 FL (ref 80–100)
MONOCYTES # BLD AUTO: 0.78 X10*3/UL (ref 0.05–0.8)
MONOCYTES NFR BLD AUTO: 9.4 %
NEUTROPHILS # BLD AUTO: 6.24 X10*3/UL (ref 1.6–5.5)
NEUTROPHILS NFR BLD AUTO: 75.2 %
NRBC BLD-RTO: 0 /100 WBCS (ref 0–0)
PLATELET # BLD AUTO: 212 X10*3/UL (ref 150–450)
POTASSIUM SERPL-SCNC: 3.7 MMOL/L (ref 3.5–5.3)
PROT SERPL-MCNC: 5.8 G/DL (ref 6.4–8.2)
RBC # BLD AUTO: 3.24 X10*6/UL (ref 4.5–5.9)
SODIUM SERPL-SCNC: 140 MMOL/L (ref 136–145)
WBC # BLD AUTO: 8.3 X10*3/UL (ref 4.4–11.3)

## 2024-04-09 PROCEDURE — 93971 EXTREMITY STUDY: CPT

## 2024-04-09 PROCEDURE — 96367 TX/PROPH/DG ADDL SEQ IV INF: CPT

## 2024-04-09 PROCEDURE — 2500000004 HC RX 250 GENERAL PHARMACY W/ HCPCS (ALT 636 FOR OP/ED)

## 2024-04-09 PROCEDURE — 86140 C-REACTIVE PROTEIN: CPT | Performed by: PHYSICIAN ASSISTANT

## 2024-04-09 PROCEDURE — 1090000002 HH PPS REVENUE DEBIT

## 2024-04-09 PROCEDURE — 93971 EXTREMITY STUDY: CPT | Mod: FOREIGN READ | Performed by: RADIOLOGY

## 2024-04-09 PROCEDURE — 99285 EMERGENCY DEPT VISIT HI MDM: CPT | Mod: 25

## 2024-04-09 PROCEDURE — 80053 COMPREHEN METABOLIC PANEL: CPT | Performed by: EMERGENCY MEDICINE

## 2024-04-09 PROCEDURE — 85652 RBC SED RATE AUTOMATED: CPT | Performed by: PHYSICIAN ASSISTANT

## 2024-04-09 PROCEDURE — 36415 COLL VENOUS BLD VENIPUNCTURE: CPT | Performed by: EMERGENCY MEDICINE

## 2024-04-09 PROCEDURE — 96375 TX/PRO/DX INJ NEW DRUG ADDON: CPT

## 2024-04-09 PROCEDURE — 2500000004 HC RX 250 GENERAL PHARMACY W/ HCPCS (ALT 636 FOR OP/ED): Performed by: PHYSICIAN ASSISTANT

## 2024-04-09 PROCEDURE — 1090000001 HH PPS REVENUE CREDIT

## 2024-04-09 PROCEDURE — 85025 COMPLETE CBC W/AUTO DIFF WBC: CPT | Performed by: EMERGENCY MEDICINE

## 2024-04-09 RX ORDER — ONDANSETRON HYDROCHLORIDE 2 MG/ML
INJECTION, SOLUTION INTRAVENOUS
Status: COMPLETED
Start: 2024-04-09 | End: 2024-04-09

## 2024-04-09 RX ORDER — ONDANSETRON HYDROCHLORIDE 2 MG/ML
4 INJECTION, SOLUTION INTRAVENOUS ONCE
Status: COMPLETED | OUTPATIENT
Start: 2024-04-09 | End: 2024-04-09

## 2024-04-09 RX ORDER — HYDROMORPHONE HYDROCHLORIDE 1 MG/ML
1 INJECTION, SOLUTION INTRAMUSCULAR; INTRAVENOUS; SUBCUTANEOUS ONCE
Status: COMPLETED | OUTPATIENT
Start: 2024-04-09 | End: 2024-04-09

## 2024-04-09 RX ADMIN — ONDANSETRON 4 MG: 2 INJECTION, SOLUTION INTRAMUSCULAR; INTRAVENOUS at 22:00

## 2024-04-09 RX ADMIN — HYDROMORPHONE HYDROCHLORIDE 1 MG: 1 INJECTION, SOLUTION INTRAMUSCULAR; INTRAVENOUS; SUBCUTANEOUS at 21:53

## 2024-04-09 RX ADMIN — ONDANSETRON HYDROCHLORIDE 4 MG: 2 INJECTION, SOLUTION INTRAVENOUS at 22:00

## 2024-04-09 ASSESSMENT — PAIN DESCRIPTION - ORIENTATION: ORIENTATION: RIGHT

## 2024-04-09 ASSESSMENT — PAIN DESCRIPTION - LOCATION: LOCATION: ANKLE

## 2024-04-09 ASSESSMENT — PAIN - FUNCTIONAL ASSESSMENT: PAIN_FUNCTIONAL_ASSESSMENT: 0-10

## 2024-04-09 ASSESSMENT — PAIN SCALES - GENERAL: PAINLEVEL_OUTOF10: 10 - WORST POSSIBLE PAIN

## 2024-04-10 PROBLEM — L03.90 CELLULITIS: Status: ACTIVE | Noted: 2024-04-10

## 2024-04-10 LAB
ANION GAP SERPL CALC-SCNC: 11 MMOL/L (ref 10–20)
BUN SERPL-MCNC: 17 MG/DL (ref 6–23)
CALCIUM SERPL-MCNC: 8.6 MG/DL (ref 8.6–10.3)
CHLORIDE SERPL-SCNC: 106 MMOL/L (ref 98–107)
CO2 SERPL-SCNC: 25 MMOL/L (ref 21–32)
CREAT SERPL-MCNC: 0.85 MG/DL (ref 0.5–1.3)
EGFRCR SERPLBLD CKD-EPI 2021: 86 ML/MIN/1.73M*2
ERYTHROCYTE [DISTWIDTH] IN BLOOD BY AUTOMATED COUNT: 14.6 % (ref 11.5–14.5)
GLUCOSE SERPL-MCNC: 92 MG/DL (ref 74–99)
HCT VFR BLD AUTO: 32.7 % (ref 41–52)
HGB BLD-MCNC: 10.4 G/DL (ref 13.5–17.5)
HOLD SPECIMEN: NORMAL
MCH RBC QN AUTO: 30.5 PG (ref 26–34)
MCHC RBC AUTO-ENTMCNC: 31.8 G/DL (ref 32–36)
MCV RBC AUTO: 96 FL (ref 80–100)
NRBC BLD-RTO: 0 /100 WBCS (ref 0–0)
PLATELET # BLD AUTO: 199 X10*3/UL (ref 150–450)
POTASSIUM SERPL-SCNC: 3.9 MMOL/L (ref 3.5–5.3)
RBC # BLD AUTO: 3.41 X10*6/UL (ref 4.5–5.9)
SODIUM SERPL-SCNC: 138 MMOL/L (ref 136–145)
WBC # BLD AUTO: 6.5 X10*3/UL (ref 4.4–11.3)

## 2024-04-10 PROCEDURE — 99232 SBSQ HOSP IP/OBS MODERATE 35: CPT | Performed by: NURSE PRACTITIONER

## 2024-04-10 PROCEDURE — 97165 OT EVAL LOW COMPLEX 30 MIN: CPT | Mod: GO | Performed by: OCCUPATIONAL THERAPIST

## 2024-04-10 PROCEDURE — 2500000001 HC RX 250 WO HCPCS SELF ADMINISTERED DRUGS (ALT 637 FOR MEDICARE OP): Performed by: NURSE PRACTITIONER

## 2024-04-10 PROCEDURE — 2500000001 HC RX 250 WO HCPCS SELF ADMINISTERED DRUGS (ALT 637 FOR MEDICARE OP)

## 2024-04-10 PROCEDURE — 2500000004 HC RX 250 GENERAL PHARMACY W/ HCPCS (ALT 636 FOR OP/ED)

## 2024-04-10 PROCEDURE — 99223 1ST HOSP IP/OBS HIGH 75: CPT

## 2024-04-10 PROCEDURE — 36415 COLL VENOUS BLD VENIPUNCTURE: CPT

## 2024-04-10 PROCEDURE — 36415 COLL VENOUS BLD VENIPUNCTURE: CPT | Performed by: NURSE PRACTITIONER

## 2024-04-10 PROCEDURE — 2500000004 HC RX 250 GENERAL PHARMACY W/ HCPCS (ALT 636 FOR OP/ED): Performed by: EMERGENCY MEDICINE

## 2024-04-10 PROCEDURE — G0378 HOSPITAL OBSERVATION PER HR: HCPCS

## 2024-04-10 PROCEDURE — 96372 THER/PROPH/DIAG INJ SC/IM: CPT

## 2024-04-10 PROCEDURE — 87040 BLOOD CULTURE FOR BACTERIA: CPT | Mod: GENLAB | Performed by: NURSE PRACTITIONER

## 2024-04-10 PROCEDURE — 2500000004 HC RX 250 GENERAL PHARMACY W/ HCPCS (ALT 636 FOR OP/ED): Performed by: NURSE PRACTITIONER

## 2024-04-10 PROCEDURE — 80048 BASIC METABOLIC PNL TOTAL CA: CPT

## 2024-04-10 PROCEDURE — 2500000002 HC RX 250 W HCPCS SELF ADMINISTERED DRUGS (ALT 637 FOR MEDICARE OP, ALT 636 FOR OP/ED)

## 2024-04-10 PROCEDURE — 96376 TX/PRO/DX INJ SAME DRUG ADON: CPT

## 2024-04-10 PROCEDURE — 96365 THER/PROPH/DIAG IV INF INIT: CPT | Mod: 59

## 2024-04-10 PROCEDURE — 97163 PT EVAL HIGH COMPLEX 45 MIN: CPT | Mod: GP | Performed by: PHYSICAL THERAPIST

## 2024-04-10 PROCEDURE — 96372 THER/PROPH/DIAG INJ SC/IM: CPT | Performed by: NURSE PRACTITIONER

## 2024-04-10 PROCEDURE — 85027 COMPLETE CBC AUTOMATED: CPT

## 2024-04-10 PROCEDURE — 94762 N-INVAS EAR/PLS OXIMTRY CONT: CPT

## 2024-04-10 PROCEDURE — 97162 PT EVAL MOD COMPLEX 30 MIN: CPT | Mod: GP | Performed by: PHYSICAL THERAPIST

## 2024-04-10 PROCEDURE — 2500000005 HC RX 250 GENERAL PHARMACY W/O HCPCS

## 2024-04-10 PROCEDURE — 1090000002 HH PPS REVENUE DEBIT

## 2024-04-10 PROCEDURE — 9420000001 HC RT PATIENT EDUCATION 5 MIN

## 2024-04-10 PROCEDURE — 1090000001 HH PPS REVENUE CREDIT

## 2024-04-10 RX ORDER — MAGNESIUM HYDROXIDE 2400 MG/10ML
10 SUSPENSION ORAL DAILY PRN
Status: DISCONTINUED | OUTPATIENT
Start: 2024-04-10 | End: 2024-04-15 | Stop reason: HOSPADM

## 2024-04-10 RX ORDER — SODIUM CHLORIDE 9 MG/ML
INJECTION, SOLUTION INTRAVENOUS
Status: COMPLETED
Start: 2024-04-10 | End: 2024-04-10

## 2024-04-10 RX ORDER — DOCUSATE SODIUM 100 MG/1
100 CAPSULE, LIQUID FILLED ORAL 2 TIMES DAILY PRN
Status: DISCONTINUED | OUTPATIENT
Start: 2024-04-10 | End: 2024-04-10

## 2024-04-10 RX ORDER — TALC
3 POWDER (GRAM) TOPICAL NIGHTLY PRN
Status: DISCONTINUED | OUTPATIENT
Start: 2024-04-10 | End: 2024-04-15 | Stop reason: HOSPADM

## 2024-04-10 RX ORDER — SODIUM CHLORIDE 9 MG/ML
10 INJECTION, SOLUTION INTRAVENOUS CONTINUOUS PRN
Status: DISCONTINUED | OUTPATIENT
Start: 2024-04-10 | End: 2024-04-15 | Stop reason: HOSPADM

## 2024-04-10 RX ORDER — LOSARTAN POTASSIUM 50 MG/1
50 TABLET ORAL DAILY
Status: DISCONTINUED | OUTPATIENT
Start: 2024-04-10 | End: 2024-04-15 | Stop reason: HOSPADM

## 2024-04-10 RX ORDER — SIMETHICONE 80 MG
80 TABLET,CHEWABLE ORAL 4 TIMES DAILY PRN
Status: DISCONTINUED | OUTPATIENT
Start: 2024-04-10 | End: 2024-04-15 | Stop reason: HOSPADM

## 2024-04-10 RX ORDER — ACETAMINOPHEN 500 MG
5 TABLET ORAL NIGHTLY
Status: DISCONTINUED | OUTPATIENT
Start: 2024-04-10 | End: 2024-04-15 | Stop reason: HOSPADM

## 2024-04-10 RX ORDER — TAMSULOSIN HYDROCHLORIDE 0.4 MG/1
0.4 CAPSULE ORAL EVERY EVENING
Status: DISCONTINUED | OUTPATIENT
Start: 2024-04-10 | End: 2024-04-15 | Stop reason: HOSPADM

## 2024-04-10 RX ORDER — DOCUSATE SODIUM 100 MG/1
100 CAPSULE, LIQUID FILLED ORAL 2 TIMES DAILY
Status: ON HOLD | COMMUNITY
Start: 2017-05-03 | End: 2024-04-10 | Stop reason: ENTERED-IN-ERROR

## 2024-04-10 RX ORDER — AMOXICILLIN 250 MG
2 CAPSULE ORAL 2 TIMES DAILY
Status: DISCONTINUED | OUTPATIENT
Start: 2024-04-10 | End: 2024-04-15 | Stop reason: HOSPADM

## 2024-04-10 RX ORDER — CELECOXIB 100 MG/1
200 CAPSULE ORAL 2 TIMES DAILY
Status: DISCONTINUED | OUTPATIENT
Start: 2024-04-10 | End: 2024-04-15 | Stop reason: HOSPADM

## 2024-04-10 RX ORDER — CARISOPRODOL 350 MG/1
350 TABLET ORAL 3 TIMES DAILY PRN
Status: DISCONTINUED | OUTPATIENT
Start: 2024-04-10 | End: 2024-04-15 | Stop reason: HOSPADM

## 2024-04-10 RX ORDER — PIPERACILLIN SODIUM, TAZOBACTAM SODIUM 3; .375 G/15ML; G/15ML
INJECTION, POWDER, LYOPHILIZED, FOR SOLUTION INTRAVENOUS
Status: COMPLETED
Start: 2024-04-10 | End: 2024-04-10

## 2024-04-10 RX ORDER — FINASTERIDE 5 MG/1
5 TABLET, FILM COATED ORAL EVERY EVENING
Status: DISCONTINUED | OUTPATIENT
Start: 2024-04-10 | End: 2024-04-15 | Stop reason: HOSPADM

## 2024-04-10 RX ORDER — DOCUSATE SODIUM 100 MG/1
100 CAPSULE, LIQUID FILLED ORAL 2 TIMES DAILY
Status: DISCONTINUED | OUTPATIENT
Start: 2024-04-10 | End: 2024-04-15 | Stop reason: HOSPADM

## 2024-04-10 RX ORDER — ENOXAPARIN SODIUM 100 MG/ML
40 INJECTION SUBCUTANEOUS EVERY 24 HOURS
Status: DISCONTINUED | OUTPATIENT
Start: 2024-04-10 | End: 2024-04-10

## 2024-04-10 RX ORDER — BISACODYL 10 MG/1
10 SUPPOSITORY RECTAL DAILY PRN
Status: DISCONTINUED | OUTPATIENT
Start: 2024-04-10 | End: 2024-04-10

## 2024-04-10 RX ORDER — TORSEMIDE 20 MG/1
20 TABLET ORAL DAILY PRN
Status: DISCONTINUED | OUTPATIENT
Start: 2024-04-10 | End: 2024-04-15 | Stop reason: HOSPADM

## 2024-04-10 RX ORDER — ACETAMINOPHEN 325 MG/1
650 TABLET ORAL EVERY 4 HOURS PRN
Status: DISCONTINUED | OUTPATIENT
Start: 2024-04-10 | End: 2024-04-11

## 2024-04-10 RX ORDER — OXYCODONE AND ACETAMINOPHEN 5; 325 MG/1; MG/1
1 TABLET ORAL EVERY 4 HOURS PRN
Status: DISCONTINUED | OUTPATIENT
Start: 2024-04-10 | End: 2024-04-11

## 2024-04-10 RX ORDER — METOPROLOL SUCCINATE 25 MG/1
50 TABLET, EXTENDED RELEASE ORAL DAILY
Status: DISCONTINUED | OUTPATIENT
Start: 2024-04-10 | End: 2024-04-15 | Stop reason: HOSPADM

## 2024-04-10 RX ORDER — LORATADINE 10 MG/1
10 TABLET ORAL DAILY
Status: DISCONTINUED | OUTPATIENT
Start: 2024-04-10 | End: 2024-04-15 | Stop reason: HOSPADM

## 2024-04-10 RX ORDER — HEPARIN SODIUM 5000 [USP'U]/ML
5000 INJECTION, SOLUTION INTRAVENOUS; SUBCUTANEOUS EVERY 8 HOURS
Status: DISCONTINUED | OUTPATIENT
Start: 2024-04-10 | End: 2024-04-15 | Stop reason: HOSPADM

## 2024-04-10 RX ORDER — MORPHINE SULFATE 4 MG/ML
4 INJECTION, SOLUTION INTRAMUSCULAR; INTRAVENOUS ONCE
Status: COMPLETED | OUTPATIENT
Start: 2024-04-10 | End: 2024-04-10

## 2024-04-10 RX ORDER — GABAPENTIN 300 MG/1
300 CAPSULE ORAL 3 TIMES DAILY
Status: DISCONTINUED | OUTPATIENT
Start: 2024-04-10 | End: 2024-04-15 | Stop reason: HOSPADM

## 2024-04-10 RX ORDER — VANCOMYCIN HYDROCHLORIDE 1 G/200ML
1000 INJECTION, SOLUTION INTRAVENOUS ONCE
Status: COMPLETED | OUTPATIENT
Start: 2024-04-10 | End: 2024-04-10

## 2024-04-10 RX ORDER — FLUTICASONE PROPIONATE 50 MCG
2 SPRAY, SUSPENSION (ML) NASAL 2 TIMES DAILY
Status: DISCONTINUED | OUTPATIENT
Start: 2024-04-10 | End: 2024-04-15 | Stop reason: HOSPADM

## 2024-04-10 RX ORDER — MORPHINE SULFATE 4 MG/ML
INJECTION, SOLUTION INTRAMUSCULAR; INTRAVENOUS
Status: COMPLETED
Start: 2024-04-10 | End: 2024-04-10

## 2024-04-10 RX ORDER — ASCORBIC ACID 500 MG
500 TABLET ORAL 2 TIMES DAILY
Status: DISCONTINUED | OUTPATIENT
Start: 2024-04-10 | End: 2024-04-15 | Stop reason: HOSPADM

## 2024-04-10 RX ORDER — PANTOPRAZOLE SODIUM 40 MG/1
40 TABLET, DELAYED RELEASE ORAL
Status: DISCONTINUED | OUTPATIENT
Start: 2024-04-10 | End: 2024-04-15 | Stop reason: HOSPADM

## 2024-04-10 RX ORDER — DICYCLOMINE HYDROCHLORIDE 10 MG/1
10 CAPSULE ORAL 4 TIMES DAILY PRN
Status: DISCONTINUED | OUTPATIENT
Start: 2024-04-10 | End: 2024-04-10

## 2024-04-10 RX ORDER — ONDANSETRON HYDROCHLORIDE 2 MG/ML
4 INJECTION, SOLUTION INTRAVENOUS EVERY 8 HOURS PRN
Status: DISCONTINUED | OUTPATIENT
Start: 2024-04-10 | End: 2024-04-15 | Stop reason: HOSPADM

## 2024-04-10 RX ORDER — ASPIRIN 325 MG
325 TABLET, DELAYED RELEASE (ENTERIC COATED) ORAL 2 TIMES DAILY
Status: DISCONTINUED | OUTPATIENT
Start: 2024-04-10 | End: 2024-04-15 | Stop reason: HOSPADM

## 2024-04-10 RX ORDER — POLYETHYLENE GLYCOL 3350 17 G/17G
17 POWDER, FOR SOLUTION ORAL DAILY PRN
Status: DISCONTINUED | OUTPATIENT
Start: 2024-04-10 | End: 2024-04-15 | Stop reason: HOSPADM

## 2024-04-10 RX ORDER — PREDNISONE 20 MG/1
20 TABLET ORAL DAILY
Qty: 7 TABLET | Refills: 0 | Status: DISCONTINUED | OUTPATIENT
Start: 2024-04-10 | End: 2024-04-11

## 2024-04-10 RX ORDER — VANCOMYCIN HYDROCHLORIDE 1 G/20ML
INJECTION, POWDER, LYOPHILIZED, FOR SOLUTION INTRAVENOUS DAILY PRN
Status: DISCONTINUED | OUTPATIENT
Start: 2024-04-10 | End: 2024-04-15 | Stop reason: HOSPADM

## 2024-04-10 RX ORDER — VANCOMYCIN 1.5 G/300ML
1500 INJECTION, SOLUTION INTRAVENOUS EVERY 24 HOURS
Status: DISCONTINUED | OUTPATIENT
Start: 2024-04-11 | End: 2024-04-15 | Stop reason: HOSPADM

## 2024-04-10 RX ADMIN — OXYCODONE HYDROCHLORIDE AND ACETAMINOPHEN 500 MG: 500 TABLET ORAL at 08:50

## 2024-04-10 RX ADMIN — SODIUM CHLORIDE 250 ML: 9 INJECTION, SOLUTION INTRAVENOUS at 05:47

## 2024-04-10 RX ADMIN — ONDANSETRON 4 MG: 2 INJECTION INTRAMUSCULAR; INTRAVENOUS at 07:28

## 2024-04-10 RX ADMIN — Medication 2 L/MIN: at 23:25

## 2024-04-10 RX ADMIN — ASPIRIN 325 MG: 325 TABLET, COATED ORAL at 20:42

## 2024-04-10 RX ADMIN — HEPARIN SODIUM 5000 UNITS: 5000 INJECTION INTRAVENOUS; SUBCUTANEOUS at 17:50

## 2024-04-10 RX ADMIN — ASPIRIN 325 MG: 325 TABLET, COATED ORAL at 08:52

## 2024-04-10 RX ADMIN — PREDNISONE 20 MG: 20 TABLET ORAL at 08:52

## 2024-04-10 RX ADMIN — TAMSULOSIN HYDROCHLORIDE 0.4 MG: 0.4 CAPSULE ORAL at 20:42

## 2024-04-10 RX ADMIN — CARISOPRODOL 350 MG: 350 TABLET ORAL at 04:57

## 2024-04-10 RX ADMIN — GABAPENTIN 300 MG: 300 CAPSULE ORAL at 08:54

## 2024-04-10 RX ADMIN — OXYCODONE HYDROCHLORIDE AND ACETAMINOPHEN 1 TABLET: 5; 325 TABLET ORAL at 17:10

## 2024-04-10 RX ADMIN — TAZOBACTAM SODIUM AND PIPERACILLIN SODIUM 3.38 G: 375; 3 INJECTION, SOLUTION INTRAVENOUS at 00:45

## 2024-04-10 RX ADMIN — HYDROMORPHONE HYDROCHLORIDE 0.5 MG: 1 INJECTION, SOLUTION INTRAMUSCULAR; INTRAVENOUS; SUBCUTANEOUS at 03:12

## 2024-04-10 RX ADMIN — PIPERACILLIN SODIUM AND TAZOBACTAM SODIUM 3.38 G: 3; .375 INJECTION, SOLUTION INTRAVENOUS at 05:47

## 2024-04-10 RX ADMIN — OXYCODONE HYDROCHLORIDE AND ACETAMINOPHEN 500 MG: 500 TABLET ORAL at 20:41

## 2024-04-10 RX ADMIN — PIPERACILLIN SODIUM AND TAZOBACTAM SODIUM 3.38 G: 3; .375 INJECTION, SOLUTION INTRAVENOUS at 23:56

## 2024-04-10 RX ADMIN — LOSARTAN POTASSIUM 50 MG: 50 TABLET, FILM COATED ORAL at 08:55

## 2024-04-10 RX ADMIN — DOCUSATE SODIUM 100 MG: 100 CAPSULE, LIQUID FILLED ORAL at 20:42

## 2024-04-10 RX ADMIN — SENNOSIDES AND DOCUSATE SODIUM 2 TABLET: 8.6; 5 TABLET ORAL at 20:41

## 2024-04-10 RX ADMIN — FLUTICASONE PROPIONATE 2 SPRAY: 50 SPRAY, METERED NASAL at 03:09

## 2024-04-10 RX ADMIN — HYDROMORPHONE HYDROCHLORIDE 0.5 MG: 1 INJECTION, SOLUTION INTRAMUSCULAR; INTRAVENOUS; SUBCUTANEOUS at 07:34

## 2024-04-10 RX ADMIN — GABAPENTIN 300 MG: 300 CAPSULE ORAL at 03:09

## 2024-04-10 RX ADMIN — FLUTICASONE PROPIONATE 2 SPRAY: 50 SPRAY, METERED NASAL at 20:46

## 2024-04-10 RX ADMIN — MORPHINE SULFATE 4 MG: 4 INJECTION, SOLUTION INTRAMUSCULAR; INTRAVENOUS at 00:04

## 2024-04-10 RX ADMIN — FINASTERIDE 5 MG: 5 TABLET, FILM COATED ORAL at 20:42

## 2024-04-10 RX ADMIN — PANTOPRAZOLE SODIUM 40 MG: 40 TABLET, DELAYED RELEASE ORAL at 05:47

## 2024-04-10 RX ADMIN — CELECOXIB 200 MG: 100 CAPSULE ORAL at 08:51

## 2024-04-10 RX ADMIN — CELECOXIB 200 MG: 100 CAPSULE ORAL at 20:41

## 2024-04-10 RX ADMIN — VANCOMYCIN HYDROCHLORIDE 1000 MG: 1 INJECTION, SOLUTION INTRAVENOUS at 01:04

## 2024-04-10 RX ADMIN — GABAPENTIN 300 MG: 300 CAPSULE ORAL at 14:23

## 2024-04-10 RX ADMIN — GABAPENTIN 300 MG: 300 CAPSULE ORAL at 20:42

## 2024-04-10 RX ADMIN — OXYCODONE HYDROCHLORIDE AND ACETAMINOPHEN 1 TABLET: 5; 325 TABLET ORAL at 10:50

## 2024-04-10 RX ADMIN — PIPERACILLIN SODIUM AND TAZOBACTAM SODIUM 3.38 G: 3; .375 INJECTION, SOLUTION INTRAVENOUS at 11:41

## 2024-04-10 RX ADMIN — HEPARIN SODIUM 5000 UNITS: 5000 INJECTION INTRAVENOUS; SUBCUTANEOUS at 11:25

## 2024-04-10 RX ADMIN — OXYCODONE HYDROCHLORIDE AND ACETAMINOPHEN 1 TABLET: 5; 325 TABLET ORAL at 20:42

## 2024-04-10 RX ADMIN — METOPROLOL SUCCINATE 50 MG: 25 TABLET, EXTENDED RELEASE ORAL at 08:55

## 2024-04-10 RX ADMIN — PIPERACILLIN SODIUM AND TAZOBACTAM SODIUM 3.38 G: 3; .375 INJECTION, SOLUTION INTRAVENOUS at 17:50

## 2024-04-10 RX ADMIN — LORATADINE 10 MG: 10 TABLET ORAL at 03:09

## 2024-04-10 RX ADMIN — Medication 5 MG: at 20:41

## 2024-04-10 RX ADMIN — ENOXAPARIN SODIUM 40 MG: 40 INJECTION SUBCUTANEOUS at 03:09

## 2024-04-10 SDOH — HEALTH STABILITY: MENTAL HEALTH: HOW MANY STANDARD DRINKS CONTAINING ALCOHOL DO YOU HAVE ON A TYPICAL DAY?: PATIENT DOES NOT DRINK

## 2024-04-10 SDOH — ECONOMIC STABILITY: INCOME INSECURITY: IN THE LAST 12 MONTHS, WAS THERE A TIME WHEN YOU WERE NOT ABLE TO PAY THE MORTGAGE OR RENT ON TIME?: NO

## 2024-04-10 SDOH — SOCIAL STABILITY: SOCIAL INSECURITY: DOES ANYONE TRY TO KEEP YOU FROM HAVING/CONTACTING OTHER FRIENDS OR DOING THINGS OUTSIDE YOUR HOME?: NO

## 2024-04-10 SDOH — ECONOMIC STABILITY: HOUSING INSECURITY
IN THE LAST 12 MONTHS, WAS THERE A TIME WHEN YOU DID NOT HAVE A STEADY PLACE TO SLEEP OR SLEPT IN A SHELTER (INCLUDING NOW)?: NO

## 2024-04-10 SDOH — SOCIAL STABILITY: SOCIAL INSECURITY: WERE YOU ABLE TO COMPLETE ALL THE BEHAVIORAL HEALTH SCREENINGS?: YES

## 2024-04-10 SDOH — ECONOMIC STABILITY: INCOME INSECURITY: HOW HARD IS IT FOR YOU TO PAY FOR THE VERY BASICS LIKE FOOD, HOUSING, MEDICAL CARE, AND HEATING?: NOT VERY HARD

## 2024-04-10 SDOH — SOCIAL STABILITY: SOCIAL INSECURITY: ARE YOU OR HAVE YOU BEEN THREATENED OR ABUSED PHYSICALLY, EMOTIONALLY, OR SEXUALLY BY ANYONE?: NO

## 2024-04-10 SDOH — SOCIAL STABILITY: SOCIAL INSECURITY: DO YOU FEEL UNSAFE GOING BACK TO THE PLACE WHERE YOU ARE LIVING?: NO

## 2024-04-10 SDOH — HEALTH STABILITY: MENTAL HEALTH: HOW OFTEN DO YOU HAVE 6 OR MORE DRINKS ON ONE OCCASION?: NEVER

## 2024-04-10 SDOH — ECONOMIC STABILITY: TRANSPORTATION INSECURITY
IN THE PAST 12 MONTHS, HAS LACK OF TRANSPORTATION KEPT YOU FROM MEETINGS, WORK, OR FROM GETTING THINGS NEEDED FOR DAILY LIVING?: NO

## 2024-04-10 SDOH — SOCIAL STABILITY: SOCIAL INSECURITY: HAVE YOU HAD THOUGHTS OF HARMING ANYONE ELSE?: NO

## 2024-04-10 SDOH — SOCIAL STABILITY: SOCIAL INSECURITY: ARE THERE ANY APPARENT SIGNS OF INJURIES/BEHAVIORS THAT COULD BE RELATED TO ABUSE/NEGLECT?: NO

## 2024-04-10 SDOH — SOCIAL STABILITY: SOCIAL INSECURITY: ABUSE: ADULT

## 2024-04-10 SDOH — SOCIAL STABILITY: SOCIAL INSECURITY: DO YOU FEEL ANYONE HAS EXPLOITED OR TAKEN ADVANTAGE OF YOU FINANCIALLY OR OF YOUR PERSONAL PROPERTY?: NO

## 2024-04-10 SDOH — ECONOMIC STABILITY: HOUSING INSECURITY: IN THE LAST 12 MONTHS, HOW MANY PLACES HAVE YOU LIVED?: 2

## 2024-04-10 SDOH — ECONOMIC STABILITY: TRANSPORTATION INSECURITY
IN THE PAST 12 MONTHS, HAS THE LACK OF TRANSPORTATION KEPT YOU FROM MEDICAL APPOINTMENTS OR FROM GETTING MEDICATIONS?: NO

## 2024-04-10 SDOH — HEALTH STABILITY: MENTAL HEALTH: HOW OFTEN DO YOU HAVE A DRINK CONTAINING ALCOHOL?: NEVER

## 2024-04-10 SDOH — ECONOMIC STABILITY: HOUSING INSECURITY: IN THE LAST 12 MONTHS, HOW MANY PLACES HAVE YOU LIVED?: 1

## 2024-04-10 SDOH — SOCIAL STABILITY: SOCIAL INSECURITY: HAS ANYONE EVER THREATENED TO HURT YOUR FAMILY OR YOUR PETS?: NO

## 2024-04-10 ASSESSMENT — PAIN DESCRIPTION - ORIENTATION
ORIENTATION: RIGHT

## 2024-04-10 ASSESSMENT — ACTIVITIES OF DAILY LIVING (ADL)
BATHING: NEEDS ASSISTANCE
ADEQUATE_TO_COMPLETE_ADL: YES
FEEDING YOURSELF: INDEPENDENT
ADL_ASSISTANCE: INDEPENDENT
WALKS IN HOME: NEEDS ASSISTANCE
JUDGMENT_ADEQUATE_SAFELY_COMPLETE_DAILY_ACTIVITIES: YES
LACK_OF_TRANSPORTATION: NO
PATIENT'S MEMORY ADEQUATE TO SAFELY COMPLETE DAILY ACTIVITIES?: YES
HEARING - RIGHT EAR: FUNCTIONAL
DRESSING YOURSELF: NEEDS ASSISTANCE
TOILETING: DEPENDENT
BATHING_ASSISTANCE: MODERATE
ASSISTIVE_DEVICE: WALKER;CANE
ADL_ASSISTANCE: INDEPENDENT
HEARING - LEFT EAR: FUNCTIONAL
GROOMING: NEEDS ASSISTANCE

## 2024-04-10 ASSESSMENT — COGNITIVE AND FUNCTIONAL STATUS - GENERAL
WALKING IN HOSPITAL ROOM: TOTAL
DRESSING REGULAR LOWER BODY CLOTHING: TOTAL
MOVING TO AND FROM BED TO CHAIR: TOTAL
CLIMB 3 TO 5 STEPS WITH RAILING: A LOT
TOILETING: A LOT
TURNING FROM BACK TO SIDE WHILE IN FLAT BAD: TOTAL
TURNING FROM BACK TO SIDE WHILE IN FLAT BAD: A LITTLE
PERSONAL GROOMING: A LITTLE
WALKING IN HOSPITAL ROOM: A LOT
DRESSING REGULAR LOWER BODY CLOTHING: A LITTLE
CLIMB 3 TO 5 STEPS WITH RAILING: A LOT
STANDING UP FROM CHAIR USING ARMS: A LOT
DRESSING REGULAR LOWER BODY CLOTHING: A LOT
MOVING FROM LYING ON BACK TO SITTING ON SIDE OF FLAT BED WITH BEDRAILS: A LITTLE
MOVING TO AND FROM BED TO CHAIR: A LITTLE
HELP NEEDED FOR BATHING: A LOT
PERSONAL GROOMING: A LOT
CLIMB 3 TO 5 STEPS WITH RAILING: TOTAL
HELP NEEDED FOR BATHING: A LITTLE
MOVING FROM LYING ON BACK TO SITTING ON SIDE OF FLAT BED WITH BEDRAILS: A LITTLE
TOILETING: TOTAL
HELP NEEDED FOR BATHING: TOTAL
STANDING UP FROM CHAIR USING ARMS: A LITTLE
MOBILITY SCORE: 8
MOVING FROM LYING ON BACK TO SITTING ON SIDE OF FLAT BED WITH BEDRAILS: A LITTLE
TURNING FROM BACK TO SIDE WHILE IN FLAT BAD: A LITTLE
DRESSING REGULAR UPPER BODY CLOTHING: A LITTLE
STANDING UP FROM CHAIR USING ARMS: TOTAL
DAILY ACTIVITIY SCORE: 18
MOBILITY SCORE: 16
DRESSING REGULAR UPPER BODY CLOTHING: A LITTLE
PATIENT BASELINE BEDBOUND: NO
WALKING IN HOSPITAL ROOM: A LOT
MOBILITY SCORE: 14
DAILY ACTIVITIY SCORE: 17
DAILY ACTIVITIY SCORE: 12
TOILETING: TOTAL
MOVING TO AND FROM BED TO CHAIR: A LOT

## 2024-04-10 ASSESSMENT — ENCOUNTER SYMPTOMS
ARTHRALGIAS: 1
ALLERGIC/IMMUNOLOGIC NEGATIVE: 1
CARDIOVASCULAR NEGATIVE: 1
EYES NEGATIVE: 1
RESPIRATORY NEGATIVE: 1
JOINT SWELLING: 1
GASTROINTESTINAL NEGATIVE: 1
PSYCHIATRIC NEGATIVE: 1
WEAKNESS: 1
HEMATOLOGIC/LYMPHATIC NEGATIVE: 1
MYALGIAS: 1
ACTIVITY CHANGE: 1
WOUND: 1
ENDOCRINE NEGATIVE: 1

## 2024-04-10 ASSESSMENT — PATIENT HEALTH QUESTIONNAIRE - PHQ9
1. LITTLE INTEREST OR PLEASURE IN DOING THINGS: NOT AT ALL
SUM OF ALL RESPONSES TO PHQ9 QUESTIONS 1 & 2: 0
2. FEELING DOWN, DEPRESSED OR HOPELESS: NOT AT ALL

## 2024-04-10 ASSESSMENT — LIFESTYLE VARIABLES
SKIP TO QUESTIONS 9-10: 1
AUDIT-C TOTAL SCORE: 0
AUDIT-C TOTAL SCORE: 0
HOW MANY STANDARD DRINKS CONTAINING ALCOHOL DO YOU HAVE ON A TYPICAL DAY: PATIENT DOES NOT DRINK
HOW OFTEN DO YOU HAVE A DRINK CONTAINING ALCOHOL: NEVER
SKIP TO QUESTIONS 9-10: 1
AUDIT-C TOTAL SCORE: 0
HOW OFTEN DO YOU HAVE 6 OR MORE DRINKS ON ONE OCCASION: NEVER

## 2024-04-10 ASSESSMENT — PAIN SCALES - GENERAL
PAINLEVEL_OUTOF10: 10 - WORST POSSIBLE PAIN
PAINLEVEL_OUTOF10: 9
PAINLEVEL_OUTOF10: 9
PAINLEVEL_OUTOF10: 10 - WORST POSSIBLE PAIN
PAINLEVEL_OUTOF10: 10 - WORST POSSIBLE PAIN

## 2024-04-10 ASSESSMENT — PAIN - FUNCTIONAL ASSESSMENT
PAIN_FUNCTIONAL_ASSESSMENT: 0-10
PAIN_FUNCTIONAL_ASSESSMENT: WONG-BAKER FACES
PAIN_FUNCTIONAL_ASSESSMENT: 0-10

## 2024-04-10 ASSESSMENT — PAIN DESCRIPTION - LOCATION
LOCATION: KNEE
LOCATION: KNEE
LOCATION: LEG

## 2024-04-10 ASSESSMENT — PAIN SCALES - WONG BAKER
WONGBAKER_NUMERICALRESPONSE: HURTS WORST
WONGBAKER_NUMERICALRESPONSE: NO HURT

## 2024-04-10 NOTE — CARE PLAN
The patient's goals for the shift include      The clinical goals for the shift include manage pain, antibiotics, safety, and PT/OT in mornng      Problem: Pain  Goal: My pain/discomfort is manageable  4/10/2024 0238 by Maxime Ruth RN  Outcome: Progressing  4/10/2024 0237 by Maxime Ruth RN  Outcome: Progressing     Problem: Safety  Goal: Patient will be injury free during hospitalization  4/10/2024 0238 by Maxime Ruth RN  Outcome: Progressing  4/10/2024 0237 by Maxime Ruth RN  Outcome: Progressing  Goal: I will remain free of falls  4/10/2024 0238 by Maxime Ruth RN  Outcome: Progressing  4/10/2024 0237 by Maxime Ruth RN  Outcome: Progressing     Problem: Daily Care  Goal: Daily care needs are met  4/10/2024 0238 by Maxime Ruth RN  Outcome: Progressing  4/10/2024 0237 by Maxime Ruth RN  Outcome: Progressing     Problem: Psychosocial Needs  Goal: Demonstrates ability to cope with hospitalization/illness  4/10/2024 0238 by Maxime Ruth RN  Outcome: Progressing  4/10/2024 0237 by Maxime Ruth RN  Outcome: Progressing  Goal: Collaborate with me, my family, and caregiver to identify my specific goals  4/10/2024 0238 by Maxime Ruth RN  Outcome: Progressing  4/10/2024 0237 by Maxime Ruth RN  Outcome: Progressing     Problem: Discharge Barriers  Goal: My discharge needs are met  4/10/2024 0238 by Maxime Ruth RN  Outcome: Progressing  4/10/2024 0237 by Maxime Ruth RN  Outcome: Progressing     Problem: Fall/Injury  Goal: Not fall by end of shift  4/10/2024 0238 by Maxime Ruth RN  Outcome: Progressing  4/10/2024 0237 by Maxime Ruth RN  Outcome: Progressing  Goal: Be free from injury by end of the shift  4/10/2024 0238 by Maxime Ruth RN  Outcome: Progressing  4/10/2024 0237 by Maxime Ruth RN  Outcome: Progressing  Goal: Verbalize understanding of  personal risk factors for fall in the hospital  4/10/2024 0238 by Maxime Ruth RN  Outcome: Progressing  4/10/2024 0237 by Maxime Ruth RN  Outcome: Progressing  Goal: Verbalize understanding of risk factor reduction measures to prevent injury from fall in the home  4/10/2024 0238 by Maxime Ruth RN  Outcome: Progressing  4/10/2024 0237 by Maxime Ruth RN  Outcome: Progressing  Goal: Use assistive devices by end of the shift  4/10/2024 0238 by Maxime Ruth, DAJA  Outcome: Progressing  4/10/2024 0237 by Maxime Ruth RN  Outcome: Progressing  Goal: Pace activities to prevent fatigue by end of the shift  4/10/2024 0238 by Maxime Ruth RN  Outcome: Progressing  4/10/2024 0237 by Maxime Ruth RN  Outcome: Progressing     Problem: Pain  Goal: Takes deep breaths with improved pain control throughout the shift  4/10/2024 0238 by Maxime Ruth RN  Outcome: Progressing  4/10/2024 0237 by Maxime Ruth RN  Outcome: Progressing  Goal: Turns in bed with improved pain control throughout the shift  4/10/2024 0238 by Maxime Ruth RN  Outcome: Progressing  4/10/2024 0237 by Maxime Ruth RN  Outcome: Progressing  Goal: Walks with improved pain control throughout the shift  4/10/2024 0238 by Maxime Ruth RN  Outcome: Progressing  4/10/2024 0237 by Maxime Ruth RN  Outcome: Progressing  Goal: Performs ADL's with improved pain control throughout shift  4/10/2024 0238 by Maxime Ruth RN  Outcome: Progressing  4/10/2024 0237 by Maxime Ruth RN  Outcome: Progressing  Goal: Participates in PT with improved pain control throughout the shift  4/10/2024 0238 by Maxime Ruth RN  Outcome: Progressing  4/10/2024 0237 by Maxime Ruth RN  Outcome: Progressing  Goal: Free from opioid side effects throughout the shift  4/10/2024 0238 by Maxime Ruth RN  Outcome: Progressing  4/10/2024  0237 by Maxime Ruth RN  Outcome: Progressing  Goal: Free from acute confusion related to pain meds throughout the shift  4/10/2024 0238 by Maxime Ruth RN  Outcome: Progressing  4/10/2024 0237 by Maxime Ruth RN  Outcome: Progressing     Problem: Skin  Goal: Decreased wound size/increased tissue granulation at next dressing change  4/10/2024 0238 by Maxime Ruth, DAJA  Outcome: Progressing  4/10/2024 0237 by Maxime Ruth RN  Outcome: Progressing  Goal: Participates in plan/prevention/treatment measures  4/10/2024 0238 by Maxime Ruth RN  Outcome: Progressing  4/10/2024 0237 by Maxime Ruth RN  Outcome: Progressing  Goal: Prevent/manage excess moisture  4/10/2024 0238 by Maxime Ruth RN  Outcome: Progressing  4/10/2024 0237 by Maxime Ruth RN  Outcome: Progressing  Goal: Prevent/minimize sheer/friction injuries  4/10/2024 0238 by Maxime Ruth RN  Outcome: Progressing  4/10/2024 0237 by Maxime Ruth RN  Outcome: Progressing  Goal: Promote/optimize nutrition  4/10/2024 0238 by Maxime Ruth RN  Outcome: Progressing  4/10/2024 0237 by Maxime Ruth RN  Outcome: Progressing  Goal: Promote skin healing  4/10/2024 0238 by Maxime Ruth RN  Outcome: Progressing  4/10/2024 0237 by Maxime Ruth RN  Outcome: Progressing

## 2024-04-10 NOTE — NURSING NOTE
0642 Patient in semi workman's on entry to room, easily arousable to verbal stimuli. NC in place with SpO2 satting 97-98%, at 2L/min. VSS, c/o 10/10 pain to right knee secondary to TKA two weeks ago. Bed alarm engaged, reinforced fall safety. Belongings and call light within reach. Continue plan of care.    0734  Patient medicated for pain with IVP hydromorphone per HCP order, see MAR for administration and pain assessment. VSS during administration, O2 sat between 93-96% during administration. Reinforced medication side effects and fall safety precautions with patient, verbalized understanding. Bed alarm remains engaged with call light within reach.    1048 Contacted pharmacy about heparin order, d/t enoxaparin administration at 0300, [facility] recommends heparin to be started after 1100 for a full eight hours between anticoagulant doses. Primary RN notified.    1300 Patient sitting in chair with right lower extremity elevated, VSS, remains on RA, lunch ordered. No needs at this time. Call light within reach and utilizing chair alarm for safety.

## 2024-04-10 NOTE — H&P
History Of Present Illness  Abraham Rodriguez is a 83 y.o. male presenting with right knee pain. Patient had knee replacement at [facility] on 3/20/24.  Patient has since had leg edema and pain which has gradually worsened.  Patient currently resting in bed, reports 10/10 sharp stinging pain with any movement.  Patient does report edema has lessened with elevation but just a slight improvement.  Patient educated on treatment plan, states understanding and agrees.       Past Medical History  Past Medical History:   Diagnosis Date    Allergic rhinitis due to pollen 10/23/2014    Hay fever    Arthritis     Chronic pain disorder     Clotting disorder (CMS/Formerly [facility])     dvt april 2020     P.E. april 2020    COPD (chronic obstructive pulmonary disease) (CMS/Formerly [facility])     hx asbestos    Coronary artery disease     Encounter for other preprocedural examination 06/24/2014    Pre-procedural examination    Encounter for screening for malignant neoplasm of prostate     Encounter for screening for malignant neoplasm of prostate    Hyperlipidemia     Hypertension     Localized edema 05/11/2020    Pedal edema    Myocardial infarction (CMS/Formerly [facility])     Other conditions influencing health status     Carcinoma Of The Tongue    Pain in unspecified knee 12/22/2014    Joint pain, knee    Personal history of diseases of the skin and subcutaneous tissue 04/23/2013    History of eczema    Personal history of other diseases of the musculoskeletal system and connective tissue 06/19/2014    Personal history of arthritis    Personal history of other diseases of the nervous system and sense organs 08/10/2021    History of Bell's palsy    Personal history of other diseases of the respiratory system 11/12/2014    History of asbestosis    Personal history of other diseases of the respiratory system 08/13/2014    History of chronic obstructive lung disease    Personal history of other specified conditions 08/20/2013    History of edema    Plantar fascial fibromatosis  07/22/2013    Plantar fasciitis    Polyp of colon     Polyp of sigmoid colon    Syncope and collapse 01/23/2015    Syncope and collapse    Unspecified abdominal pain 12/22/2014    Stomach pain    Unspecified disorder of eyelid 10/23/2014    Eyelid disorder       Surgical History  Past Surgical History:   Procedure Laterality Date    CARDIAC CATHETERIZATION      CHOLECYSTECTOMY  04/23/2013    Cholecystectomy Laparoscopic    OTHER SURGICAL HISTORY  04/27/2021    Meniscus repair    OTHER SURGICAL HISTORY Left 05/31/2023    Hip replacement    OTHER SURGICAL HISTORY  04/23/2013    Biopsy Tongue    SHOULDER SURGERY  04/23/2013    Shoulder Surgery    TOTAL KNEE ARTHROPLASTY Right 03/27/2024        Social History  He reports that he quit smoking about 41 years ago. His smoking use included cigarettes. He has never used smokeless tobacco. He reports that he does not currently use drugs after having used the following drugs: Marijuana. He reports that he does not drink alcohol.    Family History  Family History   Problem Relation Name Age of Onset    Hypothyroidism Brother          Allergies  Iodinated contrast media, Tobramycin-dexamethasone, Lipitor [atorvastatin], and Lisinopril    Review of Systems   Constitutional:  Positive for activity change.   HENT:  Positive for hearing loss.    Eyes: Negative.    Respiratory: Negative.     Cardiovascular: Negative.    Gastrointestinal: Negative.    Endocrine: Negative.    Genitourinary: Negative.    Musculoskeletal:  Positive for arthralgias, joint swelling and myalgias.   Skin:  Positive for wound.   Allergic/Immunologic: Negative.    Neurological:  Positive for weakness.   Hematological: Negative.    Psychiatric/Behavioral: Negative.          Physical Exam  Constitutional:       Appearance: Normal appearance.   HENT:      Head: Normocephalic and atraumatic.      Nose: Nose normal.      Mouth/Throat:      Mouth: Mucous membranes are moist.   Eyes:      Extraocular Movements:  "Extraocular movements intact.      Pupils: Pupils are equal, round, and reactive to light.   Cardiovascular:      Rate and Rhythm: Normal rate and regular rhythm.      Pulses: Normal pulses.      Heart sounds: Normal heart sounds.   Pulmonary:      Effort: Pulmonary effort is normal.      Breath sounds: Normal breath sounds.   Abdominal:      General: Bowel sounds are normal.      Palpations: Abdomen is soft.   Musculoskeletal:         General: Swelling present.      Cervical back: Normal range of motion.      Right lower leg: Edema present.   Skin:     General: Skin is warm and dry.      Capillary Refill: Capillary refill takes less than 2 seconds.      Findings: Erythema present.   Neurological:      Mental Status: He is alert. Mental status is at baseline.      Motor: Weakness present.      Gait: Gait abnormal.   Psychiatric:         Mood and Affect: Mood normal.         Behavior: Behavior normal.          Last Recorded Vitals  Blood pressure 134/76, pulse 87, temperature 36.8 °C (98.2 °F), temperature source Temporal, resp. rate 17, height 1.803 m (5' 11\"), weight 88 kg (194 lb 0.1 oz), SpO2 96%.    Relevant Results  Scheduled medications  ascorbic acid, 500 mg, oral, BID  aspirin, 325 mg, oral, BID  celecoxib, 200 mg, oral, BID  enoxaparin, 40 mg, subcutaneous, q24h  finasteride, 5 mg, oral, q PM  fluticasone, 2 spray, Each Nostril, BID  gabapentin, 300 mg, oral, TID  loratadine, 10 mg, oral, Daily  losartan, 50 mg, oral, Daily  melatonin, 5 mg, oral, Nightly  metoprolol succinate XL, 50 mg, oral, Daily  pantoprazole, 40 mg, oral, Daily before breakfast  piperacillin-tazobactam, 3.375 g, intravenous, q6h  predniSONE, 20 mg, oral, Daily  sennosides-docusate sodium, 2 tablet, oral, BID  tamsulosin, 0.4 mg, oral, q PM      Continuous medications     PRN medications  PRN medications: acetaminophen, bisacodyl, carisoprodol, dicyclomine, docusate sodium, HYDROmorphone, magnesium hydroxide, melatonin, ondansetron, " polyethylene glycol, simethicone, torsemide, vancomycin    Lower extremity venous duplex right    Result Date: 4/9/2024  STUDY: Right Lower Extremity Venous Doppler Ultrasound; 4/9/2024 10:33 PM INDICATION: The right leg pain, calf edema.  Knee surgery 3/20/2024 COMPARISON: US RLE venous 3/26/2024. ACCESSION NUMBER(S): [id number] ORDERING CLINICIAN: CATHY MARTIN TECHNIQUE:  Real-time grayscale, color, and spectral doppler ultrasound imaging of the right lower extremity veins was performed. FINDINGS: The right common femoral, profunda femoral, femoral and popliteal veins demonstrated normal compressibility, normal phasic venous flow and normal response to augmentation.  There is no evidence for echogenic thrombi.  The visualized deep calf veins are patent.  The contralateral common femoral vein is free of thrombosis.    No evidence for DVT within the right lower extremity. No significant change from prior ultrasound. Signed by Ramu Gonzalez MD    CT knee right wo IV contrast    Result Date: 3/27/2024  Interpreted By:  Rima Lemus, STUDY: CT KNEE RIGHT WO IV CONTRAST   INDICATION: Signs/Symptoms:with metal reduction - swelling posterior knee, pain, ecchymosis POD 7 from right total knee arthroplasty   COMPARISON: CT right knee without contrast dated 01/22/2024.   ACCESSION NUMBER(S): [id number]   ORDERING CLINICIAN: GRAHAM BARNETT   TECHNIQUE: Contiguous axial CT images were obtained at  2 mm slice thickness through the right knee without intravenous contrast administration. Coronal and sagittal reformatted images were performed. 3D reformatted images were created and reviewed.   FINDINGS: OSSEOUS STRUCTURES: Postsurgical changes of right total knee arthroplasty are noted. Hardware appears grossly intact without significant lucency at the bone metal interface within limits of background metal related artifacts. There is no evidence of periprosthetic fractures. Visualized distal femur and proximal tibia and  fibula are grossly intact without evidence of displaced fracture deformities. There is an oval circumscribed peripherally calcified osseous fragment along the posterolateral aspect of the joint space measuring approximately 1.8 x 1.6 x 1 cm in transverse, cc and AP dimensions respectively (best seen on sagittal image 37/100). 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   SOFT TISSUES: Evaluation is limited by the lack of intravenous contrast. Within this limitation, there is large volume suprapatellar joint effusion with layering hyperdense contents, likely favored to represent hemarthrosis and could be related to recent postsurgical status. There is also linearly oriented calcific densities along the anterior aspect of the suprapatellar fossa just above the level of superior margin of patella, likely favored to be sequela of prior synovitis with residual synovial calcifications. There is mild reticular subcutaneous soft tissue edema about the knee joint, predominantly involving the prepatellar soft tissues and extending along the medial and lateral aspect of the knee. This is most likely favored to be reactive secondary to recent postsurgical status. No large confluent fluid collection is noted in the posterior soft tissues of the knee and proximal leg included in the visualized field of view. Visualized musculature appears grossly unremarkable without evidence of intramuscular collections or mass lesions within limits of noncontrast technique.       1. Status post recent right total knee arthroplasty with intact hardware. Redemonstration of a peripherally calcified intracapsular synovial body projecting over the posterolateral joint space. 2. Large volume suprapatellar joint effusion with layering densities/hemarthrosis is most likely favored to be related to recent postsurgical status. However this is of indeterminate sterility and superimposed infection can not be  excluded in appropriate clinical setting. Recommend surgical correlation. 3. Reticular subcutaneous soft tissue edema about the knee joint is most likely favored to be reactive related to recent postsurgical status.         MACRO: None.   Signed by: Rima Lemus 3/27/2024 1:23 PM Dictation workstation:   [id number]    Lower extremity venous duplex right    Result Date: 3/26/2024  Interpreted By:  Jose G Mariscal, STUDY: [facility] LOWER EXTREMITY VENOUS DUPLEX RIGHT  3/26/2024 6:13 pm   INDICATION: 82 y/o   M with  Signs/Symptoms:swelling, post op. LMP:  Unknown.   COMPARISON: None.   ACCESSION NUMBER(S): [id number]   ORDERING CLINICIAN: SHYANNE CHAMPION   TECHNIQUE: Routine ultrasound of the  right lower extremity was performed with duplex Doppler (color and spectral) evaluation.   Static images were obtained for remote interpretation.   FINDINGS: THIGH VEINS:  The common femoral, femoral, popliteal, proximal medial saphenous, and deep femoral veins are patent and free of thrombus. The veins are normally compressible.  They demonstrate normal phasic flow and augmentation response.   CALF VEINS:  The paired peroneal and posterior tibial calf veins are patent.       No deep venous thrombosis of the  right lower extremity.   MACRO: None   Signed by: Jose G Mariscal 3/26/2024 6:45 PM Dictation workstation:   [id number]    XR knee right 1-2 views    Result Date: 3/20/2024  Interpreted By:  Yennifer Jimenez, STUDY: Right knee, 2 views.   INDICATION: Signs/Symptoms:right TKR   COMPARISON: 07/03/2023.   ACCESSION NUMBER(S): [id number]   ORDERING CLINICIAN: TRICIA BOSWELL   FINDINGS: No acute fracture or malalignment. Immediate postsurgical changes of right total knee arthroplasty. Hardware is intact without perihardware fractures or lucencies. Expected postsurgical soft tissue gas within the knee joint.       1. Immediate postsurgical changes of right total knee arthroplasty without hardware complication.   MACRO:  None.   Signed by: Yennifer Jimenez 3/20/2024 3:00 PM Dictation workstation:   [id number]    ECG 12 lead    Result Date: 3/13/2024  Sinus rhythm with 1st degree AV block Right bundle branch block Left anterior fascicular block   Bifascicular block   Abnormal ECG When compared with ECG of 03-JUL-2023 11:11, [place] interval has increased Confirmed by Elijah Doss ([id number]) on 3/13/2024 9:35:06 AM     Results for orders placed or performed during the hospital encounter of 04/09/24 (from the past 24 hour(s))   CBC with Differential   Result Value Ref Range    WBC 8.3 4.4 - 11.3 x10*3/uL    nRBC 0.0 0.0 - 0.0 /100 WBCs    RBC 3.24 (L) 4.50 - 5.90 x10*6/uL    Hemoglobin 10.0 (L) 13.5 - 17.5 g/dL    Hematocrit 31.1 (L) 41.0 - 52.0 %    MCV 96 80 - 100 fL    MCH 30.9 26.0 - 34.0 pg    MCHC 32.2 32.0 - 36.0 g/dL    RDW 14.5 11.5 - 14.5 %    Platelets 212 150 - 450 x10*3/uL    Neutrophils % 75.2 40.0 - 80.0 %    Immature Granulocytes %, Automated 0.6 0.0 - 0.9 %    Lymphocytes % 13.4 13.0 - 44.0 %    Monocytes % 9.4 2.0 - 10.0 %    Eosinophils % 1.3 0.0 - 6.0 %    Basophils % 0.1 0.0 - 2.0 %    Neutrophils Absolute 6.24 (H) 1.60 - 5.50 x10*3/uL    Immature Granulocytes Absolute, Automated 0.05 0.00 - 0.50 x10*3/uL    Lymphocytes Absolute 1.11 0.80 - 3.00 x10*3/uL    Monocytes Absolute 0.78 0.05 - 0.80 x10*3/uL    Eosinophils Absolute 0.11 0.00 - 0.40 x10*3/uL    Basophils Absolute 0.01 0.00 - 0.10 x10*3/uL   Comprehensive Metabolic Panel   Result Value Ref Range    Glucose 102 (H) 74 - 99 mg/dL    Sodium 140 136 - 145 mmol/L    Potassium 3.7 3.5 - 5.3 mmol/L    Chloride 107 98 - 107 mmol/L    Bicarbonate 26 21 - 32 mmol/L    Anion Gap 11 10 - 20 mmol/L    Urea Nitrogen 20 6 - 23 mg/dL    Creatinine 0.80 0.50 - 1.30 mg/dL    eGFR 88 >60 mL/min/1.73m*2    Calcium 8.4 (L) 8.6 - 10.3 mg/dL    Albumin 3.3 (L) 3.4 - 5.0 g/dL    Alkaline Phosphatase 59 33 - 136 U/L    Total Protein 5.8 (L) 6.4 - 8.2 g/dL    AST 13 9 - 39 U/L     Bilirubin, Total 0.5 0.0 - 1.2 mg/dL    ALT 10 10 - 52 U/L   Sedimentation rate, automated   Result Value Ref Range    Sedimentation Rate 2 0 - 20 mm/h   C-reactive protein   Result Value Ref Range    C-Reactive Protein 4.81 (H) <1.00 mg/dL       Assessment/Plan   Principal Problem:    Cellulitis  Active Problems:    Allergic rhinitis    Arthritis, multiple joint involvement    Osteoarthritis    Benign essential hypertension    Benign prostatic hyperplasia    Dyslipidemia    Joint pain    (HFpEF) heart failure with preserved ejection fraction (CMS/HCC)      Principal Problem:    #Cellulitis    #Arthritis, multiple joint involvement    #Osteoarthritis    #Joint pain  -WBC 8.3  -WSR 2  -CRP 4.81  -US RLE  IMPRESSION:  No evidence for DVT within the right lower extremity.  No significant change from prior ultrasound.  -continue Celebrex 200 mg PO BID  -Neurontin 300 mg PO TID  -Prednisone 20 mg PO Daily   -Dilaudid 0.5 mg Q 3 hour prn severe pain  -Zosyn 3.375 IV Q 6 (Day 1)  -Taggo IV pharmacy to dose (Day 1)  -consult to orthopedics, appreciate recs  -PT/OT eval and treat    Active Problems:    #Allergic rhinitis  -continue Flonase BID  -continue Claritin 10 mg PO Daily      #Benign essential hypertension    #Dyslipidemia    #(HFpEF) heart failure with preserved ejection fraction (CMS/HCC)  -continue  mg PO BID  -continue Cozaar 50 mg PO Daily   -continue Toprol 50 mg PO Daily       #Benign prostatic hyperplasia  -continue Proscar 5 mg PO HS  -continue Flomax 0.4 mg PO HS        DVT Prophylaxis:  mg PO BID  Fluids: N/A  Nutrition: Cardiac     Code Status: Full Code    Pt requires inpatient stay at this time.  Total accumulated time spent face to face and not face to face preparing to see the patient, obtaining and reviewing separately obtained history; performing a medically appropriate examination and/or evaluation; counseling and educating the patient, family; ordering medications, tests, or  procedures; referring and communicating with other health care professionals; documenting clinical information in the patient's medical record; independently interpreting results and communicating the results to the patient, family; and care coordination was 45 minutes.      SHAD Delgado-CNP

## 2024-04-10 NOTE — ED PROVIDER NOTES
HPI   Chief Complaint   Patient presents with    Knee Pain     Possible post surgical        History of present illness:  83-year-old male presents to the emergency room for complaints of right knee pain.  The patient states that he had a knee replacement performed 2 weeks ago.  He states that he has had persistent pain in the leg since the surgery and he states the oxycodone has not been helping very much.  He states over the past few days he has noticed swelling in the leg and he states that extends all the way down to his foot now from his thigh.  He states the area feels very warm and he has not noticed any drainage from the surgical site.  He states he has not had any fevers and denies any other symptoms at this time but is concerned about the swelling and is concerned that he might have a DVT.  He is not currently on any blood thinners.  History of coronary disease, COPD, hypertension, and asbestos exposure.     Social history: Negative for alcohol and drug use.    Review of systems:   Gen.: No weight loss, fatigue, anorexia, insomnia, fever.   Eyes: No vision loss, double vision  ENT: No pharyngitis, neck pain  Cardiac: No chest pain, palpitations, syncope  Pulmonary: No shortness of breath, cough, hemoptysis.   Heme/lymph: No swollen glands, fever, bleeding.   GI: No abdominal pain, change in bowel habits, melena, hematemesis, hematochezia, nausea, vomiting, diarrhea.   : No discharge, dysuria, frequency, urgency, hematuria.   Musculoskeletal: No joint swelling.   Skin: No rashes.   Review of systems is otherwise negative unless stated above or in history of present illness.      Physical exam:  General: Vitals noted, no distress. Afebrile.   EENT: No lymphadenopathy appreciated  Cardiac: Regular, rate, rhythm, no murmur.   Pulmonary: Lungs clear bilaterally with good aeration. No adventitious breath sounds.   Abdomen: Soft, nonsurgical. Nontender. No peritoneal signs. Normoactive bowel sounds.    Extremities: No there is noticeable peripheral edema present in the right leg compared to the left, nonpitting, surgical site appears to be clean dry and intact at this time, the leg itself feels warm to the touch and there is concern for some erythematous changes particularly over the shin and around the knee  Skin: No rash.   Neuro: No focal neurologic deficits       Medical decision making:   Testing: CBC CMP, ESR, CRP, ultrasound the right leg  Treatment:   Reevaluation:   Plan: 83-year-old male presents to the emergency room for complaints of right knee pain.  The patient states that he had a knee replacement performed 2 weeks ago.  He states that he has had persistent pain in the leg since the surgery and he states the oxycodone has not been helping very much.  He states over the past few days he has noticed swelling in the leg and he states that extends all the way down to his foot now from his thigh.  He states the area feels very warm and he has not noticed any drainage from the surgical site.  He states he has not had any fevers and denies any other symptoms at this time but is concerned about the swelling and is concerned that he might have a DVT.  He is not currently on any blood thinners.  History of coronary disease, COPD, hypertension, and asbestos exposure.  Extremities: No there is noticeable peripheral edema present in the right leg compared to the left, nonpitting, surgical site appears to be clean dry and intact at this time, the leg itself feels warm to the touch and there is concern for some erythematous changes particularly over the shin and around the knee.  The care of the patient was handed out to the oncoming physician at this time pending laboratory testing and ultrasound of the right leg  Impression:   1.  Leg swelling                                [place] Coma Scale Score: 15                     Patient History   Past Medical History:   Diagnosis Date    Allergic rhinitis due to  pollen 10/23/2014    Hay fever    Arthritis     Chronic pain disorder     Clotting disorder (CMS/HCC)     dvt april 2020     P.E. april 2020    COPD (chronic obstructive pulmonary disease) (CMS/HCC)     hx asbestos    Coronary artery disease     Encounter for other preprocedural examination 06/24/2014    Pre-procedural examination    Encounter for screening for malignant neoplasm of prostate     Encounter for screening for malignant neoplasm of prostate    Hyperlipidemia     Hypertension     Localized edema 05/11/2020    Pedal edema    Myocardial infarction (CMS/HCC)     Other conditions influencing health status     Carcinoma Of The Tongue    Pain in unspecified knee 12/22/2014    Joint pain, knee    Personal history of diseases of the skin and subcutaneous tissue 04/23/2013    History of eczema    Personal history of other diseases of the musculoskeletal system and connective tissue 06/19/2014    Personal history of arthritis    Personal history of other diseases of the nervous system and sense organs 08/10/2021    History of Bell's palsy    Personal history of other diseases of the respiratory system 11/12/2014    History of asbestosis    Personal history of other diseases of the respiratory system 08/13/2014    History of chronic obstructive lung disease    Personal history of other specified conditions 08/20/2013    History of edema    Plantar fascial fibromatosis 07/22/2013    Plantar fasciitis    Polyp of colon     Polyp of sigmoid colon    Syncope and collapse 01/23/2015    Syncope and collapse    Unspecified abdominal pain 12/22/2014    Stomach pain    Unspecified disorder of eyelid 10/23/2014    Eyelid disorder     Past Surgical History:   Procedure Laterality Date    CARDIAC CATHETERIZATION      CHOLECYSTECTOMY  04/23/2013    Cholecystectomy Laparoscopic    OTHER SURGICAL HISTORY  04/27/2021    Meniscus repair    OTHER SURGICAL HISTORY Left 05/31/2023    Hip replacement    OTHER SURGICAL HISTORY   2013    Biopsy Tongue    SHOULDER SURGERY  2013    Shoulder Surgery     Family History   Problem Relation Name Age of Onset    Hypothyroidism Brother       Social History     Tobacco Use    Smoking status: Former     Types: Cigarettes     Quit date:      Years since quittin.2    Smokeless tobacco: Never   Vaping Use    Vaping Use: Never used   Substance Use Topics    Alcohol use: Never    Drug use: Not Currently     Types: Marijuana     Comment: smokes 1-2 x's per week       Physical Exam   ED Triage Vitals   Temperature Heart Rate Respirations BP   24   36.4 °C (97.5 °F) 82 16 167/77      Pulse Ox Temp Source Heart Rate Source Patient Position   24   100 % Oral Monitor Sitting      BP Location FiO2 (%)     24 --     Left arm        Physical Exam    ED Course & MDM   Diagnoses as of 24   Cellulitis of right lower extremity       Medical Decision Making      Procedure  Procedures     Gordon Reyes PA-C  24

## 2024-04-10 NOTE — ED PROVIDER NOTES
I performed a history and physical examination of Abraham Rodriguez and discussed his management with BENY Rodriguez.  I agree with the history, physical, assessment, and plan of care, with the following exceptions: None  83-year-old male who presents to the emergency department with right knee pain.  Total knee replacement on the 20th of last month.  Increased pain and swelling since last Sunday after going home from rehab.  No fevers or chills differential includes DVT versus cellulitis versus infected joint.  The swelling is mostly lower.  The joint appears benign.  Mild erythema in the lower portion of the stitches.  Ultrasound reveals no evidence of acute DVT.  Blood work which I reviewed reveals a CRP around 4 with a previous 1 of 12.  Sed rate is normal.  White count is normal.  There certainly is a an inflammatory component but appears to be cellulitis.  I discussed the case with Dr. Hargrove orthopedist covering for Dr. Arora.  Based on the information he does not feel that the patient needs to be transferred to [facility] and the patient would rather stay here.  Case has been discussed with Ingrid Alvarez who has accepted on behalf of Dr. Gar for admission.  I have started the patient on Vanco and Zosyn.  Patient admitted in improved condition.  I was present for the following procedures: None  Time Spent in Critical Care of the patient: None  Time spent in discussions with the patient and family: 30    MD Ray Garcia MD  04/10/24 0044

## 2024-04-10 NOTE — PROGRESS NOTES
Occupational Therapy    Evaluation    Patient Name: Abraham Rodriguez  MRN: [id number]  Today's Date: 4/10/2024  Time Calculation  Start Time: 1404  Stop Time: 1423  Time Calculation (min): 19 min    Assessment  IP OT Assessment  OT Assessment: Pt. is a 83 y.o male referred to OT for self-care impairment s/p admissionfor cellulitis. Pt. s/p R TKA on 3/20 with rehab stay following. Pt. displays impaired mobility d/t increased edema in RLE impacting his ability to engage in ADLS, transfers, mobility, endurance. Pt. would benefit from skilled OT to address.  Prognosis: Good  Barriers to Discharge: None  Evaluation/Treatment Tolerance: Patient tolerated treatment well  Medical Staff Made Aware: Yes  End of Session Communication: Bedside nurse  End of Session Patient Position: Bed, 3 rail up, Alarm on  Plan:  Treatment Interventions: ADL retraining, Functional transfer training, Endurance training, Patient/family training, Equipment evaluation/education, Neuromuscular reeducation, Compensatory technique education  OT Frequency: 3 times per week  OT Discharge Recommendations: Moderate intensity level of continued care  OT Recommended Transfer Status: Minimal assist, Assist of 1  OT - OK to Discharge: Yes (Based on completed evaluation and care plan recommendations, no barriers to discharge to next site of care)    Subjective   Current Problem:  1. Cellulitis of right lower extremity          General:  General  Reason for Referral: impaired self-care d/t admission for cellulitis. Pt. recently s/p R TKA on 3/20 by Dr. Arora followed by rehab stay for 1 wk at  rehab in [place].  Referred By: Ingrid Alvarez, APRN-CNP  Past Medical History Relevant to Rehab: hay fever, arthritis, clotting d/o, COPD, CAD, malignant neoplasm of prostate, HLD, HTN, MI, carcinoma of the tongue, bell's palsy hx  Family/Caregiver Present: No  Prior to Session Communication: Bedside nurse  Patient Position Received: Up in chair, Alarm on  General  Comment: margie posadas tubigrip to RLE  Precautions:  LE Weight Bearing Status: Weight Bearing as Tolerated  Medical Precautions: Fall precautions  Post-Surgical Precautions: Right total knee precautions  Vital Signs:  Heart Rate: 78  Heart Rate Source: Monitor  SpO2: 97 %  Patient Position: Sitting  Pain:  Pain Assessment  Pain Assessment: 0-10  Pain Score:  (Pt. in pain with ambulation. Did not rate pain at time. Student RN present to pass meds.)  Pain Location: Knee  Pain Orientation: Right  Pain Interventions: Repositioned, Elevated    Objective   Cognition:  Overall Cognitive Status: Within Functional Limits  Home Living:  Type of Home:  (cabin)  Lives With: Alone  Home Adaptive Equipment: Cane, Walker rolling or standard  Home Layout: One level  Home Access: Level entry  Bathroom Shower/Tub: Tub/shower unit  Bathroom Toilet: Handicapped height  Bathroom Equipment: Grab bars in shower  Home Living Comments: sleeps in lift chair   Prior Function:  Level of [place]: Independent with ADLs and functional transfers, Independent with homemaking with ambulation (daughter lives near by who can assist as needed)  Receives Help From: Family  ADL Assistance: Independent  Homemaking Assistance: Independent  Ambulatory Assistance: Independent  Vocational: Retired  IADL History:  Homemaking Responsibilities: Yes  Meal Prep Responsibility: Primary  Laundry Responsibility: Primary  Cleaning Responsibility: Primary  Bill Paying/Finance Responsibility: Primary  Shopping Responsibility: Primary  Current License: Yes (not driving since surgery)  ADL:  Eating Assistance: Independent  Grooming Assistance: Independent (anticipated seated)  Bathing Assistance: Moderate  UE Dressing Assistance: Independent  LE Dressing Assistance: Moderate  LE Dressing Deficit: Don/doff R sock, Thread RLE into pants, Thread RLE into underwear, Pull up over hips, Use of adaptive equipment  Toileting Assistance with Device: Total (cuauhtemoc  use)  Toileting Deficit: Incontinent  Activity Tolerance:  Endurance:  (limited by pain)  Bed Mobility/Transfers: Bed Mobility  Bed Mobility: Yes  Bed Mobility 1  Bed Mobility 1: Sitting to supine  Level of Assistance 1: Minimum assistance  Bed Mobility Comments 1: required assistance to lift RLE into bed 2/2 swelling and pain    Transfers  Transfer: Yes  Transfer 1  Transfer From 1: Chair with arms to  Transfer to 1: Bed  Technique 1: Sit to stand, Stand to sit  Transfer Device 1: Walker  Transfer Level of Assistance 1: Minimum assistance  Functional Mobility:  Functional Mobility  Functional Mobility Performed: Yes  Functional Mobility 1  Surface 1: Level tile  Device 1: Rolling walker  Assistance 1: Contact guard  Sitting Balance:  Static Sitting Balance  Static Sitting-Balance Support: Feet supported  Static Sitting-Level of Assistance: Independent  Dynamic Sitting Balance  Dynamic Sitting-Balance Support: Feet supported  Dynamic Sitting-Balance: Forward lean  Dynamic Sitting-Comments: independent  Standing Balance:  Static Standing Balance  Static Standing-Balance Support: Bilateral upper extremity supported  Static Standing-Level of Assistance: Contact guard  Dynamic Standing Balance  Dynamic Standing-Balance Support: Bilateral upper extremity supported  Dynamic Standing-Balance: Turning  Dynamic Standing-Comments: CGA  IADL's:   Homemaking Responsibilities: Yes  Meal Prep Responsibility: Primary  Laundry Responsibility: Primary  Cleaning Responsibility: Primary  Bill Paying/Finance Responsibility: Primary  Shopping Responsibility: Primary  Current License: Yes (not driving since surgery)  Vision:    Sensation:  Sensation Comment: denies deficits  Strength:  Strength Comments: BUE: grossly 5/5 throughout  Perception:     Coordination:  Movements are Fluid and Coordinated: Yes   Hand Function:  Hand Function  Gross Grasp: Functional  Coordination: Functional  Extremities:   and LUE   LUE: Within Functional  Limits    Outcome Measures: [facility] Daily Activity  Putting on and taking off regular lower body clothing: A lot  Bathing (including washing, rinsing, drying): A lot  Putting on and taking off regular upper body clothing: None  Toileting, which includes using toilet, bedpan or urinal: Total  Taking care of personal grooming such as brushing teeth: None  Eating Meals: None  Daily Activity - Total Score: 17      Education Documentation  Body Mechanics, taught by Rylee Perales OT at 4/10/2024  3:56 PM.  Learner: Patient  Readiness: Acceptance  Method: Explanation  Response: Verbalizes Understanding  Comment: Pt. edu on POC and DC rec. Edu on safety with transfers and mobility.    Precautions, taught by Rylee Perales OT at 4/10/2024  3:56 PM.  Learner: Patient  Readiness: Acceptance  Method: Explanation  Response: Verbalizes Understanding  Comment: Pt. edu on POC and DC rec. Edu on safety with transfers and mobility.    ADL Training, taught by Rylee Perales OT at 4/10/2024  3:56 PM.  Learner: Patient  Readiness: Acceptance  Method: Explanation  Response: Verbalizes Understanding  Comment: Pt. edu on POC and DC rec. Edu on safety with transfers and mobility.    Education Comments  No comments found.      Goals:   Encounter Problems       Encounter Problems (Active)       ADLs       Patient will perform UB and LB bathing with SBA level of assistance.       Start:  04/10/24    Expected End:  04/24/24            Patient with complete lower body dressing with modified independent level of assistance donning and doffing all LE clothes  with PRN adaptive equipment while edge of bed        Start:  04/10/24    Expected End:  04/24/24            Patient will complete toileting including hygiene clothing management/hygiene with modified independent level of assistance.       Start:  04/10/24    Expected End:  04/24/24               BALANCE       Patient will tolerate standing for 5 minutes to modified independent level of assistance  with least restrictive device in order to improve functional activity tolerance for ADL tasks.       Start:  04/10/24    Expected End:  04/24/24               MOBILITY       Patient will perform Functional mobility min Household distances/Community Distances with modified independent level of assistance and least restrictive device in order to improve safety and functional mobility.       Start:  04/10/24    Expected End:  04/24/24               TRANSFERS       Patient will perform bed mobility modified independent level of assistance and leg  in order to improve safety and independence with mobility       Start:  04/10/24    Expected End:  04/24/24            Patient will complete sit to stand transfer with modified independent level of assistance and front wheeled walker in order to improve safety and prepare for out of bed mobility.       Start:  04/10/24    Expected End:  04/24/24

## 2024-04-10 NOTE — PROGRESS NOTES
Abraham Rodriguez is a 83 y.o. male on day 0 of admission presenting with Cellulitis.    Subjective   He is resting in bed, feet elevated off bed. Right leg  painful to touch from toes to knee, edema present, redness on dorsum of foot. He states his pain and redness has been present since surgery. IV abx infusing. PT/OT ordered. Ortho consulting. All questions answered.      Objective     Physical Exam  Constitutional:       General: He is not in acute distress.     Appearance: Normal appearance. He is not toxic-appearing.   HENT:      Head: Normocephalic and atraumatic.      Mouth/Throat:      Mouth: Mucous membranes are moist.   Eyes:      Extraocular Movements: Extraocular movements intact.      Pupils: Pupils are equal, round, and reactive to light.   Cardiovascular:      Rate and Rhythm: Normal rate and regular rhythm.      Pulses: Normal pulses.      Heart sounds: Normal heart sounds. No murmur heard.     No gallop.   Pulmonary:      Effort: Pulmonary effort is normal. No respiratory distress.      Breath sounds: Normal breath sounds. No wheezing, rhonchi or rales.   Abdominal:      General: Bowel sounds are normal. There is no distension.      Palpations: Abdomen is soft.      Tenderness: There is no abdominal tenderness. There is no guarding or rebound.   Musculoskeletal:         General: Tenderness present. No swelling, deformity or signs of injury. Normal range of motion.      Cervical back: Normal range of motion and neck supple.      Right lower leg: Edema present.   Skin:     General: Skin is warm and dry.      Capillary Refill: Capillary refill takes less than 2 seconds.      Coloration: Skin is not jaundiced.      Findings: Erythema present. No bruising or rash.   Neurological:      General: No focal deficit present.      Mental Status: He is alert and oriented to person, place, and time.      Cranial Nerves: No cranial nerve deficit.      Sensory: No sensory deficit.      Motor: No weakness.      Gait:  "Gait normal.   Psychiatric:         Mood and Affect: Mood normal.         Behavior: Behavior normal.         Thought Content: Thought content normal.         Judgment: Judgment normal.       Last Recorded Vitals  Blood pressure 145/68, pulse 73, temperature 36.5 °C (97.7 °F), temperature source Temporal, resp. rate 16, height 1.803 m (5' 11\"), weight 88 kg (194 lb 0.1 oz), SpO2 97%.  Intake/Output last 3 Shifts:  No intake/output data recorded.    Scheduled medications  ascorbic acid, 500 mg, oral, BID  aspirin, 325 mg, oral, BID  celecoxib, 200 mg, oral, BID  docusate sodium, 100 mg, oral, BID  finasteride, 5 mg, oral, q PM  fluticasone, 2 spray, Each Nostril, BID  gabapentin, 300 mg, oral, TID  heparin (porcine), 5,000 Units, subcutaneous, q8h  loratadine, 10 mg, oral, Daily  losartan, 50 mg, oral, Daily  melatonin, 5 mg, oral, Nightly  metoprolol succinate XL, 50 mg, oral, Daily  pantoprazole, 40 mg, oral, Daily before breakfast  piperacillin-tazobactam, 3.375 g, intravenous, q6h  predniSONE, 20 mg, oral, Daily  sennosides-docusate sodium, 2 tablet, oral, BID  tamsulosin, 0.4 mg, oral, q PM  [START ON 4/11/2024] vancomycin 1.5 g in 300 mL, 1,500 mg, intravenous, q24h      Continuous medications  oxygen,   sodium chloride 0.9%, 10 mL/hr      PRN medications  PRN medications: acetaminophen, carisoprodol, HYDROmorphone, magnesium hydroxide, melatonin, ondansetron, oxyCODONE-acetaminophen, oxygen, polyethylene glycol, simethicone, sodium chloride 0.9%, torsemide, vancomycin    Relevant Results  Lower extremity venous duplex right  Result Date: 4/9/2024  No evidence for DVT within the right lower extremity. No significant change from prior ultrasound. Signed by Ramu Gonzalez MD     Latest Reference Range & Units 04/09/24 20:45 04/10/24 08:30   GLUCOSE 74 - 99 mg/dL 102 (H) 92   SODIUM 136 - 145 mmol/L 140 138   POTASSIUM 3.5 - 5.3 mmol/L 3.7 3.9   CHLORIDE 98 - 107 mmol/L 107 106   Bicarbonate 21 - 32 mmol/L 26 25 "   Anion Gap 10 - 20 mmol/L 11 11   Blood Urea Nitrogen 6 - 23 mg/dL 20 17   Creatinine 0.50 - 1.30 mg/dL 0.80 0.85   EGFR >60 mL/min/1.73m*2 88 86   Calcium 8.6 - 10.3 mg/dL 8.4 (L) 8.6   Albumin 3.4 - 5.0 g/dL 3.3 (L)    Alkaline Phosphatase 33 - 136 U/L 59    ALT 10 - 52 U/L 10    AST 9 - 39 U/L 13    Bilirubin Total 0.0 - 1.2 mg/dL 0.5    Total Protein 6.4 - 8.2 g/dL 5.8 (L)    C-Reactive Protein <1.00 mg/dL 4.81 (H)       [facility] Reference Range & Units 04/09/24 20:45 04/10/24 08:30   LEUKOCYTES (10*3/UL) IN BLOOD BY AUTOMATED COUNT, [employer] 4.4 - 11.3 x10*3/uL 8.3 6.5   nRBC 0.0 - 0.0 /100 WBCs 0.0 0.0   ERYTHROCYTES (10*6/UL) IN BLOOD BY AUTOMATED COUNT, [employer] 4.50 - 5.90 x10*6/uL 3.24 (L) 3.41 (L)   HEMOGLOBIN 13.5 - 17.5 g/dL 10.0 (L) 10.4 (L)   HEMATOCRIT 41.0 - 52.0 % 31.1 (L) 32.7 (L)   MCV 80 - 100 fL 96 96   MCH 26.0 - 34.0 pg 30.9 30.5   MCHC 32.0 - 36.0 g/dL 32.2 31.8 (L)   RED CELL DISTRIBUTION WIDTH 11.5 - 14.5 % 14.5 14.6 (H)   PLATELETS (10*3/UL) IN BLOOD AUTOMATED COUNT, [employer] 150 - 450 x10*3/uL 212 199     Assessment/Plan   Principal Problem:    Cellulitis  Active Problems:    Allergic rhinitis    Arthritis, multiple joint involvement    Osteoarthritis    Benign essential hypertension    Benign prostatic hyperplasia    Dyslipidemia    Joint pain    (HFpEF) heart failure with preserved ejection fraction (CMS/HCC)    Principal Problem:  #Cellulitis  #Arthritis, multiple joint involvement  #Osteoarthritis  #Joint pain  -WBC 8.3 > 6.2  -WSR 2  -CRP 4.81  -US RLE: No evidence for DVT within the right lower extremity. No significant change from prior ultrasound.  -continue Celebrex 200 mg PO BID  -Neurontin 300 mg PO TID  -Prednisone 20 mg PO Daily   -percocet prn pain, Dilaudid BTP  -Zosyn 3.375 IV Q 6 (Day 2)  -Vanco IV pharmacy to dose (Day 2)  -consult to orthopedics, appreciate recs  -PT/OT eval and treat     Active Problems:  #Allergic rhinitis  -continue Flonase BID  -continue Claritin 10  mg PO Daily     #Benign essential hypertension  #Dyslipidemia  #(HFpEF) heart failure with preserved ejection fraction (CMS/HCC)  -continue  mg PO BID  -continue Cozaar 50 mg PO Daily   -continue Toprol 50 mg PO Daily      #Benign prostatic hyperplasia  -continue Proscar 5 mg PO HS  -continue Flomax 0.4 mg PO HS        DVT Prophylaxis:  mg PO BID  Fluids: N/A  Nutrition: Cardiac    Code Status: Full Code     Pt requires inpatient stay at this time.  Total accumulated time spent face to face and not face to face preparing to see the patient, obtaining and reviewing separately obtained history; performing a medically appropriate examination and/or evaluation; counseling and educating the patient, family; ordering medications, tests, or procedures; referring and communicating with other health care professionals; documenting clinical information in the patient's medical record; independently interpreting results and communicating the results to the patient, family; and care coordination was 30 minutes.    Alesha Iglesias, APRN-CNP

## 2024-04-10 NOTE — DISCHARGE INSTR - OTHER ORDERS
Thank you for choosing [facility] for your Health Care needs. As you transition from the hospital back to home, we hope we took your preferences into account on how you manage your health needs so you can manage your health at home.     You may receive a survey in the mail within the next couple weeks. Please take the time to complete it and return it. Your input is ALWAYS important to us. Thank you!  Your Care Transition Team - [facility] - [phone]    For questions about your medications listed on your discharge instructions, please call the Nurses Station at [phone].

## 2024-04-10 NOTE — PROGRESS NOTES
Physical Therapy    Physical Therapy Evaluation    Patient Name: Abraham Rodriguez  MRN: [id number]  Today's Date: 4/10/2024   Time Calculation  Start Time: 0915  Stop Time: 0930  Time Calculation (min): 15 min    Assessment/Plan   PT Assessment  PT Assessment Results: Decreased strength, Decreased range of motion, Decreased endurance, Impaired balance, Decreased mobility, Decreased skin integrity, Orthopedic restrictions, Pain  Rehab Prognosis: Good  Barriers to Discharge: pain level  Evaluation/Treatment Tolerance: Patient limited by pain (declined ther ex for knee)  Medical Staff Made Aware: Yes  Strengths: Housing layout  Barriers to Participation:  (pain)  End of Session Communication: Bedside nurse  Assessment Comment: Pleasant 83 y.o presents with impaired mobility and pain. Pt.  had recent TKA (just got home from SNF) and came in with RLE pain/cellulitis. Pt. was able IND prior to sx and currently requires [facility] for transfers would benefit from additional PT to address above noted limitations and prevent further decline. Pt. is in signifcant pain (10/10) which is limiting him from being able to participate.  End of Session Patient Position:  (BSC. Notified staff)  IP OR SWING BED PT PLAN  Inpatient or Swing Bed: Inpatient  PT Plan  Treatment/Interventions: Bed mobility, Transfer training, Gait training, Balance training, Strengthening, Range of motion, Therapeutic exercise, Endurance training, Therapeutic activity, Home exercise program, Positioning  PT Plan: Skilled PT  PT Frequency: 4 times per week  PT Discharge Recommendations: Moderate intensity level of continued care  PT Recommended Transfer Status: Assist x1  PT - OK to Discharge: Yes Based on completed evaluation and care plan recommendations, no barriers to discharge to next site of care        Subjective   General Visit Information:  General  Reason for Referral: cellulitis, impaired mobility, recent TKA  Referred By: SHAD Delgado-CNP  Past  Medical History Relevant to Rehab: PMH: hypertension  Hyperlipidemia  Coronary Artery Disease  Myocardial Infarction  HFpEF  COPD  DVT/PE (patient denies)  Chronic Low Back Pain with Radiculopathy  Chronic Asbestosis  Obstructive Sleep Apnea  GERD  Tongue Cancer  Squamous Cell Carcinoma - Nose  Bell's Palsy  Planar Fascitis  Colon Polyps, recent TKA 3/20/24  Prior to Session Communication: Bedside nurse  Patient Position Received: Bed, 3 rail up, Alarm on  General Comment: margie posadas (was recently at SNF following TKA, d/c home for 1 day and returned to hospital)  Home Living:  Home Living  Type of Home:  (cabin)  Lives With: Alone  Home Adaptive Equipment: Cane, Walker rolling or standard, Crutches (lift chair, Rollator)  Home Layout: One level  Home Access: Level entry  Bathroom Shower/Tub: Tub/shower unit  Bathroom Toilet: Handicapped height  Bathroom Equipment: Grab bars in shower, Grab bars around toilet (shower chair)  Home Living Comments: sleeps in lift chair  Prior Level of Function:  Prior Function Per Pt/Caregiver Report  Level of [place]: Independent with ADLs and functional transfers, Independent with homemaking with ambulation  Receives Help From: Family  ADL Assistance: Independent  Homemaking Assistance: Independent  Ambulatory Assistance: Independent (has been using walker post sx)  Precautions:  Precautions  Medical Precautions: Fall precautions  Post-Surgical Precautions: Right total knee precautions    Objective   Pain:  Pain Assessment  Pain Assessment: 0-10  Pain Score: 10 - Worst possible pain  Pain Type: Acute pain  Pain Location: Knee  Pain Orientation: Right  Pain Interventions:  (states the pain medication does not help)  Cognition:  Cognition  Overall Cognitive Status: Within Functional Limits    General Assessments:       Strength  Strength Comments: NT formally 2/2 urgent need to get to BSC and pain with palpation in RLE  Coordination  Movements are Fluid and Coordinated:  Yes    Static Standing Balance  Static Standing-Comment/Number of Minutes: fair; difficulty placing weight through RLE  Dynamic Standing Balance  Dynamic Standing-Comments: fair; difficulty placing weight through RLE  Functional Assessments:  Bed Mobility  Bed Mobility: Yes  Bed Mobility 1  Bed Mobility 1: Supine to sitting  Level of Assistance 1: Contact guard  Bed Mobility Comments 1: required additional/time effort to complete    Transfers  Transfer: Yes  Transfer 1  Technique 1: Sit to stand, Stand to sit  Transfer Device 1: Walker  Transfer Level of Assistance 1: Minimum assistance  Transfers 2  Transfer From 2: Bed to  Transfer to 2: Commode-standard  Technique 2: Stand pivot  Transfer Device 2: Walker  Transfer Level of Assistance 2: Minimum assistance    Ambulation/Gait Training  Ambulation/Gait Training Performed: Yes  Ambulation/Gait Training 1  Device 1: Rolling walker  Assistance 1: Minimum assistance  Quality of Gait 1: Antalgic  Comments/Distance (ft) 1: 3 steps  Extremity/Trunk Assessments:  Defer to OT for UE detail   RLE   RLE : Exceptions to WFL (R knee limited 2/2 cellulitis, pain and recent TKA)     Outcome Measures:  [facility] Basic Mobility  Turning from your back to your side while in a flat bed without using bedrails: A little  Moving from lying on your back to sitting on the side of a flat bed without using bedrails: A little  Moving to and from bed to chair (including a wheelchair): A little  Standing up from a chair using your arms (e.g. wheelchair or bedside chair): A little  To walk in hospital room: A lot  Climbing 3-5 steps with railing: A lot  Basic Mobility - Total Score: 16    Encounter Problems        Education Documentation  Mobility Training, taught by Pearl Juan, PT at 4/10/2024 10:48 AM.  Learner: Patient  Readiness: Acceptance  Method: Explanation  Response: Verbalizes Understanding, Needs Reinforcement  Comment: Educated pt. on importance of mobility    Education  Comments  No comments found.

## 2024-04-10 NOTE — CARE PLAN
The patient's goals for the shift include pain control    The clinical goals for the shift include Pain control    Oral and IV pain medications needed to control pain. OOB to chair. PT/OT evaluated. Pt tolerated. Assessed by ortho.

## 2024-04-10 NOTE — CONSULTS
Reason For Consult  Right knee pain and sweelling    History Of Present Illness  Abraham Rodriguez is a 83 y.o. male presenting with pain and swelling to right knee. He underwent R TKA with Dr. Arora on 3/20.  He reported to [facility] on 3/26 for postoperative knee pain and swelling.  Dr. Arora was consulted on the patient and recommended rehab placement.  Was not concerned with septic joint per note.  He was discharged on 3/29 to SNF.  He reports that he got discharged 1 day ago and went home.  He states at that time his knee pain became 10 out of 10 again and very stiff affecting his ability to ambulate.  He states physical therapy was going well at rehab and they were able to flex his knee to 106 degrees and he was walking well with his walker.  He denies any new falls since getting home.  He has not sure why his pain became so much more severe all of a sudden.  He denies fevers or chills.  His white blood cell count is normal and ERS is normal.  CRP slightly elevated but less than 1 week ago.  He denies any numbness or tingling in the leg.  Denies any purulent drainage from the incision.     Past Medical History  He has a past medical history of Allergic rhinitis due to pollen (10/23/2014), Arthritis, Chronic pain disorder, Clotting disorder (CMS/[facility]), COPD (chronic obstructive pulmonary disease) (CMS/[facility]), Coronary artery disease, Encounter for other preprocedural examination (06/24/2014), Encounter for screening for malignant neoplasm of prostate, Hyperlipidemia, Hypertension, Localized edema (05/11/2020), Myocardial infarction (CMS/[facility]), Other conditions influencing health status, Pain in unspecified knee (12/22/2014), Personal history of diseases of the skin and subcutaneous tissue (04/23/2013), Personal history of other diseases of the musculoskeletal system and connective tissue (06/19/2014), Personal history of other diseases of the nervous system and sense organs (08/10/2021), Personal history of other  diseases of the respiratory system (11/12/2014), Personal history of other diseases of the respiratory system (08/13/2014), Personal history of other specified conditions (08/20/2013), Plantar fascial fibromatosis (07/22/2013), Polyp of colon, Syncope and collapse (01/23/2015), Unspecified abdominal pain (12/22/2014), and Unspecified disorder of eyelid (10/23/2014).    Surgical History  He has a past surgical history that includes Other surgical history (04/27/2021); Other surgical history (Left, 05/31/2023); Cholecystectomy (04/23/2013); Shoulder surgery (04/23/2013); Other surgical history (04/23/2013); Cardiac catheterization; and Total knee arthroplasty (Right, 03/27/2024).     Social History  He reports that he quit smoking about 41 years ago. His smoking use included cigarettes. He has never used smokeless tobacco. He reports that he does not currently use drugs after having used the following drugs: Marijuana. He reports that he does not drink alcohol.    Family History  Family History   Problem Relation Name Age of Onset    Hypothyroidism Brother          Allergies  Iodinated contrast media, Tobramycin-dexamethasone, Lipitor [atorvastatin], and Lisinopril    Review of Systems  A complete review of systems was conducted, pertinent only to the HPI noted above.     Constitutional: None     Eyes: No additions to above history     Ears, Nose, Throat: No additions to above history     Cardiovascular: No additions to above history     Respiratory: No additions to above history     GI: No additions to above history     : No additions to above history     Skin/Neuro: No additions to above history     Endocrine/Heme/Lymph: No additions to above history     Immunologic: No additions to above history     Psychiatric: No additions to above history     Musculoskeletal: see above       Physical Exam  General: Well developed, awake/alert/oriented x3, no distress, alert and cooperative.    Skin: Warm and dry    Eyes:  "EOMI    Head/neck: No apparent injury    Cardiac: Palpable pulses    Respiratory: Normal rise and fall of chest    GI: No obvious abdominal distension    Extremities: No edema or deformity    Neuro: AxOx3    Psych: Appropriate mood    Focused Musculoskeletal  Right knee: Moderate knee effusion, ecchymosis over the anterior knee.  Vertical total knee arthroplasty incision, no wound dehiscence.  Diffuse tender to palpation.  Unable to assess varus and valgus stability or range of motion due to patient's persistent pain.  Distally is neurovascularly intact.     Last Recorded Vitals  Blood pressure 136/71, pulse 81, temperature 36.5 °C (97.7 °F), temperature source Temporal, resp. rate 18, height 1.803 m (5' 11\"), weight 88 kg (194 lb 0.1 oz), SpO2 93%.    Relevant Results  Lower extremity venous duplex right    Result Date: 4/9/2024  STUDY: Right Lower Extremity Venous Doppler Ultrasound; 4/9/2024 10:33 PM INDICATION: The right leg pain, calf edema.  Knee surgery 3/20/2024 COMPARISON: US RLE venous 3/26/2024. ACCESSION NUMBER(S): [id number] ORDERING CLINICIAN: CATHY MARTIN TECHNIQUE:  Real-time grayscale, color, and spectral doppler ultrasound imaging of the right lower extremity veins was performed. FINDINGS: The right common femoral, profunda femoral, femoral and popliteal veins demonstrated normal compressibility, normal phasic venous flow and normal response to augmentation.  There is no evidence for echogenic thrombi.  The visualized deep calf veins are patent.  The contralateral common femoral vein is free of thrombosis.    No evidence for DVT within the right lower extremity. No significant change from prior ultrasound. Signed by Ramu Gonzalez MD    CT knee right wo IV contrast    Result Date: 3/27/2024  Interpreted By:  Rima Lemus, STUDY: CT KNEE RIGHT WO IV CONTRAST   INDICATION: Signs/Symptoms:with metal reduction - swelling posterior knee, pain, ecchymosis POD 7 from right total knee arthroplasty   " COMPARISON: CT right knee without contrast dated 01/22/2024.   ACCESSION NUMBER(S): [id number]   ORDERING CLINICIAN: GRAHAM BARNETT   TECHNIQUE: Contiguous axial CT images were obtained at  2 mm slice thickness through the right knee without intravenous contrast administration. Coronal and sagittal reformatted images were performed. 3D reformatted images were created and reviewed.   FINDINGS: OSSEOUS STRUCTURES: Postsurgical changes of right total knee arthroplasty are noted. Hardware appears grossly intact without significant lucency at the bone metal interface within limits of background metal related artifacts. There is no evidence of periprosthetic fractures. Visualized distal femur and proximal tibia and fibula are grossly intact without evidence of displaced fracture deformities. There is an oval circumscribed peripherally calcified osseous fragment along the posterolateral aspect of the joint space measuring approximately 1.8 x 1.6 x 1 cm in transverse, cc and AP dimensions respectively (best seen on sagittal image 37/100). 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   SOFT TISSUES: Evaluation is limited by the lack of intravenous contrast. Within this limitation, there is large volume suprapatellar joint effusion with layering hyperdense contents, likely favored to represent hemarthrosis and could be related to recent postsurgical status. There is also linearly oriented calcific densities along the anterior aspect of the suprapatellar fossa just above the level of superior margin of patella, likely favored to be sequela of prior synovitis with residual synovial calcifications. There is mild reticular subcutaneous soft tissue edema about the knee joint, predominantly involving the prepatellar soft tissues and extending along the medial and lateral aspect of the knee. This is most likely favored to be reactive secondary to recent postsurgical status. No large  confluent fluid collection is noted in the posterior soft tissues of the knee and proximal leg included in the visualized field of view. Visualized musculature appears grossly unremarkable without evidence of intramuscular collections or mass lesions within limits of noncontrast technique.       1. Status post recent right total knee arthroplasty with intact hardware. Redemonstration of a peripherally calcified intracapsular synovial body projecting over the posterolateral joint space. 2. Large volume suprapatellar joint effusion with layering densities/hemarthrosis is most likely favored to be related to recent postsurgical status. However this is of indeterminate sterility and superimposed infection can not be excluded in appropriate clinical setting. Recommend surgical correlation. 3. Reticular subcutaneous soft tissue edema about the knee joint is most likely favored to be reactive related to recent postsurgical status.         MACRO: None.   Signed by: Rima Lemus 3/27/2024 1:23 PM Dictation workstation:   [id number]    Lower extremity venous duplex right    Result Date: 3/26/2024  Interpreted By:  Jose G Mariscal, STUDY: [facility] US LOWER EXTREMITY VENOUS DUPLEX RIGHT  3/26/2024 6:13 pm   INDICATION: 82 y/o   M with  Signs/Symptoms:swelling, post op. LMP:  Unknown.   COMPARISON: None.   ACCESSION NUMBER(S): [id number]   ORDERING CLINICIAN: SHYANNE CHAMPION   TECHNIQUE: Routine ultrasound of the  right lower extremity was performed with duplex Doppler (color and spectral) evaluation.   Static images were obtained for remote interpretation.   FINDINGS: THIGH VEINS:  The common femoral, femoral, popliteal, proximal medial saphenous, and deep femoral veins are patent and free of thrombus. The veins are normally compressible.  They demonstrate normal phasic flow and augmentation response.   CALF VEINS:  The paired peroneal and posterior tibial calf veins are patent.       No deep venous thrombosis of the   right lower extremity.   MACRO: None   Signed by: Jose G Mariscal 3/26/2024 6:45 PM Dictation workstation:   [id number]    XR knee right 1-2 views    Result Date: 3/20/2024  Interpreted By:  Yennifer Jimenez, STUDY: Right knee, 2 views.   INDICATION: Signs/Symptoms:right TKR   COMPARISON: 07/03/2023.   ACCESSION NUMBER(S): [id number]   ORDERING CLINICIAN: TRICIA BOSWELL   FINDINGS: No acute fracture or malalignment. Immediate postsurgical changes of right total knee arthroplasty. Hardware is intact without perihardware fractures or lucencies. Expected postsurgical soft tissue gas within the knee joint.       1. Immediate postsurgical changes of right total knee arthroplasty without hardware complication.   MACRO: None.   Signed by: Yennifer Jimenez 3/20/2024 3:00 PM Dictation workstation:   [id number]      Scheduled medications  ascorbic acid, 500 mg, oral, BID  aspirin, 325 mg, oral, BID  celecoxib, 200 mg, oral, BID  finasteride, 5 mg, oral, q PM  fluticasone, 2 spray, Each Nostril, BID  gabapentin, 300 mg, oral, TID  loratadine, 10 mg, oral, Daily  losartan, 50 mg, oral, Daily  melatonin, 5 mg, oral, Nightly  metoprolol succinate XL, 50 mg, oral, Daily  pantoprazole, 40 mg, oral, Daily before breakfast  piperacillin-tazobactam, 3.375 g, intravenous, q6h  predniSONE, 20 mg, oral, Daily  sennosides-docusate sodium, 2 tablet, oral, BID  tamsulosin, 0.4 mg, oral, q PM      Continuous medications  oxygen,   sodium chloride 0.9%, 10 mL/hr      PRN medications  PRN medications: acetaminophen, bisacodyl, carisoprodol, dicyclomine, docusate sodium, HYDROmorphone, magnesium hydroxide, melatonin, ondansetron, oxygen, polyethylene glycol, simethicone, sodium chloride 0.9%, torsemide, vancomycin  Results for orders placed or performed during the hospital encounter of 04/09/24 (from the past 24 hour(s))   CBC with Differential   Result Value Ref Range    WBC 8.3 4.4 - 11.3 x10*3/uL    nRBC 0.0 0.0 - 0.0 /100 WBCs    RBC 3.24  (L) 4.50 - 5.90 x10*6/uL    Hemoglobin 10.0 (L) 13.5 - 17.5 g/dL    Hematocrit 31.1 (L) 41.0 - 52.0 %    MCV 96 80 - 100 fL    MCH 30.9 26.0 - 34.0 pg    MCHC 32.2 32.0 - 36.0 g/dL    RDW 14.5 11.5 - 14.5 %    Platelets 212 150 - 450 x10*3/uL    Neutrophils % 75.2 40.0 - 80.0 %    Immature Granulocytes %, Automated 0.6 0.0 - 0.9 %    Lymphocytes % 13.4 13.0 - 44.0 %    Monocytes % 9.4 2.0 - 10.0 %    Eosinophils % 1.3 0.0 - 6.0 %    Basophils % 0.1 0.0 - 2.0 %    Neutrophils Absolute 6.24 (H) 1.60 - 5.50 x10*3/uL    Immature Granulocytes Absolute, Automated 0.05 0.00 - 0.50 x10*3/uL    Lymphocytes Absolute 1.11 0.80 - 3.00 x10*3/uL    Monocytes Absolute 0.78 0.05 - 0.80 x10*3/uL    Eosinophils Absolute 0.11 0.00 - 0.40 x10*3/uL    Basophils Absolute 0.01 0.00 - 0.10 x10*3/uL   Comprehensive Metabolic Panel   Result Value Ref Range    Glucose 102 (H) 74 - 99 mg/dL    Sodium 140 136 - 145 mmol/L    Potassium 3.7 3.5 - 5.3 mmol/L    Chloride 107 98 - 107 mmol/L    Bicarbonate 26 21 - 32 mmol/L    Anion Gap 11 10 - 20 mmol/L    Urea Nitrogen 20 6 - 23 mg/dL    Creatinine 0.80 0.50 - 1.30 mg/dL    eGFR 88 >60 mL/min/1.73m*2    Calcium 8.4 (L) 8.6 - 10.3 mg/dL    Albumin 3.3 (L) 3.4 - 5.0 g/dL    Alkaline Phosphatase 59 33 - 136 U/L    Total Protein 5.8 (L) 6.4 - 8.2 g/dL    AST 13 9 - 39 U/L    Bilirubin, Total 0.5 0.0 - 1.2 mg/dL    ALT 10 10 - 52 U/L   Sedimentation rate, automated   Result Value Ref Range    Sedimentation Rate 2 0 - 20 mm/h   C-reactive protein   Result Value Ref Range    C-Reactive Protein 4.81 (H) <1.00 mg/dL        Assessment/Plan   Right total knee arthroplasty postoperative pain    83-year-old male presents 2 weeks after right knee replacement with Dr. Arora complaining of pain and swelling.  He was evaluated 1 week after surgery by Dr. Arora and sent to rehab.  He was reportedly doing very well at rehab until he went home.  Exam not concerning for septic joint.  Incision does not appear infected.   CT nonspecific joint effusion, no hardware issues.  Normal white cell count.  Threshold for arthrocentesis of the knee is very high for a knee with a total joint replacement, especially this close to surgery.  Discussed case with Dr. Yanes who also saw the patient.  Recommend outpatient follow-up.    -Pain control per medicine team, agree with NSAIDs and prednisone  -Outpatient physical therapy  -Follow-up with Dr. Arora in office  -For discharge from orthopedic standpoint        Note dictated with Get10  transcription software. Completed without full typed error editing and sent to avoid delay.    Zander Ashley PA-C

## 2024-04-10 NOTE — PROGRESS NOTES
04/10/24 1512   Discharge Planning   Living Arrangements Alone   Support Systems Children   Assistance Needed Independent in ADL's and most IADL's. His daughter assists him with cleaning. He is currently active with [facility] - RN, PT/OT  (DME: walker, cane, rollator, crutches, shower chair, grab bars)   Type of Residence Private residence  ([facility] style cabin home)   Number of Stairs to Enter Residence 1  (no railing)   Number of Stairs Within Residence 0   Do you have animals or pets at home? No   Who is requesting discharge planning? Provider   Type of Home Care Services Home nursing visits;Home OT;Home PT   Patient expects to be discharged to: [facility] resumed vs SNF   Does the patient need discharge transport arranged? No  (his daughter will pick him up when medically ready.)   Housing Stability   In the last 12 months, how many places have you lived? 1     Patient evaluated at bedside. AAOX3. Patient has not been driving since his knee surgery. His daughter has been providing him transportation. Patient denies any falls within the past 6 months. PCP: Angella Lima last seen 11/29/23. Pharmacy: [facility] in [place]. Patient self manages their home medications directly from the medication bottles without difficulty, and denies issues with affordability. He recently was at  [facility]hab post right total knee replacement. He discharged 4/5/24 to home with [facility]. PT evaluated patient with [facility] 16 recommends MOD level of therapy. Patient would prefer to return home with [facility] if he is able to walk with his walker. He is hoping as the inflammation decreases he will get his mobility back and be able to return home. We discussed C vs SNF and will follow him through his stay as he progresses.     Discharge plan: [facility] vs SNF.  DC NOT Secure

## 2024-04-10 NOTE — CONSULTS
"Vancomycin Dosing by Pharmacy- INITIAL    Abraham Rodriguez is a 83 y.o. year old male who Pharmacy has been consulted for vancomycin dosing for cellulitis, skin and soft tissue. Based on the patient's indication and renal status this patient will be dosed based on a goal AUC of 400-600.     Renal function is currently stable.    Visit Vitals  /71 (BP Location: Right arm, Patient Position: Lying)   Pulse 84   Temp 36.5 °C (97.7 °F) (Temporal)   Resp 18 Comment: after pain medication administration VS        Lab Results   Component Value Date    CREATININE 0.80 04/09/2024    CREATININE 0.90 04/04/2024    CREATININE 1.18 04/01/2024    CREATININE 0.93 03/31/2024        Patient weight is No results found for: \"PTWEIGHT\"    No results found for: \"CULTURE\"     No intake/output data recorded.  [unfilled]    No results found for: \"PATIENTTEMP\"       Assessment/Plan     Patient will not be given a loading dose.  Will initiate vancomycin maintenance,  1500 mg every 24 hours.    This dosing regimen is predicted by [employer] to result in the following pharmacokinetic parameters:    Regimen: 1500 mg IV every 24 hours.  Start time: 01:04 on 04/11/2024  Exposure target: AUC24 (range)400-600 mg/L.hr   AUC24,ss: 421 mg/L.hr  Probability of AUC24 > 400: 56 %  Ctrough,ss: 11.5 mg/L  Probability of Ctrough,ss > 20: 10 %  Probability of nephrotoxicity (Lodise MELISSA 2009): 7 %      Follow-up level will be ordered on 4/11 at 0500 unless clinically indicated sooner.  Will continue to monitor renal function daily while on vancomycin and order serum creatinine at least every 48 hours if not already ordered.  Follow for continued vancomycin needs, clinical response, and signs/symptoms of toxicity.       Will Garcia, PharmD       "

## 2024-04-11 PROBLEM — L03.115 CELLULITIS OF RIGHT LOWER EXTREMITY: Status: ACTIVE | Noted: 2024-04-11

## 2024-04-11 LAB
ANION GAP SERPL CALC-SCNC: 11 MMOL/L (ref 10–20)
BUN SERPL-MCNC: 21 MG/DL (ref 6–23)
CALCIUM SERPL-MCNC: 8.8 MG/DL (ref 8.6–10.3)
CHLORIDE SERPL-SCNC: 106 MMOL/L (ref 98–107)
CO2 SERPL-SCNC: 27 MMOL/L (ref 21–32)
CREAT SERPL-MCNC: 1.03 MG/DL (ref 0.5–1.3)
EGFRCR SERPLBLD CKD-EPI 2021: 72 ML/MIN/1.73M*2
ERYTHROCYTE [DISTWIDTH] IN BLOOD BY AUTOMATED COUNT: 14 % (ref 11.5–14.5)
GLUCOSE SERPL-MCNC: 97 MG/DL (ref 74–99)
HCT VFR BLD AUTO: 32.8 % (ref 41–52)
HGB BLD-MCNC: 10.3 G/DL (ref 13.5–17.5)
MCH RBC QN AUTO: 30.5 PG (ref 26–34)
MCHC RBC AUTO-ENTMCNC: 31.4 G/DL (ref 32–36)
MCV RBC AUTO: 97 FL (ref 80–100)
NRBC BLD-RTO: 0 /100 WBCS (ref 0–0)
PLATELET # BLD AUTO: 199 X10*3/UL (ref 150–450)
POTASSIUM SERPL-SCNC: 4 MMOL/L (ref 3.5–5.3)
RBC # BLD AUTO: 3.38 X10*6/UL (ref 4.5–5.9)
SODIUM SERPL-SCNC: 140 MMOL/L (ref 136–145)
VANCOMYCIN SERPL-MCNC: 14.5 UG/ML (ref 5–20)
WBC # BLD AUTO: 6.6 X10*3/UL (ref 4.4–11.3)

## 2024-04-11 PROCEDURE — 2500000001 HC RX 250 WO HCPCS SELF ADMINISTERED DRUGS (ALT 637 FOR MEDICARE OP): Performed by: NURSE PRACTITIONER

## 2024-04-11 PROCEDURE — 80202 ASSAY OF VANCOMYCIN: CPT | Performed by: NURSE PRACTITIONER

## 2024-04-11 PROCEDURE — 96361 HYDRATE IV INFUSION ADD-ON: CPT

## 2024-04-11 PROCEDURE — 97535 SELF CARE MNGMENT TRAINING: CPT | Mod: GO | Performed by: OCCUPATIONAL THERAPIST

## 2024-04-11 PROCEDURE — 2500000004 HC RX 250 GENERAL PHARMACY W/ HCPCS (ALT 636 FOR OP/ED): Performed by: INTERNAL MEDICINE

## 2024-04-11 PROCEDURE — 36415 COLL VENOUS BLD VENIPUNCTURE: CPT

## 2024-04-11 PROCEDURE — 85027 COMPLETE CBC AUTOMATED: CPT

## 2024-04-11 PROCEDURE — 97116 GAIT TRAINING THERAPY: CPT | Mod: GP | Performed by: PHYSICAL THERAPIST

## 2024-04-11 PROCEDURE — 1100000001 HC PRIVATE ROOM DAILY: Mod: IPSPLIT

## 2024-04-11 PROCEDURE — 1090000002 HH PPS REVENUE DEBIT

## 2024-04-11 PROCEDURE — 2500000004 HC RX 250 GENERAL PHARMACY W/ HCPCS (ALT 636 FOR OP/ED)

## 2024-04-11 PROCEDURE — 96372 THER/PROPH/DIAG INJ SC/IM: CPT | Performed by: NURSE PRACTITIONER

## 2024-04-11 PROCEDURE — 2500000004 HC RX 250 GENERAL PHARMACY W/ HCPCS (ALT 636 FOR OP/ED): Performed by: NURSE PRACTITIONER

## 2024-04-11 PROCEDURE — 2500000002 HC RX 250 W HCPCS SELF ADMINISTERED DRUGS (ALT 637 FOR MEDICARE OP, ALT 636 FOR OP/ED): Mod: IPSPLIT

## 2024-04-11 PROCEDURE — 2500000001 HC RX 250 WO HCPCS SELF ADMINISTERED DRUGS (ALT 637 FOR MEDICARE OP): Mod: IPSPLIT | Performed by: NURSE PRACTITIONER

## 2024-04-11 PROCEDURE — 80048 BASIC METABOLIC PNL TOTAL CA: CPT

## 2024-04-11 PROCEDURE — 99232 SBSQ HOSP IP/OBS MODERATE 35: CPT | Performed by: NURSE PRACTITIONER

## 2024-04-11 PROCEDURE — 96366 THER/PROPH/DIAG IV INF ADDON: CPT

## 2024-04-11 PROCEDURE — 1090000001 HH PPS REVENUE CREDIT

## 2024-04-11 PROCEDURE — 2500000001 HC RX 250 WO HCPCS SELF ADMINISTERED DRUGS (ALT 637 FOR MEDICARE OP)

## 2024-04-11 PROCEDURE — 97110 THERAPEUTIC EXERCISES: CPT | Mod: GP | Performed by: PHYSICAL THERAPIST

## 2024-04-11 RX ORDER — ACETAMINOPHEN 325 MG/1
975 TABLET ORAL EVERY 8 HOURS
Status: DISCONTINUED | OUTPATIENT
Start: 2024-04-11 | End: 2024-04-15 | Stop reason: HOSPADM

## 2024-04-11 RX ORDER — KETOROLAC TROMETHAMINE 15 MG/ML
15 INJECTION, SOLUTION INTRAMUSCULAR; INTRAVENOUS EVERY 6 HOURS PRN
Status: DISCONTINUED | OUTPATIENT
Start: 2024-04-11 | End: 2024-04-15 | Stop reason: HOSPADM

## 2024-04-11 RX ORDER — KETOROLAC TROMETHAMINE 15 MG/ML
15 INJECTION, SOLUTION INTRAMUSCULAR; INTRAVENOUS ONCE
Status: COMPLETED | OUTPATIENT
Start: 2024-04-11 | End: 2024-04-11

## 2024-04-11 RX ORDER — PREDNISONE 20 MG/1
40 TABLET ORAL DAILY
Status: DISCONTINUED | OUTPATIENT
Start: 2024-04-12 | End: 2024-04-15 | Stop reason: HOSPADM

## 2024-04-11 RX ORDER — OXYCODONE HYDROCHLORIDE 5 MG/1
5 TABLET ORAL EVERY 6 HOURS PRN
Status: DISCONTINUED | OUTPATIENT
Start: 2024-04-11 | End: 2024-04-13

## 2024-04-11 RX ADMIN — HEPARIN SODIUM 5000 UNITS: 5000 INJECTION INTRAVENOUS; SUBCUTANEOUS at 18:07

## 2024-04-11 RX ADMIN — OXYCODONE HYDROCHLORIDE AND ACETAMINOPHEN 500 MG: 500 TABLET ORAL at 08:49

## 2024-04-11 RX ADMIN — GABAPENTIN 300 MG: 300 CAPSULE ORAL at 14:39

## 2024-04-11 RX ADMIN — OXYCODONE HYDROCHLORIDE 5 MG: 5 TABLET ORAL at 16:04

## 2024-04-11 RX ADMIN — OXYCODONE HYDROCHLORIDE 5 MG: 5 TABLET ORAL at 21:07

## 2024-04-11 RX ADMIN — CELECOXIB 200 MG: 100 CAPSULE ORAL at 21:07

## 2024-04-11 RX ADMIN — FLUTICASONE PROPIONATE 2 SPRAY: 50 SPRAY, METERED NASAL at 21:09

## 2024-04-11 RX ADMIN — PREDNISONE 20 MG: 20 TABLET ORAL at 08:49

## 2024-04-11 RX ADMIN — METOPROLOL SUCCINATE 50 MG: 25 TABLET, EXTENDED RELEASE ORAL at 08:48

## 2024-04-11 RX ADMIN — OXYCODONE HYDROCHLORIDE AND ACETAMINOPHEN 500 MG: 500 TABLET ORAL at 21:07

## 2024-04-11 RX ADMIN — HEPARIN SODIUM 5000 UNITS: 5000 INJECTION INTRAVENOUS; SUBCUTANEOUS at 10:12

## 2024-04-11 RX ADMIN — DOCUSATE SODIUM 100 MG: 100 CAPSULE, LIQUID FILLED ORAL at 21:08

## 2024-04-11 RX ADMIN — SENNOSIDES AND DOCUSATE SODIUM 2 TABLET: 8.6; 5 TABLET ORAL at 21:07

## 2024-04-11 RX ADMIN — VANCOMYCIN 1.5 G: 1.5 INJECTION, SOLUTION INTRAVENOUS at 00:44

## 2024-04-11 RX ADMIN — SIMETHICONE 80 MG: 80 TABLET, CHEWABLE ORAL at 18:07

## 2024-04-11 RX ADMIN — FLUTICASONE PROPIONATE 2 SPRAY: 50 SPRAY, METERED NASAL at 08:52

## 2024-04-11 RX ADMIN — GABAPENTIN 300 MG: 300 CAPSULE ORAL at 08:48

## 2024-04-11 RX ADMIN — ACETAMINOPHEN 975 MG: 325 TABLET ORAL at 11:07

## 2024-04-11 RX ADMIN — ASPIRIN 325 MG: 325 TABLET, COATED ORAL at 21:08

## 2024-04-11 RX ADMIN — Medication 5 MG: at 21:08

## 2024-04-11 RX ADMIN — GABAPENTIN 300 MG: 300 CAPSULE ORAL at 21:07

## 2024-04-11 RX ADMIN — PIPERACILLIN SODIUM AND TAZOBACTAM SODIUM 3.38 G: 3; .375 INJECTION, SOLUTION INTRAVENOUS at 06:10

## 2024-04-11 RX ADMIN — HEPARIN SODIUM 5000 UNITS: 5000 INJECTION INTRAVENOUS; SUBCUTANEOUS at 02:15

## 2024-04-11 RX ADMIN — PIPERACILLIN SODIUM AND TAZOBACTAM SODIUM 3.38 G: 3; .375 INJECTION, SOLUTION INTRAVENOUS at 12:56

## 2024-04-11 RX ADMIN — CARISOPRODOL 350 MG: 350 TABLET ORAL at 18:16

## 2024-04-11 RX ADMIN — SIMETHICONE 80 MG: 80 TABLET, CHEWABLE ORAL at 11:22

## 2024-04-11 RX ADMIN — ASPIRIN 325 MG: 325 TABLET, COATED ORAL at 08:47

## 2024-04-11 RX ADMIN — KETOROLAC TROMETHAMINE 15 MG: 15 INJECTION INTRAMUSCULAR; INTRAVENOUS at 11:10

## 2024-04-11 RX ADMIN — LOSARTAN POTASSIUM 50 MG: 50 TABLET, FILM COATED ORAL at 08:48

## 2024-04-11 RX ADMIN — FINASTERIDE 5 MG: 5 TABLET, FILM COATED ORAL at 21:08

## 2024-04-11 RX ADMIN — CELECOXIB 200 MG: 100 CAPSULE ORAL at 08:48

## 2024-04-11 RX ADMIN — ACETAMINOPHEN 975 MG: 325 TABLET ORAL at 18:16

## 2024-04-11 RX ADMIN — PIPERACILLIN SODIUM AND TAZOBACTAM SODIUM 3.38 G: 3; .375 INJECTION, SOLUTION INTRAVENOUS at 18:07

## 2024-04-11 RX ADMIN — LORATADINE 10 MG: 10 TABLET ORAL at 08:48

## 2024-04-11 RX ADMIN — KETOROLAC TROMETHAMINE 15 MG: 15 INJECTION INTRAMUSCULAR; INTRAVENOUS at 18:06

## 2024-04-11 RX ADMIN — SODIUM CHLORIDE 10 ML/HR: 9 INJECTION, SOLUTION INTRAVENOUS at 06:11

## 2024-04-11 RX ADMIN — TAMSULOSIN HYDROCHLORIDE 0.4 MG: 0.4 CAPSULE ORAL at 21:08

## 2024-04-11 RX ADMIN — PANTOPRAZOLE SODIUM 40 MG: 40 TABLET, DELAYED RELEASE ORAL at 06:10

## 2024-04-11 RX ADMIN — OXYCODONE HYDROCHLORIDE AND ACETAMINOPHEN 1 TABLET: 5; 325 TABLET ORAL at 08:49

## 2024-04-11 ASSESSMENT — COGNITIVE AND FUNCTIONAL STATUS - GENERAL
DAILY ACTIVITIY SCORE: 17
TOILETING: A LOT
CLIMB 3 TO 5 STEPS WITH RAILING: A LOT
TURNING FROM BACK TO SIDE WHILE IN FLAT BAD: A LITTLE
DAILY ACTIVITIY SCORE: 17
DRESSING REGULAR LOWER BODY CLOTHING: A LOT
STANDING UP FROM CHAIR USING ARMS: A LITTLE
DRESSING REGULAR UPPER BODY CLOTHING: A LITTLE
STANDING UP FROM CHAIR USING ARMS: A LITTLE
MOBILITY SCORE: 17
HELP NEEDED FOR BATHING: A LOT
MOVING FROM LYING ON BACK TO SITTING ON SIDE OF FLAT BED WITH BEDRAILS: A LITTLE
WALKING IN HOSPITAL ROOM: A LITTLE
HELP NEEDED FOR BATHING: A LOT
MOVING TO AND FROM BED TO CHAIR: A LITTLE
DRESSING REGULAR LOWER BODY CLOTHING: A LOT
WALKING IN HOSPITAL ROOM: A LITTLE
MOBILITY SCORE: 17
MOVING FROM LYING ON BACK TO SITTING ON SIDE OF FLAT BED WITH BEDRAILS: A LITTLE
TOILETING: A LOT
TURNING FROM BACK TO SIDE WHILE IN FLAT BAD: A LITTLE
CLIMB 3 TO 5 STEPS WITH RAILING: A LOT
DRESSING REGULAR UPPER BODY CLOTHING: A LITTLE
MOVING TO AND FROM BED TO CHAIR: A LITTLE

## 2024-04-11 ASSESSMENT — PAIN SCALES - GENERAL
PAINLEVEL_OUTOF10: 10 - WORST POSSIBLE PAIN
PAINLEVEL_OUTOF10: 9
PAINLEVEL_OUTOF10: 10 - WORST POSSIBLE PAIN
PAINLEVEL_OUTOF10: 9
PAINLEVEL_OUTOF10: 10 - WORST POSSIBLE PAIN
PAINLEVEL_OUTOF10: 9
PAINLEVEL_OUTOF10: 10 - WORST POSSIBLE PAIN

## 2024-04-11 ASSESSMENT — PAIN - FUNCTIONAL ASSESSMENT
PAIN_FUNCTIONAL_ASSESSMENT: 0-10

## 2024-04-11 ASSESSMENT — PAIN DESCRIPTION - ORIENTATION
ORIENTATION: RIGHT

## 2024-04-11 ASSESSMENT — ACTIVITIES OF DAILY LIVING (ADL)
HOME_MANAGEMENT_TIME_ENTRY: 26
BATHING_LEVEL_OF_ASSISTANCE: SETUP;CLOSE SUPERVISION
BATHING_WHERE_ASSESSED: OTHER (COMMENT)

## 2024-04-11 ASSESSMENT — PAIN DESCRIPTION - LOCATION
LOCATION: LEG
LOCATION: KNEE
LOCATION: KNEE

## 2024-04-11 NOTE — CARE PLAN
Problem: Pain  Goal: My pain/discomfort is manageable  Outcome: Progressing     Problem: Safety  Goal: Patient will be injury free during hospitalization  Outcome: Progressing  Goal: I will remain free of falls  Outcome: Progressing     Problem: Daily Care  Goal: Daily care needs are met  Outcome: Progressing     Problem: Psychosocial Needs  Goal: Demonstrates ability to cope with hospitalization/illness  Outcome: Progressing  Goal: Collaborate with me, my family, and caregiver to identify my specific goals  Outcome: Progressing     Problem: Discharge Barriers  Goal: My discharge needs are met  Outcome: Progressing     Problem: Fall/Injury  Goal: Not fall by end of shift  Outcome: Progressing  Goal: Be free from injury by end of the shift  Outcome: Progressing  Goal: Verbalize understanding of personal risk factors for fall in the hospital  Outcome: Progressing  Goal: Verbalize understanding of risk factor reduction measures to prevent injury from fall in the home  Outcome: Progressing  Goal: Use assistive devices by end of the shift  Outcome: Progressing  Goal: Pace activities to prevent fatigue by end of the shift  Outcome: Progressing     Problem: Pain  Goal: Takes deep breaths with improved pain control throughout the shift  Outcome: Progressing  Goal: Turns in bed with improved pain control throughout the shift  Outcome: Progressing  Goal: Walks with improved pain control throughout the shift  Outcome: Progressing  Goal: Performs ADL's with improved pain control throughout shift  Outcome: Progressing  Goal: Participates in PT with improved pain control throughout the shift  Outcome: Progressing  Goal: Free from opioid side effects throughout the shift  Outcome: Progressing  Goal: Free from acute confusion related to pain meds throughout the shift  Outcome: Progressing     Problem: Skin  Goal: Decreased wound size/increased tissue granulation at next dressing change  Outcome: Progressing  Goal: Participates in  plan/prevention/treatment measures  Outcome: Progressing  Goal: Prevent/manage excess moisture  Outcome: Progressing  Goal: Prevent/minimize sheer/friction injuries  Outcome: Progressing  Goal: Promote/optimize nutrition  Outcome: Progressing  Goal: Promote skin healing  Outcome: Progressing     Problem: Nutrition  Goal: Oral intake greater than 50%  Outcome: Progressing  Goal: Oral intake greater 75%  Outcome: Progressing  Goal: Consume prescribed supplement  Outcome: Progressing  Goal: Adequate PO fluid intake  Outcome: Progressing  Goal: Nutrition support goals are met within 48 hrs  Outcome: Progressing  Goal: Nutrition support is meeting 75% of nutrient needs  Outcome: Progressing  Goal: BG  mg/dL  Outcome: Progressing  Goal: Lab values WNL  Outcome: Progressing  Goal: Electrolytes WNL  Outcome: Progressing  Goal: Promote healing  Outcome: Progressing  Goal: Maintain stable weight  Outcome: Progressing  Goal: Reduce weight from edema/fluid  Outcome: Progressing  Goal: Gradual weight gain  Outcome: Progressing     Problem: Pain - Adult  Goal: Verbalizes/displays adequate comfort level or baseline comfort level  Outcome: Progressing     Problem: Safety - Adult  Goal: Free from fall injury  Outcome: Progressing     Problem: Discharge Planning  Goal: Discharge to home or other facility with appropriate resources  Outcome: Progressing     Problem: Chronic Conditions and Co-morbidities  Goal: Patient's chronic conditions and co-morbidity symptoms are monitored and maintained or improved  Outcome: Progressing   The patient's goals for the shift include pain control    The clinical goals for the shift include Pain control    Over the shift, the patient did not make progress toward the following goals. Barriers to progression include . Recommendations to address these barriers include .

## 2024-04-11 NOTE — PROGRESS NOTES
04/11/24 1449   Discharge Planning   Patient expects to be discharged to: [facility] - pending acceptance and qualifying stay     Patient was upgraded to inpatient. [facility] 16 with MOD recommendation. Patient would like to go to [facility]. Referral sent to [place], awaiting response. Will continue to follow for any transition care needs or changes in current plan.

## 2024-04-11 NOTE — PROGRESS NOTES
Abraham Rodriguez is a 83 y.o. male on day 0 of admission presenting with Cellulitis.      Subjective   Patient assessed at bedside; sitting up in bed. He complained of his right knee hurting. He reports he can't put weight on it without having severe pain. He reports he is not going home if he can't bear weight on his leg. He denies SOB, fever, chills, N/V       Objective     Last Recorded Vitals  /76 (BP Location: Right arm, Patient Position: Lying)   Pulse 72   Temp 36.6 °C (97.9 °F) (Temporal)   Resp 19   Wt 88 kg (194 lb 0.1 oz)   SpO2 99%   Intake/Output last 3 Shifts:    Intake/Output Summary (Last 24 hours) at 4/11/2024 1100  Last data filed at 4/11/2024 0044  Gross per 24 hour   Intake 100 ml   Output 875 ml   Net -775 ml       Admission Weight  Weight: 86.2 kg (190 lb) (04/09/24 1943)    Daily Weight  04/10/24 : 88 kg (194 lb 0.1 oz)    Image Results      Physical Exam  Vitals reviewed.   Constitutional:       Appearance: Normal appearance. He is normal weight.   HENT:      Head: Normocephalic and atraumatic.      Nose: Nose normal.      Mouth/Throat:      Mouth: Mucous membranes are moist.      Pharynx: Oropharynx is clear.   Eyes:      Conjunctiva/sclera: Conjunctivae normal.      Pupils: Pupils are equal, round, and reactive to light.   Cardiovascular:      Rate and Rhythm: Normal rate and regular rhythm.      Pulses: Normal pulses.      Heart sounds: Normal heart sounds.   Pulmonary:      Effort: Pulmonary effort is normal.      Breath sounds: Normal breath sounds.   Abdominal:      General: Bowel sounds are normal.      Palpations: Abdomen is soft.   Musculoskeletal:         General: Swelling and tenderness present.      Cervical back: Normal range of motion and neck supple.      Right lower leg: Edema present.   Skin:     General: Skin is warm and dry.      Capillary Refill: Capillary refill takes 2 to 3 seconds.      Findings: Bruising present.      Comments: Right knee incision well approx.  Healing well. Knee is swollen; bruising to the lateral thigh   Neurological:      General: No focal deficit present.      Mental Status: He is alert and oriented to person, place, and time.   Psychiatric:         Mood and Affect: Mood normal.         Behavior: Behavior normal.         Relevant Results    Scheduled medications  acetaminophen, 975 mg, oral, q8h  ascorbic acid, 500 mg, oral, BID  aspirin, 325 mg, oral, BID  celecoxib, 200 mg, oral, BID  docusate sodium, 100 mg, oral, BID  finasteride, 5 mg, oral, q PM  fluticasone, 2 spray, Each Nostril, BID  gabapentin, 300 mg, oral, TID  heparin (porcine), 5,000 Units, subcutaneous, q8h  ketorolac, 15 mg, intravenous, Once  loratadine, 10 mg, oral, Daily  losartan, 50 mg, oral, Daily  melatonin, 5 mg, oral, Nightly  metoprolol succinate XL, 50 mg, oral, Daily  pantoprazole, 40 mg, oral, Daily before breakfast  piperacillin-tazobactam, 3.375 g, intravenous, q6h  predniSONE, 20 mg, oral, Daily  sennosides-docusate sodium, 2 tablet, oral, BID  tamsulosin, 0.4 mg, oral, q PM  vancomycin 1.5 g in 300 mL, 1,500 mg, intravenous, q24h      Continuous medications  oxygen, , Last Rate: 2 L/min (04/10/24 2325)  sodium chloride 0.9%, 10 mL/hr, Last Rate: 10 mL/hr (04/11/24 0611)      PRN medications  PRN medications: carisoprodol, ketorolac, magnesium hydroxide, melatonin, ondansetron, oxyCODONE, oxygen, polyethylene glycol, simethicone, sodium chloride 0.9%, torsemide, vancomycin    Results for orders placed or performed during the hospital encounter of 04/09/24 (from the past 24 hour(s))   CBC   Result Value Ref Range    WBC 6.6 4.4 - 11.3 x10*3/uL    nRBC 0.0 0.0 - 0.0 /100 WBCs    RBC 3.38 (L) 4.50 - 5.90 x10*6/uL    Hemoglobin 10.3 (L) 13.5 - 17.5 g/dL    Hematocrit 32.8 (L) 41.0 - 52.0 %    MCV 97 80 - 100 fL    MCH 30.5 26.0 - 34.0 pg    MCHC 31.4 (L) 32.0 - 36.0 g/dL    RDW 14.0 11.5 - 14.5 %    Platelets 199 150 - 450 x10*3/uL   Basic metabolic panel   Result Value Ref  Range    Glucose 97 74 - 99 mg/dL    Sodium 140 136 - 145 mmol/L    Potassium 4.0 3.5 - 5.3 mmol/L    Chloride 106 98 - 107 mmol/L    Bicarbonate 27 21 - 32 mmol/L    Anion Gap 11 10 - 20 mmol/L    Urea Nitrogen 21 6 - 23 mg/dL    Creatinine 1.03 0.50 - 1.30 mg/dL    eGFR 72 >60 mL/min/1.73m*2    Calcium 8.8 8.6 - 10.3 mg/dL   Vancomycin   Result Value Ref Range    Vancomycin 14.5 5.0 - 20.0 ug/mL                   Assessment/Plan      Principal Problem:    Cellulitis  Active Problems:    Allergic rhinitis    Arthritis, multiple joint involvement    Osteoarthritis    Benign essential hypertension    Benign prostatic hyperplasia    Dyslipidemia    Joint pain    (HFpEF) heart failure with preserved ejection fraction (CMS/HCC)    Cellulitis RLE  Arthritis, multiple joint involvement  Osteoarthritis  Right knee Joint pain  -WBC 8.3 > 6.2  -WSR 2  -CRP 4.81  -US RLE: No evidence for DVT within the right lower extremity. No significant change from prior ultrasound.  -continue celecoxib, gabapentin   -continue prednisone  -started acetaminophen 975 mg scheduled  -started oxycodone prn severe pain  -started toradol prn for moderate pain  -discontinued percocet prn pain, Dilaudid BTP  -continue Zosyn 3.375 IV Q 6 (Day 3)  -continue Vancomycin IV pharmacy to dose (Day 2)  -consult to orthopedics, appreciate recs  -PT/OT evaluation recommending moderate level therapy      Allergic rhinitis  -continue Flonase, loratadine     Benign essential hypertension  Dyslipidemia  Chronic Diastolic Heart Failure  -continue aspirin, losartan, metoprolol succinate, and torsemide  -monitor BP and HR     Benign prostatic hyperplasia  -continue finasteride, tamsulosin    PUD ppx  -continue pantoprazole      DVT ppx  -continue heparin SQ     Code Status: Full Code     Disposition: Pt requires inpatient stay at this time.    Total accumulated time spent face to face and not face to face preparing to see the patient, obtaining and reviewing  separately obtained history; performing a medically appropriate examination and/or evaluation; counseling and educating the patient, family; ordering medications, tests, or procedures; referring and communicating with other health care professionals; documenting clinical information in the patient's medical record; independently interpreting results and communicating the results to the patient, family; and care coordination was 30 minutes.              Aixa Adkins, SHAD-CNP

## 2024-04-11 NOTE — PROGRESS NOTES
"Vancomycin Dosing by Pharmacy- FOLLOW UP    Abraham Rodriguez is a 83 y.o. year old male who Pharmacy has been consulted for vancomycin dosing for cellulitis, skin and soft tissue. Based on the patient's indication and renal status this patient is being dosed based on a goal AUC of 400-600.     Renal function is currently declining.    Current vancomycin dose: 1500 mg given every 24 hours    Estimated vancomycin AUC on current dose: 500 mg/L.hr     Visit Vitals  /76 (BP Location: Right arm, Patient Position: Lying)   Pulse 68   Temp 36.6 °C (97.9 °F) (Temporal)   Resp 19        Lab Results   Component Value Date    CREATININE 1.03 04/11/2024    CREATININE 0.85 04/10/2024    CREATININE 0.80 04/09/2024    CREATININE 0.90 04/04/2024        Patient weight is No results found for: \"PTWEIGHT\"    No results found for: \"CULTURE\"     I/O last 3 completed shifts:  In: 740 (8.4 mL/kg) [P.O.:640; IV Piggyback:100]  Out: 1125 (12.8 mL/kg) [Urine:1125 (0.4 mL/kg/hr)]  Weight: 88 kg   [unfilled]    No results found for: \"PATIENTTEMP\"     Assessment/Plan    Within goal AUC range. Continue current vancomycin regimen.    This dosing regimen is predicted by [employer] to result in the following pharmacokinetic parameters:    Regimen: 1500 mg IV every 24 hours.  Start time: 00:44 on 04/12/2024  Exposure target: AUC24 (range)400-600 mg/L.hr   AUC24,ss: 500 mg/L.hr  Probability of AUC24 > 400: 85 %  Ctrough,ss: 15.3 mg/L  Probability of Ctrough,ss > 20: 20 %  Probability of nephrotoxicity (Lodise MELISSA 2009): 11 %      The next level will be obtained on 4/14 at 0500. May be obtained sooner if clinically indicated.   Will continue to monitor renal function daily while on vancomycin and order serum creatinine at least every 48 hours if not already ordered.  Follow for continued vancomycin needs, clinical response, and signs/symptoms of toxicity.       Will Garcia, PharmD           "

## 2024-04-11 NOTE — PROGRESS NOTES
Occupational Therapy    Occupational Therapy Treatment    Name: Abraham Rodriguez  MRN: [id number]  : 1940  Date: 24  Time Calculation  Start Time: 834  Stop Time: 0910 ([id number]; RN present for 10 minutes for med pass)  Time Calculation (min): 36 min    Assessment:  OT Assessment: Good overall participation in OT session today. Pt. continues to report high level of pain and displays tolerance for minimal distances impeding his ability to return home. Pt. displays impaired bathing, dressing, toileting, ambulaiton, and transfers and would conitinue ot benefit from skilled OT. Pt. swelling decreased today with use of tubigrip.  Prognosis: Good  Barriers to Discharge: None  Evaluation/Treatment Tolerance: Patient tolerated treatment well  Medical Staff Made Aware: Yes  End of Session Communication: Bedside nurse  End of Session Patient Position: Up in chair, Alarm on  Plan:  Treatment Interventions: ADL retraining, Functional transfer training, Endurance training, Patient/family training, Equipment evaluation/education, Neuromuscular reeducation, Compensatory technique education  OT Frequency: 3 times per week  OT Discharge Recommendations: Moderate intensity level of continued care  OT Recommended Transfer Status: Minimal assist, Assist of 1  OT - OK to Discharge: Yes (Based on completed evaluation and care plan recommendations, no barriers to discharge to next site of care)    Subjective   Previous Visit Info:  OT Last Visit  OT Received On: 24  General:  General  Reason for Referral: impaired self-care d/t admission for cellulitis. Pt. recently s/p R TKA on 3/20 by Dr. Arora followed by rehab stay for 1 wk at  rehab in [place].  Referred By: SHAD Calvillo-CNP  Past Medical History Relevant to Rehab: hay fever, arthritis, clotting d/o, COPD, CAD, malignant neoplasm of prostate, HLD, HTN, MI, carcinoma of the tongue, bell's palsy hx  Family/Caregiver Present: No  Prior to Session  Communication: Bedside nurse  Patient Position Received: Bed, 3 rail up, Alarm on  General Comment: margie posadas, tubigrip RLE  Precautions:  LE Weight Bearing Status: Weight Bearing as Tolerated  Medical Precautions: Fall precautions  Post-Surgical Precautions: Right total knee precautions  Vitals:  Vital Signs  Heart Rate: 72  Heart Rate Source: Monitor  SpO2: 99 %  Patient Position: Lying  Pain Assessment:  Pain Assessment  Pain Assessment: 0-10  Pain Score: 10 - Worst possible pain  Pain Type: Surgical pain  Pain Location: Knee  Pain Orientation: Right  Pain Interventions: Repositioned (RN in to pass meds)     Objective   Activities of Daily Living: UE Bathing  UE Bathing Level of Assistance: Setup, Close supervision  UE Bathing Where Assessed: Other (Comment) (chair)  UE Bathing Comments: pt. only washed underarms and declined to wash rest of UB    LE Bathing  LE Bathing Level of Assistance: Setup, Close supervision  LE Bathing Where Assessed: Other (Comment) (chair)  LE Bathing Comments: pericare hygiene completed. pt. declined to wash LEs to day.    UE Dressing  UE Dressing Level of Assistance: Minimum assistance  UE Dressing Where Assessed: Chair  UE Dressing Comments: assistance to tie gown; declined attempting himself; pt. able to pull off t-shirt with distant supervision    LE Dressing  LE Dressing: Yes  LE Dressing Adaptive Equipment: Reacher  Sock Level of Assistance: Dependent  Shoe Level of Assistance: Maximum assistance  Adult Briefs Level of Assistance: Contact guard  LE Dressing Where Assessed: Chair  LE Dressing Comments: use of reacher with good follow through; CGA in standing to pull around hips    Toileting  Toileting Level of Assistance: Moderate assistance  Where Assessed: Bedside commode  Toileting Comments: assistance for pericare; unable to maintain balance with unitlateal support to complete pericare. Pt. required assistance from OT. Removed purewick to encourage urinal  use.    Functional Standing Tolerance:  Functional Standing Tolerance  Time: 2 minutes  Activity: standing for pericare  Functional Standing Tolerance Comments: mainly weight bearing through LLE  Bed Mobility/Transfers: Bed Mobility  Bed Mobility: Yes  Bed Mobility 1  Bed Mobility 1: Supine to sitting  Level of Assistance 1: Close supervision  Bed Mobility Comments 1: use of bed rails    Transfers  Transfer: Yes  Transfer 1  Transfer From 1: Bed to  Transfer to 1: Commode-standard  Technique 1: Sit to stand, Stand to sit  Transfer Device 1: Walker  Transfer Level of Assistance 1: Contact guard  Transfers 2  Transfer From 2: Commode-standard to  Transfer to 2: Chair with arms  Technique 2: Sit to stand, Stand to sit  Transfer Device 2: Walker  Transfer Level of Assistance 2: Contact guard  Transfers 3  Transfer From 3: Chair with arms to  Transfer to 3: Stand, Sit  Technique 3: Sit to stand, Stand to sit  Transfer Device 3: Walker  Transfer Level of Assistance 3: Contact guard    Toilet Transfers  Toilet Transfer From: Bed  Toilet Transfer Type: To  Toilet Transfer to: Standard bedside commode  Toilet Transfer Technique: Ambulating  Toilet Transfers: Contact guard    Functional Mobility:  Functional Mobility  Functional Mobility Performed: Yes  Functional Mobility 1  Surface 1: Level tile  Device 1: Rolling walker  Assistance 1: Contact guard    Outcome Measures:  [facility] Daily Activity  Putting on and taking off regular lower body clothing: A lot  Bathing (including washing, rinsing, drying): A lot  Putting on and taking off regular upper body clothing: A little  Toileting, which includes using toilet, bedpan or urinal: A lot  Taking care of personal grooming such as brushing teeth: None  Eating Meals: None  Daily Activity - Total Score: 17    Education Documentation  Body Mechanics, taught by Rylee Perales OT at 4/11/2024 10:12 AM.  Learner: Patient  Readiness: Acceptance  Method: Explanation  Response: Verbalizes  Understanding  Comment: Edu on hand placement for transfers.    Precautions, taught by Rylee Perales OT at 4/11/2024 10:12 AM.  Learner: Patient  Readiness: Acceptance  Method: Explanation  Response: Verbalizes Understanding  Comment: Edu on hand placement for transfers.    ADL Training, taught by Rylee Perales OT at 4/11/2024 10:12 AM.  Learner: Patient  Readiness: Acceptance  Method: Explanation  Response: Verbalizes Understanding  Comment: Edu on hand placement for transfers.    Body Mechanics, taught by Rylee Perales OT at 4/10/2024  3:56 PM.  Learner: Patient  Readiness: Acceptance  Method: Explanation  Response: Verbalizes Understanding  Comment: Pt. edu on POC and DC rec. Edu on safety with transfers and mobility.    Precautions, taught by Rylee Perales OT at 4/10/2024  3:56 PM.  Learner: Patient  Readiness: Acceptance  Method: Explanation  Response: Verbalizes Understanding  Comment: Pt. edu on POC and DC rec. Edu on safety with transfers and mobility.    ADL Training, taught by Rylee Perales OT at 4/10/2024  3:56 PM.  Learner: Patient  Readiness: Acceptance  Method: Explanation  Response: Verbalizes Understanding  Comment: Pt. edu on POC and DC rec. Edu on safety with transfers and mobility.    Education Comments  No comments found.      Goals:  Encounter Problems       Encounter Problems (Active)       ADLs       Patient will perform UB and LB bathing with SBA level of assistance. (Progressing)       Start:  04/10/24    Expected End:  04/24/24            Patient with complete lower body dressing with modified independent level of assistance donning and doffing all LE clothes  with PRN adaptive equipment while edge of bed  (Progressing)       Start:  04/10/24    Expected End:  04/24/24            Patient will complete toileting including hygiene clothing management/hygiene with modified independent level of assistance. (Progressing)       Start:  04/10/24    Expected End:  04/24/24               BALANCE        Patient will tolerate standing for 5 minutes to modified independent level of assistance with least restrictive device in order to improve functional activity tolerance for ADL tasks. (Progressing)       Start:  04/10/24    Expected End:  04/24/24               MOBILITY       Patient will perform Functional mobility min Household distances/Community Distances with modified independent level of assistance and least restrictive device in order to improve safety and functional mobility. (Progressing)       Start:  04/10/24    Expected End:  04/24/24               TRANSFERS       Patient will perform bed mobility modified independent level of assistance and leg  in order to improve safety and independence with mobility (Progressing)       Start:  04/10/24    Expected End:  04/24/24            Patient will complete sit to stand transfer with modified independent level of assistance and front wheeled walker in order to improve safety and prepare for out of bed mobility. (Progressing)       Start:  04/10/24    Expected End:  04/24/24

## 2024-04-11 NOTE — CARE PLAN
Pt completed an overnight pulse ox trend on room air, on the night of 4/10/24. Report start date: 4/10/24 10:40:00 PM.  Report end date: 4/10/24 11:20:00 PM.  Total time below spo2 88%: 7 min 6 sec.  Longest duration: 3 min 58 sec.  Lowest spo2: 66 at 04/10/24 11:19:21 PM.  Number of events: 12.

## 2024-04-11 NOTE — PROGRESS NOTES
Physical Therapy    Physical Therapy Treatment    Patient Name: Abraham Rodriguez  MRN: [id number]  Today's Date: 4/11/2024  Time Calculation  Start Time: 1015  Stop Time: 1045  Time Calculation (min): 30 min       Assessment/Plan   PT Assessment  Assessment Comment: Pt. cont. to be in signifcant pain but was able to particpate in therapy.  Unable to tolerate standing at sink after amb. or second trial of amb. at this time.  End of Session Patient Position: Up in chair, Alarm on     PT Plan  Treatment/Interventions: Bed mobility, Transfer training, Gait training, Balance training, Strengthening, Range of motion, Therapeutic exercise, Endurance training, Therapeutic activity, Home exercise program, Positioning  PT Plan: Skilled PT  PT Frequency: 4 times per week  PT Discharge Recommendations: Moderate intensity level of continued care  PT Recommended Transfer Status: Assist x1  PT - OK to Discharge: Yes      General Visit Information:   PT  Visit  PT Received On: 04/11/24  General  Prior to Session Communication: Bedside nurse  Patient Position Received: Up in chair, Alarm on  General Comment: masimo,eccymosis RLE (proximal thigh region) and R knee swelling. Incision intact    Subjective   Precautions:  Precautions  LE Weight Bearing Status: Weight Bearing as Tolerated  Medical Precautions: Fall precautions  Post-Surgical Precautions: Right total knee precautions      Objective   Pain:  Pain Assessment  Pain Assessment: 0-10  Pain Score: 10 - Worst possible pain  Pain Location:  (knee)  Pain Interventions: Repositioned  Cognition:  Cognition  Overall Cognitive Status: Within Functional Limits  Postural Control:     Extremity/Trunk Assessments:  RLE   RLE :  (limited ROM 2/2 pain and swelling. Approx. -10 to 80 degrees. MMT: RLE: hip 3+/5; knee 3/5; ankle 4-/5 LLE: 4-/5)     Activity Tolerance:  Activity Tolerance  Endurance: Decreased tolerance for upright activites. Unable to tolerate standing at sink to brush his teeth  after amb.   Treatments:  Therapeutic Exercise  Therapeutic Exercise Performed: Yes  Therapeutic Exercise Activity 1: B ankle pumps 10x2, LAQ 10x2, hip flexion 10x2 (pt. required assist with LAQ and had difficulty with all AROM on RLE 2/2 pain.)    Ambulation/Gait Training 1  Surface 1: Level tile  Device 1: Rolling walker  Gait Support Devices: Gait belt (chair follow)  Assistance 1: Contact guard  Quality of Gait 1: Antalgic (step to gait pattern. VC's for sequencing)  Comments/Distance (ft) 1: 75'  Transfers  Transfer: Yes  Transfer 1  Technique 1: Sit to stand, Stand to sit  Transfer Device 1: Walker, Gait belt  Transfer Level of Assistance 1: Contact guard    Outcome Measures:  [facility] Basic Mobility  Turning from your back to your side while in a flat bed without using bedrails: A little  Moving from lying on your back to sitting on the side of a flat bed without using bedrails: A little  Moving to and from bed to chair (including a wheelchair): A little  Standing up from a chair using your arms (e.g. wheelchair or bedside chair): A little  To walk in hospital room: A little  Climbing 3-5 steps with railing: A lot  Basic Mobility - Total Score: 17    Education Documentation  Mobility Training, taught by Pearl Juan, PT at 4/11/2024 12:08 PM.  Learner: Patient  Readiness: Acceptance  Method: Explanation  Response: Verbalizes Understanding  Comment: Educated on POC    Education Comments  No comments found.        OP EDUCATION:       Encounter Problems       Encounter Problems (Active)       Balance       STG - Maintains dynamic standing balance with upper extremity support >=5 min with >=good- balance        Start:  04/10/24    Expected End:  04/24/24               Mobility       LTG - Patient will ambulate household distance Brendan with WW (Progressing)       Start:  04/10/24    Expected End:  04/24/24            STG - Patient will ambulate up and down a curb/step IND       Start:  04/10/24    Expected End:   04/24/24               PT Transfers       STG - Patient will perform bed mobility IND       Start:  04/10/24    Expected End:  04/24/24            STG - Patient will transfer sit to and from stand NATHALIE with WW (Progressing)       Start:  04/10/24    Expected End:  04/24/24               Pain - Adult

## 2024-04-11 NOTE — CONSULTS
Nutrition Assessment Note  Nutrition Assessment      Reason for Assessment  Reason for Assessment: Admission nursing screening (Nutrition consulted for nursing screen for wound.)    Per H&P / Progress Notes:  Pt presented to ED from home with R knee pain, worsening leg edema and pain.  Pt s/p R knee replacement on 3/20.  He received prednisone, antibiotics for cellulitis and was admitted to [facility] on 4/9.  Ortho, PT/OT consulted.  Current diet order is Cardiac.    Past Medical History:  Allergic rhinitis due to pollen 10/23/2014   Hay fever  Arthritis  Chronic pain disorder  Clotting disorder (CMS/[facility])  dvt april 2020     P.E. april 2020  COPD (chronic obstructive pulmonary disease) (CMS/[facility])  hx asbestos  Coronary artery disease  Encounter for other preprocedural examination  06/24/2014  Pre-procedural examination  Encounter for screening for malignant neoplasm of prostate  Encounter for screening for malignant neoplasm of prostate  Hyperlipidemia  Hypertension  Localized edema 05/11/2020   Pedal edema  Myocardial infarction (CMS/[facility])  Other conditions influencing health status  Carcinoma Of The Tongue  Pain in unspecified knee 12/22/2014   Joint pain, knee  Personal history of diseases of the skin and subcutaneous tissue  04/23/2013   History of eczema  Personal history of other diseases of the musculoskeletal system and connective tissue 06/19/2014  Personal history of arthritis  Personal history of other diseases of the nervous system and sense organs 08/10/2021   History of Bell's palsy  Personal history of other diseases of the respiratory system  11/12/2014  History of asbestosis  Personal history of other diseases of the respiratory system  08/13/2014   History of chronic obstructive lung disease  Personal history of other specified conditions 08/20/2013   History of edema  Plantar fascial fibromatosis 07/22/2013  Plantar fasciitis  Polyp of colon  Polyp of sigmoid colon  Syncope and collapse  "01/23/2015  Syncope and collapse  Unspecified abdominal pain 12/22/2014   Stomach pain  Unspecified disorder of eyelid 10/23/2014   Eyelid disorder    Past Surgical History:  CARDIAC CATHETERIZATION  CHOLECYSTECTOMY 04/23/2013   Cholecystectomy Laparoscopic  OTHER SURGICAL HISTORY 04/27/2021   Meniscus repair  OTHER SURGICAL HISTORY  Left 05/31/2023   Hip replacement  OTHER SURGICAL HISTORY 04/23/2013   Biopsy Tongue  SHOULDER SURGERY 04/23/2013   Shoulder Surgery  TOTAL KNEE ARTHROPLASTY  Right 03/27/2024    Medications and allergies reviewed.    Pertinent Labs:  4/10/24:  10.4/32.7 (L)  MCHC 31.8 (L)    Last Recorded Vitals  Blood pressure 145/68, pulse 73, temperature 36.5 °C (97.7 °F), temperature source Temporal, resp. rate 16, height 1.803 m (5' 11\"), weight 88 kg (194 lb 0.1 oz), SpO2 97%.    History:  Food and Nutrient History  Energy Intake: Good > 75 %  Food and Nutrient History: Visited Abraham in room, he is sitting up in chair.  He reports good appetite, says he ate \"all but the plate.\"  Says he did not eat for the past two days.  He reports eating well prior to admission, cooks his own meals, alternates between eggs and cereal for breakfast.  Says he elimiated breads and cake from his diet, drinks juice, milk, and gatorade; does not drink pop.  Presently, he denies nausea, stomach pain, difficulty chewing / swallowing.  Endorses R leg pain.  Reports intentional weight loss;  lbs, was 212 lbs.  Pt declined Ensure.  Vitamin/Herbal Supplement Use: none per patient.         Food and Nutrient Administration History  Additional Food and Nutrient Administration History: PO Intake per flowsheet:  4/10- 100% B                   Anthropometrics:  Height: 180.3 cm (5' 10.98\")  Weight: 88 kg (194 lb 0.1 oz)  BMI (Calculated): 27.07    Weight Change: 2.11    Weight Change  Weight History / % Weight Change: Weight history per chart:  4/10/24- 88 kg; 3/12/24- 86.2 kg; 11/21/23- 85.7 kg; 4/6/23- 87.5 " "kg  Significant Weight Loss: No         IBW/kg (Dietitian Calculated): 78.2 kg  Percent of IBW: 113 %     Wt Readings from Last 15 Encounters:   04/10/24 88 kg (194 lb 0.1 oz)   03/26/24 86.2 kg (190 lb)   03/20/24 86.2 kg (190 lb 0.6 oz)   03/12/24 86.2 kg (190 lb 0.6 oz)   11/29/23 83.6 kg (184 lb 3.2 oz)   11/21/23 85.7 kg (189 lb)   08/30/23 85.3 kg (188 lb)   08/07/23 85.5 kg (188 lb 9 oz)   07/11/23 88.5 kg (195 lb)   05/23/23 88.5 kg (195 lb)   05/11/23 87.5 kg (193 lb)   04/06/23 87.5 kg (193 lb)   01/18/23 87.5 kg (193 lb)   09/20/22 86.6 kg (191 lb)   08/09/22 88 kg (194 lb 0.1 oz)                                 Energy Needs:  Calculated Energy Needs Using Equations  Height: 180.3 cm (5' 10.98\")  Weight Used for Equation Calculations: 78.2 kg (172 lb 6.4 oz)  Knott- [place] Equation (Overweight or Obese Patients): 1499  Temp: 37 °C (98.6 °F)    Estimated Energy Needs  Total Energy Estimated Needs (kCal): 2350 kCal  Total Estimated Energy Need per Day (kCal/kg): 30 kCal/kg  Method for Estimating Needs: 1955 to 2350 kcal/day (25 to 30 kcal/kg IBW)    Estimated Protein Needs  Total Protein Estimated Needs (g): 94 g  Total Protein Estimated Needs (g/kg): 1.2 g/kg  Method for Estimating Needs: 1.2 g/kg IBW    Estimated Fluid Needs  Total Fluid Estimated Needs (mL): 1955 mL  Total Fluid Estimated Needs (mL/kg): 25 mL/kg  Method for Estimating Needs: 25 mL/kg IBW or fluid per medical team.         Nutrition Focused Physical Findings:  Subcutaneous Fat Loss  Orbital Fat Pads: Mild-Moderate (slight dark circles and slight hollowing)  Buccal Fat Pads: Mild-Moderate (flat cheeks, minimal bounce)  Triceps: Mild-Moderate (less than ample fat tissue) (mild)  Ribs: Defer    Muscle Wasting  Temporalis: Mild-Moderate (slight depression) (mild)  Pectoralis (Clavicular Region): Mild-Moderate (some protrusion of clavicle)  Deltoid/Trapezius: Mild-Moderate (slight protrusion of acromion process)  Interosseous: Well " nourished (muscle bulges)  Trapezius/Infraspinatus/Supraspinatus (Scapular Region): Defer  Quadriceps: Defer  Gastrocnemius: Mild-Moderate (not well developed muscle) (mild- L leg assessed only.)    Edema  Edema Location: no L lower extremity edema per NFPE; R leg wrapped, appears edematous.         Physical Findings (Nutrition Deficiency/Toxicity)  Hair: Negative  Eyes: Negative  Nails: Negative  Skin: Positive (blanchable erythema to buttocks per nursing flowsheet.)       Nutrition Diagnosis   Malnutrition Diagnosis  Patient has Malnutrition Diagnosis:  (Unable to determine at this time.  Weight stable over the past year with non-significant fluctuations; pt with good appetite and eating adequately.  Noted some mild to moderate muscle and subcutaneous fat loss on physical assessment ? normal aging.)    Patient has Nutrition Diagnosis: Yes  Nutrition Diagnosis 1: Inadequate protein intake     Related to (1): increased metabolic demand  As Evidenced by (1): increased protein needs s/p recent surgery and leg cellulitis, current intake meeting less than 75% of estimated needs.  Additional Assessment Information (1): Unable to determine at this time. Weight stable over the past year with non-significant fluctuations; pt with good appetite and eating adequately. Noted some mild to moderate muscle and subcutaneous fat loss on physical assessment ? normal aging.                                         Nutrition Interventions/Recommendations   Nutrition Prescription  Individualized Nutrition Prescription Provided for : diet, fluids, oral supplements    Food and/or Nutrient Delivery Interventions  Meals and Snacks: Energy-modified diet, Mineral-modified diet, Protein-modified diet  Goal: Continue Cardiac Diet; Encourage intake of protein foods at each meal.                                    Medical Food Supplement: Commercial food  Goal: Prostat BID (200 kcal/30 g protein)    Vitamin Supplement Therapy: Other (Comment)  (MVI with minerals.)  Goal: Daily MVI with minerals.    Mineral Supplement Therapy: Other (Comment) (MVI with minerals.)  Goal: Daily MVI with minerals.                                  Education Documentation  No documentation found.    -Encouraged intake of protein foods.       Nutrition Monitoring and Evaluation   Food and Nutrient Related History  Energy Intake: Estimated energy intake  Criteria: Nutritional intake meeting > 75% of estimated needs.    Fluid Intake: Estimated fluid intake  Criteria: Fluid intake meeting estimated needs.    Amount of Food: Medical food intake  Criteria: Pt will take Prostat BID with good tolerance.                   Vitamin Intake: Measured vitamin intake  Criteria: Pt will take daily MVI with minerals    Mineral/Element Intake: Measured mineral/element intake  Criteria: Pt will take daily MVI with minerals    Anthropometrics: Body Composition/Growth/Weight History  Weight: Measured weight  Criteria: Reduce weight from edema / fluid.                   Biochemical Data, Medical Tests and Procedures                           Nutrition Focused Physical Findings  Adipose: Loss of subcutaneous fat  Criteria: Gain / maintain adipose stores.              Muscles: Muscle atrophy  Criteria: Gain / maintain lean muscle mass.    Skin: Impaired wound healing  Criteria: wound healing / skin wnl              Follow Up  Time Spent (min): 65 minutes  Last Date of Nutrition Visit: 04/10/24  Nutrition Follow-Up Needed?: 3-5 days, Dietitian to reassess per policy  Follow up Comment: % meals; PS; edema

## 2024-04-12 LAB
ANION GAP SERPL CALC-SCNC: 15 MMOL/L (ref 10–20)
BUN SERPL-MCNC: 24 MG/DL (ref 6–23)
CALCIUM SERPL-MCNC: 9.1 MG/DL (ref 8.6–10.3)
CHLORIDE SERPL-SCNC: 104 MMOL/L (ref 98–107)
CO2 SERPL-SCNC: 24 MMOL/L (ref 21–32)
CREAT SERPL-MCNC: 1.04 MG/DL (ref 0.5–1.3)
EGFRCR SERPLBLD CKD-EPI 2021: 71 ML/MIN/1.73M*2
ERYTHROCYTE [DISTWIDTH] IN BLOOD BY AUTOMATED COUNT: 13.7 % (ref 11.5–14.5)
GLUCOSE SERPL-MCNC: 90 MG/DL (ref 74–99)
HCT VFR BLD AUTO: 36.2 % (ref 41–52)
HGB BLD-MCNC: 11.5 G/DL (ref 13.5–17.5)
MCH RBC QN AUTO: 30.5 PG (ref 26–34)
MCHC RBC AUTO-ENTMCNC: 31.8 G/DL (ref 32–36)
MCV RBC AUTO: 96 FL (ref 80–100)
NRBC BLD-RTO: 0 /100 WBCS (ref 0–0)
PLATELET # BLD AUTO: 210 X10*3/UL (ref 150–450)
POTASSIUM SERPL-SCNC: 3.8 MMOL/L (ref 3.5–5.3)
RBC # BLD AUTO: 3.77 X10*6/UL (ref 4.5–5.9)
SODIUM SERPL-SCNC: 139 MMOL/L (ref 136–145)
WBC # BLD AUTO: 7.4 X10*3/UL (ref 4.4–11.3)

## 2024-04-12 PROCEDURE — 97110 THERAPEUTIC EXERCISES: CPT | Mod: GP,CQ,IPSPLIT

## 2024-04-12 PROCEDURE — 2500000004 HC RX 250 GENERAL PHARMACY W/ HCPCS (ALT 636 FOR OP/ED): Mod: IPSPLIT | Performed by: NURSE PRACTITIONER

## 2024-04-12 PROCEDURE — 1090000001 HH PPS REVENUE CREDIT

## 2024-04-12 PROCEDURE — 80048 BASIC METABOLIC PNL TOTAL CA: CPT | Mod: IPSPLIT | Performed by: NURSE PRACTITIONER

## 2024-04-12 PROCEDURE — 2500000001 HC RX 250 WO HCPCS SELF ADMINISTERED DRUGS (ALT 637 FOR MEDICARE OP): Mod: IPSPLIT

## 2024-04-12 PROCEDURE — 2500000002 HC RX 250 W HCPCS SELF ADMINISTERED DRUGS (ALT 637 FOR MEDICARE OP, ALT 636 FOR OP/ED): Mod: IPSPLIT,MUE

## 2024-04-12 PROCEDURE — 97535 SELF CARE MNGMENT TRAINING: CPT | Mod: GO,IPSPLIT | Performed by: OCCUPATIONAL THERAPIST

## 2024-04-12 PROCEDURE — 1090000002 HH PPS REVENUE DEBIT

## 2024-04-12 PROCEDURE — 1100000001 HC PRIVATE ROOM DAILY: Mod: IPSPLIT

## 2024-04-12 PROCEDURE — 2500000004 HC RX 250 GENERAL PHARMACY W/ HCPCS (ALT 636 FOR OP/ED): Mod: IPSPLIT

## 2024-04-12 PROCEDURE — 2500000001 HC RX 250 WO HCPCS SELF ADMINISTERED DRUGS (ALT 637 FOR MEDICARE OP): Mod: IPSPLIT | Performed by: NURSE PRACTITIONER

## 2024-04-12 PROCEDURE — 99232 SBSQ HOSP IP/OBS MODERATE 35: CPT | Performed by: NURSE PRACTITIONER

## 2024-04-12 PROCEDURE — 2500000004 HC RX 250 GENERAL PHARMACY W/ HCPCS (ALT 636 FOR OP/ED): Mod: IPSPLIT | Performed by: INTERNAL MEDICINE

## 2024-04-12 PROCEDURE — 36415 COLL VENOUS BLD VENIPUNCTURE: CPT | Mod: IPSPLIT | Performed by: NURSE PRACTITIONER

## 2024-04-12 PROCEDURE — 97530 THERAPEUTIC ACTIVITIES: CPT | Mod: GO,IPSPLIT | Performed by: OCCUPATIONAL THERAPIST

## 2024-04-12 PROCEDURE — 97116 GAIT TRAINING THERAPY: CPT | Mod: GP,CQ,IPSPLIT

## 2024-04-12 PROCEDURE — 85027 COMPLETE CBC AUTOMATED: CPT | Mod: IPSPLIT | Performed by: NURSE PRACTITIONER

## 2024-04-12 RX ADMIN — LORATADINE 10 MG: 10 TABLET ORAL at 08:23

## 2024-04-12 RX ADMIN — SODIUM CHLORIDE 10 ML/HR: 9 INJECTION, SOLUTION INTRAVENOUS at 05:53

## 2024-04-12 RX ADMIN — METOPROLOL SUCCINATE 50 MG: 25 TABLET, EXTENDED RELEASE ORAL at 08:23

## 2024-04-12 RX ADMIN — FLUTICASONE PROPIONATE 2 SPRAY: 50 SPRAY, METERED NASAL at 08:26

## 2024-04-12 RX ADMIN — PIPERACILLIN SODIUM AND TAZOBACTAM SODIUM 3.38 G: 3; .375 INJECTION, SOLUTION INTRAVENOUS at 00:00

## 2024-04-12 RX ADMIN — ACETAMINOPHEN 975 MG: 325 TABLET ORAL at 12:18

## 2024-04-12 RX ADMIN — PREDNISONE 40 MG: 20 TABLET ORAL at 08:23

## 2024-04-12 RX ADMIN — OXYCODONE HYDROCHLORIDE AND ACETAMINOPHEN 500 MG: 500 TABLET ORAL at 21:04

## 2024-04-12 RX ADMIN — PANTOPRAZOLE SODIUM 40 MG: 40 TABLET, DELAYED RELEASE ORAL at 07:00

## 2024-04-12 RX ADMIN — KETOROLAC TROMETHAMINE 15 MG: 15 INJECTION INTRAMUSCULAR; INTRAVENOUS at 14:38

## 2024-04-12 RX ADMIN — VANCOMYCIN 1.5 G: 1.5 INJECTION, SOLUTION INTRAVENOUS at 01:43

## 2024-04-12 RX ADMIN — ACETAMINOPHEN 975 MG: 325 TABLET ORAL at 03:08

## 2024-04-12 RX ADMIN — CELECOXIB 200 MG: 100 CAPSULE ORAL at 21:04

## 2024-04-12 RX ADMIN — FINASTERIDE 5 MG: 5 TABLET, FILM COATED ORAL at 21:05

## 2024-04-12 RX ADMIN — OXYCODONE HYDROCHLORIDE 5 MG: 5 TABLET ORAL at 17:56

## 2024-04-12 RX ADMIN — PIPERACILLIN SODIUM AND TAZOBACTAM SODIUM 3.38 G: 3; .375 INJECTION, SOLUTION INTRAVENOUS at 05:53

## 2024-04-12 RX ADMIN — PIPERACILLIN SODIUM AND TAZOBACTAM SODIUM 3.38 G: 3; .375 INJECTION, SOLUTION INTRAVENOUS at 12:18

## 2024-04-12 RX ADMIN — LOSARTAN POTASSIUM 50 MG: 50 TABLET, FILM COATED ORAL at 08:23

## 2024-04-12 RX ADMIN — HEPARIN SODIUM 5000 UNITS: 5000 INJECTION INTRAVENOUS; SUBCUTANEOUS at 01:43

## 2024-04-12 RX ADMIN — CELECOXIB 200 MG: 100 CAPSULE ORAL at 08:23

## 2024-04-12 RX ADMIN — FLUTICASONE PROPIONATE 2 SPRAY: 50 SPRAY, METERED NASAL at 21:11

## 2024-04-12 RX ADMIN — SIMETHICONE 80 MG: 80 TABLET, CHEWABLE ORAL at 17:58

## 2024-04-12 RX ADMIN — HEPARIN SODIUM 5000 UNITS: 5000 INJECTION INTRAVENOUS; SUBCUTANEOUS at 17:56

## 2024-04-12 RX ADMIN — GABAPENTIN 300 MG: 300 CAPSULE ORAL at 08:23

## 2024-04-12 RX ADMIN — TAMSULOSIN HYDROCHLORIDE 0.4 MG: 0.4 CAPSULE ORAL at 21:05

## 2024-04-12 RX ADMIN — ASPIRIN 325 MG: 325 TABLET, COATED ORAL at 21:06

## 2024-04-12 RX ADMIN — ACETAMINOPHEN 975 MG: 325 TABLET ORAL at 21:06

## 2024-04-12 RX ADMIN — Medication 5 MG: at 21:06

## 2024-04-12 RX ADMIN — HEPARIN SODIUM 5000 UNITS: 5000 INJECTION INTRAVENOUS; SUBCUTANEOUS at 10:01

## 2024-04-12 RX ADMIN — PIPERACILLIN SODIUM AND TAZOBACTAM SODIUM 3.38 G: 3; .375 INJECTION, SOLUTION INTRAVENOUS at 17:56

## 2024-04-12 RX ADMIN — ASPIRIN 325 MG: 325 TABLET, COATED ORAL at 08:22

## 2024-04-12 RX ADMIN — CARISOPRODOL 350 MG: 350 TABLET ORAL at 19:00

## 2024-04-12 RX ADMIN — GABAPENTIN 300 MG: 300 CAPSULE ORAL at 21:06

## 2024-04-12 RX ADMIN — GABAPENTIN 300 MG: 300 CAPSULE ORAL at 14:35

## 2024-04-12 RX ADMIN — OXYCODONE HYDROCHLORIDE 5 MG: 5 TABLET ORAL at 08:26

## 2024-04-12 RX ADMIN — KETOROLAC TROMETHAMINE 15 MG: 15 INJECTION INTRAMUSCULAR; INTRAVENOUS at 21:11

## 2024-04-12 RX ADMIN — OXYCODONE HYDROCHLORIDE AND ACETAMINOPHEN 500 MG: 500 TABLET ORAL at 08:23

## 2024-04-12 ASSESSMENT — PAIN DESCRIPTION - ORIENTATION
ORIENTATION: RIGHT

## 2024-04-12 ASSESSMENT — COGNITIVE AND FUNCTIONAL STATUS - GENERAL
DAILY ACTIVITIY SCORE: 22
TOILETING: A LITTLE
STANDING UP FROM CHAIR USING ARMS: A LITTLE
DRESSING REGULAR LOWER BODY CLOTHING: A LITTLE
HELP NEEDED FOR BATHING: A LOT
DAILY ACTIVITIY SCORE: 20
MOVING FROM LYING ON BACK TO SITTING ON SIDE OF FLAT BED WITH BEDRAILS: A LITTLE
MOVING TO AND FROM BED TO CHAIR: A LITTLE
HELP NEEDED FOR BATHING: A LITTLE
TURNING FROM BACK TO SIDE WHILE IN FLAT BAD: A LITTLE
MOBILITY SCORE: 17
CLIMB 3 TO 5 STEPS WITH RAILING: A LOT
WALKING IN HOSPITAL ROOM: A LITTLE
CLIMB 3 TO 5 STEPS WITH RAILING: A LOT
WALKING IN HOSPITAL ROOM: A LITTLE
DRESSING REGULAR LOWER BODY CLOTHING: A LITTLE
MOBILITY SCORE: 21

## 2024-04-12 ASSESSMENT — PAIN SCALES - GENERAL
PAINLEVEL_OUTOF10: 6
PAINLEVEL_OUTOF10: 10 - WORST POSSIBLE PAIN
PAINLEVEL_OUTOF10: 6
PAINLEVEL_OUTOF10: 10 - WORST POSSIBLE PAIN
PAINLEVEL_OUTOF10: 10 - WORST POSSIBLE PAIN
PAINLEVEL_OUTOF10: 9
PAINLEVEL_OUTOF10: 10 - WORST POSSIBLE PAIN
PAINLEVEL_OUTOF10: 9
PAINLEVEL_OUTOF10: 5 - MODERATE PAIN
PAINLEVEL_OUTOF10: 4

## 2024-04-12 ASSESSMENT — PAIN - FUNCTIONAL ASSESSMENT
PAIN_FUNCTIONAL_ASSESSMENT: 0-10

## 2024-04-12 ASSESSMENT — PAIN DESCRIPTION - LOCATION
LOCATION: LEG
LOCATION: KNEE
LOCATION: LEG

## 2024-04-12 ASSESSMENT — ACTIVITIES OF DAILY LIVING (ADL): HOME_MANAGEMENT_TIME_ENTRY: 25

## 2024-04-12 NOTE — CARE PLAN
Problem: Pain  Goal: My pain/discomfort is manageable  Outcome: Progressing     Problem: Safety  Goal: Patient will be injury free during hospitalization  Outcome: Progressing  Goal: I will remain free of falls  Outcome: Progressing     Problem: Daily Care  Goal: Daily care needs are met  Outcome: Progressing     Problem: Psychosocial Needs  Goal: Demonstrates ability to cope with hospitalization/illness  Outcome: Progressing  Goal: Collaborate with me, my family, and caregiver to identify my specific goals  Outcome: Progressing     Problem: Discharge Barriers  Goal: My discharge needs are met  Outcome: Progressing     Problem: Fall/Injury  Goal: Not fall by end of shift  Outcome: Progressing  Goal: Be free from injury by end of the shift  Outcome: Progressing  Goal: Verbalize understanding of personal risk factors for fall in the hospital  Outcome: Progressing  Goal: Verbalize understanding of risk factor reduction measures to prevent injury from fall in the home  Outcome: Progressing  Goal: Use assistive devices by end of the shift  Outcome: Progressing  Goal: Pace activities to prevent fatigue by end of the shift  Outcome: Progressing     Problem: Pain  Goal: Takes deep breaths with improved pain control throughout the shift  Outcome: Progressing  Goal: Turns in bed with improved pain control throughout the shift  Outcome: Progressing  Goal: Walks with improved pain control throughout the shift  Outcome: Progressing  Goal: Performs ADL's with improved pain control throughout shift  Outcome: Progressing  Goal: Participates in PT with improved pain control throughout the shift  Outcome: Progressing  Goal: Free from opioid side effects throughout the shift  Outcome: Progressing  Goal: Free from acute confusion related to pain meds throughout the shift  Outcome: Progressing     Problem: Skin  Goal: Decreased wound size/increased tissue granulation at next dressing change  Outcome: Progressing  Goal: Participates in  plan/prevention/treatment measures  Outcome: Progressing  Goal: Prevent/manage excess moisture  Outcome: Progressing  Goal: Prevent/minimize sheer/friction injuries  Outcome: Progressing  Goal: Promote/optimize nutrition  Outcome: Progressing  Goal: Promote skin healing  Outcome: Progressing     Problem: Nutrition  Goal: Oral intake greater 75%  Outcome: Progressing  Goal: Consume prescribed supplement  Outcome: Progressing  Goal: Adequate PO fluid intake  Outcome: Progressing  Goal: Lab values WNL  Outcome: Progressing  Goal: Promote healing  Outcome: Progressing  Goal: Reduce weight from edema/fluid  Outcome: Progressing     Problem: Pain - Adult  Goal: Verbalizes/displays adequate comfort level or baseline comfort level  Outcome: Progressing     Problem: Safety - Adult  Goal: Free from fall injury  Outcome: Progressing     Problem: Discharge Planning  Goal: Discharge to home or other facility with appropriate resources  Outcome: Progressing     Problem: Chronic Conditions and Co-morbidities  Goal: Patient's chronic conditions and co-morbidity symptoms are monitored and maintained or improved  Outcome: Progressing   The patient's goals for the shift include pain control    The clinical goals for the shift include control pain    Over the shift, the patient did not make progress toward the following goals. Barriers to progression include . Recommendations to address these barriers include .

## 2024-04-12 NOTE — PROGRESS NOTES
Abraham Rodriguez is a 83 y.o. male on day 1 of admission presenting with Cellulitis.      Subjective   Patient assessed at bedside; sitting up in a chair. He complained his RLE is painful, swollen, and hot to touch. He has lost ROM d/t the swelling and pain. He denies SOB, fever, chills, N/V/D/C.       Objective     Last Recorded Vitals  /83 (BP Location: Right arm, Patient Position: Lying)   Pulse 67   Temp 36.5 °C (97.7 °F) (Temporal)   Resp 19   Wt 88 kg (194 lb 0.1 oz)   SpO2 94%   Intake/Output last 3 Shifts:    Intake/Output Summary (Last 24 hours) at 4/12/2024 1201  Last data filed at 4/12/2024 0900  Gross per 24 hour   Intake 446.33 ml   Output 1 ml   Net 445.33 ml       Admission Weight  Weight: 86.2 kg (190 lb) (04/09/24 1943)    Daily Weight  04/10/24 : 88 kg (194 lb 0.1 oz)    Image Results      Physical Exam  Vitals reviewed.   Constitutional:       Appearance: Normal appearance. He is normal weight.   HENT:      Head: Normocephalic and atraumatic.      Nose: Nose normal.      Mouth/Throat:      Mouth: Mucous membranes are moist.      Pharynx: Oropharynx is clear.   Eyes:      Conjunctiva/sclera: Conjunctivae normal.      Pupils: Pupils are equal, round, and reactive to light.   Cardiovascular:      Rate and Rhythm: Normal rate and regular rhythm.      Pulses: Normal pulses.      Heart sounds: Normal heart sounds.   Pulmonary:      Effort: Pulmonary effort is normal.      Breath sounds: Normal breath sounds.   Abdominal:      General: Bowel sounds are normal.      Palpations: Abdomen is soft.   Musculoskeletal:         General: Swelling and tenderness present.      Cervical back: Normal range of motion and neck supple.      Right lower leg: Edema present.   Skin:     General: Skin is warm and dry.      Capillary Refill: Capillary refill takes 2 to 3 seconds.      Findings: Bruising present.      Comments: Right knee incision well approx. Healing well. Knee is swollen; bruising to the lateral  thigh   Neurological:      General: No focal deficit present.      Mental Status: He is alert and oriented to person, place, and time.   Psychiatric:         Mood and Affect: Mood normal.         Behavior: Behavior normal.       Relevant Results    Scheduled medications  acetaminophen, 975 mg, oral, q8h  ascorbic acid, 500 mg, oral, BID  aspirin, 325 mg, oral, BID  celecoxib, 200 mg, oral, BID  docusate sodium, 100 mg, oral, BID  finasteride, 5 mg, oral, q PM  fluticasone, 2 spray, Each Nostril, BID  gabapentin, 300 mg, oral, TID  heparin (porcine), 5,000 Units, subcutaneous, q8h  loratadine, 10 mg, oral, Daily  losartan, 50 mg, oral, Daily  melatonin, 5 mg, oral, Nightly  metoprolol succinate XL, 50 mg, oral, Daily  pantoprazole, 40 mg, oral, Daily before breakfast  piperacillin-tazobactam, 3.375 g, intravenous, q6h  predniSONE, 40 mg, oral, Daily  sennosides-docusate sodium, 2 tablet, oral, BID  tamsulosin, 0.4 mg, oral, q PM  vancomycin 1.5 g in 300 mL, 1,500 mg, intravenous, q24h      Continuous medications  oxygen, , Last Rate: 2 L/min (04/10/24 0429)  sodium chloride 0.9%, 10 mL/hr, Last Rate: 10 mL/hr (04/12/24 0580)      PRN medications  PRN medications: carisoprodol, ketorolac, magnesium hydroxide, melatonin, ondansetron, oxyCODONE, oxygen, polyethylene glycol, simethicone, sodium chloride 0.9%, torsemide, vancomycin      Results for orders placed or performed during the hospital encounter of 04/09/24 (from the past 24 hour(s))   Basic metabolic panel   Result Value Ref Range    Glucose 90 74 - 99 mg/dL    Sodium 139 136 - 145 mmol/L    Potassium 3.8 3.5 - 5.3 mmol/L    Chloride 104 98 - 107 mmol/L    Bicarbonate 24 21 - 32 mmol/L    Anion Gap 15 10 - 20 mmol/L    Urea Nitrogen 24 (H) 6 - 23 mg/dL    Creatinine 1.04 0.50 - 1.30 mg/dL    eGFR 71 >60 mL/min/1.73m*2    Calcium 9.1 8.6 - 10.3 mg/dL   CBC   Result Value Ref Range    WBC 7.4 4.4 - 11.3 x10*3/uL    nRBC 0.0 0.0 - 0.0 /100 WBCs    RBC 3.77 (L) 4.50  - 5.90 x10*6/uL    Hemoglobin 11.5 (L) 13.5 - 17.5 g/dL    Hematocrit 36.2 (L) 41.0 - 52.0 %    MCV 96 80 - 100 fL    MCH 30.5 26.0 - 34.0 pg    MCHC 31.8 (L) 32.0 - 36.0 g/dL    RDW 13.7 11.5 - 14.5 %    Platelets 210 150 - 450 x10*3/uL                     Assessment/Plan      Principal Problem:    Cellulitis  Active Problems:    Allergic rhinitis    Arthritis, multiple joint involvement    Osteoarthritis    Benign essential hypertension    Benign prostatic hyperplasia    Dyslipidemia    Joint pain    (HFpEF) heart failure with preserved ejection fraction (Multi)    Cellulitis of right lower extremity    Cellulitis RLE  Arthritis, multiple joint involvement  Osteoarthritis  Right knee Joint pain  -WBC 8.3 > 6.2  -WSR 2  -CRP 4.81  -US RLE: No evidence for DVT within the right lower extremity. No significant change from prior ultrasound.  -continue celecoxib, gabapentin   -continue prednisone  -started acetaminophen 975 mg scheduled  -started oxycodone prn severe pain  -started toradol prn for moderate pain  -discontinued percocet prn pain, Dilaudid BTP  -continue Zosyn 3.375 IV Q 6 (Day 4)  -continue Vancomycin IV pharmacy to dose (Day 3)  -consult to orthopedics, appreciate recs  -PT/OT evaluation recommending moderate level therapy      Allergic rhinitis  -continue Flonase, loratadine     Benign essential hypertension  Dyslipidemia  Chronic Diastolic Heart Failure  -continue aspirin, losartan, metoprolol succinate, and torsemide  -monitor BP and HR     Benign prostatic hyperplasia  -continue finasteride, tamsulosin     PUD ppx  -continue pantoprazole      DVT ppx  -continue heparin SQ     Code Status: Full Code     Disposition: Pt requires inpatient stay at this time.     Total accumulated time spent face to face and not face to face preparing to see the patient, obtaining and reviewing separately obtained history; performing a medically appropriate examination and/or evaluation; counseling and educating the  patient, family; ordering medications, tests, or procedures; referring and communicating with other health care professionals; documenting clinical information in the patient's medical record; independently interpreting results and communicating the results to the patient, family; and care coordination was 30 minutes.              Aixa Adkins, APRN-CNP

## 2024-04-12 NOTE — PROGRESS NOTES
Patient has been accepted to [facility]. The will be able to accept him on 4/15. They would like him to be re-evaluated on Monday to confirm the need for rehab. IMM letter given and explained to patient this morning. Consent/signature obtained and copy given to patient. Oncoming TCC to continue to follow for any transition care needs or changes in current plan.     Discharge plan: [facility].  DC Secure once confirmed need on Monday.

## 2024-04-12 NOTE — PROGRESS NOTES
Physical Therapy    Physical Therapy Treatment    Patient Name: Abraham Rodriguez  MRN: [id number]  Today's Date: 4/12/2024  Time Calculation  Start Time: 0934  Stop Time: 1000  Time Calculation (min): 26 min       Assessment/Plan   PT Assessment  PT Assessment Results: Decreased strength, Decreased range of motion, Decreased endurance, Decreased mobility  End of Session Communication: Bedside nurse  End of Session Patient Position: Up in chair, Alarm on  PT Plan  Inpatient/Swing Bed or Outpatient: Inpatient  PT Plan  Treatment/Interventions: Transfer training, Gait training, Therapeutic exercise  PT Plan: Skilled PT  PT Frequency: 4 times per week  PT Discharge Recommendations: Moderate intensity level of continued care  PT Recommended Transfer Status: Assist x1  PT - OK to Discharge: Yes      General Visit Information:   PT  Visit  PT Received On: 04/12/24  General  Prior to Session Communication: Bedside nurse  Patient Position Received: Up in chair, Alarm on    Subjective   Patient states that he is having a lot of pain, however, pleasant and agreeable to session.  Precautions:  Precautions  LE Weight Bearing Status: Weight Bearing as Tolerated  Medical Precautions: Fall precautions  Vital Signs:  Vital Signs  SpO2: 94 %    Objective   Pain:  Pain Assessment  Pain Assessment: 0-10  Pain Score: 10 - Worst possible pain  Pain Type: Surgical pain  Pain Location: Knee  Pain Orientation: Right  Pain Interventions:  (Patient medicated prior to session, also using ice pack upon arrival.)  Response to Interventions: No change in pain after session.  Cognition:  Cognition  Overall Cognitive Status: Within Functional Limits  Activity Tolerance:  Activity Tolerance  Endurance: Tolerates 30 min exercise with multiple rests (Patient having a lot of pain and edema, fatigued quickly requiring frequent short seated rests to continue. Pulse ox monitored, remained in the 90's throughout session.)  Treatments:  Therapeutic  Exercise  Therapeutic Exercise Performed: Yes  Therapeutic Exercise Activity 1: quad sets x 10 with vcs required to increase quad contraction  Therapeutic Exercise Activity 2: saqs x 10 with vcs required for eccentric control  Therapeutic Exercise Activity 3: slr x 5 with minimal clearance  Therapeutic Exercise Activity 4: hip abduction/adduction x 10 with moderate amount of effort from patient  Therapeutic Exercise Activity 5: laqs x 10 with limited ROM due to pain and edema  Therapeutic Exercise Activity 6: heel slides seated x 10  Knee flexion right in sitting position 95 degrees actively.    Ambulation/Gait Training  Ambulation/Gait Training Performed: Yes  Ambulation/Gait Training 1  Surface 1: Level tile  Device 1: Rolling walker  Gait Support Devices: Gait belt  Assistance 1: Minimum assistance, Minimal verbal cues (Vcs required to maintain erect posture, min assist required for safety due to weakness, fatigue, and pain. No LOB during session.)  Quality of Gait 1: Inconsistent stride length, Diminished heel strike, Forward flexed posture, Antalgic  Comments/Distance (ft) 1: 25x2  Transfers  Transfer: Yes  Transfer 1  Technique 1: Sit to stand, Stand to sit  Transfer Device 1: Walker, Gait belt  Transfer Level of Assistance 1: Contact guard    Outcome Measures:  [facility] Basic Mobility  Turning from your back to your side while in a flat bed without using bedrails: A little  Moving from lying on your back to sitting on the side of a flat bed without using bedrails: A little  Moving to and from bed to chair (including a wheelchair): A little  Standing up from a chair using your arms (e.g. wheelchair or bedside chair): A little  To walk in hospital room: A little  Climbing 3-5 steps with railing: A lot  Basic Mobility - Total Score: 17    Education Documentation  Mobility Training, taught by Philomena Robb PTA at 4/12/2024 10:11 AM.  Learner: Patient  Readiness: Acceptance  Method: Explanation  Response:  Demonstrated Understanding  Comment: Educated patient on proper use of fww, good follow through with cues.    Education Comments  No comments found.      Encounter Problems       Encounter Problems (Active)       Balance       STG - Maintains dynamic standing balance with upper extremity support >=5 min with >=good- balance  (Progressing)       Start:  04/10/24    Expected End:  04/24/24               Mobility       LTG - Patient will ambulate household distance Nathalie with WW (Progressing)       Start:  04/10/24    Expected End:  04/24/24            STG - Patient will ambulate up and down a curb/step IND       Start:  04/10/24    Expected End:  04/24/24               PT Transfers       STG - Patient will perform bed mobility IND       Start:  04/10/24    Expected End:  04/24/24            STG - Patient will transfer sit to and from stand NATHALIE with WW (Progressing)       Start:  04/10/24    Expected End:  04/24/24               Pain - Adult          Safety       LTG - Patient will adhere to hip precautions during ADL's and transfers       Start:  04/10/24            LTG - Patient will demonstrate safety requirements appropriate to situation/environment       Start:  04/10/24            LTG - Patient will utilize safety techniques       Start:  04/10/24            STG - Patient locks brakes on wheelchair       Start:  04/10/24            STG - Patient uses call light consistently to request assistance with transfers       Start:  04/10/24            STG - Patient uses gait belt during all transfers       Start:  04/10/24            Goal 1       Start:  04/10/24            Goal 2       Start:  04/10/24            Goal 3       Start:  04/10/24

## 2024-04-12 NOTE — PROGRESS NOTES
Occupational Therapy    Occupational Therapy Treatment    Name: Abraham Rodriguez  MRN: [id number]  : 1940  Date: 24  Time Calculation  Start Time: 1355  Stop Time: 1432  Time Calculation (min): 37 min    Assessment:  OT Assessment: Good overall participation in therapy thus far. Pt. is progressing everyday. Pt. increasing balance, ADLs, and ambulation. Would continue to benefit  form skilled OT at this time as pt's pain is elevated as well as swelling and he is uanble to care for himself fully at home alone.  Prognosis: Good  Barriers to Discharge: Decreased caregiver support  Evaluation/Treatment Tolerance: Patient tolerated treatment well, Patient limited by pain  Medical Staff Made Aware: Yes  End of Session Communication: Bedside nurse  End of Session Patient Position: Bed, 2 rail up, Alarm on  Plan:  Treatment Interventions: ADL retraining, Functional transfer training, Endurance training, Patient/family training, Equipment evaluation/education, Neuromuscular reeducation, Compensatory technique education  OT Frequency: 3 times per week  OT Discharge Recommendations: Moderate intensity level of continued care  OT Recommended Transfer Status: Minimal assist, Assist of 1  OT - OK to Discharge: Yes    Subjective   Previous Visit Info:  OT Last Visit  OT Received On: 24  General:  General  Prior to Session Communication:  (student nurse)  Patient Position Received:  (Pt. ambulating to bathroom with student nurse)  General Comment: krystyna hanson,  Precautions:  LE Weight Bearing Status: Weight Bearing as Tolerated  Medical Precautions: Fall precautions  Pain Assessment:  Pain Assessment  Pain Assessment: 0-10  Pain Score: 10 - Worst possible pain  Pain Type: Surgical pain  Pain Location: Knee  Pain Orientation: Right  Pain Interventions: Repositioned  Response to Interventions: No change in pain     Objective   Activities of Daily Living: Grooming  Grooming Level of Assistance:  Independent  Grooming Where Assessed: Standing sinkside  Grooming Comments: close sup for balance standing at sink, completed oral hygiene with independence    LE Dressing  LE Dressing: Yes  LE Dressing Adaptive Equipment: Reacher  Pants Level of Assistance: Close supervision  Adult Briefs Level of Assistance: Close supervision  LE Dressing Where Assessed: Chair  LE Dressing Comments: use of reacher with MIN Verbal cues when brief was caught on sock    Toileting  Toileting Level of Assistance: Close supervision  Where Assessed: Toilet  Toileting Comments: pericare completed with close supervision, pt. able to tolerate weightbearing through LEs without UE support today    Functional Standing Tolerance:  Functional Standing Tolerance  Time: 3 minutes  Activity: oral hygiene  Functional Standing Tolerance Comments: pt. reported knee pain after task  Bed Mobility/Transfers: Bed Mobility  Bed Mobility: Yes  Bed Mobility 1  Bed Mobility 1: Sitting to supine  Level of Assistance 1: Close supervision  Bed Mobility Comments 1: use of bed rails, uncotrolled hip hike to get RLE positoined in bed.    Transfers  Transfer: Yes  Transfer 1  Transfer From 1: Toilet to  Transfer to 1: Chair with arms  Technique 1: Sit to stand, Stand to sit  Transfer Device 1: Walker  Transfer Level of Assistance 1: Close supervision  Trials/Comments 1: use of grab bars  Transfers 2  Transfer From 2: Chair with arms to  Transfer to 2: Stand, Sit  Technique 2: Stand to sit, Sit to stand  Transfer Device 2: Walker  Transfer Level of Assistance 2: Close supervision  Trials/Comments 2: x10 reps  Transfers 3  Transfer From 3: Chair with arms to  Transfer to 3: Bed  Technique 3: Sit to stand, Stand to sit  Transfer Device 3: Walker  Transfer Level of Assistance 3: Close supervision  Trials/Comments 3: good overall carryover of reach back to bed/chair    Toilet Transfers  Toilet Transfer From: Chair  Toilet Transfer Type: To and from  Toilet Transfer to:  Raised toilet seat with rails  Toilet Transfer Technique: Ambulating  Toilet Transfers: Supervision    Functional Mobility:  Functional Mobility  Functional Mobility Performed: Yes  Functional Mobility 1  Surface 1: Level tile  Device 1: Rolling walker  Assistance 1: Close supervision  Comments 1: noted pt. locking knee with limited fleixon with ambulation, cued pt. to proceed with light flexion in knee to increse functional mobility in standsard way  Therapy/Activity:   Balance/Neuromuscular Re-Education  Balance/Neuromuscular Re-Education Activity Performed: Yes  Balance/Neuromuscular Re-Education Activity 1: x10 weight shifting on one foot; pt. able to tolerate max weight through LLE to simulate ambulation; increased UEs noted  Balance/Neuromuscular Re-Education Activity 2: x10 marches; increased weightbearing through BUEs, able to tolerate full weight through R knee with mild unsteadiness    Outcome Measures:  [facility] Daily Activity  Putting on and taking off regular lower body clothing: A little  Bathing (including washing, rinsing, drying): A lot  Putting on and taking off regular upper body clothing: None  Toileting, which includes using toilet, bedpan or urinal: A little  Taking care of personal grooming such as brushing teeth: None  Eating Meals: None  Daily Activity - Total Score: 20      Education Documentation  ADL Training, taught by Rylee Perales OT at 4/12/2024  3:24 PM.  Learner: Patient  Readiness: Acceptance  Method: Explanation  Response: Verbalizes Understanding  Comment: reacher positoining    Education Comments  No comments found.      Goals:  Encounter Problems       Encounter Problems (Active)       ADLs       Patient will perform UB and LB bathing with SBA level of assistance. (Progressing)       Start:  04/10/24    Expected End:  04/24/24            Patient with complete lower body dressing with modified independent level of assistance donning and doffing all LE clothes  with PRN adaptive  equipment while edge of bed  (Progressing)       Start:  04/10/24    Expected End:  04/24/24            Patient will complete toileting including hygiene clothing management/hygiene with modified independent level of assistance. (Progressing)       Start:  04/10/24    Expected End:  04/24/24               BALANCE       Patient will tolerate standing for 5 minutes to modified independent level of assistance with least restrictive device in order to improve functional activity tolerance for ADL tasks. (Progressing)       Start:  04/10/24    Expected End:  04/24/24               MOBILITY       Patient will perform Functional mobility min Household distances/Community Distances with modified independent level of assistance and least restrictive device in order to improve safety and functional mobility. (Progressing)       Start:  04/10/24    Expected End:  04/24/24               TRANSFERS       Patient will perform bed mobility modified independent level of assistance and leg  in order to improve safety and independence with mobility (Progressing)       Start:  04/10/24    Expected End:  04/24/24            Patient will complete sit to stand transfer with modified independent level of assistance and front wheeled walker in order to improve safety and prepare for out of bed mobility. (Progressing)       Start:  04/10/24    Expected End:  04/24/24

## 2024-04-12 NOTE — CARE PLAN
The patient's goals for the shift include pain control    The clinical goals for the shift include Pt will have pain <9/10 throughout shift.    Patient remained safe and stable throughout shift. VS were stable and received tylenol, toradol and oxy for pain.  PT/OT worked with pt and tolerated well. IV abx were administered throughout shift and pt tolerated well. No other complaints at this time. Pt resting in bed and with call light within reach.

## 2024-04-13 LAB
ANION GAP SERPL CALC-SCNC: 11 MMOL/L (ref 10–20)
BUN SERPL-MCNC: 24 MG/DL (ref 6–23)
CALCIUM SERPL-MCNC: 8.7 MG/DL (ref 8.6–10.3)
CHLORIDE SERPL-SCNC: 107 MMOL/L (ref 98–107)
CO2 SERPL-SCNC: 23 MMOL/L (ref 21–32)
CREAT SERPL-MCNC: 1.08 MG/DL (ref 0.5–1.3)
EGFRCR SERPLBLD CKD-EPI 2021: 68 ML/MIN/1.73M*2
ERYTHROCYTE [DISTWIDTH] IN BLOOD BY AUTOMATED COUNT: 14 % (ref 11.5–14.5)
GLUCOSE SERPL-MCNC: 103 MG/DL (ref 74–99)
HCT VFR BLD AUTO: 33.3 % (ref 41–52)
HGB BLD-MCNC: 10.7 G/DL (ref 13.5–17.5)
MCH RBC QN AUTO: 30.7 PG (ref 26–34)
MCHC RBC AUTO-ENTMCNC: 32.1 G/DL (ref 32–36)
MCV RBC AUTO: 95 FL (ref 80–100)
NRBC BLD-RTO: 0 /100 WBCS (ref 0–0)
PLATELET # BLD AUTO: 213 X10*3/UL (ref 150–450)
POTASSIUM SERPL-SCNC: 3.4 MMOL/L (ref 3.5–5.3)
RBC # BLD AUTO: 3.49 X10*6/UL (ref 4.5–5.9)
SODIUM SERPL-SCNC: 138 MMOL/L (ref 136–145)
WBC # BLD AUTO: 8.4 X10*3/UL (ref 4.4–11.3)

## 2024-04-13 PROCEDURE — 2500000004 HC RX 250 GENERAL PHARMACY W/ HCPCS (ALT 636 FOR OP/ED): Mod: IPSPLIT | Performed by: NURSE PRACTITIONER

## 2024-04-13 PROCEDURE — 2500000002 HC RX 250 W HCPCS SELF ADMINISTERED DRUGS (ALT 637 FOR MEDICARE OP, ALT 636 FOR OP/ED): Mod: IPSPLIT

## 2024-04-13 PROCEDURE — 85027 COMPLETE CBC AUTOMATED: CPT | Mod: IPSPLIT | Performed by: NURSE PRACTITIONER

## 2024-04-13 PROCEDURE — 36415 COLL VENOUS BLD VENIPUNCTURE: CPT | Mod: IPSPLIT | Performed by: NURSE PRACTITIONER

## 2024-04-13 PROCEDURE — 1090000001 HH PPS REVENUE CREDIT

## 2024-04-13 PROCEDURE — 2500000001 HC RX 250 WO HCPCS SELF ADMINISTERED DRUGS (ALT 637 FOR MEDICARE OP): Mod: IPSPLIT

## 2024-04-13 PROCEDURE — 80048 BASIC METABOLIC PNL TOTAL CA: CPT | Mod: IPSPLIT | Performed by: NURSE PRACTITIONER

## 2024-04-13 PROCEDURE — 2500000002 HC RX 250 W HCPCS SELF ADMINISTERED DRUGS (ALT 637 FOR MEDICARE OP, ALT 636 FOR OP/ED): Mod: IPSPLIT,MUE

## 2024-04-13 PROCEDURE — 1100000001 HC PRIVATE ROOM DAILY: Mod: IPSPLIT

## 2024-04-13 PROCEDURE — 99232 SBSQ HOSP IP/OBS MODERATE 35: CPT

## 2024-04-13 PROCEDURE — 2500000004 HC RX 250 GENERAL PHARMACY W/ HCPCS (ALT 636 FOR OP/ED): Mod: IPSPLIT | Performed by: INTERNAL MEDICINE

## 2024-04-13 PROCEDURE — 1090000002 HH PPS REVENUE DEBIT

## 2024-04-13 PROCEDURE — 2500000004 HC RX 250 GENERAL PHARMACY W/ HCPCS (ALT 636 FOR OP/ED): Mod: IPSPLIT

## 2024-04-13 RX ORDER — OXYCODONE HYDROCHLORIDE 5 MG/1
7.5 TABLET ORAL EVERY 6 HOURS PRN
Status: DISCONTINUED | OUTPATIENT
Start: 2024-04-13 | End: 2024-04-15 | Stop reason: HOSPADM

## 2024-04-13 RX ORDER — POTASSIUM CHLORIDE 20 MEQ/1
40 TABLET, EXTENDED RELEASE ORAL ONCE
Status: COMPLETED | OUTPATIENT
Start: 2024-04-13 | End: 2024-04-13

## 2024-04-13 RX ADMIN — ASPIRIN 325 MG: 325 TABLET, COATED ORAL at 21:00

## 2024-04-13 RX ADMIN — GABAPENTIN 300 MG: 300 CAPSULE ORAL at 16:40

## 2024-04-13 RX ADMIN — VANCOMYCIN 1.5 G: 1.5 INJECTION, SOLUTION INTRAVENOUS at 01:08

## 2024-04-13 RX ADMIN — FLUTICASONE PROPIONATE 2 SPRAY: 50 SPRAY, METERED NASAL at 09:59

## 2024-04-13 RX ADMIN — HEPARIN SODIUM 5000 UNITS: 5000 INJECTION INTRAVENOUS; SUBCUTANEOUS at 09:55

## 2024-04-13 RX ADMIN — HEPARIN SODIUM 5000 UNITS: 5000 INJECTION INTRAVENOUS; SUBCUTANEOUS at 18:48

## 2024-04-13 RX ADMIN — CELECOXIB 200 MG: 100 CAPSULE ORAL at 09:54

## 2024-04-13 RX ADMIN — CELECOXIB 200 MG: 100 CAPSULE ORAL at 21:00

## 2024-04-13 RX ADMIN — SIMETHICONE 80 MG: 80 TABLET, CHEWABLE ORAL at 13:37

## 2024-04-13 RX ADMIN — PANTOPRAZOLE SODIUM 40 MG: 40 TABLET, DELAYED RELEASE ORAL at 06:28

## 2024-04-13 RX ADMIN — SODIUM CHLORIDE 10 ML/HR: 9 INJECTION, SOLUTION INTRAVENOUS at 05:36

## 2024-04-13 RX ADMIN — GABAPENTIN 300 MG: 300 CAPSULE ORAL at 09:54

## 2024-04-13 RX ADMIN — ASPIRIN 325 MG: 325 TABLET, COATED ORAL at 09:54

## 2024-04-13 RX ADMIN — LOSARTAN POTASSIUM 50 MG: 50 TABLET, FILM COATED ORAL at 09:54

## 2024-04-13 RX ADMIN — PIPERACILLIN SODIUM AND TAZOBACTAM SODIUM 3.38 G: 3; .375 INJECTION, SOLUTION INTRAVENOUS at 13:37

## 2024-04-13 RX ADMIN — ACETAMINOPHEN 975 MG: 325 TABLET ORAL at 18:48

## 2024-04-13 RX ADMIN — PIPERACILLIN SODIUM AND TAZOBACTAM SODIUM 3.38 G: 3; .375 INJECTION, SOLUTION INTRAVENOUS at 05:35

## 2024-04-13 RX ADMIN — PIPERACILLIN SODIUM AND TAZOBACTAM SODIUM 3.38 G: 3; .375 INJECTION, SOLUTION INTRAVENOUS at 00:25

## 2024-04-13 RX ADMIN — PIPERACILLIN SODIUM AND TAZOBACTAM SODIUM 3.38 G: 3; .375 INJECTION, SOLUTION INTRAVENOUS at 18:48

## 2024-04-13 RX ADMIN — OXYCODONE HYDROCHLORIDE AND ACETAMINOPHEN 500 MG: 500 TABLET ORAL at 21:00

## 2024-04-13 RX ADMIN — FINASTERIDE 5 MG: 5 TABLET, FILM COATED ORAL at 21:01

## 2024-04-13 RX ADMIN — GABAPENTIN 300 MG: 300 CAPSULE ORAL at 21:01

## 2024-04-13 RX ADMIN — Medication 5 MG: at 21:01

## 2024-04-13 RX ADMIN — PREDNISONE 40 MG: 20 TABLET ORAL at 09:54

## 2024-04-13 RX ADMIN — ACETAMINOPHEN 975 MG: 325 TABLET ORAL at 13:37

## 2024-04-13 RX ADMIN — TAMSULOSIN HYDROCHLORIDE 0.4 MG: 0.4 CAPSULE ORAL at 21:00

## 2024-04-13 RX ADMIN — HEPARIN SODIUM 5000 UNITS: 5000 INJECTION INTRAVENOUS; SUBCUTANEOUS at 02:26

## 2024-04-13 RX ADMIN — METOPROLOL SUCCINATE 50 MG: 25 TABLET, EXTENDED RELEASE ORAL at 09:54

## 2024-04-13 RX ADMIN — POTASSIUM CHLORIDE 40 MEQ: 1500 TABLET, EXTENDED RELEASE ORAL at 09:55

## 2024-04-13 RX ADMIN — KETOROLAC TROMETHAMINE 15 MG: 15 INJECTION INTRAMUSCULAR; INTRAVENOUS at 21:01

## 2024-04-13 RX ADMIN — OXYCODONE HYDROCHLORIDE 7.5 MG: 5 TABLET ORAL at 17:28

## 2024-04-13 RX ADMIN — LORATADINE 10 MG: 10 TABLET ORAL at 09:54

## 2024-04-13 RX ADMIN — OXYCODONE HYDROCHLORIDE AND ACETAMINOPHEN 500 MG: 500 TABLET ORAL at 09:54

## 2024-04-13 RX ADMIN — FLUTICASONE PROPIONATE 2 SPRAY: 50 SPRAY, METERED NASAL at 21:00

## 2024-04-13 ASSESSMENT — COGNITIVE AND FUNCTIONAL STATUS - GENERAL
TOILETING: A LITTLE
MOVING FROM LYING ON BACK TO SITTING ON SIDE OF FLAT BED WITH BEDRAILS: A LITTLE
MOBILITY SCORE: 19
STANDING UP FROM CHAIR USING ARMS: A LITTLE
DRESSING REGULAR LOWER BODY CLOTHING: A LITTLE
MOVING TO AND FROM BED TO CHAIR: A LITTLE
DAILY ACTIVITIY SCORE: 20
HELP NEEDED FOR BATHING: A LOT
WALKING IN HOSPITAL ROOM: A LITTLE
TURNING FROM BACK TO SIDE WHILE IN FLAT BAD: A LITTLE

## 2024-04-13 ASSESSMENT — PAIN - FUNCTIONAL ASSESSMENT
PAIN_FUNCTIONAL_ASSESSMENT: 0-10

## 2024-04-13 ASSESSMENT — PAIN SCALES - GENERAL
PAINLEVEL_OUTOF10: 10 - WORST POSSIBLE PAIN
PAINLEVEL_OUTOF10: 7
PAINLEVEL_OUTOF10: 9
PAINLEVEL_OUTOF10: 0 - NO PAIN

## 2024-04-13 ASSESSMENT — PAIN DESCRIPTION - LOCATION: LOCATION: KNEE

## 2024-04-13 ASSESSMENT — PAIN DESCRIPTION - ORIENTATION: ORIENTATION: RIGHT

## 2024-04-13 NOTE — CARE PLAN
The patient's goals for the shift include pain control    The clinical goals for the shift include patient will have decreased swelling to knee this shift    Patient continues to work with therapy while admitted. No PRN pain medications needed today

## 2024-04-13 NOTE — PROGRESS NOTES
"Abraham Rodriguez is a 83 y.o. male on day 2 of admission presenting with Cellulitis.    Subjective     No overnight events reported.     Patient reports 10/10 pain in his right leg this morning. He denies any fevers or chills and has a good appetite. He had some loose stool overnight but denies diarrhea or blood in stool; says that he usually has loose stool when he is on antibiotics.  He says that he was taking oxycodone 10 mg at home and this was reduced to 5 mg here. Home med list reviewed; patient was taking Percocet 7.5-325 mg TID PRN at home. Oxycodone increased to 7.5 mg PO q6h PRN for severe pain.  Plan of care discussed with patient, all questions answered.      Objective     Physical Exam  Constitutional:       General: He is not in acute distress.     Appearance: He is not toxic-appearing.   HENT:      Head: Normocephalic and atraumatic.      Mouth/Throat:      Mouth: Mucous membranes are moist.   Eyes:      Conjunctiva/sclera: Conjunctivae normal.   Cardiovascular:      Rate and Rhythm: Normal rate and regular rhythm.   Pulmonary:      Effort: No respiratory distress.      Breath sounds: Normal breath sounds.   Abdominal:      General: There is no distension.      Palpations: Abdomen is soft.      Tenderness: There is no abdominal tenderness.   Musculoskeletal:         General: Swelling (right knee) and tenderness (RLE) present.      Right lower leg: Edema present.   Skin:     General: Skin is warm and dry.      Comments: RLE wrapped with clean/dry compression stocking  Healing surgical incision over right knee, no drainage   Neurological:      Mental Status: He is alert and oriented to person, place, and time.   Psychiatric:         Mood and Affect: Mood normal.         Behavior: Behavior normal.         Last Recorded Vitals  Blood pressure 139/78, pulse 79, temperature 36.5 °C (97.7 °F), temperature source Temporal, resp. rate 16, height 1.803 m (5' 10.98\"), weight 88 kg (194 lb 0.1 oz), SpO2 " 96%.  Intake/Output last 3 Shifts:  I/O last 3 completed shifts:  In: 1132.5 (12.9 mL/kg) [P.O.:540; I.V.:392.5 (4.5 mL/kg); IV Piggyback:200]  Out: 1 (0 mL/kg) [Stool:1]  Weight: 88 kg     Relevant Results      Scheduled medications  acetaminophen, 975 mg, oral, q8h  ascorbic acid, 500 mg, oral, BID  aspirin, 325 mg, oral, BID  celecoxib, 200 mg, oral, BID  docusate sodium, 100 mg, oral, BID  finasteride, 5 mg, oral, q PM  fluticasone, 2 spray, Each Nostril, BID  gabapentin, 300 mg, oral, TID  heparin (porcine), 5,000 Units, subcutaneous, q8h  loratadine, 10 mg, oral, Daily  losartan, 50 mg, oral, Daily  melatonin, 5 mg, oral, Nightly  metoprolol succinate XL, 50 mg, oral, Daily  pantoprazole, 40 mg, oral, Daily before breakfast  piperacillin-tazobactam, 3.375 g, intravenous, q6h  predniSONE, 40 mg, oral, Daily  sennosides-docusate sodium, 2 tablet, oral, BID  tamsulosin, 0.4 mg, oral, q PM  vancomycin 1.5 g in 300 mL, 1,500 mg, intravenous, q24h      Continuous medications  oxygen, , Last Rate: 2 L/min (04/10/24 2325)  sodium chloride 0.9%, 10 mL/hr, Last Rate: 10 mL/hr (04/13/24 1203)      PRN medications  PRN medications: carisoprodol, ketorolac, magnesium hydroxide, melatonin, ondansetron, oxyCODONE, oxygen, polyethylene glycol, simethicone, sodium chloride 0.9%, torsemide, vancomycin    Results for orders placed or performed during the hospital encounter of 04/09/24 (from the past 24 hour(s))   Basic metabolic panel   Result Value Ref Range    Glucose 103 (H) 74 - 99 mg/dL    Sodium 138 136 - 145 mmol/L    Potassium 3.4 (L) 3.5 - 5.3 mmol/L    Chloride 107 98 - 107 mmol/L    Bicarbonate 23 21 - 32 mmol/L    Anion Gap 11 10 - 20 mmol/L    Urea Nitrogen 24 (H) 6 - 23 mg/dL    Creatinine 1.08 0.50 - 1.30 mg/dL    eGFR 68 >60 mL/min/1.73m*2    Calcium 8.7 8.6 - 10.3 mg/dL   CBC   Result Value Ref Range    WBC 8.4 4.4 - 11.3 x10*3/uL    nRBC 0.0 0.0 - 0.0 /100 WBCs    RBC 3.49 (L) 4.50 - 5.90 x10*6/uL    Hemoglobin  10.7 (L) 13.5 - 17.5 g/dL    Hematocrit 33.3 (L) 41.0 - 52.0 %    MCV 95 80 - 100 fL    MCH 30.7 26.0 - 34.0 pg    MCHC 32.1 32.0 - 36.0 g/dL    RDW 14.0 11.5 - 14.5 %    Platelets 213 150 - 450 x10*3/uL       Lower extremity venous duplex right    Result Date: 4/9/2024  STUDY: Right Lower Extremity Venous Doppler Ultrasound; 4/9/2024 10:33 PM INDICATION: The right leg pain, calf edema.  Knee surgery 3/20/2024 COMPARISON: US RLE venous 3/26/2024. ACCESSION NUMBER(S): [id number] ORDERING CLINICIAN: CATHY MARTIN TECHNIQUE:  Real-time grayscale, color, and spectral doppler ultrasound imaging of the right lower extremity veins was performed. FINDINGS: The right common femoral, profunda femoral, femoral and popliteal veins demonstrated normal compressibility, normal phasic venous flow and normal response to augmentation.  There is no evidence for echogenic thrombi.  The visualized deep calf veins are patent.  The contralateral common femoral vein is free of thrombosis.    No evidence for DVT within the right lower extremity. No significant change from prior ultrasound. Signed by Ramu Gonzalez MD    CT knee right wo IV contrast    Result Date: 3/27/2024  Interpreted By:  Rima Lemus, STUDY: CT KNEE RIGHT WO IV CONTRAST   INDICATION: Signs/Symptoms:with metal reduction - swelling posterior knee, pain, ecchymosis POD 7 from right total knee arthroplasty   COMPARISON: CT right knee without contrast dated 01/22/2024.   ACCESSION NUMBER(S): [id number]   ORDERING CLINICIAN: GRAHAM BARNETT   TECHNIQUE: Contiguous axial CT images were obtained at  2 mm slice thickness through the right knee without intravenous contrast administration. Coronal and sagittal reformatted images were performed. 3D reformatted images were created and reviewed.   FINDINGS: OSSEOUS STRUCTURES: Postsurgical changes of right total knee arthroplasty are noted. Hardware appears grossly intact without significant lucency at the bone metal interface  within limits of background metal related artifacts. There is no evidence of periprosthetic fractures. Visualized distal femur and proximal tibia and fibula are grossly intact without evidence of displaced fracture deformities. There is an oval circumscribed peripherally calcified osseous fragment along the posterolateral aspect of the joint space measuring approximately 1.8 x 1.6 x 1 cm in transverse, cc and AP dimensions respectively (best seen on sagittal image 37/100). 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   SOFT TISSUES: Evaluation is limited by the lack of intravenous contrast. Within this limitation, there is large volume suprapatellar joint effusion with layering hyperdense contents, likely favored to represent hemarthrosis and could be related to recent postsurgical status. There is also linearly oriented calcific densities along the anterior aspect of the suprapatellar fossa just above the level of superior margin of patella, likely favored to be sequela of prior synovitis with residual synovial calcifications. There is mild reticular subcutaneous soft tissue edema about the knee joint, predominantly involving the prepatellar soft tissues and extending along the medial and lateral aspect of the knee. This is most likely favored to be reactive secondary to recent postsurgical status. No large confluent fluid collection is noted in the posterior soft tissues of the knee and proximal leg included in the visualized field of view. Visualized musculature appears grossly unremarkable without evidence of intramuscular collections or mass lesions within limits of noncontrast technique.       1. Status post recent right total knee arthroplasty with intact hardware. Redemonstration of a peripherally calcified intracapsular synovial body projecting over the posterolateral joint space. 2. Large volume suprapatellar joint effusion with layering densities/hemarthrosis is  most likely favored to be related to recent postsurgical status. However this is of indeterminate sterility and superimposed infection can not be excluded in appropriate clinical setting. Recommend surgical correlation. 3. Reticular subcutaneous soft tissue edema about the knee joint is most likely favored to be reactive related to recent postsurgical status.         MACRO: None.   Signed by: Rima Lemus 3/27/2024 1:23 PM Dictation workstation:   [id number]    Lower extremity venous duplex right    Result Date: 3/26/2024  Interpreted By:  Jose G Mariscal, STUDY: [facility] US LOWER EXTREMITY VENOUS DUPLEX RIGHT  3/26/2024 6:13 pm   INDICATION: 82 y/o   M with  Signs/Symptoms:swelling, post op. LMP:  Unknown.   COMPARISON: None.   ACCESSION NUMBER(S): [id number]   ORDERING CLINICIAN: SHYANNE CHAMPION   TECHNIQUE: Routine ultrasound of the  right lower extremity was performed with duplex Doppler (color and spectral) evaluation.   Static images were obtained for remote interpretation.   FINDINGS: THIGH VEINS:  The common femoral, femoral, popliteal, proximal medial saphenous, and deep femoral veins are patent and free of thrombus. The veins are normally compressible.  They demonstrate normal phasic flow and augmentation response.   CALF VEINS:  The paired peroneal and posterior tibial calf veins are patent.       No deep venous thrombosis of the  right lower extremity.   MACRO: None   Signed by: Jose G Mariscal 3/26/2024 6:45 PM Dictation workstation:   [id number]    XR knee right 1-2 views    Result Date: 3/20/2024  Interpreted By:  Yennifer Jimenez, STUDY: Right knee, 2 views.   INDICATION: Signs/Symptoms:right TKR   COMPARISON: 07/03/2023.   ACCESSION NUMBER(S): [id number]   ORDERING CLINICIAN: TRICIA BOSWELL   FINDINGS: No acute fracture or malalignment. Immediate postsurgical changes of right total knee arthroplasty. Hardware is intact without perihardware fractures or lucencies. Expected postsurgical soft  tissue gas within the knee joint.       1. Immediate postsurgical changes of right total knee arthroplasty without hardware complication.   MACRO: None.   Signed by: Yennifer Jimenez 3/20/2024 3:00 PM Dictation workstation:   [id number]            Assessment/Plan   Principal Problem:    Cellulitis  Active Problems:    Allergic rhinitis    Arthritis, multiple joint involvement    Osteoarthritis    Benign essential hypertension    Benign prostatic hyperplasia    Dyslipidemia    Joint pain    (HFpEF) heart failure with preserved ejection fraction (Multi)    Cellulitis of right lower extremity    Cellulitis RLE  Arthritis, multiple joint involvement  Right knee pain  S/p right knee replacement  - No leukocytosis since admission  - ESR 2  - CRP 4.81  - BCx negative x 2 days   - US RLE: No evidence for DVT within the right lower extremity. No significant change from prior ultrasound.  - CT right knee as above  - continue celecoxib, gabapentin   - continue prednisone (day 2/5)  - continue acetaminophen 975 mg scheduled  - continue oxycodone prn severe pain- increased to 7.5 mg q6h PRN on 4/13   - continue toradol prn for moderate pain- Cr remains stable   - Colace BID for prevention of constipation on opiates   - continue Zosyn 3.375 IV Q 6 (Day 5)  - continue Vancomycin IV pharmacy to dose (Day 4)  - Ortho consulted; outpatient PT and follow up   - PT/OT evaluation recommending moderate level therapy  - Daily BMP to monitor renal function  - [facility] 4[facility]    Hypokalemia  - K 3.4, repleted per protocol  - Daily BMP     Allergic rhinitis  - continue Flonase, loratadine  - remains on room air    Benign essential hypertension  Dyslipidemia  Chronic Diastolic Heart Failure  - continue aspirin, losartan, metoprolol succinate  - Torsemide PRN for edema- home med  - cardiac diet   - monitor BP and HR     Benign prostatic hyperplasia  - continue finasteride, tamsulosin  - bladder scan PRN to monitor for  urinary retention     PUD ppx  - continue pantoprazole      DVT ppx  - continue heparin SQ     Code Status: Full Code     Disposition: Pt requires inpatient stay at this time.     Total accumulated time spent face to face and not face to face preparing to see the patient, obtaining and reviewing separately obtained history; performing a medically appropriate examination and/or evaluation; counseling and educating the patient, family; ordering medications, tests, or procedures; referring and communicating with other health care professionals; documenting clinical information in the patient's medical record; independently interpreting results and communicating the results to the patient, family; and care coordination was 30 minutes.        Maame Moon PA-C

## 2024-04-13 NOTE — CARE PLAN
The patient's goals for the shift include pain control    The clinical goals for the shift include Pain will be controlled this shift.    Over the shift, the patient's pain was controlled.  Rested well this shift.

## 2024-04-14 LAB
ANION GAP SERPL CALC-SCNC: 13 MMOL/L (ref 10–20)
BACTERIA BLD CULT: NORMAL
BUN SERPL-MCNC: 25 MG/DL (ref 6–23)
CALCIUM SERPL-MCNC: 8.7 MG/DL (ref 8.6–10.3)
CHLORIDE SERPL-SCNC: 107 MMOL/L (ref 98–107)
CO2 SERPL-SCNC: 23 MMOL/L (ref 21–32)
CREAT SERPL-MCNC: 1.09 MG/DL (ref 0.5–1.3)
EGFRCR SERPLBLD CKD-EPI 2021: 67 ML/MIN/1.73M*2
ERYTHROCYTE [DISTWIDTH] IN BLOOD BY AUTOMATED COUNT: 13.8 % (ref 11.5–14.5)
GLUCOSE SERPL-MCNC: 99 MG/DL (ref 74–99)
HCT VFR BLD AUTO: 30.8 % (ref 41–52)
HGB BLD-MCNC: 10 G/DL (ref 13.5–17.5)
MCH RBC QN AUTO: 30.5 PG (ref 26–34)
MCHC RBC AUTO-ENTMCNC: 32.5 G/DL (ref 32–36)
MCV RBC AUTO: 94 FL (ref 80–100)
NRBC BLD-RTO: 0 /100 WBCS (ref 0–0)
PLATELET # BLD AUTO: 183 X10*3/UL (ref 150–450)
POTASSIUM SERPL-SCNC: 3.9 MMOL/L (ref 3.5–5.3)
RBC # BLD AUTO: 3.28 X10*6/UL (ref 4.5–5.9)
SODIUM SERPL-SCNC: 139 MMOL/L (ref 136–145)
VANCOMYCIN SERPL-MCNC: 24.2 UG/ML (ref 5–20)
WBC # BLD AUTO: 8.7 X10*3/UL (ref 4.4–11.3)

## 2024-04-14 PROCEDURE — 2500000001 HC RX 250 WO HCPCS SELF ADMINISTERED DRUGS (ALT 637 FOR MEDICARE OP): Mod: IPSPLIT

## 2024-04-14 PROCEDURE — 2500000004 HC RX 250 GENERAL PHARMACY W/ HCPCS (ALT 636 FOR OP/ED): Mod: IPSPLIT

## 2024-04-14 PROCEDURE — 82374 ASSAY BLOOD CARBON DIOXIDE: CPT | Mod: IPSPLIT

## 2024-04-14 PROCEDURE — 36415 COLL VENOUS BLD VENIPUNCTURE: CPT | Mod: IPSPLIT

## 2024-04-14 PROCEDURE — 1090000001 HH PPS REVENUE CREDIT

## 2024-04-14 PROCEDURE — 1100000001 HC PRIVATE ROOM DAILY: Mod: IPSPLIT

## 2024-04-14 PROCEDURE — 85027 COMPLETE CBC AUTOMATED: CPT | Mod: IPSPLIT

## 2024-04-14 PROCEDURE — 99232 SBSQ HOSP IP/OBS MODERATE 35: CPT

## 2024-04-14 PROCEDURE — 2500000004 HC RX 250 GENERAL PHARMACY W/ HCPCS (ALT 636 FOR OP/ED): Mod: JZ,IPSPLIT | Performed by: NURSE PRACTITIONER

## 2024-04-14 PROCEDURE — 2500000002 HC RX 250 W HCPCS SELF ADMINISTERED DRUGS (ALT 637 FOR MEDICARE OP, ALT 636 FOR OP/ED): Mod: IPSPLIT

## 2024-04-14 PROCEDURE — 80202 ASSAY OF VANCOMYCIN: CPT | Mod: IPSPLIT

## 2024-04-14 PROCEDURE — 1090000002 HH PPS REVENUE DEBIT

## 2024-04-14 PROCEDURE — 2500000004 HC RX 250 GENERAL PHARMACY W/ HCPCS (ALT 636 FOR OP/ED): Mod: IPSPLIT | Performed by: NURSE PRACTITIONER

## 2024-04-14 RX ADMIN — GABAPENTIN 300 MG: 300 CAPSULE ORAL at 09:35

## 2024-04-14 RX ADMIN — VANCOMYCIN 1.5 G: 1.5 INJECTION, SOLUTION INTRAVENOUS at 01:15

## 2024-04-14 RX ADMIN — CELECOXIB 200 MG: 100 CAPSULE ORAL at 20:56

## 2024-04-14 RX ADMIN — ASPIRIN 325 MG: 325 TABLET, COATED ORAL at 20:57

## 2024-04-14 RX ADMIN — OXYCODONE HYDROCHLORIDE 7.5 MG: 5 TABLET ORAL at 00:06

## 2024-04-14 RX ADMIN — OXYCODONE HYDROCHLORIDE AND ACETAMINOPHEN 500 MG: 500 TABLET ORAL at 09:35

## 2024-04-14 RX ADMIN — METOPROLOL SUCCINATE 50 MG: 25 TABLET, EXTENDED RELEASE ORAL at 09:35

## 2024-04-14 RX ADMIN — TAMSULOSIN HYDROCHLORIDE 0.4 MG: 0.4 CAPSULE ORAL at 20:56

## 2024-04-14 RX ADMIN — ACETAMINOPHEN 975 MG: 325 TABLET ORAL at 03:24

## 2024-04-14 RX ADMIN — OXYCODONE HYDROCHLORIDE 7.5 MG: 5 TABLET ORAL at 20:55

## 2024-04-14 RX ADMIN — Medication 5 MG: at 20:57

## 2024-04-14 RX ADMIN — KETOROLAC TROMETHAMINE 15 MG: 15 INJECTION INTRAMUSCULAR; INTRAVENOUS at 06:37

## 2024-04-14 RX ADMIN — ACETAMINOPHEN 975 MG: 325 TABLET ORAL at 15:43

## 2024-04-14 RX ADMIN — PIPERACILLIN SODIUM AND TAZOBACTAM SODIUM 3.38 G: 3; .375 INJECTION, SOLUTION INTRAVENOUS at 21:02

## 2024-04-14 RX ADMIN — OXYCODONE HYDROCHLORIDE 7.5 MG: 5 TABLET ORAL at 09:34

## 2024-04-14 RX ADMIN — FLUTICASONE PROPIONATE 2 SPRAY: 50 SPRAY, METERED NASAL at 09:38

## 2024-04-14 RX ADMIN — PANTOPRAZOLE SODIUM 40 MG: 40 TABLET, DELAYED RELEASE ORAL at 06:44

## 2024-04-14 RX ADMIN — Medication 1 CAPSULE: at 16:28

## 2024-04-14 RX ADMIN — SIMETHICONE 80 MG: 80 TABLET, CHEWABLE ORAL at 21:06

## 2024-04-14 RX ADMIN — PIPERACILLIN SODIUM AND TAZOBACTAM SODIUM 3.38 G: 3; .375 INJECTION, SOLUTION INTRAVENOUS at 00:07

## 2024-04-14 RX ADMIN — PIPERACILLIN SODIUM AND TAZOBACTAM SODIUM 3.38 G: 3; .375 INJECTION, SOLUTION INTRAVENOUS at 15:43

## 2024-04-14 RX ADMIN — PIPERACILLIN SODIUM AND TAZOBACTAM SODIUM 3.38 G: 3; .375 INJECTION, SOLUTION INTRAVENOUS at 06:37

## 2024-04-14 RX ADMIN — OXYCODONE HYDROCHLORIDE AND ACETAMINOPHEN 500 MG: 500 TABLET ORAL at 20:56

## 2024-04-14 RX ADMIN — GABAPENTIN 300 MG: 300 CAPSULE ORAL at 20:57

## 2024-04-14 RX ADMIN — FLUTICASONE PROPIONATE 2 SPRAY: 50 SPRAY, METERED NASAL at 20:58

## 2024-04-14 RX ADMIN — LOSARTAN POTASSIUM 50 MG: 50 TABLET, FILM COATED ORAL at 09:36

## 2024-04-14 RX ADMIN — HEPARIN SODIUM 5000 UNITS: 5000 INJECTION INTRAVENOUS; SUBCUTANEOUS at 03:24

## 2024-04-14 RX ADMIN — FINASTERIDE 5 MG: 5 TABLET, FILM COATED ORAL at 20:56

## 2024-04-14 RX ADMIN — HEPARIN SODIUM 5000 UNITS: 5000 INJECTION INTRAVENOUS; SUBCUTANEOUS at 18:31

## 2024-04-14 RX ADMIN — PREDNISONE 40 MG: 20 TABLET ORAL at 09:35

## 2024-04-14 RX ADMIN — GABAPENTIN 300 MG: 300 CAPSULE ORAL at 15:44

## 2024-04-14 RX ADMIN — LORATADINE 10 MG: 10 TABLET ORAL at 09:35

## 2024-04-14 RX ADMIN — KETOROLAC TROMETHAMINE 15 MG: 15 INJECTION INTRAMUSCULAR; INTRAVENOUS at 15:44

## 2024-04-14 RX ADMIN — ASPIRIN 325 MG: 325 TABLET, COATED ORAL at 09:35

## 2024-04-14 RX ADMIN — CELECOXIB 200 MG: 100 CAPSULE ORAL at 09:36

## 2024-04-14 ASSESSMENT — PAIN SCALES - GENERAL
PAINLEVEL_OUTOF10: 4
PAINLEVEL_OUTOF10: 6
PAINLEVEL_OUTOF10: 10 - WORST POSSIBLE PAIN
PAINLEVEL_OUTOF10: 4
PAINLEVEL_OUTOF10: 8
PAINLEVEL_OUTOF10: 9
PAINLEVEL_OUTOF10: 9

## 2024-04-14 ASSESSMENT — COGNITIVE AND FUNCTIONAL STATUS - GENERAL
CLIMB 3 TO 5 STEPS WITH RAILING: A LITTLE
MOBILITY SCORE: 23
DAILY ACTIVITIY SCORE: 24

## 2024-04-14 ASSESSMENT — PAIN - FUNCTIONAL ASSESSMENT
PAIN_FUNCTIONAL_ASSESSMENT: 0-10

## 2024-04-14 ASSESSMENT — PAIN DESCRIPTION - LOCATION
LOCATION: KNEE

## 2024-04-14 ASSESSMENT — PAIN DESCRIPTION - ORIENTATION
ORIENTATION: RIGHT

## 2024-04-14 NOTE — PROGRESS NOTES
"Vancomycin Dosing by Pharmacy- FOLLOW UP    Abraham Rodriguez is a 83 y.o. year old male who Pharmacy has been consulted for vancomycin dosing for cellulitis, skin and soft tissue. Based on the patient's indication and renal status this patient is being dosed based on a goal AUC of 400-600.     Renal function is currently stable.    Current vancomycin dose: 1500 mg given every 24 hours    Estimated vancomycin AUC on current dose: 513 mg/L.hr     Visit Vitals  /71 (BP Location: Right arm, Patient Position: Sitting)   Pulse 58   Temp 36.9 °C (98.4 °F) (Temporal)   Resp 17        Lab Results   Component Value Date    CREATININE 1.09 04/14/2024    CREATININE 1.08 04/13/2024    CREATININE 1.04 04/12/2024    CREATININE 1.03 04/11/2024        Patient weight is No results found for: \"PTWEIGHT\"    No results found for: \"CULTURE\"     I/O last 3 completed shifts:  In: 2706.5 (30.8 mL/kg) [P.O.:1020; I.V.:536.5 (6.1 mL/kg); IV Piggyback:1150]  Out: 2 (0 mL/kg) [Stool:2]  Weight: 88 kg   [unfilled]    No results found for: \"PATIENTTEMP\"     Assessment/Plan    Within goal AUC range. Continue current vancomycin regimen.    This dosing regimen is predicted by [employer] to result in the following pharmacokinetic parameters:  Loading dose: N/A  Regimen: 1500 mg IV every 24 hours.  Start time: 01:15 on 04/15/2024  Exposure target: AUC24 (range)400-600 mg/L.hr   AUC24,ss: 513 mg/L.hr  Probability of AUC24 > 400: 89 %  Ctrough,ss: 15.7 mg/L  Probability of Ctrough,ss > 20: 22 %  Probability of nephrotoxicity (Lodise MELISSA 2009): 11 %      The next level will be obtained on 4/19 at 0500. May be obtained sooner if clinically indicated.   Will continue to monitor renal function daily while on vancomycin and order serum creatinine at least every 48 hours if not already ordered.  Follow for continued vancomycin needs, clinical response, and signs/symptoms of toxicity.       Cindi Gonzalez, PharmD           "

## 2024-04-14 NOTE — PROGRESS NOTES
"Abraham Rodriguez is a 83 y.o. male on day 3 of admission presenting with Cellulitis.    Subjective     No overnight events reported.     Patient reports slight improvement in his right leg pain; rates it 9/10 this morning compared to 10/10 yesterday. He says he has had some loose/semi-formed stool but no diarrhea. He is tolerating his diet and has been working with therapy. Plan of care discussed with patient, all questions answered.         Objective     Physical Exam  Constitutional:       General: He is not in acute distress.     Appearance: He is not toxic-appearing.   HENT:      Head: Normocephalic and atraumatic.      Mouth/Throat:      Mouth: Mucous membranes are moist.   Eyes:      Conjunctiva/sclera: Conjunctivae normal.   Cardiovascular:      Rate and Rhythm: Normal rate and regular rhythm.   Pulmonary:      Effort: No respiratory distress.      Breath sounds: Normal breath sounds.   Abdominal:      General: There is no distension.      Palpations: Abdomen is soft.      Tenderness: There is no abdominal tenderness.   Musculoskeletal:         General: Tenderness (RLE) present.      Right lower leg: Edema present.   Skin:     General: Skin is warm and dry.      Comments: Compression stocking in place on RLE  Healing surgical incision over right knee; no drainage or wound dehiscence    Neurological:      Mental Status: He is alert and oriented to person, place, and time.   Psychiatric:         Mood and Affect: Mood normal.         Behavior: Behavior normal.         Last Recorded Vitals  Blood pressure 157/71, pulse 58, temperature 36.9 °C (98.4 °F), temperature source Temporal, resp. rate 17, height 1.803 m (5' 10.98\"), weight 88 kg (194 lb 0.1 oz), SpO2 98%.  Intake/Output last 3 Shifts:  I/O last 3 completed shifts:  In: 2706.5 (30.8 mL/kg) [P.O.:1020; I.V.:536.5 (6.1 mL/kg); IV Piggyback:1150]  Out: 2 (0 mL/kg) [Stool:2]  Weight: 88 kg     Relevant Results      Scheduled medications  acetaminophen, 975 mg, " oral, q8h  ascorbic acid, 500 mg, oral, BID  aspirin, 325 mg, oral, BID  celecoxib, 200 mg, oral, BID  docusate sodium, 100 mg, oral, BID  finasteride, 5 mg, oral, q PM  fluticasone, 2 spray, Each Nostril, BID  gabapentin, 300 mg, oral, TID  heparin (porcine), 5,000 Units, subcutaneous, q8h  lactobacillus acidophilus, 1 capsule, oral, Daily  loratadine, 10 mg, oral, Daily  losartan, 50 mg, oral, Daily  melatonin, 5 mg, oral, Nightly  metoprolol succinate XL, 50 mg, oral, Daily  pantoprazole, 40 mg, oral, Daily before breakfast  piperacillin-tazobactam, 3.375 g, intravenous, q6h  predniSONE, 40 mg, oral, Daily  sennosides-docusate sodium, 2 tablet, oral, BID  tamsulosin, 0.4 mg, oral, q PM  vancomycin 1.5 g in 300 mL, 1,500 mg, intravenous, q24h      Continuous medications  oxygen, , Last Rate: 2 L/min (04/10/24 6138)  sodium chloride 0.9%, 10 mL/hr, Last Rate: 10 mL/hr (04/14/24 4955)      PRN medications  PRN medications: carisoprodol, ketorolac, magnesium hydroxide, melatonin, ondansetron, oxyCODONE, oxygen, polyethylene glycol, simethicone, sodium chloride 0.9%, torsemide, vancomycin    Results for orders placed or performed during the hospital encounter of 04/09/24 (from the past 24 hour(s))   Vancomycin   Result Value Ref Range    Vancomycin 24.2 (H) 5.0 - 20.0 ug/mL   Basic metabolic panel   Result Value Ref Range    Glucose 99 74 - 99 mg/dL    Sodium 139 136 - 145 mmol/L    Potassium 3.9 3.5 - 5.3 mmol/L    Chloride 107 98 - 107 mmol/L    Bicarbonate 23 21 - 32 mmol/L    Anion Gap 13 10 - 20 mmol/L    Urea Nitrogen 25 (H) 6 - 23 mg/dL    Creatinine 1.09 0.50 - 1.30 mg/dL    eGFR 67 >60 mL/min/1.73m*2    Calcium 8.7 8.6 - 10.3 mg/dL   CBC   Result Value Ref Range    WBC 8.7 4.4 - 11.3 x10*3/uL    nRBC 0.0 0.0 - 0.0 /100 WBCs    RBC 3.28 (L) 4.50 - 5.90 x10*6/uL    Hemoglobin 10.0 (L) 13.5 - 17.5 g/dL    Hematocrit 30.8 (L) 41.0 - 52.0 %    MCV 94 80 - 100 fL    MCH 30.5 26.0 - 34.0 pg    MCHC 32.5 32.0 - 36.0  g/dL    RDW 13.8 11.5 - 14.5 %    Platelets 183 150 - 450 x10*3/uL       Lower extremity venous duplex right    Result Date: 4/9/2024  STUDY: Right Lower Extremity Venous Doppler Ultrasound; 4/9/2024 10:33 PM INDICATION: The right leg pain, calf edema.  Knee surgery 3/20/2024 COMPARISON: US RLE venous 3/26/2024. ACCESSION NUMBER(S): [id number] ORDERING CLINICIAN: CATHY MARTIN TECHNIQUE:  Real-time grayscale, color, and spectral doppler ultrasound imaging of the right lower extremity veins was performed. FINDINGS: The right common femoral, profunda femoral, femoral and popliteal veins demonstrated normal compressibility, normal phasic venous flow and normal response to augmentation.  There is no evidence for echogenic thrombi.  The visualized deep calf veins are patent.  The contralateral common femoral vein is free of thrombosis.    No evidence for DVT within the right lower extremity. No significant change from prior ultrasound. Signed by Ramu Gonzalez MD    CT knee right wo IV contrast    Result Date: 3/27/2024  Interpreted By:  Rima Lemus, STUDY: CT KNEE RIGHT WO IV CONTRAST   INDICATION: Signs/Symptoms:with metal reduction - swelling posterior knee, pain, ecchymosis POD 7 from right total knee arthroplasty   COMPARISON: CT right knee without contrast dated 01/22/2024.   ACCESSION NUMBER(S): [id number]   ORDERING CLINICIAN: GRAHAM BARNETT   TECHNIQUE: Contiguous axial CT images were obtained at  2 mm slice thickness through the right knee without intravenous contrast administration. Coronal and sagittal reformatted images were performed. 3D reformatted images were created and reviewed.   FINDINGS: OSSEOUS STRUCTURES: Postsurgical changes of right total knee arthroplasty are noted. Hardware appears grossly intact without significant lucency at the bone metal interface within limits of background metal related artifacts. There is no evidence of periprosthetic fractures. Visualized distal femur and  proximal tibia and fibula are grossly intact without evidence of displaced fracture deformities. There is an oval circumscribed peripherally calcified osseous fragment along the posterolateral aspect of the joint space measuring approximately 1.8 x 1.6 x 1 cm in transverse, cc and AP dimensions respectively (best seen on sagittal image 37/100). 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   SOFT TISSUES: Evaluation is limited by the lack of intravenous contrast. Within this limitation, there is large volume suprapatellar joint effusion with layering hyperdense contents, likely favored to represent hemarthrosis and could be related to recent postsurgical status. There is also linearly oriented calcific densities along the anterior aspect of the suprapatellar fossa just above the level of superior margin of patella, likely favored to be sequela of prior synovitis with residual synovial calcifications. There is mild reticular subcutaneous soft tissue edema about the knee joint, predominantly involving the prepatellar soft tissues and extending along the medial and lateral aspect of the knee. This is most likely favored to be reactive secondary to recent postsurgical status. No large confluent fluid collection is noted in the posterior soft tissues of the knee and proximal leg included in the visualized field of view. Visualized musculature appears grossly unremarkable without evidence of intramuscular collections or mass lesions within limits of noncontrast technique.       1. Status post recent right total knee arthroplasty with intact hardware. Redemonstration of a peripherally calcified intracapsular synovial body projecting over the posterolateral joint space. 2. Large volume suprapatellar joint effusion with layering densities/hemarthrosis is most likely favored to be related to recent postsurgical status. However this is of indeterminate sterility and superimposed  infection can not be excluded in appropriate clinical setting. Recommend surgical correlation. 3. Reticular subcutaneous soft tissue edema about the knee joint is most likely favored to be reactive related to recent postsurgical status.         MACRO: None.   Signed by: Rima Lemus 3/27/2024 1:23 PM Dictation workstation:   [id number]    Lower extremity venous duplex right    Result Date: 3/26/2024  Interpreted By:  Jose G Mariscal, STUDY: [facility] US LOWER EXTREMITY VENOUS DUPLEX RIGHT  3/26/2024 6:13 pm   INDICATION: 84 y/o   M with  Signs/Symptoms:swelling, post op. LMP:  Unknown.   COMPARISON: None.   ACCESSION NUMBER(S): [id number]   ORDERING CLINICIAN: SHYANNE CHAMPION   TECHNIQUE: Routine ultrasound of the  right lower extremity was performed with duplex Doppler (color and spectral) evaluation.   Static images were obtained for remote interpretation.   FINDINGS: THIGH VEINS:  The common femoral, femoral, popliteal, proximal medial saphenous, and deep femoral veins are patent and free of thrombus. The veins are normally compressible.  They demonstrate normal phasic flow and augmentation response.   CALF VEINS:  The paired peroneal and posterior tibial calf veins are patent.       No deep venous thrombosis of the  right lower extremity.   MACRO: None   Signed by: Jose G Mariscal 3/26/2024 6:45 PM Dictation workstation:   [id number]    XR knee right 1-2 views    Result Date: 3/20/2024  Interpreted By:  Yennifer Jimenez, STUDY: Right knee, 2 views.   INDICATION: Signs/Symptoms:right TKR   COMPARISON: 07/03/2023.   ACCESSION NUMBER(S): [id number]   ORDERING CLINICIAN: TRICIA BOSWELL   FINDINGS: No acute fracture or malalignment. Immediate postsurgical changes of right total knee arthroplasty. Hardware is intact without perihardware fractures or lucencies. Expected postsurgical soft tissue gas within the knee joint.       1. Immediate postsurgical changes of right total knee arthroplasty without hardware  complication.   MACRO: None.   Signed by: Yennifer Jimenez 3/20/2024 3:00 PM Dictation workstation:   [id number]                    Assessment/Plan   Principal Problem:    Cellulitis  Active Problems:    Allergic rhinitis    Arthritis, multiple joint involvement    Osteoarthritis    Benign essential hypertension    Benign prostatic hyperplasia    Dyslipidemia    Joint pain    (HFpEF) heart failure with preserved ejection fraction (Multi)    Cellulitis of right lower extremity    Cellulitis RLE (improving)   Arthritis, multiple joint involvement  Right knee pain  S/p right knee replacement  - No leukocytosis since admission  - ESR 2  - CRP 4.81  - BCx negative x 3 days   - US RLE: No evidence for DVT within the right lower extremity. No significant change from prior ultrasound.  - CT right knee as above  - continue celecoxib, gabapentin   - continue prednisone (day 3/5)  - continue acetaminophen 975 mg scheduled  - continue oxycodone prn severe pain- increased to 7.5 mg q6h PRN on 4/13 (home dose)   - continue toradol prn for moderate pain- Cr remains stable   - Colace BID for prevention of constipation on opiates   - continue Zosyn 3.375 IV Q 6 (Day 6)  - continue Vancomycin IV pharmacy to dose (Day 5)  - Ortho consulted; outpatient PT and follow up   - PT/OT evaluation recommending moderate level therapy  - Daily BMP to monitor renal function  - Discharge to [facility] 4/15     Hypokalemia (resolved)   - K 3.4, repleted per protocol  - K today 3.9   - Daily BMP      Allergic rhinitis  - continue Flonase, loratadine  - remains on room air     Benign essential hypertension  Dyslipidemia  Chronic Diastolic Heart Failure  - continue aspirin, losartan, metoprolol succinate  - Torsemide PRN for edema- home med  - No evidence of volume overload on exam  - cardiac diet   - monitor BP and HR     Benign prostatic hyperplasia  - continue finasteride, tamsulosin  - bladder scan PRN to monitor for urinary retention      Anemia, normocytic  - H/H stable  - Daily CBC    PUD ppx  - continue pantoprazole      DVT ppx  - continue heparin SQ     Code Status: Full Code     Disposition: Pt requires inpatient stay at this time.     Total accumulated time spent face to face and not face to face preparing to see the patient, obtaining and reviewing separately obtained history; performing a medically appropriate examination and/or evaluation; counseling and educating the patient, family; ordering medications, tests, or procedures; referring and communicating with other health care professionals; documenting clinical information in the patient's medical record; independently interpreting results and communicating the results to the patient, family; and care coordination was 30 minutes.         Maame Moon PA-C

## 2024-04-14 NOTE — CARE PLAN
The patient's goals for the shift include pain control    The clinical goals for the shift include Patient will report decreased edema.    Oxycodone and Toradol IV administered to control pain. IV ATB tolerated. RT LE edema noted. Encouraged to elevate leg.

## 2024-04-14 NOTE — CARE PLAN
The patient's goals for the shift include pain control    The clinical goals for the shift include patient will have decreased swelling to knee this shift    Problem: Pain  Goal: My pain/discomfort is manageable  Outcome: Progressing     Problem: Safety  Goal: Patient will be injury free during hospitalization  Outcome: Progressing  Goal: I will remain free of falls  Outcome: Progressing     Problem: Daily Care  Goal: Daily care needs are met  Outcome: Progressing     Problem: Psychosocial Needs  Goal: Demonstrates ability to cope with hospitalization/illness  Outcome: Progressing  Goal: Collaborate with me, my family, and caregiver to identify my specific goals  Outcome: Progressing     Problem: Discharge Barriers  Goal: My discharge needs are met  Outcome: Progressing     Problem: Fall/Injury  Goal: Not fall by end of shift  Outcome: Progressing  Goal: Be free from injury by end of the shift  Outcome: Progressing  Goal: Verbalize understanding of personal risk factors for fall in the hospital  Outcome: Progressing  Goal: Verbalize understanding of risk factor reduction measures to prevent injury from fall in the home  Outcome: Progressing  Goal: Use assistive devices by end of the shift  Outcome: Progressing  Goal: Pace activities to prevent fatigue by end of the shift  Outcome: Progressing     Problem: Pain  Goal: Takes deep breaths with improved pain control throughout the shift  Outcome: Progressing  Goal: Turns in bed with improved pain control throughout the shift  Outcome: Progressing  Goal: Walks with improved pain control throughout the shift  Outcome: Progressing  Goal: Performs ADL's with improved pain control throughout shift  Outcome: Progressing  Goal: Participates in PT with improved pain control throughout the shift  Outcome: Progressing  Goal: Free from opioid side effects throughout the shift  Outcome: Progressing  Goal: Free from acute confusion related to pain meds throughout the shift  Outcome:  Progressing     Problem: Skin  Goal: Decreased wound size/increased tissue granulation at next dressing change  Outcome: Progressing     Problem: Skin  Goal: Decreased wound size/increased tissue granulation at next dressing change  Outcome: Progressing     Problem: Skin  Goal: Decreased wound size/increased tissue granulation at next dressing change  Outcome: Progressing  Goal: Participates in plan/prevention/treatment measures  Outcome: Progressing  Goal: Prevent/manage excess moisture  Outcome: Progressing  Goal: Prevent/minimize sheer/friction injuries  Outcome: Progressing  Goal: Promote/optimize nutrition  Outcome: Progressing  Goal: Promote skin healing  Outcome: Progressing     Problem: Nutrition  Goal: Oral intake greater 75%  Outcome: Progressing  Goal: Consume prescribed supplement  Outcome: Progressing  Goal: Adequate PO fluid intake  Outcome: Progressing  Goal: Lab values WNL  Outcome: Progressing  Goal: Promote healing  Outcome: Progressing  Goal: Reduce weight from edema/fluid  Outcome: Progressing     Problem: Pain - Adult  Goal: Verbalizes/displays adequate comfort level or baseline comfort level  Outcome: Progressing     Problem: Safety - Adult  Goal: Free from fall injury  Outcome: Progressing     Problem: Chronic Conditions and Co-morbidities  Goal: Patient's chronic conditions and co-morbidity symptoms are monitored and maintained or improved  Outcome: Progressing

## 2024-04-15 ENCOUNTER — HOSPITAL ENCOUNTER (INPATIENT)
Facility: HOSPITAL | Age: 84
LOS: 10 days | Discharge: HOME | DRG: 603 | End: 2024-04-25
Attending: STUDENT IN AN ORGANIZED HEALTH CARE EDUCATION/TRAINING PROGRAM | Admitting: STUDENT IN AN ORGANIZED HEALTH CARE EDUCATION/TRAINING PROGRAM
Payer: MEDICARE

## 2024-04-15 ENCOUNTER — APPOINTMENT (OUTPATIENT)
Dept: PRIMARY CARE | Facility: CLINIC | Age: 84
End: 2024-04-15
Payer: COMMERCIAL

## 2024-04-15 ENCOUNTER — APPOINTMENT (OUTPATIENT)
Dept: RADIOLOGY | Facility: HOSPITAL | Age: 84
DRG: 603 | End: 2024-04-15
Payer: MEDICARE

## 2024-04-15 VITALS
HEIGHT: 71 IN | BODY MASS INDEX: 27.16 KG/M2 | DIASTOLIC BLOOD PRESSURE: 80 MMHG | WEIGHT: 194 LBS | RESPIRATION RATE: 18 BRPM | OXYGEN SATURATION: 98 % | TEMPERATURE: 97.9 F | SYSTOLIC BLOOD PRESSURE: 159 MMHG | HEART RATE: 91 BPM

## 2024-04-15 DIAGNOSIS — R60.0 BILATERAL LOWER EXTREMITY EDEMA: ICD-10-CM

## 2024-04-15 DIAGNOSIS — Z96.651 STATUS POST TOTAL RIGHT KNEE REPLACEMENT: ICD-10-CM

## 2024-04-15 DIAGNOSIS — Z96.651 STATUS POST TOTAL KNEE REPLACEMENT, RIGHT: Primary | ICD-10-CM

## 2024-04-15 DIAGNOSIS — R60.0 PEDAL EDEMA: ICD-10-CM

## 2024-04-15 DIAGNOSIS — L23.9 ALLERGIC DERMATITIS: ICD-10-CM

## 2024-04-15 DIAGNOSIS — M16.12 PRIMARY OSTEOARTHRITIS OF LEFT HIP: ICD-10-CM

## 2024-04-15 LAB
ANION GAP SERPL CALC-SCNC: 13 MMOL/L (ref 10–20)
BNP SERPL-MCNC: 173 PG/ML (ref 0–99)
BUN SERPL-MCNC: 24 MG/DL (ref 6–23)
CALCIUM SERPL-MCNC: 8.9 MG/DL (ref 8.6–10.3)
CHLORIDE SERPL-SCNC: 106 MMOL/L (ref 98–107)
CO2 SERPL-SCNC: 24 MMOL/L (ref 21–32)
CREAT SERPL-MCNC: 0.96 MG/DL (ref 0.5–1.3)
EGFRCR SERPLBLD CKD-EPI 2021: 78 ML/MIN/1.73M*2
ERYTHROCYTE [DISTWIDTH] IN BLOOD BY AUTOMATED COUNT: 14 % (ref 11.5–14.5)
GLUCOSE SERPL-MCNC: 88 MG/DL (ref 74–99)
HCT VFR BLD AUTO: 33.5 % (ref 41–52)
HGB BLD-MCNC: 10.8 G/DL (ref 13.5–17.5)
MCH RBC QN AUTO: 30.4 PG (ref 26–34)
MCHC RBC AUTO-ENTMCNC: 32.2 G/DL (ref 32–36)
MCV RBC AUTO: 94 FL (ref 80–100)
NRBC BLD-RTO: 0 /100 WBCS (ref 0–0)
PLATELET # BLD AUTO: 187 X10*3/UL (ref 150–450)
POTASSIUM SERPL-SCNC: 3.9 MMOL/L (ref 3.5–5.3)
RBC # BLD AUTO: 3.55 X10*6/UL (ref 4.5–5.9)
SODIUM SERPL-SCNC: 139 MMOL/L (ref 136–145)
WBC # BLD AUTO: 8.4 X10*3/UL (ref 4.4–11.3)

## 2024-04-15 PROCEDURE — 2500000001 HC RX 250 WO HCPCS SELF ADMINISTERED DRUGS (ALT 637 FOR MEDICARE OP): Mod: IPSPLIT

## 2024-04-15 PROCEDURE — 80048 BASIC METABOLIC PNL TOTAL CA: CPT | Mod: IPSPLIT

## 2024-04-15 PROCEDURE — 2500000001 HC RX 250 WO HCPCS SELF ADMINISTERED DRUGS (ALT 637 FOR MEDICARE OP): Performed by: STUDENT IN AN ORGANIZED HEALTH CARE EDUCATION/TRAINING PROGRAM

## 2024-04-15 PROCEDURE — 73700 CT LOWER EXTREMITY W/O DYE: CPT | Mod: RT

## 2024-04-15 PROCEDURE — 73700 CT LOWER EXTREMITY W/O DYE: CPT | Mod: RIGHT SIDE | Performed by: RADIOLOGY

## 2024-04-15 PROCEDURE — 83880 ASSAY OF NATRIURETIC PEPTIDE: CPT | Mod: IPSPLIT | Performed by: STUDENT IN AN ORGANIZED HEALTH CARE EDUCATION/TRAINING PROGRAM

## 2024-04-15 PROCEDURE — 85027 COMPLETE CBC AUTOMATED: CPT | Mod: IPSPLIT

## 2024-04-15 PROCEDURE — 97116 GAIT TRAINING THERAPY: CPT | Mod: GP,IPSPLIT | Performed by: PHYSICAL THERAPIST

## 2024-04-15 PROCEDURE — 97535 SELF CARE MNGMENT TRAINING: CPT | Mod: GO,IPSPLIT | Performed by: OCCUPATIONAL THERAPIST

## 2024-04-15 PROCEDURE — 1090000001 HH PPS REVENUE CREDIT

## 2024-04-15 PROCEDURE — 1090000002 HH PPS REVENUE DEBIT

## 2024-04-15 PROCEDURE — 94760 N-INVAS EAR/PLS OXIMETRY 1: CPT

## 2024-04-15 PROCEDURE — 97110 THERAPEUTIC EXERCISES: CPT | Mod: GP,IPSPLIT | Performed by: PHYSICAL THERAPIST

## 2024-04-15 PROCEDURE — 99232 SBSQ HOSP IP/OBS MODERATE 35: CPT

## 2024-04-15 PROCEDURE — 1900000001 HC SKILLED SWING ROOM

## 2024-04-15 PROCEDURE — 2500000002 HC RX 250 W HCPCS SELF ADMINISTERED DRUGS (ALT 637 FOR MEDICARE OP, ALT 636 FOR OP/ED): Mod: MUE | Performed by: STUDENT IN AN ORGANIZED HEALTH CARE EDUCATION/TRAINING PROGRAM

## 2024-04-15 PROCEDURE — 36415 COLL VENOUS BLD VENIPUNCTURE: CPT | Mod: IPSPLIT

## 2024-04-15 PROCEDURE — 2500000004 HC RX 250 GENERAL PHARMACY W/ HCPCS (ALT 636 FOR OP/ED): Mod: IPSPLIT | Performed by: NURSE PRACTITIONER

## 2024-04-15 PROCEDURE — 2500000004 HC RX 250 GENERAL PHARMACY W/ HCPCS (ALT 636 FOR OP/ED): Mod: IPSPLIT

## 2024-04-15 PROCEDURE — 2500000004 HC RX 250 GENERAL PHARMACY W/ HCPCS (ALT 636 FOR OP/ED): Mod: JZ,IPSPLIT | Performed by: NURSE PRACTITIONER

## 2024-04-15 RX ORDER — SIMETHICONE 80 MG
80 TABLET,CHEWABLE ORAL 4 TIMES DAILY PRN
Status: DISCONTINUED | OUTPATIENT
Start: 2024-04-15 | End: 2024-04-25 | Stop reason: HOSPADM

## 2024-04-15 RX ORDER — AMOXICILLIN AND CLAVULANATE POTASSIUM 875; 125 MG/1; MG/1
1 TABLET, FILM COATED ORAL EVERY 12 HOURS SCHEDULED
Status: COMPLETED | OUTPATIENT
Start: 2024-04-15 | End: 2024-04-20

## 2024-04-15 RX ORDER — ASCORBIC ACID 500 MG
500 TABLET ORAL 2 TIMES DAILY
Status: DISCONTINUED | OUTPATIENT
Start: 2024-04-15 | End: 2024-04-25 | Stop reason: HOSPADM

## 2024-04-15 RX ORDER — GABAPENTIN 300 MG/1
300 CAPSULE ORAL 3 TIMES DAILY
Status: ON HOLD
Start: 2024-04-15 | End: 2024-04-24 | Stop reason: WASHOUT

## 2024-04-15 RX ORDER — PANTOPRAZOLE SODIUM 40 MG/1
40 TABLET, DELAYED RELEASE ORAL
Status: DISCONTINUED | OUTPATIENT
Start: 2024-04-16 | End: 2024-04-25 | Stop reason: HOSPADM

## 2024-04-15 RX ORDER — OXYCODONE AND ACETAMINOPHEN 7.5; 325 MG/1; MG/1
1 TABLET ORAL 3 TIMES DAILY PRN
Qty: 9 TABLET | Refills: 0 | Status: SHIPPED | OUTPATIENT
Start: 2024-04-15 | End: 2024-04-25 | Stop reason: HOSPADM

## 2024-04-15 RX ORDER — DOXYCYCLINE HYCLATE 100 MG
100 TABLET ORAL EVERY 12 HOURS SCHEDULED
Status: DISPENSED | OUTPATIENT
Start: 2024-04-15 | End: 2024-04-20

## 2024-04-15 RX ORDER — FINASTERIDE 5 MG/1
5 TABLET, FILM COATED ORAL EVERY EVENING
Status: DISCONTINUED | OUTPATIENT
Start: 2024-04-15 | End: 2024-04-25 | Stop reason: HOSPADM

## 2024-04-15 RX ORDER — TAMSULOSIN HYDROCHLORIDE 0.4 MG/1
0.4 CAPSULE ORAL EVERY EVENING
Status: DISCONTINUED | OUTPATIENT
Start: 2024-04-15 | End: 2024-04-25 | Stop reason: HOSPADM

## 2024-04-15 RX ORDER — POLYETHYLENE GLYCOL 3350 17 G/17G
17 POWDER, FOR SOLUTION ORAL DAILY
Status: DISCONTINUED | OUTPATIENT
Start: 2024-04-15 | End: 2024-04-25 | Stop reason: HOSPADM

## 2024-04-15 RX ORDER — LOSARTAN POTASSIUM 50 MG/1
50 TABLET ORAL DAILY
Status: DISCONTINUED | OUTPATIENT
Start: 2024-04-15 | End: 2024-04-25 | Stop reason: HOSPADM

## 2024-04-15 RX ORDER — ONDANSETRON 4 MG/1
4 TABLET, FILM COATED ORAL EVERY 6 HOURS PRN
Status: DISCONTINUED | OUTPATIENT
Start: 2024-04-15 | End: 2024-04-25 | Stop reason: HOSPADM

## 2024-04-15 RX ORDER — ACETAMINOPHEN 325 MG/1
650 TABLET ORAL EVERY 6 HOURS PRN
Status: DISCONTINUED | OUTPATIENT
Start: 2024-04-15 | End: 2024-04-15

## 2024-04-15 RX ORDER — METOPROLOL SUCCINATE 50 MG/1
50 TABLET, EXTENDED RELEASE ORAL DAILY
Status: DISCONTINUED | OUTPATIENT
Start: 2024-04-15 | End: 2024-04-25 | Stop reason: HOSPADM

## 2024-04-15 RX ORDER — MAGNESIUM HYDROXIDE 2400 MG/10ML
10 SUSPENSION ORAL DAILY PRN
Status: DISCONTINUED | OUTPATIENT
Start: 2024-04-15 | End: 2024-04-25 | Stop reason: HOSPADM

## 2024-04-15 RX ORDER — ACETAMINOPHEN 325 MG/1
975 TABLET ORAL EVERY 8 HOURS
Status: DISCONTINUED | OUTPATIENT
Start: 2024-04-15 | End: 2024-04-25 | Stop reason: HOSPADM

## 2024-04-15 RX ORDER — GABAPENTIN 300 MG/1
300 CAPSULE ORAL 3 TIMES DAILY
Status: DISCONTINUED | OUTPATIENT
Start: 2024-04-15 | End: 2024-04-25 | Stop reason: HOSPADM

## 2024-04-15 RX ORDER — DOCUSATE SODIUM 100 MG/1
100 CAPSULE, LIQUID FILLED ORAL 2 TIMES DAILY
Status: DISCONTINUED | OUTPATIENT
Start: 2024-04-15 | End: 2024-04-25 | Stop reason: HOSPADM

## 2024-04-15 RX ORDER — FLUTICASONE PROPIONATE 50 MCG
1 SPRAY, SUSPENSION (ML) NASAL DAILY PRN
Status: DISCONTINUED | OUTPATIENT
Start: 2024-04-15 | End: 2024-04-16

## 2024-04-15 RX ORDER — OXYCODONE HYDROCHLORIDE 5 MG/1
10 TABLET ORAL EVERY 6 HOURS PRN
Status: DISCONTINUED | OUTPATIENT
Start: 2024-04-15 | End: 2024-04-17

## 2024-04-15 RX ORDER — OXYCODONE HYDROCHLORIDE 5 MG/1
5 TABLET ORAL EVERY 4 HOURS PRN
Status: DISCONTINUED | OUTPATIENT
Start: 2024-04-15 | End: 2024-04-25 | Stop reason: HOSPADM

## 2024-04-15 RX ORDER — CEFUROXIME AXETIL 500 MG/1
500 TABLET ORAL 2 TIMES DAILY
Start: 2024-04-15 | End: 2024-04-25 | Stop reason: HOSPADM

## 2024-04-15 RX ORDER — ENOXAPARIN SODIUM 100 MG/ML
40 INJECTION SUBCUTANEOUS EVERY 24 HOURS
Status: DISCONTINUED | OUTPATIENT
Start: 2024-04-15 | End: 2024-04-25 | Stop reason: HOSPADM

## 2024-04-15 RX ADMIN — FINASTERIDE 5 MG: 5 TABLET, FILM COATED ORAL at 20:20

## 2024-04-15 RX ADMIN — GABAPENTIN 300 MG: 300 CAPSULE ORAL at 17:06

## 2024-04-15 RX ADMIN — PIPERACILLIN SODIUM AND TAZOBACTAM SODIUM 3.38 G: 3; .375 INJECTION, SOLUTION INTRAVENOUS at 03:25

## 2024-04-15 RX ADMIN — HEPARIN SODIUM 5000 UNITS: 5000 INJECTION INTRAVENOUS; SUBCUTANEOUS at 10:58

## 2024-04-15 RX ADMIN — PIPERACILLIN SODIUM AND TAZOBACTAM SODIUM 3.38 G: 3; .375 INJECTION, SOLUTION INTRAVENOUS at 10:58

## 2024-04-15 RX ADMIN — TAMSULOSIN HYDROCHLORIDE 0.4 MG: 0.4 CAPSULE ORAL at 20:20

## 2024-04-15 RX ADMIN — ACETAMINOPHEN 975 MG: 325 TABLET ORAL at 23:51

## 2024-04-15 RX ADMIN — GABAPENTIN 300 MG: 300 CAPSULE ORAL at 08:14

## 2024-04-15 RX ADMIN — OXYCODONE HYDROCHLORIDE AND ACETAMINOPHEN 500 MG: 500 TABLET ORAL at 08:12

## 2024-04-15 RX ADMIN — OXYCODONE HYDROCHLORIDE AND ACETAMINOPHEN 500 MG: 500 TABLET ORAL at 20:19

## 2024-04-15 RX ADMIN — DOCUSATE SODIUM 100 MG: 100 CAPSULE, LIQUID FILLED ORAL at 20:19

## 2024-04-15 RX ADMIN — VANCOMYCIN 1.5 G: 1.5 INJECTION, SOLUTION INTRAVENOUS at 00:23

## 2024-04-15 RX ADMIN — AMOXICILLIN AND CLAVULANATE POTASSIUM 1 TABLET: 875; 125 TABLET, FILM COATED ORAL at 20:19

## 2024-04-15 RX ADMIN — OXYCODONE HYDROCHLORIDE 10 MG: 5 TABLET ORAL at 20:19

## 2024-04-15 RX ADMIN — CELECOXIB 200 MG: 100 CAPSULE ORAL at 08:12

## 2024-04-15 RX ADMIN — ACETAMINOPHEN 975 MG: 325 TABLET ORAL at 08:21

## 2024-04-15 RX ADMIN — PREDNISONE 40 MG: 20 TABLET ORAL at 08:17

## 2024-04-15 RX ADMIN — GABAPENTIN 300 MG: 300 CAPSULE ORAL at 20:20

## 2024-04-15 RX ADMIN — OXYCODONE HYDROCHLORIDE 7.5 MG: 5 TABLET ORAL at 05:37

## 2024-04-15 RX ADMIN — HEPARIN SODIUM 5000 UNITS: 5000 INJECTION INTRAVENOUS; SUBCUTANEOUS at 01:44

## 2024-04-15 RX ADMIN — KETOROLAC TROMETHAMINE 15 MG: 15 INJECTION INTRAMUSCULAR; INTRAVENOUS at 10:58

## 2024-04-15 RX ADMIN — PANTOPRAZOLE SODIUM 40 MG: 40 TABLET, DELAYED RELEASE ORAL at 05:36

## 2024-04-15 RX ADMIN — CARISOPRODOL 350 MG: 350 TABLET ORAL at 00:21

## 2024-04-15 RX ADMIN — ASPIRIN 325 MG: 325 TABLET, COATED ORAL at 08:14

## 2024-04-15 RX ADMIN — LORATADINE 10 MG: 10 TABLET ORAL at 08:16

## 2024-04-15 RX ADMIN — ACETAMINOPHEN 975 MG: 325 TABLET ORAL at 17:06

## 2024-04-15 RX ADMIN — LOSARTAN POTASSIUM 50 MG: 50 TABLET, FILM COATED ORAL at 08:16

## 2024-04-15 RX ADMIN — Medication 1 CAPSULE: at 08:15

## 2024-04-15 RX ADMIN — METOPROLOL SUCCINATE 50 MG: 25 TABLET, EXTENDED RELEASE ORAL at 08:16

## 2024-04-15 RX ADMIN — KETOROLAC TROMETHAMINE 15 MG: 15 INJECTION INTRAMUSCULAR; INTRAVENOUS at 03:13

## 2024-04-15 SDOH — SOCIAL STABILITY: SOCIAL INSECURITY: ARE THERE ANY APPARENT SIGNS OF INJURIES/BEHAVIORS THAT COULD BE RELATED TO ABUSE/NEGLECT?: NO

## 2024-04-15 SDOH — SOCIAL STABILITY: SOCIAL INSECURITY: DOES ANYONE TRY TO KEEP YOU FROM HAVING/CONTACTING OTHER FRIENDS OR DOING THINGS OUTSIDE YOUR HOME?: NO

## 2024-04-15 SDOH — SOCIAL STABILITY: SOCIAL INSECURITY: ARE YOU OR HAVE YOU BEEN THREATENED OR ABUSED PHYSICALLY, EMOTIONALLY, OR SEXUALLY BY ANYONE?: NO

## 2024-04-15 SDOH — SOCIAL STABILITY: SOCIAL INSECURITY: HAVE YOU HAD THOUGHTS OF HARMING ANYONE ELSE?: NO

## 2024-04-15 SDOH — SOCIAL STABILITY: SOCIAL INSECURITY: WERE YOU ABLE TO COMPLETE ALL THE BEHAVIORAL HEALTH SCREENINGS?: YES

## 2024-04-15 SDOH — SOCIAL STABILITY: SOCIAL INSECURITY: DO YOU FEEL UNSAFE GOING BACK TO THE PLACE WHERE YOU ARE LIVING?: NO

## 2024-04-15 SDOH — SOCIAL STABILITY: SOCIAL INSECURITY: ABUSE: ADULT

## 2024-04-15 SDOH — SOCIAL STABILITY: SOCIAL INSECURITY: DO YOU FEEL ANYONE HAS EXPLOITED OR TAKEN ADVANTAGE OF YOU FINANCIALLY OR OF YOUR PERSONAL PROPERTY?: NO

## 2024-04-15 SDOH — SOCIAL STABILITY: SOCIAL INSECURITY: HAS ANYONE EVER THREATENED TO HURT YOUR FAMILY OR YOUR PETS?: NO

## 2024-04-15 ASSESSMENT — PAIN DESCRIPTION - ORIENTATION
ORIENTATION: RIGHT

## 2024-04-15 ASSESSMENT — COGNITIVE AND FUNCTIONAL STATUS - GENERAL
STANDING UP FROM CHAIR USING ARMS: A LITTLE
DAILY ACTIVITIY SCORE: 24
DRESSING REGULAR UPPER BODY CLOTHING: A LITTLE
DAILY ACTIVITIY SCORE: 20
MOVING TO AND FROM BED TO CHAIR: A LITTLE
MOVING TO AND FROM BED TO CHAIR: A LITTLE
STANDING UP FROM CHAIR USING ARMS: A LITTLE
WALKING IN HOSPITAL ROOM: A LITTLE
STANDING UP FROM CHAIR USING ARMS: A LITTLE
CLIMB 3 TO 5 STEPS WITH RAILING: A LITTLE
MOVING FROM LYING ON BACK TO SITTING ON SIDE OF FLAT BED WITH BEDRAILS: A LITTLE
MOBILITY SCORE: 20
WALKING IN HOSPITAL ROOM: A LITTLE
MOBILITY SCORE: 20
MOBILITY SCORE: 20
CLIMB 3 TO 5 STEPS WITH RAILING: A LITTLE
WALKING IN HOSPITAL ROOM: A LITTLE
CLIMB 3 TO 5 STEPS WITH RAILING: A LITTLE
DAILY ACTIVITIY SCORE: 24
CLIMB 3 TO 5 STEPS WITH RAILING: A LOT
STANDING UP FROM CHAIR USING ARMS: A LITTLE
WALKING IN HOSPITAL ROOM: A LITTLE
HELP NEEDED FOR BATHING: A LITTLE
MOBILITY SCORE: 17
DRESSING REGULAR LOWER BODY CLOTHING: A LITTLE
TURNING FROM BACK TO SIDE WHILE IN FLAT BAD: A LITTLE
DAILY ACTIVITIY SCORE: 24
MOVING TO AND FROM BED TO CHAIR: A LITTLE
PATIENT BASELINE BEDBOUND: NO
TOILETING: A LITTLE
MOVING TO AND FROM BED TO CHAIR: A LITTLE

## 2024-04-15 ASSESSMENT — ACTIVITIES OF DAILY LIVING (ADL)
TOILETING: INDEPENDENT
LACK_OF_TRANSPORTATION: PATIENT DECLINED
DRESSING YOURSELF: INDEPENDENT
BATHING: INDEPENDENT
WALKS IN HOME: NEEDS ASSISTANCE
FEEDING YOURSELF: INDEPENDENT
PATIENT'S MEMORY ADEQUATE TO SAFELY COMPLETE DAILY ACTIVITIES?: YES
ASSISTIVE_DEVICE: WALKER
ADEQUATE_TO_COMPLETE_ADL: YES
HOME_MANAGEMENT_TIME_ENTRY: 30
HEARING - LEFT EAR: FUNCTIONAL
JUDGMENT_ADEQUATE_SAFELY_COMPLETE_DAILY_ACTIVITIES: YES
GROOMING: INDEPENDENT
HEARING - RIGHT EAR: FUNCTIONAL

## 2024-04-15 ASSESSMENT — PAIN SCALES - GENERAL
PAINLEVEL_OUTOF10: 10 - WORST POSSIBLE PAIN
PAINLEVEL_OUTOF10: 6
PAINLEVEL_OUTOF10: 9
PAINLEVEL_OUTOF10: 10 - WORST POSSIBLE PAIN
PAINLEVEL_OUTOF10: 3
PAINLEVEL_OUTOF10: 10 - WORST POSSIBLE PAIN
PAINLEVEL_OUTOF10: 10 - WORST POSSIBLE PAIN
PAINLEVEL_OUTOF10: 9
PAINLEVEL_OUTOF10: 10 - WORST POSSIBLE PAIN
PAINLEVEL_OUTOF10: 10 - WORST POSSIBLE PAIN

## 2024-04-15 ASSESSMENT — PATIENT HEALTH QUESTIONNAIRE - PHQ9
SUM OF ALL RESPONSES TO PHQ9 QUESTIONS 1 & 2: 0
1. LITTLE INTEREST OR PLEASURE IN DOING THINGS: NOT AT ALL
2. FEELING DOWN, DEPRESSED OR HOPELESS: NOT AT ALL

## 2024-04-15 ASSESSMENT — LIFESTYLE VARIABLES
HOW MANY STANDARD DRINKS CONTAINING ALCOHOL DO YOU HAVE ON A TYPICAL DAY: PATIENT DOES NOT DRINK
HOW OFTEN DO YOU HAVE A DRINK CONTAINING ALCOHOL: NEVER
AUDIT-C TOTAL SCORE: 0
AUDIT-C TOTAL SCORE: 0
PRESCIPTION_ABUSE_PAST_12_MONTHS: NO
HOW OFTEN DO YOU HAVE 6 OR MORE DRINKS ON ONE OCCASION: NEVER
SUBSTANCE_ABUSE_PAST_12_MONTHS: NO
SKIP TO QUESTIONS 9-10: 1

## 2024-04-15 ASSESSMENT — PAIN DESCRIPTION - LOCATION
LOCATION: KNEE
LOCATION: LEG

## 2024-04-15 ASSESSMENT — PAIN - FUNCTIONAL ASSESSMENT
PAIN_FUNCTIONAL_ASSESSMENT: 0-10

## 2024-04-15 ASSESSMENT — COLUMBIA-SUICIDE SEVERITY RATING SCALE - C-SSRS
6. HAVE YOU EVER DONE ANYTHING, STARTED TO DO ANYTHING, OR PREPARED TO DO ANYTHING TO END YOUR LIFE?: NO
1. IN THE PAST MONTH, HAVE YOU WISHED YOU WERE DEAD OR WISHED YOU COULD GO TO SLEEP AND NOT WAKE UP?: NO
2. HAVE YOU ACTUALLY HAD ANY THOUGHTS OF KILLING YOURSELF?: NO

## 2024-04-15 NOTE — PROGRESS NOTES
Physical Therapy    Physical Therapy Treatment    Patient Name: Abraham Rodriguez  MRN: [id number]  Today's Date: 4/15/2024  Time Calculation  Start Time: 1105  Stop Time: 1130  Time Calculation (min): 25 min       Assessment/Plan   PT Assessment  PT Assessment Results: Decreased strength, Decreased range of motion, Decreased endurance, Decreased mobility  Rehab Prognosis: Good  Barriers to Discharge: pain level  Assessment Comment: pt. states he will not be able to manage at home at this time and will end up right back here if he were to go home.  Pt. Cont. To benefit from PT to increase independence prevent further decline.   End of Session Patient Position: Up in chair, Alarm on  PT Plan  Inpatient/Swing Bed or Outpatient: Inpatient  PT Plan  Treatment/Interventions: Transfer training, Gait training, Therapeutic exercise  PT Plan: Skilled PT  PT Frequency: 4 times per week  PT Discharge Recommendations: Moderate intensity level of continued care  PT Recommended Transfer Status: Assist x1  PT - OK to Discharge: Yes      General Visit Information:   PT  Visit  PT Received On: 04/15/24  General  Prior to Session Communication: Bedside nurse  Patient Position Received: Up in chair, Alarm on  General Comment: JEROME hanson foot edema (Pt. has significant concerns returning home.)    Subjective   Precautions:  Precautions  LE Weight Bearing Status: Weight Bearing as Tolerated  Medical Precautions: Fall precautions  Post-Surgical Precautions: Right total knee precautions  Vital Signs:  Vital Signs  Heart Rate: 82  SpO2: 97 %    Objective   Pain:  Pain Assessment  Pain Assessment: 0-10  Pain Score: 10 - Worst possible pain  Pain Location:  (knee)  Pain Orientation: Right  Cognition:  Cognition  Overall Cognitive Status: Within Functional Limits  Postural Control:  Static Standing Balance  Static Standing-Comment/Number of Minutes: good- ; able to maintain balance with no UE support for 3 min with CGA  Dynamic Standing  Balance  Dynamic Standing-Comments: F+; with WW    Activity Tolerance:  Activity Tolerance  Endurance: Tolerates 30 min exercise with multiple rests (pt. cont. to c/o 10/10 pain with ther ex)  Treatments:  Therapeutic Exercise  Therapeutic Exercise Performed: Yes  Therapeutic Exercise Activity 1: B ankle pumps 10x2, LAQ 10x2, hip flexion 10x2, knee flexion 10x2, sit to stand 5x (standing shoulder depression 10x, scap retraction 10x, shoulder rolls A/P 10x (all without UE support))    Ambulation/Gait Training  Ambulation/Gait Training Performed: Yes  Ambulation/Gait Training 1  Device 1: Rolling walker  Assistance 1: Close supervision  Quality of Gait 1: Inconsistent stride length, Antalgic (progressed to swing through gait pattern)  Comments/Distance (ft) 1: 50' x 2       Outcome Measures:  [facility] Basic Mobility  Turning from your back to your side while in a flat bed without using bedrails: A little  Moving from lying on your back to sitting on the side of a flat bed without using bedrails: A little  Moving to and from bed to chair (including a wheelchair): A little  Standing up from a chair using your arms (e.g. wheelchair or bedside chair): A little  To walk in hospital room: A little  Climbing 3-5 steps with railing: A lot  Basic Mobility - Total Score: 17    Education Documentation  Mobility Training, taught by Pearl Juan, PT at 4/15/2024 11:50 AM.  Learner: Patient  Readiness: Acceptance  Method: Explanation  Response: Verbalizes Understanding, Needs Reinforcement  Comment: Educated pt. on HEP    Education Comments  No comments found.        OP EDUCATION:       Encounter Problems       Encounter Problems (Active)       Balance       STG - Maintains dynamic standing balance with upper extremity support >=5 min with >=good- balance  (Progressing)       Start:  04/10/24    Expected End:  04/24/24               Mobility       LTG - Patient will ambulate household distance Brendan with WW (Progressing)        Start:  04/10/24    Expected End:  04/24/24            STG - Patient will ambulate up and down a curb/step IND       Start:  04/10/24    Expected End:  04/24/24               PT Transfers       STG - Patient will perform bed mobility IND       Start:  04/10/24    Expected End:  04/24/24            STG - Patient will transfer sit to and from stand NATHALIE with WW (Progressing)       Start:  04/10/24    Expected End:  04/24/24

## 2024-04-15 NOTE — CARE PLAN
Patient had uneventful shift. VSS. Patient up in chair for meal Used call bell for all patient needs. Tolerating ordered medications this shift. Patient arrived this afternoon so he did not receive therapy this shift. Patient is A&O, complaints of pain 10/10 and only relieved to a 9 with Oxy 10. Patient has bruising to side of right leg and foot, excessive swelling to right leg and foot with some swelling also noted in the left leg and foot.

## 2024-04-15 NOTE — CARE PLAN
Problem: Pain  Goal: My pain/discomfort is manageable  Outcome: Progressing     Problem: Safety  Goal: Patient will be injury free during hospitalization  Outcome: Progressing  Goal: I will remain free of falls  Outcome: Progressing     Problem: Psychosocial Needs  Goal: Demonstrates ability to cope with hospitalization/illness  Outcome: Progressing  Goal: Collaborate with me, my family, and caregiver to identify my specific goals  Outcome: Progressing     Problem: Discharge Barriers  Goal: My discharge needs are met  Outcome: Progressing     Problem: Fall/Injury  Goal: Not fall by end of shift  Outcome: Progressing  Goal: Be free from injury by end of the shift  Outcome: Progressing  Goal: Verbalize understanding of personal risk factors for fall in the hospital  Outcome: Progressing  Goal: Verbalize understanding of risk factor reduction measures to prevent injury from fall in the home  Outcome: Progressing  Goal: Use assistive devices by end of the shift  Outcome: Progressing  Goal: Pace activities to prevent fatigue by end of the shift  Outcome: Progressing     Problem: Pain  Goal: Takes deep breaths with improved pain control throughout the shift  Outcome: Progressing  Goal: Turns in bed with improved pain control throughout the shift  Outcome: Progressing  Goal: Walks with improved pain control throughout the shift  Outcome: Progressing  Goal: Performs ADL's with improved pain control throughout shift  Outcome: Progressing  Goal: Participates in PT with improved pain control throughout the shift  Outcome: Progressing  Goal: Free from opioid side effects throughout the shift  Outcome: Progressing  Goal: Free from acute confusion related to pain meds throughout the shift  Outcome: Progressing     Problem: Skin  Goal: Decreased wound size/increased tissue granulation at next dressing change  Outcome: Progressing  Goal: Participates in plan/prevention/treatment measures  Outcome: Progressing  Goal:  Prevent/manage excess moisture  Outcome: Progressing  Goal: Prevent/minimize sheer/friction injuries  Outcome: Progressing  Goal: Promote/optimize nutrition  Outcome: Progressing  Goal: Promote skin healing  Outcome: Progressing     Problem: Nutrition  Goal: Oral intake greater 75%  Outcome: Progressing  Goal: Consume prescribed supplement  Outcome: Progressing  Goal: Adequate PO fluid intake  Outcome: Progressing  Goal: Lab values WNL  Outcome: Progressing  Goal: Promote healing  Outcome: Progressing  Goal: Reduce weight from edema/fluid  Outcome: Progressing     Problem: Pain - Adult  Goal: Verbalizes/displays adequate comfort level or baseline comfort level  Outcome: Progressing     Problem: Safety - Adult  Goal: Free from fall injury  Outcome: Progressing     Problem: Discharge Planning  Goal: Discharge to home or other facility with appropriate resources  Outcome: Progressing     Problem: Chronic Conditions and Co-morbidities  Goal: Patient's chronic conditions and co-morbidity symptoms are monitored and maintained or improved  Outcome: Progressing   The patient's goals for the shift include pain control    The clinical goals for the shift include Patient will remain free of injuries this shift

## 2024-04-15 NOTE — CARE PLAN
The patient's goals for the shift include pain control    The clinical goals for the shift include Patient will report decreased edema.      Problem: Pain  Goal: My pain/discomfort is manageable  Outcome: Progressing     Problem: Safety  Goal: Patient will be injury free during hospitalization  Outcome: Progressing  Goal: I will remain free of falls  Outcome: Progressing     Problem: Daily Care  Goal: Daily care needs are met  Outcome: Progressing     Problem: Psychosocial Needs  Goal: Demonstrates ability to cope with hospitalization/illness  Outcome: Progressing  Goal: Collaborate with me, my family, and caregiver to identify my specific goals  Outcome: Progressing     Problem: Discharge Barriers  Goal: My discharge needs are met  Outcome: Progressing     Problem: Fall/Injury  Goal: Not fall by end of shift  Outcome: Progressing  Goal: Be free from injury by end of the shift  Outcome: Progressing  Goal: Verbalize understanding of personal risk factors for fall in the hospital  Outcome: Progressing  Goal: Verbalize understanding of risk factor reduction measures to prevent injury from fall in the home  Outcome: Progressing  Goal: Use assistive devices by end of the shift  Outcome: Progressing  Goal: Pace activities to prevent fatigue by end of the shift  Outcome: Progressing     Problem: Pain  Goal: Takes deep breaths with improved pain control throughout the shift  Outcome: Progressing  Goal: Turns in bed with improved pain control throughout the shift  Outcome: Progressing  Goal: Walks with improved pain control throughout the shift  Outcome: Progressing  Goal: Performs ADL's with improved pain control throughout shift  Outcome: Progressing  Goal: Participates in PT with improved pain control throughout the shift  Outcome: Progressing  Goal: Free from opioid side effects throughout the shift  Outcome: Progressing  Goal: Free from acute confusion related to pain meds throughout the shift  Outcome: Progressing      Problem: Skin  Goal: Decreased wound size/increased tissue granulation at next dressing change  Outcome: Progressing  Goal: Participates in plan/prevention/treatment measures  Outcome: Progressing  Goal: Prevent/manage excess moisture  Outcome: Progressing  Goal: Prevent/minimize sheer/friction injuries  Outcome: Progressing  Goal: Promote/optimize nutrition  Outcome: Progressing  Goal: Promote skin healing  Outcome: Progressing     Problem: Nutrition  Goal: Oral intake greater 75%  Outcome: Progressing  Goal: Consume prescribed supplement  Outcome: Progressing  Goal: Adequate PO fluid intake  Outcome: Progressing  Goal: Lab values WNL  Outcome: Progressing  Goal: Promote healing  Outcome: Progressing  Goal: Reduce weight from edema/fluid  Outcome: Progressing     Problem: Pain - Adult  Goal: Verbalizes/displays adequate comfort level or baseline comfort level  Outcome: Progressing     Problem: Safety - Adult  Goal: Free from fall injury  Outcome: Progressing     Problem: Discharge Planning  Goal: Discharge to home or other facility with appropriate resources  Outcome: Progressing     Problem: Chronic Conditions and Co-morbidities  Goal: Patient's chronic conditions and co-morbidity symptoms are monitored and maintained or improved  Outcome: Progressing

## 2024-04-15 NOTE — PROGRESS NOTES
04/15/24 1305   Discharge Planning   Living Arrangements Alone   Support Systems Children   Home or Post Acute Services Post acute facilities (Rehab/SNF/etc)   Type of Post Acute Facility Services Rehab   Patient expects to be discharged to: [facility] Swing Bed   Does the patient need discharge transport arranged? No     [facility] Swing Bed accepted, final orders sent, transport time 4pm today. Patient in agreement. Nurse/Provider aware. Nurse report number [phone]. [facility] update on transport time today.

## 2024-04-15 NOTE — PROGRESS NOTES
Occupational Therapy    Occupational Therapy Treatment    Name: Abraham Rodriguez  MRN: [id number]  : 1940  Date: 04/15/24  Time Calculation  Start Time: 1003  Stop Time: 1033  Time Calculation (min): 30 min    Assessment:  OT Assessment: Good overall participation in therapy session today. Noted heightened pain d/t over working knee this weekend, per report exercises every 15 minutes. Pt. displays decreased edema, though still present. Pt. reports he is still not confiden to return home as he lives alone. Pt. did have rehab before, returned home and was unable to care for himself. Pt. is still at CGA/SBA level and would benefit from continued OT prior to returning home without supports.  Prognosis: Good  Barriers to Discharge: Decreased caregiver support  Evaluation/Treatment Tolerance: Patient tolerated treatment well, Patient limited by pain  Medical Staff Made Aware: Yes  End of Session Communication: Bedside nurse  End of Session Patient Position: Bed, 2 rail up, Alarm on  Plan:  Treatment Interventions: ADL retraining, Functional transfer training, Endurance training, Patient/family training, Equipment evaluation/education, Neuromuscular reeducation, Compensatory technique education  OT Frequency: 3 times per week  OT Discharge Recommendations: Moderate intensity level of continued care  OT Recommended Transfer Status: Stand by assist, Assist of 1  OT - OK to Discharge: Yes    Subjective   Previous Visit Info:  OT Last Visit  OT Received On: 04/15/24  General:  General  Prior to Session Communication: Bedside nurse  Patient Position Received: Up in chair, Alarm off, not on at start of session  General Comment: masimo, tubigrip,  Precautions:  LE Weight Bearing Status: Weight Bearing as Tolerated  Medical Precautions: Fall precautions  Post-Surgical Precautions: Right total knee precautions  Pain Assessment:  Pain Assessment  Pain Assessment: 0-10  Pain Score: 10 - Worst possible pain  Pain Location:  Knee  Pain Orientation: Right  Pain Interventions: Repositioned (RN in and notified of knee pain;)     Objective   Activities of Daily Living: Grooming  Grooming Level of Assistance: Independent  Grooming Where Assessed: Standing sinkside  Grooming Comments: close sup for balance, no UE support noted    UE Bathing  UE Bathing Level of Assistance: Setup, Close supervision  UE Bathing Where Assessed: Standing sinkside  UE Bathing Comments: only washed UB, Difficulty reaching lower back declined assist. Able to stand without UE support. Decreased WB noted on RLE d/t increased pain.    UE Dressing  UE Dressing Level of Assistance: Minimum assistance  UE Dressing Where Assessed: Other (Comment) (standing at sink)  UE Dressing Comments: assistance to tie gown in back    Toileting  Toileting Level of Assistance: Close supervision  Where Assessed: Toilet  Toileting Comments: pericare completed in standing with close supervision; no noted LOB; able to complete clothing managment as well    Functional Standing Tolerance:  Functional Standing Tolerance  Time: 10 minutes  Activity: oral hygiene; UB bathing  Functional Standing Tolerance Comments: reported increased knee pain after task,  Bed Mobility/Transfers:      Transfers  Transfer: Yes  Transfer 1  Transfer From 1: Chair with arms to  Transfer to 1: Toilet  Technique 1: Sit to stand, Stand to sit  Transfer Device 1: Walker  Transfer Level of Assistance 1: Close supervision  Transfers 2  Transfer From 2: Toilet to  Transfer to 2: Chair with arms  Technique 2: Stand to sit, Sit to stand  Transfer Device 2: Walker  Transfer Level of Assistance 2: Close supervision    Toilet Transfers  Toilet Transfer From: Chair  Toilet Transfer Type: To and from  Toilet Transfer to: Raised toilet seat with rails  Toilet Transfer Technique: Ambulating  Toilet Transfers: Supervision    Functional Mobility:  Functional Mobility  Functional Mobility Performed: Yes  Functional Mobility 1  Surface  "1: Level tile  Device 1: Rolling walker  Assistance 1: Contact guard, Close supervision  Comments 1: increased flexed posture today with short step to technique being utilized as well. Pt. reported, \"I over did it this weekend, exercises every 15 mintues and now my pain is through the roof.\" Pt. ambulation not as smooth as previous dates.    Outcome Measures:  [facility] Daily Activity  Putting on and taking off regular lower body clothing: A little  Bathing (including washing, rinsing, drying): A little  Putting on and taking off regular upper body clothing: A little  Toileting, which includes using toilet, bedpan or urinal: A little  Taking care of personal grooming such as brushing teeth: None  Eating Meals: None  Daily Activity - Total Score: 20    Education Documentation  Body Mechanics, taught by Rylee Perales OT at 4/15/2024 10:59 AM.  Learner: Patient  Readiness: Acceptance  Method: Explanation  Response: Verbalizes Understanding  Comment: Edu on POC and DC rec.    Precautions, taught by yRlee Perales OT at 4/15/2024 10:59 AM.  Learner: Patient  Readiness: Acceptance  Method: Explanation  Response: Verbalizes Understanding  Comment: Edu on POC and DC rec.    ADL Training, taught by Rylee Perales OT at 4/15/2024 10:59 AM.  Learner: Patient  Readiness: Acceptance  Method: Explanation  Response: Verbalizes Understanding  Comment: Edu on POC and DC rec.    Education Comments  No comments found.      Goals:  Encounter Problems       Encounter Problems (Active)       ADLs       Patient will perform UB and LB bathing with SBA level of assistance. (Progressing)       Start:  04/10/24    Expected End:  04/24/24            Patient with complete lower body dressing with modified independent level of assistance donning and doffing all LE clothes  with PRN adaptive equipment while edge of bed  (Progressing)       Start:  04/10/24    Expected End:  04/24/24            Patient will complete toileting including hygiene clothing " management/hygiene with modified independent level of assistance. (Progressing)       Start:  04/10/24    Expected End:  04/24/24               BALANCE       Patient will tolerate standing for 5 minutes to modified independent level of assistance with least restrictive device in order to improve functional activity tolerance for ADL tasks. (Progressing)       Start:  04/10/24    Expected End:  04/24/24               MOBILITY       Patient will perform Functional mobility min Household distances/Community Distances with modified independent level of assistance and least restrictive device in order to improve safety and functional mobility. (Progressing)       Start:  04/10/24    Expected End:  04/24/24               TRANSFERS       Patient will perform bed mobility modified independent level of assistance and leg  in order to improve safety and independence with mobility (Progressing)       Start:  04/10/24    Expected End:  04/24/24            Patient will complete sit to stand transfer with modified independent level of assistance and front wheeled walker in order to improve safety and prepare for out of bed mobility. (Progressing)       Start:  04/10/24    Expected End:  04/24/24

## 2024-04-15 NOTE — DISCHARGE SUMMARY
Discharge Diagnosis  Cellulitis    Issues Requiring Follow-Up        Discharge Meds     Your medication list        START taking these medications        Instructions Last Dose Given Next Dose Due   cefuroxime 500 mg tablet  Commonly known as: Ceftin      Take 1 tablet (500 mg) by mouth 2 times a day for 2 days.       lactobacillus acidophilus capsule  Start taking on: April 16, 2024      Take 1 capsule by mouth once daily.              CHANGE how you take these medications        Instructions Last Dose Given Next Dose Due   gabapentin 300 mg capsule  Commonly known as: Neurontin  What changed:   when to take this  reasons to take this      Take 1 capsule (300 mg) by mouth 3 times a day.       oxyCODONE-acetaminophen 7.5-325 mg tablet  Commonly known as: Percocet  What changed: reasons to take this      Take 1 tablet by mouth 3 times a day as needed for severe pain (7 - 10) for up to 3 days.       polyethylene glycol 17 gram packet  Commonly known as: Glycolax, Miralax  What changed:   when to take this  reasons to take this      Take 17 g by mouth 2 times a day.              CONTINUE taking these medications        Instructions Last Dose Given Next Dose Due   ascorbic acid 500 mg tablet  Commonly known as: Vitamin C           aspirin 325 mg EC tablet           bisacodyl 10 mg suppository  Commonly known as: Dulcolax      Insert 1 suppository (10 mg) into the rectum once daily.       carisoprodol 350 mg tablet  Commonly known as: Soma           celecoxib 200 mg capsule  Commonly known as: CeleBREX           Dexilant 60 mg DR capsule  Generic drug: dexlansoprazole           docusate sodium 100 mg capsule  Commonly known as: Colace           finasteride 5 mg tablet  Commonly known as: Proscar           fluticasone 50 mcg/actuation nasal spray  Commonly known as: Flonase           irbesartan 150 mg tablet  Commonly known as: Avapro           loratadine 10 mg tablet  Commonly known as: Claritin           melatonin 5 mg  tablet      Take 1 tablet (5 mg) by mouth once daily at bedtime.       metoprolol succinate XL 50 mg 24 hr tablet  Commonly known as: Toprol-XL           naloxone 4 mg/0.1 mL nasal spray  Commonly known as: Narcan           pantoprazole 40 mg EC tablet  Commonly known as: ProtoNix      Take 1 tablet (40 mg) by mouth once daily in the morning. Take before meals. Do not crush, chew, or split.       sennosides-docusate sodium 8.6-50 mg tablet  Commonly known as: Jesenia-Colace      Take 2 tablets by mouth 2 times a day.       simethicone 80 mg chewable tablet  Commonly known as: Mylicon      Chew 1 tablet (80 mg) 4 times a day as needed for flatulence.       tamsulosin 0.4 mg 24 hr capsule  Commonly known as: Flomax           torsemide 20 mg tablet  Commonly known as: Demadex                  STOP taking these medications      acetaminophen 325 mg tablet  Commonly known as: Tylenol        dicyclomine 10 mg capsule  Commonly known as: Bentyl        dicyclomine 20 mg tablet  Commonly known as: Bentyl        predniSONE 20 mg tablet  Commonly known as: Deltasone                  Where to Get Your Medications        You can get these medications from any pharmacy    Bring a paper prescription for each of these medications  oxyCODONE-acetaminophen 7.5-325 mg tablet       Information about where to get these medications is not yet available    Ask your nurse or doctor about these medications  cefuroxime 500 mg tablet  gabapentin 300 mg capsule  lactobacillus acidophilus capsule         Test Results Pending At Discharge  Pending Labs       No current pending labs.            Hospital Course     Abraham Rodriguez is a 83 y.o. male presenting with right knee pain. Patient had knee replacement at [facility] on 3/20/24.  Patient has since had leg edema and pain which has gradually worsened.  Patient currently resting in bed, reports 10/10 sharp stinging pain with any movement.  Patient does report edema has lessened with elevation but  just a slight improvement.  Patient educated on treatment plan, states understanding and agrees.      Cellulitis RLE (improving)   Arthritis, multiple joint involvement  Right knee pain  S/p right knee replacement  - No leukocytosis since admission  - ESR 2  - CRP 4.81  - BCx negative - final report at 4 days   - US RLE: No evidence for DVT within the right lower extremity. No significant change from prior ultrasound.  - CT right knee as above  - continue celecoxib, gabapentin   - continue prednisone (day 4/5)  - continue acetaminophen 975 mg scheduled  - continue oxycodone prn severe pain- increased to 7.5 mg q6h PRN on 4/13 (home dose)   - continue toradol prn for moderate pain- Cr remains stable   - Colace BID for prevention of constipation on opiates   - continue Zosyn 3.375 IV Q 6 (Day 7)  - continue Vancomycin IV pharmacy to dose (Day 6)  - Ortho consulted; outpatient PT and follow up   - PT/OT evaluation recommending moderate level therapy  - Daily BMP to monitor renal function  - Discharge to [facility] 4/15     Hypokalemia (resolved)   - K 3.4, repleted per protocol  - K today 3.9   - Daily BMP      Allergic rhinitis  - continue Flonase, loratadine  - remains on room air     Benign essential hypertension  Dyslipidemia  Chronic Diastolic Heart Failure  - continue aspirin, losartan, metoprolol succinate  - Torsemide PRN for edema- home med  - No evidence of volume overload on exam  - cardiac diet   - monitor BP and HR     Benign prostatic hyperplasia  - continue finasteride, tamsulosin  - bladder scan PRN to monitor for urinary retention     Anemia, normocytic  - H/H stable  - Daily CBC     PUD ppx  - continue pantoprazole      DVT ppx  - continue heparin SQ     Code Status: Full Code    Disposition: Patient stable for discharge to [facility]. He is discharged on cefuroxime x 2 days for a total of 1 week of antibiotic therapy. He will follow up with his PCP on discharge from swing [facility].      Total cumulative time spent in preparation of this discharge including documentation review, coordination of care with the medical team including PT/SW/care coordinators and treating consultants, discussion with patient and pertinent family members and finalization of prescriptions, follow-up appointments, and this discharge summary was approximately 45 minutes.      Pertinent Physical Exam At Time of Discharge  Physical Exam  Constitutional:       General: He is not in acute distress.     Appearance: He is not toxic-appearing.   HENT:      Head: Normocephalic and atraumatic.      Mouth/Throat:      Mouth: Mucous membranes are moist.   Eyes:      Conjunctiva/sclera: Conjunctivae normal.   Cardiovascular:      Rate and Rhythm: Normal rate and regular rhythm.   Pulmonary:      Effort: No respiratory distress.      Breath sounds: Normal breath sounds.   Abdominal:      General: There is no distension.      Palpations: Abdomen is soft.      Tenderness: There is no abdominal tenderness.   Musculoskeletal:         General: Swelling (right knee) and tenderness (right calf and knee) present.      Right lower leg: Edema present.      Left lower leg: No edema.   Skin:     General: Skin is warm and dry.      Comments: Compression dressing in place on RLE  Healing surgical incision over right knee, no drainage or wound dehiscence    Neurological:      Mental Status: He is alert and oriented to person, place, and time.   Psychiatric:         Mood and Affect: Mood normal.         Behavior: Behavior normal.         Outpatient Follow-Up  Future Appointments   Date Time Provider Department [place]   4/22/2024 11:45 AM Macho Elizabeth MD [email or web address] [facility]   4/24/2024  3:00 PM Angella Lima APRN-CNP [id number] [facility]   6/12/2024 11:20 AM SHAD Roque-CNP [id number] [facility]   11/22/2024 10:00 AM Giselle Johnson APRN-CNP [id number] [facility]         Maame Moon PA-C

## 2024-04-16 ENCOUNTER — APPOINTMENT (OUTPATIENT)
Dept: PRIMARY CARE | Facility: CLINIC | Age: 84
End: 2024-04-16
Payer: MEDICARE

## 2024-04-16 LAB
ALBUMIN SERPL BCP-MCNC: 3.1 G/DL (ref 3.4–5)
ANION GAP SERPL CALC-SCNC: 9 MMOL/L (ref 10–20)
BASOPHILS # BLD AUTO: 0.01 X10*3/UL (ref 0–0.1)
BASOPHILS NFR BLD AUTO: 0.1 %
BUN SERPL-MCNC: 25 MG/DL (ref 6–23)
CALCIUM SERPL-MCNC: 8.5 MG/DL (ref 8.6–10.3)
CHLORIDE SERPL-SCNC: 108 MMOL/L (ref 98–107)
CK SERPL-CCNC: 14 U/L (ref 0–325)
CO2 SERPL-SCNC: 26 MMOL/L (ref 21–32)
CREAT SERPL-MCNC: 0.85 MG/DL (ref 0.5–1.3)
CRP SERPL-MCNC: 0.3 MG/DL
EGFRCR SERPLBLD CKD-EPI 2021: 86 ML/MIN/1.73M*2
EOSINOPHIL # BLD AUTO: 0.05 X10*3/UL (ref 0–0.4)
EOSINOPHIL NFR BLD AUTO: 0.6 %
ERYTHROCYTE [DISTWIDTH] IN BLOOD BY AUTOMATED COUNT: 14.3 % (ref 11.5–14.5)
ERYTHROCYTE [SEDIMENTATION RATE] IN BLOOD BY WESTERGREN METHOD: 3 MM/H (ref 0–20)
GLUCOSE SERPL-MCNC: 89 MG/DL (ref 74–99)
HCT VFR BLD AUTO: 31 % (ref 41–52)
HGB BLD-MCNC: 10.2 G/DL (ref 13.5–17.5)
HOLD SPECIMEN: NORMAL
IMM GRANULOCYTES # BLD AUTO: 0.07 X10*3/UL (ref 0–0.5)
IMM GRANULOCYTES NFR BLD AUTO: 0.9 % (ref 0–0.9)
LYMPHOCYTES # BLD AUTO: 2.01 X10*3/UL (ref 0.8–3)
LYMPHOCYTES NFR BLD AUTO: 24.6 %
MAGNESIUM SERPL-MCNC: 2.03 MG/DL (ref 1.6–2.4)
MCH RBC QN AUTO: 30.8 PG (ref 26–34)
MCHC RBC AUTO-ENTMCNC: 32.9 G/DL (ref 32–36)
MCV RBC AUTO: 94 FL (ref 80–100)
MONOCYTES # BLD AUTO: 0.61 X10*3/UL (ref 0.05–0.8)
MONOCYTES NFR BLD AUTO: 7.5 %
NEUTROPHILS # BLD AUTO: 5.42 X10*3/UL (ref 1.6–5.5)
NEUTROPHILS NFR BLD AUTO: 66.3 %
NRBC BLD-RTO: 0 /100 WBCS (ref 0–0)
PHOSPHATE SERPL-MCNC: 3.4 MG/DL (ref 2.5–4.9)
PLATELET # BLD AUTO: 182 X10*3/UL (ref 150–450)
POTASSIUM SERPL-SCNC: 3.6 MMOL/L (ref 3.5–5.3)
RBC # BLD AUTO: 3.31 X10*6/UL (ref 4.5–5.9)
SODIUM SERPL-SCNC: 139 MMOL/L (ref 136–145)
WBC # BLD AUTO: 8.2 X10*3/UL (ref 4.4–11.3)

## 2024-04-16 PROCEDURE — 83735 ASSAY OF MAGNESIUM: CPT | Performed by: STUDENT IN AN ORGANIZED HEALTH CARE EDUCATION/TRAINING PROGRAM

## 2024-04-16 PROCEDURE — 1090000001 HH PPS REVENUE CREDIT

## 2024-04-16 PROCEDURE — 2500000004 HC RX 250 GENERAL PHARMACY W/ HCPCS (ALT 636 FOR OP/ED): Performed by: NURSE PRACTITIONER

## 2024-04-16 PROCEDURE — 97165 OT EVAL LOW COMPLEX 30 MIN: CPT | Mod: GO

## 2024-04-16 PROCEDURE — 97161 PT EVAL LOW COMPLEX 20 MIN: CPT | Mod: GP

## 2024-04-16 PROCEDURE — 2500000001 HC RX 250 WO HCPCS SELF ADMINISTERED DRUGS (ALT 637 FOR MEDICARE OP): Performed by: NURSE PRACTITIONER

## 2024-04-16 PROCEDURE — 85025 COMPLETE CBC W/AUTO DIFF WBC: CPT | Performed by: STUDENT IN AN ORGANIZED HEALTH CARE EDUCATION/TRAINING PROGRAM

## 2024-04-16 PROCEDURE — 97116 GAIT TRAINING THERAPY: CPT | Mod: GP

## 2024-04-16 PROCEDURE — 85652 RBC SED RATE AUTOMATED: CPT | Performed by: STUDENT IN AN ORGANIZED HEALTH CARE EDUCATION/TRAINING PROGRAM

## 2024-04-16 PROCEDURE — 1090000002 HH PPS REVENUE DEBIT

## 2024-04-16 PROCEDURE — 82550 ASSAY OF CK (CPK): CPT | Performed by: STUDENT IN AN ORGANIZED HEALTH CARE EDUCATION/TRAINING PROGRAM

## 2024-04-16 PROCEDURE — 97535 SELF CARE MNGMENT TRAINING: CPT | Mod: GO

## 2024-04-16 PROCEDURE — 1900000001 HC SKILLED SWING ROOM

## 2024-04-16 PROCEDURE — 2500000004 HC RX 250 GENERAL PHARMACY W/ HCPCS (ALT 636 FOR OP/ED): Performed by: STUDENT IN AN ORGANIZED HEALTH CARE EDUCATION/TRAINING PROGRAM

## 2024-04-16 PROCEDURE — 86140 C-REACTIVE PROTEIN: CPT | Performed by: STUDENT IN AN ORGANIZED HEALTH CARE EDUCATION/TRAINING PROGRAM

## 2024-04-16 PROCEDURE — 82947 ASSAY GLUCOSE BLOOD QUANT: CPT | Performed by: STUDENT IN AN ORGANIZED HEALTH CARE EDUCATION/TRAINING PROGRAM

## 2024-04-16 PROCEDURE — 2500000006 HC RX 250 W HCPCS SELF ADMINISTERED DRUGS (ALT 637 FOR ALL PAYERS): Performed by: NURSE PRACTITIONER

## 2024-04-16 PROCEDURE — 94760 N-INVAS EAR/PLS OXIMETRY 1: CPT

## 2024-04-16 PROCEDURE — 36415 COLL VENOUS BLD VENIPUNCTURE: CPT | Performed by: STUDENT IN AN ORGANIZED HEALTH CARE EDUCATION/TRAINING PROGRAM

## 2024-04-16 PROCEDURE — 2500000001 HC RX 250 WO HCPCS SELF ADMINISTERED DRUGS (ALT 637 FOR MEDICARE OP): Performed by: STUDENT IN AN ORGANIZED HEALTH CARE EDUCATION/TRAINING PROGRAM

## 2024-04-16 PROCEDURE — 97530 THERAPEUTIC ACTIVITIES: CPT | Mod: GP

## 2024-04-16 PROCEDURE — 2500000006 HC RX 250 W HCPCS SELF ADMINISTERED DRUGS (ALT 637 FOR ALL PAYERS): Mod: MUE | Performed by: STUDENT IN AN ORGANIZED HEALTH CARE EDUCATION/TRAINING PROGRAM

## 2024-04-16 PROCEDURE — 97110 THERAPEUTIC EXERCISES: CPT | Mod: GP

## 2024-04-16 PROCEDURE — 87040 BLOOD CULTURE FOR BACTERIA: CPT | Mod: CONLAB | Performed by: STUDENT IN AN ORGANIZED HEALTH CARE EDUCATION/TRAINING PROGRAM

## 2024-04-16 PROCEDURE — 2500000002 HC RX 250 W HCPCS SELF ADMINISTERED DRUGS (ALT 637 FOR MEDICARE OP, ALT 636 FOR OP/ED): Performed by: STUDENT IN AN ORGANIZED HEALTH CARE EDUCATION/TRAINING PROGRAM

## 2024-04-16 RX ORDER — ASPIRIN 81 MG/1
81 TABLET ORAL DAILY
Status: DISCONTINUED | OUTPATIENT
Start: 2024-04-16 | End: 2024-04-25 | Stop reason: HOSPADM

## 2024-04-16 RX ORDER — FUROSEMIDE 20 MG/1
20 TABLET ORAL ONCE
Status: DISCONTINUED | OUTPATIENT
Start: 2024-04-16 | End: 2024-04-19

## 2024-04-16 RX ORDER — POTASSIUM CHLORIDE 20 MEQ/1
20 TABLET, EXTENDED RELEASE ORAL ONCE
Status: COMPLETED | OUTPATIENT
Start: 2024-04-16 | End: 2024-04-16

## 2024-04-16 RX ORDER — LORATADINE 10 MG/1
10 TABLET ORAL DAILY
Status: DISCONTINUED | OUTPATIENT
Start: 2024-04-16 | End: 2024-04-25 | Stop reason: HOSPADM

## 2024-04-16 RX ORDER — FLUTICASONE PROPIONATE 50 MCG
1 SPRAY, SUSPENSION (ML) NASAL 2 TIMES DAILY
Status: DISCONTINUED | OUTPATIENT
Start: 2024-04-16 | End: 2024-04-25 | Stop reason: HOSPADM

## 2024-04-16 RX ADMIN — GABAPENTIN 300 MG: 300 CAPSULE ORAL at 20:03

## 2024-04-16 RX ADMIN — DOXYCYCLINE HYCLATE 100 MG: 100 TABLET, COATED ORAL at 20:04

## 2024-04-16 RX ADMIN — AMOXICILLIN AND CLAVULANATE POTASSIUM 1 TABLET: 875; 125 TABLET, FILM COATED ORAL at 09:18

## 2024-04-16 RX ADMIN — OXYCODONE HYDROCHLORIDE 10 MG: 5 TABLET ORAL at 09:18

## 2024-04-16 RX ADMIN — LOSARTAN POTASSIUM 50 MG: 50 TABLET, FILM COATED ORAL at 09:18

## 2024-04-16 RX ADMIN — OXYCODONE HYDROCHLORIDE AND ACETAMINOPHEN 500 MG: 500 TABLET ORAL at 20:03

## 2024-04-16 RX ADMIN — POTASSIUM CHLORIDE 20 MEQ: 1500 TABLET, EXTENDED RELEASE ORAL at 15:12

## 2024-04-16 RX ADMIN — GABAPENTIN 300 MG: 300 CAPSULE ORAL at 09:18

## 2024-04-16 RX ADMIN — FLUTICASONE PROPIONATE 1 SPRAY: 50 SPRAY, METERED NASAL at 13:40

## 2024-04-16 RX ADMIN — SIMETHICONE 80 MG: 80 TABLET, CHEWABLE ORAL at 00:05

## 2024-04-16 RX ADMIN — Medication 1 SPRAY: at 20:04

## 2024-04-16 RX ADMIN — DOXYCYCLINE HYCLATE 100 MG: 100 TABLET, COATED ORAL at 09:20

## 2024-04-16 RX ADMIN — TAMSULOSIN HYDROCHLORIDE 0.4 MG: 0.4 CAPSULE ORAL at 20:03

## 2024-04-16 RX ADMIN — ACETAMINOPHEN 975 MG: 325 TABLET ORAL at 23:46

## 2024-04-16 RX ADMIN — ENOXAPARIN SODIUM 40 MG: 100 INJECTION SUBCUTANEOUS at 17:01

## 2024-04-16 RX ADMIN — OXYCODONE HYDROCHLORIDE AND ACETAMINOPHEN 500 MG: 500 TABLET ORAL at 09:20

## 2024-04-16 RX ADMIN — PANTOPRAZOLE SODIUM 40 MG: 40 TABLET, DELAYED RELEASE ORAL at 06:26

## 2024-04-16 RX ADMIN — ACETAMINOPHEN 975 MG: 325 TABLET ORAL at 09:18

## 2024-04-16 RX ADMIN — ACETAMINOPHEN 975 MG: 325 TABLET ORAL at 17:01

## 2024-04-16 RX ADMIN — METOPROLOL SUCCINATE 50 MG: 50 TABLET, EXTENDED RELEASE ORAL at 09:18

## 2024-04-16 RX ADMIN — LORATADINE 10 MG: 10 TABLET ORAL at 13:40

## 2024-04-16 RX ADMIN — OXYCODONE HYDROCHLORIDE 10 MG: 5 TABLET ORAL at 16:11

## 2024-04-16 RX ADMIN — DOCUSATE SODIUM 100 MG: 100 CAPSULE, LIQUID FILLED ORAL at 09:18

## 2024-04-16 RX ADMIN — DOCUSATE SODIUM 100 MG: 100 CAPSULE, LIQUID FILLED ORAL at 20:03

## 2024-04-16 RX ADMIN — ASPIRIN 81 MG: 81 TABLET, COATED ORAL at 15:12

## 2024-04-16 RX ADMIN — AMOXICILLIN AND CLAVULANATE POTASSIUM 1 TABLET: 875; 125 TABLET, FILM COATED ORAL at 20:03

## 2024-04-16 RX ADMIN — FINASTERIDE 5 MG: 5 TABLET, FILM COATED ORAL at 20:03

## 2024-04-16 ASSESSMENT — PAIN - FUNCTIONAL ASSESSMENT
PAIN_FUNCTIONAL_ASSESSMENT: 0-10

## 2024-04-16 ASSESSMENT — COGNITIVE AND FUNCTIONAL STATUS - GENERAL
WALKING IN HOSPITAL ROOM: A LITTLE
STANDING UP FROM CHAIR USING ARMS: A LITTLE
CLIMB 3 TO 5 STEPS WITH RAILING: A LITTLE
MOBILITY SCORE: 20
STANDING UP FROM CHAIR USING ARMS: A LITTLE
STANDING UP FROM CHAIR USING ARMS: A LITTLE
MOVING TO AND FROM BED TO CHAIR: A LITTLE
CLIMB 3 TO 5 STEPS WITH RAILING: A LITTLE
DAILY ACTIVITIY SCORE: 24
MOBILITY SCORE: 20
TOILETING: A LITTLE
CLIMB 3 TO 5 STEPS WITH RAILING: A LITTLE
MOVING TO AND FROM BED TO CHAIR: A LITTLE
WALKING IN HOSPITAL ROOM: A LITTLE
MOBILITY SCORE: 20
WALKING IN HOSPITAL ROOM: A LITTLE
WALKING IN HOSPITAL ROOM: A LITTLE
MOBILITY SCORE: 20
STANDING UP FROM CHAIR USING ARMS: A LITTLE
MOVING TO AND FROM BED TO CHAIR: A LITTLE
DAILY ACTIVITIY SCORE: 21
MOVING TO AND FROM BED TO CHAIR: A LITTLE
TOILETING: A LITTLE
HELP NEEDED FOR BATHING: A LITTLE
DAILY ACTIVITIY SCORE: 23
CLIMB 3 TO 5 STEPS WITH RAILING: A LITTLE
DRESSING REGULAR LOWER BODY CLOTHING: A LITTLE

## 2024-04-16 ASSESSMENT — PAIN SCALES - GENERAL
PAINLEVEL_OUTOF10: 10 - WORST POSSIBLE PAIN
PAINLEVEL_OUTOF10: 9
PAINLEVEL_OUTOF10: 9
PAINLEVEL_OUTOF10: 10 - WORST POSSIBLE PAIN

## 2024-04-16 ASSESSMENT — ACTIVITIES OF DAILY LIVING (ADL)
ADL_ASSISTANCE: INDEPENDENT
HOME_MANAGEMENT_TIME_ENTRY: 26
BATHING_LEVEL_OF_ASSISTANCE: SETUP;DISTANT SUPERVISION
BATHING_WHERE_ASSESSED: SHOWER
BATHING_ASSISTANCE: STAND BY

## 2024-04-16 ASSESSMENT — PAIN DESCRIPTION - DESCRIPTORS: DESCRIPTORS: ACHING

## 2024-04-16 NOTE — PROGRESS NOTES
04/16/24 1538   Discharge Planning   Living Arrangements Alone   Support Systems Children   Assistance Needed mod I using walker   Type of Residence Private residence   Home or Post Acute Services In home services   Type of Home Care Services Home health aide;Home nursing visits;Home OT;Home PT   Patient expects to be discharged to: home with outgoing HH services   Patient Choice   Provider Choice list and CMS website (https://medicare.gov/care-compare#search) for post-acute Quality and Resource Measure Data were provided and reviewed with: Patient     Swing Bed Social Assessment:    Pt arrived from: [facility]  Inpatient [id number] dates: 4/11-4/15/24  Diagnosis: rt LE cellulitis  Skilled servcies needed:PT/OT  Follow-up appointments needed: ortho    Patient lives with: alone  Prior level of function:Mod I    DME available: walker  Support people/agencies: dtrs  Weekly Swing Bed Meeing kathy be on:   Thursday    at 11:30  Patient requests that following person/people to be given updates and/or attend meeting: dtr    Patient is expected to reach home safety goals by: 4/30  Expected needs at discharge: home health for PT/OT/RN/aide services    Met with pt and dtr in room. She will be here for the meeting on Thursday. Pt was given a list of HH agencies for preference. JONG Barnett

## 2024-04-16 NOTE — CARE PLAN
The patient's goals for the shift include      The clinical goals for the shift include patient will participate in therapy throughout swing program    Over the shift, the patient did participate in therapy this shift. Patient c/o pain a 10/10. Patient has been pleasant and cooperative. Call light in reach.

## 2024-04-16 NOTE — PROGRESS NOTES
Occupational Therapy                 Therapy Communication Note    Patient Name: Abraham Rodriguez  MRN: [id number]  Today's Date: 4/16/2024     Discipline: Occupational Therapy    Missed Visit Reason: Missed Visit Reason: Other (Comment) (Patient eating breakfast at this time requesting to participate in therapy following.)    Missed Time: Attempt    Attempt time: 0808

## 2024-04-16 NOTE — PROGRESS NOTES
Physical Therapy    Physical Therapy Treatment    Patient Name: Abraham Rodriguez  MRN: [id number]  Today's Date: 4/16/2024  Time Calculation  Start Time: 1454  Stop Time: 1524  Time Calculation (min): 30 min       Assessment/Plan   PT Assessment  PT Assessment Results: Decreased strength, Decreased range of motion, Decreased endurance, Impaired balance, Decreased mobility, Pain  Rehab Prognosis: Excellent  Barriers to Discharge: pain level  Evaluation/Treatment Tolerance: Patient tolerated treatment well  Medical Staff Made Aware: Yes  Strengths: Access to adaptive/assistive products, Capable of completing ADLs semi/independent, Coping skills, Housing layout, Premorbid level of function, Support of extended family/friends  Barriers to Participation: Comorbidities  End of Session Communication: Bedside nurse, PCT/NA/CTA  Assessment Comment: Pt pleasant throughout session, though easily distracted and needing re-directing. Pt expressing concerns about what materials were used in his joint replacements and about going home too soon. Pt edu on POC and comforted about prognosis.  End of Session Patient Position: Up in chair, Alarm on (w STNA)     PT Plan  Treatment/Interventions: Bed mobility, Transfer training, Gait training, Stair training, Balance training, Neuromuscular re-education, Strengthening, Endurance training, Range of motion, Therapeutic exercise, Therapeutic activity  PT Plan: Skilled PT  PT Frequency: 6 times per week  PT Discharge Recommendations: Low intensity level of continued care  PT Recommended Transfer Status: Stand by assist  PT - OK to Discharge: Yes      General Visit Information:   PT  Visit  PT Received On: 04/16/24  Response to Previous Treatment: Patient with no complaints from previous session.  General  Reason for Referral: impaired mobility and gait difficulty  Referred By: Dayo Cash DO  Past Medical History Relevant to Rehab:  (PMH: hay fever, arthritis, clotting d/o, COPD, CAD,  malignant neoplasm of prostate, HLD, HTN ,MI, carcinoma of the tongue, bell's palsy)  Prior to Session Communication: Bedside nurse, PCT/NA/CTA  Patient Position Received: Up in chair, Alarm on  Preferred Learning Style: auditory, verbal, visual  General Comment: Pt pleasant and agreeable throughout session. Pt very talkative.    Subjective   Precautions:  Precautions  LE Weight Bearing Status: Weight Bearing as Tolerated  Medical Precautions: Fall precautions  Post-Surgical Precautions: Right total knee precautions    Objective   Pain:  Pain Assessment  Pain Assessment: 0-10  Pain Score: 10 - Worst possible pain  Cognition:  Cognition  Overall Cognitive Status: Within Functional Limits    Activity Tolerance:  Activity Tolerance  Endurance: Tolerates 30 min exercise with multiple rests  Treatments:  Therapeutic Exercise  Therapeutic Exercise Activity 1: 2x10 fang heel raises  Therapeutic Exercise Activity 2: 3x30 sec knee flexion on stairs    Ambulation/Gait Training 1  Surface 1: Level tile  Device 1: Rolling walker  Gait Support Devices: Gait belt  Assistance 1: Close supervision  Quality of Gait 1: Antalgic, Soft knee(s), Diminished heel strike, Forward flexed posture  Comments/Distance (ft) 1: 2x200 ft pt room to therapy gym and back. slow bruce, forward flexed.  Transfers  Transfer: Yes  Transfer 1  Transfer From 1: Chair with arms to  Transfer to 1: Stand  Technique 1: Sit to stand, Stand to sit  Transfer Device 1: Walker  Transfer Level of Assistance 1: Distant supervision  Trials/Comments 1: mult trials, pt extending R knee d/t pain and limited flexion  Transfers 2  Transfer From 2: Mat to  Transfer to 2: Stand  Technique 2: Sit to stand, Stand to sit  Transfer Device 2: Walker  Transfer Level of Assistance 2: Distant supervision  Trials/Comments 2: same technique as above    Stairs  Stairs: Yes  Stairs  Rails 1: Bilateral  Device 1: Railing  Support Devices 1: Gait belt  Assistance 1: Contact  guard  Comment/Number of Steps 1: 1x4 steps, ascending forward leading w non surgical side, descending forward leading w surgical side. needing vcs for sequencing    Outcome Measures:  [facility] Basic Mobility  Turning from your back to your side while in a flat bed without using bedrails: None  Moving from lying on your back to sitting on the side of a flat bed without using bedrails: None  Moving to and from bed to chair (including a wheelchair): A little  Standing up from a chair using your arms (e.g. wheelchair or bedside chair): A little  To walk in hospital room: A little  Climbing 3-5 steps with railing: A little  Basic Mobility - Total Score: 20    Education Documentation  Mobility Training, taught by Patricia Smith, PT at 4/16/2024 12:40 PM.  Learner: Patient  Readiness: Acceptance  Method: Explanation  Response: Verbalizes Understanding, Demonstrated Understanding, Needs Reinforcement  Comment: pt edu on safety and importance of mobility, role of PT and POC    Education Comments  No comments found.        Encounter Problems       Encounter Problems (Active)       PT Problem       pt will be able to ambulate community distances with LRAD and modified independent  (Progressing)       Start:  04/16/24    Expected End:  05/07/24            Pt will be able to perform STS transfer from all surfaces with independence and without extending RLE  (Progressing)       Start:  04/16/24    Expected End:  05/07/24            Pt will be able to navigate 4 steps with hand rail on one side(s) and modified independent to allow for safe DC.   (Progressing)       Start:  04/16/24    Expected End:  05/07/24            Pt will improve R knee flexion to >100 degrees (Progressing)       Start:  04/16/24    Expected End:  05/07/24            Pt will be able to perform all aspects of bed mobility with independent        Start:  04/16/24    Expected End:  05/07/24            pt will report pain level </= to 5/10       Start:  04/16/24     Expected End:  05/07/24               Pain - Adult

## 2024-04-16 NOTE — H&P
History of Present Illness  Abraham Rodriguez is a 83 y.o. male  with PMHx significant for allergic rhinitis, arthritis, chronic pain, COPD, asbestosis of the lung, DVT 2020, CAD, MI, HFpEF prostate CA, HLD, HTN, edema,  who presented to [facility] after having a total right knee replacement with complications 3/20/24 in which he had developed a hematoma of the RLE and cellulitis of the right knee. He with admitted to the swing unit for continued physical therapy before returning to his home where he lives alone. He will continue antibiotic therapy with augmentin and doxycycline for a total of ten days from initial start of antibiotic therapy.     12 Point ROS negative unless noted in above HPI     Past Medical History  Past Medical History:   Diagnosis Date    Allergic rhinitis due to pollen 10/23/2014    Hay fever    Arthritis     Chronic pain disorder     Clotting disorder (Multi)     dvt april 2020     P.E. april 2020    COPD (chronic obstructive pulmonary disease) (Multi)     hx asbestos    Coronary artery disease     Encounter for other preprocedural examination 06/24/2014    Pre-procedural examination    Encounter for screening for malignant neoplasm of prostate     Encounter for screening for malignant neoplasm of prostate    Hyperlipidemia     Hypertension     Localized edema 05/11/2020    Pedal edema    Myocardial infarction (Multi)     Other conditions influencing health status     Carcinoma Of The Tongue    Pain in unspecified knee 12/22/2014    Joint pain, knee    Personal history of diseases of the skin and subcutaneous tissue 04/23/2013    History of eczema    Personal history of other diseases of the musculoskeletal system and connective tissue 06/19/2014    Personal history of arthritis    Personal history of other diseases of the nervous system and sense organs 08/10/2021    History of Bell's palsy    Personal history of other diseases of the respiratory system 11/12/2014    History of asbestosis     "Personal history of other diseases of the respiratory system 08/13/2014    History of chronic obstructive lung disease    Personal history of other specified conditions 08/20/2013    History of edema    Plantar fascial fibromatosis 07/22/2013    Plantar fasciitis    Polyp of colon     Polyp of sigmoid colon    Syncope and collapse 01/23/2015    Syncope and collapse    Unspecified abdominal pain 12/22/2014    Stomach pain    Unspecified disorder of eyelid 10/23/2014    Eyelid disorder       Surgical History  Past Surgical History:   Procedure Laterality Date    CARDIAC CATHETERIZATION      CHOLECYSTECTOMY  04/23/2013    Cholecystectomy Laparoscopic    OTHER SURGICAL HISTORY  04/27/2021    Meniscus repair    OTHER SURGICAL HISTORY Left 05/31/2023    Hip replacement    OTHER SURGICAL HISTORY  04/23/2013    Biopsy Tongue    SHOULDER SURGERY  04/23/2013    Shoulder Surgery    TOTAL KNEE ARTHROPLASTY Right 03/27/2024        Social History  He reports that he quit smoking about 41 years ago. His smoking use included cigarettes. He has never used smokeless tobacco. He reports that he does not currently use drugs after having used the following drugs: Marijuana. He reports that he does not drink alcohol.    Allergies  Iodinated contrast media, Tobramycin-dexamethasone, Cinnamon, Cucumber, Lipitor [atorvastatin], and Lisinopril     Physical Exam     Last Recorded Vitals  Blood pressure 172/78, pulse 70, temperature 36.3 °C (97.4 °F), temperature source Temporal, resp. rate 18, height 1.803 m (5' 11\"), weight 92 kg (202 lb 13.2 oz), SpO2 97%.    Relevant Results  Scheduled medications  acetaminophen, 975 mg, oral, q8h  amoxicillin-pot clavulanate, 1 tablet, oral, q12h GUERO  ascorbic acid, 500 mg, oral, BID  docusate sodium, 100 mg, oral, BID  doxycycline, 100 mg, oral, q12h GUERO  enoxaparin, 40 mg, subcutaneous, q24h  finasteride, 5 mg, oral, q PM  gabapentin, 300 mg, oral, TID  loratadine, 10 mg, oral, Daily  losartan, 50 mg, " oral, Daily  metoprolol succinate XL, 50 mg, oral, Daily  pantoprazole, 40 mg, oral, Daily before breakfast  polyethylene glycol, 17 g, oral, Daily  tamsulosin, 0.4 mg, oral, q PM      Continuous medications     PRN medications  PRN medications: fluticasone, magnesium hydroxide, ondansetron, oxyCODONE, oxyCODONE, simethicone     Results for orders placed or performed during the hospital encounter of 04/15/24 (from the past 24 hour(s))   Creatine Kinase   Result Value Ref Range    Creatine Kinase 14 0 - 325 U/L   Lavender Top   Result Value Ref Range    Extra Tube Hold for add-ons.    Magnesium   Result Value Ref Range    Magnesium 2.03 1.60 - 2.40 mg/dL   Renal Function Panel   Result Value Ref Range    Glucose 89 74 - 99 mg/dL    Sodium 139 136 - 145 mmol/L    Potassium 3.6 3.5 - 5.3 mmol/L    Chloride 108 (H) 98 - 107 mmol/L    Bicarbonate 26 21 - 32 mmol/L    Anion Gap 9 (L) 10 - 20 mmol/L    Urea Nitrogen 25 (H) 6 - 23 mg/dL    Creatinine 0.85 0.50 - 1.30 mg/dL    eGFR 86 >60 mL/min/1.73m*2    Calcium 8.5 (L) 8.6 - 10.3 mg/dL    Phosphorus 3.4 2.5 - 4.9 mg/dL    Albumin 3.1 (L) 3.4 - 5.0 g/dL   C-reactive protein   Result Value Ref Range    C-Reactive Protein 0.30 <1.00 mg/dL   Sedimentation rate, automated   Result Value Ref Range    Sedimentation Rate 3 0 - 20 mm/h   CBC and Auto Differential   Result Value Ref Range    WBC 8.2 4.4 - 11.3 x10*3/uL    nRBC 0.0 0.0 - 0.0 /100 WBCs    RBC 3.31 (L) 4.50 - 5.90 x10*6/uL    Hemoglobin 10.2 (L) 13.5 - 17.5 g/dL    Hematocrit 31.0 (L) 41.0 - 52.0 %    MCV 94 80 - 100 fL    MCH 30.8 26.0 - 34.0 pg    MCHC 32.9 32.0 - 36.0 g/dL    RDW 14.3 11.5 - 14.5 %    Platelets 182 150 - 450 x10*3/uL    Neutrophils % 66.3 40.0 - 80.0 %    Immature Granulocytes %, Automated 0.9 0.0 - 0.9 %    Lymphocytes % 24.6 13.0 - 44.0 %    Monocytes % 7.5 2.0 - 10.0 %    Eosinophils % 0.6 0.0 - 6.0 %    Basophils % 0.1 0.0 - 2.0 %    Neutrophils Absolute 5.42 1.60 - 5.50 x10*3/uL    Immature  Granulocytes Absolute, Automated 0.07 0.00 - 0.50 x10*3/uL    Lymphocytes Absolute 2.01 0.80 - 3.00 x10*3/uL    Monocytes Absolute 0.61 0.05 - 0.80 x10*3/uL    Eosinophils Absolute 0.05 0.00 - 0.40 x10*3/uL    Basophils Absolute 0.01 0.00 - 0.10 x10*3/uL        Imaging  CT knee right wo IV contrast    Result Date: 4/16/2024  Interpreted By:  Jaswinder Colunga, STUDY: CT KNEE RIGHT WO IV CONTRAST;  4/15/2024 10:48 pm   INDICATION: Signs/Symptoms:concern for septic right knee s/p total knee replacement.   COMPARISON: 3/27/2024   ACCESSION NUMBER(S): [id number]   ORDERING CLINICIAN: VIOLETA BOWDEN   TECHNIQUE: Contiguous axial images of the knee were obtained without intravenous contrast. Coronal and sagittal reformatted images were obtained from the axial images.   FINDINGS: There is postsurgical change of right knee arthroplasty resulting in beam hardening artifact and limiting evaluation. No definite evidence of acute displaced periprosthetic knee fracture. There is stable appearance of previously described 16 mm x 9 mm calcific/ossific density in the posterolateral soft tissues adjacent to the knee. There is also stable calcific density superior to the patella. Small to moderate suprapatellar knee effusion is again noted.       Postsurgical change of right knee arthroplasty resulting in beam hardening artifact and limiting evaluation. No definite evidence of acute displaced periprosthetic knee fracture.   Suprapatellar knee effusion is again noted; clinical correlation recommended if there is concern for infection given the clinical history.   MACRO: None   Signed by: Jaswinder Colunga 4/16/2024 2:14 AM Dictation workstation:   [id number]       Assessment/Plan   #Cellulitis  ~Augmentin 875 BID 5 days  ~doxycycline 100 mg BID five days  ~has gross edema of the BLE right worse than left.   ~incision is without drainage and well approximated.   ~hematoma RLE post surgical      #Arthritis, multiple joint involvement     #Osteoarthritis    #Joint pain  ~continue gabapentin  ~oxycodone for pain     Active Problems:    #Allergic rhinitis  -continue Flonase BID  -continue Claritin 10 mg PO Daily       #Benign essential hypertension    #Dyslipidemia    #(HFpEF) heart failure with preserved ejection fraction (CMS/HCC)  -continue  mg PO BID  -continue Cozaar 50 mg PO Daily   -continue Toprol 50 mg PO Daily        #Benign prostatic hyperplasia  -continue Proscar 5 mg PO HS  -continue Flomax 0.4 mg PO HS        DVT Prophylaxis: Lovenox  Fluids: N/A  Nutrition: Cardiac    GI: protonix  Code Status: Full Code     Pt requires inpatient stay at this time.     Disposition: Admitted to [facility] for intense physical therapy and continuation of antibiotic therapy.       ARCHANA Tovar  Internal Medicine    Patient was seen in collaboration with the YA, Jade MAGAÑA.  I was present for the pertinent components of the history, physical, and medical decision making and independently examined the patient.  We reviewed the medication reconciliation list and ancillary studies, including lab, imaging, and microbiology, as well as discussed the differential diagnoses; which includes, but is not limited to Principal Problem:    Status post total right knee replacement  .      I agree with her plan as documented in the electronic medical record.    Ramu De Anda MD

## 2024-04-16 NOTE — PROGRESS NOTES
Physical Therapy    Physical Therapy Evaluation    Patient Name: Abraham Rodriguez  MRN: [id number]  Today's Date: 4/16/2024   Time Calculation  Start Time: 1132  Stop Time: 1155  Time Calculation (min): 23 min    Assessment/Plan   PT Assessment  PT Assessment Results: Decreased strength, Decreased range of motion, Decreased endurance, Impaired balance, Decreased mobility, Pain  Rehab Prognosis: Excellent  Barriers to Discharge: pain level  Evaluation/Treatment Tolerance: Patient tolerated treatment well  Medical Staff Made Aware: Yes  Strengths: Access to adaptive/assistive products, Capable of completing ADLs semi/independent, Coping skills, Housing layout, Premorbid level of function, Support of extended family/friends  Barriers to Participation: Comorbidities  End of Session Communication: Bedside nurse, PCT/NA/CTA  Assessment Comment: Pt is an 82 y/o s/p R TKA 3/20 and had 1 week rehab stay in [place]. Pt now presenting for swing bed eval following hospitalization for cellulitis. Pt demonstrating deficits as stated above and requires skilled PT intervention to address these deficits and allow for safe DC home. Pt was easily distacted during session, and stated mult times that he did not think he would be safe to return home.  End of Session Patient Position: Up in chair, Alarm on  IP OR SWING BED PT PLAN  Inpatient or Swing Bed: Swing Bed  PT Plan  Treatment/Interventions: Bed mobility, Transfer training, Gait training, Stair training, Balance training, Neuromuscular re-education, Strengthening, Endurance training, Range of motion, Therapeutic exercise, Therapeutic activity  PT Plan: Skilled PT  PT Frequency: 6 times per week  PT Discharge Recommendations: Low intensity level of continued care  PT Recommended Transfer Status: Stand by assist  PT - OK to Discharge: Yes Based on completed evaluation and care plan recommendations, no barriers to discharge to next site of care       Subjective   General Visit  Information:  General  Reason for Referral: impaired mobility and gait difficulty  Referred By: Dayo Cash DO  Past Medical History Relevant to Rehab:  (PMH: hay fever, arthritis, clotting d/o, COPD, CAD, malignant neoplasm of prostate, HLD, HTN ,MI, carcinoma of the tongue, bell's palsy)  Prior to Session Communication: Bedside nurse, PCT/NA/CTA  Patient Position Received: Up in chair, Alarm on  Preferred Learning Style: auditory, verbal, visual  General Comment: Pt pleasant and agreeable throughout session. Pt eager to share story of his rehab so far.  Home Living:  Home Living  Type of Home: House  Lives With: Alone (States he has a cat)  Home Adaptive Equipment: Cane, Walker rolling or standard, Crutches (rollator)  Home Layout: One level, Able to live on main level with bedroom/bathroom  Home Access: Level entry (Reports small threshold into house, but no full step.)  Bathroom Shower/Tub: Tub/shower unit  Bathroom Toilet: Standard  Bathroom Equipment: Grab bars in shower, Shower chair with back, Tub transfer bench  Bathroom Accessibility: patient reports he uses the tub and sink for STS transfer from toilet  Prior Level of Function:  Prior Function Per Pt/Caregiver Report  Level of [place]: Independent with ADLs and functional transfers, Independent with homemaking with ambulation (dtr lives near by and assists PRn)  Hand Dominance: Right  Precautions:  Precautions  LE Weight Bearing Status: Weight Bearing as Tolerated (CA)  Medical Precautions: Fall precautions  Post-Surgical Precautions: Right total knee precautions      Objective   Pain:  Pain Assessment  Pain Assessment: 0-10  Pain Score: 10 - Worst possible pain (Pt very talkative and not showing any signs of severe pain.)  Cognition:  Cognition  Overall Cognitive Status: Within Functional Limits    General Assessments:     Activity Tolerance  Endurance: Tolerates 30 min exercise with multiple rests    Sensation  Sensation Comment: denies  deficits    Coordination  Movements are Fluid and Coordinated: Yes      Dynamic Standing Balance  Dynamic Standing-Comments: Pt able to stand w/o holding onto FWW mult times to make hand gestures while speaking. Pt also able to reach outside BRICE w/o UE support to adjust chair and move pillows to make it more comfortable while standing. Distant supervision.  Functional Assessments:     Transfers  Transfer: Yes  Transfer 1  Transfer From 1: Chair with arms to  Transfer to 1: Stand  Technique 1: Sit to stand, Stand to sit  Transfer Device 1: Walker  Transfer Level of Assistance 1: Distant supervision  Trials/Comments 1: Good hand placement. Pt extending R leg when sitting d/t painful knee flexion.    Ambulation/Gait Training 1  Surface 1: Level tile  Device 1: Rolling walker  Gait Support Devices: Gait belt  Assistance 1: Close supervision  Quality of Gait 1: Antalgic, Soft knee(s), Diminished heel strike, Forward flexed posture  Comments/Distance (ft) 1: 1x50 ft. Pt stating that he tries to walk heel toe. Decreased heel strike fang but L>R. Very forward flexed and antalgic on R side. Intermittent pauses d/t pt being distracted by objects in the hallway.  Extremity/Trunk Assessments:  RUE   RUE : Within Functional Limits  LUE   LUE: Within Functional Limits  RLE   RLE :  (knee ROM 0-90. Functionally strong, pain w knee flex/ext. Tight hamstring and calf musculature.)  LLE   LLE :  (MMT WNL. Tight hamstring and calf musculature.)  Outcome Measures:  [facility] Basic Mobility  Turning from your back to your side while in a flat bed without using bedrails: None  Moving from lying on your back to sitting on the side of a flat bed without using bedrails: None  Moving to and from bed to chair (including a wheelchair): A little  Standing up from a chair using your arms (e.g. wheelchair or bedside chair): A little  To walk in hospital room: A little  Climbing 3-5 steps with railing: A little  Basic Mobility - Total Score:  20    Encounter Problems       Encounter Problems (Active)       PT Problem       pt will be able to ambulate community distances with LRAD and modified independent        Start:  04/16/24    Expected End:  05/07/24            Pt will be able to perform STS transfer from all surfaces with independence and without extending RLE        Start:  04/16/24    Expected End:  05/07/24            Pt will be able to navigate 4 steps with hand rail on one side(s) and modified independent to allow for safe DC.         Start:  04/16/24    Expected End:  05/07/24            Pt will improve R knee flexion to >100 degrees       Start:  04/16/24    Expected End:  05/07/24            Pt will be able to perform all aspects of bed mobility with independent        Start:  04/16/24    Expected End:  05/07/24            pt will report pain level </= to 5/10       Start:  04/16/24    Expected End:  05/07/24               Pain - Adult              Education Documentation  Mobility Training, taught by Patricia Smith, PT at 4/16/2024 12:40 PM.  Learner: Patient  Readiness: Acceptance  Method: Explanation  Response: Verbalizes Understanding, Demonstrated Understanding, Needs Reinforcement  Comment: pt edu on safety and importance of mobility, role of PT and POC    Education Comments  No comments found.

## 2024-04-16 NOTE — CARE PLAN
Swing Bed Program  Patient Profile & Activities Assessment        Precautions:  Fall precautions    Cognition:  Within Functional Limits    Preferred Learning Style:  auditory, verbal, visual    Ethnic/Cultural Interests:   Patient reports NA.     Clubs or Organizations:   Patient reports he is active with the [employer] and the [facility].    Previous/Current Interests, Abilities & Hobbies:  Watching TV/movies/sports (enjoys looking at and working on hot rods)    Explained: Discussed with patient availability of books, books on tape, games, movies, videos, social visits, pet therapy visits. Coloring, crossword puzzle/word search/sodoku books also available. Can request walk or stroll with staff.    Activity Plan:  Patient will be invited to participate in all appropriate activities.    Goal: Patient will participate in activities daily for socialization, intellectual stimulation, and spiritual needs according to patient's preferences.    Sasha Bueno, OT

## 2024-04-16 NOTE — PROGRESS NOTES
Occupational Therapy    Evaluation/Treatment    Patient Name: Abraham Rodriguez  MRN: [id number]  : 1940  Today's Date: 24  Time Calculation  Start Time: 824  Stop Time: 905  Time Calculation (min): 41 min     Assessment:  OT Assessment: Pt is a 82 y/o male presenting for a skilled OT evaluation following admission to the hospital for deconditioing following cellulitis and s/p R TKR. Pt displayed decreased endurance and safety with ADLs and transfers. Pt would benefit from OT at the low intensity to increase safety and return to PLOF.  End of Session Communication: Bedside nurse, PCT/NA/CTA  End of Session Patient Position:  (finishing in bathroom s/p shower with STNA)  OT Assessment Results: Decreased ADL status, Decreased safe judgment during ADL, Decreased endurance, Decreased functional mobility  Prognosis: Excellent  Barriers to Discharge: None  Evaluation/Treatment Tolerance: Patient tolerated treatment well  Strengths: Ability to acquire knowledge, Attitude of self, Housing layout, Insight into problems, Support of extended family/friends  Barriers to Participation: Comorbidities    Plan:  Treatment Interventions: ADL retraining, Functional transfer training, Endurance training, Neuromuscular reeducation  OT Frequency: 5 times per week  OT Discharge Recommendations: Low intensity level of continued care  OT Recommended Transfer Status: Assist of 1  OT - OK to Discharge: Yes  Based on completed evaluation and care plan recommendations, no barriers to discharge to next site of care.  Treatment Interventions: ADL retraining, Functional transfer training, Endurance training, Neuromuscular reeducation    Subjective   Current Problem:  Status post total right knee replacement; deconditioning    General:   OT Received On: 24  General  Reason for Referral: ADLs/safety following previous admission for cellulitis  Referred By: Dayo Cash DO  Past Medical History Relevant to Rehab: Pt admitted to  "[facility] for cellulitis. Pt s/p R TKR 3/20 by Dr. Arora and had 1 week rehab stay in [place]. Pt now presenting for swing bed rehab. (PMH: hay fever, arthritis, clotting d/o, COPD, CAD, malignant neoplasm of prostate, HLD, HTN ,MI, carcinoma of the tongue, bell's palsy)  Missed Visit: Yes  Missed Visit Reason: Other (Comment) (Patient eating breakfast at this time requesting to participate in therapy following.)  Prior to Session Communication: Bedside nurse, PCT/NA/CTA  Patient Position Received:  (ambulating into the abthroom with STNA present)  Preferred Learning Style: auditory, verbal, visual  General Comment: Pt pleseant and cooperative throughout session. Pt left with all needs in reach following session.    Precautions:  LE Weight Bearing Status: Weight Bearing as Tolerated (UT)  Medical Precautions: Fall precautions  Post-Surgical Precautions: Right total knee precautions    Pain:  Pain Assessment  Pain Assessment: 0-10  Pain Score: 10 - Worst possible pain (patient consistantly rating knee pain at 10/10 reporting, \"If I could get my pain to an 8, that would be just great!\". patient showing no clinical signs of pain during session and only reporting pain when asked. Nursing aware.)    Objective   Cognition:  Overall Cognitive Status: Within Functional Limits    Home Living:  Type of Home:  (cabin)  Lives With: Alone  Home Adaptive Equipment: Cane, Walker rolling or standard, Crutches (rollator)  Home Layout: One level, Able to live on main level with bedroom/bathroom  Home Access: Level entry  Bathroom Shower/Tub: Tub/shower unit  Bathroom Toilet: Standard  Bathroom Equipment: Grab bars in shower, Shower chair with back, Tub transfer bench  Bathroom Accessibility: patient reports he uses the tub and vanity to asist into standing PRN.    Prior Function:  Level of Badger: Independent with ADLs and functional transfers, Independent with homemaking with ambulation (dtr lives near by and assists " PRn)  Receives Help From: Family  ADL Assistance: Independent  Homemaking Assistance: Independent  Ambulatory Assistance: Independent  Vocational: Retired  Leisure: enjoys hot rods and watching TV  Hand Dominance: Right    IADL History:  Homemaking Responsibilities: Yes  Meal Prep Responsibility: Primary  Laundry Responsibility: Primary  Cleaning Responsibility: Primary  Bill Paying/Finance Responsibility: Primary  Shopping Responsibility: Primary  Current License: Yes (has not driven since sx)    ADL:  Eating Assistance: Independent  Grooming Assistance: Stand by  Grooming Deficit: Supervision/safety, Standing with assistive device  Bathing Assistance: Stand by  Bathing Deficit: Supervision/safety, Setup, Increased time to complete   UE Dressing Assistance: Independent  LE Dressing Assistance: Minimal  LE Dressing Deficit: Increased time to complete, Thread RLE into pants, Thread LLE into pants, Steadying, Requires assistive device for steadying  Toileting Assistance with Device: Stand by  Toileting Deficit: Increased time to complete, Supervison/safety    Activities of Daily Living:   Grooming  Grooming Level of Assistance: Independent  Grooming Where Assessed: Standing sinkside    UE Bathing  UE Bathing Level of Assistance: Setup, Distant supervision  UE Bathing Where Assessed: Shower (standing)    LE Bathing  LE Bathing Level of Assistance: Setup, Distant supervision  LE Bathing Where Assessed: Shower (standing/sitting)    UE Dressing  UE Dressing Level of Assistance: Minimum assistance  UE Dressing Where Assessed:  (standing in front of shower)  UE Dressing Comments: assist for ties on gown    LE Dressing  Pants Level of Assistance: Close supervision (doffing only)  Sock Level of Assistance: Close supervision (doffing only)  Adult Briefs Level of Assistance: Close supervision (doffing only)    Toileting  Toileting Level of Assistance: Distant supervision  Where Assessed: Toilet    Activity  Tolerance:  Endurance: Tolerates 30 min exercise with multiple rests    Bed Mobility/Transfers:   Transfers  Transfer: Yes  Transfer 1  Transfer From 1: Chair with arms to  Transfer to 1: Stand  Technique 1: Sit to stand  Transfer Device 1: Walker  Transfer Level of Assistance 1: Distant supervision  Trials/Comments 1: good safety throughout  Transfers 2  Transfer From 2: Toilet to  Transfer to 2: Stand  Technique 2: Sit to stand, Stand to sit  Transfer Device 2: Walker  Transfer Level of Assistance 2: Distant supervision  Toilet Transfers  Toilet Transfer From: Chair  Toilet Transfer Type: To and from  Toilet Transfer to: Raised toilet seat with rails  Toilet Transfer Technique: Ambulating  Toilet Transfers: Supervision  Shower Transfers  Shower Transfer From: Recliner  Shower Transfer Type: To  Shower Transfer to: Standing  Shower Transfer Technique: Ambulating  Shower Transfers: Contact guard    Functional Mobility:  Functional Mobility  Functional Mobility Performed: Yes  Functional Mobility 1  Surface 1: Level tile  Device 1: Rolling walker  Assistance 1: Close supervision  Comments 1: displayed good safety with walker within room during ADLs    Sitting Balance:  Static Sitting Balance  Static Sitting-Balance Support: Feet supported, Bilateral upper extremity supported  Static Sitting-Level of Assistance: Independent  Dynamic Sitting Balance  Dynamic Sitting-Balance Support: Feet supported, No upper extremity supported  Dynamic Sitting-Balance: Forward lean  Dynamic Sitting-Comments: distant supervision; completing dressing activities.    Standing Balance:  Static Standing Balance  Static Standing-Balance Support: Bilateral upper extremity supported  Static Standing-Level of Assistance: Distant supervision  Dynamic Standing Balance  Dynamic Standing-Balance Support: No upper extremity supported  Dynamic Standing-Balance:  (lower body dressing)  Dynamic Standing-Comments: close supervision    Vision:  Vision -  Basic Assessment  Current Vision: Wears glasses all the time    Sensation:  Sensation Comment: denies deficits    Coordination:  Movements are Fluid and Coordinated: Yes     Hand Function:  Hand Function  Gross Grasp: Functional  Coordination: Functional    Extremities:  RUE   RUE : Within Functional Limits and LUE   LUE: Within Functional Limits    Outcome Measures:   [facility] Daily Activity  Putting on and taking off regular lower body clothing: A little  Bathing (including washing, rinsing, drying): A little  Putting on and taking off regular upper body clothing: None  Toileting, which includes using toilet, bedpan or urinal: A little  Taking care of personal grooming such as brushing teeth: None  Eating Meals: None  Daily Activity - Total Score: 21    Education Documentation  ADL Training, taught by Sasha Mckeon OT at 4/16/2024 10:56 AM.  Learner: Patient  Readiness: Acceptance  Method: Explanation  Response: Verbalizes Understanding  Comment: use of call bell for needs; safety with transfers/ADLs    Education Comments  No comments found.      Goals:  Encounter Problems       Encounter Problems (Active)       ADLs       Patient will perform UB and LB bathing with modified independent level of assistance and grab bars and shower chair.       Start:  04/16/24    Expected End:  04/23/24            Patient with complete lower body dressing with modified independent level of assistance donning and doffing all LE clothes  with PRN adaptive equipment while supported sitting and standing.       Start:  04/16/24    Expected End:  04/23/24            Patient will complete toileting including hygiene clothing management/hygiene with modified independent level of assistance.       Start:  04/16/24    Expected End:  04/23/24               BALANCE       Pt will maintain dynamic standing balance during ADL task with modified independent level of assistance in order to demonstrate decreased risk of falling and improved  postural control.       Start:  04/16/24    Expected End:  04/23/24               MOBILITY       Patient will perform Functional mobility mod  Household distances/Community Distances with modified independent level of assistance and front wheeled walker in order to improve safety and functional mobility.       Start:  04/16/24    Expected End:  04/23/24               TRANSFERS       Patient will complete sit to stand transfer from all surfaces including chair, car, and tub transfer bench with modified independent level of assistance and front wheeled walker in order to improve safety and prepare for safe mobility.       Start:  04/16/24    Expected End:  04/23/24

## 2024-04-17 PROCEDURE — 2500000001 HC RX 250 WO HCPCS SELF ADMINISTERED DRUGS (ALT 637 FOR MEDICARE OP): Performed by: STUDENT IN AN ORGANIZED HEALTH CARE EDUCATION/TRAINING PROGRAM

## 2024-04-17 PROCEDURE — 97112 NEUROMUSCULAR REEDUCATION: CPT | Mod: GO

## 2024-04-17 PROCEDURE — 97110 THERAPEUTIC EXERCISES: CPT | Mod: GP

## 2024-04-17 PROCEDURE — 1090000002 HH PPS REVENUE DEBIT

## 2024-04-17 PROCEDURE — 2500000001 HC RX 250 WO HCPCS SELF ADMINISTERED DRUGS (ALT 637 FOR MEDICARE OP): Performed by: NURSE PRACTITIONER

## 2024-04-17 PROCEDURE — 1900000001 HC SKILLED SWING ROOM

## 2024-04-17 PROCEDURE — 97535 SELF CARE MNGMENT TRAINING: CPT | Mod: GO

## 2024-04-17 PROCEDURE — 94760 N-INVAS EAR/PLS OXIMETRY 1: CPT | Mod: MUE

## 2024-04-17 PROCEDURE — 2500000004 HC RX 250 GENERAL PHARMACY W/ HCPCS (ALT 636 FOR OP/ED): Performed by: STUDENT IN AN ORGANIZED HEALTH CARE EDUCATION/TRAINING PROGRAM

## 2024-04-17 PROCEDURE — 1090000001 HH PPS REVENUE CREDIT

## 2024-04-17 PROCEDURE — 2500000006 HC RX 250 W HCPCS SELF ADMINISTERED DRUGS (ALT 637 FOR ALL PAYERS): Performed by: STUDENT IN AN ORGANIZED HEALTH CARE EDUCATION/TRAINING PROGRAM

## 2024-04-17 PROCEDURE — 2500000004 HC RX 250 GENERAL PHARMACY W/ HCPCS (ALT 636 FOR OP/ED): Performed by: NURSE PRACTITIONER

## 2024-04-17 PROCEDURE — 97116 GAIT TRAINING THERAPY: CPT | Mod: GP

## 2024-04-17 RX ORDER — OXYCODONE HYDROCHLORIDE 5 MG/1
5 TABLET ORAL
Status: DISCONTINUED | OUTPATIENT
Start: 2024-04-17 | End: 2024-04-25 | Stop reason: HOSPADM

## 2024-04-17 RX ADMIN — OXYCODONE HYDROCHLORIDE AND ACETAMINOPHEN 500 MG: 500 TABLET ORAL at 20:53

## 2024-04-17 RX ADMIN — PANTOPRAZOLE SODIUM 40 MG: 40 TABLET, DELAYED RELEASE ORAL at 06:08

## 2024-04-17 RX ADMIN — AMOXICILLIN AND CLAVULANATE POTASSIUM 1 TABLET: 875; 125 TABLET, FILM COATED ORAL at 09:01

## 2024-04-17 RX ADMIN — OXYCODONE HYDROCHLORIDE 5 MG: 5 TABLET ORAL at 20:53

## 2024-04-17 RX ADMIN — ACETAMINOPHEN 975 MG: 325 TABLET ORAL at 16:16

## 2024-04-17 RX ADMIN — GABAPENTIN 300 MG: 300 CAPSULE ORAL at 20:53

## 2024-04-17 RX ADMIN — TAMSULOSIN HYDROCHLORIDE 0.4 MG: 0.4 CAPSULE ORAL at 20:53

## 2024-04-17 RX ADMIN — GABAPENTIN 300 MG: 300 CAPSULE ORAL at 15:26

## 2024-04-17 RX ADMIN — LORATADINE 10 MG: 10 TABLET ORAL at 09:01

## 2024-04-17 RX ADMIN — ASPIRIN 81 MG: 81 TABLET, COATED ORAL at 09:01

## 2024-04-17 RX ADMIN — OXYCODONE HYDROCHLORIDE AND ACETAMINOPHEN 500 MG: 500 TABLET ORAL at 09:02

## 2024-04-17 RX ADMIN — OXYCODONE HYDROCHLORIDE 10 MG: 5 TABLET ORAL at 10:02

## 2024-04-17 RX ADMIN — GABAPENTIN 300 MG: 300 CAPSULE ORAL at 09:01

## 2024-04-17 RX ADMIN — DOXYCYCLINE HYCLATE 100 MG: 100 TABLET, COATED ORAL at 20:53

## 2024-04-17 RX ADMIN — AMOXICILLIN AND CLAVULANATE POTASSIUM 1 TABLET: 875; 125 TABLET, FILM COATED ORAL at 20:53

## 2024-04-17 RX ADMIN — OXYCODONE HYDROCHLORIDE 5 MG: 5 TABLET ORAL at 12:00

## 2024-04-17 RX ADMIN — ACETAMINOPHEN 975 MG: 325 TABLET ORAL at 09:01

## 2024-04-17 RX ADMIN — Medication 1 SPRAY: at 09:02

## 2024-04-17 RX ADMIN — ENOXAPARIN SODIUM 40 MG: 100 INJECTION SUBCUTANEOUS at 18:08

## 2024-04-17 RX ADMIN — FINASTERIDE 5 MG: 5 TABLET, FILM COATED ORAL at 20:53

## 2024-04-17 RX ADMIN — METOPROLOL SUCCINATE 50 MG: 50 TABLET, EXTENDED RELEASE ORAL at 09:01

## 2024-04-17 RX ADMIN — SIMETHICONE 80 MG: 80 TABLET, CHEWABLE ORAL at 09:03

## 2024-04-17 RX ADMIN — LOSARTAN POTASSIUM 50 MG: 50 TABLET, FILM COATED ORAL at 09:01

## 2024-04-17 RX ADMIN — OXYCODONE HYDROCHLORIDE 5 MG: 5 TABLET ORAL at 16:15

## 2024-04-17 RX ADMIN — DOXYCYCLINE HYCLATE 100 MG: 100 TABLET, COATED ORAL at 09:01

## 2024-04-17 RX ADMIN — Medication 1 SPRAY: at 20:53

## 2024-04-17 ASSESSMENT — COGNITIVE AND FUNCTIONAL STATUS - GENERAL
MOVING TO AND FROM BED TO CHAIR: A LITTLE
DRESSING REGULAR LOWER BODY CLOTHING: A LITTLE
TOILETING: A LITTLE
WALKING IN HOSPITAL ROOM: A LITTLE
HELP NEEDED FOR BATHING: A LITTLE
CLIMB 3 TO 5 STEPS WITH RAILING: A LITTLE
MOBILITY SCORE: 23
MOBILITY SCORE: 20
DAILY ACTIVITIY SCORE: 21
STANDING UP FROM CHAIR USING ARMS: A LITTLE
TOILETING: A LITTLE
DAILY ACTIVITIY SCORE: 23
CLIMB 3 TO 5 STEPS WITH RAILING: A LITTLE

## 2024-04-17 ASSESSMENT — PAIN SCALES - GENERAL
PAINLEVEL_OUTOF10: 7
PAINLEVEL_OUTOF10: 0 - NO PAIN
PAINLEVEL_OUTOF10: 7
PAINLEVEL_OUTOF10: 10 - WORST POSSIBLE PAIN
PAINLEVEL_OUTOF10: 5 - MODERATE PAIN
PAINLEVEL_OUTOF10: 10 - WORST POSSIBLE PAIN

## 2024-04-17 ASSESSMENT — PAIN - FUNCTIONAL ASSESSMENT
PAIN_FUNCTIONAL_ASSESSMENT: 0-10

## 2024-04-17 ASSESSMENT — ACTIVITIES OF DAILY LIVING (ADL): HOME_MANAGEMENT_TIME_ENTRY: 20

## 2024-04-17 ASSESSMENT — PAIN DESCRIPTION - ORIENTATION
ORIENTATION: RIGHT
ORIENTATION: RIGHT

## 2024-04-17 ASSESSMENT — PAIN DESCRIPTION - LOCATION: LOCATION: KNEE

## 2024-04-17 NOTE — PROGRESS NOTES
Physical Therapy    Physical Therapy Treatment    Patient Name: Abraham Rodriguez  MRN: [id number]  Today's Date: 4/17/2024  Time Calculation  Start Time: 1350  Stop Time: 1430  Time Calculation (min): 40 min       Assessment/Plan   PT Assessment  Assessment Comment: Making excellent progress in functional mobility. R TKR ex progressing (4 weeks post op).     PT Plan  Treatment/Interventions: Bed mobility, Transfer training, Gait training, Stair training, Balance training, Neuromuscular re-education, Strengthening, Endurance training, Range of motion, Therapeutic exercise, Therapeutic activity  PT Plan: Skilled PT  PT Frequency: 6 times per week  PT Discharge Recommendations: Low intensity level of continued care  PT Recommended Transfer Status: Stand by assist  PT - OK to Discharge: Yes      General Visit Information:   PT  Visit  PT Received On: 04/17/24  Response to Previous Treatment: Patient with no complaints from previous session.  General  Prior to Session Communication: Bedside nurse  Patient Position Received: Up in chair, Alarm on  General Comment: No complaints today. Knee not bothering him much.    Subjective   Precautions:  Precautions  Post-Surgical Precautions: Right total knee precautions    Objective   Pain:  Pain Assessment  Pain Assessment: 0-10  Pain Score: 0 - No pain (no sponateous knee pain with activity.)     Postural Control:  Static Sitting Balance  Static Sitting-Level of Assistance: Independent  Static Standing Balance  Static Standing-Level of Assistance: Independent  Dynamic Standing Balance  Dynamic Standing-Comments: I wuth rolling walker    Extremity/Trunk Assessments:  5-116 R knee flexion    Activity Tolerance:   Improving nicely.    Treatments:  Therapeutic Exercise  Therapeutic Exercise Performed: Yes  Therapeutic Exercise Activity 1: TKE 3x10  Therapeutic Exercise Activity 2: QS/SLR combo 3x5  Therapeutic Exercise Activity 3: HS 3x10  Therapeutic Exercise Activity 4: PT assisted  stretching into flexion (116)  Therapeutic Exercise Activity 5: LAQ 3x10  Therapeutic Exercise Activity 6: step up with stool and CGA (4 inch) 1x10       Bed Mobility 1  Bed Mobility 1: Supine to sitting, Sitting to supine  Level of Assistance 1: Modified independent    Ambulation/Gait Training  Ambulation/Gait Training Performed: Yes  Ambulation/Gait Training 1  Device 1: Rolling walker  Assistance 1: Distant supervision  Quality of Gait 1: Forward flexed posture  Comments/Distance (ft) 1: 2x250 ft  Transfer 1  Technique 1: Sit to stand, Stand to sit  Transfer Device 1: Walker  Transfer Level of Assistance 1: Modified independent    Outcome Measures:  [facility] Basic Mobility  Turning from your back to your side while in a flat bed without using bedrails: None  Moving from lying on your back to sitting on the side of a flat bed without using bedrails: None  Moving to and from bed to chair (including a wheelchair): None  Standing up from a chair using your arms (e.g. wheelchair or bedside chair): None  To walk in hospital room: None  Climbing 3-5 steps with railing: A little  Basic Mobility - Total Score: 23    Education Documentation  No documentation found.  Education Comments  No comments found.        OP EDUCATION:       Encounter Problems       Encounter Problems (Active)       PT Problem       pt will be able to ambulate community distances with LRAD and modified independent  (Progressing)       Start:  04/16/24    Expected End:  05/07/24            Pt will be able to perform STS transfer from all surfaces with independence and without extending RLE  (Progressing)       Start:  04/16/24    Expected End:  05/07/24            Pt will be able to navigate 4 steps with hand rail on one side(s) and modified independent to allow for safe DC.   (Progressing)       Start:  04/16/24    Expected End:  05/07/24            Pt will improve R knee flexion to >100 degrees (Progressing)       Start:  04/16/24    Expected End:   05/07/24            Pt will be able to perform all aspects of bed mobility with independent        Start:  04/16/24    Expected End:  05/07/24            pt will report pain level </= to 5/10       Start:  04/16/24    Expected End:  05/07/24               Pain - Adult

## 2024-04-17 NOTE — CONSULTS
"Nutrition Assessment Note  Nutrition Assessment      Reason for Assessment  Reason for Assessment: Dietitian discretion ([facility] bed admission)  HPI: Pt s/p total right knee replacement with complications of hematoma and cellulitis of right knee. Pt admitted to [facility] unit for PT, OT, and antibiotic therapy.     PMH: arthritis, COPD, CAD, MI, HF, prostate CA, HLD, HTN, edema    History:  Food and Nutrient History  Energy Intake: Good > 75 %  Food and Nutrient History: Pt visited in room after breakfast, sitting up in a chair. Pt states he appetite is \"too good,\" denies difficulty chewing or swallowing. States he lives home alone, but has help from his daughter and granddaughter upon discharge. Pt states he has eliminated bread, cake, and cookies from his diet for wt loss. States he was doing well with a UBW of 190 lbs prior to admission. Pt states he is not happy with current weight and plans to take it back off after discharge.         Food and Nutrient Administration History  Additional Food and Nutrient Administration History: 100% PO per flowsheet     Food Allergies/Intolerances:   cinnamon and cucumber with seeds  Energy Intake: Good > 75 %  GI Symptoms: None     Oral Problems: None    Skilled Unit Only  Appetite/Fluid intake:  good (%)   Feeding Ability:  independent   Dentition:  no problem present  Oral Function:  no problem present     Objective Data:  Nutrition Significant Labs:  04/16/24:  BUN 25^    Nutrition Specific Mediations:  Colace  Vitamin C  Lasix  Losartan  Metoprolol  Potassium Chloride    I/O:     Intake/Output Summary (Last 24 hours) at 4/17/2024 1247  Last data filed at 4/17/2024 0840  Gross per 24 hour   Intake 1080 ml   Output --   Net 1080 ml       Dietary Orders (From admission, onward)       Start     Ordered    04/15/24 1628  Adult diet Regular  Diet effective now        Question:  Diet type  Answer:  Regular    04/15/24 1635                  Wt Readings from Last 10 Encounters: " "  04/15/24 92 kg (202 lb 13.2 oz)   04/10/24 88 kg (194 lb 0.1 oz)   03/26/24 86.2 kg (190 lb)   03/20/24 86.2 kg (190 lb 0.6 oz)   03/12/24 86.2 kg (190 lb 0.6 oz)   11/29/23 83.6 kg (184 lb 3.2 oz)   11/21/23 85.7 kg (189 lb)   08/30/23 85.3 kg (188 lb)   08/07/23 85.5 kg (188 lb 9 oz)   07/11/23 88.5 kg (195 lb)       Anthropometrics:  Height: 180.3 cm (5' 11\")  Weight: 92 kg (202 lb 13.2 oz)  BMI (Calculated): 28.3         Weight Change  Weight History / % Weight Change: 03/12/24 - 86.2 kg (5.8 kg wt gain in the past month)  Significant Weight Loss: No         IBW/kg (Dietitian Calculated): 78.2 kg  Percent of IBW: 118 %     Energy Needs:  Calculated Energy Needs Using Equations  Height: 180.3 cm (5' 11\")  Temp: 36.6 °C (97.9 °F)    Estimated Energy Needs  Total Energy Estimated Needs (kCal): 2150 kCal (9837-9262 Kcal/day)  Method for Estimating Needs: 25-30 Kcal/kg IBW    Estimated Protein Needs  Total Protein Estimated Needs (g): 95 g  Method for Estimating Needs: 1.2 gm/kg IBW    Estimated Fluid Needs  Total Fluid Estimated Needs (mL): 1955 mL  Method for Estimating Needs: 25 mL/kg or per team         Nutrition Focused Physical Findings:  Subcutaneous Fat Loss  Orbital Fat Pads: Mild-Moderate (slight dark circles and slight hollowing) (age related losses noted)  Buccal Fat Pads: Well nourished (full, rounded cheeks)  Ribs: Defer    Muscle Wasting  Temporalis: Mild-Moderate (slight depression) (age related losses noted)  Pectoralis (Clavicular Region): Mild-Moderate (some protrusion of clavicle)  Interosseous: Well nourished (muscle bulges)  Trapezius/Infraspinatus/Supraspinatus (Scapular Region): Defer    Edema  Edema Location: +3 RLE; +2 LLE per flowsheet         Physical Findings (Nutrition Deficiency/Toxicity)  Hair: Negative  Eyes: Negative  Nails: Negative  Skin: Positive (R knee surgical incision; Pressure injury to buttocks per flowsheet)       Nutrition Diagnosis        Patient has Nutrition " Diagnosis: Yes  Nutrition Diagnosis 1: Inadequate protein intake  Diagnosis Status (1): New  Related to (1): increased metabolic demand  As Evidenced by (1): s/p total knee replacement, leg cellulitis, pressure injury to buttocks       Nutrition Interventions/Recommendations   Nutrition Prescription  Individualized Nutrition Prescription Provided for : diet, fluids, ONS    Food and/or Nutrient Delivery Interventions  Meals and Snacks: Mineral-modified diet  Goal: Na+ restriction diet with hx of HF and current bilateral leg edema; daily weight monitoring     Medical Food Supplement: Commercial food  Goal: Prostat BID (100 Kcal and 15 gm protein per 30 mL serving)       Education Documentation  No documentation found.    Pt educated on importance of protein intake for wound healing       Nutrition Monitoring and Evaluation   Food and Nutrient Related History  Energy Intake: Estimated energy intake  Criteria: Pt will consume >75% of estimated needs       Amount of Food: Medical food intake  Criteria: Pt will consume prostat BID    Anthropometrics: Body Composition/Growth/Weight History  Weight: Measured weight  Criteria: Reduce fluid wt / edema     Nutrition Focused Physical Findings   Criteria: facilitate wound healing       Evaluate nutrition intervention as compared to nutrition goal(s) and estimated nutrient need criteria.         Follow Up  Time Spent (min): 60 minutes  Last Date of Nutrition Visit: 04/17/24  Nutrition Follow-Up Needed?: Dietitian to reassess per policy  Follow up Comment: Prostat; edema

## 2024-04-17 NOTE — PROGRESS NOTES
Physical Therapy                 Therapy Communication Note    Patient Name: Abraham Rodriguez  MRN: [id number]  Today's Date: 4/17/2024     Discipline: Physical Therapy    Missed Visit Reason: Missed Visit Reason: Other (Comment) (Pt coming out of bathroom w/o waiting for staff. Stating that he had a bad night and was constantly going to the bathroom. Stating that he was not feeling well and wanted to finish his breakfast before participating to see if that makes him feel better)    Missed Time: Attempt    Comment: First attempt at 8:30. Nursing staff made aware.

## 2024-04-17 NOTE — PROGRESS NOTES
"Occupational Therapy    Occupational Therapy Treatment    Name: Abraham Rodriguez  MRN: [id number]  : 1940  Date: 24  Time Calculation  Start Time: 1249  Stop Time: 1329  Time Calculation (min): 40 min    Assessment:  Evaluation/Treatment Tolerance: Patient tolerated treatment well  End of Session Communication: Bedside nurse, PCT/NA/KATHERIN  End of Session Patient Position: Up in chair, Alarm on  Plan:  Treatment Interventions: ADL retraining, Functional transfer training, Endurance training, Neuromuscular reeducation  OT Frequency: 5 times per week  OT Discharge Recommendations: Low intensity level of continued care  OT Recommended Transfer Status: Assist of 1  OT - OK to Discharge: Yes    Subjective   Previous Visit Info:  OT Last Visit On: 24  OT Received On: 24  General:  General  Prior to Session Communication: Bedside nurse, PCT/CIERRA/KATHERIN  Patient Position Received: Up in chair, Alarm on  General Comment: Pt pleseant and agreeable to therapy this afternoon. Pt reporting feeling better since this morning and eager to participate.  Precautions:  LE Weight Bearing Status: Weight Bearing as Tolerated  Medical Precautions: Fall precautions  Post-Surgical Precautions: Right total knee precautions  Pain Assessment:  Pain Assessment  Pain Assessment: 0-10  Pain Score: 10 - Worst possible pain (Patient reported he has been \"hanging out\" at a 10 and \"if I could just get to an 8 I would be elated\" nursing aware. No clinical signs of pain noted throughout session.)  Pain Type: Surgical pain  Pain Location: Knee  Pain Orientation: Right     Objective   Activities of Daily Living:   LE Dressing  LE Dressing Adaptive Equipment: Reacher, Sock aide  Sock Level of Assistance: Distant supervision (Socks and tubigrip compression. ptcomplaining of difficulty donning and doffing socks; pt educated on use of adaptive equipment, though when it came to trialing equipment, pt able to complete without its use.)  LE " Dressing Where Assessed: Recliner    Functional Standing Tolerance:  Functional Standing Tolerance  Time: 20 minutes  Activity: kitchen activities standing with FWW and counter for support    Bed Mobility/Transfers:   Transfer 1  Transfer From 1: Chair with arms to  Transfer to 1: Stand  Technique 1: Sit to stand, Stand to sit  Transfer Device 1: Walker  Transfer Level of Assistance 1: Distant supervision  Transfers 2  Transfer From 2: Mat to  Transfer to 2: Stand  Technique 2: Sit to stand, Stand to sit  Transfer Device 2: Walker  Transfer Level of Assistance 2: Distant supervision    Functional Mobility:  Functional Mobility  Functional Mobility Performed: Yes  Functional Mobility 1  Surface 1: Level tile  Device 1: Rolling walker  Assistance 1: Distant supervision  Comments 1: to therapy gym and back approx 200ft    IADL's:   Kitchen Mobility  Kitchen Mobility Level of Assistance: Distant supervision  Kitchen-Mobility Level: Walker  Kitchen Activity: Retrieve items, Transport items, Other (Comment) (simulation of  loading and emptying)  Kitchen Mobility Comments: Patient engaging in kitchen tasks as a dynamic balance and stnading tolerance activity. Pt retrieving ingredients/plates/bowls/pans from high cupboards over head (outside [employer]) placing them on counter, loading and unloading , and putting items away. Pt displaying good balance throughout with no increase in knee pain. Pt able to forward flex at the  to load and unload with ease. Good participation and endurance throughout.    Outcome Measures:  [facility] Daily Activity  Putting on and taking off regular lower body clothing: A little  Bathing (including washing, rinsing, drying): A little  Putting on and taking off regular upper body clothing: None  Toileting, which includes using toilet, bedpan or urinal: A little  Taking care of personal grooming such as brushing teeth: None  Eating Meals: None  Daily Activity - Total Score:  21    Education Documentation  ADL Training, taught by Sasha Mckeon OT at 4/17/2024  2:01 PM.  Learner: Patient  Readiness: Acceptance  Method: Explanation  Response: Verbalizes Understanding  Comment: use of tubigrip compression for R leg swelling.    Education Comments  No comments found.      Goals:  Encounter Problems       Encounter Problems (Active)       ADLs       Patient will perform UB and LB bathing with modified independent level of assistance and grab bars and shower chair.       Start:  04/16/24    Expected End:  04/23/24            Patient with complete lower body dressing with modified independent level of assistance donning and doffing all LE clothes  with PRN adaptive equipment while supported sitting and standing. (Progressing)       Start:  04/16/24    Expected End:  04/23/24            Patient will complete toileting including hygiene clothing management/hygiene with modified independent level of assistance.       Start:  04/16/24    Expected End:  04/23/24               BALANCE       Pt will maintain dynamic standing balance during ADL task with modified independent level of assistance in order to demonstrate decreased risk of falling and improved postural control. (Progressing)       Start:  04/16/24    Expected End:  04/23/24               MOBILITY       Patient will perform Functional mobility mod  Household distances/Community Distances with modified independent level of assistance and front wheeled walker in order to improve safety and functional mobility. (Progressing)       Start:  04/16/24    Expected End:  04/23/24               TRANSFERS       Patient will complete sit to stand transfer from all surfaces including chair, car, and tub transfer bench with modified independent level of assistance and front wheeled walker in order to improve safety and prepare for safe mobility. (Progressing)       Start:  04/16/24    Expected End:  04/23/24

## 2024-04-17 NOTE — CARE PLAN
The patient's goals for the shift include      The clinical goals for the shift include Patient to utilize call light and tommanage pain    Over the shift, the patient did not make progress toward the following goals. Patient did utilize call light appropriately. Patient insist that pain level stays 10/10 regardless of interventions or PRN pain medications.  Patient is sitting the chair in his room at this time with call light in reach.

## 2024-04-17 NOTE — SIGNIFICANT EVENT
Edema BLE. Questionable opioid withdrawal this morning with symptoms of shakiness and sweating. Was given oxycodone. He is not opioid naive. Pain medication increased to routine during daylight hours to enhance his ability to participate in therapy.

## 2024-04-17 NOTE — NURSING NOTE
"1345  pt has a purple discoloration on right arm,  states \" I don't know how I got this\"  No pain with palpatiion,  will monitor     "

## 2024-04-17 NOTE — PROGRESS NOTES
Physical Therapy                 Therapy Communication Note    Patient Name: Abraham Rodriguez  MRN: [id number]  Today's Date: 4/17/2024     Discipline: Physical Therapy    Missed Visit Reason: Missed Visit Reason: Other (Comment) (Pt stating that he is not feeling well, that he feels dizzy, nauseous, and he is getting the chills. States his knee feels worse and requested ice. States he does not want to participate at this time.)    Missed Time: Attempt    Comment: 2nd attempt at 09:35. Nursing staff made aware of pt complaints.

## 2024-04-17 NOTE — PROGRESS NOTES
"Physical Therapy                 Therapy Communication Note    Patient Name: Abraham Rodriguez  MRN: [id number]  Today's Date: 4/17/2024     Discipline: Physical Therapy    Missed Visit Reason: Missed Visit Reason: Other (Comment) (Pt reporting improvement in symptoms compared to earlier, but cont to state that his knee is bothering him. Pt stated \"I think I'll do it after lunch.\" Pt edu on importance of participation to make improvements.)    Missed Time: Attempt    Comment: 3rd attempt at 11:38  "

## 2024-04-18 PROCEDURE — 1900000001 HC SKILLED SWING ROOM

## 2024-04-18 PROCEDURE — 2500000006 HC RX 250 W HCPCS SELF ADMINISTERED DRUGS (ALT 637 FOR ALL PAYERS): Performed by: STUDENT IN AN ORGANIZED HEALTH CARE EDUCATION/TRAINING PROGRAM

## 2024-04-18 PROCEDURE — 2500000004 HC RX 250 GENERAL PHARMACY W/ HCPCS (ALT 636 FOR OP/ED): Performed by: STUDENT IN AN ORGANIZED HEALTH CARE EDUCATION/TRAINING PROGRAM

## 2024-04-18 PROCEDURE — 97110 THERAPEUTIC EXERCISES: CPT | Mod: GP

## 2024-04-18 PROCEDURE — 1090000001 HH PPS REVENUE CREDIT

## 2024-04-18 PROCEDURE — 97116 GAIT TRAINING THERAPY: CPT | Mod: GP

## 2024-04-18 PROCEDURE — 97530 THERAPEUTIC ACTIVITIES: CPT | Mod: GP

## 2024-04-18 PROCEDURE — 2500000004 HC RX 250 GENERAL PHARMACY W/ HCPCS (ALT 636 FOR OP/ED): Performed by: NURSE PRACTITIONER

## 2024-04-18 PROCEDURE — 97535 SELF CARE MNGMENT TRAINING: CPT | Mod: GO

## 2024-04-18 PROCEDURE — 94760 N-INVAS EAR/PLS OXIMETRY 1: CPT | Mod: MUE

## 2024-04-18 PROCEDURE — 2500000001 HC RX 250 WO HCPCS SELF ADMINISTERED DRUGS (ALT 637 FOR MEDICARE OP): Performed by: NURSE PRACTITIONER

## 2024-04-18 PROCEDURE — 1090000002 HH PPS REVENUE DEBIT

## 2024-04-18 PROCEDURE — 2500000001 HC RX 250 WO HCPCS SELF ADMINISTERED DRUGS (ALT 637 FOR MEDICARE OP): Performed by: STUDENT IN AN ORGANIZED HEALTH CARE EDUCATION/TRAINING PROGRAM

## 2024-04-18 RX ADMIN — OXYCODONE HYDROCHLORIDE 5 MG: 5 TABLET ORAL at 11:10

## 2024-04-18 RX ADMIN — DOXYCYCLINE HYCLATE 100 MG: 100 TABLET, COATED ORAL at 08:38

## 2024-04-18 RX ADMIN — ACETAMINOPHEN 975 MG: 325 TABLET ORAL at 00:38

## 2024-04-18 RX ADMIN — PANTOPRAZOLE SODIUM 40 MG: 40 TABLET, DELAYED RELEASE ORAL at 06:07

## 2024-04-18 RX ADMIN — OXYCODONE HYDROCHLORIDE 5 MG: 5 TABLET ORAL at 16:23

## 2024-04-18 RX ADMIN — METOPROLOL SUCCINATE 50 MG: 50 TABLET, EXTENDED RELEASE ORAL at 08:38

## 2024-04-18 RX ADMIN — GABAPENTIN 300 MG: 300 CAPSULE ORAL at 08:39

## 2024-04-18 RX ADMIN — LOSARTAN POTASSIUM 50 MG: 50 TABLET, FILM COATED ORAL at 08:39

## 2024-04-18 RX ADMIN — SIMETHICONE 80 MG: 80 TABLET, CHEWABLE ORAL at 16:37

## 2024-04-18 RX ADMIN — SIMETHICONE 80 MG: 80 TABLET, CHEWABLE ORAL at 00:51

## 2024-04-18 RX ADMIN — GABAPENTIN 300 MG: 300 CAPSULE ORAL at 16:23

## 2024-04-18 RX ADMIN — AMOXICILLIN AND CLAVULANATE POTASSIUM 1 TABLET: 875; 125 TABLET, FILM COATED ORAL at 08:38

## 2024-04-18 RX ADMIN — OXYCODONE HYDROCHLORIDE AND ACETAMINOPHEN 500 MG: 500 TABLET ORAL at 20:45

## 2024-04-18 RX ADMIN — Medication 1 SPRAY: at 08:39

## 2024-04-18 RX ADMIN — Medication 1 SPRAY: at 20:43

## 2024-04-18 RX ADMIN — OXYCODONE HYDROCHLORIDE 5 MG: 5 TABLET ORAL at 00:38

## 2024-04-18 RX ADMIN — AMOXICILLIN AND CLAVULANATE POTASSIUM 1 TABLET: 875; 125 TABLET, FILM COATED ORAL at 20:44

## 2024-04-18 RX ADMIN — OXYCODONE HYDROCHLORIDE AND ACETAMINOPHEN 500 MG: 500 TABLET ORAL at 08:40

## 2024-04-18 RX ADMIN — POLYETHYLENE GLYCOL 3350 17 G: 17 POWDER, FOR SOLUTION ORAL at 08:41

## 2024-04-18 RX ADMIN — DOCUSATE SODIUM 100 MG: 100 CAPSULE, LIQUID FILLED ORAL at 08:39

## 2024-04-18 RX ADMIN — SIMETHICONE 80 MG: 80 TABLET, CHEWABLE ORAL at 09:05

## 2024-04-18 RX ADMIN — ACETAMINOPHEN 975 MG: 325 TABLET ORAL at 23:46

## 2024-04-18 RX ADMIN — TAMSULOSIN HYDROCHLORIDE 0.4 MG: 0.4 CAPSULE ORAL at 20:44

## 2024-04-18 RX ADMIN — DOXYCYCLINE HYCLATE 100 MG: 100 TABLET, COATED ORAL at 20:44

## 2024-04-18 RX ADMIN — ACETAMINOPHEN 975 MG: 325 TABLET ORAL at 08:39

## 2024-04-18 RX ADMIN — FINASTERIDE 5 MG: 5 TABLET, FILM COATED ORAL at 20:44

## 2024-04-18 RX ADMIN — LORATADINE 10 MG: 10 TABLET ORAL at 08:39

## 2024-04-18 RX ADMIN — ASPIRIN 81 MG: 81 TABLET, COATED ORAL at 08:40

## 2024-04-18 RX ADMIN — GABAPENTIN 300 MG: 300 CAPSULE ORAL at 20:45

## 2024-04-18 RX ADMIN — ACETAMINOPHEN 975 MG: 325 TABLET ORAL at 16:23

## 2024-04-18 RX ADMIN — OXYCODONE HYDROCHLORIDE 5 MG: 5 TABLET ORAL at 06:07

## 2024-04-18 RX ADMIN — ENOXAPARIN SODIUM 40 MG: 100 INJECTION SUBCUTANEOUS at 18:12

## 2024-04-18 ASSESSMENT — COGNITIVE AND FUNCTIONAL STATUS - GENERAL
DRESSING REGULAR LOWER BODY CLOTHING: A LITTLE
CLIMB 3 TO 5 STEPS WITH RAILING: A LITTLE
MOBILITY SCORE: 23
DAILY ACTIVITIY SCORE: 22
MOBILITY SCORE: 23
CLIMB 3 TO 5 STEPS WITH RAILING: A LITTLE
CLIMB 3 TO 5 STEPS WITH RAILING: A LITTLE
MOBILITY SCORE: 23
HELP NEEDED FOR BATHING: A LITTLE

## 2024-04-18 ASSESSMENT — PAIN SCALES - GENERAL
PAINLEVEL_OUTOF10: 5 - MODERATE PAIN
PAINLEVEL_OUTOF10: 8
PAINLEVEL_OUTOF10: 5 - MODERATE PAIN
PAINLEVEL_OUTOF10: 2
PAINLEVEL_OUTOF10: 5 - MODERATE PAIN
PAINLEVEL_OUTOF10: 0 - NO PAIN
PAINLEVEL_OUTOF10: 8
PAINLEVEL_OUTOF10: 2
PAINLEVEL_OUTOF10: 5 - MODERATE PAIN

## 2024-04-18 ASSESSMENT — PAIN - FUNCTIONAL ASSESSMENT
PAIN_FUNCTIONAL_ASSESSMENT: 0-10

## 2024-04-18 ASSESSMENT — PAIN DESCRIPTION - ORIENTATION
ORIENTATION: RIGHT;LEFT
ORIENTATION: RIGHT;LEFT
ORIENTATION: RIGHT

## 2024-04-18 ASSESSMENT — PAIN DESCRIPTION - LOCATION
LOCATION: KNEE
LOCATION: LEG
LOCATION: LEG

## 2024-04-18 ASSESSMENT — ACTIVITIES OF DAILY LIVING (ADL): HOME_MANAGEMENT_TIME_ENTRY: 17

## 2024-04-18 NOTE — PROGRESS NOTES
Physical Therapy    Physical Therapy Treatment    Patient Name: Abraham Rodriguez  MRN: [id number]  Today's Date: 4/18/2024  Time Calculation  Start Time: 1350  Stop Time: 1431  Time Calculation (min): 41 min       Assessment/Plan   PT Assessment  End of Session Communication: Bedside nurse, PCT/NA/CTA  Assessment Comment: Pt requesting ice at end of session d/t pain. Tolerated session well. Pt is fairly independent but is very concerned about not being able to go home, stating that he is unable to do most things w/o help. Pt edu throughout session on his abilities. Some patellar mobilization done in an effort to decrease pt pain.Pt given ice at end of session to help w pain.  End of Session Patient Position: Up in chair, Alarm on     PT Plan  Treatment/Interventions: Bed mobility, Transfer training, Gait training, Stair training, Balance training, Neuromuscular re-education, Strengthening, Endurance training, Range of motion, Therapeutic exercise, Therapeutic activity  PT Plan: Skilled PT  PT Frequency: 6 times per week  PT Discharge Recommendations: Low intensity level of continued care  PT Recommended Transfer Status: Stand by assist  PT - OK to Discharge: Yes      General Visit Information:   PT  Visit  PT Received On: 04/18/24  Response to Previous Treatment: Patient reporting fatigue but able to participate.  General  Prior to Session Communication: Bedside nurse  Patient Position Received: Up in chair, Alarm on  General Comment: pleasant and agreeable to participate w encouragement    Subjective   Precautions:  Precautions  Medical Precautions: Fall precautions  Post-Surgical Precautions: Right total knee precautions      Objective   Pain:  Pain Assessment  Pain Assessment: 0-10  Pain Score: 5 - Moderate pain  Pain Type: Acute pain, Surgical pain  Pain Location: Knee  Pain Orientation: Right    Treatments:       Bed Mobility  Bed Mobility: Yes  Bed Mobility 1  Bed Mobility 1: Supine to sitting, Sitting to  supine  Level of Assistance 1: Modified independent  Bed Mobility Comments 1: increased time required d/t increase in LBP    Ambulation/Gait Training  Ambulation/Gait Training Performed: Yes  Ambulation/Gait Training 1  Surface 1: Level tile  Device 1: Rolling walker  Gait Support Devices: Gait belt  Assistance 1: Modified independent  Quality of Gait 1: Diminished heel strike, Forward flexed posture  Comments/Distance (ft) 1: 2x200 ft from room to therapy gym and back  Ambulation/Gait Training 2  Surface 2: Uneven  Device 2: Rolling walker  Gait Support Devices: Gait belt  Assistance 2: Distant supervision  Quality of Gait 2: Diminished heel strike, Forward flexed posture  Comments/Distance (ft) 2: 1x10 ft on mat on floor. Pt steady, similar gait pattern to level surfaces.  Ambulation/Gait Training 3  Surface 3: Level tile  Device 3: Cane single point cane  Gait Support Devices: Gait belt  Assistance 3: Contact guard  Quality of Gait 3: Wide base of support, Diminished heel strike, Forward flexed posture  Comments/Distance (ft) 3: intermittent incorrect sequencing, no LOB. Mildly unsteady.  Transfer 1  Transfer From 1: Chair with arms to  Transfer to 1: Stand  Technique 1: Sit to stand, Stand to sit  Transfer Device 1: Walker  Transfer Level of Assistance 1: Modified independent  Transfers 2  Transfer From 2: Mat to  Transfer to 2: Stand  Technique 2: Sit to stand, Stand to sit  Transfer Device 2: Gait belt  Transfer Level of Assistance 2: Modified independent  Transfers 3  Transfer From 3: Toilet to  Transfer to 3: Sit, Stand  Technique 3: Sit to stand, Stand to sit  Transfer Device 3: Walker  Transfer Level of Assistance 3: Modified independent    Outcome Measures:  [facility] Basic Mobility  Turning from your back to your side while in a flat bed without using bedrails: None  Moving from lying on your back to sitting on the side of a flat bed without using bedrails: None  Moving to and from bed to chair (including a  wheelchair): None  Standing up from a chair using your arms (e.g. wheelchair or bedside chair): None  To walk in hospital room: None  Climbing 3-5 steps with railing: A little  Basic Mobility - Total Score: 23    Education Documentation  No documentation found.  Education Comments  No comments found.        Encounter Problems       Encounter Problems (Active)       PT Problem       pt will be able to ambulate community distances with LRAD and modified independent  (Progressing)       Start:  04/16/24    Expected End:  05/07/24            Pt will be able to perform STS transfer from all surfaces with independence and without extending RLE  (Progressing)       Start:  04/16/24    Expected End:  05/07/24            Pt will be able to navigate 4 steps with hand rail on one side(s) and modified independent to allow for safe DC.   (Progressing)       Start:  04/16/24    Expected End:  05/07/24            Pt will improve R knee flexion to >100 degrees (Progressing)       Start:  04/16/24    Expected End:  05/07/24            Pt will be able to perform all aspects of bed mobility with independent  (Progressing)       Start:  04/16/24    Expected End:  05/07/24            pt will report pain level </= to 5/10 (Progressing)       Start:  04/16/24    Expected End:  05/07/24               Pain - Adult

## 2024-04-18 NOTE — CARE PLAN
"The patient's goals for the shift include  NA    The clinical goals for the shift include Pt will have no nausea or vomiting this shift    Pt ambulates standby with walker, continent of urine, took a shower/bath this evening. Bilateral feet edema, \"puffy.\" Old wound on right, inner buttocks almost healed, discoloration, no open areas, dressing removed (soiled in shower) open to air. Requested [employer] for gas.    "

## 2024-04-18 NOTE — CARE PLAN
"The patient's goals for the shift include      The clinical goals for the shift include Patient to tolerate medication given, and use call light as needed.    Patient was able to met goals this shift, Patient was able to participate with therapy. However Patient has been in pain and discomfort since therapy. Patient feels he may have been \"worked too hard\". Patient tried the Iceman with some relief. Patient resting in chair at the end of shift with call light within reach.   "

## 2024-04-18 NOTE — PROGRESS NOTES
"Occupational Therapy    Occupational Therapy Treatment    Name: Abraham Rodriguez  MRN: [id number]  : 1940  Date: 24  Time Calculation  Start Time: 1509  Stop Time: 1526  Time Calculation (min): 17 min    Assessment:  Evaluation/Treatment Tolerance: Patient limited by pain (Pt limited by pain and hyperfocues on leg swelling/sensation; nursing notifed and not noticing significant changes at this time, but will continue to monitor.)  End of Session Communication: Bedside nurse, PCT/NA/KATHERIN  End of Session Patient Position: Up in chair, Alarm on  Plan:  Treatment Interventions: ADL retraining, Functional transfer training, Endurance training, Neuromuscular reeducation  OT Frequency: 5 times per week  OT Discharge Recommendations: Low intensity level of continued care  OT Recommended Transfer Status: Assist of 1  OT - OK to Discharge: Yes    Subjective   Previous Visit Info:  OT Last Visit On: 24  OT Received On: 24  General:  General  Missed Visit: Yes  Missed Visit Reason: Other (Comment) (Patient in PT session unavailable for OT treatment at this time.)  Prior to Session Communication: Bedside nurse, PCT/CIERRA/KATHERIN  Patient Position Received: Up in chair, Alarm on  General Comment: Pt hyperfocused on R leg edema and warmth limiting session. OT attempting to educate pt on side affects of swelling though patient unreceptive at this time. Pt complaining of increased pain in leg though nursing informed patient no pain medication avaible a that time. Pt reporting, \"they worked me real good earlier and now I can't move my leg\", though patient able to demonstrate fair movement. Nursing aware of leg and attempting to encourage patient to participate in therapy, though pt unwilling at this time.   Precautions:  Medical Precautions: Fall precautions  Post-Surgical Precautions: Right total knee precautions  Pain Assessment:  Pain Assessment  Pain Assessment: 0-10  Pain Score:  (unrated R knee pain; pt reporting " "increased following PT sessions)  Pain Type: Acute pain, Surgical pain  Pain Location: Knee  Pain Orientation: Right  Pain Interventions: Cold applied  Response to Interventions: pt reporting no change in pain though, \"ice feels good\"     Objective   Activities of Daily Living:   LE Dressing  LE Dressing Adaptive Equipment: Reacher  Sock Level of Assistance: Distant supervision (doffing only; pt doffing slipper socks to attempt to put own socks on to practice getting B feet into shoes. pt becoming hyperfocused on knee swelling and OT made numerous unsucccessful attempts to engage patient in the task. Pt requesting to keep socks and compression off as well as reporting to therapist, \"I do not think that compression is doing anything.')  LE Dressing Where Assessed: Recliner    Outcome Measures:  [facility] Daily Activity  Putting on and taking off regular lower body clothing: A little  Bathing (including washing, rinsing, drying): A little  Putting on and taking off regular upper body clothing: None  Toileting, which includes using toilet, bedpan or urinal: None  Taking care of personal grooming such as brushing teeth: None  Eating Meals: None  Daily Activity - Total Score: 22    Goals:  Encounter Problems       Encounter Problems (Active)       ADLs       Patient will perform UB and LB bathing with modified independent level of assistance and grab bars and shower chair.       Start:  04/16/24    Expected End:  04/23/24            Patient with complete lower body dressing with modified independent level of assistance donning and doffing all LE clothes  with PRN adaptive equipment while supported sitting and standing. (Progressing)       Start:  04/16/24    Expected End:  04/23/24            Patient will complete toileting including hygiene clothing management/hygiene with modified independent level of assistance.       Start:  04/16/24    Expected End:  04/23/24               BALANCE       Pt will maintain dynamic standing " balance during ADL task with modified independent level of assistance in order to demonstrate decreased risk of falling and improved postural control. (Progressing)       Start:  04/16/24    Expected End:  04/23/24               MOBILITY       Patient will perform Functional mobility mod  Household distances/Community Distances with modified independent level of assistance and front wheeled walker in order to improve safety and functional mobility. (Progressing)       Start:  04/16/24    Expected End:  04/23/24               TRANSFERS       Patient will complete sit to stand transfer from all surfaces including chair, car, and tub transfer bench with modified independent level of assistance and front wheeled walker in order to improve safety and prepare for safe mobility. (Progressing)       Start:  04/16/24    Expected End:  04/23/24

## 2024-04-18 NOTE — PROGRESS NOTES
Occupational Therapy                 Therapy Communication Note    Patient Name: Abraham Rodriguez  MRN: [id number]  Today's Date: 4/18/2024     Discipline: Occupational Therapy    Missed Visit Reason: Missed Visit Reason: Other (Comment) (Patient in PT session unavailable for OT treatment at this time.)    Missed Time: Attempt    Attempt time: 0946

## 2024-04-18 NOTE — PROGRESS NOTES
Physical Therapy    Physical Therapy Treatment    Patient Name: Abraham Rodriguez  MRN: [id number]  Today's Date: 4/18/2024  Time Calculation  Start Time: 0910  Stop Time: 1000  Time Calculation (min): 50 min       Assessment/Plan   PT Assessment  Assessment Comment: Improving tolerance of R TKR ex. Ice applied by nursing post session. Excellent mobility.     PT Plan  Treatment/Interventions: Bed mobility, Transfer training, Gait training, Stair training, Balance training, Neuromuscular re-education, Strengthening, Endurance training, Range of motion, Therapeutic exercise, Therapeutic activity  PT Plan: Skilled PT  PT Frequency: 6 times per week  PT Discharge Recommendations: Low intensity level of continued care  PT Recommended Transfer Status: Stand by assist  PT - OK to Discharge: Yes      General Visit Information:   PT  Visit  PT Received On: 04/18/24  Response to Previous Treatment:  (Some expected muscle soreness in thigh.)  General  Prior to Session Communication: Bedside nurse  Patient Position Received: Up in chair, Alarm on  General Comment: Receptive to PT.    Subjective   Precautions:  Precautions  Post-Surgical Precautions: Right total knee precautions  Vital Signs:   Stable    Objective   Pain:  Pain Assessment  Pain Assessment: 0-10  Pain Score: 0 - No pain (just a little stiffness today.)     Postural Control:  Static Sitting Balance  Static Sitting-Level of Assistance: Independent  Static Standing Balance  Static Standing-Level of Assistance: Independent  Dynamic Standing Balance  Dynamic Standing-Comments: I wuth rolling walker     Treatments:  Therapeutic Exercise  Therapeutic Exercise Performed: Yes  Therapeutic Exercise Activity 1: PT assosted R knee rom 30  Therapeutic Exercise Activity 2: assisted stretchinto flexion 3x30 sec (120 degrees)  Therapeutic Exercise Activity 3: sidelying R quad stretch 3x20 sec  Therapeutic Exercise Activity 4: modified TKE 3x10  Therapeutic Exercise Activity 5: maddison  roll 3x10  Therapeutic Exercise Activity 6: SLR 3x5  Therapeutic Exercise Activity 7: QS 3x10  Therapeutic Exercise Activity 8: heel slide 3x10  Therapeutic Exercise Activity 9: LAQ in modified Katelyn position 3x10x2.5#    Bed Mobility 1  Bed Mobility 1: Supine to sitting, Sitting to supine  Level of Assistance 1: Modified independent    Ambulation/Gait Training  Ambulation/Gait Training Performed: Yes  Ambulation/Gait Training 1  Device 1: Rolling walker  Assistance 1: Modified independent  Quality of Gait 1: Diminished heel strike, Forward flexed posture  Comments/Distance (ft) 1: 2x250 ft.  Transfer 1  Technique 1: Sit to stand, Stand to sit  Transfer Device 1: Walker  Transfer Level of Assistance 1: Modified independent    Outcome Measures:  [facility] Basic Mobility  Turning from your back to your side while in a flat bed without using bedrails: None  Moving from lying on your back to sitting on the side of a flat bed without using bedrails: None  Moving to and from bed to chair (including a wheelchair): None  Standing up from a chair using your arms (e.g. wheelchair or bedside chair): None  To walk in hospital room: None  Climbing 3-5 steps with railing: A little  Basic Mobility - Total Score: 23    Education Documentation  No documentation found.  Education Comments  No comments found.        OP EDUCATION:       Encounter Problems       Encounter Problems (Active)       PT Problem       pt will be able to ambulate community distances with LRAD and modified independent  (Progressing)       Start:  04/16/24    Expected End:  05/07/24            Pt will be able to perform STS transfer from all surfaces with independence and without extending RLE  (Progressing)       Start:  04/16/24    Expected End:  05/07/24            Pt will be able to navigate 4 steps with hand rail on one side(s) and modified independent to allow for safe DC.   (Progressing)       Start:  04/16/24    Expected End:  05/07/24            Pt will  improve R knee flexion to >100 degrees (Progressing)       Start:  04/16/24    Expected End:  05/07/24            Pt will be able to perform all aspects of bed mobility with independent        Start:  04/16/24    Expected End:  05/07/24            pt will report pain level </= to 5/10       Start:  04/16/24    Expected End:  05/07/24               Pain - Adult

## 2024-04-19 PROCEDURE — 97116 GAIT TRAINING THERAPY: CPT | Mod: GP,CQ

## 2024-04-19 PROCEDURE — 1090000002 HH PPS REVENUE DEBIT

## 2024-04-19 PROCEDURE — 1900000001 HC SKILLED SWING ROOM

## 2024-04-19 PROCEDURE — 2500000001 HC RX 250 WO HCPCS SELF ADMINISTERED DRUGS (ALT 637 FOR MEDICARE OP): Performed by: NURSE PRACTITIONER

## 2024-04-19 PROCEDURE — 97535 SELF CARE MNGMENT TRAINING: CPT | Mod: GO | Performed by: OCCUPATIONAL THERAPIST

## 2024-04-19 PROCEDURE — 2500000004 HC RX 250 GENERAL PHARMACY W/ HCPCS (ALT 636 FOR OP/ED): Performed by: NURSE PRACTITIONER

## 2024-04-19 PROCEDURE — 97110 THERAPEUTIC EXERCISES: CPT | Mod: GP,CQ

## 2024-04-19 PROCEDURE — 2500000001 HC RX 250 WO HCPCS SELF ADMINISTERED DRUGS (ALT 637 FOR MEDICARE OP): Performed by: STUDENT IN AN ORGANIZED HEALTH CARE EDUCATION/TRAINING PROGRAM

## 2024-04-19 PROCEDURE — 2500000001 HC RX 250 WO HCPCS SELF ADMINISTERED DRUGS (ALT 637 FOR MEDICARE OP): Performed by: INTERNAL MEDICINE

## 2024-04-19 PROCEDURE — 97530 THERAPEUTIC ACTIVITIES: CPT | Mod: GP

## 2024-04-19 PROCEDURE — 1090000001 HH PPS REVENUE CREDIT

## 2024-04-19 PROCEDURE — 2500000006 HC RX 250 W HCPCS SELF ADMINISTERED DRUGS (ALT 637 FOR ALL PAYERS): Performed by: STUDENT IN AN ORGANIZED HEALTH CARE EDUCATION/TRAINING PROGRAM

## 2024-04-19 PROCEDURE — 2500000004 HC RX 250 GENERAL PHARMACY W/ HCPCS (ALT 636 FOR OP/ED): Performed by: STUDENT IN AN ORGANIZED HEALTH CARE EDUCATION/TRAINING PROGRAM

## 2024-04-19 PROCEDURE — 94760 N-INVAS EAR/PLS OXIMETRY 1: CPT | Mod: MUE

## 2024-04-19 PROCEDURE — 94760 N-INVAS EAR/PLS OXIMETRY 1: CPT

## 2024-04-19 RX ORDER — FUROSEMIDE 40 MG/1
40 TABLET ORAL DAILY
Status: DISCONTINUED | OUTPATIENT
Start: 2024-04-19 | End: 2024-04-25 | Stop reason: HOSPADM

## 2024-04-19 RX ADMIN — AMOXICILLIN AND CLAVULANATE POTASSIUM 1 TABLET: 875; 125 TABLET, FILM COATED ORAL at 20:30

## 2024-04-19 RX ADMIN — LORATADINE 10 MG: 10 TABLET ORAL at 09:06

## 2024-04-19 RX ADMIN — OXYCODONE HYDROCHLORIDE 5 MG: 5 TABLET ORAL at 12:06

## 2024-04-19 RX ADMIN — Medication 1 SPRAY: at 09:06

## 2024-04-19 RX ADMIN — ACETAMINOPHEN 975 MG: 325 TABLET ORAL at 15:47

## 2024-04-19 RX ADMIN — SIMETHICONE 80 MG: 80 TABLET, CHEWABLE ORAL at 09:06

## 2024-04-19 RX ADMIN — GABAPENTIN 300 MG: 300 CAPSULE ORAL at 09:05

## 2024-04-19 RX ADMIN — PANTOPRAZOLE SODIUM 40 MG: 40 TABLET, DELAYED RELEASE ORAL at 06:18

## 2024-04-19 RX ADMIN — OXYCODONE HYDROCHLORIDE 5 MG: 5 TABLET ORAL at 06:18

## 2024-04-19 RX ADMIN — OXYCODONE HYDROCHLORIDE 5 MG: 5 TABLET ORAL at 15:28

## 2024-04-19 RX ADMIN — DOXYCYCLINE HYCLATE 100 MG: 100 TABLET, COATED ORAL at 09:06

## 2024-04-19 RX ADMIN — LOSARTAN POTASSIUM 50 MG: 50 TABLET, FILM COATED ORAL at 09:05

## 2024-04-19 RX ADMIN — SIMETHICONE 80 MG: 80 TABLET, CHEWABLE ORAL at 18:42

## 2024-04-19 RX ADMIN — AMOXICILLIN AND CLAVULANATE POTASSIUM 1 TABLET: 875; 125 TABLET, FILM COATED ORAL at 09:05

## 2024-04-19 RX ADMIN — OXYCODONE HYDROCHLORIDE 5 MG: 5 TABLET ORAL at 20:30

## 2024-04-19 RX ADMIN — DOCUSATE SODIUM 100 MG: 100 CAPSULE, LIQUID FILLED ORAL at 20:30

## 2024-04-19 RX ADMIN — ASPIRIN 81 MG: 81 TABLET, COATED ORAL at 09:05

## 2024-04-19 RX ADMIN — FINASTERIDE 5 MG: 5 TABLET, FILM COATED ORAL at 20:30

## 2024-04-19 RX ADMIN — GABAPENTIN 300 MG: 300 CAPSULE ORAL at 20:30

## 2024-04-19 RX ADMIN — ACETAMINOPHEN 975 MG: 325 TABLET ORAL at 09:05

## 2024-04-19 RX ADMIN — OXYCODONE HYDROCHLORIDE AND ACETAMINOPHEN 500 MG: 500 TABLET ORAL at 20:30

## 2024-04-19 RX ADMIN — TAMSULOSIN HYDROCHLORIDE 0.4 MG: 0.4 CAPSULE ORAL at 20:30

## 2024-04-19 RX ADMIN — GABAPENTIN 300 MG: 300 CAPSULE ORAL at 15:28

## 2024-04-19 RX ADMIN — ENOXAPARIN SODIUM 40 MG: 100 INJECTION SUBCUTANEOUS at 18:07

## 2024-04-19 RX ADMIN — DOXYCYCLINE HYCLATE 100 MG: 100 TABLET, COATED ORAL at 20:30

## 2024-04-19 RX ADMIN — OXYCODONE HYDROCHLORIDE AND ACETAMINOPHEN 500 MG: 500 TABLET ORAL at 09:06

## 2024-04-19 RX ADMIN — Medication 1 SPRAY: at 20:30

## 2024-04-19 RX ADMIN — METOPROLOL SUCCINATE 50 MG: 50 TABLET, EXTENDED RELEASE ORAL at 09:06

## 2024-04-19 RX ADMIN — FUROSEMIDE 40 MG: 40 TABLET ORAL at 12:06

## 2024-04-19 ASSESSMENT — PAIN - FUNCTIONAL ASSESSMENT
PAIN_FUNCTIONAL_ASSESSMENT: 0-10

## 2024-04-19 ASSESSMENT — COGNITIVE AND FUNCTIONAL STATUS - GENERAL
DRESSING REGULAR LOWER BODY CLOTHING: A LOT
DAILY ACTIVITIY SCORE: 20
DAILY ACTIVITIY SCORE: 19
MOBILITY SCORE: 23
PERSONAL GROOMING: A LITTLE
DRESSING REGULAR LOWER BODY CLOTHING: A LOT
HELP NEEDED FOR BATHING: A LITTLE
DRESSING REGULAR UPPER BODY CLOTHING: A LITTLE
CLIMB 3 TO 5 STEPS WITH RAILING: A LITTLE
HELP NEEDED FOR BATHING: A LITTLE
DRESSING REGULAR UPPER BODY CLOTHING: A LITTLE
MOBILITY SCORE: 23
CLIMB 3 TO 5 STEPS WITH RAILING: A LITTLE
CLIMB 3 TO 5 STEPS WITH RAILING: A LITTLE
MOBILITY SCORE: 23

## 2024-04-19 ASSESSMENT — PAIN DESCRIPTION - LOCATION
LOCATION: KNEE

## 2024-04-19 ASSESSMENT — PAIN DESCRIPTION - ORIENTATION
ORIENTATION: RIGHT

## 2024-04-19 ASSESSMENT — PAIN SCALES - GENERAL
PAINLEVEL_OUTOF10: 8
PAINLEVEL_OUTOF10: 8
PAINLEVEL_OUTOF10: 10 - WORST POSSIBLE PAIN
PAINLEVEL_OUTOF10: 9
PAINLEVEL_OUTOF10: 10 - WORST POSSIBLE PAIN
PAINLEVEL_OUTOF10: 6
PAINLEVEL_OUTOF10: 5 - MODERATE PAIN
PAINLEVEL_OUTOF10: 5 - MODERATE PAIN

## 2024-04-19 ASSESSMENT — ACTIVITIES OF DAILY LIVING (ADL): HOME_MANAGEMENT_TIME_ENTRY: 50

## 2024-04-19 NOTE — CONSULTS
"    Service:   Service: Wound Care     Consult:  Consult requested by (Attending Name): TRICIA BOSWELL   Reason: sacral pressure wound evaluation     Review Family/Social History and ROS:   Social History:    Smoking Status: former smoker  (1)   Alcohol Use: denies (1)   Drug Use: weekly  Marijuana - sleep aide  (1)   Occupation: Retired (1)   Social History:     x 2. Lives alone. Independent in ADL's. (1)           Allergies:  ·  Ionic contrasts : Other, Rash, Itching  ·  contrast  (specific type unknown): Unknown  ·  Lipitor : Unknown      Body Images (can only annotate once):    Adult/Child/Feet Image:          ·  Image Comments Right buttock- 1 cm epidermal erosion, tender, no drainage       Assessment:    82 year old male sitting in chair, preparing to discharge, c/o tender buttock. Supplied waffle seat cushion, replaced sacral mepilex, educated on repositioning every  2 hours.    Right buttock- 1 cm epidermal erosion, tender, no drainage    Nursing updated.  Doc halo with questions.    Plan of Care Reviewed With:  Plan of Care Reviewed With: patient     Consult Status:  Consult Order ID: [id number]       Electronic Signatures:  Ashtyn Shah (DAJA)  (Signed 01-Jun-2023 15:27)   Authored: Service, Review Family/Social History and ROS,  Allergies, Body Images (can only annotate once), Assessment/Recommendations, Note Completion      Last Updated: 01-Jun-2023 15:27 by Ashtyn Shah (RN)    References:  1.  Data Referenced From \"Consult-Medicine\" 31-May-2023 14:25   "

## 2024-04-19 NOTE — PROGRESS NOTES
Occupational Therapy    Occupational Therapy Treatment    Name: Abraham Rodriguez  MRN: [id number]  : 1940  Date: 24  Time Calculation  Start Time: 1005  Stop Time: 1055  Time Calculation (min): 50 min    Assessment:  End of Session Communication: Bedside nurse, Physician  End of Session Patient Position: Alarm on, Up in chair  Plan:  Treatment Interventions: ADL retraining, Functional transfer training, Endurance training, Neuromuscular reeducation  OT Frequency: 5 times per week  OT Discharge Recommendations: Low intensity level of continued care  OT Recommended Transfer Status: Assist of 1  OT - OK to Discharge: Yes, Based on completed evaluation and care plan recommendations, no barriers to discharge to next site of care     Subjective   Previous Visit Info:  OT Last Visit  OT Received On: 24  General:  General  Prior to Session Communication: Bedside nurse  Patient Position Received: Up in chair, Alarm on  General Comment: Nursing reported increased edema in leg, not resolving with standard compression, elevation, ice.  Precautions:  LE Weight Bearing Status: Weight Bearing as Tolerated  Medical Precautions: Fall precautions  Precautions Comment: s/p RTKR 1 month ago     Pain Assessment:  Pain Assessment  Pain Assessment: 0-10  Pain Score: 10 - Worst possible pain  Pain Location: Knee  Pain Orientation: Right  Patient's Stated Pain Goal: 8  Pain Interventions: Cold applied, Repositioned, Ambulation/increased activity, Distraction  Response to Interventions: no change per patient     Objective   Activities of Daily Living: Grooming  Grooming Level of Assistance: Distant supervision  Grooming Where Assessed: Standing sinkside    LE Dressing  LE Dressing Adaptive Equipment: Reacher  Pants Level of Assistance: Distant supervision, Setup  Shoe Level of Assistance: Maximum assistance  LE Dressing Where Assessed: Edge of bed  LE Dressing Comments: difficulty d/t edema in legs. Applied thigh high milind hose  on RLE and knee high on LLE. Unable to fit shoe on feet d/t edema. Requested patient ask daughter to bring in alternate shoes.    Toileting  Toileting Level of Assistance: Modified independent  Where Assessed: Toilet     Bed Mobility/Transfers: Bed Mobility 1  Bed Mobility 1: Supine to sitting, Sitting to supine  Level of Assistance 1: Modified independent    Transfer 1  Transfer From 1: Bed to  Transfer to 1: Chair with arms  Transfer Device 1: Walker  Transfer Level of Assistance 1: Modified independent  Transfers 2  Transfer From 2: Chair with arms to  Transfer to 2: Bed  Transfer Device 2: Walker  Transfer Level of Assistance 2: Modified independent    Toilet Transfers  Toilet Transfer From: Bed  Toilet Transfer to: Raised toilet seat with rails  Toilet Transfers: Modified independence    Functional Mobility:  Functional Mobility 1  Surface 1: Level tile  Device 1: Rolling walker  Assistance 1: Distant supervision  Comments 1: cues for posture, pace and sequence     Other Activity:  Other Activity  Other Activity Performed: Yes  Other Activity 1: assessed volume in BLE. Pt having increased edema, pitting in B feet and lower legs, pitting in R thigh and apparent edema in trunk. Reviewed weight and pt had weight gain of 14# from 4/15 to 4/19. Discussed with nursing and reported to MD. Applied thigh high milind hose to RLE    Outcome Measures:  [facility] Daily Activity  Putting on and taking off regular lower body clothing: A lot  Bathing (including washing, rinsing, drying): A little  Putting on and taking off regular upper body clothing: A little  Toileting, which includes using toilet, bedpan or urinal: None  Taking care of personal grooming such as brushing teeth: A little  Eating Meals: None  Daily Activity - Total Score: 19    Goals:  Encounter Problems       Encounter Problems (Active)       ADLs       Patient will perform UB and LB bathing with modified independent level of assistance and grab bars and shower  chair.       Start:  04/16/24    Expected End:  04/23/24            Patient with complete lower body dressing with modified independent level of assistance donning and doffing all LE clothes  with PRN adaptive equipment while supported sitting and standing. (Progressing)       Start:  04/16/24    Expected End:  04/23/24            Patient will complete toileting including hygiene clothing management/hygiene with modified independent level of assistance.       Start:  04/16/24    Expected End:  04/23/24               BALANCE       Pt will maintain dynamic standing balance during ADL task with modified independent level of assistance in order to demonstrate decreased risk of falling and improved postural control. (Progressing)       Start:  04/16/24    Expected End:  04/23/24               MOBILITY       Patient will perform Functional mobility mod  Household distances/Community Distances with modified independent level of assistance and front wheeled walker in order to improve safety and functional mobility. (Progressing)       Start:  04/16/24    Expected End:  04/23/24               TRANSFERS       Patient will complete sit to stand transfer from all surfaces including chair, car, and tub transfer bench with modified independent level of assistance and front wheeled walker in order to improve safety and prepare for safe mobility. (Progressing)       Start:  04/16/24    Expected End:  04/23/24

## 2024-04-19 NOTE — CARE PLAN
"The patient's goals for the shift include  decrease my pain    The clinical goals for the shift include Pt will utilize non narcotics for pain control    Problem: Pain - Adult  Goal: Verbalizes/displays adequate comfort level or baseline comfort level  Outcome: Not Progressing   Patient c/o at a constant 10/10, never lets up. Ice was soothing but did not help with pain. Encouraged to elevate bilateral legs in recliner with little effect, right BLE +3 and left BLE +2\"I did that earlier today\" Attempted to educate regarding edema and elevation and patient states \"Yes, I know, I do what I am told\". Offered ice and stated \"I did that 4 times today, Ill do it again tomorrow\".  Patient is cooperative and pleasant and very talkative. Denies cough or congestion, constipation or diarrhea. Declined colace d/t \"soft stools\". Patient in bed with HOB elevated to almost 90 degrees due to patient feels like he cannot breathe if laying flatter. Encouraged to reposition with little effect. Patient incontinent of urine at times, utilizes pull ups.  "

## 2024-04-19 NOTE — PROGRESS NOTES
Physical Therapy    Physical Therapy Treatment    Patient Name: Abraham Rodriguez  MRN: [id number]  Today's Date: 4/19/2024  Time Calculation  Start Time: 0906  Stop Time: 0944  Time Calculation (min): 38 min       Assessment/Plan   PT Assessment  End of Session Communication: Bedside nurse, PCT/NA/CTA  Assessment Comment: Session limited by pt requesting to use the restroom and by pt hyperfocusing on bowel movements. Pt needing frequent redirecting. PT also spent time edu pt on realistic timeline for progression of ADs and for DC d/t pt anticipating that he will not be leaving until he can ambulate w/o an AD. Pt also hyperfocusing on knee pain stating that he felt he did too much yesterday and is feeling the effects of the pain. Ice given to pt at end of session to assist w pain management.  End of Session Patient Position: Up in chair, Alarm on     PT Plan  Treatment/Interventions: Bed mobility, Transfer training, Gait training, Stair training, Balance training, Neuromuscular re-education, Strengthening, Endurance training, Range of motion, Therapeutic exercise, Therapeutic activity  PT Plan: Skilled PT  PT Frequency: 6 times per week  PT Discharge Recommendations: Low intensity level of continued care  PT Recommended Transfer Status: Stand by assist  PT - OK to Discharge: Yes      General Visit Information:   PT  Visit  PT Received On: 04/19/24  Response to Previous Treatment: Patient reporting fatigue but able to participate.  General  Missed Visit: Yes  Missed Visit Reason: Other (Comment) (Pt currently using bathroom and requesting moderate amount of time to finish before participating in therapy)  Prior to Session Communication: Bedside nurse, PCT/NA/CTA  Patient Position Received: Up in chair, Alarm on  General Comment: Pt agreeable to session but reporting that his stomache is not feeling well this AM.    Subjective   Precautions:  Precautions  LE Weight Bearing Status: Weight Bearing as Tolerated  Medical  "Precautions: Fall precautions  Post-Surgical Precautions: Right total knee precautions      Objective   Pain:  Pain Assessment  Pain Assessment: 0-10  Pain Score: 8  Pain Type: Surgical pain  Pain Location: Knee  Pain Orientation: Right    Treatments:  Therapeutic Exercise  Therapeutic Exercise Activity 1: PT assisted R knee ROM 3x30 sec while seated. Pt reporting mod amount of \"pulling\" in anterior thigh. Good amount of knee flexion noted.    Ambulation/Gait Training  Ambulation/Gait Training Performed: Yes  Ambulation/Gait Training 1  Surface 1: Level tile  Device 1: Rolling walker  Gait Support Devices: Gait belt  Assistance 1: Modified independent  Quality of Gait 1: Diminished heel strike, Forward flexed posture  Comments/Distance (ft) 1: 1x >200ft. Ambulation trial terminated by pt needing to use the restroom.  Ambulation/Gait Training 2  Surface 2: Level tile  Device 2: Single point cane  Gait Support Devices: Gait belt  Assistance 2: Contact guard  Quality of Gait 2: Diminished heel strike, Forward flexed posture  Comments/Distance (ft) 2: 1x 50 ft. moderate ampunt of vcs for sequencing. one LOB noted when pt turned to sit in chair requiring mod A from PT to recover.    Transfer: Yes  Transfer 1  Transfer From 1: Chair with arms to  Transfer to 1: Stand  Technique 1: Sit to stand, Stand to sit  Transfer Device 1: Walker, Cane  Transfer Level of Assistance 1: Modified independent  Transfers 2  Transfer From 2: Toilet to  Transfer to 2: Stand, Sit  Technique 2: Sit to stand, Stand to sit  Transfer Device 2: Walker  Transfer Level of Assistance 2: Modified independent      Outcome Measures:  [facility] Basic Mobility  Turning from your back to your side while in a flat bed without using bedrails: None  Moving from lying on your back to sitting on the side of a flat bed without using bedrails: None  Moving to and from bed to chair (including a wheelchair): None  Standing up from a chair using your arms (e.g. " wheelchair or bedside chair): None  To walk in hospital room: None  Climbing 3-5 steps with railing: A little  Basic Mobility - Total Score: 23    Education Documentation  No documentation found.  Education Comments  No comments found.      Encounter Problems       Encounter Problems (Active)       PT Problem       pt will be able to ambulate community distances with LRAD and modified independent  (Progressing)       Start:  04/16/24    Expected End:  05/07/24            Pt will be able to perform STS transfer from all surfaces with independence and without extending RLE  (Progressing)       Start:  04/16/24    Expected End:  05/07/24            Pt will be able to navigate 4 steps with hand rail on one side(s) and modified independent to allow for safe DC.   (Progressing)       Start:  04/16/24    Expected End:  05/07/24            Pt will improve R knee flexion to >100 degrees (Progressing)       Start:  04/16/24    Expected End:  05/07/24            Pt will be able to perform all aspects of bed mobility with independent  (Progressing)       Start:  04/16/24    Expected End:  05/07/24            pt will report pain level </= to 5/10 (Progressing)       Start:  04/16/24    Expected End:  05/07/24               Pain - Adult

## 2024-04-19 NOTE — PROGRESS NOTES
Physical Therapy    Physical Therapy Treatment    Patient Name: Abraham Rodriguez  MRN: [id number]  Today's Date: 4/19/2024  Time Calculation  Start Time: 1304  Stop Time: 1343  Time Calculation (min): 39 min       Assessment/Plan   PT Assessment  PT Assessment Results: Decreased strength, Decreased range of motion, Decreased endurance, Impaired balance, Decreased mobility, Pain  End of Session Communication: Bedside nurse, PCT/NA/KATHERIN  Assessment Comment: Patient was able to complete various strengthening interventions well with only occasional rest breaks d/t fatigue. Reports R knee felt good post session.  End of Session Patient Position: Up in chair, Alarm on     PT Plan  Treatment/Interventions: Bed mobility, Transfer training, Gait training, Stair training, Balance training, Neuromuscular re-education, Strengthening, Endurance training, Range of motion, Therapeutic exercise, Therapeutic activity  PT Plan: Skilled PT  PT Frequency: 6 times per week  PT Discharge Recommendations: Low intensity level of continued care  PT Recommended Transfer Status: Stand by assist  PT - OK to Discharge: Yes      General Visit Information:   PT  Visit  PT Received On: 04/19/24  Response to Previous Treatment: Patient reporting fatigue but able to participate.  General  Prior to Session Communication: PCT/NA/CTA, Bedside nurse  Patient Position Received: Up in chair, Alarm on  General Comment: Patient initially leary on going out of room d/t frequent trips to restroom so far today, but able to encourage to go down into therapy gym    Subjective   Precautions:  Precautions  LE Weight Bearing Status: Weight Bearing as Tolerated  Medical Precautions: Fall precautions  Post-Surgical Precautions: Right total knee precautions  Precautions Comment: s/p RTKR 1 month ago      Objective   Pain:  Pain Assessment  Pain Assessment: 0-10  Pain Score:  (patient did not rate any pain this afternoon, nursing had reported patient had just been given  "medication)  Cognition:  Cognition  Overall Cognitive Status: Within Functional Limits    Extremity/Trunk Assessments:  AROM RLE (degrees)  R Knee Flexion 0-130: 95  R Knee Extension 0-130: -3    Treatments:  Therapeutic Exercise  Therapeutic Exercise Performed: Yes  Therapeutic Exercise Activity 1: Supine mini bridges x10  Therapeutic Exercise Activity 2: Supine SAQ over bolster into SLR 2x10 (focus on maintaining quad contraction + knee extension)  Therapeutic Exercise Activity 3: Standing hamstring curls 2x10  Therapeutic Exercise Activity 4: Standing hip ext 2x10  Therapeutic Exercise Activity 5: Standing calf raises 2x10  Therapeutic Exercise Activity 6: Standing alt marches 2x10  Patient taking rest breaks when needed d/t muscular fatigue       Bed Mobility  Bed Mobility: Yes  Bed Mobility 1  Bed Mobility 1: Supine to sitting, Sitting to supine  Level of Assistance 1: Modified independent  Bed Mobility Comments 1: completed on therapy mat table with no bed rails/elevation available    Ambulation/Gait Training  Ambulation/Gait Training Performed: Yes  Ambulation/Gait Training 1  Surface 1: Level tile  Device 1: Rolling walker  Gait Support Devices: Gait belt  Assistance 1: Modified independent  Quality of Gait 1: Diminished heel strike, Forward flexed posture  Comments/Distance (ft) 1: [id number]  Transfers  Transfer: Yes  Transfer 1  Transfer From 1: Chair with arms to  Transfer to 1: Stand  Technique 1: Sit to stand, Stand to sit  Transfer Device 1: Walker, Gait belt  Transfer Level of Assistance 1: Modified independent    Stairs  Stairs: Yes  Stairs  Rails 1: Right  Device 1: Railing, Single point cane  Support Devices 1: Gait belt  Assistance 1: Close supervision  Comment/Number of Steps 1: x8 6\" steps (ascending/descending) (patient required cuing for correct sequencing with use of cane, but able to verbalize good understanding of LE sequencing)    Outcome Measures:  [facility] Basic Mobility  Turning from your " back to your side while in a flat bed without using bedrails: None  Moving from lying on your back to sitting on the side of a flat bed without using bedrails: None  Moving to and from bed to chair (including a wheelchair): None  Standing up from a chair using your arms (e.g. wheelchair or bedside chair): None  To walk in hospital room: None  Climbing 3-5 steps with railing: A little  Basic Mobility - Total Score: 23      Education Comments  Education to patient on keeping RLE moving throughout the day to reduce stiffness (seated knee flexion/extension + ankle pumps)           Encounter Problems       Encounter Problems (Active)       PT Problem       pt will be able to ambulate community distances with LRAD and modified independent  (Progressing)       Start:  04/16/24    Expected End:  05/07/24            Pt will be able to perform STS transfer from all surfaces with independence and without extending RLE  (Progressing)       Start:  04/16/24    Expected End:  05/07/24            Pt will be able to navigate 4 steps with hand rail on one side(s) and modified independent to allow for safe DC.   (Progressing)       Start:  04/16/24    Expected End:  05/07/24            Pt will improve R knee flexion to >100 degrees (Progressing)       Start:  04/16/24    Expected End:  05/07/24            Pt will be able to perform all aspects of bed mobility with independent  (Progressing)       Start:  04/16/24    Expected End:  05/07/24            pt will report pain level </= to 5/10 (Progressing)       Start:  04/16/24    Expected End:  05/07/24               Pain - Adult

## 2024-04-20 LAB — BACTERIA BLD CULT: NORMAL

## 2024-04-20 PROCEDURE — 97116 GAIT TRAINING THERAPY: CPT | Mod: GP,CQ

## 2024-04-20 PROCEDURE — 1900000001 HC SKILLED SWING ROOM

## 2024-04-20 PROCEDURE — 1090000002 HH PPS REVENUE DEBIT

## 2024-04-20 PROCEDURE — 2500000004 HC RX 250 GENERAL PHARMACY W/ HCPCS (ALT 636 FOR OP/ED): Performed by: STUDENT IN AN ORGANIZED HEALTH CARE EDUCATION/TRAINING PROGRAM

## 2024-04-20 PROCEDURE — 2500000004 HC RX 250 GENERAL PHARMACY W/ HCPCS (ALT 636 FOR OP/ED): Performed by: NURSE PRACTITIONER

## 2024-04-20 PROCEDURE — 2500000001 HC RX 250 WO HCPCS SELF ADMINISTERED DRUGS (ALT 637 FOR MEDICARE OP): Performed by: NURSE PRACTITIONER

## 2024-04-20 PROCEDURE — 2500000006 HC RX 250 W HCPCS SELF ADMINISTERED DRUGS (ALT 637 FOR ALL PAYERS): Performed by: STUDENT IN AN ORGANIZED HEALTH CARE EDUCATION/TRAINING PROGRAM

## 2024-04-20 PROCEDURE — 1090000001 HH PPS REVENUE CREDIT

## 2024-04-20 PROCEDURE — 2500000001 HC RX 250 WO HCPCS SELF ADMINISTERED DRUGS (ALT 637 FOR MEDICARE OP)

## 2024-04-20 PROCEDURE — 97110 THERAPEUTIC EXERCISES: CPT | Mod: GP,CQ

## 2024-04-20 PROCEDURE — 94760 N-INVAS EAR/PLS OXIMETRY 1: CPT | Mod: MUE

## 2024-04-20 PROCEDURE — 2500000001 HC RX 250 WO HCPCS SELF ADMINISTERED DRUGS (ALT 637 FOR MEDICARE OP): Performed by: INTERNAL MEDICINE

## 2024-04-20 PROCEDURE — 2500000001 HC RX 250 WO HCPCS SELF ADMINISTERED DRUGS (ALT 637 FOR MEDICARE OP): Performed by: STUDENT IN AN ORGANIZED HEALTH CARE EDUCATION/TRAINING PROGRAM

## 2024-04-20 RX ORDER — TALC
3 POWDER (GRAM) TOPICAL NIGHTLY PRN
Status: DISCONTINUED | OUTPATIENT
Start: 2024-04-20 | End: 2024-04-25 | Stop reason: HOSPADM

## 2024-04-20 RX ADMIN — PANTOPRAZOLE SODIUM 40 MG: 40 TABLET, DELAYED RELEASE ORAL at 06:04

## 2024-04-20 RX ADMIN — Medication 1 SPRAY: at 08:27

## 2024-04-20 RX ADMIN — DOXYCYCLINE HYCLATE 100 MG: 100 TABLET, COATED ORAL at 08:27

## 2024-04-20 RX ADMIN — OXYCODONE HYDROCHLORIDE 5 MG: 5 TABLET ORAL at 11:12

## 2024-04-20 RX ADMIN — OXYCODONE HYDROCHLORIDE 5 MG: 5 TABLET ORAL at 06:04

## 2024-04-20 RX ADMIN — FUROSEMIDE 40 MG: 40 TABLET ORAL at 08:27

## 2024-04-20 RX ADMIN — MELATONIN TAB 3 MG 3 MG: 3 TAB at 23:34

## 2024-04-20 RX ADMIN — OXYCODONE HYDROCHLORIDE AND ACETAMINOPHEN 500 MG: 500 TABLET ORAL at 20:03

## 2024-04-20 RX ADMIN — ACETAMINOPHEN 975 MG: 325 TABLET ORAL at 00:12

## 2024-04-20 RX ADMIN — OXYCODONE HYDROCHLORIDE 5 MG: 5 TABLET ORAL at 20:10

## 2024-04-20 RX ADMIN — ENOXAPARIN SODIUM 40 MG: 100 INJECTION SUBCUTANEOUS at 18:04

## 2024-04-20 RX ADMIN — GABAPENTIN 300 MG: 300 CAPSULE ORAL at 20:03

## 2024-04-20 RX ADMIN — GABAPENTIN 300 MG: 300 CAPSULE ORAL at 08:27

## 2024-04-20 RX ADMIN — GABAPENTIN 300 MG: 300 CAPSULE ORAL at 15:46

## 2024-04-20 RX ADMIN — SIMETHICONE 80 MG: 80 TABLET, CHEWABLE ORAL at 11:39

## 2024-04-20 RX ADMIN — DOXYCYCLINE HYCLATE 100 MG: 100 TABLET, COATED ORAL at 20:03

## 2024-04-20 RX ADMIN — AMOXICILLIN AND CLAVULANATE POTASSIUM 1 TABLET: 875; 125 TABLET, FILM COATED ORAL at 20:03

## 2024-04-20 RX ADMIN — OXYCODONE HYDROCHLORIDE AND ACETAMINOPHEN 500 MG: 500 TABLET ORAL at 08:28

## 2024-04-20 RX ADMIN — Medication 1 SPRAY: at 20:03

## 2024-04-20 RX ADMIN — OXYCODONE HYDROCHLORIDE 5 MG: 5 TABLET ORAL at 15:46

## 2024-04-20 RX ADMIN — ACETAMINOPHEN 975 MG: 325 TABLET ORAL at 23:54

## 2024-04-20 RX ADMIN — ACETAMINOPHEN 975 MG: 325 TABLET ORAL at 15:46

## 2024-04-20 RX ADMIN — TAMSULOSIN HYDROCHLORIDE 0.4 MG: 0.4 CAPSULE ORAL at 20:03

## 2024-04-20 RX ADMIN — METOPROLOL SUCCINATE 50 MG: 50 TABLET, EXTENDED RELEASE ORAL at 08:27

## 2024-04-20 RX ADMIN — ASPIRIN 81 MG: 81 TABLET, COATED ORAL at 08:28

## 2024-04-20 RX ADMIN — AMOXICILLIN AND CLAVULANATE POTASSIUM 1 TABLET: 875; 125 TABLET, FILM COATED ORAL at 08:27

## 2024-04-20 RX ADMIN — LOSARTAN POTASSIUM 50 MG: 50 TABLET, FILM COATED ORAL at 08:28

## 2024-04-20 RX ADMIN — FINASTERIDE 5 MG: 5 TABLET, FILM COATED ORAL at 20:03

## 2024-04-20 RX ADMIN — LORATADINE 10 MG: 10 TABLET ORAL at 08:27

## 2024-04-20 RX ADMIN — DOCUSATE SODIUM 100 MG: 100 CAPSULE, LIQUID FILLED ORAL at 20:03

## 2024-04-20 RX ADMIN — ACETAMINOPHEN 975 MG: 325 TABLET ORAL at 08:27

## 2024-04-20 ASSESSMENT — PAIN - FUNCTIONAL ASSESSMENT
PAIN_FUNCTIONAL_ASSESSMENT: 0-10

## 2024-04-20 ASSESSMENT — COGNITIVE AND FUNCTIONAL STATUS - GENERAL
CLIMB 3 TO 5 STEPS WITH RAILING: A LITTLE
DAILY ACTIVITIY SCORE: 20
DRESSING REGULAR UPPER BODY CLOTHING: A LITTLE
HELP NEEDED FOR BATHING: A LITTLE
MOBILITY SCORE: 23
MOBILITY SCORE: 23
DRESSING REGULAR LOWER BODY CLOTHING: A LOT
CLIMB 3 TO 5 STEPS WITH RAILING: A LITTLE

## 2024-04-20 ASSESSMENT — PAIN DESCRIPTION - LOCATION
LOCATION: KNEE
LOCATION: KNEE

## 2024-04-20 ASSESSMENT — PAIN DESCRIPTION - ORIENTATION
ORIENTATION: RIGHT
ORIENTATION: RIGHT

## 2024-04-20 NOTE — CARE PLAN
The patient's goals for the shift include      The clinical goals for the shift include Patient will have decreased pain through 1900    Patient with uneventful day. Ambulates with walker SBA. Continent of B&B. Medicated for pain per MAR. Patient states pain is always at a 10 or higher. Participated with therapy. Patient dtr and great grandson in to visit. Enjoys watching car shows. Patient resting in recliner, call button in reach.

## 2024-04-20 NOTE — PROGRESS NOTES
Physical Therapy    Physical Therapy Treatment    Patient Name: Abraham Rodriguez  MRN: [id number]  Today's Date: 4/20/2024  Time Calculation  Start Time: 0903  Stop Time: 0943  Time Calculation (min): 40 min       Assessment/Plan   PT Assessment  PT Assessment Results: Decreased strength, Decreased range of motion, Decreased endurance, Impaired balance, Decreased mobility, Pain  Evaluation/Treatment Tolerance: Patient tolerated treatment well  End of Session Communication: Bedside nurse  Assessment Comment: Patient continues to be challenged by functional strengthening/ROM exercises. Overall doing well with amb/transfers  End of Session Patient Position: Up in chair, Alarm on     PT Plan  Treatment/Interventions: Bed mobility, Transfer training, Gait training, Stair training, Balance training, Neuromuscular re-education, Strengthening, Endurance training, Range of motion, Therapeutic exercise, Therapeutic activity  PT Plan: Skilled PT  PT Frequency: 6 times per week  PT Discharge Recommendations: Low intensity level of continued care  PT Recommended Transfer Status: Stand by assist  PT - OK to Discharge: Yes      General Visit Information:   PT  Visit  PT Received On: 04/20/24  Response to Previous Treatment: Patient reporting fatigue but able to participate.  General  Prior to Session Communication: Bedside nurse  Patient Position Received: Up in chair, Alarm on  General Comment: Patient having c/o increased swelling this morning when getting up (noted to be normal amount of swelling, patient states it has gone down some)    Subjective   Precautions:  Precautions  LE Weight Bearing Status: Weight Bearing as Tolerated  Medical Precautions: Fall precautions  Post-Surgical Precautions: Right total knee precautions  Precautions Comment: s/p RTKR 1 month ago (applied compression socks)      Objective   Pain:  Pain Assessment  Pain Assessment: 0-10  Pain Score: 10 - Worst possible pain  Pain Location: Knee  Pain Orientation:  "Right  Pain Interventions: Cold applied, Elevated  Cognition:  Cognition  Overall Cognitive Status: Within Functional Limits  Postural Control:  Static Sitting Balance  Static Sitting-Balance Support: No upper extremity supported  Static Sitting-Level of Assistance: Modified independent  Static Sitting-Comment/Number of Minutes: x3 min while performing tasks at sink    Treatments:  Therapeutic Exercise  Therapeutic Exercise Performed: Yes  Therapeutic Exercise Activity 1: 6\" forward step ups 2x10  Therapeutic Exercise Activity 2: 6\" lateral step ups R x10  Therapeutic Exercise Activity 3: Standing TKE GTB 2x10  Therapeutic Exercise Activity 4: Seated knee flexion with 3 sec hold 3x10    Patient taking rest breaks throughout as needed d/t muscle fatigue and reports of occasional increase in pain/tightness     Ambulation/Gait Training  Ambulation/Gait Training Performed: Yes  Ambulation/Gait Training 1  Surface 1: Level tile  Device 1: Rolling walker  Gait Support Devices: Gait belt  Assistance 1: Modified independent  Quality of Gait 1: Diminished heel strike, Forward flexed posture  Comments/Distance (ft) 1: x10ft, x15ft ,[id number]  Transfers  Transfer: Yes  Transfer 1  Transfer From 1: Chair with arms to  Transfer to 1: Stand  Technique 1: Sit to stand, Stand to sit  Transfer Device 1: Walker  Transfer Level of Assistance 1: Modified independent  Trials/Comments 1: many STS trials completed throughout session    Outcome Measures:  [facility] Basic Mobility  Turning from your back to your side while in a flat bed without using bedrails: None  Moving from lying on your back to sitting on the side of a flat bed without using bedrails: None  Moving to and from bed to chair (including a wheelchair): None  Standing up from a chair using your arms (e.g. wheelchair or bedside chair): None  To walk in hospital room: None  Climbing 3-5 steps with railing: A little  Basic Mobility - Total Score: 23      Encounter Problems       " Encounter Problems (Active)       PT Problem       pt will be able to ambulate community distances with LRAD and modified independent  (Progressing)       Start:  04/16/24    Expected End:  04/22/24            Pt will be able to perform STS transfer from all surfaces with independence and without extending RLE  (Progressing)       Start:  04/16/24    Expected End:  04/22/24            Pt will be able to navigate 4 steps with hand rail on one side(s) and modified independent to allow for safe DC.   (Progressing)       Start:  04/16/24    Expected End:  04/22/24            Pt will improve R knee flexion to >100 degrees (Progressing)       Start:  04/16/24    Expected End:  04/22/24            Pt will be able to perform all aspects of bed mobility with independent  (Progressing)       Start:  04/16/24    Expected End:  04/22/24            pt will report pain level </= to 5/10 (Progressing)       Start:  04/16/24    Expected End:  04/22/24               Pain - Adult

## 2024-04-20 NOTE — CARE PLAN
"The patient's goals for the shift include      The clinical goals for the shift include Patient will have a decrease pain this shift    Over the shift, the patient did not make progress toward the following goals. Pt did not sleep much this shift and reported that his pain is always \"More than a 10\". This nurse woke pt up for morning medications, pt denies any needs.   "

## 2024-04-21 PROCEDURE — 94760 N-INVAS EAR/PLS OXIMETRY 1: CPT

## 2024-04-21 PROCEDURE — 1090000002 HH PPS REVENUE DEBIT

## 2024-04-21 PROCEDURE — 1090000001 HH PPS REVENUE CREDIT

## 2024-04-21 PROCEDURE — 2500000004 HC RX 250 GENERAL PHARMACY W/ HCPCS (ALT 636 FOR OP/ED): Performed by: NURSE PRACTITIONER

## 2024-04-21 PROCEDURE — 1090000003 HH PPS REVENUE ADJ

## 2024-04-21 PROCEDURE — 2500000001 HC RX 250 WO HCPCS SELF ADMINISTERED DRUGS (ALT 637 FOR MEDICARE OP): Performed by: STUDENT IN AN ORGANIZED HEALTH CARE EDUCATION/TRAINING PROGRAM

## 2024-04-21 PROCEDURE — 2500000004 HC RX 250 GENERAL PHARMACY W/ HCPCS (ALT 636 FOR OP/ED): Performed by: STUDENT IN AN ORGANIZED HEALTH CARE EDUCATION/TRAINING PROGRAM

## 2024-04-21 PROCEDURE — 2500000001 HC RX 250 WO HCPCS SELF ADMINISTERED DRUGS (ALT 637 FOR MEDICARE OP): Performed by: NURSE PRACTITIONER

## 2024-04-21 PROCEDURE — 1900000001 HC SKILLED SWING ROOM

## 2024-04-21 PROCEDURE — 2500000006 HC RX 250 W HCPCS SELF ADMINISTERED DRUGS (ALT 637 FOR ALL PAYERS): Performed by: STUDENT IN AN ORGANIZED HEALTH CARE EDUCATION/TRAINING PROGRAM

## 2024-04-21 PROCEDURE — 2500000001 HC RX 250 WO HCPCS SELF ADMINISTERED DRUGS (ALT 637 FOR MEDICARE OP): Performed by: INTERNAL MEDICINE

## 2024-04-21 RX ORDER — METHOCARBAMOL 500 MG/1
500 TABLET, FILM COATED ORAL EVERY 8 HOURS PRN
Status: DISCONTINUED | OUTPATIENT
Start: 2024-04-21 | End: 2024-04-25 | Stop reason: HOSPADM

## 2024-04-21 RX ADMIN — ACETAMINOPHEN 975 MG: 325 TABLET ORAL at 16:39

## 2024-04-21 RX ADMIN — LOSARTAN POTASSIUM 50 MG: 50 TABLET, FILM COATED ORAL at 08:11

## 2024-04-21 RX ADMIN — FINASTERIDE 5 MG: 5 TABLET, FILM COATED ORAL at 20:32

## 2024-04-21 RX ADMIN — OXYCODONE HYDROCHLORIDE 5 MG: 5 TABLET ORAL at 16:39

## 2024-04-21 RX ADMIN — METHOCARBAMOL 500 MG: 500 TABLET ORAL at 22:09

## 2024-04-21 RX ADMIN — METOPROLOL SUCCINATE 50 MG: 50 TABLET, EXTENDED RELEASE ORAL at 08:11

## 2024-04-21 RX ADMIN — Medication 1 SPRAY: at 20:33

## 2024-04-21 RX ADMIN — OXYCODONE HYDROCHLORIDE AND ACETAMINOPHEN 500 MG: 500 TABLET ORAL at 08:11

## 2024-04-21 RX ADMIN — OXYCODONE HYDROCHLORIDE 5 MG: 5 TABLET ORAL at 11:49

## 2024-04-21 RX ADMIN — ENOXAPARIN SODIUM 40 MG: 100 INJECTION SUBCUTANEOUS at 17:23

## 2024-04-21 RX ADMIN — OXYCODONE HYDROCHLORIDE 5 MG: 5 TABLET ORAL at 20:32

## 2024-04-21 RX ADMIN — LORATADINE 10 MG: 10 TABLET ORAL at 08:11

## 2024-04-21 RX ADMIN — TAMSULOSIN HYDROCHLORIDE 0.4 MG: 0.4 CAPSULE ORAL at 20:32

## 2024-04-21 RX ADMIN — OXYCODONE HYDROCHLORIDE 5 MG: 5 TABLET ORAL at 14:20

## 2024-04-21 RX ADMIN — FUROSEMIDE 40 MG: 40 TABLET ORAL at 08:11

## 2024-04-21 RX ADMIN — ACETAMINOPHEN 975 MG: 325 TABLET ORAL at 08:11

## 2024-04-21 RX ADMIN — OXYCODONE HYDROCHLORIDE 5 MG: 5 TABLET ORAL at 06:04

## 2024-04-21 RX ADMIN — PANTOPRAZOLE SODIUM 40 MG: 40 TABLET, DELAYED RELEASE ORAL at 06:04

## 2024-04-21 RX ADMIN — GABAPENTIN 300 MG: 300 CAPSULE ORAL at 08:11

## 2024-04-21 RX ADMIN — SIMETHICONE 80 MG: 80 TABLET, CHEWABLE ORAL at 09:00

## 2024-04-21 RX ADMIN — GABAPENTIN 300 MG: 300 CAPSULE ORAL at 14:20

## 2024-04-21 RX ADMIN — ASPIRIN 81 MG: 81 TABLET, COATED ORAL at 08:11

## 2024-04-21 RX ADMIN — Medication 1 SPRAY: at 08:10

## 2024-04-21 RX ADMIN — OXYCODONE HYDROCHLORIDE AND ACETAMINOPHEN 500 MG: 500 TABLET ORAL at 20:32

## 2024-04-21 RX ADMIN — GABAPENTIN 300 MG: 300 CAPSULE ORAL at 20:32

## 2024-04-21 ASSESSMENT — PAIN SCALES - GENERAL
PAINLEVEL_OUTOF10: 10 - WORST POSSIBLE PAIN
PAINLEVEL_OUTOF10: 0 - NO PAIN
PAINLEVEL_OUTOF10: 10 - WORST POSSIBLE PAIN

## 2024-04-21 ASSESSMENT — COGNITIVE AND FUNCTIONAL STATUS - GENERAL
PERSONAL GROOMING: A LITTLE
DRESSING REGULAR LOWER BODY CLOTHING: A LITTLE
MOBILITY SCORE: 22
CLIMB 3 TO 5 STEPS WITH RAILING: A LOT
HELP NEEDED FOR BATHING: A LITTLE
DAILY ACTIVITIY SCORE: 21

## 2024-04-21 ASSESSMENT — PAIN - FUNCTIONAL ASSESSMENT
PAIN_FUNCTIONAL_ASSESSMENT: 0-10
PAIN_FUNCTIONAL_ASSESSMENT: UNABLE TO SELF-REPORT
PAIN_FUNCTIONAL_ASSESSMENT: 0-10
PAIN_FUNCTIONAL_ASSESSMENT: UNABLE TO SELF-REPORT

## 2024-04-21 ASSESSMENT — PAIN DESCRIPTION - LOCATION
LOCATION: KNEE
LOCATION: KNEE

## 2024-04-21 ASSESSMENT — PAIN DESCRIPTION - ORIENTATION
ORIENTATION: RIGHT
ORIENTATION: RIGHT

## 2024-04-21 NOTE — CARE PLAN
The patient's goals for the shift include      The clinical goals for the shift include Patient will ambulate in [place] this shift with sba    Patient with uneventful shift. Continues to ambulate with walker and sba. Continent of B&B. Legs remain edematous. Medicated for pain per MAR. Patient reports unable to sleep much and feeling very tired and off today. Patient up in chair most of day. Patient did lay in bed for two hours and napped. Resting in recliner, call button in reach.

## 2024-04-21 NOTE — CARE PLAN
The patient's goals for the shift include      The clinical goals for the shift include Patient will get a restful nights sleep    Over the shift, the patient did make progress toward the following goals. Pt is up in chair, denies any pain or needs att.

## 2024-04-22 ENCOUNTER — APPOINTMENT (OUTPATIENT)
Dept: PAIN MEDICINE | Facility: CLINIC | Age: 84
End: 2024-04-22
Payer: MEDICARE

## 2024-04-22 PROCEDURE — 2500000001 HC RX 250 WO HCPCS SELF ADMINISTERED DRUGS (ALT 637 FOR MEDICARE OP): Performed by: NURSE PRACTITIONER

## 2024-04-22 PROCEDURE — 97535 SELF CARE MNGMENT TRAINING: CPT | Mod: GO

## 2024-04-22 PROCEDURE — 2500000001 HC RX 250 WO HCPCS SELF ADMINISTERED DRUGS (ALT 637 FOR MEDICARE OP): Performed by: STUDENT IN AN ORGANIZED HEALTH CARE EDUCATION/TRAINING PROGRAM

## 2024-04-22 PROCEDURE — 2500000006 HC RX 250 W HCPCS SELF ADMINISTERED DRUGS (ALT 637 FOR ALL PAYERS): Performed by: STUDENT IN AN ORGANIZED HEALTH CARE EDUCATION/TRAINING PROGRAM

## 2024-04-22 PROCEDURE — 2500000004 HC RX 250 GENERAL PHARMACY W/ HCPCS (ALT 636 FOR OP/ED): Performed by: STUDENT IN AN ORGANIZED HEALTH CARE EDUCATION/TRAINING PROGRAM

## 2024-04-22 PROCEDURE — 97116 GAIT TRAINING THERAPY: CPT | Mod: GP

## 2024-04-22 PROCEDURE — 2500000001 HC RX 250 WO HCPCS SELF ADMINISTERED DRUGS (ALT 637 FOR MEDICARE OP): Performed by: INTERNAL MEDICINE

## 2024-04-22 PROCEDURE — 97140 MANUAL THERAPY 1/> REGIONS: CPT | Mod: GP

## 2024-04-22 PROCEDURE — 97112 NEUROMUSCULAR REEDUCATION: CPT | Mod: GO

## 2024-04-22 PROCEDURE — 2500000004 HC RX 250 GENERAL PHARMACY W/ HCPCS (ALT 636 FOR OP/ED): Performed by: NURSE PRACTITIONER

## 2024-04-22 PROCEDURE — 97110 THERAPEUTIC EXERCISES: CPT | Mod: GP

## 2024-04-22 PROCEDURE — 1900000001 HC SKILLED SWING ROOM

## 2024-04-22 PROCEDURE — 94760 N-INVAS EAR/PLS OXIMETRY 1: CPT | Mod: MUE

## 2024-04-22 PROCEDURE — 97530 THERAPEUTIC ACTIVITIES: CPT | Mod: GP

## 2024-04-22 RX ADMIN — ACETAMINOPHEN 975 MG: 325 TABLET ORAL at 08:53

## 2024-04-22 RX ADMIN — OXYCODONE HYDROCHLORIDE 5 MG: 5 TABLET ORAL at 12:08

## 2024-04-22 RX ADMIN — PANTOPRAZOLE SODIUM 40 MG: 40 TABLET, DELAYED RELEASE ORAL at 07:13

## 2024-04-22 RX ADMIN — METHOCARBAMOL 500 MG: 500 TABLET ORAL at 15:34

## 2024-04-22 RX ADMIN — OXYCODONE HYDROCHLORIDE AND ACETAMINOPHEN 500 MG: 500 TABLET ORAL at 08:53

## 2024-04-22 RX ADMIN — LORATADINE 10 MG: 10 TABLET ORAL at 08:53

## 2024-04-22 RX ADMIN — SIMETHICONE 80 MG: 80 TABLET, CHEWABLE ORAL at 10:39

## 2024-04-22 RX ADMIN — OXYCODONE HYDROCHLORIDE 5 MG: 5 TABLET ORAL at 07:13

## 2024-04-22 RX ADMIN — LOSARTAN POTASSIUM 50 MG: 50 TABLET, FILM COATED ORAL at 08:53

## 2024-04-22 RX ADMIN — ENOXAPARIN SODIUM 40 MG: 100 INJECTION SUBCUTANEOUS at 18:33

## 2024-04-22 RX ADMIN — OXYCODONE HYDROCHLORIDE AND ACETAMINOPHEN 500 MG: 500 TABLET ORAL at 20:40

## 2024-04-22 RX ADMIN — FUROSEMIDE 40 MG: 40 TABLET ORAL at 12:08

## 2024-04-22 RX ADMIN — METHOCARBAMOL 500 MG: 500 TABLET ORAL at 07:13

## 2024-04-22 RX ADMIN — GABAPENTIN 300 MG: 300 CAPSULE ORAL at 15:34

## 2024-04-22 RX ADMIN — FINASTERIDE 5 MG: 5 TABLET, FILM COATED ORAL at 20:40

## 2024-04-22 RX ADMIN — ACETAMINOPHEN 975 MG: 325 TABLET ORAL at 16:46

## 2024-04-22 RX ADMIN — ACETAMINOPHEN 975 MG: 325 TABLET ORAL at 00:02

## 2024-04-22 RX ADMIN — OXYCODONE HYDROCHLORIDE 5 MG: 5 TABLET ORAL at 16:47

## 2024-04-22 RX ADMIN — METHOCARBAMOL 500 MG: 500 TABLET ORAL at 23:03

## 2024-04-22 RX ADMIN — METOPROLOL SUCCINATE 50 MG: 50 TABLET, EXTENDED RELEASE ORAL at 08:53

## 2024-04-22 RX ADMIN — DOCUSATE SODIUM 100 MG: 100 CAPSULE, LIQUID FILLED ORAL at 20:40

## 2024-04-22 RX ADMIN — ASPIRIN 81 MG: 81 TABLET, COATED ORAL at 08:53

## 2024-04-22 RX ADMIN — GABAPENTIN 300 MG: 300 CAPSULE ORAL at 08:53

## 2024-04-22 RX ADMIN — TAMSULOSIN HYDROCHLORIDE 0.4 MG: 0.4 CAPSULE ORAL at 20:40

## 2024-04-22 RX ADMIN — Medication 1 SPRAY: at 08:55

## 2024-04-22 RX ADMIN — Medication 1 SPRAY: at 20:40

## 2024-04-22 RX ADMIN — GABAPENTIN 300 MG: 300 CAPSULE ORAL at 20:40

## 2024-04-22 ASSESSMENT — COGNITIVE AND FUNCTIONAL STATUS - GENERAL
CLIMB 3 TO 5 STEPS WITH RAILING: A LITTLE
CLIMB 3 TO 5 STEPS WITH RAILING: A LITTLE
DRESSING REGULAR LOWER BODY CLOTHING: A LITTLE
CLIMB 3 TO 5 STEPS WITH RAILING: A LITTLE
MOBILITY SCORE: 23
HELP NEEDED FOR BATHING: A LITTLE
MOBILITY SCORE: 23
PERSONAL GROOMING: A LITTLE
HELP NEEDED FOR BATHING: A LITTLE
MOBILITY SCORE: 23
DRESSING REGULAR LOWER BODY CLOTHING: A LITTLE
DAILY ACTIVITIY SCORE: 21
DAILY ACTIVITIY SCORE: 22

## 2024-04-22 ASSESSMENT — PAIN SCALES - GENERAL
PAINLEVEL_OUTOF10: 10 - WORST POSSIBLE PAIN
PAINLEVEL_OUTOF10: 6
PAINLEVEL_OUTOF10: 10 - WORST POSSIBLE PAIN
PAINLEVEL_OUTOF10: 5 - MODERATE PAIN
PAINLEVEL_OUTOF10: 10 - WORST POSSIBLE PAIN
PAINLEVEL_OUTOF10: 10 - WORST POSSIBLE PAIN

## 2024-04-22 ASSESSMENT — ACTIVITIES OF DAILY LIVING (ADL): HOME_MANAGEMENT_TIME_ENTRY: 17

## 2024-04-22 ASSESSMENT — PAIN - FUNCTIONAL ASSESSMENT
PAIN_FUNCTIONAL_ASSESSMENT: 0-10

## 2024-04-22 NOTE — PROGRESS NOTES
"Physical Therapy    Physical Therapy Treatment    Patient Name: Abraham Rodriguez  MRN: [id number]  Today's Date: 4/22/2024  Time Calculation  Start Time: 0827  Stop Time: 0906  Time Calculation (min): 39 min       Assessment/Plan   PT Assessment  End of Session Communication: Bedside nurse, PCT/NA/CTA  Assessment Comment: Pt tolerated session well. Pt cont to believe that his deficits are greater than they are, often saying during session that he is \"no where close\" to being able to go home safely. PT has begun pointing out to pt all of the mobility that he is able to do by himself, but pt appears to dismiss all of it.  End of Session Patient Position: Up in chair, Alarm on     PT Plan  Treatment/Interventions: Bed mobility, Transfer training, Gait training, Stair training, Balance training, Neuromuscular re-education, Strengthening, Endurance training, Range of motion, Therapeutic exercise, Therapeutic activity  PT Plan: Skilled PT  PT Frequency: 6 times per week  PT Discharge Recommendations: Low intensity level of continued care  PT Recommended Transfer Status: Stand by assist  PT - OK to Discharge: Yes      General Visit Information:   PT  Visit  PT Received On: 04/22/24  Response to Previous Treatment: Patient with no complaints from previous session.  General  Missed Visit: Yes  Missed Visit Reason: Other (Comment) (Pt eating beakfast in room. Requesting time to finish before participating in therapy.)  Prior to Session Communication: Bedside nurse  Patient Position Received: Up in chair, Alarm off, not on at start of session  General Comment: Pt pleasant and agreeable to participate    Subjective   Precautions:  Precautions  LE Weight Bearing Status: Weight Bearing as Tolerated  Medical Precautions: Fall precautions  Post-Surgical Precautions: Right total knee precautions    Objective   Pain:  Pain Assessment  Pain Assessment: 0-10  Pain Score: 10 - Worst possible pain (Pt able to perform activities w/o " complaints. Only complaining of pain when asked. Otherwise only complaining of stiffness in R quad/knee)      Treatments:  Therapeutic Exercise  Therapeutic Exercise Activity 1: PT assisted knee flexion in seated EOM. 3x30 sec ea  Therapeutic Exercise Activity 2: R forward lunge on step focusing on knee flexion. Not formally measured but pt able to surpass 90 degrees of flex    Manual Therapy  Manual Therapy Activity 1: Gentle R knee ext mobs to w bolster under ankle to improve R knee ext. Pre-manual measurement 0 degrees, after manual measurement 3 degrees of hyperext.  Manual Therapy Activity 2: STM to R quad and IT band, moderate amount of tone noted. Grade 2 patellar mobs in all directions performed intermittently.    Bed Mobility 1  Bed Mobility 1: Supine to sitting, Sitting to supine  Level of Assistance 1: Modified independent  Bed Mobility Comments 1: on mat table    Ambulation/Gait Training 1  Surface 1: Level tile  Device 1: Rolling walker  Gait Support Devices: Gait belt  Assistance 1: Modified independent  Quality of Gait 1: Diminished heel strike, Forward flexed posture  Comments/Distance (ft) 1: 1x200, amd various short distances between exercises, no concerns  Ambulation/Gait Training 2  Surface 2: Level tile  Device 2: Single point cane  Gait Support Devices: Gait belt  Assistance 2: Contact guard  Quality of Gait 2: Diminished heel strike, Forward flexed posture  Comments/Distance (ft) 2: 1x200 ft from therapy gym to pt room. 1 LOB noted w pt needing min A from PT to recover. minimal amount of vcs for sequencing w good return noted.       Outcome Measures:  [facility] Basic Mobility  Turning from your back to your side while in a flat bed without using bedrails: None  Moving from lying on your back to sitting on the side of a flat bed without using bedrails: None  Moving to and from bed to chair (including a wheelchair): None  Standing up from a chair using your arms (e.g. wheelchair or bedside chair):  None  To walk in hospital room: None  Climbing 3-5 steps with railing: A little  Basic Mobility - Total Score: 23    Education Documentation  No documentation found.  Education Comments  No comments found.      Encounter Problems       Encounter Problems (Active)       PT Problem       pt will be able to ambulate community distances with LRAD and modified independent  (Progressing)       Start:  04/16/24    Expected End:  04/22/24            Pt will be able to perform STS transfer from all surfaces with independence and without extending RLE  (Progressing)       Start:  04/16/24    Expected End:  04/22/24            Pt will be able to navigate 4 steps with hand rail on one side(s) and modified independent to allow for safe DC.   (Progressing)       Start:  04/16/24    Expected End:  04/22/24            Pt will improve R knee flexion to >100 degrees (Progressing)       Start:  04/16/24    Expected End:  04/22/24            Pt will be able to perform all aspects of bed mobility with independent  (Progressing)       Start:  04/16/24    Expected End:  04/22/24            pt will report pain level </= to 5/10 (Progressing)       Start:  04/16/24    Expected End:  04/22/24               Pain - Adult

## 2024-04-22 NOTE — PROGRESS NOTES
Physical Therapy    Physical Therapy Treatment    Patient Name: Abraham Rodriguez  MRN: [id number]  Today's Date: 4/22/2024  Time Calculation  Start Time: 1355  Stop Time: 1440  Time Calculation (min): 45 min       Assessment/Plan   PT Assessment  Assessment Comment: Excellent mobility and knee improving  functionally as well.     PT Plan  Treatment/Interventions: Bed mobility, Transfer training, Gait training, Stair training, Balance training, Neuromuscular re-education, Strengthening, Endurance training, Range of motion, Therapeutic exercise, Therapeutic activity  PT Plan: Skilled PT  PT Frequency: 6 times per week  PT Discharge Recommendations: Low intensity level of continued care  PT Recommended Transfer Status: Stand by assist  PT - OK to Discharge: Yes      General Visit Information:   PT  Visit  PT Received On: 04/22/24  Response to Previous Treatment: Patient with no complaints from previous session.  General  General Comment: Less knee edema and maintaining excellent mobility.    Subjective   Precautions:   Fall prec.    Objective   Pain:  Pain Assessment  Pain Assessment: 0-10  Pain Score: 5 - Moderate pain  Pain Location: Knee  Pain Orientation: Right    Cognition:  Cognition  Overall Cognitive Status: Within Functional Limits    Postural Control:  Static Sitting Balance  Static Sitting-Level of Assistance: Independent  Static Standing Balance  Static Standing-Level of Assistance: Modified independent  Dynamic Standing Balance  Dynamic Standing-Comments: Brendan with walker/supervision with SC    Activity Tolerance:   Improving    Treatments:  Therapeutic Exercise  Therapeutic Exercise Performed: Yes  Therapeutic Exercise Activity 1: HS 3x10  Therapeutic Exercise Activity 2: TKE 3x10x2.5#  Therapeutic Exercise Activity 3: Self range supine in knee extension to HS pulls into flexion 3x10/20 sec  Therapeutic Exercise Activity 4: PT assisted HS stretches 3x30 sec  Therapeutic Exercise Activity 5: QS with  facilitation 3x10  Therapeutic Exercise Activity 6: modified cisco position for quad stretches 3x20 sec  Therapeutic Exercise Activity 7: LAQ in modified Cisco position 3x10  Therapeutic Exercise Activity 8: mini step up 3x10    Bed Mobility 1  Bed Mobility 1: Supine to sitting, Sitting to supine  Level of Assistance 1: Modified independent    Ambulation/Gait Training  Ambulation/Gait Training Performed: Yes  Ambulation/Gait Training 1  Surface 1: Level tile  Device 1: Single point cane  Assistance 1: Distant supervision  Quality of Gait 1: Diminished heel strike, Inconsistent stride length, Decreased step length, Forward flexed posture  Comments/Distance (ft) 1: 2x100 ft  Ambulation/Gait Training 2  Surface 2: Level tile  Device 2: Rolling walker  Assistance 2: Modified independent  Quality of Gait 2: Diminished heel strike, Forward flexed posture  Comments/Distance (ft) 2: 1x250 ft.  Transfer 1  Technique 1: Sit to stand, Stand to sit  Transfer Level of Assistance 1: Modified independent    Outcome Measures:  [facility] Basic Mobility  Turning from your back to your side while in a flat bed without using bedrails: None  Moving from lying on your back to sitting on the side of a flat bed without using bedrails: None  Moving to and from bed to chair (including a wheelchair): None  Standing up from a chair using your arms (e.g. wheelchair or bedside chair): None  To walk in hospital room: None  Climbing 3-5 steps with railing: A little  Basic Mobility - Total Score: 23    Education Documentation  No documentation found.  Education Comments  No comments found.        OP EDUCATION:       Encounter Problems       Encounter Problems (Active)       PT Problem       pt will be able to ambulate community distances with LRAD and modified independent  (Progressing)       Start:  04/16/24    Expected End:  04/22/24            Pt will be able to perform STS transfer from all surfaces with independence and without extending RLE   (Progressing)       Start:  04/16/24    Expected End:  04/22/24            Pt will be able to navigate 4 steps with hand rail on one side(s) and modified independent to allow for safe DC.   (Progressing)       Start:  04/16/24    Expected End:  04/22/24            Pt will improve R knee flexion to >100 degrees (Progressing)       Start:  04/16/24    Expected End:  04/22/24            Pt will be able to perform all aspects of bed mobility with independent  (Progressing)       Start:  04/16/24    Expected End:  04/22/24            pt will report pain level </= to 5/10 (Progressing)       Start:  04/16/24    Expected End:  04/22/24               Pain - Adult

## 2024-04-22 NOTE — NURSING NOTE
Uneventful shift. Pain management appointment cancelled due to [facility] unavailabilty to transport to appointment. Appointment is tomorrow at 1130 at [facility] pain management. [facility] to  at 10 am.

## 2024-04-22 NOTE — PROGRESS NOTES
Occupational Therapy    Occupational Therapy Treatment    Name: Abraham Rodriguez  MRN: [id number]  : 1940  Date: 24  Time Calculation  Start Time: 928  Stop Time:   Time Calculation (min): 32 min    Assessment:  Evaluation/Treatment Tolerance: Patient limited by fatigue  End of Session Communication: Bedside nurse, PCT/CIERRA/KATHERIN  End of Session Patient Position: Up in chair, Alarm on  Plan:  Treatment Interventions: ADL retraining, Functional transfer training, Endurance training, Neuromuscular reeducation  OT Frequency: 5 times per week  OT Discharge Recommendations: Low intensity level of continued care  OT Recommended Transfer Status: Assist of 1  OT - OK to Discharge: Yes    Subjective   Previous Visit Info:  OT Last Visit On: 24  OT Received On: 24  General:  General  Prior to Session Communication: Bedside nurse, PCT/CIERRA/KATHERIN  Patient Position Received:  (in bathroom)  General Comment: Patient agreeable to session though reporting fatigue.  Precautions:  LE Weight Bearing Status: Weight Bearing as Tolerated  Medical Precautions: Fall precautions  Post-Surgical Precautions: Right total knee precautions  Precautions Comment: s/p RTKR 1 month ago; strict Is&Os following the discovery of a large weight gain last week  Pain Assessment:  Pain Assessment  Pain Assessment: 0-10  Pain Score: 10 - Worst possible pain  Pain Interventions:  (pt reports no relief in pain with any/all interventions)     Objective   Activities of Daily Living:   Grooming  Grooming Level of Assistance: Modified independent  Grooming Where Assessed: Standing sinkside    Toileting  Toileting Level of Assistance: Modified independent  Where Assessed: Toilet     Bed Mobility/Transfers:   Transfer 1  Transfer From 1: Chair with arms to  Transfer to 1: Stand, Sit  Technique 1: Sit to stand, Stand to sit  Transfer Device 1: Walker  Transfer Level of Assistance 1: Modified independent  Transfers 2  Transfer From 2: Chair with arms  to  Transfer to 2: Stand  Technique 2: Sit to stand, Stand to sit  Transfer Device 2: Cane  Transfer Level of Assistance 2: Modified independent    Toilet Transfers  Toilet Transfer From: Recliner  Toilet Transfer Type: To and from  Toilet Transfer to: Raised toilet seat with rails  Toilet Transfer Technique: Ambulating  Toilet Transfers: Modified independence  Tub Transfers  Tub Transfer From: Cane  Tub Transfer Type: To and from  Tub Transfer to: Transfer tub bench  Tub Transfer Technique: Ambulating  Tub Transfers: Modified independence  Tub Transfers Comments: Patient reporting he has a bench at home to use PRN. Patient wanting to be fully independent not using any assitive devices, however OT explained to patient that he is able to go home once he is safe even though he is using an assistive device.    Car Transfers  Car Transfer From: Cane  Car Transfer Technique: Ambulating  Car Transfers: Modified independence    Functional Mobility:  Functional Mobility 1  Surface 1: Level tile  Device 1: Rolling walker, Single point cane  Assistance 1:  (CGA for cane; distant sup for walker)  Comments 1: pt walking to therapy gym and back this date using each device x1; pt reporting still feeling unsteady using cane and OT continued to encourage patient to use walker untill more steady.    IADL's:   Kitchen Mobility  Kitchen Mobility Level of Assistance: Distant supervision  Kitchen-Mobility Level: Cane  Kitchen Activity: Retrieve items, Transport items, Other (Comment) (loading and unloading )  Kitchen Mobility Comments: Pt completeing various kitchen tasks using cane reaching outside [place] as a dynamic balance activity.  Pt becoming more fatigued this date rushing activities, though not noted to be unsafe. Pt using L UE for support throughout on counter and cane.     Therapy/Activity:   Balance/Neuromuscular Re-Education  Balance/Neuromuscular Re-Education Activity 1: Pt completing standing balance activity with  no UE support. Pt using UE exerciser to abduct/adduct shoulders at 90 degrees of shoulder flexion with no LOB. Good power noted throughout and pt able to complete without leaning on mat table. Distant supervision throughout.    Outcome Measures:  [facility] Daily Activity  Putting on and taking off regular lower body clothing: A little  Bathing (including washing, rinsing, drying): A little  Putting on and taking off regular upper body clothing: None  Toileting, which includes using toilet, bedpan or urinal: None  Taking care of personal grooming such as brushing teeth: None  Eating Meals: None  Daily Activity - Total Score: 22    Goals:  Encounter Problems       Encounter Problems (Active)       ADLs       Patient will perform UB and LB bathing with modified independent level of assistance and grab bars and shower chair.       Start:  04/16/24    Expected End:  04/26/24            Patient with complete lower body dressing with modified independent level of assistance donning and doffing all LE clothes  with PRN adaptive equipment while supported sitting and standing. (Progressing)       Start:  04/16/24    Expected End:  04/26/24            Patient will complete toileting including hygiene clothing management/hygiene with modified independent level of assistance. (Met)       Start:  04/16/24    Expected End:  04/26/24    Resolved:  04/22/24            BALANCE       Pt will maintain dynamic standing balance during ADL task with modified independent level of assistance in order to demonstrate decreased risk of falling and improved postural control. (Progressing)       Start:  04/16/24    Expected End:  04/26/24               MOBILITY       Patient will perform Functional mobility mod  Household distances/Community Distances with modified independent level of assistance and front wheeled walker in order to improve safety and functional mobility. (Progressing)       Start:  04/16/24    Expected End:  04/26/24                   Encounter Problems (Resolved)       TRANSFERS       Patient will complete sit to stand transfer from all surfaces including chair, car, and tub transfer bench with modified independent level of assistance and front wheeled walker in order to improve safety and prepare for safe mobility. (Met)       Start:  04/16/24    Expected End:  04/26/24    Resolved:  04/22/24

## 2024-04-22 NOTE — PROGRESS NOTES
Physical Therapy                 Therapy Communication Note    Patient Name: Abraham Rodriguez  MRN: [id number]  Today's Date: 4/22/2024     Discipline: Physical Therapy    Missed Visit Reason: Missed Visit Reason: Other (Comment) (Pt eating beakfast in room. Requesting time to finish before participating in therapy.)    Missed Time: Attempt    Comment: Nursing staff aware

## 2024-04-22 NOTE — CARE PLAN
The patient's goals for the shift include      The clinical goals for the shift include Patient will get restful nights sleep    Over the shift, the patient did make progress toward the following goals. Pt slept through most of the night, and is sleeping currently. Breaths are even and unlabored, no s/s of distress noted, no complaints of pain or needs voiced att

## 2024-04-23 ENCOUNTER — DOCUMENTATION (OUTPATIENT)
Dept: OCCUPATIONAL THERAPY | Facility: HOSPITAL | Age: 84
End: 2024-04-23
Payer: COMMERCIAL

## 2024-04-23 ENCOUNTER — OFFICE VISIT (OUTPATIENT)
Dept: PAIN MEDICINE | Facility: CLINIC | Age: 84
End: 2024-04-23
Payer: MEDICARE

## 2024-04-23 VITALS
HEART RATE: 101 BPM | RESPIRATION RATE: 16 BRPM | BODY MASS INDEX: 27.3 KG/M2 | HEIGHT: 71 IN | WEIGHT: 195 LBS | OXYGEN SATURATION: 94 % | SYSTOLIC BLOOD PRESSURE: 124 MMHG | DIASTOLIC BLOOD PRESSURE: 75 MMHG

## 2024-04-23 DIAGNOSIS — M54.12 CERVICAL RADICULITIS: ICD-10-CM

## 2024-04-23 DIAGNOSIS — M51.36 DEGENERATION OF INTERVERTEBRAL DISC OF LUMBAR REGION: ICD-10-CM

## 2024-04-23 DIAGNOSIS — M48.062 LUMBAR STENOSIS WITH NEUROGENIC CLAUDICATION: ICD-10-CM

## 2024-04-23 DIAGNOSIS — M47.816 LUMBAR SPONDYLOSIS: ICD-10-CM

## 2024-04-23 LAB
ALBUMIN SERPL BCP-MCNC: 3.9 G/DL (ref 3.4–5)
ANION GAP SERPL CALC-SCNC: 10 MMOL/L (ref 10–20)
BASOPHILS # BLD AUTO: 0.03 X10*3/UL (ref 0–0.1)
BASOPHILS NFR BLD AUTO: 0.4 %
BUN SERPL-MCNC: 22 MG/DL (ref 6–23)
CALCIUM SERPL-MCNC: 9.3 MG/DL (ref 8.6–10.3)
CHLORIDE SERPL-SCNC: 103 MMOL/L (ref 98–107)
CO2 SERPL-SCNC: 28 MMOL/L (ref 21–32)
CREAT SERPL-MCNC: 0.84 MG/DL (ref 0.5–1.3)
EGFRCR SERPLBLD CKD-EPI 2021: 87 ML/MIN/1.73M*2
EOSINOPHIL # BLD AUTO: 0.31 X10*3/UL (ref 0–0.4)
EOSINOPHIL NFR BLD AUTO: 3.9 %
ERYTHROCYTE [DISTWIDTH] IN BLOOD BY AUTOMATED COUNT: 14.1 % (ref 11.5–14.5)
GLUCOSE SERPL-MCNC: 106 MG/DL (ref 74–99)
HCT VFR BLD AUTO: 38.6 % (ref 41–52)
HGB BLD-MCNC: 12.4 G/DL (ref 13.5–17.5)
IMM GRANULOCYTES # BLD AUTO: 0.04 X10*3/UL (ref 0–0.5)
IMM GRANULOCYTES NFR BLD AUTO: 0.5 % (ref 0–0.9)
LYMPHOCYTES # BLD AUTO: 1.41 X10*3/UL (ref 0.8–3)
LYMPHOCYTES NFR BLD AUTO: 17.9 %
MAGNESIUM SERPL-MCNC: 2.19 MG/DL (ref 1.6–2.4)
MCH RBC QN AUTO: 30.4 PG (ref 26–34)
MCHC RBC AUTO-ENTMCNC: 32.1 G/DL (ref 32–36)
MCV RBC AUTO: 95 FL (ref 80–100)
MONOCYTES # BLD AUTO: 0.93 X10*3/UL (ref 0.05–0.8)
MONOCYTES NFR BLD AUTO: 11.8 %
NEUTROPHILS # BLD AUTO: 5.15 X10*3/UL (ref 1.6–5.5)
NEUTROPHILS NFR BLD AUTO: 65.5 %
NRBC BLD-RTO: 0 /100 WBCS (ref 0–0)
PHOSPHATE SERPL-MCNC: 3.2 MG/DL (ref 2.5–4.9)
PLATELET # BLD AUTO: 226 X10*3/UL (ref 150–450)
POTASSIUM SERPL-SCNC: 3.7 MMOL/L (ref 3.5–5.3)
RBC # BLD AUTO: 4.08 X10*6/UL (ref 4.5–5.9)
SODIUM SERPL-SCNC: 137 MMOL/L (ref 136–145)
WBC # BLD AUTO: 7.9 X10*3/UL (ref 4.4–11.3)

## 2024-04-23 PROCEDURE — 36415 COLL VENOUS BLD VENIPUNCTURE: CPT | Performed by: NURSE PRACTITIONER

## 2024-04-23 PROCEDURE — 83735 ASSAY OF MAGNESIUM: CPT | Performed by: NURSE PRACTITIONER

## 2024-04-23 PROCEDURE — 2500000001 HC RX 250 WO HCPCS SELF ADMINISTERED DRUGS (ALT 637 FOR MEDICARE OP): Performed by: NURSE PRACTITIONER

## 2024-04-23 PROCEDURE — 97530 THERAPEUTIC ACTIVITIES: CPT | Mod: GP

## 2024-04-23 PROCEDURE — 2500000001 HC RX 250 WO HCPCS SELF ADMINISTERED DRUGS (ALT 637 FOR MEDICARE OP): Performed by: STUDENT IN AN ORGANIZED HEALTH CARE EDUCATION/TRAINING PROGRAM

## 2024-04-23 PROCEDURE — 85025 COMPLETE CBC W/AUTO DIFF WBC: CPT | Performed by: NURSE PRACTITIONER

## 2024-04-23 PROCEDURE — 94760 N-INVAS EAR/PLS OXIMETRY 1: CPT

## 2024-04-23 PROCEDURE — 2500000004 HC RX 250 GENERAL PHARMACY W/ HCPCS (ALT 636 FOR OP/ED): Performed by: NURSE PRACTITIONER

## 2024-04-23 PROCEDURE — 2500000004 HC RX 250 GENERAL PHARMACY W/ HCPCS (ALT 636 FOR OP/ED): Performed by: STUDENT IN AN ORGANIZED HEALTH CARE EDUCATION/TRAINING PROGRAM

## 2024-04-23 PROCEDURE — 97110 THERAPEUTIC EXERCISES: CPT | Mod: GP

## 2024-04-23 PROCEDURE — 80069 RENAL FUNCTION PANEL: CPT | Performed by: NURSE PRACTITIONER

## 2024-04-23 PROCEDURE — 3074F SYST BP LT 130 MM HG: CPT | Performed by: ANESTHESIOLOGY

## 2024-04-23 PROCEDURE — 99309 SBSQ NF CARE MODERATE MDM 30: CPT | Performed by: INTERNAL MEDICINE

## 2024-04-23 PROCEDURE — 3078F DIAST BP <80 MM HG: CPT | Performed by: ANESTHESIOLOGY

## 2024-04-23 PROCEDURE — 97535 SELF CARE MNGMENT TRAINING: CPT | Mod: GO

## 2024-04-23 PROCEDURE — 1159F MED LIST DOCD IN RCRD: CPT | Performed by: ANESTHESIOLOGY

## 2024-04-23 PROCEDURE — 2500000001 HC RX 250 WO HCPCS SELF ADMINISTERED DRUGS (ALT 637 FOR MEDICARE OP): Performed by: INTERNAL MEDICINE

## 2024-04-23 PROCEDURE — 1111F DSCHRG MED/CURRENT MED MERGE: CPT | Performed by: ANESTHESIOLOGY

## 2024-04-23 PROCEDURE — 99213 OFFICE O/P EST LOW 20 MIN: CPT | Performed by: ANESTHESIOLOGY

## 2024-04-23 PROCEDURE — 2500000001 HC RX 250 WO HCPCS SELF ADMINISTERED DRUGS (ALT 637 FOR MEDICARE OP)

## 2024-04-23 PROCEDURE — 2500000006 HC RX 250 W HCPCS SELF ADMINISTERED DRUGS (ALT 637 FOR ALL PAYERS): Performed by: STUDENT IN AN ORGANIZED HEALTH CARE EDUCATION/TRAINING PROGRAM

## 2024-04-23 PROCEDURE — 1900000001 HC SKILLED SWING ROOM

## 2024-04-23 RX ORDER — OXYCODONE HYDROCHLORIDE 5 MG/1
5 TABLET ORAL 4 TIMES DAILY PRN
Qty: 112 TABLET | Refills: 0 | Status: SHIPPED | OUTPATIENT
Start: 2024-04-29 | End: 2024-05-27

## 2024-04-23 RX ORDER — OXYCODONE HYDROCHLORIDE 5 MG/1
5 TABLET ORAL 4 TIMES DAILY PRN
Qty: 112 TABLET | Refills: 0 | Status: SHIPPED | OUTPATIENT
Start: 2024-06-24 | End: 2024-05-15 | Stop reason: SDUPTHER

## 2024-04-23 RX ORDER — OXYCODONE HYDROCHLORIDE 5 MG/1
5 TABLET ORAL 4 TIMES DAILY PRN
Qty: 112 TABLET | Refills: 0 | Status: SHIPPED | OUTPATIENT
Start: 2024-05-27 | End: 2024-05-15 | Stop reason: SDUPTHER

## 2024-04-23 RX ORDER — DULOXETIN HYDROCHLORIDE 30 MG/1
30 CAPSULE, DELAYED RELEASE ORAL DAILY
Qty: 30 CAPSULE | Refills: 11 | Status: SHIPPED | OUTPATIENT
Start: 2024-04-23 | End: 2024-05-15 | Stop reason: SDUPTHER

## 2024-04-23 RX ADMIN — TAMSULOSIN HYDROCHLORIDE 0.4 MG: 0.4 CAPSULE ORAL at 20:34

## 2024-04-23 RX ADMIN — GABAPENTIN 300 MG: 300 CAPSULE ORAL at 08:38

## 2024-04-23 RX ADMIN — OXYCODONE HYDROCHLORIDE 5 MG: 5 TABLET ORAL at 16:09

## 2024-04-23 RX ADMIN — MELATONIN TAB 3 MG 3 MG: 3 TAB at 20:46

## 2024-04-23 RX ADMIN — METHOCARBAMOL 500 MG: 500 TABLET ORAL at 23:09

## 2024-04-23 RX ADMIN — OXYCODONE HYDROCHLORIDE 5 MG: 5 TABLET ORAL at 08:36

## 2024-04-23 RX ADMIN — ACETAMINOPHEN 975 MG: 325 TABLET ORAL at 16:08

## 2024-04-23 RX ADMIN — METOPROLOL SUCCINATE 50 MG: 50 TABLET, EXTENDED RELEASE ORAL at 08:35

## 2024-04-23 RX ADMIN — PANTOPRAZOLE SODIUM 40 MG: 40 TABLET, DELAYED RELEASE ORAL at 06:33

## 2024-04-23 RX ADMIN — DOCUSATE SODIUM 100 MG: 100 CAPSULE, LIQUID FILLED ORAL at 20:34

## 2024-04-23 RX ADMIN — LORATADINE 10 MG: 10 TABLET ORAL at 08:42

## 2024-04-23 RX ADMIN — SIMETHICONE 80 MG: 80 TABLET, CHEWABLE ORAL at 20:43

## 2024-04-23 RX ADMIN — ACETAMINOPHEN 975 MG: 325 TABLET ORAL at 08:37

## 2024-04-23 RX ADMIN — Medication 1 SPRAY: at 08:38

## 2024-04-23 RX ADMIN — SIMETHICONE 80 MG: 80 TABLET, CHEWABLE ORAL at 06:37

## 2024-04-23 RX ADMIN — Medication 1 SPRAY: at 20:34

## 2024-04-23 RX ADMIN — OXYCODONE HYDROCHLORIDE AND ACETAMINOPHEN 500 MG: 500 TABLET ORAL at 08:37

## 2024-04-23 RX ADMIN — ACETAMINOPHEN 975 MG: 325 TABLET ORAL at 00:25

## 2024-04-23 RX ADMIN — DOCUSATE SODIUM 100 MG: 100 CAPSULE, LIQUID FILLED ORAL at 08:35

## 2024-04-23 RX ADMIN — FINASTERIDE 5 MG: 5 TABLET, FILM COATED ORAL at 20:34

## 2024-04-23 RX ADMIN — ENOXAPARIN SODIUM 40 MG: 100 INJECTION SUBCUTANEOUS at 18:00

## 2024-04-23 RX ADMIN — METHOCARBAMOL 500 MG: 500 TABLET ORAL at 09:20

## 2024-04-23 RX ADMIN — OXYCODONE HYDROCHLORIDE 5 MG: 5 TABLET ORAL at 06:33

## 2024-04-23 RX ADMIN — OXYCODONE HYDROCHLORIDE AND ACETAMINOPHEN 500 MG: 500 TABLET ORAL at 20:34

## 2024-04-23 RX ADMIN — GABAPENTIN 300 MG: 300 CAPSULE ORAL at 16:08

## 2024-04-23 RX ADMIN — LOSARTAN POTASSIUM 50 MG: 50 TABLET, FILM COATED ORAL at 08:38

## 2024-04-23 RX ADMIN — GABAPENTIN 300 MG: 300 CAPSULE ORAL at 20:34

## 2024-04-23 RX ADMIN — ASPIRIN 81 MG: 81 TABLET, COATED ORAL at 08:35

## 2024-04-23 RX ADMIN — METHOCARBAMOL 500 MG: 500 TABLET ORAL at 16:09

## 2024-04-23 RX ADMIN — FUROSEMIDE 40 MG: 40 TABLET ORAL at 13:59

## 2024-04-23 ASSESSMENT — COGNITIVE AND FUNCTIONAL STATUS - GENERAL
MOBILITY SCORE: 23
CLIMB 3 TO 5 STEPS WITH RAILING: A LITTLE
DAILY ACTIVITIY SCORE: 22
HELP NEEDED FOR BATHING: A LITTLE
DRESSING REGULAR LOWER BODY CLOTHING: A LITTLE
MOBILITY SCORE: 23
CLIMB 3 TO 5 STEPS WITH RAILING: A LITTLE

## 2024-04-23 ASSESSMENT — PAIN - FUNCTIONAL ASSESSMENT
PAIN_FUNCTIONAL_ASSESSMENT: 0-10

## 2024-04-23 ASSESSMENT — PAIN DESCRIPTION - ORIENTATION
ORIENTATION: RIGHT
ORIENTATION: RIGHT

## 2024-04-23 ASSESSMENT — PAIN SCALES - GENERAL
PAINLEVEL_OUTOF10: 5 - MODERATE PAIN
PAINLEVEL_OUTOF10: 6
PAINLEVEL_OUTOF10: 7
PAINLEVEL_OUTOF10: 10 - WORST POSSIBLE PAIN
PAINLEVEL_OUTOF10: 8

## 2024-04-23 ASSESSMENT — ACTIVITIES OF DAILY LIVING (ADL): HOME_MANAGEMENT_TIME_ENTRY: 43

## 2024-04-23 ASSESSMENT — PAIN DESCRIPTION - LOCATION
LOCATION: KNEE
LOCATION: KNEE

## 2024-04-23 NOTE — PROGRESS NOTES
Physical Therapy    Physical Therapy Treatment    Patient Name: Abraham Rodriguez  MRN: [id number]  Today's Date: 4/23/2024  Time Calculation  Start Time: 1448  Stop Time: 1516  Time Calculation (min): 28 min       Assessment/Plan   PT Assessment  End of Session Communication: Bedside nurse, PCT/NA/CTA  Assessment Comment: Session limited by pt pain and fatigue. Pt hyperfixating on the comments he states his pain management dr said to him. Pt also making complaints of being pushed too far in therapy sessions. Pt edu on importance of knee mobility following TKAs, w pt dismissing edu. Session kept short today d/t pt complaints, and mult rest breaks needed d/t pain.  End of Session Patient Position: Up in chair, Alarm on     PT Plan  Treatment/Interventions: Bed mobility, Transfer training, Gait training, Stair training, Balance training, Neuromuscular re-education, Strengthening, Endurance training, Range of motion, Therapeutic exercise, Therapeutic activity  PT Plan: Skilled PT  PT Frequency: 6 times per week  PT Discharge Recommendations: Low intensity level of continued care  PT Recommended Transfer Status: Stand by assist  PT - OK to Discharge: Yes      General Visit Information:   PT  Visit  PT Received On: 04/23/24  Response to Previous Treatment: Patient reporting fatigue but able to participate.  General  Prior to Session Communication: Bedside nurse  Patient Position Received: Alarm off, not on at start of session (pt exiting bathroom w/o calling for nursing staff upon PT entrance)  General Comment: Pt agreeable to participate, reporting increased fatigue and pain from travel this day.    Subjective   Precautions:  Precautions  LE Weight Bearing Status: Weight Bearing as Tolerated  Medical Precautions: Fall precautions  Post-Surgical Precautions: Right total knee precautions      Objective   Pain:  Pain Assessment  Pain Assessment: 0-10  Pain Score:  (reporting 10/10 but demo no clinical signs of pain)    Postural  Control:  Dynamic Standing Balance  Dynamic Standing-Comments: pt able to stand and reach outside [place] to wash hands and brush teeth. Mod I throughout. approx 3 min.    Treatments:  Therapeutic Exercise  Therapeutic Exercise Activity 1: 2x10 heel slides, reaching 114 degrees activity.  Therapeutic Exercise Activity 2: Attempted bridges but pt stating pain is too great in his low back.  Therapeutic Exercise Activity 3: 2x10 heel raises  Therapeutic Exercise Activity 4: 2x10 standing HS curls.    Bed Mobility 1  Bed Mobility 1: Supine to sitting, Sitting to supine  Level of Assistance 1: Modified independent  Bed Mobility Comments 1: on mat table. Increased time needed to perform d/t LBP.    Ambulation/Gait Training 1  Surface 1: Level tile  Device 1: Rolling walker  Gait Support Devices: Gait belt  Assistance 1: Modified independent  Quality of Gait 1: Diminished heel strike, Inconsistent stride length, Decreased step length, Forward flexed posture  Comments/Distance (ft) 1: 2x200 ft. slower bruce noted d/t fatigue and pain  Transfer 1  Transfer From 1: Chair with arms to  Transfer to 1: Sit  Technique 1: Stand to sit  Transfer Device 1: Walker  Transfer Level of Assistance 1: Modified independent  Transfers 2  Transfer From 2: Mat to  Transfer to 2: Stand  Technique 2: Sit to stand, Stand to sit  Transfer Device 2: Walker  Transfer Level of Assistance 2: Modified independent  Transfers 3  Transfer From 3: Toilet to  Transfer to 3: Stand  Technique 3: Sit to stand  Transfer Device 3: Walker  Transfer Level of Assistance 3: Modified independent    Outcome Measures:  [facility] Basic Mobility  Turning from your back to your side while in a flat bed without using bedrails: None  Moving from lying on your back to sitting on the side of a flat bed without using bedrails: None  Moving to and from bed to chair (including a wheelchair): None  Standing up from a chair using your arms (e.g. wheelchair or bedside chair): None  To  walk in hospital room: None  Climbing 3-5 steps with railing: A little  Basic Mobility - Total Score: 23    Education Documentation  No documentation found.  Education Comments  No comments found.      Encounter Problems       Encounter Problems (Active)       PT Problem       pt will be able to ambulate community distances with LRAD and modified independent  (Met)       Start:  04/16/24    Expected End:  04/22/24    Resolved:  04/23/24         Pt will be able to perform STS transfer from all surfaces with independence and without extending RLE  (Met)       Start:  04/16/24    Expected End:  04/22/24    Resolved:  04/23/24         Pt will be able to navigate 4 steps with hand rail on one side(s) and modified independent to allow for safe DC.   (Progressing)       Start:  04/16/24    Expected End:  04/22/24            Pt will improve R knee flexion to >100 degrees (Progressing)       Start:  04/16/24    Expected End:  04/22/24            Pt will be able to perform all aspects of bed mobility with independent  (Progressing)       Start:  04/16/24    Expected End:  04/22/24            pt will report pain level </= to 5/10 (Progressing)       Start:  04/16/24    Expected End:  04/22/24               Pain - Adult

## 2024-04-23 NOTE — PROGRESS NOTES
04/23/24 1604   Discharge Planning   Patient expects to be discharged to: home with outpt PT at [facility]- will need shuttle arranged for this   Does the patient need discharge transport arranged? No     Met with pt in room; he was given a 2 day Notice of Discharge. Pt got upset and states his knee is no better than when he got here. He says he cannot function at home due to need for a walker. He wants to stay here until he no longer needs a walker. SW tried to let him know that he has met home safety rules by Medicare standards and that Medicare requires that we give him a 2 day notice of discharge when these goals are met. He began yelling and calling names, calmed down, then elevated again. He was very loud and pounding on table. Refused to sign the NOMNC and called this SW unsociable names, stated for me to get out. RN was present and trying to calm him down. Security was called to the room to calm him. He signed NOMNC with security present. TC to his dtr to notify of discharge by Thursday. Pt is agreeable to outpt PT at [facility] with shuttle services. JONG Barnett

## 2024-04-23 NOTE — CARE PLAN
"The patient's goals for the shift include  \"decrease pain in knee\"    The clinical goals for the shift include Patient will utilize ice packs for pain    Patient states new medication is helping, \"surprisingly\", but \"that jac really worked me too hard today and I don't know if Ill be able to even walk tomorrow\". Encouraged patient to speak up and will send PT a message with his concerns. Patient is medication compliant, pleasant and cooperative. Uneventful shift. In bed with eyes closed since 2345 and still sleeping now. Edema continues to BLE especially RLE. Wearing NOE hose and elevating legs with some effect. Continuing to monitor edema and weight.  " no murmur/regular rate and rhythm no rub

## 2024-04-23 NOTE — PROGRESS NOTES
"Pain Management Clinic Note     Chief Complaint: right knee pain     History Of Present Illness  Abraham Rodriguez is a 83 y.o. male with a past medical history of right knee osteoarthritis, low back pain, COPD, CAD, HTN, HLD who presents to the clinic for evaluation of right knee pain. Patient has been seen in the past by Dr. Elizabeth for his right knee pain, last seen in November 2023 where he was continued on his percocet 7.5-325 TID. He recently had right knee total arthroplasty on 03/20/2024 with Dr. Scott Arora.  Postop course was complicated by hematoma of the right knee, as well as cellulitis of the right lower extremity.  Patient has been in and out of the ED in the hospital multiple times over the course of the last couple of weeks.  He is currently in a long-term inpatient rehab \"swing\" facility where he is receiving physical therapy multiple times a day, 5 days a week.  He explains that the pain in the right knee is still extremely severe, rating it a 10/10 in severity.  The pain in his knee is mainly located in the front around the entirety of the knee With some pain in his posterior knee as well.  While inpatient he is receiving oxycodone 5 mg 3 times daily, gabapentin 300 mg 3 times daily, Celebrex 200 twice daily, and Robaxin 750 mg every 8 as needed.  He says that the commendation of his medications not significantly benefiting him.  He also received some ice therapy at his right knee which she says has been beneficial for him.  He says that the physical therapy is usually tolerable can be intense, and causes him a lot of pain.  Of note he is also endorsing continued significant swelling of the right lower extremity, and that his right calf \"is hard like a rock\". He does not report pain in right calf at this time. Of note he does have a history of DVTs in the past. He had a DVT ultrasound study done on 04/09 that was negative. He is concerned about the swelling in progress of his physical therapy as he " was able to have been home by this point after surgery.     The pain causes significant stress in the patient's life, specifically interferes with general activity, mood, walking ability, ability to perform tasks at home and/or work.       Past Medical History  He has a past medical history of Allergic rhinitis due to pollen (10/23/2014), Arthritis, Chronic pain disorder, Clotting disorder (Multi), COPD (chronic obstructive pulmonary disease) (Multi), Coronary artery disease, Encounter for other preprocedural examination (06/24/2014), Encounter for screening for malignant neoplasm of prostate, Hyperlipidemia, Hypertension, Localized edema (05/11/2020), Myocardial infarction (Multi), Other conditions influencing health status, Pain in unspecified knee (12/22/2014), Personal history of diseases of the skin and subcutaneous tissue (04/23/2013), Personal history of other diseases of the musculoskeletal system and connective tissue (06/19/2014), Personal history of other diseases of the nervous system and sense organs (08/10/2021), Personal history of other diseases of the respiratory system (11/12/2014), Personal history of other diseases of the respiratory system (08/13/2014), Personal history of other specified conditions (08/20/2013), Plantar fascial fibromatosis (07/22/2013), Polyp of colon, Syncope and collapse (01/23/2015), Unspecified abdominal pain (12/22/2014), and Unspecified disorder of eyelid (10/23/2014).    Surgical History  He has a past surgical history that includes Other surgical history (04/27/2021); Other surgical history (Left, 05/31/2023); Cholecystectomy (04/23/2013); Shoulder surgery (04/23/2013); Other surgical history (04/23/2013); Cardiac catheterization; and Total knee arthroplasty (Right, 03/27/2024).     Social History  He reports that he quit smoking about 41 years ago. His smoking use included cigarettes. He has never used smokeless tobacco. He reports that he does not currently use drugs  after having used the following drugs: Marijuana. He reports that he does not drink alcohol.    Family History  Family History   Problem Relation Name Age of Onset    Hypothyroidism Brother          Allergies  Iodinated contrast media, Tobramycin-dexamethasone, Cinnamon, Cucumber, Lipitor [atorvastatin], and Lisinopril    Review of Symptoms:   Constitutional: Negative for chills, diaphoresis or fever  HENT: Negative for neck swelling  Eyes:.  Negative for eye pain  Respiratory:.  Negative for cough, shortness of breath or wheezing    Cardiovascular:.  Negative for chest pain or palpitations  Gastrointestinal:.  Negative for abdominal pain, nausea and vomiting  Genitourinary:.  Negative for urgency  Musculoskeletal:  Positive for back pain. Positive for joint pain. Denies falls within the past 3 months.  Skin: Negative for wounds or itching   Neurological: Negative for dizziness, seizures, loss of consciousness and weakness  Endo/Heme/Allergies: Does not bruise/bleed easily  Psychiatric/Behavioral: Negative for depression. The patient does not appear anxious.       PHYSICAL EXAM  Vitals signs reviewed  Constitutional:       General: Not in acute distress     Appearance: Normal appearance. Not ill-appearing.  HENT:     Head: Normocephalic and atraumatic  Eyes:     Conjunctiva/sclera: Conjunctivae normal  Cardiovascular:     Rate and Rhythm: Normal rate and regular rhythm  Pulmonary:     Effort: No respiratory distress  Abdominal:     Palpations: Abdomen is soft  Musculoskeletal: 1+ pitting edema of RLE, no pitting edema of LLE  Skin:     General: Erythema of right lower leg extending from ankle to knee  Neurological:     General: No focal deficit present  Psychiatric:         Mood and Affect: Mood normal         Behavior: Behavior normal    Advanced Exam   Inspection: Surgical scar well healed over anterior right knee. Right leg grossly more edematous than left. Erythematous and tender.  Palpation: Tenderness to  palpation of right knee over anterior, lateral and posterior aspects.  ROM: Decreased range of motion of right knee with flexion and extension.   Motor: 5/5 strength upper and lower extremities  Sensory: Negative for sensory abnormalities in upper and lower extremities  Reflexes: 2+ reflexes bilateral upper and lower extremities       Last Recorded Vitals  /75 (BP Location: Left arm, Patient Position: Sitting)   Pulse 101   Resp 16   Wt 88.5 kg (195 lb)   SpO2 94%     Relevant Results  Current Outpatient Medications   Medication Instructions    ascorbic acid (VITAMIN C) 500 mg, oral, 2 times daily    aspirin 325 mg, oral, 2 times daily    bisacodyl (DULCOLAX) 10 mg, rectal, Daily    celecoxib (CELEBREX) 200 mg, oral, 2 times daily PRN    dexlansoprazole (DEXILANT) 60 mg, oral, Daily    docusate sodium (COLACE) 100 mg, oral, 2 times daily PRN    finasteride (PROSCAR) 5 mg, oral, Every evening, Do not crush, chew, or split.    fluticasone (Flonase) 50 mcg/actuation nasal spray 1 spray, Each Nostril, Daily PRN, Shake gently. Before first use, prime pump. After use, clean tip and replace cap. As needed    gabapentin (NEURONTIN) 300 mg, oral, 3 times daily    irbesartan (AVAPRO) 150 mg, oral, Daily    lactobacillus acidophilus capsule 1 capsule, oral, Daily    loratadine (CLARITIN) 10 mg, oral, Daily    melatonin 5 mg, oral, Nightly    metoprolol succinate XL (Toprol-XL) 50 mg 24 hr tablet 1 tablet, oral, Daily    naloxone (Narcan) 4 mg/0.1 mL nasal spray nasal, As needed,  ADMINISTER A SINGLE SPRAY IN ONE NOSTRIL UPON SIGNS OF OPIOID OVERDOSE. CALL 911. REPEAT AFTER 3 MINUTES IF NO RESPONSE.<BR>    pantoprazole (PROTONIX) 40 mg, oral, Daily before breakfast, Do not crush, chew, or split.    polyethylene glycol (GLYCOLAX, MIRALAX) 17 g, oral, 2 times daily    sennosides-docusate sodium (Jesenia-Colace) 8.6-50 mg tablet 2 tablets, oral, 2 times daily    simethicone (MYLICON) 80 mg, oral, 4 times daily PRN     tamsulosin (Flomax) 0.4 mg 24 hr capsule oral, Every evening    torsemide (Demadex) 20 mg tablet 1 tablet, oral, Daily PRN         XR hip right 2 or 3 views     Narrative  PROCEDURE:         HIP RT 2 VIEW W OR WO AP PELVIS - OXR  0057  REASON FOR EXAM: pain, history of total replacement    RESULT: MRN: [id number]  Patient Name: ARTEMIO MORALES    STUDY:  HIP RT 2 VIEW W OR WO AP PELVIS; 8/18/2022 9:47 am    INDICATION:  pain, history of total replacement.    COMPARISON:  None.    ACCESSION NUMBER(S):  [id number]    ORDERING CLINICIAN:  JACKELIN LANGE    TECHNIQUE:  Right hip two views    FINDINGS:  No fractures or destructive lesions are identified. There is no evidence for  loosening or infection of the patient's right hip prosthesis.    Impression  No acute pathologic findings are identified.  Total right hip prosthesis.  Dictation workstation:   [id number]    Original Interpreting Physician:   JOSEP WEST M.D.  Original Transcribed by/Date: MMODAL Aug 18 2022  9:26A  Original Electronically Signed by/Date: JOSEP WEST M.D. Aug 18 2022  9:55A    Addendum Interpreting Physician:  Addendum Transcribed by/Date: NO ADDENDUM  Addendum Electronically Signed by/Date:      MR LUMBAR SPINE WO IV CONTRAST 01/28/2022    Narrative  MRN: [id number]  Patient Name: ARTEMIO MORALES    STUDY:  MRI L-SPINE WO;  1/28/2022 3:14 pm    INDICATION:  low back pain  M47.816: Lumbar spondylosis M54.16: Lumbar  radiculopathy.    COMPARISON:  10/08/2021 MR    ACCESSION NUMBER(S):  [id number]    ORDERING CLINICIAN:  KAYLEIGH MORSE    TECHNIQUE:  Sagittal T1, T2, STIR, and axial T1 weighted images of the lumbar  spine were acquired. Patient was unable to tolerate further images  due to severe pain.    FINDINGS:  Alignment: There are 5 lumbar type vertebrae. The lumbar spine is  straightened.    Vertebrae/Intervertebral Discs: The vertebral bodies demonstrate  expected height.The marrow has patchy bands of fatty transformation  most prominent adjacent  to the endplates anteriorly at L1-2 and along  the L5-S1 endplates. The discs have near complete loss of height  throughout the lumbar region with degenerative desiccation as well,  unchanged.    Conus: The lower thoracic cord appears unremarkable. The conus  terminates at T12-L1.    T10-11: No stenosis is noted on the sagittal images.    T11-12: The thecal sac is mildly indented by disc bulge on the  sagittal images.    T12-L1: The facet joints are moderately arthritic, unchanged. No  stenosis is noted.    L1-2: The lateral recesses are mildly stenosed more so on the right  by facet hypertrophy and ligament thickening as well as disc bulge  more so on the right. The facet joints are moderately arthritic.    L2-3: The spinal canal and lateral recesses are mildly stenosed by  ligament thickening and facet hypertrophy with disc bulge similar to  the prior study. The left neural foramen is mildly stenosed by  endplate spurring and disc bulge. The facet joints are moderately  arthritic.    L3-4: The lateral recesses are moderately stenosed more so on the  left with mild-to-moderate spinal canal stenosis secondary to disc  bulge, endplate spurring, ligament thickening and facet hypertrophy.  The left neural foramen is mildly stenosed. The facet joints are  moderately arthritic.    L4-5: The lateral recesses and spinal canal are moderately stenosed  secondary to ligament thickening, facet hypertrophy and disc  protrusion similar to the prior exam. The facet joints are moderately  arthritic. The neural foramina are mildly stenosed more so on the  left by endplate spurring, disc bulge and facet hypertrophy.    L5-S1: Left lateral/far lateral disc bulge abuts the exiting left L5  nerve, unchanged. The facet joints are mildly to moderately  arthritic. No central stenosis is noted.      The prevertebral and posterior paraspinous soft tissues are  unremarkable.    Impression  1. The T2 axial images were not obtained as the  patient could not  tolerate additional imaging due to pain.    2. Multilevel degenerative changes are present as noted similar to  the prior examination.      MR LUMBAR SPINE WO IV CONTRAST 10/08/2021    Narrative  MRN: [id number]  Patient Name: ARTEMIO MORALES    STUDY:  MRI L-SPINE WO;  10/8/2021 2:36 pm    INDICATION:  lumbar radiculitis, Radiculopathy, lumbar region   M48.062 Spinal  stenosis, lumbar region with neurogenic claudication  .    COMPARISON:  MRI lumbar spine dated 12/19/2016.    ACCESSION NUMBER(S):  [id number]    ORDERING CLINICIAN:  JOSE PHILIP    TECHNIQUE:  Multiplanar multisequence MR imaging of the lumbar spine performed  without intravenous contrast. Sagittal T1, T2, STIR, axial T1 and T2  weighted images of the lumbar spine were acquired.    FINDINGS:  Segmentation: Normal.    Conus: The lower thoracic cord appears unremarkable. The conus  terminates at the level of the superior endplate of L1. The cauda  equina within the upper lumbar regions are normal in appearance.  Within the lower lumbar regions (L4 level inferiorly), the cauda  equina appear adherent to the periphery of the thecal sac. Previously  the cauda equina were normal in appearance in these regions.  Epidural fluid: None.    Alignment: Slight rotatory levoscoliosis.  Vertebral bodies: Normal.  Marrow signal: Marrow heterogeneity and multilevel type 2 Modic  degenerative endplate signal change. No abnormal marrow edema.  Intervertebral discs: Severe degenerative intervertebral disc space  height loss at L2-L3 with additional moderate diffuse involvement of  the lumbar regions.    Degenerative change:    T12-L1: Mild facet arthropathy. Minimal disc bulge. No spinal canal  stenosis or neural foraminal narrowing.  L1-2: Mild facet arthropathy. Mild disc bulge and hypertrophic  endplate spurring. No spinal canal stenosis. No substantial neural  foraminal narrowing.  L2-3: Mild facet arthropathy. Mild disc bulge with  hypertrophic  endplate spurring, worse along the left lateral aspect. There is  encroachment on the left lateral recess and descending left L3 nerve  root. No additional spinal canal stenosis. Mild bilateral neural  foraminal narrowing.  L3-4: Mild-to-moderate facet arthropathy with slight ligamentum  flavum thickening. Mild to moderate disc bulge with hypertrophic  endplate spurring. No spinal canal stenosis. Mild bilateral neural  foraminal narrowing.  L4-5: Mild-to-moderate facet arthropathy with slight ligamentum  flavum thickening. Encroachment on the lateral recesses without  additional spinal canal stenosis. Mild-to-moderate neural foraminal  narrowing.  L5-S1: Mild facet arthropathy. Mild to moderate disc bulge and  hypertrophic endplate spurring. No spinal canal stenosis. Mild  bilateral neural foraminal narrowing.    Soft tissues: Diffuse fatty atrophy of the posterior paraspinal  musculature. Subcentimeter cystic appearing right renal lesion,  likely a simple renal cyst. Mild symmetric renal atrophy.    Impression  In comparison to previous examination there are findings suggestive  of arachnoiditis with the cauda equina appearing adherent to the  periphery of the thecal sac within the inferior lumbar regions.    Similar appearance of multilevel degenerative change. No spinal canal  stenosis. Mild-to-moderate neural foraminal narrowing as above.     No image results found.       No diagnosis found.     ASSESSMENT/PLAN  Abraham Rodriguez is a 83 y.o. male with a past medical history of   Based on history, available imaging and physical exam, the patient's primary pain etiology is likely due to acute on chronic right knee pain. Patient's recent        Our plan is as follows:  - Continue oxycodone 5 mg TID, Gabapentin 300mg TID, celebrex 200 BID, and robaxin 500 mg q8 PRN .   - Will add duloxetine 30-60 mg (1-2 tablet) daily   - Suspicion for interval develop of DVT of right lower extremity. Would recommend  extremely close monitoring of the right knee and calf and encourage repeat DVT ultrasound as last study was done 04/09/2024.  - Continue to participate in physical therapy as well as physician directed home exercises  - Continue pain medications as prescribed       Frantz Ortiz MD

## 2024-04-23 NOTE — PROGRESS NOTES
Abraham Rodriguez is a 83 y.o. male on day 8 of admission presenting with Status post total right knee replacement.      Subjective   Abraham is seen in his room sitting in his chair in no acute distress. He expressed that he felt that therapy is going well except in the afternoon ROM session that have been causing him increased pain afterwards. He continues on routine oxycodone for his pain. He is going to pain clinic today at [facility].        Objective     Last Recorded Vitals  /69 (BP Location: Left arm, Patient Position: Sitting)   Pulse 93   Temp 36.6 °C (97.9 °F) (Temporal)   Resp 18   Wt 85.7 kg (188 lb 15 oz)   SpO2 97%   Intake/Output last 3 Shifts:    Intake/Output Summary (Last 24 hours) at 4/23/2024 1006  Last data filed at 4/23/2024 0831  Gross per 24 hour   Intake 840 ml   Output 2580 ml   Net -1740 ml       Admission Weight  Weight: 92 kg (202 lb 13.2 oz) (04/15/24 1528)    Daily Weight  04/23/24 : 85.7 kg (188 lb 15 oz)    Image Results  CT knee right wo IV contrast  Narrative: Interpreted By:  Jaswinder Colunga,   STUDY:  CT KNEE RIGHT WO IV CONTRAST;  4/15/2024 10:48 pm      INDICATION:  Signs/Symptoms:concern for septic right knee s/p total knee  replacement.      COMPARISON:  3/27/2024      ACCESSION NUMBER(S):  [id number]      ORDERING CLINICIAN:  VIOLETA BOWDEN      TECHNIQUE:  Contiguous axial images of the knee were obtained without intravenous  contrast. Coronal and sagittal reformatted images were obtained from  the axial images.      FINDINGS:  There is postsurgical change of right knee arthroplasty resulting in  beam hardening artifact and limiting evaluation. No definite evidence  of acute displaced periprosthetic knee fracture. There is stable  appearance of previously described 16 mm x 9 mm calcific/ossific  density in the posterolateral soft tissues adjacent to the knee.  There is also stable calcific density superior to the patella. Small  to moderate suprapatellar knee effusion is  again noted.      Impression: Postsurgical change of right knee arthroplasty resulting in beam  hardening artifact and limiting evaluation. No definite evidence of  acute displaced periprosthetic knee fracture.      Suprapatellar knee effusion is again noted; clinical correlation  recommended if there is concern for infection given the clinical  history.      MACRO:  None      Signed by: Jaswinder  4/16/2024 2:14 AM  Dictation workstation:   [id number]      Physical Exam  Constitutional:       General: He is not in acute distress.     Appearance: Normal appearance.   HENT:      Head: Atraumatic.      Nose: Nose normal.      Mouth/Throat:      Mouth: Mucous membranes are moist.   Eyes:      Extraocular Movements: Extraocular movements intact.      Pupils: Pupils are equal, round, and reactive to light.   Cardiovascular:      Rate and Rhythm: Normal rate and regular rhythm.      Pulses: Normal pulses.   Pulmonary:      Effort: Pulmonary effort is normal.      Breath sounds: Normal breath sounds.   Abdominal:      General: Bowel sounds are normal. There is no distension.      Palpations: Abdomen is soft.      Tenderness: There is no abdominal tenderness.   Musculoskeletal:         General: Swelling present.      Cervical back: Normal range of motion.      Right lower leg: Edema present.      Comments: Decreased ROM of the right knee.   Continued redness with warmth and edema of the right knee. Improved from previous exam.   Skin:     General: Skin is warm and dry.      Capillary Refill: Capillary refill takes less than 2 seconds.   Neurological:      General: No focal deficit present.      Mental Status: He is alert and oriented to person, place, and time.   Psychiatric:         Mood and Affect: Mood normal.         Behavior: Behavior normal.       Results for orders placed or performed during the hospital encounter of 04/15/24 (from the past 96 hour(s))   CBC and Auto Differential   Result Value Ref Range    WBC  7.9 4.4 - 11.3 x10*3/uL    nRBC 0.0 0.0 - 0.0 /100 WBCs    RBC 4.08 (L) 4.50 - 5.90 x10*6/uL    Hemoglobin 12.4 (L) 13.5 - 17.5 g/dL    Hematocrit 38.6 (L) 41.0 - 52.0 %    MCV 95 80 - 100 fL    MCH 30.4 26.0 - 34.0 pg    MCHC 32.1 32.0 - 36.0 g/dL    RDW 14.1 11.5 - 14.5 %    Platelets 226 150 - 450 x10*3/uL    Neutrophils % 65.5 40.0 - 80.0 %    Immature Granulocytes %, Automated 0.5 0.0 - 0.9 %    Lymphocytes % 17.9 13.0 - 44.0 %    Monocytes % 11.8 2.0 - 10.0 %    Eosinophils % 3.9 0.0 - 6.0 %    Basophils % 0.4 0.0 - 2.0 %    Neutrophils Absolute 5.15 1.60 - 5.50 x10*3/uL    Immature Granulocytes Absolute, Automated 0.04 0.00 - 0.50 x10*3/uL    Lymphocytes Absolute 1.41 0.80 - 3.00 x10*3/uL    Monocytes Absolute 0.93 (H) 0.05 - 0.80 x10*3/uL    Eosinophils Absolute 0.31 0.00 - 0.40 x10*3/uL    Basophils Absolute 0.03 0.00 - 0.10 x10*3/uL   Renal function panel   Result Value Ref Range    Glucose 106 (H) 74 - 99 mg/dL    Sodium 137 136 - 145 mmol/L    Potassium 3.7 3.5 - 5.3 mmol/L    Chloride 103 98 - 107 mmol/L    Bicarbonate 28 21 - 32 mmol/L    Anion Gap 10 10 - 20 mmol/L    Urea Nitrogen 22 6 - 23 mg/dL    Creatinine 0.84 0.50 - 1.30 mg/dL    eGFR 87 >60 mL/min/1.73m*2    Calcium 9.3 8.6 - 10.3 mg/dL    Phosphorus 3.2 2.5 - 4.9 mg/dL    Albumin 3.9 3.4 - 5.0 g/dL   Magnesium   Result Value Ref Range    Magnesium 2.19 1.60 - 2.40 mg/dL     Scheduled medications  acetaminophen, 975 mg, oral, q8h  ascorbic acid, 500 mg, oral, BID  aspirin, 81 mg, oral, Daily  docusate sodium, 100 mg, oral, BID  enoxaparin, 40 mg, subcutaneous, q24h  finasteride, 5 mg, oral, q PM  fluticasone, 1 spray, Each Nostril, BID  furosemide, 40 mg, oral, Daily  gabapentin, 300 mg, oral, TID  loratadine, 10 mg, oral, Daily  losartan, 50 mg, oral, Daily  metoprolol succinate XL, 50 mg, oral, Daily  oxyCODONE, 5 mg, oral, TID AC  pantoprazole, 40 mg, oral, Daily before breakfast  polyethylene glycol, 17 g, oral, Daily  tamsulosin, 0.4 mg,  oral, q PM      Continuous medications     PRN medications  PRN medications: magnesium hydroxide, melatonin, methocarbamol, ondansetron, oxyCODONE, simethicone         Assessment/Plan      #Cellulitis  ~Augmentin 875 BID 5 days  ~doxycycline 100 mg BID five days  ~has gross edema of the BLE right worse than left.   ~incision is without drainage and well approximated.   ~hematoma RLE post surgical~resolved  ~continues with edema of the right knee with increased warmth and pink in color. Will monitor.       #Arthritis, multiple joint involvement    #Osteoarthritis    #Joint pain  ~continue gabapentin  ~oxycodone for pain ordered routine  ~continues to have difficulty with pain with ROM therapy but expressed that the other therapies are going well.      Active Problems:    #Allergic rhinitis  -continue Flonase BID  -continue Claritin 10 mg PO Daily       #Benign essential hypertension    #Dyslipidemia    #(HFpEF) heart failure with preserved ejection fraction (CMS/HCC)  -continue  mg PO BID  -continue Cozaar 50 mg PO Daily   -continue Toprol 50 mg PO Daily   ~continue furosemide       #Benign prostatic hyperplasia  -continue Proscar 5 mg PO HS  -continue Flomax 0.4 mg PO HS        DVT Prophylaxis: Lovenox  Fluids: N/A  Nutrition: Cardiac    GI: protonix  Code Status: Full Code     Pt requires inpatient stay at this time.      Disposition: Admitted to [place] for intense physical therapy and continuation of antibiotic therapy.      ARCHANA Tovar    Patient was seen in collaboration with the YAJade.  I was present for the pertinent components of the history, physical, and medical decision making and independently examined the patient.  We reviewed the medication reconciliation list and ancillary studies, including lab, imaging, and microbiology, as well as discussed the differential diagnoses; which includes, but is not limited to Principal Problem:    Status post total right knee  replacement  .      I agree with her plan as documented in the electronic medical record.    Ramu De Anda MD

## 2024-04-23 NOTE — PROGRESS NOTES
Occupational Therapy    Occupational Therapy Treatment    Name: Abraham Rodriguez  MRN: [id number]  : 1940  Date: 24  Time Calculation  Start Time: 824  Stop Time: 907  Time Calculation (min): 43 min    Assessment:  Evaluation/Treatment Tolerance: Patient tolerated treatment well (Could be self limiting at times)  End of Session Communication: Bedside nurse, PCT/CIERRA/KATHERIN  End of Session Patient Position:  (in bathrrom with STNA supervising)  Plan:  Treatment Interventions: ADL retraining, Functional transfer training, Endurance training, Neuromuscular reeducation  OT Frequency: 5 times per week  OT Discharge Recommendations: Low intensity level of continued care  OT Recommended Transfer Status: Assist of 1  OT - OK to Discharge: Yes  Based on completed evaluation and care plan recommendations, no barriers to discharge to next site of care.  Patient making significant progress towards goals and is displaying safety for transition to home going.     Subjective   Previous Visit Info:  OT Last Visit On: 24  OT Received On: 24  General:  General  Prior to Session Communication: Bedside nurse, PCT/CIERRA/KATHERIN  Patient Position Received: Up in chair, Alarm on  General Comment: Patient very self limiting throughout session limiting full participation.  Precautions:  LE Weight Bearing Status: Weight Bearing as Tolerated  Medical Precautions: Fall precautions  Post-Surgical Precautions: Right total knee precautions  Pain Assessment:  Pain Assessment  Pain Assessment: 0-10  Pain Score:  (unrated low back and R knee pain)  Pain Interventions: Repositioned, Relaxation technique     Objective   Activities of Daily Living:   Grooming  Grooming Level of Assistance: Modified independent  Grooming Where Assessed: Standing sinkside  Grooming Comments: good balance throughout    LE Dressing  Sock Level of Assistance: Modified independent (increased time to complete refusing to use adaptive equipment though reporting he  "has access to it at home)  Shoe Level of Assistance: Modified independent (shoe horn with slippers; patient asked to bring in shoes, however reporting his swelling will prevent them all from fitting. When questioned what he will wear to go home he stated, \"I will wear those socks\".)  Compression Hose Level of Assistance: Dependent (After completing sock doffing MOD I, patient unwilling to attempt compression hose donning. Patient stating I can't do it following OTs encouragement, patient throwing compressionsock on floor resulting in OT donning for him.)  LE Dressing Where Assessed: Recliner    Toileting  Toileting Level of Assistance: Modified independent  Where Assessed: Toilet    Bed Mobility/Transfers:   Transfer 1  Transfer From 1: Chair with arms to  Transfer to 1: Stand, Sit  Technique 1: Sit to stand, Stand to sit  Transfer Device 1: Walker  Transfer Level of Assistance 1: Modified independent  Trials/Comments 1: completed x2  Transfers 2  Transfer From 2: Chair without arms to  Transfer to 2: Stand, Sit  Technique 2: Sit to stand, Stand to sit  Transfer Device 2: Walker  Transfer Level of Assistance 2: Modified independent    Toilet Transfers  Toilet Transfer From: Recliner  Toilet Transfer Type: To  Toilet Transfer to: Raised toilet seat with rails  Toilet Transfer Technique: Ambulating  Toilet Transfers: Modified independence    Functional Mobility:  Functional Mobility 1  Surface 1: Level tile  Device 1: Rolling walker, Single point cane (gait belt)  Assistance 1: Contact guard, Independent (I w/ walker; CGA w/ cane)  Comments 1: Patient ambulating with cane and forward flexed posture this date unable to attempt to correct with cueing 3x LOB with min assist to correct from OT. FWW MOD I    Outcome Measures:  [facility] Daily Activity  Putting on and taking off regular lower body clothing: A little  Bathing (including washing, rinsing, drying): A little  Putting on and taking off regular upper body clothing: " None  Toileting, which includes using toilet, bedpan or urinal: None  Taking care of personal grooming such as brushing teeth: None  Eating Meals: None  Daily Activity - Total Score: 22    Goals:  Encounter Problems       Encounter Problems (Active)       ADLs       Patient will perform UB and LB bathing with modified independent level of assistance and grab bars and shower chair.       Start:  04/16/24    Expected End:  04/26/24            Patient with complete lower body dressing with modified independent level of assistance donning and doffing all LE clothes  with PRN adaptive equipment while supported sitting and standing. (Progressing)       Start:  04/16/24    Expected End:  04/26/24            Patient will complete toileting including hygiene clothing management/hygiene with modified independent level of assistance. (Met)       Start:  04/16/24    Expected End:  04/26/24    Resolved:  04/22/24            BALANCE       Pt will maintain dynamic standing balance during ADL task with modified independent level of assistance in order to demonstrate decreased risk of falling and improved postural control. (Progressing)       Start:  04/16/24    Expected End:  04/26/24                  Encounter Problems (Resolved)       MOBILITY       Patient will perform Functional mobility mod  Household distances/Community Distances with modified independent level of assistance and front wheeled walker in order to improve safety and functional mobility. (Met)       Start:  04/16/24    Expected End:  04/26/24    Resolved:  04/23/24            TRANSFERS       Patient will complete sit to stand transfer from all surfaces including chair, car, and tub transfer bench with modified independent level of assistance and front wheeled walker in order to improve safety and prepare for safe mobility. (Met)       Start:  04/16/24    Expected End:  04/26/24    Resolved:  04/22/24

## 2024-04-23 NOTE — PROGRESS NOTES
Physical Therapy    Physical Therapy Treatment    Patient Name: Abraham Rodriguez  MRN: [id number]  Today's Date: 4/23/2024  Time Calculation  Start Time: 0930  Stop Time: 0948  Time Calculation (min): 18 min       Assessment/Plan   PT Assessment  End of Session Communication: PCT/CIERRA/KATHERIN  Assessment Comment: Session limited by pickup for appointment. Able to work on mobility/strengthening as well as transfers/amb  End of Session Patient Position:  (patient in wheelchair with STNA/CCAN as he is leaving for his appointment)     PT Plan  Treatment/Interventions: Bed mobility, Transfer training, Gait training, Stair training, Balance training, Neuromuscular re-education, Strengthening, Endurance training, Range of motion, Therapeutic exercise, Therapeutic activity  PT Plan: Skilled PT  PT Frequency: 6 times per week  PT Discharge Recommendations: Low intensity level of continued care  PT Recommended Transfer Status: Stand by assist  PT - OK to Discharge: Yes      General Visit Information:   PT  Visit  PT Received On: 04/23/24  Response to Previous Treatment: Patient reporting fatigue but able to participate.  General  Prior to Session Communication: Bedside nurse, PCT/CIERRA/KATHERIN (nurse reports patient had just recieved all medication)  Patient Position Received: Up in chair, Alarm on  General Comment: Patient having lots of c/o increased pain, but states it never goes down no matter what.    Subjective   Precautions:  Precautions  LE Weight Bearing Status: Weight Bearing as Tolerated  Medical Precautions: Fall precautions  Post-Surgical Precautions: Right total knee precautions  Precautions Comment: s/p RTKR 1 month ago; strict Is&Os following the discovery of a large weight gain last week      Objective   Pain:  Pain Assessment  Pain Assessment: 0-10  Pain Score: 10 - Worst possible pain  Pain Location: Knee  Pain Orientation: Right  Cognition:  Cognition  Overall Cognitive Status: Within Functional  Limits    Treatments:  Therapeutic Exercise  Therapeutic Exercise Performed: Yes  Therapeutic Exercise Activity 1: seated self knee extension stretch 2x10  Therapeutic Exercise Activity 2: seated self knee flexion stretch 2x10  Therapeutic Exercise Activity 3: LAQ 2# 2x10  Therapeutic Exercise Activity 4: Hamstring curls GTB 2x10  Therapeutic Exercise Activity 5: SLR hip abd 2# 2x10  Therapeutic Exercise Activity 6: Single leg marches 2# 2x10  Therapeutic Exercise Activity 7: STS x10    Ambulation/Gait Training  Ambulation/Gait Training Performed: Yes  Ambulation/Gait Training 1  Surface 1: Level tile  Device 1: Rolling walker  Assistance 1: Modified independent  Quality of Gait 1: Diminished heel strike, Inconsistent stride length, Decreased step length, Forward flexed posture  Comments/Distance (ft) 1: x200ft  Transfers  Transfer: Yes  Transfer 1  Transfer From 1: Chair with arms to  Transfer to 1: Stand  Technique 1: Sit to stand, Stand to sit  Transfer Device 1: Walker  Transfer Level of Assistance 1: Modified independent  Transfers 2  Transfer From 2: Mat to  Transfer to 2: Stand  Technique 2: Sit to stand, Stand to sit  Transfer Device 2: Walker  Transfer Level of Assistance 2: Modified independent    Outcome Measures:  [facility] Basic Mobility  Turning from your back to your side while in a flat bed without using bedrails: None  Moving from lying on your back to sitting on the side of a flat bed without using bedrails: None  Moving to and from bed to chair (including a wheelchair): None  Standing up from a chair using your arms (e.g. wheelchair or bedside chair): None  To walk in hospital room: None  Climbing 3-5 steps with railing: A little  Basic Mobility - Total Score: 23        Education Comments  Patient having lots of c/o from previous session. Attempting to educate patient on importance of stretching to improve mobility/ROM for increasing quality and safety of functional movements. After each attempt at  educating, patient will either ignore/not acknowledge/or change the subject.           Encounter Problems       Encounter Problems (Active)       PT Problem       pt will be able to ambulate community distances with LRAD and modified independent  (Met)       Start:  04/16/24    Expected End:  04/22/24    Resolved:  04/23/24         Pt will be able to perform STS transfer from all surfaces with independence and without extending RLE  (Met)       Start:  04/16/24    Expected End:  04/22/24    Resolved:  04/23/24         Pt will be able to navigate 4 steps with hand rail on one side(s) and modified independent to allow for safe DC.   (Progressing)       Start:  04/16/24    Expected End:  04/22/24            Pt will improve R knee flexion to >100 degrees (Progressing)       Start:  04/16/24    Expected End:  04/22/24            Pt will be able to perform all aspects of bed mobility with independent  (Progressing)       Start:  04/16/24    Expected End:  04/22/24            pt will report pain level </= to 5/10 (Progressing)       Start:  04/16/24    Expected End:  04/22/24               Pain - Adult

## 2024-04-24 ENCOUNTER — OFFICE VISIT (OUTPATIENT)
Dept: PRIMARY CARE | Facility: CLINIC | Age: 84
End: 2024-04-24
Payer: MEDICARE

## 2024-04-24 ENCOUNTER — APPOINTMENT (OUTPATIENT)
Dept: RADIOLOGY | Facility: HOSPITAL | Age: 84
DRG: 603 | End: 2024-04-24
Payer: MEDICARE

## 2024-04-24 VITALS — DIASTOLIC BLOOD PRESSURE: 67 MMHG | SYSTOLIC BLOOD PRESSURE: 144 MMHG | OXYGEN SATURATION: 95 % | HEART RATE: 86 BPM

## 2024-04-24 DIAGNOSIS — I10 PRIMARY HYPERTENSION: Primary | ICD-10-CM

## 2024-04-24 DIAGNOSIS — I87.2 VENOUS INSUFFICIENCY OF BOTH LOWER EXTREMITIES: ICD-10-CM

## 2024-04-24 DIAGNOSIS — L03.115 CELLULITIS OF RIGHT LOWER EXTREMITY: ICD-10-CM

## 2024-04-24 DIAGNOSIS — M17.11 PRIMARY OSTEOARTHRITIS OF RIGHT KNEE: ICD-10-CM

## 2024-04-24 PROCEDURE — 2500000001 HC RX 250 WO HCPCS SELF ADMINISTERED DRUGS (ALT 637 FOR MEDICARE OP): Performed by: STUDENT IN AN ORGANIZED HEALTH CARE EDUCATION/TRAINING PROGRAM

## 2024-04-24 PROCEDURE — 1123F ACP DISCUSS/DSCN MKR DOCD: CPT | Performed by: NURSE PRACTITIONER

## 2024-04-24 PROCEDURE — 94760 N-INVAS EAR/PLS OXIMETRY 1: CPT

## 2024-04-24 PROCEDURE — 1159F MED LIST DOCD IN RCRD: CPT | Performed by: NURSE PRACTITIONER

## 2024-04-24 PROCEDURE — 2500000004 HC RX 250 GENERAL PHARMACY W/ HCPCS (ALT 636 FOR OP/ED): Performed by: STUDENT IN AN ORGANIZED HEALTH CARE EDUCATION/TRAINING PROGRAM

## 2024-04-24 PROCEDURE — 93971 EXTREMITY STUDY: CPT

## 2024-04-24 PROCEDURE — 97110 THERAPEUTIC EXERCISES: CPT | Mod: GP

## 2024-04-24 PROCEDURE — 1036F TOBACCO NON-USER: CPT | Performed by: NURSE PRACTITIONER

## 2024-04-24 PROCEDURE — 1900000001 HC SKILLED SWING ROOM

## 2024-04-24 PROCEDURE — 3078F DIAST BP <80 MM HG: CPT | Performed by: NURSE PRACTITIONER

## 2024-04-24 PROCEDURE — 97530 THERAPEUTIC ACTIVITIES: CPT | Mod: GP

## 2024-04-24 PROCEDURE — 3077F SYST BP >= 140 MM HG: CPT | Performed by: NURSE PRACTITIONER

## 2024-04-24 PROCEDURE — 1111F DSCHRG MED/CURRENT MED MERGE: CPT | Performed by: NURSE PRACTITIONER

## 2024-04-24 PROCEDURE — 97112 NEUROMUSCULAR REEDUCATION: CPT | Mod: GO

## 2024-04-24 PROCEDURE — 2500000006 HC RX 250 W HCPCS SELF ADMINISTERED DRUGS (ALT 637 FOR ALL PAYERS): Performed by: STUDENT IN AN ORGANIZED HEALTH CARE EDUCATION/TRAINING PROGRAM

## 2024-04-24 PROCEDURE — 2500000001 HC RX 250 WO HCPCS SELF ADMINISTERED DRUGS (ALT 637 FOR MEDICARE OP): Performed by: NURSE PRACTITIONER

## 2024-04-24 PROCEDURE — 97535 SELF CARE MNGMENT TRAINING: CPT | Mod: GO

## 2024-04-24 PROCEDURE — 2500000001 HC RX 250 WO HCPCS SELF ADMINISTERED DRUGS (ALT 637 FOR MEDICARE OP): Performed by: INTERNAL MEDICINE

## 2024-04-24 PROCEDURE — 2500000004 HC RX 250 GENERAL PHARMACY W/ HCPCS (ALT 636 FOR OP/ED): Performed by: NURSE PRACTITIONER

## 2024-04-24 PROCEDURE — 1160F RVW MEDS BY RX/DR IN RCRD: CPT | Performed by: NURSE PRACTITIONER

## 2024-04-24 PROCEDURE — 99214 OFFICE O/P EST MOD 30 MIN: CPT | Performed by: NURSE PRACTITIONER

## 2024-04-24 PROCEDURE — 93971 EXTREMITY STUDY: CPT | Performed by: RADIOLOGY

## 2024-04-24 PROCEDURE — 1158F ADVNC CARE PLAN TLK DOCD: CPT | Performed by: NURSE PRACTITIONER

## 2024-04-24 RX ORDER — DULOXETIN HYDROCHLORIDE 20 MG/1
20 CAPSULE, DELAYED RELEASE ORAL DAILY
Status: DISCONTINUED | OUTPATIENT
Start: 2024-04-24 | End: 2024-04-25 | Stop reason: HOSPADM

## 2024-04-24 RX ADMIN — METOPROLOL SUCCINATE 50 MG: 50 TABLET, EXTENDED RELEASE ORAL at 09:50

## 2024-04-24 RX ADMIN — ACETAMINOPHEN 975 MG: 325 TABLET ORAL at 18:38

## 2024-04-24 RX ADMIN — FINASTERIDE 5 MG: 5 TABLET, FILM COATED ORAL at 20:25

## 2024-04-24 RX ADMIN — Medication 1 SPRAY: at 20:26

## 2024-04-24 RX ADMIN — ENOXAPARIN SODIUM 40 MG: 100 INJECTION SUBCUTANEOUS at 18:38

## 2024-04-24 RX ADMIN — LOSARTAN POTASSIUM 50 MG: 50 TABLET, FILM COATED ORAL at 09:49

## 2024-04-24 RX ADMIN — OXYCODONE HYDROCHLORIDE 5 MG: 5 TABLET ORAL at 18:38

## 2024-04-24 RX ADMIN — GABAPENTIN 300 MG: 300 CAPSULE ORAL at 20:25

## 2024-04-24 RX ADMIN — METHOCARBAMOL 500 MG: 500 TABLET ORAL at 09:50

## 2024-04-24 RX ADMIN — OXYCODONE HYDROCHLORIDE 5 MG: 5 TABLET ORAL at 22:34

## 2024-04-24 RX ADMIN — SIMETHICONE 80 MG: 80 TABLET, CHEWABLE ORAL at 06:24

## 2024-04-24 RX ADMIN — ACETAMINOPHEN 975 MG: 325 TABLET ORAL at 00:00

## 2024-04-24 RX ADMIN — OXYCODONE HYDROCHLORIDE 5 MG: 5 TABLET ORAL at 06:24

## 2024-04-24 RX ADMIN — TAMSULOSIN HYDROCHLORIDE 0.4 MG: 0.4 CAPSULE ORAL at 20:25

## 2024-04-24 RX ADMIN — OXYCODONE HYDROCHLORIDE AND ACETAMINOPHEN 500 MG: 500 TABLET ORAL at 09:49

## 2024-04-24 RX ADMIN — LORATADINE 10 MG: 10 TABLET ORAL at 09:49

## 2024-04-24 RX ADMIN — FUROSEMIDE 40 MG: 40 TABLET ORAL at 09:50

## 2024-04-24 RX ADMIN — Medication 1 SPRAY: at 09:50

## 2024-04-24 RX ADMIN — ASPIRIN 81 MG: 81 TABLET, COATED ORAL at 09:50

## 2024-04-24 RX ADMIN — ACETAMINOPHEN 975 MG: 325 TABLET ORAL at 09:47

## 2024-04-24 RX ADMIN — OXYCODONE HYDROCHLORIDE AND ACETAMINOPHEN 500 MG: 500 TABLET ORAL at 20:25

## 2024-04-24 RX ADMIN — OXYCODONE HYDROCHLORIDE 5 MG: 5 TABLET ORAL at 11:22

## 2024-04-24 RX ADMIN — GABAPENTIN 300 MG: 300 CAPSULE ORAL at 09:49

## 2024-04-24 RX ADMIN — PANTOPRAZOLE SODIUM 40 MG: 40 TABLET, DELAYED RELEASE ORAL at 06:24

## 2024-04-24 ASSESSMENT — COGNITIVE AND FUNCTIONAL STATUS - GENERAL
MOBILITY SCORE: 24
DAILY ACTIVITIY SCORE: 24

## 2024-04-24 ASSESSMENT — PAIN SCALES - GENERAL
PAINLEVEL_OUTOF10: 5 - MODERATE PAIN
PAINLEVEL_OUTOF10: 8
PAINLEVEL_OUTOF10: 10 - WORST POSSIBLE PAIN
PAINLEVEL_OUTOF10: 5 - MODERATE PAIN
PAINLEVEL_OUTOF10: 6

## 2024-04-24 ASSESSMENT — PAIN - FUNCTIONAL ASSESSMENT
PAIN_FUNCTIONAL_ASSESSMENT: 0-10

## 2024-04-24 ASSESSMENT — PAIN DESCRIPTION - ORIENTATION
ORIENTATION: RIGHT

## 2024-04-24 ASSESSMENT — ENCOUNTER SYMPTOMS
OCCASIONAL FEELINGS OF UNSTEADINESS: 1
LOSS OF SENSATION IN FEET: 0
DEPRESSION: 0

## 2024-04-24 ASSESSMENT — PAIN DESCRIPTION - LOCATION
LOCATION: KNEE

## 2024-04-24 ASSESSMENT — ACTIVITIES OF DAILY LIVING (ADL): HOME_MANAGEMENT_TIME_ENTRY: 11

## 2024-04-24 NOTE — CARE PLAN
"The patient's goals for the shift include  \"get more than 3 straight hours of sleep\"    The clinical goals for the shift include Patient will ambulate to bathroom and fluids QS    Patient continues to c/o pain to right knee, 10/10 at times, but continues to laugh, joke and make conversation easily. No cough, congestion, nausea, vomiting, diarrhea or constipation noted. Bs x4, BLSC, rle with continued +3-+4 edema. NOE hose in place, elevation and ice utilized with little effect noted. Encouraging fluids, lotion applied to BLE after NOE removed. Patient cooperative and pleasant. Given melatonin per request with good effect. Ambulating with walker with steady gait.  "

## 2024-04-24 NOTE — PROGRESS NOTES
Physical Therapy                 Therapy Communication Note    Patient Name: Abraham Rodriguez  MRN: [id number]  Today's Date: 4/24/2024     Discipline: Physical Therapy    Missed Visit Reason: Missed Visit Reason: Patient refused (Pt currently eating breakfast, requesting more time to eat before participating in therapy.)    Missed Time: Attempt    Comment: Nursing staff aware of attempt.

## 2024-04-24 NOTE — PROGRESS NOTES
Physical Therapy    Physical Therapy Treatment    Patient Name: Abraham Rodriguez  MRN: [id number]  Today's Date: 4/24/2024  Time Calculation  Start Time: 1448  Stop Time: 1516  Time Calculation (min): 28 min       Assessment/Plan   PT Assessment  End of Session Communication: Bedside nurse, PCT/NA/CTA  Assessment Comment: Pt tolerated session fair. Session limited by pt's need to use restroom, and needing intermittent therapeutic rest breaks between activities. Pt reporting increased pain and swelling w all activities, but swelling appears to be about the same compared to previous session. D/t pt progress and pending DC, pt freq will be decreased to 1x a day to focus on knee mobility and preventing complications following TKA.  End of Session Patient Position: Up in chair, Alarm on     PT Plan  Treatment/Interventions: Bed mobility, Transfer training, Gait training, Stair training, Balance training, Neuromuscular re-education, Strengthening, Endurance training, Range of motion, Therapeutic exercise, Therapeutic activity  PT Plan: Skilled PT  PT Frequency: 6 times per week  PT Discharge Recommendations: Low intensity level of continued care  PT Recommended Transfer Status: Stand by assist  PT - OK to Discharge: Yes      General Visit Information:   PT  Visit  PT Received On: 04/24/24  Response to Previous Treatment: Patient with no complaints from previous session.  General  Prior to Session Communication: Bedside nurse  Patient Position Received:  (Pt leaving bathroom upon PT arrival.)  General Comment: Pt pleasant and agreeable to participate    Subjective   Precautions:  Precautions  LE Weight Bearing Status: Weight Bearing as Tolerated  Medical Precautions: Fall precautions  Post-Surgical Precautions: Right total knee precautions      Objective   Pain:  Pain Assessment  Pain Assessment: 0-10  Pain Score: 10 - Worst possible pain    Postural Control:  Dynamic Standing Balance  Dynamic Standing-Balance Support: No upper  extremity supported  Dynamic Standing-Balance: Lateral lean, Reaching for objects, Reaching across midline, Forward lean  Dynamic Standing-Comments: Pt able to stand and reach across midline and outside BRICE for different objects. Mod I throughout w no UE support.    Treatments:  Therapeutic Exercise  Therapeutic Exercise Activity 1: 2x10 unilateral knee bends on stairs, focusing on R knee flexion, holding for a brief pause.  Therapeutic Exercise Activity 2: 2x10 TKE w GTB    Ambulation/Gait Training 1  Surface 1: Level tile  Device 1: Rolling walker  Gait Support Devices: Gait belt  Assistance 1: Modified independent  Quality of Gait 1: Diminished heel strike, Inconsistent stride length, Decreased step length, Forward flexed posture  Comments/Distance (ft) 1: 2x200 and mult other short distances between activities.  Ambulation/Gait Training 2  Comments/Distance (ft) 2: pt declining to practice w SPC this date.  Transfer 1  Transfer From 1: Chair with arms to  Transfer to 1: Sit  Technique 1: Stand to sit  Transfer Device 1: Walker  Transfer Level of Assistance 1: Modified independent  Transfers 2  Transfer From 2: Mat to  Transfer to 2: Sit, Stand  Technique 2: Sit to stand, Stand to sit  Transfer Device 2: Walker  Transfer Level of Assistance 2: Modified independent  Transfers 3  Transfer From 3: Toilet to  Transfer to 3: Sit, Stand  Technique 3: Sit to stand, Stand to sit  Transfer Device 3: Walker  Transfer Level of Assistance 3: Modified independent    Stairs  Rails 1: Bilateral  Device 1: Railing  Assistance 1: Modified independent  Comment/Number of Steps 1: 1x4, ascending/descending forward, leading w L to ascend and leadaing w R to descend. Pt steady throughout.    Outcome Measures:  [facility] Basic Mobility  Turning from your back to your side while in a flat bed without using bedrails: None  Moving from lying on your back to sitting on the side of a flat bed without using bedrails: None  Moving to and from bed  to chair (including a wheelchair): None  Standing up from a chair using your arms (e.g. wheelchair or bedside chair): None  To walk in hospital room: None  Climbing 3-5 steps with railing: None  Basic Mobility - Total Score: 24    Education Documentation  No documentation found.  Education Comments  No comments found.      Encounter Problems       Encounter Problems (Active)       PT Problem       pt will be able to ambulate community distances with LRAD and modified independent  (Met)       Start:  04/16/24    Expected End:  04/22/24    Resolved:  04/23/24         Pt will be able to perform STS transfer from all surfaces with independence and without extending RLE  (Met)       Start:  04/16/24    Expected End:  04/22/24    Resolved:  04/23/24         Pt will be able to navigate 4 steps with hand rail on one side(s) and modified independent to allow for safe DC.   (Progressing)       Start:  04/16/24    Expected End:  04/22/24            Pt will improve R knee flexion to >100 degrees (Progressing)       Start:  04/16/24    Expected End:  04/22/24            Pt will be able to perform all aspects of bed mobility with independent  (Met)       Start:  04/16/24    Expected End:  04/22/24    Resolved:  04/24/24         pt will report pain level </= to 5/10 (Progressing)       Start:  04/16/24    Expected End:  04/22/24               Pain - Adult

## 2024-04-24 NOTE — PROGRESS NOTES
"Subjective   Patient ID: Abraham Rodriguez is a 83 y.o. male who presents for Follow-up (Patient mentioned he has a right total knee replacement and was in the ER due to pain. ).    83-year-old male here for follow up hospital stay and rehab. Had a RT TKR By Suzan Quiros.  He did well immediately postop.  He remained inpatient and then discharged home.  He was having poor pain control limiting mobility and therapy.  Once discharged home he then returned 4 days later to the emergency room with worsening pain and edema right lower extremity and shortness of breath.  DVT study was negative.  He was admitted for pain management.  Ultimately he was diagnosed with hematoma right lower extremity.  He was then discharged to outpatient skilled facility for rehabilitation.  On April 9 he returned to the emergency room with increasing pain right lower extremity and edema.  DVT study negative.  He was started on 2 IV antibiotics treated for cellulitis.  He was then discharged from the hospital back to skilled facility on April 15, 2024.  During his skilled admission he was treated with Augmentin and doxycycline for right lower extremity cellulitis.  Home meds were continued.  Gabapentin was started and OxyContin was used for pain management.  He saw pain management specialist April 23 who ultimately changed his medications around.  Will be discharge tomorrow from rehab. Not happy with the physical therapy at the facility.  He is upset that he is feeling so slow.  Still not ambulating well on his own.  He is excited to go home and states he has a walker or cane crutches \"anything that I need to help me move around \".  He came to the visit today in a wheelchair.  Not sure when his follow up is with yesenia Quiros at [facility].  He plans on calling [facility] orthopedics when he is discharged from rehabilitation           Review of Systems    Objective   /67   Pulse 86   SpO2 95%     Physical Exam  Vitals and " nursing note reviewed.   Constitutional:       General: He is not in acute distress.     Appearance: Normal appearance.   HENT:      Head: Normocephalic and atraumatic.      Right Ear: External ear normal.      Left Ear: External ear normal.      Mouth/Throat:      Mouth: Mucous membranes are moist.      Pharynx: Oropharynx is clear.   Eyes:      Extraocular Movements: Extraocular movements intact.      Conjunctiva/sclera: Conjunctivae normal.      Pupils: Pupils are equal, round, and reactive to light.   Neck:      Vascular: No carotid bruit.   Cardiovascular:      Rate and Rhythm: Normal rate and regular rhythm.      Pulses: Normal pulses.      Heart sounds: Normal heart sounds.   Pulmonary:      Effort: Pulmonary effort is normal.      Breath sounds: Normal breath sounds.   Musculoskeletal:      Comments: RT knee edema down into calf. Non pitting. Wearing support stocking. Mildly WTT through the support stocking. Incision DANIEL no drainage.    Lymphadenopathy:      Cervical: No cervical adenopathy.   Skin:     General: Skin is warm and dry.   Neurological:      Mental Status: He is alert and oriented to person, place, and time. Mental status is at baseline.   Psychiatric:         Mood and Affect: Mood normal.         Behavior: Behavior normal.         Thought Content: Thought content normal.         Judgment: Judgment normal.         Assessment/Plan   Diagnoses and all orders for this visit:  Primary hypertension  Comments:  Blood pressure okay.  It should come down with pain control and mobility.  Continue metoprolol and irbesartan  Venous insufficiency of both lower extremities  Comments:  Chronic lower extremity edema.  This is probably playing into effect with the right lower extremity edema and decreased healing  Cellulitis of right lower extremity  Comments:  Continue antibiotics until dosing is complete.  Make sure to follow-up with orthopedics  Primary osteoarthritis of right knee  Comments:  Status post  right total knee replacement March 20, 2024.  Dr. Arora with [facility]  Other orders  -     Follow Up In Primary Care - Established; Future

## 2024-04-24 NOTE — PROGRESS NOTES
04/24/24 1333   Discharge Planning   Patient expects to be discharged to: home w/ outgoing Home Health for RN/PT/OT/aide services ([facility])- accepted   Does the patient need discharge transport arranged? No   Patient Choice   Provider Choice list and CMS website (https://medicare.gov/care-compare#search) for post-acute Quality and Resource Measure Data were provided and reviewed with: Patient   Patient / Family choosing to utilize agency / facility established prior to hospitalization No     Pt has elected to have HH services on discharge instead of going to [facility] out. He was given a preference list and is electing [facility] as he has had them in the recent past. Referral was made via [facility] and have accepted. Physician was notified of HH orders. JONG Barnett

## 2024-04-24 NOTE — CONSULTS
"Nutrition Follow-up Note    History:  Food and Nutrient History  Energy Intake: Good > 75 %  Food and Nutrient History: Pt laying in bed in room. Stated he has been having the ProStat twice a day and that he is eating everything. He can tell that he has lost some fluid weight. Has no home going nutrition concerns. Spoke with Suzie LAND about cellulitis and pressure injury. Per Suzie, cellulitis has improved and mepilex has been removed.      Food and Nutrient Administration History  Additional Food and Nutrient Administration History: 100% PO per flowsheet    Skilled Unit Only  Appetite/Fluid intake:  good (%)  Feeding Ability:  independent   Dentition:  own/edentulous    Oral Function:no problem present      Dietary Orders (From admission, onward)       Start     Ordered    04/17/24 1253  Oral nutritional supplements  Until discontinued        Comments: Provided by dietary; given by nursing as med-pass   Question Answer Comment   Deliver with Breakfast    Deliver with Dinner    Select supplement: Pro-Stat Sugar Free        04/17/24 1253    04/17/24 1252  Adult diet Regular, 2-3 grams sodium  Diet effective now        Question Answer Comment   Diet type Regular    Diet type 2-3 grams sodium        04/17/24 1253                  Wt Readings from Last 10 Encounters:   04/24/24 85.6 kg (188 lb 11.4 oz)   04/23/24 88.5 kg (195 lb)   04/10/24 88 kg (194 lb 0.1 oz)   03/26/24 86.2 kg (190 lb)   03/20/24 86.2 kg (190 lb 0.6 oz)   03/12/24 86.2 kg (190 lb 0.6 oz)   11/29/23 83.6 kg (184 lb 3.2 oz)   11/21/23 85.7 kg (189 lb)   08/30/23 85.3 kg (188 lb)   08/07/23 85.5 kg (188 lb 9 oz)       Anthropometrics:  Height: 180.3 cm (5' 11\")  Weight: 85.6 kg (188 lb 11.4 oz)  BMI (Calculated): 26.33    Weight Change: -0.12    Weight Change  Weight History / % Weight Change: Wt 4/10/24 - 88kg (2.7% fluid loss)  Significant Weight Loss: No    IBW/kg (Dietitian Calculated): 78.2 kg  Percent of IBW: 118 %       Energy " "Needs:  Calculated Energy Needs Using Equations  Height: 180.3 cm (5' 11\")  Temp: 36.4 °C (97.5 °F)    Estimated Energy Needs  Total Energy Estimated Needs (kCal): 2150 kCal  Method for Estimating Needs: 25-30 Kcal/kg IBW    Estimated Protein Needs  Total Protein Estimated Needs (g): 95 g  Method for Estimating Needs: 1.2 gm/kg IBW    Estimated Fluid Needs  Total Fluid Estimated Needs (mL): 1955 mL  Method for Estimating Needs: 25 mL/kg or per team       Nutrition Diagnosis        Patient has Nutrition Diagnosis: Yes  Nutrition Diagnosis 1: Inadequate protein intake  Diagnosis Status (1): Resolved  Related to (1): increased metabolic demand  As Evidenced by (1): s/p total knee replacement, leg cellulitis, pressure injury to buttocks       Nutrition Interventions/Recommendations   Nutrition Prescription  Individualized Nutrition Prescription Provided for : diet, fluids, ONS    Food and/or Nutrient Delivery Interventions  Meals and Snacks: Mineral-modified diet  Goal: Cont Na+ restriction as ordered        Medical Food Supplement: Commercial food  Goal: Cont ProStat (100kcal and 15g protein per 30mL)       Coordination of Nutrition Care by a Nutrition Professional  Collaboration and Referral of Nutrition Care: Collaboration by nutrition professional with other providers  Goal: Suzie LAND    Education Documentation  No documentation found.      Nutrition Monitoring and Evaluation   Food and Nutrient Related History  Energy Intake: Estimated energy intake  Criteria: Pt will consume >75% of estimated needs - goal met         Amount of Food: Medical food intake  Criteria: Pt will consume prostat BID - goal met    Anthropometrics: Body Composition/Growth/Weight History  Weight: Measured weight  Criteria: Reduce fluid wt / edema - goal met    Nutrition Focused Physical Findings   Skin: Impaired wound healing  Criteria: facilitate wound healing - goal met     Follow Up  Time Spent (min): 30 minutes  Follow up: Provided " inpatient RDN contact information  Last Date of Nutrition Visit: 04/24/24  Nutrition Follow-Up Needed?: Dietitian to reassess per policy

## 2024-04-24 NOTE — PROGRESS NOTES
Occupational Therapy    Occupational Therapy Treatment    Name: Abraham Rodriguez  MRN: [id number]  : 1940  Date: 24  Time Calculation  Start Time: 1001  Stop Time: 1032  Time Calculation (min): 31 min    Assessment:  Evaluation/Treatment Tolerance: Patient tolerated treatment well  End of Session Communication: Bedside nurse, PCT/CIERRA/KATHERIN  End of Session Patient Position: Up in chair, Alarm on  Plan:  Treatment Interventions: ADL retraining, Functional transfer training, Endurance training, Neuromuscular reeducation  OT Frequency: 5 times per week  OT Discharge Recommendations: Low intensity level of continued care  OT Recommended Transfer Status: Assist of 1  OT - OK to Discharge: Yes    Subjective   Previous Visit Info:  OT Last Visit On: 24  OT Received On: 24  General:  General  Prior to Session Communication: Bedside nurse, PCT/CIERRA/KATHERIN  Patient Position Received: Up in chair, Alarm on  General Comment: Pt pleseant and cooperative throughout session.  Precautions:  LE Weight Bearing Status: Weight Bearing as Tolerated  Medical Precautions: Fall precautions  Post-Surgical Precautions: Right total knee precautions  Pain Assessment:  Pain Assessment  Pain Assessment: 0-10  Pain Score:  (unrated R knee and back pain not increased by participation)     Objective   Cognition:  Overall Cognitive Status: Within Functional Limits    Activities of Daily Living:   LE Dressing  Sock Level of Assistance: Modified independent (no use of device)  Compression Hose Level of Assistance: Dependent (Patient continuing to refuse to attempt to don without OT assistance. Though patient lives alone, patient reporting he will have someone to help him don his compression and will  not wear it unless leaving the home disregarding OT education.)  LE Dressing Where Assessed: Recliner    Bed Mobility/Transfers:   Transfer 1  Transfer From 1: Chair with arms to  Transfer to 1: Stand  Technique 1: Sit to stand  Transfer  "Device 1: Walker  Transfer Level of Assistance 1: Modified independent  Transfers 2  Transfer From 2: Mat to  Transfer to 2: Stand  Technique 2: Sit to stand, Stand to sit  Transfer Device 2: Walker  Transfer Level of Assistance 2: Modified independent  Trials/Comments 2: completed multiple times during balance activities.    Functional Mobility:  Functional Mobility 1  Surface 1: Level tile  Device 1: Rolling walker  Assistance 1: Independent  Comments 1: approx 200ft x2 to therapy gym and back     Therapy/Activity:   Balance/Neuromuscular Re-Education  Balance/Neuromuscular Re-Education Activity 1: Dynamic reaching activity completed this date standing on foam pad with alternating single UE support. Patient reaching for cones and turning to place them behind on the mat table 2x 10 on eacch side with one break. Pt displaying good balance throughout reporting, \"that is a good full body work out\"  Balance/Neuromuscular Re-Education Activity 2: Ball toss in standing with no UE support. Tossing ball back and forth over and underhanded. OT tossing ball outsside BRICE with good ability to reach and maintain balance.    Outcome Measures:  [facility] Daily Activity  Putting on and taking off regular lower body clothing: None  Bathing (including washing, rinsing, drying): None  Putting on and taking off regular upper body clothing: None  Toileting, which includes using toilet, bedpan or urinal: None  Taking care of personal grooming such as brushing teeth: None  Eating Meals: None  Daily Activity - Total Score: 24    Education Documentation  ADL Training, taught by Sasha Mckeon OT at 4/24/2024 11:23 AM.  Learner: Patient  Readiness: Acceptance  Method: Explanation  Response: Needs Reinforcement, No Evidence of Learning  Comment: importance of wearing compression    Education Comments  No comments found.      Goals:  Encounter Problems       Encounter Problems (Active)       ADLs       Patient will perform UB and LB " bathing with modified independent level of assistance and grab bars and shower chair.       Start:  04/16/24    Expected End:  04/26/24            Patient with complete lower body dressing with modified independent level of assistance donning and doffing all LE clothes  with PRN adaptive equipment while supported sitting and standing. (Progressing)       Start:  04/16/24    Expected End:  04/26/24            Patient will complete toileting including hygiene clothing management/hygiene with modified independent level of assistance. (Met)       Start:  04/16/24    Expected End:  04/26/24    Resolved:  04/22/24            BALANCE       Pt will maintain dynamic standing balance during ADL task with modified independent level of assistance in order to demonstrate decreased risk of falling and improved postural control. (Progressing)       Start:  04/16/24    Expected End:  04/26/24                  Encounter Problems (Resolved)       MOBILITY       Patient will perform Functional mobility mod  Household distances/Community Distances with modified independent level of assistance and front wheeled walker in order to improve safety and functional mobility. (Met)       Start:  04/16/24    Expected End:  04/26/24    Resolved:  04/23/24            TRANSFERS       Patient will complete sit to stand transfer from all surfaces including chair, car, and tub transfer bench with modified independent level of assistance and front wheeled walker in order to improve safety and prepare for safe mobility. (Met)       Start:  04/16/24    Expected End:  04/26/24    Resolved:  04/22/24

## 2024-04-25 VITALS
BODY MASS INDEX: 26.42 KG/M2 | HEIGHT: 71 IN | WEIGHT: 188.71 LBS | DIASTOLIC BLOOD PRESSURE: 71 MMHG | SYSTOLIC BLOOD PRESSURE: 137 MMHG | RESPIRATION RATE: 18 BRPM | OXYGEN SATURATION: 96 % | TEMPERATURE: 97.6 F | HEART RATE: 86 BPM

## 2024-04-25 PROBLEM — R25.2 MUSCLE CRAMPING: Status: RESOLVED | Noted: 2023-09-14 | Resolved: 2024-04-25

## 2024-04-25 PROBLEM — Z96.641 STATUS POST RIGHT HIP REPLACEMENT: Status: RESOLVED | Noted: 2023-09-14 | Resolved: 2024-04-25

## 2024-04-25 PROBLEM — R60.0 PEDAL EDEMA: Status: RESOLVED | Noted: 2023-09-14 | Resolved: 2024-04-25

## 2024-04-25 PROBLEM — Z96.651 STATUS POST TOTAL KNEE REPLACEMENT, RIGHT: Status: RESOLVED | Noted: 2024-03-20 | Resolved: 2024-04-25

## 2024-04-25 PROBLEM — L23.9 ALLERGIC DERMATITIS: Status: RESOLVED | Noted: 2023-09-14 | Resolved: 2024-04-25

## 2024-04-25 PROBLEM — R60.9 EDEMA: Status: RESOLVED | Noted: 2023-09-14 | Resolved: 2024-04-25

## 2024-04-25 PROBLEM — E87.6 HYPOKALEMIA: Status: RESOLVED | Noted: 2023-09-14 | Resolved: 2024-04-25

## 2024-04-25 PROBLEM — M23.41 LOOSE BODY OF RIGHT KNEE: Status: RESOLVED | Noted: 2023-09-14 | Resolved: 2024-04-25

## 2024-04-25 PROBLEM — R91.8 LUNG INFILTRATE: Status: RESOLVED | Noted: 2023-09-14 | Resolved: 2024-04-25

## 2024-04-25 PROBLEM — R23.4 FISSURE IN SKIN OF FOOT: Status: RESOLVED | Noted: 2023-09-14 | Resolved: 2024-04-25

## 2024-04-25 PROCEDURE — 2500000004 HC RX 250 GENERAL PHARMACY W/ HCPCS (ALT 636 FOR OP/ED): Performed by: NURSE PRACTITIONER

## 2024-04-25 PROCEDURE — 94760 N-INVAS EAR/PLS OXIMETRY 1: CPT

## 2024-04-25 PROCEDURE — 2500000001 HC RX 250 WO HCPCS SELF ADMINISTERED DRUGS (ALT 637 FOR MEDICARE OP): Performed by: INTERNAL MEDICINE

## 2024-04-25 PROCEDURE — 99315 NF DSCHRG MGMT 30 MIN/LESS: CPT | Performed by: INTERNAL MEDICINE

## 2024-04-25 PROCEDURE — 2500000001 HC RX 250 WO HCPCS SELF ADMINISTERED DRUGS (ALT 637 FOR MEDICARE OP): Performed by: STUDENT IN AN ORGANIZED HEALTH CARE EDUCATION/TRAINING PROGRAM

## 2024-04-25 PROCEDURE — 2500000001 HC RX 250 WO HCPCS SELF ADMINISTERED DRUGS (ALT 637 FOR MEDICARE OP): Performed by: NURSE PRACTITIONER

## 2024-04-25 RX ORDER — FLUTICASONE PROPIONATE 50 MCG
1 SPRAY, SUSPENSION (ML) NASAL DAILY PRN
Qty: 16 G | Refills: 12 | Status: SHIPPED | OUTPATIENT
Start: 2024-04-25

## 2024-04-25 RX ORDER — FUROSEMIDE 40 MG/1
40 TABLET ORAL DAILY
Qty: 30 TABLET | Refills: 1 | Status: ON HOLD | OUTPATIENT
Start: 2024-04-26 | End: 2024-05-04

## 2024-04-25 RX ORDER — TIZANIDINE 2 MG/1
2 TABLET ORAL EVERY 8 HOURS PRN
Qty: 30 TABLET | Refills: 0 | Status: SHIPPED | OUTPATIENT
Start: 2024-04-25

## 2024-04-25 RX ADMIN — Medication 1 SPRAY: at 09:40

## 2024-04-25 RX ADMIN — GABAPENTIN 300 MG: 300 CAPSULE ORAL at 09:39

## 2024-04-25 RX ADMIN — OXYCODONE HYDROCHLORIDE 5 MG: 5 TABLET ORAL at 06:48

## 2024-04-25 RX ADMIN — PANTOPRAZOLE SODIUM 40 MG: 40 TABLET, DELAYED RELEASE ORAL at 06:48

## 2024-04-25 RX ADMIN — LOSARTAN POTASSIUM 50 MG: 50 TABLET, FILM COATED ORAL at 09:40

## 2024-04-25 RX ADMIN — FUROSEMIDE 40 MG: 40 TABLET ORAL at 09:39

## 2024-04-25 RX ADMIN — ASPIRIN 81 MG: 81 TABLET, COATED ORAL at 09:40

## 2024-04-25 RX ADMIN — ACETAMINOPHEN 975 MG: 325 TABLET ORAL at 09:40

## 2024-04-25 RX ADMIN — DULOXETINE HYDROCHLORIDE 20 MG: 20 CAPSULE, DELAYED RELEASE ORAL at 09:39

## 2024-04-25 RX ADMIN — METOPROLOL SUCCINATE 50 MG: 50 TABLET, EXTENDED RELEASE ORAL at 09:40

## 2024-04-25 RX ADMIN — OXYCODONE HYDROCHLORIDE 5 MG: 5 TABLET ORAL at 03:14

## 2024-04-25 RX ADMIN — LORATADINE 10 MG: 10 TABLET ORAL at 09:39

## 2024-04-25 RX ADMIN — OXYCODONE HYDROCHLORIDE AND ACETAMINOPHEN 500 MG: 500 TABLET ORAL at 09:40

## 2024-04-25 RX ADMIN — OXYCODONE HYDROCHLORIDE 5 MG: 5 TABLET ORAL at 11:24

## 2024-04-25 RX ADMIN — ACETAMINOPHEN 975 MG: 325 TABLET ORAL at 03:14

## 2024-04-25 ASSESSMENT — PAIN - FUNCTIONAL ASSESSMENT
PAIN_FUNCTIONAL_ASSESSMENT: 0-10

## 2024-04-25 ASSESSMENT — PAIN SCALES - GENERAL
PAINLEVEL_OUTOF10: 5 - MODERATE PAIN

## 2024-04-25 ASSESSMENT — PAIN DESCRIPTION - LOCATION
LOCATION: KNEE
LOCATION: KNEE

## 2024-04-25 NOTE — NURSING NOTE
Patient discharged home today with daughter. Prescriptions to be picked up were confirmed and he has a follow up appointment scheduled with Dr Arora. No further questions at this time.

## 2024-04-25 NOTE — CARE PLAN
"The patient's goals for the shift include  NA    The clinical goals for the shift include Pt will remain afebrile this shift    Pt sitting up in chair, saying he is going home today - says we are \"kicking him out.\" Pt is pleasant but frustrated with particular staff who he believes treated him inappropriately. Pain in RIGHT knee/leg controlled with PO oxy and Tylenol. Stable with walker to bathroom.    "

## 2024-04-25 NOTE — DISCHARGE SUMMARY
Discharge Diagnosis  Status post total right knee replacement    Issues Requiring Follow-Up  Pain management  Dependent edema/BMP in 1week    Test Results Pending At Discharge  Pending Labs       No current pending labs.            Hospital Course   Abraham Rodriguez is a 83 y.o. male  with PMHx significant for allergic rhinitis, arthritis, chronic pain, COPD, asbestosis of the lung, DVT 2020, CAD, MI, HFpEF prostate CA, HLD, HTN, edema,  who presented to [facility] after having a total right knee replacement with complications 3/20/24 in which he had developed a hematoma of the RLE and cellulitis of the right knee. He with admitted to the swing unit for continued physical therapy before returning to his home where he lives alone. He will continue antibiotic therapy with augmentin and doxycycline for a total of ten days from initial start of antibiotic therapy.     In addition, while here, patient progressed well, but did have significant pitting and dependent edema, which is improved on the 40mg of Lasix he was placed on.  He was started on Lasix 40mg, but it has not completely resolved.  But it is not impairing his therapy.  He will be discharged to home today with  and he will f/u with ortho, PCP, and to his regular pain managemebt    Pertinent Physical Exam At Time of Discharge  Constitutional:       General: He is not in acute distress.     Appearance: Normal appearance.   HENT:      Head: Atraumatic.      Nose: Nose normal.      Mouth/Throat:      Mouth: Mucous membranes are moist.   Eyes:      Extraocular Movements: Extraocular movements intact.      Pupils: Pupils are equal, round, and reactive to light.   Cardiovascular:      Rate and Rhythm: Normal rate and regular rhythm.      Pulses: Normal pulses.   Pulmonary:      Effort: Pulmonary effort is normal.      Breath sounds: Normal breath sounds.   Abdominal:      General: Bowel sounds are normal. There is no distension.      Palpations: Abdomen is soft.       Tenderness: There is no abdominal tenderness.   Musculoskeletal:         General: Swelling present.      Cervical back: Normal range of motion.      Right lower leg: Edema present.      Comments: Improved  ROM of the right knee since 4/24.   Continued redness with warmth and edema of the right knee. Improved from previous exam.   Skin:     General: Skin is warm and dry.      Capillary Refill: Capillary refill takes less than 2 seconds.   Neurological:      General: No focal deficit present.      Mental Status: He is alert and oriented to person, place, and time.   Psychiatric:         Mood and Affect: Mood normal.         Behavior: Behavior normal.     Home Medications     Medication List      ASK your doctor about these medications     aspirin 325 mg EC tablet   cefuroxime 500 mg tablet; Commonly known as: Ceftin; Take 1 tablet (500   mg) by mouth 2 times a day for 2 days.; Ask about: Should I take this   medication?   Dexilant 60 mg DR capsule; Generic drug: dexlansoprazole   docusate sodium 100 mg capsule; Commonly known as: Colace   DULoxetine 30 mg DR capsule; Commonly known as: Cymbalta; Take 1 capsule   (30 mg) by mouth once daily. Do not crush or chew.   finasteride 5 mg tablet; Commonly known as: Proscar   irbesartan 150 mg tablet; Commonly known as: Avapro   metoprolol succinate XL 50 mg 24 hr tablet; Commonly known as: Toprol-XL   naloxone 4 mg/0.1 mL nasal spray; Commonly known as: Narcan   * oxyCODONE 5 mg immediate release tablet; Commonly known as:   Roxicodone; Take 1 tablet (5 mg) by mouth 4 times a day as needed for   severe pain (7 - 10) for up to 28 days. Do not fill before April 29, 2024.; Start taking on: April 29, 2024   * oxyCODONE 5 mg immediate release tablet; Commonly known as:   Roxicodone; Take 1 tablet (5 mg) by mouth 4 times a day as needed for   severe pain (7 - 10) for up to 28 days. Do not fill before May 27, 2024.;   Start taking on: May 27, 2024   * oxyCODONE 5 mg immediate  release tablet; Commonly known as:   Roxicodone; Take 1 tablet (5 mg) by mouth 4 times a day as needed for   severe pain (7 - 10) for up to 28 days. Do not fill before June 24, 2024.;   Start taking on: June 24, 2024   oxyCODONE-acetaminophen 7.5-325 mg tablet; Commonly known as: Percocet;   Take 1 tablet by mouth 3 times a day as needed for severe pain (7 - 10)   for up to 3 days.; Ask about: Should I take this medication?   tamsulosin 0.4 mg 24 hr capsule; Commonly known as: Flomax  * This list has 3 medication(s) that are the same as other medications   prescribed for you. Read the directions carefully, and ask your doctor or   other care provider to review them with you.       Outpatient Follow-Up  Future Appointments   Date Time Provider Department Center   5/28/2024 12:20 PM SHAD CortésCNP [id number] [facility]   6/12/2024 11:20 AM SHAD RoqueCNP [id number] [facility]   7/17/2024  1:00 PM ARCHANA Barrientos [id number] [facility]   11/22/2024 10:00 AM Giselle Johnson APRFELICIACNP [id number] [place]     #Cellulitis (resolved)   ~Augmentin 875 BID 5 days  ~doxycycline 100 mg BID five days  ~has gross edema of the BLE right worse than left (improved from admission   ~incision is without drainage and well approximated.   ~hematoma RLE post surgical~resolved  ~continues with edema of the right knee with increased warmth and pink in color. Will monitor.       #Arthritis, multiple joint involvement    #Osteoarthritis    #Joint pain  ~continue gabapentin  ~oxycodone for pain ordered routine  ~continues to have difficulty with pain with ROM therapy but expressed that the other therapies are going well.      Active Problems:    #Allergic rhinitis  -continue Flonase BID  -continue Claritin 10 mg PO Daily       #Benign essential hypertension    #Dyslipidemia    #(HFpEF) heart failure with preserved ejection fraction (CMS/HCC)  -continue  mg PO BID  -continue Cozaar 50 mg PO Daily   -continue Toprol 50  mg PO Daily   ~continue furosemide       #Benign prostatic hyperplasia  -continue Proscar 5 mg PO HS  -continue Flomax 0.4 mg PO HS    Ramu De Anda MD

## 2024-05-01 ENCOUNTER — HOSPITAL ENCOUNTER (EMERGENCY)
Facility: HOSPITAL | Age: 84
Discharge: OTHER INSTITUTION | End: 2024-05-01
Attending: FAMILY MEDICINE
Payer: MEDICARE

## 2024-05-01 ENCOUNTER — HOSPITAL ENCOUNTER (INPATIENT)
Facility: HOSPITAL | Age: 84
LOS: 2 days | Discharge: HOME | DRG: 694 | End: 2024-05-04
Attending: STUDENT IN AN ORGANIZED HEALTH CARE EDUCATION/TRAINING PROGRAM | Admitting: STUDENT IN AN ORGANIZED HEALTH CARE EDUCATION/TRAINING PROGRAM
Payer: MEDICARE

## 2024-05-01 ENCOUNTER — APPOINTMENT (OUTPATIENT)
Dept: RADIOLOGY | Facility: HOSPITAL | Age: 84
End: 2024-05-01
Payer: MEDICARE

## 2024-05-01 VITALS
RESPIRATION RATE: 20 BRPM | WEIGHT: 190 LBS | DIASTOLIC BLOOD PRESSURE: 68 MMHG | HEART RATE: 93 BPM | BODY MASS INDEX: 26.6 KG/M2 | OXYGEN SATURATION: 97 % | TEMPERATURE: 98.4 F | SYSTOLIC BLOOD PRESSURE: 174 MMHG | HEIGHT: 71 IN

## 2024-05-01 DIAGNOSIS — N20.0 RENAL CALCULUS, LEFT: Primary | ICD-10-CM

## 2024-05-01 DIAGNOSIS — I10 BENIGN ESSENTIAL HYPERTENSION: Primary | ICD-10-CM

## 2024-05-01 DIAGNOSIS — N39.0 URINARY TRACT INFECTION WITHOUT HEMATURIA, SITE UNSPECIFIED: ICD-10-CM

## 2024-05-01 DIAGNOSIS — M50.30 DDD (DEGENERATIVE DISC DISEASE), CERVICAL: ICD-10-CM

## 2024-05-01 DIAGNOSIS — N20.1 CALCULUS OF URETER: ICD-10-CM

## 2024-05-01 DIAGNOSIS — N20.0 KIDNEY STONE: Primary | ICD-10-CM

## 2024-05-01 DIAGNOSIS — R60.0 PEDAL EDEMA: ICD-10-CM

## 2024-05-01 DIAGNOSIS — M17.12 ARTHRITIS OF KNEE, LEFT: ICD-10-CM

## 2024-05-01 LAB
ALBUMIN SERPL BCP-MCNC: 3.8 G/DL (ref 3.4–5)
ALP SERPL-CCNC: 54 U/L (ref 33–136)
ALT SERPL W P-5'-P-CCNC: 7 U/L (ref 10–52)
ANION GAP SERPL CALC-SCNC: 11 MMOL/L (ref 10–20)
APPEARANCE UR: ABNORMAL
AST SERPL W P-5'-P-CCNC: 8 U/L (ref 9–39)
BASOPHILS # BLD AUTO: 0.02 X10*3/UL (ref 0–0.1)
BASOPHILS NFR BLD AUTO: 0.3 %
BILIRUB SERPL-MCNC: 0.6 MG/DL (ref 0–1.2)
BILIRUB UR STRIP.AUTO-MCNC: NEGATIVE MG/DL
BUN SERPL-MCNC: 25 MG/DL (ref 6–23)
CALCIUM SERPL-MCNC: 9.3 MG/DL (ref 8.6–10.3)
CHLORIDE SERPL-SCNC: 106 MMOL/L (ref 98–107)
CO2 SERPL-SCNC: 26 MMOL/L (ref 21–32)
COLOR UR: ABNORMAL
CREAT SERPL-MCNC: 1.21 MG/DL (ref 0.5–1.3)
EGFRCR SERPLBLD CKD-EPI 2021: 59 ML/MIN/1.73M*2
EOSINOPHIL # BLD AUTO: 0.03 X10*3/UL (ref 0–0.4)
EOSINOPHIL NFR BLD AUTO: 0.4 %
ERYTHROCYTE [DISTWIDTH] IN BLOOD BY AUTOMATED COUNT: 12.9 % (ref 11.5–14.5)
GLUCOSE SERPL-MCNC: 112 MG/DL (ref 74–99)
GLUCOSE UR STRIP.AUTO-MCNC: NORMAL MG/DL
HCT VFR BLD AUTO: 35.6 % (ref 41–52)
HGB BLD-MCNC: 11.7 G/DL (ref 13.5–17.5)
IMM GRANULOCYTES # BLD AUTO: 0.02 X10*3/UL (ref 0–0.5)
IMM GRANULOCYTES NFR BLD AUTO: 0.3 % (ref 0–0.9)
KETONES UR STRIP.AUTO-MCNC: ABNORMAL MG/DL
LACTATE SERPL-SCNC: 0.7 MMOL/L (ref 0.4–2)
LEUKOCYTE ESTERASE UR QL STRIP.AUTO: NEGATIVE
LIPASE SERPL-CCNC: <3 U/L (ref 9–82)
LYMPHOCYTES # BLD AUTO: 0.73 X10*3/UL (ref 0.8–3)
LYMPHOCYTES NFR BLD AUTO: 10 %
MCH RBC QN AUTO: 30.7 PG (ref 26–34)
MCHC RBC AUTO-ENTMCNC: 32.9 G/DL (ref 32–36)
MCV RBC AUTO: 93 FL (ref 80–100)
MONOCYTES # BLD AUTO: 0.56 X10*3/UL (ref 0.05–0.8)
MONOCYTES NFR BLD AUTO: 7.7 %
MUCOUS THREADS #/AREA URNS AUTO: ABNORMAL /LPF
NEUTROPHILS # BLD AUTO: 5.93 X10*3/UL (ref 1.6–5.5)
NEUTROPHILS NFR BLD AUTO: 81.3 %
NITRITE UR QL STRIP.AUTO: NEGATIVE
NRBC BLD-RTO: 0 /100 WBCS (ref 0–0)
PH UR STRIP.AUTO: 5.5 [PH]
PLATELET # BLD AUTO: 217 X10*3/UL (ref 150–450)
POTASSIUM SERPL-SCNC: 3.8 MMOL/L (ref 3.5–5.3)
PROT SERPL-MCNC: 6.6 G/DL (ref 6.4–8.2)
PROT UR STRIP.AUTO-MCNC: ABNORMAL MG/DL
RBC # BLD AUTO: 3.81 X10*6/UL (ref 4.5–5.9)
RBC # UR STRIP.AUTO: ABNORMAL /UL
RBC #/AREA URNS AUTO: >20 /HPF
SODIUM SERPL-SCNC: 139 MMOL/L (ref 136–145)
SP GR UR STRIP.AUTO: 1.02
UROBILINOGEN UR STRIP.AUTO-MCNC: NORMAL MG/DL
WBC # BLD AUTO: 7.3 X10*3/UL (ref 4.4–11.3)
WBC #/AREA URNS AUTO: ABNORMAL /HPF
YEAST BUDDING #/AREA UR COMP ASSIST: PRESENT /HPF

## 2024-05-01 PROCEDURE — 83690 ASSAY OF LIPASE: CPT | Performed by: PHYSICIAN ASSISTANT

## 2024-05-01 PROCEDURE — 36415 COLL VENOUS BLD VENIPUNCTURE: CPT | Performed by: PHYSICIAN ASSISTANT

## 2024-05-01 PROCEDURE — 93971 EXTREMITY STUDY: CPT

## 2024-05-01 PROCEDURE — 93971 EXTREMITY STUDY: CPT | Mod: FOREIGN READ | Performed by: RADIOLOGY

## 2024-05-01 PROCEDURE — 99285 EMERGENCY DEPT VISIT HI MDM: CPT | Mod: 25

## 2024-05-01 PROCEDURE — 96365 THER/PROPH/DIAG IV INF INIT: CPT

## 2024-05-01 PROCEDURE — 96376 TX/PRO/DX INJ SAME DRUG ADON: CPT

## 2024-05-01 PROCEDURE — 2500000004 HC RX 250 GENERAL PHARMACY W/ HCPCS (ALT 636 FOR OP/ED): Performed by: PHYSICIAN ASSISTANT

## 2024-05-01 PROCEDURE — 87086 URINE CULTURE/COLONY COUNT: CPT | Mod: GENLAB | Performed by: PHYSICIAN ASSISTANT

## 2024-05-01 PROCEDURE — 74176 CT ABD & PELVIS W/O CONTRAST: CPT | Mod: FOREIGN READ | Performed by: RADIOLOGY

## 2024-05-01 PROCEDURE — 84075 ASSAY ALKALINE PHOSPHATASE: CPT | Performed by: PHYSICIAN ASSISTANT

## 2024-05-01 PROCEDURE — 81001 URINALYSIS AUTO W/SCOPE: CPT | Performed by: PHYSICIAN ASSISTANT

## 2024-05-01 PROCEDURE — 85025 COMPLETE CBC W/AUTO DIFF WBC: CPT | Performed by: PHYSICIAN ASSISTANT

## 2024-05-01 PROCEDURE — 83605 ASSAY OF LACTIC ACID: CPT | Performed by: PHYSICIAN ASSISTANT

## 2024-05-01 PROCEDURE — G0378 HOSPITAL OBSERVATION PER HR: HCPCS

## 2024-05-01 PROCEDURE — 96375 TX/PRO/DX INJ NEW DRUG ADDON: CPT

## 2024-05-01 PROCEDURE — 74176 CT ABD & PELVIS W/O CONTRAST: CPT

## 2024-05-01 RX ORDER — MORPHINE SULFATE 4 MG/ML
4 INJECTION, SOLUTION INTRAMUSCULAR; INTRAVENOUS ONCE
Status: COMPLETED | OUTPATIENT
Start: 2024-05-01 | End: 2024-05-01

## 2024-05-01 RX ORDER — ONDANSETRON HYDROCHLORIDE 2 MG/ML
4 INJECTION, SOLUTION INTRAVENOUS ONCE
Status: COMPLETED | OUTPATIENT
Start: 2024-05-01 | End: 2024-05-01

## 2024-05-01 RX ORDER — CEFTRIAXONE 2 G/50ML
2 INJECTION, SOLUTION INTRAVENOUS ONCE
Status: COMPLETED | OUTPATIENT
Start: 2024-05-01 | End: 2024-05-01

## 2024-05-01 RX ORDER — METOPROLOL SUCCINATE 50 MG/1
50 TABLET, EXTENDED RELEASE ORAL DAILY
Qty: 90 TABLET | Refills: 3 | Status: SHIPPED | OUTPATIENT
Start: 2024-05-01

## 2024-05-01 RX ADMIN — MORPHINE SULFATE 4 MG: 4 INJECTION, SOLUTION INTRAMUSCULAR; INTRAVENOUS at 13:11

## 2024-05-01 RX ADMIN — MORPHINE SULFATE 4 MG: 4 INJECTION, SOLUTION INTRAMUSCULAR; INTRAVENOUS at 21:22

## 2024-05-01 RX ADMIN — ONDANSETRON 4 MG: 2 INJECTION INTRAMUSCULAR; INTRAVENOUS at 21:23

## 2024-05-01 RX ADMIN — SODIUM CHLORIDE 1000 ML: 9 INJECTION, SOLUTION INTRAVENOUS at 13:12

## 2024-05-01 RX ADMIN — ONDANSETRON 4 MG: 2 INJECTION INTRAMUSCULAR; INTRAVENOUS at 13:12

## 2024-05-01 RX ADMIN — CEFTRIAXONE 2 G: 2 INJECTION, SOLUTION INTRAVENOUS at 16:50

## 2024-05-01 RX ADMIN — MORPHINE SULFATE 4 MG: 4 INJECTION, SOLUTION INTRAMUSCULAR; INTRAVENOUS at 16:50

## 2024-05-01 SDOH — SOCIAL STABILITY: SOCIAL INSECURITY: WERE YOU ABLE TO COMPLETE ALL THE BEHAVIORAL HEALTH SCREENINGS?: YES

## 2024-05-01 SDOH — SOCIAL STABILITY: SOCIAL INSECURITY: ARE THERE ANY APPARENT SIGNS OF INJURIES/BEHAVIORS THAT COULD BE RELATED TO ABUSE/NEGLECT?: NO

## 2024-05-01 SDOH — SOCIAL STABILITY: SOCIAL INSECURITY: DO YOU FEEL UNSAFE GOING BACK TO THE PLACE WHERE YOU ARE LIVING?: NO

## 2024-05-01 SDOH — SOCIAL STABILITY: SOCIAL INSECURITY: ARE YOU OR HAVE YOU BEEN THREATENED OR ABUSED PHYSICALLY, EMOTIONALLY, OR SEXUALLY BY ANYONE?: NO

## 2024-05-01 SDOH — SOCIAL STABILITY: SOCIAL INSECURITY: DO YOU FEEL ANYONE HAS EXPLOITED OR TAKEN ADVANTAGE OF YOU FINANCIALLY OR OF YOUR PERSONAL PROPERTY?: NO

## 2024-05-01 SDOH — SOCIAL STABILITY: SOCIAL INSECURITY: HAS ANYONE EVER THREATENED TO HURT YOUR FAMILY OR YOUR PETS?: NO

## 2024-05-01 SDOH — SOCIAL STABILITY: SOCIAL INSECURITY: ABUSE: ADULT

## 2024-05-01 SDOH — SOCIAL STABILITY: SOCIAL INSECURITY: HAVE YOU HAD ANY THOUGHTS OF HARMING ANYONE ELSE?: NO

## 2024-05-01 SDOH — SOCIAL STABILITY: SOCIAL INSECURITY: DOES ANYONE TRY TO KEEP YOU FROM HAVING/CONTACTING OTHER FRIENDS OR DOING THINGS OUTSIDE YOUR HOME?: NO

## 2024-05-01 SDOH — SOCIAL STABILITY: SOCIAL INSECURITY: HAVE YOU HAD THOUGHTS OF HARMING ANYONE ELSE?: NO

## 2024-05-01 ASSESSMENT — COGNITIVE AND FUNCTIONAL STATUS - GENERAL
MOBILITY SCORE: 20
STANDING UP FROM CHAIR USING ARMS: A LITTLE
STANDING UP FROM CHAIR USING ARMS: A LITTLE
CLIMB 3 TO 5 STEPS WITH RAILING: A LITTLE
MOVING TO AND FROM BED TO CHAIR: A LITTLE
WALKING IN HOSPITAL ROOM: A LITTLE
DAILY ACTIVITIY SCORE: 24
MOBILITY SCORE: 20
CLIMB 3 TO 5 STEPS WITH RAILING: A LITTLE
WALKING IN HOSPITAL ROOM: A LITTLE
PATIENT BASELINE BEDBOUND: NO
MOVING TO AND FROM BED TO CHAIR: A LITTLE
DAILY ACTIVITIY SCORE: 24

## 2024-05-01 ASSESSMENT — PAIN DESCRIPTION - ORIENTATION: ORIENTATION: LEFT

## 2024-05-01 ASSESSMENT — ACTIVITIES OF DAILY LIVING (ADL)
GROOMING: INDEPENDENT
PATIENT'S MEMORY ADEQUATE TO SAFELY COMPLETE DAILY ACTIVITIES?: YES
HEARING - RIGHT EAR: FUNCTIONAL
JUDGMENT_ADEQUATE_SAFELY_COMPLETE_DAILY_ACTIVITIES: YES
DRESSING YOURSELF: INDEPENDENT
TOILETING: INDEPENDENT
LACK_OF_TRANSPORTATION: NO
HEARING - LEFT EAR: FUNCTIONAL
BATHING: INDEPENDENT
ADEQUATE_TO_COMPLETE_ADL: YES
FEEDING YOURSELF: INDEPENDENT
WALKS IN HOME: NEEDS ASSISTANCE

## 2024-05-01 ASSESSMENT — PAIN DESCRIPTION - FREQUENCY: FREQUENCY: CONSTANT/CONTINUOUS

## 2024-05-01 ASSESSMENT — PAIN - FUNCTIONAL ASSESSMENT
PAIN_FUNCTIONAL_ASSESSMENT: 0-10

## 2024-05-01 ASSESSMENT — LIFESTYLE VARIABLES
HOW OFTEN DO YOU HAVE A DRINK CONTAINING ALCOHOL: NEVER
SKIP TO QUESTIONS 9-10: 1
AUDIT-C TOTAL SCORE: 0
HOW OFTEN DO YOU HAVE 6 OR MORE DRINKS ON ONE OCCASION: NEVER
AUDIT-C TOTAL SCORE: 0
HOW MANY STANDARD DRINKS CONTAINING ALCOHOL DO YOU HAVE ON A TYPICAL DAY: PATIENT DOES NOT DRINK

## 2024-05-01 ASSESSMENT — PAIN SCALES - GENERAL
PAINLEVEL_OUTOF10: 10 - WORST POSSIBLE PAIN
PAINLEVEL_OUTOF10: 8
PAINLEVEL_OUTOF10: 8
PAINLEVEL_OUTOF10: 10 - WORST POSSIBLE PAIN
PAINLEVEL_OUTOF10: 10 - WORST POSSIBLE PAIN

## 2024-05-01 ASSESSMENT — PAIN DESCRIPTION - DESCRIPTORS
DESCRIPTORS: ACHING
DESCRIPTORS: DULL

## 2024-05-01 ASSESSMENT — PAIN DESCRIPTION - LOCATION
LOCATION: ABDOMEN

## 2024-05-01 ASSESSMENT — COLUMBIA-SUICIDE SEVERITY RATING SCALE - C-SSRS
6. HAVE YOU EVER DONE ANYTHING, STARTED TO DO ANYTHING, OR PREPARED TO DO ANYTHING TO END YOUR LIFE?: NO
6. HAVE YOU EVER DONE ANYTHING, STARTED TO DO ANYTHING, OR PREPARED TO DO ANYTHING TO END YOUR LIFE?: NO
2. HAVE YOU ACTUALLY HAD ANY THOUGHTS OF KILLING YOURSELF?: NO
2. HAVE YOU ACTUALLY HAD ANY THOUGHTS OF KILLING YOURSELF?: NO
1. IN THE PAST MONTH, HAVE YOU WISHED YOU WERE DEAD OR WISHED YOU COULD GO TO SLEEP AND NOT WAKE UP?: NO
1. IN THE PAST MONTH, HAVE YOU WISHED YOU WERE DEAD OR WISHED YOU COULD GO TO SLEEP AND NOT WAKE UP?: NO

## 2024-05-01 ASSESSMENT — PAIN DESCRIPTION - PAIN TYPE: TYPE: ACUTE PAIN

## 2024-05-01 NOTE — ED PROVIDER NOTES
HPI   Chief Complaint   Patient presents with    Abdominal Pain     Abdominal pain , diarrhea after eating fish last evening        History of present illness:  83-year-old male presents to the emergency room for complaints of left lower abdominal pain.  The patient states that last night he woke up with a sudden onset of severe nausea and vomiting.  He states he has vomited multiple times since last night he has not very intense abdominal pain in his left lower side that radiates to the center of his abdomen slightly.  He states that he initially thought that this might be because of some bad food that he had.  He states that he had a fish dinner that he has never had before and he states they thought that patient was fine but after eating it last night he initially went to bed fine but then woke up with vomiting.  He states that he has never had any issues of his stomach before and denies any diarrhea or fevers or chills or any other symptoms at this time.  He has a past medical history of COPD hyperlipidemia hypertension heart disease Bell's palsy and edema.    Social history: Negative for alcohol and drug use.    Review of systems:   Gen.: No weight loss, fatigue, anorexia, insomnia, fever.   Eyes: No vision loss, double vision   ENT: No pharyngitis, neck pain  Cardiac: No chest pain, palpitations, syncope, near syncope.   Pulmonary: No shortness of breath, cough, hemoptysis.   Heme/lymph: No swollen glands, fever, bleeding.   GI: No abdominal pain, change in bowel habits, melena, hematemesis, hematochezia, nausea, vomiting, diarrhea.   : No discharge, dysuria, frequency, urgency, hematuria.   Musculoskeletal: No limb pain, joint pain, joint swelling.   Skin: No rashes.   Review of systems is otherwise negative unless stated above or in history of present illness.      Physical exam:  General: Vitals noted, upon presentation the patient is laying on his back but holding his left lower side and complaining  about cramping abdominal pain  EENT: No lymphadenopathy appreciated  Cardiac: Regular, rate, rhythm, no murmur.   Pulmonary: Lungs clear bilaterally with good aeration. No adventitious breath sounds.   Abdomen: Soft, nonsurgical. Nontender. No peritoneal signs. Normoactive bowel sounds.   Extremities: No peripheral edema.   Skin: No rash.   Neuro: No focal neurologic deficits      Medical decision making:   Testing: CBC CMP urinalysis CT scan pelvis with contrast lactate: CBC lactate CMP unremarkable urinalysis shows 6-10 white blood cells and greater than 20 red blood cells and budding yeast, CT scan shows concerns for hydronephrosis of the left kidney and hydroureter as well but a kidney stone cannot be identified as the distal ureter unfortunately cannot be visualized secondary to artifact from the patient's hip replacements.  Treatment: IV morphine given for comfort IV Zofran given, IV fluids given  Reevaluation:   Plan: 83-year-old male presents to the emergency room for complaints of left lower abdominal pain.  The patient states that last night he woke up with a sudden onset of severe nausea and vomiting.  He states he has vomited multiple times since last night he has not very intense abdominal pain in his left lower side that radiates to the center of his abdomen slightly.  He states that he initially thought that this might be because of some bad food that he had.  He states that he had a fish dinner that he has never had before and he states they thought that patient was fine but after eating it last night he initially went to bed fine but then woke up with vomiting.  He states that he has never had any issues of his stomach before and denies any diarrhea or fevers or chills or any other symptoms at this time.  He has a past medical history of COPD hyperlipidemia hypertension heart disease Bell's palsy and edema. Abdomen: Soft, nonsurgical. Nontender. No peritoneal signs. Normoactive bowel sounds.  The  patient upon presentation appeared very uncomfortable holding his left lower side at this time.  He has no tenderness to palpation in the right side but there appears to be some may be minor discomfort to palpation of the left lower side.  He denies any other symptoms this time denies any fevers.  I explained to the patient the test results and that I have concerns about his symptoms as well as his imaging.  I spoke with Dr. Moore of urology who reviewed the case and explained to me that he felt that be best the patient was placed on IV antibiotics and transferred to [facility] at this time for further care and treatment.  I spoke with Dr. Feliz of the hospitalist team who accepted the care of the patient at this time.  Impression:   1.  Kidney stone            History provided by:  Patient   used: No                        No data recorded                   Patient History   Past Medical History:   Diagnosis Date    Allergic dermatitis 09/14/2023    Allergic rhinitis due to pollen 10/23/2014    Hay fever    Arthritis     Chronic pain disorder     Clotting disorder (Multi)     dvt april 2020     P.E. april 2020    COPD (chronic obstructive pulmonary disease) (Multi)     hx asbestos    Coronary artery disease     Encounter for other preprocedural examination 06/24/2014    Pre-procedural examination    Encounter for screening for malignant neoplasm of prostate     Encounter for screening for malignant neoplasm of prostate    Fissure in skin of foot 09/14/2023    Hyperlipidemia     Hypertension     Localized edema 05/11/2020    Pedal edema    Lung infiltrate 09/14/2023    Myocardial infarction (Multi)     Other conditions influencing health status     Carcinoma Of The Tongue    Pain in unspecified knee 12/22/2014    Joint pain, knee    Personal history of diseases of the skin and subcutaneous tissue 04/23/2013    History of eczema    Personal history of other diseases of the musculoskeletal system  and connective tissue 2014    Personal history of arthritis    Personal history of other diseases of the nervous system and sense organs 08/10/2021    History of Bell's palsy    Personal history of other diseases of the respiratory system 2014    History of asbestosis    Personal history of other diseases of the respiratory system 2014    History of chronic obstructive lung disease    Personal history of other specified conditions 2013    History of edema    Plantar fascial fibromatosis 2013    Plantar fasciitis    Polyp of colon     Polyp of sigmoid colon    Syncope and collapse 2015    Syncope and collapse    Unspecified abdominal pain 2014    Stomach pain    Unspecified disorder of eyelid 10/23/2014    Eyelid disorder     Past Surgical History:   Procedure Laterality Date    CARDIAC CATHETERIZATION      CHOLECYSTECTOMY  2013    Cholecystectomy Laparoscopic    OTHER SURGICAL HISTORY  2021    Meniscus repair    OTHER SURGICAL HISTORY Left 2023    Hip replacement    OTHER SURGICAL HISTORY  2013    Biopsy Tongue    SHOULDER SURGERY  2013    Shoulder Surgery    TOTAL KNEE ARTHROPLASTY Right 2024     Family History   Problem Relation Name Age of Onset    Hypothyroidism Brother       Social History     Tobacco Use    Smoking status: Former     Current packs/day: 0.00     Types: Cigarettes     Quit date:      Years since quittin.3    Smokeless tobacco: Never   Vaping Use    Vaping status: Never Used   Substance Use Topics    Alcohol use: Never    Drug use: Not Currently     Comment: smokes 1-2 x's per week       Physical Exam   ED Triage Vitals [24 1154]   Temperature Heart Rate Respirations BP   36.5 °C (97.7 °F) 96 16 175/83      Pulse Ox Temp Source Heart Rate Source Patient Position   96 % Oral -- Sitting      BP Location FiO2 (%)     Right arm --       Physical Exam    ED Course & MDM   Diagnoses as of 24 1632   Kidney  stone   Urinary tract infection without hematuria, site unspecified       Medical Decision Making      Procedure  Procedures     Gordon Reyes PA-C  05/01/24 [id number]

## 2024-05-02 ENCOUNTER — APPOINTMENT (OUTPATIENT)
Dept: CARDIOLOGY | Facility: HOSPITAL | Age: 84
DRG: 694 | End: 2024-05-02
Payer: MEDICARE

## 2024-05-02 PROBLEM — N20.0 RENAL CALCULUS, LEFT: Status: ACTIVE | Noted: 2024-05-02

## 2024-05-02 PROBLEM — N20.1 CALCULUS OF URETER: Status: ACTIVE | Noted: 2024-05-01

## 2024-05-02 LAB
ANION GAP SERPL CALC-SCNC: 12 MMOL/L (ref 10–20)
BUN SERPL-MCNC: 20 MG/DL (ref 6–23)
CALCIUM SERPL-MCNC: 8.7 MG/DL (ref 8.6–10.3)
CHLORIDE SERPL-SCNC: 106 MMOL/L (ref 98–107)
CO2 SERPL-SCNC: 25 MMOL/L (ref 21–32)
CREAT SERPL-MCNC: 1.25 MG/DL (ref 0.5–1.3)
EGFRCR SERPLBLD CKD-EPI 2021: 57 ML/MIN/1.73M*2
ERYTHROCYTE [DISTWIDTH] IN BLOOD BY AUTOMATED COUNT: 12.9 % (ref 11.5–14.5)
GLUCOSE SERPL-MCNC: 96 MG/DL (ref 74–99)
HCT VFR BLD AUTO: 34.1 % (ref 41–52)
HGB BLD-MCNC: 11.3 G/DL (ref 13.5–17.5)
HOLD SPECIMEN: NORMAL
MCH RBC QN AUTO: 30.9 PG (ref 26–34)
MCHC RBC AUTO-ENTMCNC: 33.1 G/DL (ref 32–36)
MCV RBC AUTO: 93 FL (ref 80–100)
NRBC BLD-RTO: 0 /100 WBCS (ref 0–0)
PLATELET # BLD AUTO: 226 X10*3/UL (ref 150–450)
POTASSIUM SERPL-SCNC: 3.5 MMOL/L (ref 3.5–5.3)
RBC # BLD AUTO: 3.66 X10*6/UL (ref 4.5–5.9)
SODIUM SERPL-SCNC: 139 MMOL/L (ref 136–145)
WBC # BLD AUTO: 5.5 X10*3/UL (ref 4.4–11.3)

## 2024-05-02 PROCEDURE — 80048 BASIC METABOLIC PNL TOTAL CA: CPT | Performed by: INTERNAL MEDICINE

## 2024-05-02 PROCEDURE — 99223 1ST HOSP IP/OBS HIGH 75: CPT | Performed by: INTERNAL MEDICINE

## 2024-05-02 PROCEDURE — 1210000001 HC SEMI-PRIVATE ROOM DAILY

## 2024-05-02 PROCEDURE — 93005 ELECTROCARDIOGRAM TRACING: CPT

## 2024-05-02 PROCEDURE — 2500000004 HC RX 250 GENERAL PHARMACY W/ HCPCS (ALT 636 FOR OP/ED): Performed by: STUDENT IN AN ORGANIZED HEALTH CARE EDUCATION/TRAINING PROGRAM

## 2024-05-02 PROCEDURE — 2500000004 HC RX 250 GENERAL PHARMACY W/ HCPCS (ALT 636 FOR OP/ED): Performed by: INTERNAL MEDICINE

## 2024-05-02 PROCEDURE — 2500000006 HC RX 250 W HCPCS SELF ADMINISTERED DRUGS (ALT 637 FOR ALL PAYERS): Mod: MUE | Performed by: INTERNAL MEDICINE

## 2024-05-02 PROCEDURE — 36415 COLL VENOUS BLD VENIPUNCTURE: CPT | Performed by: INTERNAL MEDICINE

## 2024-05-02 PROCEDURE — 2500000002 HC RX 250 W HCPCS SELF ADMINISTERED DRUGS (ALT 637 FOR MEDICARE OP, ALT 636 FOR OP/ED): Performed by: INTERNAL MEDICINE

## 2024-05-02 PROCEDURE — 85027 COMPLETE CBC AUTOMATED: CPT | Performed by: INTERNAL MEDICINE

## 2024-05-02 PROCEDURE — 2500000001 HC RX 250 WO HCPCS SELF ADMINISTERED DRUGS (ALT 637 FOR MEDICARE OP): Performed by: INTERNAL MEDICINE

## 2024-05-02 RX ORDER — FLUTICASONE PROPIONATE 50 MCG
1 SPRAY, SUSPENSION (ML) NASAL DAILY PRN
Status: DISCONTINUED | OUTPATIENT
Start: 2024-05-02 | End: 2024-05-04 | Stop reason: HOSPADM

## 2024-05-02 RX ORDER — ACETAMINOPHEN 325 MG/1
650 TABLET ORAL EVERY 4 HOURS PRN
Status: DISCONTINUED | OUTPATIENT
Start: 2024-05-02 | End: 2024-05-04 | Stop reason: HOSPADM

## 2024-05-02 RX ORDER — ALUMINUM HYDROXIDE, MAGNESIUM HYDROXIDE, AND SIMETHICONE 1200; 120; 1200 MG/30ML; MG/30ML; MG/30ML
30 SUSPENSION ORAL EVERY 6 HOURS PRN
Status: DISCONTINUED | OUTPATIENT
Start: 2024-05-02 | End: 2024-05-04 | Stop reason: HOSPADM

## 2024-05-02 RX ORDER — GUAIFENESIN/DEXTROMETHORPHAN 100-10MG/5
5 SYRUP ORAL EVERY 4 HOURS PRN
Status: DISCONTINUED | OUTPATIENT
Start: 2024-05-02 | End: 2024-05-04 | Stop reason: HOSPADM

## 2024-05-02 RX ORDER — ONDANSETRON 4 MG/1
4 TABLET, FILM COATED ORAL EVERY 8 HOURS PRN
Status: DISCONTINUED | OUTPATIENT
Start: 2024-05-02 | End: 2024-05-04 | Stop reason: HOSPADM

## 2024-05-02 RX ORDER — CALCIUM CARBONATE 200(500)MG
500 TABLET,CHEWABLE ORAL 4 TIMES DAILY PRN
Status: DISCONTINUED | OUTPATIENT
Start: 2024-05-02 | End: 2024-05-04 | Stop reason: HOSPADM

## 2024-05-02 RX ORDER — METOPROLOL SUCCINATE 50 MG/1
50 TABLET, EXTENDED RELEASE ORAL DAILY
Status: DISCONTINUED | OUTPATIENT
Start: 2024-05-02 | End: 2024-05-04 | Stop reason: HOSPADM

## 2024-05-02 RX ORDER — LORATADINE 10 MG/1
10 TABLET ORAL DAILY
Status: DISCONTINUED | OUTPATIENT
Start: 2024-05-02 | End: 2024-05-04 | Stop reason: HOSPADM

## 2024-05-02 RX ORDER — DOCUSATE SODIUM 100 MG/1
100 CAPSULE, LIQUID FILLED ORAL 2 TIMES DAILY PRN
Status: DISCONTINUED | OUTPATIENT
Start: 2024-05-02 | End: 2024-05-04 | Stop reason: HOSPADM

## 2024-05-02 RX ORDER — OXYCODONE HYDROCHLORIDE 5 MG/1
5 TABLET ORAL 4 TIMES DAILY PRN
Status: DISCONTINUED | OUTPATIENT
Start: 2024-05-02 | End: 2024-05-03

## 2024-05-02 RX ORDER — SODIUM CHLORIDE 450 MG/100ML
85 INJECTION, SOLUTION INTRAVENOUS CONTINUOUS
Status: DISCONTINUED | OUTPATIENT
Start: 2024-05-02 | End: 2024-05-04 | Stop reason: HOSPADM

## 2024-05-02 RX ORDER — TIZANIDINE 4 MG/1
2 TABLET ORAL EVERY 8 HOURS PRN
Status: DISCONTINUED | OUTPATIENT
Start: 2024-05-02 | End: 2024-05-04 | Stop reason: HOSPADM

## 2024-05-02 RX ORDER — ONDANSETRON HYDROCHLORIDE 2 MG/ML
4 INJECTION, SOLUTION INTRAVENOUS EVERY 8 HOURS PRN
Status: DISCONTINUED | OUTPATIENT
Start: 2024-05-02 | End: 2024-05-04 | Stop reason: HOSPADM

## 2024-05-02 RX ORDER — TAMSULOSIN HYDROCHLORIDE 0.4 MG/1
0.8 CAPSULE ORAL EVERY EVENING
Status: DISCONTINUED | OUTPATIENT
Start: 2024-05-02 | End: 2024-05-04 | Stop reason: HOSPADM

## 2024-05-02 RX ORDER — ACETAMINOPHEN 160 MG/5ML
650 SOLUTION ORAL EVERY 4 HOURS PRN
Status: DISCONTINUED | OUTPATIENT
Start: 2024-05-02 | End: 2024-05-04 | Stop reason: HOSPADM

## 2024-05-02 RX ORDER — DULOXETIN HYDROCHLORIDE 30 MG/1
30 CAPSULE, DELAYED RELEASE ORAL DAILY
Status: DISCONTINUED | OUTPATIENT
Start: 2024-05-02 | End: 2024-05-04 | Stop reason: HOSPADM

## 2024-05-02 RX ORDER — PANTOPRAZOLE SODIUM 40 MG/1
40 TABLET, DELAYED RELEASE ORAL
Status: DISCONTINUED | OUTPATIENT
Start: 2024-05-02 | End: 2024-05-04 | Stop reason: HOSPADM

## 2024-05-02 RX ORDER — ASPIRIN 325 MG
325 TABLET ORAL 2 TIMES DAILY
Status: DISCONTINUED | OUTPATIENT
Start: 2024-05-02 | End: 2024-05-04 | Stop reason: HOSPADM

## 2024-05-02 RX ORDER — GUAIFENESIN 600 MG/1
600 TABLET, EXTENDED RELEASE ORAL EVERY 12 HOURS PRN
Status: DISCONTINUED | OUTPATIENT
Start: 2024-05-02 | End: 2024-05-04 | Stop reason: HOSPADM

## 2024-05-02 RX ORDER — FINASTERIDE 5 MG/1
5 TABLET, FILM COATED ORAL EVERY EVENING
Status: DISCONTINUED | OUTPATIENT
Start: 2024-05-02 | End: 2024-05-04 | Stop reason: HOSPADM

## 2024-05-02 RX ORDER — ACETAMINOPHEN 650 MG/1
650 SUPPOSITORY RECTAL EVERY 4 HOURS PRN
Status: DISCONTINUED | OUTPATIENT
Start: 2024-05-02 | End: 2024-05-04 | Stop reason: HOSPADM

## 2024-05-02 RX ADMIN — ASPIRIN 325 MG: 325 TABLET ORAL at 09:02

## 2024-05-02 RX ADMIN — TAMSULOSIN HYDROCHLORIDE 0.8 MG: 0.4 CAPSULE ORAL at 20:07

## 2024-05-02 RX ADMIN — LORATADINE 10 MG: 10 TABLET ORAL at 09:02

## 2024-05-02 RX ADMIN — OXYCODONE HYDROCHLORIDE 5 MG: 5 TABLET ORAL at 23:31

## 2024-05-02 RX ADMIN — TAMSULOSIN HYDROCHLORIDE 0.8 MG: 0.4 CAPSULE ORAL at 02:28

## 2024-05-02 RX ADMIN — FINASTERIDE 5 MG: 5 TABLET, FILM COATED ORAL at 20:07

## 2024-05-02 RX ADMIN — FLUTICASONE PROPIONATE 1 SPRAY: 50 SPRAY, METERED NASAL at 09:02

## 2024-05-02 RX ADMIN — ASPIRIN 325 MG: 325 TABLET ORAL at 20:07

## 2024-05-02 RX ADMIN — SODIUM CHLORIDE 85 ML/HR: 4.5 INJECTION, SOLUTION INTRAVENOUS at 21:28

## 2024-05-02 RX ADMIN — ALUMINUM HYDROXIDE, MAGNESIUM HYDROXIDE, AND DIMETHICONE 30 ML: 200; 20; 200 SUSPENSION ORAL at 09:08

## 2024-05-02 RX ADMIN — FINASTERIDE 5 MG: 5 TABLET, FILM COATED ORAL at 02:28

## 2024-05-02 RX ADMIN — OXYCODONE HYDROCHLORIDE 5 MG: 5 TABLET ORAL at 02:31

## 2024-05-02 RX ADMIN — SODIUM CHLORIDE 85 ML/HR: 4.5 INJECTION, SOLUTION INTRAVENOUS at 10:20

## 2024-05-02 RX ADMIN — METOPROLOL SUCCINATE 50 MG: 50 TABLET, EXTENDED RELEASE ORAL at 09:02

## 2024-05-02 RX ADMIN — DULOXETINE HYDROCHLORIDE 30 MG: 30 CAPSULE, DELAYED RELEASE ORAL at 09:02

## 2024-05-02 RX ADMIN — PANTOPRAZOLE SODIUM 40 MG: 40 TABLET, DELAYED RELEASE ORAL at 06:13

## 2024-05-02 ASSESSMENT — ENCOUNTER SYMPTOMS
NEUROLOGICAL NEGATIVE: 1
ENDOCRINE NEGATIVE: 1
ROS GI COMMENTS: SEE HPI
CARDIOVASCULAR NEGATIVE: 1
EYES NEGATIVE: 1
ALLERGIC/IMMUNOLOGIC NEGATIVE: 1
PSYCHIATRIC NEGATIVE: 1
CONSTITUTIONAL NEGATIVE: 1
RESPIRATORY NEGATIVE: 1
HEMATOLOGIC/LYMPHATIC NEGATIVE: 1

## 2024-05-02 ASSESSMENT — COGNITIVE AND FUNCTIONAL STATUS - GENERAL
DAILY ACTIVITIY SCORE: 24
WALKING IN HOSPITAL ROOM: A LITTLE
STANDING UP FROM CHAIR USING ARMS: A LITTLE
MOBILITY SCORE: 20
MOVING TO AND FROM BED TO CHAIR: A LITTLE
CLIMB 3 TO 5 STEPS WITH RAILING: A LITTLE

## 2024-05-02 ASSESSMENT — PAIN SCALES - GENERAL
PAINLEVEL_OUTOF10: 10 - WORST POSSIBLE PAIN
PAINLEVEL_OUTOF10: 8
PAINLEVEL_OUTOF10: 3
PAINLEVEL_OUTOF10: 4
PAINLEVEL_OUTOF10: 8

## 2024-05-02 ASSESSMENT — PAIN - FUNCTIONAL ASSESSMENT
PAIN_FUNCTIONAL_ASSESSMENT: 0-10

## 2024-05-02 ASSESSMENT — PAIN DESCRIPTION - DESCRIPTORS
DESCRIPTORS: DULL
DESCRIPTORS: DULL

## 2024-05-02 ASSESSMENT — PAIN DESCRIPTION - ORIENTATION: ORIENTATION: LEFT

## 2024-05-02 ASSESSMENT — ACTIVITIES OF DAILY LIVING (ADL): LACK_OF_TRANSPORTATION: NO

## 2024-05-02 ASSESSMENT — PAIN DESCRIPTION - LOCATION: LOCATION: ABDOMEN

## 2024-05-02 NOTE — CONSULTS
Reason For Consult  Hydronephrosis, probable stone.    History Of Present Illness  Abraham Rodriguez is a 83 y.o. male presenting with left lower abdominal pain which started the night before admission.  It was also associated with nausea and vomiting.  He came to the emergency room where a CT scan was performed showing some left-sided hydronephrosis.  It was followed down to the distal ureter but the distal ureter could not be seen due to the fact that he has had bilateral hip replacement surgery.  His white blood cell count was normal.His urinalysis showed greater than 20 red blood cells per high-power field.  He continues to have persistent pain.  He has no prior history of kidney stones.     Past Medical History  He has a past medical history of Allergic dermatitis (09/14/2023), Allergic rhinitis due to pollen (10/23/2014), Arthritis, Chronic pain disorder, Clotting disorder (Multi), COPD (chronic obstructive pulmonary disease) (Multi), Coronary artery disease, Encounter for other preprocedural examination (06/24/2014), Encounter for screening for malignant neoplasm of prostate, Fissure in skin of foot (09/14/2023), Hyperlipidemia, Hypertension, Localized edema (05/11/2020), Lung infiltrate (09/14/2023), Myocardial infarction (Multi), Other conditions influencing health status, Pain in unspecified knee (12/22/2014), Personal history of diseases of the skin and subcutaneous tissue (04/23/2013), Personal history of other diseases of the musculoskeletal system and connective tissue (06/19/2014), Personal history of other diseases of the nervous system and sense organs (08/10/2021), Personal history of other diseases of the respiratory system (11/12/2014), Personal history of other diseases of the respiratory system (08/13/2014), Personal history of other specified conditions (08/20/2013), Plantar fascial fibromatosis (07/22/2013), Polyp of colon, Syncope and collapse (01/23/2015), Unspecified abdominal pain  "(12/22/2014), and Unspecified disorder of eyelid (10/23/2014).    Surgical History  He has a past surgical history that includes Other surgical history (04/27/2021); Other surgical history (Left, 05/31/2023); Cholecystectomy (04/23/2013); Shoulder surgery (04/23/2013); Other surgical history (04/23/2013); Cardiac catheterization; and Total knee arthroplasty (Right, 03/27/2024).     Social History  He reports that he quit smoking about 41 years ago. His smoking use included cigarettes. He has never used smokeless tobacco. He reports that he does not currently use drugs. He reports that he does not drink alcohol.    Family History  Family History   Problem Relation Name Age of Onset    Hypothyroidism Brother          Allergies  Iodinated contrast media, Tobramycin-dexamethasone, Cinnamon, Cucumber, Lipitor [atorvastatin], and Lisinopril    Review of Systems  As per admission HPI.     Physical Exam  Awake, alert, oriented  Afebrile, vital signs stable  HEENT: Normal extraocular movements  Neck: Supple  Abdomen: Tender in the left lower quadrant and left flank.  Back: Left CVA tenderness  Lungs: Normal respiratory pattern.  Extremities: Moves all 4  Psychological:Haskins: Somewhat lethargic affect.  P     Last Recorded Vitals  Blood pressure 155/76, pulse 71, temperature 36.9 °C (98.4 °F), temperature source Temporal, resp. rate 16, height 1.803 m (5' 11\"), weight 89.4 kg (197 lb), SpO2 92%.    Relevant Results      Scheduled medications  aspirin, 325 mg, oral, BID  DULoxetine, 30 mg, oral, Daily  finasteride, 5 mg, oral, q PM  loratadine, 10 mg, oral, Daily  metoprolol succinate XL, 50 mg, oral, Daily  pantoprazole, 40 mg, oral, Daily before breakfast  tamsulosin, 0.8 mg, oral, q PM      Continuous medications  sodium chloride, 85 mL/hr, Last Rate: 85 mL/hr (05/02/24 1020)      PRN medications  PRN medications: acetaminophen **OR** acetaminophen **OR** acetaminophen, alum-mag hydroxide-simeth, calcium carbonate, " dextromethorphan-guaifenesin, docusate sodium, fluticasone, guaiFENesin, ondansetron **OR** ondansetron, oxyCODONE, tiZANidine  Results for orders placed or performed during the hospital encounter of 05/01/24 (from the past 24 hour(s))   Electrocardiogram, 12-lead PRN ACS symptoms   Result Value Ref Range    Ventricular Rate 86 BPM    Atrial Rate 86 BPM    HI Interval 208 ms    QRS Duration 146 ms    QT Interval 420 ms    QTC Calculation(Bazett) 502 ms    P Axis 50 degrees    R Axis -38 degrees    T Axis 21 degrees    QRS Count 14 beats    Q Onset 210 ms    P Onset 106 ms    P Offset 161 ms    T Offset 420 ms    QTC Fredericia 473 ms   Basic metabolic panel   Result Value Ref Range    Glucose 96 74 - 99 mg/dL    Sodium 139 136 - 145 mmol/L    Potassium 3.5 3.5 - 5.3 mmol/L    Chloride 106 98 - 107 mmol/L    Bicarbonate 25 21 - 32 mmol/L    Anion Gap 12 10 - 20 mmol/L    Urea Nitrogen 20 6 - 23 mg/dL    Creatinine 1.25 0.50 - 1.30 mg/dL    eGFR 57 (L) >60 mL/min/1.73m*2    Calcium 8.7 8.6 - 10.3 mg/dL   CBC   Result Value Ref Range    WBC 5.5 4.4 - 11.3 x10*3/uL    nRBC 0.0 0.0 - 0.0 /100 WBCs    RBC 3.66 (L) 4.50 - 5.90 x10*6/uL    Hemoglobin 11.3 (L) 13.5 - 17.5 g/dL    Hematocrit 34.1 (L) 41.0 - 52.0 %    MCV 93 80 - 100 fL    MCH 30.9 26.0 - 34.0 pg    MCHC 33.1 32.0 - 36.0 g/dL    RDW 12.9 11.5 - 14.5 %    Platelets 226 150 - 450 x10*3/uL        Assessment/Plan     Left-sided hydronephrosis: This left-sided hydronephrosis associated with left sided abdominal and left flank pain and microscopic hematuria is all consistent with a ureteral stone.  The pain is not improved and he is still nauseated.  The CT scan shows hydronephrosis but because of his hip replacement surgery the distal ureters cannot be seen.  I will assume that he has a partially obstructing stone and we will plan on cystoscopy, ureteroscopy and laser lithotripsy tomorrow.  This was discussed with him.    Goldy Alston MD

## 2024-05-02 NOTE — PROGRESS NOTES
Occupational Therapy                 Therapy Communication Note    Patient Name: Abraham Rodriguez  MRN: [id number]  Today's Date: 5/2/2024     Discipline: Occupational Therapy    Missed Visit Reason: Missed Visit Reason: Patient refused, Patient sleeping (Pt cleared by RN for therapy. Pt politely declined, stating he has not gotten any sleep the past night and wants to rest prior to OOB activities. No eval this date.)    Missed Time: Attempt    Comment: [id number]

## 2024-05-02 NOTE — CARE PLAN
Problem: Pain  Goal: Takes deep breaths with improved pain control throughout the shift  Outcome: Progressing  Goal: Turns in bed with improved pain control throughout the shift  Outcome: Progressing  Goal: Walks with improved pain control throughout the shift  Outcome: Progressing  Goal: Performs ADL's with improved pain control throughout shift  Outcome: Progressing  Goal: Participates in PT with improved pain control throughout the shift  Outcome: Progressing  Goal: Free from opioid side effects throughout the shift  Outcome: Progressing  Goal: Free from acute confusion related to pain meds throughout the shift  Outcome: Progressing     Problem: Pain  Goal: My pain/discomfort is manageable  Outcome: Progressing     Problem: Safety  Goal: Patient will be injury free during hospitalization  Outcome: Progressing     Problem: Daily Care  Goal: Daily care needs are met  Outcome: Progressing     Problem: Psychosocial Needs  Goal: Demonstrates ability to cope with hospitalization/illness  Outcome: Progressing  Goal: Collaborate with me, my family, and caregiver to identify my specific goals  Outcome: Progressing     Problem: Discharge Barriers  Goal: My discharge needs are met  Outcome: Progressing     Problem: Fall/Injury  Goal: Not fall by end of shift  Outcome: Progressing  Goal: Be free from injury by end of the shift  Outcome: Progressing  Goal: Verbalize understanding of personal risk factors for fall in the hospital  Outcome: Progressing  Goal: Verbalize understanding of risk factor reduction measures to prevent injury from fall in the home  Outcome: Progressing  Goal: Use assistive devices by end of the shift  Outcome: Progressing   The patient's goals for the shift include  to control pain    The clinical goals for the shift include pain control and safety    Over the shift, the patient did not make progress toward the following goals. Barriers to progression include none. Recommendations to address these  barriers include none.

## 2024-05-02 NOTE — PROGRESS NOTES
"Abraham Rodriguez is a 83 y.o. male on day 0 of admission presenting with Renal calculus, left.    Subjective   Pt continues to have abdominal pain.        Objective     Physical Exam  Vitals and nursing note reviewed.   Constitutional:       General: He is not in acute distress.     Appearance: Normal appearance. He is not ill-appearing or toxic-appearing.   HENT:      Head: Normocephalic and atraumatic.      Nose: Nose normal.      Mouth/Throat:      Mouth: Mucous membranes are moist.   Eyes:      Extraocular Movements: Extraocular movements intact.      Conjunctiva/sclera: Conjunctivae normal.      Pupils: Pupils are equal, round, and reactive to light.   Cardiovascular:      Rate and Rhythm: Normal rate and regular rhythm.      Heart sounds: No murmur heard.     No gallop.   Pulmonary:      Effort: Pulmonary effort is normal. No respiratory distress.      Breath sounds: Normal breath sounds. No wheezing, rhonchi or rales.   Abdominal:      General: Abdomen is flat. Bowel sounds are normal. There is no distension.      Palpations: Abdomen is soft. There is no mass.      Tenderness: There is abdominal tenderness.      Comments: + left CVA tenderness   Musculoskeletal:         General: No swelling or tenderness. Normal range of motion.      Cervical back: Normal range of motion and neck supple.   Skin:     General: Skin is warm and dry.   Neurological:      General: No focal deficit present.      Mental Status: He is alert and oriented to person, place, and time. Mental status is at baseline.   Psychiatric:         Mood and Affect: Mood normal.         Behavior: Behavior normal.         Thought Content: Thought content normal.         Judgment: Judgment normal.         Last Recorded Vitals:  /83 (BP Location: Right arm, Patient Position: Lying)   Pulse 104   Temp 36.6 °C (97.9 °F) (Temporal)   Resp 18   Ht 1.803 m (5' 11\")   Wt 89.4 kg (197 lb)   SpO2 96%   BMI 27.48 kg/m²      Scheduled " medications:  aspirin, 325 mg, oral, BID  DULoxetine, 30 mg, oral, Daily  finasteride, 5 mg, oral, q PM  loratadine, 10 mg, oral, Daily  metoprolol succinate XL, 50 mg, oral, Daily  pantoprazole, 40 mg, oral, Daily before breakfast  tamsulosin, 0.8 mg, oral, q PM      Continuous medications:  sodium chloride, 85 mL/hr, Last Rate: 85 mL/hr (05/02/24 1020)      PRN medications:  PRN medications: acetaminophen **OR** acetaminophen **OR** acetaminophen, alum-mag hydroxide-simeth, calcium carbonate, dextromethorphan-guaifenesin, docusate sodium, fluticasone, guaiFENesin, ondansetron **OR** ondansetron, oxyCODONE, tiZANidine     Relevant Results:  Results for orders placed or performed during the hospital encounter of 05/01/24 (from the past 24 hour(s))   Electrocardiogram, 12-lead PRN ACS symptoms   Result Value Ref Range    Ventricular Rate 86 BPM    Atrial Rate 86 BPM    PA Interval 208 ms    QRS Duration 146 ms    QT Interval 420 ms    QTC Calculation(Bazett) 502 ms    P Axis 50 degrees    R Axis -38 degrees    T Axis 21 degrees    QRS Count 14 beats    Q Onset 210 ms    P Onset 106 ms    P Offset 161 ms    T Offset 420 ms    QTC Fredericia 473 ms   Basic metabolic panel   Result Value Ref Range    Glucose 96 74 - 99 mg/dL    Sodium 139 136 - 145 mmol/L    Potassium 3.5 3.5 - 5.3 mmol/L    Chloride 106 98 - 107 mmol/L    Bicarbonate 25 21 - 32 mmol/L    Anion Gap 12 10 - 20 mmol/L    Urea Nitrogen 20 6 - 23 mg/dL    Creatinine 1.25 0.50 - 1.30 mg/dL    eGFR 57 (L) >60 mL/min/1.73m*2    Calcium 8.7 8.6 - 10.3 mg/dL   CBC   Result Value Ref Range    WBC 5.5 4.4 - 11.3 x10*3/uL    nRBC 0.0 0.0 - 0.0 /100 WBCs    RBC 3.66 (L) 4.50 - 5.90 x10*6/uL    Hemoglobin 11.3 (L) 13.5 - 17.5 g/dL    Hematocrit 34.1 (L) 41.0 - 52.0 %    MCV 93 80 - 100 fL    MCH 30.9 26.0 - 34.0 pg    MCHC 33.1 32.0 - 36.0 g/dL    RDW 12.9 11.5 - 14.5 %    Platelets 226 150 - 450 x10*3/uL       Electrocardiogram, 12-lead PRN ACS symptoms    Result  Date: 5/2/2024  Sinus rhythm with Premature atrial complexes Left axis deviation Right bundle branch block Abnormal ECG When compared with ECG of 12-MAR-2024 12:21, Premature atrial complexes are now Present NH interval has decreased    CT abdomen pelvis wo IV contrast    Result Date: 5/1/2024  STUDY: CT Abdomen and Pelvis without IV Contrast; 5/1/2024 1:45 PM INDICATION: Generalized abdominal cramping and nausea/dry heaving, onset this morning. COMPARISON: CT AP 8/9/2023. ACCESSION NUMBER(S): [id number] ORDERING CLINICIAN: CATHY MARTIN TECHNIQUE: CT of the abdomen and pelvis was performed.  Contiguous axial images were obtained at 3 mm slice thickness through the abdomen and pelvis. Coronal and sagittal reconstructions at 3 mm slice thickness were performed. No intravenous contrast was administered.  Automated mA/kV exposure control was utilized and patient examination was performed in strict accordance with principles of ALARA. FINDINGS: Please note that the evaluation of vessels, lymph nodes and organs is limited without intravenous contrast.  LOWER CHEST: No cardiomegaly.  No pericardial effusion.  Lung bases are clear.  ABDOMEN:  LIVER: No hepatomegaly.  Smooth surface contour.  Normal attenuation.  BILE DUCTS: No intrahepatic or extrahepatic biliary ductal dilatation.  GALLBLADDER: The gallbladder is unremarkable. STOMACH: No abnormalities identified.  PANCREAS: No masses or ductal dilatation.  SPLEEN: No splenomegaly or focal splenic lesion.  ADRENAL GLANDS: No thickening or nodules.  KIDNEYS AND URETERS: Kidneys are normal in size and location.  There are several small nonobstructing stones in the left kidney. No stones are seen in the right kidney. There is mild left hydronephrosis and hydroureter. However, I am unable to visualize the distal aspect of the left ureter due to prominent metallic artifact from bilateral hip arthroplasties.  PELVIS:  BLADDER: No abnormalities identified.  REPRODUCTIVE  ORGANS: No abnormalities identified.  BOWEL: No abnormalities identified. The appendix is identified and is unremarkable.  VESSELS: No abnormalities identified.  Abdominal aorta is normal in caliber.  PERITONEUM/RETROPERITONEUM/LYMPH NODES: No free fluid.  No pneumoperitoneum. No lymphadenopathy.  ABDOMINAL WALL: No abnormalities identified. SOFT TISSUES: No abnormalities identified.  BONES: No acute fracture or aggressive osseous lesion.    The examination is limited due to prominent metallic artifact from bilateral hip arthroplasties. I am unable to visualize the left ureter or the right ureter through this area. There is mild left hydronephrosis and hydroureter which I suspect may be due to an obstructing left ureteral stone, but I am unable to visualize an obstructing stone due to the artifact. Signed by Lukasz Bales MD    Lower extremity venous duplex right    Result Date: 5/1/2024  STUDY: Right Lower Extremity Venous Doppler Ultrasound; 5/1/2024 12:56 PM INDICATION: RLE edema x one week, status post knee replacement one month ago. COMPARISON: US LE Venous 4/24/2024, 4/9/2024. ACCESSION NUMBER(S): [id number] ORDERING CLINICIAN: CATHY MARTIN TECHNIQUE:  Real-time grayscale, color, and spectral doppler ultrasound imaging of the right lower extremity veins was performed. FINDINGS: The right common femoral, profunda femoral, femoral and popliteal veins demonstrated normal compressibility, normal phasic venous flow and normal response to augmentation.  There is no evidence for echogenic thrombi.  The visualized deep calf veins are patent.  The contralateral common femoral vein is free of thrombosis.    No evidence for DVT within the right lower extremity. No significant change compared to prior ultrasound. Signed by Sean Howard MD           Assessment/Plan   Principal Problem:    Renal calculus, left    Left ureteral calculus with mild left hydronephrosis and hydroureter  Hx BPH  - urology consulted  -  IVF  - keep NPO  - flomax  - finasteride    CHUY  Creat 1.21, GFR 59 (up from 0.84 and 87 respectively)  Likely related to #1  Hold nephrotoxic meds including ARB  - IVF    Normocytic anemia  - monitor     Left knee OA  S/P recent left TKA  Wound looks good without evidence of infection  PT and OT to see re: swelling and stiffness    HTN  - metoprolol    Depression  - cymbalta    GERD  - protonix    Dispo: monitor clinically, urology consult         Lynn Feliz MD  Hospitalist

## 2024-05-02 NOTE — PROGRESS NOTES
05/02/24 0804   Discharge Planning   Living Arrangements Alone   Support Systems Children   Assistance Needed A&OX3; independent with ADLs with walker; drives (but limited now due to recent knee replacement; room air baseline and currently room air   Type of Residence Private residence   Number of Stairs to Enter Residence 0   Number of Stairs Within Residence 0   Do you have animals or pets at home? No   Who is requesting discharge planning? Provider   Home or Post Acute Services In home services   Patient expects to be discharged to: Plan to discharge to home (pending urology workup) with resumption of [facility]   Does the patient need discharge transport arranged? Yes   RoundTrip coordination needed? Yes   Has discharge transport been arranged? No   Financial Resource Strain   How hard is it for you to pay for the very basics like food, housing, medical care, and heating? Not hard   Housing Stability   In the last 12 months, was there a time when you were not able to pay the mortgage or rent on time? N   In the last 12 months, how many places have you lived? 1   In the last 12 months, was there a time when you did not have a steady place to sleep or slept in a shelter (including now)? N   Transportation Needs   In the past 12 months, has lack of transportation kept you from medical appointments or from getting medications? no   In the past 12 months, has lack of transportation kept you from meetings, work, or from getting things needed for daily living? No

## 2024-05-02 NOTE — H&P
History Of Present Illness  Abraham Rodriguez is an 83 y.o. male presenting with left flank pain. He was discharged from this hospital on 4/25/2024 after undergoing right TKR. He says he spent time in rehab and was eventually discharged home in a satisfactory condition. He presented to [facility] ED yesterday because of severe left flank pain that began the day before. He denied having any fever, chills, nausea, vomiting, dysuria, urgency, frequency or last hematuria. Work up in the ED revealed mild left hydronephrosis and hydroureter due to an obstructing left ureteral stone. He was transferred to [facility] for further care and management. He is presently complaining of pain and stiffness in his right knee due to lying on the cot in the ED at [place] for many hours.     Past Medical History  Past Medical History:   Diagnosis Date    Allergic dermatitis 09/14/2023    Allergic rhinitis due to pollen 10/23/2014    Hay fever    Arthritis     Chronic pain disorder     Clotting disorder (Multi)     dvt april 2020     P.E. april 2020    COPD (chronic obstructive pulmonary disease) (Multi)     hx asbestos    Coronary artery disease     Encounter for other preprocedural examination 06/24/2014    Pre-procedural examination    Encounter for screening for malignant neoplasm of prostate     Encounter for screening for malignant neoplasm of prostate    Fissure in skin of foot 09/14/2023    Hyperlipidemia     Hypertension     Localized edema 05/11/2020    Pedal edema    Lung infiltrate 09/14/2023    Myocardial infarction (Multi)     Other conditions influencing health status     Carcinoma Of The Tongue    Pain in unspecified knee 12/22/2014    Joint pain, knee    Personal history of diseases of the skin and subcutaneous tissue 04/23/2013    History of eczema    Personal history of other diseases of the musculoskeletal system and connective tissue 06/19/2014    Personal history of arthritis    Personal  history of other diseases of the nervous system and sense organs 08/10/2021    History of Bell's palsy    Personal history of other diseases of the respiratory system 11/12/2014    History of asbestosis    Personal history of other diseases of the respiratory system 08/13/2014    History of chronic obstructive lung disease    Personal history of other specified conditions 08/20/2013    History of edema    Plantar fascial fibromatosis 07/22/2013    Plantar fasciitis    Polyp of colon     Polyp of sigmoid colon    Syncope and collapse 01/23/2015    Syncope and collapse    Unspecified abdominal pain 12/22/2014    Stomach pain    Unspecified disorder of eyelid 10/23/2014    Eyelid disorder       Past Surgical History  Past Surgical History:   Procedure Laterality Date    CARDIAC CATHETERIZATION      CHOLECYSTECTOMY  04/23/2013    Cholecystectomy Laparoscopic    OTHER SURGICAL HISTORY  04/27/2021    Meniscus repair    OTHER SURGICAL HISTORY Left 05/31/2023    Hip replacement    OTHER SURGICAL HISTORY  04/23/2013    Biopsy Tongue    SHOULDER SURGERY  04/23/2013    Shoulder Surgery    TOTAL KNEE ARTHROPLASTY Right 03/27/2024        Social History  He reports that he quit smoking about 41 years ago. His smoking use included cigarettes. He has never used smokeless tobacco. He reports that he does not currently use drugs. He reports that he does not drink alcohol.    Family History  Family History   Problem Relation Name Age of Onset    Hypothyroidism Brother          Allergies  Iodinated contrast media, Tobramycin-dexamethasone, Cinnamon, Cucumber, Lipitor [atorvastatin], and Lisinopril    Review of Systems   Constitutional: Negative.    HENT: Negative.     Eyes: Negative.    Respiratory: Negative.     Cardiovascular: Negative.    Gastrointestinal:         See HPI   Endocrine: Negative.    Genitourinary:         See HPI   Musculoskeletal:         See HPI   Skin: Negative.    Allergic/Immunologic: Negative.    Neurological:  "Negative.    Hematological: Negative.    Psychiatric/Behavioral: Negative.          Physical Exam  Constitutional:       General: He is not in acute distress.     Appearance: Normal appearance. He is not ill-appearing, toxic-appearing or diaphoretic.   HENT:      Head: Normocephalic and atraumatic.      Nose: Nose normal.      Mouth/Throat:      Mouth: Mucous membranes are dry.      Pharynx: Oropharynx is clear. No oropharyngeal exudate.   Eyes:      General: No scleral icterus.        Right eye: No discharge.         Left eye: No discharge.      Extraocular Movements: Extraocular movements intact.      Conjunctiva/sclera: Conjunctivae normal.   Cardiovascular:      Rate and Rhythm: Normal rate and regular rhythm.      Heart sounds:      Gallop present.      Comments: S1 and S2 with frequent extra systolic beats  Pulmonary:      Breath sounds: No wheezing, rhonchi or rales.   Abdominal:      Palpations: Abdomen is soft. There is no mass.      Tenderness: There is no abdominal tenderness. There is no right CVA tenderness, left CVA tenderness, guarding or rebound.   Musculoskeletal:         General: Swelling and tenderness present.      Cervical back: Neck supple.      Comments: Right knee is swollen and tender with LROM. No erythema   Lymphadenopathy:      Cervical: No cervical adenopathy.   Skin:     General: Skin is warm and dry.   Neurological:      General: No focal deficit present.      Mental Status: He is alert and oriented to person, place, and time.   Psychiatric:         Mood and Affect: Mood normal.         Behavior: Behavior normal.          Last Recorded Vitals  Blood pressure 157/74, pulse 96, temperature 37 °C (98.6 °F), temperature source Temporal, resp. rate 18, height 1.803 m (5' 11\"), weight 89.4 kg (197 lb), SpO2 97%.    Relevant Results   Latest Reference Range & Units 05/01/24 13:08 05/01/24 13:33   GLUCOSE 74 - 99 mg/dL 112 (H)    SODIUM 136 - 145 mmol/L 139    POTASSIUM 3.5 - 5.3 mmol/L 3.8  "   CHLORIDE 98 - 107 mmol/L 106    Bicarbonate 21 - 32 mmol/L 26    Anion Gap 10 - 20 mmol/L 11    Blood Urea Nitrogen 6 - 23 mg/dL 25 (H)    Creatinine 0.50 - 1.30 mg/dL 1.21    EGFR >60 mL/min/1.73m*2 59 (L)    Calcium 8.6 - 10.3 mg/dL 9.3    Albumin 3.4 - 5.0 g/dL 3.8    Alkaline Phosphatase 33 - 136 U/L 54    ALT 10 - 52 U/L 7 (L)    AST 9 - 39 U/L 8 (L)    Bilirubin Total 0.0 - 1.2 mg/dL 0.6    Total Protein 6.4 - 8.2 g/dL 6.6    Lactate 0.4 - 2.0 mmol/L 0.7    LIPASE 9 - 82 U/L <3 (L)    LEUKOCYTES (10*3/UL) IN BLOOD BY AUTOMATED COUNT, [place] 4.4 - 11.3 x10*3/uL 7.3    nRBC 0.0 - 0.0 /100 WBCs 0.0    ERYTHROCYTES (10*6/UL) IN BLOOD BY AUTOMATED COUNT, [place] 4.50 - 5.90 x10*6/uL 3.81 (L)    HEMOGLOBIN 13.5 - 17.5 g/dL 11.7 (L)    HEMATOCRIT 41.0 - 52.0 % 35.6 (L)    MCV 80 - 100 fL 93    MCH 26.0 - 34.0 pg 30.7    MCHC 32.0 - 36.0 g/dL 32.9    RED CELL DISTRIBUTION WIDTH 11.5 - 14.5 % 12.9    PLATELETS (10*3/UL) IN BLOOD AUTOMATED COUNT, [place] 150 - 450 x10*3/uL 217    NEUTROPHILS/100 LEUKOCYTES IN BLOOD BY AUTOMATED COUNT, [place] 40.0 - 80.0 % 81.3    Immature Granulocytes %, Automated 0.0 - 0.9 % 0.3    Lymphocytes % 13.0 - 44.0 % 10.0    Monocytes % 2.0 - 10.0 % 7.7    Eosinophils % 0.0 - 6.0 % 0.4    Basophils % 0.0 - 2.0 % 0.3    NEUTROPHILS (10*3/UL) IN BLOOD BY AUTOMATED COUNT, [place] 1.60 - 5.50 x10*3/uL 5.93 (H)    Immature Granulocytes Absolute, Automated 0.00 - 0.50 x10*3/uL 0.02    Lymphocytes Absolute 0.80 - 3.00 x10*3/uL 0.73 (L)    Monocytes Absolute 0.05 - 0.80 x10*3/uL 0.56    Eosinophils Absolute 0.00 - 0.40 x10*3/uL 0.03    Basophils Absolute 0.00 - 0.10 x10*3/uL 0.02    URINE CULTURE   Rpt (IP)   Color, Urine Light-Yellow, Yellow, Dark-Yellow   Light-Orange !   Appearance, Urine Clear   Turbid !   Specific Gravity, Urine 1.005 - 1.035   1.019   pH, Urine 5.0, 5.5, 6.0, 6.5, 7.0, 7.5, 8.0   5.5   Protein, Urine NEGATIVE, 10 (TRACE), 20 (TRACE) mg/dL  50 (1+) !   Glucose, Urine  Normal mg/dL  Normal   Blood, Urine NEGATIVE   OVER (3+) !   Ketones, Urine NEGATIVE mg/dL  60 (2+) !   Bilirubin, Urine NEGATIVE   NEGATIVE   Urobilinogen, Urine Normal mg/dL  Normal   Nitrite, Urine NEGATIVE   NEGATIVE   Leukocyte Esterase, Urine NEGATIVE   NEGATIVE   Mucus, Urine Reference range not established. /LPF  FEW   Budding Yeast, Urine NONE /HPF  PRESENT !   RBC, Urine NONE, 1-2, 3-5 /HPF  >20 !   WBC, Urine 1-5, NONE /HPF  6-10 !   (H): Data is abnormally high  (L): Data is abnormally low  !: Data is abnormal  (IP): In Process  Rpt: View report in Results Review for more information    CT ABD/PELVIS:      IMPRESSION:  The examination is limited due to prominent metallic artifact from  bilateral hip arthroplasties. I am unable to visualize the left ureter  or the right ureter through this area. There is mild left  hydronephrosis and hydroureter which I suspect may be due to an  obstructing left ureteral stone, but I am unable to visualize an  obstructing stone due to the artifact.  Signed by Lukasz Bales MD    RLE VENOUS DUPLEX:      IMPRESSION:  No evidence for DVT within the right lower extremity.  No significant change compared to prior ultrasound.     Assessment/Plan   Left ureteral calculus with mild left hydronephrosis and hydroureter  Pain control  IVF  Flomax  Urology consulted    CHUY  Creat 1.21, GFR 59 (up from 0.84 and 87 respectively)  Likely related to #1  Hold nephrotoxic meds including ARB  Volume expansion    Left knee OA  S/P recent left TKA  Wound looks good without evidence of infection  PT and OT to see re: swelling and stiffness      I spent 75 minutes in the professional and overall care of this patient.      Lyala Thomas MD

## 2024-05-02 NOTE — CARE PLAN
Problem: Pain  Goal: Takes deep breaths with improved pain control throughout the shift  Outcome: Progressing  Goal: Turns in bed with improved pain control throughout the shift  Outcome: Progressing  Goal: Walks with improved pain control throughout the shift  Outcome: Progressing  Goal: Performs ADL's with improved pain control throughout shift  Outcome: Progressing  Goal: Participates in PT with improved pain control throughout the shift  Outcome: Progressing  Goal: Free from opioid side effects throughout the shift  Outcome: Progressing  Goal: Free from acute confusion related to pain meds throughout the shift  Outcome: Progressing   The patient's goals for the shift include      The clinical goals for the shift include improved pain

## 2024-05-03 ENCOUNTER — ANESTHESIA (OUTPATIENT)
Dept: OPERATING ROOM | Facility: HOSPITAL | Age: 84
DRG: 694 | End: 2024-05-03
Payer: MEDICARE

## 2024-05-03 ENCOUNTER — APPOINTMENT (OUTPATIENT)
Dept: RADIOLOGY | Facility: HOSPITAL | Age: 84
DRG: 694 | End: 2024-05-03
Payer: MEDICARE

## 2024-05-03 ENCOUNTER — ANESTHESIA EVENT (OUTPATIENT)
Dept: OPERATING ROOM | Facility: HOSPITAL | Age: 84
DRG: 694 | End: 2024-05-03
Payer: MEDICARE

## 2024-05-03 LAB
ALBUMIN SERPL BCP-MCNC: 3.4 G/DL (ref 3.4–5)
ALP SERPL-CCNC: 53 U/L (ref 33–136)
ALT SERPL W P-5'-P-CCNC: 4 U/L (ref 10–52)
ANION GAP SERPL CALC-SCNC: 10 MMOL/L (ref 10–20)
AST SERPL W P-5'-P-CCNC: 9 U/L (ref 9–39)
BACTERIA UR CULT: NO GROWTH
BASOPHILS # BLD AUTO: 0.02 X10*3/UL (ref 0–0.1)
BASOPHILS NFR BLD AUTO: 0.3 %
BILIRUB SERPL-MCNC: 0.5 MG/DL (ref 0–1.2)
BUN SERPL-MCNC: 19 MG/DL (ref 6–23)
CALCIUM SERPL-MCNC: 8.6 MG/DL (ref 8.6–10.3)
CHLORIDE SERPL-SCNC: 105 MMOL/L (ref 98–107)
CO2 SERPL-SCNC: 26 MMOL/L (ref 21–32)
CREAT SERPL-MCNC: 1.61 MG/DL (ref 0.5–1.3)
EGFRCR SERPLBLD CKD-EPI 2021: 42 ML/MIN/1.73M*2
EOSINOPHIL # BLD AUTO: 0.17 X10*3/UL (ref 0–0.4)
EOSINOPHIL NFR BLD AUTO: 3 %
ERYTHROCYTE [DISTWIDTH] IN BLOOD BY AUTOMATED COUNT: 12.7 % (ref 11.5–14.5)
GLUCOSE SERPL-MCNC: 91 MG/DL (ref 74–99)
HCT VFR BLD AUTO: 33.7 % (ref 41–52)
HGB BLD-MCNC: 11 G/DL (ref 13.5–17.5)
IMM GRANULOCYTES # BLD AUTO: 0.02 X10*3/UL (ref 0–0.5)
IMM GRANULOCYTES NFR BLD AUTO: 0.3 % (ref 0–0.9)
LYMPHOCYTES # BLD AUTO: 1.09 X10*3/UL (ref 0.8–3)
LYMPHOCYTES NFR BLD AUTO: 19 %
MCH RBC QN AUTO: 30.2 PG (ref 26–34)
MCHC RBC AUTO-ENTMCNC: 32.6 G/DL (ref 32–36)
MCV RBC AUTO: 93 FL (ref 80–100)
MONOCYTES # BLD AUTO: 0.63 X10*3/UL (ref 0.05–0.8)
MONOCYTES NFR BLD AUTO: 11 %
NEUTROPHILS # BLD AUTO: 3.82 X10*3/UL (ref 1.6–5.5)
NEUTROPHILS NFR BLD AUTO: 66.4 %
NRBC BLD-RTO: 0 /100 WBCS (ref 0–0)
PLATELET # BLD AUTO: 197 X10*3/UL (ref 150–450)
POTASSIUM SERPL-SCNC: 3.6 MMOL/L (ref 3.5–5.3)
PROT SERPL-MCNC: 6.1 G/DL (ref 6.4–8.2)
RBC # BLD AUTO: 3.64 X10*6/UL (ref 4.5–5.9)
SODIUM SERPL-SCNC: 137 MMOL/L (ref 136–145)
WBC # BLD AUTO: 5.8 X10*3/UL (ref 4.4–11.3)

## 2024-05-03 PROCEDURE — 36415 COLL VENOUS BLD VENIPUNCTURE: CPT | Performed by: STUDENT IN AN ORGANIZED HEALTH CARE EDUCATION/TRAINING PROGRAM

## 2024-05-03 PROCEDURE — 7100000002 HC RECOVERY ROOM TIME - EACH INCREMENTAL 1 MINUTE: Performed by: UROLOGY

## 2024-05-03 PROCEDURE — 80053 COMPREHEN METABOLIC PANEL: CPT | Performed by: STUDENT IN AN ORGANIZED HEALTH CARE EDUCATION/TRAINING PROGRAM

## 2024-05-03 PROCEDURE — 2500000004 HC RX 250 GENERAL PHARMACY W/ HCPCS (ALT 636 FOR OP/ED): Performed by: ANESTHESIOLOGY

## 2024-05-03 PROCEDURE — 2500000001 HC RX 250 WO HCPCS SELF ADMINISTERED DRUGS (ALT 637 FOR MEDICARE OP): Performed by: UROLOGY

## 2024-05-03 PROCEDURE — 2500000004 HC RX 250 GENERAL PHARMACY W/ HCPCS (ALT 636 FOR OP/ED): Performed by: INTERNAL MEDICINE

## 2024-05-03 PROCEDURE — 0TC78ZZ EXTIRPATION OF MATTER FROM LEFT URETER, VIA NATURAL OR ARTIFICIAL OPENING ENDOSCOPIC: ICD-10-PCS | Performed by: UROLOGY

## 2024-05-03 PROCEDURE — 2500000005 HC RX 250 GENERAL PHARMACY W/O HCPCS: Performed by: ANESTHESIOLOGY

## 2024-05-03 PROCEDURE — 82365 CALCULUS SPECTROSCOPY: CPT | Performed by: UROLOGY

## 2024-05-03 PROCEDURE — 2500000001 HC RX 250 WO HCPCS SELF ADMINISTERED DRUGS (ALT 637 FOR MEDICARE OP): Performed by: STUDENT IN AN ORGANIZED HEALTH CARE EDUCATION/TRAINING PROGRAM

## 2024-05-03 PROCEDURE — 3600000003 HC OR TIME - INITIAL BASE CHARGE - PROCEDURE LEVEL THREE: Performed by: UROLOGY

## 2024-05-03 PROCEDURE — 2500000004 HC RX 250 GENERAL PHARMACY W/ HCPCS (ALT 636 FOR OP/ED): Performed by: STUDENT IN AN ORGANIZED HEALTH CARE EDUCATION/TRAINING PROGRAM

## 2024-05-03 PROCEDURE — 1210000001 HC SEMI-PRIVATE ROOM DAILY

## 2024-05-03 PROCEDURE — 2720000007 HC OR 272 NO HCPCS: Performed by: UROLOGY

## 2024-05-03 PROCEDURE — 76000 FLUOROSCOPY <1 HR PHYS/QHP: CPT

## 2024-05-03 PROCEDURE — 2500000004 HC RX 250 GENERAL PHARMACY W/ HCPCS (ALT 636 FOR OP/ED): Performed by: UROLOGY

## 2024-05-03 PROCEDURE — 3600000008 HC OR TIME - EACH INCREMENTAL 1 MINUTE - PROCEDURE LEVEL THREE: Performed by: UROLOGY

## 2024-05-03 PROCEDURE — 2500000006 HC RX 250 W HCPCS SELF ADMINISTERED DRUGS (ALT 637 FOR ALL PAYERS): Performed by: UROLOGY

## 2024-05-03 PROCEDURE — 99233 SBSQ HOSP IP/OBS HIGH 50: CPT | Performed by: STUDENT IN AN ORGANIZED HEALTH CARE EDUCATION/TRAINING PROGRAM

## 2024-05-03 PROCEDURE — 99100 ANES PT EXTEME AGE<1 YR&>70: CPT | Performed by: ANESTHESIOLOGY

## 2024-05-03 PROCEDURE — 85025 COMPLETE CBC W/AUTO DIFF WBC: CPT | Performed by: STUDENT IN AN ORGANIZED HEALTH CARE EDUCATION/TRAINING PROGRAM

## 2024-05-03 PROCEDURE — 3700000002 HC GENERAL ANESTHESIA TIME - EACH INCREMENTAL 1 MINUTE: Performed by: UROLOGY

## 2024-05-03 PROCEDURE — A52352 PR CYSTO/URETERO/PYELOSCOPY, CALCULUS TX: Performed by: ANESTHESIOLOGY

## 2024-05-03 PROCEDURE — 2500000001 HC RX 250 WO HCPCS SELF ADMINISTERED DRUGS (ALT 637 FOR MEDICARE OP): Performed by: INTERNAL MEDICINE

## 2024-05-03 PROCEDURE — 3700000001 HC GENERAL ANESTHESIA TIME - INITIAL BASE CHARGE: Performed by: UROLOGY

## 2024-05-03 PROCEDURE — 2500000002 HC RX 250 W HCPCS SELF ADMINISTERED DRUGS (ALT 637 FOR MEDICARE OP, ALT 636 FOR OP/ED): Performed by: INTERNAL MEDICINE

## 2024-05-03 PROCEDURE — 7100000001 HC RECOVERY ROOM TIME - INITIAL BASE CHARGE: Performed by: UROLOGY

## 2024-05-03 PROCEDURE — 2500000004 HC RX 250 GENERAL PHARMACY W/ HCPCS (ALT 636 FOR OP/ED): Mod: JZ | Performed by: UROLOGY

## 2024-05-03 RX ORDER — SODIUM CHLORIDE, SODIUM LACTATE, POTASSIUM CHLORIDE, CALCIUM CHLORIDE 600; 310; 30; 20 MG/100ML; MG/100ML; MG/100ML; MG/100ML
100 INJECTION, SOLUTION INTRAVENOUS CONTINUOUS
Status: CANCELLED | OUTPATIENT
Start: 2024-05-03

## 2024-05-03 RX ORDER — ETOMIDATE 2 MG/ML
INJECTION INTRAVENOUS AS NEEDED
Status: DISCONTINUED | OUTPATIENT
Start: 2024-05-03 | End: 2024-05-03

## 2024-05-03 RX ORDER — MORPHINE SULFATE 2 MG/ML
2 INJECTION, SOLUTION INTRAMUSCULAR; INTRAVENOUS
Status: DISCONTINUED | OUTPATIENT
Start: 2024-05-03 | End: 2024-05-04 | Stop reason: HOSPADM

## 2024-05-03 RX ORDER — CEFAZOLIN 1 G/1
INJECTION, POWDER, FOR SOLUTION INTRAVENOUS AS NEEDED
Status: DISCONTINUED | OUTPATIENT
Start: 2024-05-03 | End: 2024-05-03

## 2024-05-03 RX ORDER — OXYCODONE HYDROCHLORIDE 5 MG/1
10 TABLET ORAL 4 TIMES DAILY PRN
Status: DISCONTINUED | OUTPATIENT
Start: 2024-05-03 | End: 2024-05-04 | Stop reason: HOSPADM

## 2024-05-03 RX ORDER — OXYCODONE HYDROCHLORIDE 5 MG/1
5 TABLET ORAL EVERY 4 HOURS PRN
Status: CANCELLED | OUTPATIENT
Start: 2024-05-03

## 2024-05-03 RX ORDER — FENTANYL CITRATE 50 UG/ML
INJECTION, SOLUTION INTRAMUSCULAR; INTRAVENOUS AS NEEDED
Status: DISCONTINUED | OUTPATIENT
Start: 2024-05-03 | End: 2024-05-03

## 2024-05-03 RX ORDER — ONDANSETRON HYDROCHLORIDE 2 MG/ML
4 INJECTION, SOLUTION INTRAVENOUS ONCE AS NEEDED
Status: CANCELLED | OUTPATIENT
Start: 2024-05-03

## 2024-05-03 RX ORDER — DROPERIDOL 2.5 MG/ML
0.62 INJECTION, SOLUTION INTRAMUSCULAR; INTRAVENOUS ONCE AS NEEDED
Status: CANCELLED | OUTPATIENT
Start: 2024-05-03

## 2024-05-03 RX ORDER — CEFAZOLIN SODIUM 2 G/100ML
2000 INJECTION, SOLUTION INTRAVENOUS EVERY 8 HOURS
Status: DISCONTINUED | OUTPATIENT
Start: 2024-05-03 | End: 2024-05-04 | Stop reason: HOSPADM

## 2024-05-03 RX ORDER — ONDANSETRON HYDROCHLORIDE 2 MG/ML
INJECTION, SOLUTION INTRAVENOUS AS NEEDED
Status: DISCONTINUED | OUTPATIENT
Start: 2024-05-03 | End: 2024-05-03

## 2024-05-03 RX ORDER — LIDOCAINE HYDROCHLORIDE 20 MG/ML
INJECTION, SOLUTION INFILTRATION; PERINEURAL AS NEEDED
Status: DISCONTINUED | OUTPATIENT
Start: 2024-05-03 | End: 2024-05-03

## 2024-05-03 RX ORDER — SODIUM CHLORIDE, SODIUM LACTATE, POTASSIUM CHLORIDE, CALCIUM CHLORIDE 600; 310; 30; 20 MG/100ML; MG/100ML; MG/100ML; MG/100ML
100 INJECTION, SOLUTION INTRAVENOUS CONTINUOUS
Status: DISCONTINUED | OUTPATIENT
Start: 2024-05-03 | End: 2024-05-04 | Stop reason: HOSPADM

## 2024-05-03 RX ADMIN — FENTANYL CITRATE 100 MCG: 50 INJECTION, SOLUTION INTRAMUSCULAR; INTRAVENOUS at 14:33

## 2024-05-03 RX ADMIN — CEFAZOLIN SODIUM 2000 MG: 2 INJECTION, SOLUTION INTRAVENOUS at 18:37

## 2024-05-03 RX ADMIN — FINASTERIDE 5 MG: 5 TABLET, FILM COATED ORAL at 20:26

## 2024-05-03 RX ADMIN — TAMSULOSIN HYDROCHLORIDE 0.8 MG: 0.4 CAPSULE ORAL at 20:26

## 2024-05-03 RX ADMIN — MORPHINE SULFATE 2 MG: 2 INJECTION, SOLUTION INTRAMUSCULAR; INTRAVENOUS at 02:10

## 2024-05-03 RX ADMIN — FLUTICASONE PROPIONATE 1 SPRAY: 50 SPRAY, METERED NASAL at 08:50

## 2024-05-03 RX ADMIN — OXYCODONE HYDROCHLORIDE 10 MG: 5 TABLET ORAL at 08:50

## 2024-05-03 RX ADMIN — DEXAMETHASONE SODIUM PHOSPHATE 4 MG: 4 INJECTION INTRA-ARTICULAR; INTRALESIONAL; INTRAMUSCULAR; INTRAVENOUS; SOFT TISSUE at 14:33

## 2024-05-03 RX ADMIN — ASPIRIN 325 MG: 325 TABLET ORAL at 20:26

## 2024-05-03 RX ADMIN — SODIUM CHLORIDE, POTASSIUM CHLORIDE, SODIUM LACTATE AND CALCIUM CHLORIDE 100 ML/HR: 600; 310; 30; 20 INJECTION, SOLUTION INTRAVENOUS at 13:00

## 2024-05-03 RX ADMIN — DULOXETINE HYDROCHLORIDE 30 MG: 30 CAPSULE, DELAYED RELEASE ORAL at 08:50

## 2024-05-03 RX ADMIN — PANTOPRAZOLE SODIUM 40 MG: 40 TABLET, DELAYED RELEASE ORAL at 08:50

## 2024-05-03 RX ADMIN — LORATADINE 10 MG: 10 TABLET ORAL at 08:50

## 2024-05-03 RX ADMIN — OXYCODONE HYDROCHLORIDE 10 MG: 5 TABLET ORAL at 17:04

## 2024-05-03 RX ADMIN — LIDOCAINE HYDROCHLORIDE 100 MG: 20 INJECTION, SOLUTION INFILTRATION; PERINEURAL at 14:33

## 2024-05-03 RX ADMIN — CEFAZOLIN 2 G: 1 INJECTION, POWDER, FOR SOLUTION INTRAMUSCULAR; INTRAVENOUS at 14:41

## 2024-05-03 RX ADMIN — ETOMIDATE 16 MG: 20 INJECTION, SOLUTION INTRAVENOUS at 14:33

## 2024-05-03 RX ADMIN — SODIUM CHLORIDE, POTASSIUM CHLORIDE, SODIUM LACTATE AND CALCIUM CHLORIDE 100 ML/HR: 600; 310; 30; 20 INJECTION, SOLUTION INTRAVENOUS at 21:32

## 2024-05-03 RX ADMIN — ONDANSETRON 4 MG: 2 INJECTION INTRAMUSCULAR; INTRAVENOUS at 14:33

## 2024-05-03 RX ADMIN — METOPROLOL SUCCINATE 50 MG: 50 TABLET, EXTENDED RELEASE ORAL at 08:50

## 2024-05-03 SDOH — HEALTH STABILITY: MENTAL HEALTH: CURRENT SMOKER: 0

## 2024-05-03 ASSESSMENT — PAIN SCALES - GENERAL
PAINLEVEL_OUTOF10: 0 - NO PAIN
PAINLEVEL_OUTOF10: 0 - NO PAIN
PAINLEVEL_OUTOF10: 10 - WORST POSSIBLE PAIN
PAINLEVEL_OUTOF10: 4
PAINLEVEL_OUTOF10: 6
PAINLEVEL_OUTOF10: 10 - WORST POSSIBLE PAIN
PAINLEVEL_OUTOF10: 5 - MODERATE PAIN
PAINLEVEL_OUTOF10: 10 - WORST POSSIBLE PAIN
PAINLEVEL_OUTOF10: 10 - WORST POSSIBLE PAIN
PAINLEVEL_OUTOF10: 0 - NO PAIN
PAINLEVEL_OUTOF10: 0 - NO PAIN

## 2024-05-03 ASSESSMENT — COGNITIVE AND FUNCTIONAL STATUS - GENERAL
MOVING TO AND FROM BED TO CHAIR: A LITTLE
WALKING IN HOSPITAL ROOM: A LITTLE
DAILY ACTIVITIY SCORE: 24
STANDING UP FROM CHAIR USING ARMS: A LITTLE
CLIMB 3 TO 5 STEPS WITH RAILING: A LITTLE
MOBILITY SCORE: 20

## 2024-05-03 ASSESSMENT — ACTIVITIES OF DAILY LIVING (ADL): LACK_OF_TRANSPORTATION: NO

## 2024-05-03 ASSESSMENT — PAIN - FUNCTIONAL ASSESSMENT
PAIN_FUNCTIONAL_ASSESSMENT: 0-10

## 2024-05-03 ASSESSMENT — PAIN DESCRIPTION - LOCATION
LOCATION: ABDOMEN

## 2024-05-03 ASSESSMENT — PAIN DESCRIPTION - ORIENTATION
ORIENTATION: LEFT;LOWER
ORIENTATION: LEFT

## 2024-05-03 NOTE — ANESTHESIA PREPROCEDURE EVALUATION
Patient: Abraham Rodriguez    Procedure Information       Date/Time: 05/03/24 1330    Procedure: CYSTOSCOPY, LASER LITHOTRIPSY (Left: Ureter)    Location: [facility] / [facility]    Surgeons: Goldy Alston MD          Vitals:    05/03/24 0446   BP: 166/79   Pulse: 78   Resp: 16   Temp: 36.9 °C (98.4 °F)   SpO2: 93%       Past Surgical History:   Procedure Laterality Date    CARDIAC CATHETERIZATION      CHOLECYSTECTOMY  04/23/2013    Cholecystectomy Laparoscopic    OTHER SURGICAL HISTORY  04/27/2021    Meniscus repair    OTHER SURGICAL HISTORY Left 05/31/2023    Hip replacement    OTHER SURGICAL HISTORY  04/23/2013    Biopsy Tongue    SHOULDER SURGERY  04/23/2013    Shoulder Surgery    TOTAL KNEE ARTHROPLASTY Right 03/27/2024     Past Medical History:   Diagnosis Date    Allergic dermatitis 09/14/2023    Allergic rhinitis due to pollen 10/23/2014    Hay fever    Arthritis     Chronic pain disorder     Clotting disorder (Multi)     dvt april 2020     P.E. april 2020    COPD (chronic obstructive pulmonary disease) (Multi)     hx asbestos    Coronary artery disease     Encounter for other preprocedural examination 06/24/2014    Pre-procedural examination    Encounter for screening for malignant neoplasm of prostate     Encounter for screening for malignant neoplasm of prostate    Fissure in skin of foot 09/14/2023    Hyperlipidemia     Hypertension     Localized edema 05/11/2020    Pedal edema    Lung infiltrate 09/14/2023    Myocardial infarction (Multi)     Other conditions influencing health status     Carcinoma Of The Tongue    Pain in unspecified knee 12/22/2014    Joint pain, knee    Personal history of diseases of the skin and subcutaneous tissue 04/23/2013    History of eczema    Personal history of other diseases of the musculoskeletal system and connective tissue 06/19/2014    Personal history of arthritis    Personal history of other diseases of the nervous system and sense organs 08/10/2021    History of  Bell's palsy    Personal history of other diseases of the respiratory system 11/12/2014    History of asbestosis    Personal history of other diseases of the respiratory system 08/13/2014    History of chronic obstructive lung disease    Personal history of other specified conditions 08/20/2013    History of edema    Plantar fascial fibromatosis 07/22/2013    Plantar fasciitis    Polyp of colon     Polyp of sigmoid colon    Syncope and collapse 01/23/2015    Syncope and collapse    Unspecified abdominal pain 12/22/2014    Stomach pain    Unspecified disorder of eyelid 10/23/2014    Eyelid disorder       Current Facility-Administered Medications:     acetaminophen (Tylenol) tablet 650 mg, 650 mg, oral, q4h PRN **OR** acetaminophen (Tylenol) oral liquid 650 mg, 650 mg, oral, q4h PRN **OR** acetaminophen (Tylenol) suppository 650 mg, 650 mg, rectal, q4h PRN, Layla Thomas MD    alum-mag hydroxide-simeth (Mylanta) 200-200-20 mg/5 mL oral suspension 30 mL, 30 mL, oral, q6h PRN, Layla Thomas MD, 30 mL at 05/02/24 0908    aspirin tablet 325 mg, 325 mg, oral, BID, Layla Thomas MD, 325 mg at 05/02/24 2007    calcium carbonate (Tums) chewable tablet 500 mg, 500 mg, oral, 4x daily PRN, Layla Thomas MD    dextromethorphan-guaifenesin (Robitussin DM)  mg/5 mL oral liquid 5 mL, 5 mL, oral, q4h PRN, Layla Thomas MD    docusate sodium (Colace) capsule 100 mg, 100 mg, oral, BID PRN, Layla Thomas MD    DULoxetine (Cymbalta) DR capsule 30 mg, 30 mg, oral, Daily, Layla Thomas MD, 30 mg at 05/03/24 0850    finasteride (Proscar) tablet 5 mg, 5 mg, oral, q PM, Layla Thomas MD, 5 mg at 05/02/24 2007    fluticasone (Flonase) nasal spray 1 spray, 1 spray, Each Nostril, Daily PRN, Layla Thomas MD, 1 spray at 05/03/24 0850    guaiFENesin (Mucinex) 12 hr tablet 600 mg, 600 mg, oral, q12h PRN, Layla Thomas MD    loratadine (Claritin) tablet 10 mg, 10 mg, oral, Daily, Lynn Feliz,  MD, 10 mg at 05/03/24 0850    metoprolol succinate XL (Toprol-XL) 24 hr tablet 50 mg, 50 mg, oral, Daily, Layla Thomas MD, 50 mg at 05/03/24 0850    morphine injection 2 mg, 2 mg, intravenous, q3h PRN, Layla Thomas MD, 2 mg at 05/03/24 0210    ondansetron (Zofran) tablet 4 mg, 4 mg, oral, q8h PRN **OR** ondansetron (Zofran) injection 4 mg, 4 mg, intravenous, q8h PRN, Layla Thomas MD    oxyCODONE (Roxicodone) immediate release tablet 10 mg, 10 mg, oral, 4x daily PRN, Lynn Feliz MD, 10 mg at 05/03/24 0850    pantoprazole (ProtoNix) EC tablet 40 mg, 40 mg, oral, Daily before breakfast, Layla Thomas MD, 40 mg at 05/03/24 0850    sodium chloride 0.45 % infusion, 85 mL/hr, intravenous, Continuous, Layla Thomas MD, Last Rate: 85 mL/hr at 05/03/24 0314, 85 mL/hr at 05/03/24 0314    tamsulosin (Flomax) 24 hr capsule 0.8 mg, 0.8 mg, oral, q PM, Layla Thomas MD, 0.8 mg at 05/02/24 2007    tiZANidine (Zanaflex) tablet 2 mg, 2 mg, oral, q8h PRN, Layla Thomas MD  Prior to Admission medications    Medication Sig Start Date End Date Taking? Authorizing Provider   aspirin 325 mg EC tablet Take 1 tablet (325 mg) by mouth 2 times a day. 3/20/24  Yes Narendra Mcclain PA-C   dexlansoprazole (Dexilant) 60 mg DR capsule Take 1 capsule (60 mg) by mouth once daily.   Yes Historical Provider, MD   docusate sodium (Colace) 100 mg capsule Take 1 capsule (100 mg) by mouth 2 times a day as needed for constipation. 3/20/24  Yes Narendra Mcclain PA-C   DULoxetine (Cymbalta) 30 mg DR capsule Take 1 capsule (30 mg) by mouth once daily. Do not crush or chew. 4/23/24 4/23/25 Yes Macho Elizabeth MD   finasteride (Proscar) 5 mg tablet Take 1 tablet (5 mg) by mouth once daily in the evening. Do not crush, chew, or split.   Yes Historical Provider, MD   fluticasone (Flonase) 50 mcg/actuation nasal spray Administer 1 spray into each nostril once daily as needed for rhinitis. Shake gently. Before first use, prime  pump. After use, clean tip and replace cap. As needed 4/25/24  Yes Ramu De Anda MD   furosemide (Lasix) 40 mg tablet Take 1 tablet (40 mg) by mouth once daily.  Patient taking differently: Take 1 tablet (40 mg) by mouth once daily as needed. Takes as needed 4/26/24  Yes Ramu De Anda MD   irbesartan (Avapro) 150 mg tablet Take 1 tablet (150 mg) by mouth once daily.   Yes Historical Provider, MD   metoprolol succinate XL (Toprol-XL) 50 mg 24 hr tablet TAKE 1 TABLET DAILY 5/1/24  Yes ARCHANA Restrepo   oxyCODONE (Roxicodone) 5 mg immediate release tablet Take 1 tablet (5 mg) by mouth 4 times a day as needed for severe pain (7 - 10) for up to 28 days. Do not fill before April 29, 2024. 4/29/24 5/27/24 Yes Macho Elizabeth MD   oxyCODONE (Roxicodone) 5 mg immediate release tablet Take 1 tablet (5 mg) by mouth 4 times a day as needed for severe pain (7 - 10) for up to 28 days. Do not fill before May 27, 2024. 5/27/24 6/24/24 Yes Macho Elizabeth MD   oxyCODONE (Roxicodone) 5 mg immediate release tablet Take 1 tablet (5 mg) by mouth 4 times a day as needed for severe pain (7 - 10) for up to 28 days. Do not fill before June 24, 2024. 6/24/24 7/22/24 Yes Macho Elizabeth MD   tamsulosin (Flomax) 0.4 mg 24 hr capsule Take 2 capsules (0.8 mg) by mouth once daily in the evening.   Yes Historical Provider, MD   tiZANidine (Zanaflex) 2 mg tablet Take 1 tablet (2 mg) by mouth every 8 hours if needed for muscle spasms. 4/25/24  Yes ARCHANA Tovar   naloxone (Narcan) 4 mg/0.1 mL nasal spray Administer into affected nostril(s) if needed for opioid reversal or respiratory depression.  ADMINISTER A SINGLE SPRAY IN ONE NOSTRIL UPON SIGNS OF OPIOID OVERDOSE. CALL 911. REPEAT AFTER 3 MINUTES IF NO RESPONSE. 6/16/22   Historical Provider, MD   metoprolol succinate XL (Toprol-XL) 50 mg 24 hr tablet Take 1 tablet (50 mg) by mouth once daily.  5/1/24  Historical Provider, MD     Allergies   Allergen Reactions     Iodinated Contrast Media Unknown and Anaphylaxis    Tobramycin-Dexamethasone Itching and Rash    Cinnamon GI Upset    [place] GI Upset     Unable to eat cucumber with seeds only; seedless cucumbers ok.    Lipitor [Atorvastatin] Other and Unknown     Felt like I was floating after taking    Lisinopril Unknown     Social History     Tobacco Use    Smoking status: Former     Current packs/day: 0.00     Types: Cigarettes     Quit date:      Years since quittin.3    Smokeless tobacco: Never   Substance Use Topics    Alcohol use: Never         Chemistry    Lab Results   Component Value Date/Time     2024 0703    K 3.6 2024 0703     2024 0703    CO2 26 2024 0703    BUN 19 2024 0703    CREATININE 1.61 (H) 2024 0703    Lab Results   Component Value Date/Time    CALCIUM 8.6 2024 0703    ALKPHOS 53 2024 0703    AST 9 2024 0703    ALT 4 (L) 2024 0703    BILITOT 0.5 2024 0703          Lab Results   Component Value Date/Time    WBC 5.8 2024 0703    HGB 11.0 (L) 2024 0703    HCT 33.7 (L) 2024 0703     2024 0703     Lab Results   Component Value Date/Time    PROTIME 12.6 2024 0512    INR 1.1 2024 0512     Encounter Date: 24   Electrocardiogram, 12-lead PRN ACS symptoms   Result Value    Ventricular Rate 86    Atrial Rate 86    TN Interval 208    QRS Duration 146    QT Interval 420    QTC Calculation(Bazett) 502    P Axis 50    R Axis -38    T Axis 21    QRS Count 14    Q Onset 210    P Onset 106    P Offset 161    T Offset 420    QTC Fredericia 473    Narrative    Sinus rhythm with Premature atrial complexes  Left axis deviation  Right bundle branch block  Abnormal ECG  When compared with ECG of 12-MAR-2024 12:21,  Premature atrial complexes are now Present  TN interval has decreased     No results found for this or any previous visit from the past 1095 days.      Relevant Problems   Cardiac   (+)  ASHD (arteriosclerotic heart disease)   (+) Benign essential hypertension   (+) Coronary atherosclerosis of native coronary artery   (+) Primary hypertension      Pulmonary   (+) Bilateral pulmonary embolism (Multi)   (+) COPD, mild (Multi)   (+) Dyspnea on exertion   (+) Restrictive lung disease      Neuro   (+) Cervical radiculitis   (+) Idiopathic peripheral neuropathy   (+) Lumbago with sciatica, left side   (+) Lumbago with sciatica, right side   (+) Lumbar radiculitis   (+) Lumbar radiculopathy   (+) Neuropathy, peroneal nerve   (+) Right cervical radiculopathy   (+) Sciatica      GI   (+) Esophageal reflux      /Renal   (+) Benign prostatic hyperplasia   (+) Renal calculus, left      Musculoskeletal   (+) Chronic pain syndrome   (+) DDD (degenerative disc disease), cervical   (+) Degeneration of intervertebral disc of lumbar region   (+) Degenerative joint disease of left hip   (+) Lumbar spondylosis   (+) Lumbar stenosis with neurogenic claudication   (+) Osteoarthritis   (+) Primary osteoarthritis of right knee      HEENT   (+) Chronic sinusitis       Clinical information reviewed:   Tobacco  Allergies  Meds  Problems  Med Hx  Surg Hx   Fam Hx  Soc   Hx        NPO Detail:  No data recorded     Physical Exam    Airway  Mallampati: II     Cardiovascular - normal exam     Dental    Pulmonary    Abdominal            Anesthesia Plan    History of general anesthesia?: yes  History of complications of general anesthesia?: no    ASA 3     general     The patient is not a current smoker.  Patient was not previously instructed to abstain from smoking on day of procedure.  Patient did not smoke on day of procedure.  Education provided regarding risk of obstructive sleep apnea.  intravenous induction   Anesthetic plan and risks discussed with patient.    Plan discussed with CRNA.

## 2024-05-03 NOTE — CARE PLAN
Problem: Pain  Goal: Takes deep breaths with improved pain control throughout the shift  Outcome: Progressing  Goal: Turns in bed with improved pain control throughout the shift  Outcome: Progressing  Goal: Walks with improved pain control throughout the shift  Outcome: Progressing  Goal: Free from opioid side effects throughout the shift  Outcome: Progressing     Problem: Pain  Goal: My pain/discomfort is manageable  Outcome: Progressing     Problem: Safety  Goal: Patient will be injury free during hospitalization  Outcome: Progressing     Problem: Daily Care  Goal: Daily care needs are met  Outcome: Progressing     Problem: Psychosocial Needs  Goal: Demonstrates ability to cope with hospitalization/illness  Outcome: Progressing     Problem: Discharge Barriers  Goal: My discharge needs are met  Outcome: Progressing   The patient's goals for the shift include  to reduce pain    The clinical goals for the shift include to control pain level    Over the shift, the patient did not make progress toward the following goals. Barriers to progression include none. Recommendations to address these barriers include none.

## 2024-05-03 NOTE — CARE PLAN
Problem: Pain  Goal: Turns in bed with improved pain control throughout the shift  Note: Prn pain med   The patient's goals for the shift include      The clinical goals for the shift include Pt will have controlled pain overnight    Over the shift, the patient did not make progress toward the following goals. Pt medicated twice for PRN pain. Need for increase in pain medication. VSS.

## 2024-05-03 NOTE — ANESTHESIA POSTPROCEDURE EVALUATION
Patient: Abraham Rodriguez    Procedure Summary       Date: 05/03/24 Room / Location: [facility]    Anesthesia Start: 1426 Anesthesia Stop: 1506    Procedure: CYSTOSCOPY, LASER LITHOTRIPSY (Left: Ureter) Diagnosis:       Calculus of ureter      (Calculus of ureter [N20.1])    Surgeons: Goldy Alston MD Responsible Provider: Zander Anderson MD    Anesthesia Type: general ASA Status: 3            Anesthesia Type: general    Vitals Value Taken Time   BP See vital signs 05/03/24 1506   Temp  05/03/24 1506   Pulse  05/03/24 1506   Resp  05/03/24 1506   SpO2  05/03/24 1506       Anesthesia Post Evaluation    Patient location during evaluation: PACU  Patient participation: complete - patient participated  Level of consciousness: awake  Pain management: adequate  Multimodal analgesia pain management approach  Airway patency: patent  Two or more strategies used to mitigate risk of obstructive sleep apnea  Cardiovascular status: acceptable  Respiratory status: acceptable  Hydration status: acceptable  Postoperative Nausea and Vomiting: none        No notable events documented.

## 2024-05-03 NOTE — PROGRESS NOTES
"Abraham Rodriguez is a 83 y.o. male on day 1 of admission presenting with Renal calculus, left.    Subjective   Pt continues to have abdominal pain.        Objective     Physical Exam  Vitals and nursing note reviewed.   Constitutional:       General: He is not in acute distress.     Appearance: Normal appearance. He is not ill-appearing or toxic-appearing.   HENT:      Head: Normocephalic and atraumatic.      Nose: Nose normal.      Mouth/Throat:      Mouth: Mucous membranes are moist.   Eyes:      Extraocular Movements: Extraocular movements intact.      Conjunctiva/sclera: Conjunctivae normal.      Pupils: Pupils are equal, round, and reactive to light.   Cardiovascular:      Rate and Rhythm: Normal rate and regular rhythm.      Heart sounds: No murmur heard.     No gallop.   Pulmonary:      Effort: Pulmonary effort is normal. No respiratory distress.      Breath sounds: Normal breath sounds. No wheezing, rhonchi or rales.   Abdominal:      General: Abdomen is flat. Bowel sounds are normal. There is no distension.      Palpations: Abdomen is soft. There is no mass.      Tenderness: There is abdominal tenderness.      Comments: + left CVA tenderness   Musculoskeletal:         General: No swelling or tenderness. Normal range of motion.      Cervical back: Normal range of motion and neck supple.   Skin:     General: Skin is warm and dry.   Neurological:      General: No focal deficit present.      Mental Status: He is alert and oriented to person, place, and time. Mental status is at baseline.   Psychiatric:         Mood and Affect: Mood normal.         Behavior: Behavior normal.         Thought Content: Thought content normal.         Judgment: Judgment normal.         Last Recorded Vitals:  /79   Pulse 78   Temp 36.9 °C (98.4 °F)   Resp 16   Ht 1.803 m (5' 11\")   Wt 89.4 kg (197 lb)   SpO2 93%   BMI 27.48 kg/m²      Scheduled medications:  aspirin, 325 mg, oral, BID  DULoxetine, 30 mg, oral, " Daily  finasteride, 5 mg, oral, q PM  loratadine, 10 mg, oral, Daily  metoprolol succinate XL, 50 mg, oral, Daily  pantoprazole, 40 mg, oral, Daily before breakfast  tamsulosin, 0.8 mg, oral, q PM      Continuous medications:  sodium chloride, 85 mL/hr, Last Rate: 85 mL/hr (05/03/24 0314)      PRN medications:  PRN medications: acetaminophen **OR** acetaminophen **OR** acetaminophen, alum-mag hydroxide-simeth, calcium carbonate, dextromethorphan-guaifenesin, docusate sodium, fluticasone, guaiFENesin, morphine, ondansetron **OR** ondansetron, oxyCODONE, tiZANidine     Relevant Results:  Results for orders placed or performed during the hospital encounter of 05/01/24 (from the past 24 hour(s))   Comprehensive Metabolic Panel   Result Value Ref Range    Glucose 91 74 - 99 mg/dL    Sodium 137 136 - 145 mmol/L    Potassium 3.6 3.5 - 5.3 mmol/L    Chloride 105 98 - 107 mmol/L    Bicarbonate 26 21 - 32 mmol/L    Anion Gap 10 10 - 20 mmol/L    Urea Nitrogen 19 6 - 23 mg/dL    Creatinine 1.61 (H) 0.50 - 1.30 mg/dL    eGFR 42 (L) >60 mL/min/1.73m*2    Calcium 8.6 8.6 - 10.3 mg/dL    Albumin 3.4 3.4 - 5.0 g/dL    Alkaline Phosphatase 53 33 - 136 U/L    Total Protein 6.1 (L) 6.4 - 8.2 g/dL    AST 9 9 - 39 U/L    Bilirubin, Total 0.5 0.0 - 1.2 mg/dL    ALT 4 (L) 10 - 52 U/L   CBC and Auto Differential   Result Value Ref Range    WBC 5.8 4.4 - 11.3 x10*3/uL    nRBC 0.0 0.0 - 0.0 /100 WBCs    RBC 3.64 (L) 4.50 - 5.90 x10*6/uL    Hemoglobin 11.0 (L) 13.5 - 17.5 g/dL    Hematocrit 33.7 (L) 41.0 - 52.0 %    MCV 93 80 - 100 fL    MCH 30.2 26.0 - 34.0 pg    MCHC 32.6 32.0 - 36.0 g/dL    RDW 12.7 11.5 - 14.5 %    Platelets 197 150 - 450 x10*3/uL    Neutrophils % 66.4 40.0 - 80.0 %    Immature Granulocytes %, Automated 0.3 0.0 - 0.9 %    Lymphocytes % 19.0 13.0 - 44.0 %    Monocytes % 11.0 2.0 - 10.0 %    Eosinophils % 3.0 0.0 - 6.0 %    Basophils % 0.3 0.0 - 2.0 %    Neutrophils Absolute 3.82 1.60 - 5.50 x10*3/uL    Immature  Granulocytes Absolute, Automated 0.02 0.00 - 0.50 x10*3/uL    Lymphocytes Absolute 1.09 0.80 - 3.00 x10*3/uL    Monocytes Absolute 0.63 0.05 - 0.80 x10*3/uL    Eosinophils Absolute 0.17 0.00 - 0.40 x10*3/uL    Basophils Absolute 0.02 0.00 - 0.10 x10*3/uL       Electrocardiogram, 12-lead PRN ACS symptoms    Result Date: 5/2/2024  Sinus rhythm with Premature atrial complexes Left axis deviation Right bundle branch block Abnormal ECG When compared with ECG of 12-MAR-2024 12:21, Premature atrial complexes are now Present MI interval has decreased    CT abdomen pelvis wo IV contrast    Result Date: 5/1/2024  STUDY: CT Abdomen and Pelvis without IV Contrast; 5/1/2024 1:45 PM INDICATION: Generalized abdominal cramping and nausea/dry heaving, onset this morning. COMPARISON: CT AP 8/9/2023. ACCESSION NUMBER(S): [id number] ORDERING CLINICIAN: CATHY MARTIN TECHNIQUE: CT of the abdomen and pelvis was performed.  Contiguous axial images were obtained at 3 mm slice thickness through the abdomen and pelvis. Coronal and sagittal reconstructions at 3 mm slice thickness were performed. No intravenous contrast was administered.  Automated mA/kV exposure control was utilized and patient examination was performed in strict accordance with principles of ALARA. FINDINGS: Please note that the evaluation of vessels, lymph nodes and organs is limited without intravenous contrast.  LOWER CHEST: No cardiomegaly.  No pericardial effusion.  Lung bases are clear.  ABDOMEN:  LIVER: No hepatomegaly.  Smooth surface contour.  Normal attenuation.  BILE DUCTS: No intrahepatic or extrahepatic biliary ductal dilatation.  GALLBLADDER: The gallbladder is unremarkable. STOMACH: No abnormalities identified.  PANCREAS: No masses or ductal dilatation.  SPLEEN: No splenomegaly or focal splenic lesion.  ADRENAL GLANDS: No thickening or nodules.  KIDNEYS AND URETERS: Kidneys are normal in size and location.  There are several small nonobstructing stones in  the left kidney. No stones are seen in the right kidney. There is mild left hydronephrosis and hydroureter. However, I am unable to visualize the distal aspect of the left ureter due to prominent metallic artifact from bilateral hip arthroplasties.  PELVIS:  BLADDER: No abnormalities identified.  REPRODUCTIVE ORGANS: No abnormalities identified.  BOWEL: No abnormalities identified. The appendix is identified and is unremarkable.  VESSELS: No abnormalities identified.  Abdominal aorta is normal in caliber.  PERITONEUM/RETROPERITONEUM/LYMPH NODES: No free fluid.  No pneumoperitoneum. No lymphadenopathy.  ABDOMINAL WALL: No abnormalities identified. SOFT TISSUES: No abnormalities identified.  BONES: No acute fracture or aggressive osseous lesion.    The examination is limited due to prominent metallic artifact from bilateral hip arthroplasties. I am unable to visualize the left ureter or the right ureter through this area. There is mild left hydronephrosis and hydroureter which I suspect may be due to an obstructing left ureteral stone, but I am unable to visualize an obstructing stone due to the artifact. Signed by Lukasz Bales MD    Lower extremity venous duplex right    Result Date: 5/1/2024  STUDY: Right Lower Extremity Venous Doppler Ultrasound; 5/1/2024 12:56 PM INDICATION: RLE edema x one week, status post knee replacement one month ago. COMPARISON: US LE Venous 4/24/2024, 4/9/2024. ACCESSION NUMBER(S): [id number] ORDERING CLINICIAN: CATHY MARTIN TECHNIQUE:  Real-time grayscale, color, and spectral doppler ultrasound imaging of the right lower extremity veins was performed. FINDINGS: The right common femoral, profunda femoral, femoral and popliteal veins demonstrated normal compressibility, normal phasic venous flow and normal response to augmentation.  There is no evidence for echogenic thrombi.  The visualized deep calf veins are patent.  The contralateral common femoral vein is free of  thrombosis.    No evidence for DVT within the right lower extremity. No significant change compared to prior ultrasound. Signed by Sean Howard MD           Assessment/Plan   Principal Problem:    Renal calculus, left  Active Problems:    Calculus of ureter    Left ureteral calculus with mild left hydronephrosis and hydroureter  Hx BPH  - urology following  - OR per urology  - IVF  - keep NPO  - flomax  - finasteride    CHUY  Cr up to 1.6  Likely related to #1  Hold nephrotoxic meds including ARB  - IVF    Normocytic anemia  - monitor     Left knee OA  S/P recent left TKA  Wound looks good without evidence of infection  PT and OT to see re: swelling and stiffness    HTN  BP elevated, component of pain  - metoprolol  - hold ARB    Depression  - cymbalta    GERD  - protonix    Dispo: monitor clinically, OR per urology         Lynn Feliz MD  Hospitalist

## 2024-05-03 NOTE — OP NOTE
CYSTOSCOPY, LASER LITHOTRIPSY (L) Operative Note     Date: 2024 - 5/3/2024  OR Location: [facility] OR    Name: Abraham Rodriguez : 1940, Age: 83 y.o., MRN: [id number], Sex: male    Diagnosis  Pre-op Diagnosis     * Calculus of ureter [N20.1] Post-op Diagnosis     * Calculus of ureter [N20.1]     Procedures  CYSTOSCOPY, LASER LITHOTRIPSY  66120 - AL CYSTO/URETERO W/LITHOTRIPSY &INDWELL STENT INSRT      Surgeons      * Goldy Alston - Primary    Resident/Fellow/Other Assistant:  Surgeons and Role:  * No surgeons found with a matching role *    Procedure Summary  Anesthesia: General  ASA: III  Anesthesia Staff: Anesthesiologist: Zander Anderson MD  Estimated Blood Loss: 0 mL  Intra-op Medications: Administrations occurring from 1330 to 1430 on 24:  * No intraprocedure medications in log *           Anesthesia Record               Intraprocedure I/O Totals       None           Specimen:   ID Type Source Tests Collected by Time   A : LEFT URETERAL STONE Calculus Urine, Clean Catch CALCULI (STONE) ANALYSIS Goldy Alston MD 5/3/2024 6437        Staff:   Circulator: Rabia Aragon RN; Huy Dozier RN  Scrub Person: Magali Vitale RN         Drains and/or Catheters: * None in log *    Tourniquet Times:         Implants:     Findings: Stone emanating from the left ureterovesical junction.    Indications: Abraham Rodriguez is an 83 y.o. male who is having surgery for Calculus of ureter [N20.1].     The patient was seen in the preoperative area. The risks, benefits, complications, treatment options, non-operative alternatives, expected recovery and outcomes were discussed with the patient. The possibilities of reaction to medication, pulmonary aspiration, injury to surrounding structures, bleeding, recurrent infection, the need for additional procedures, failure to diagnose a condition, and creating a complication requiring transfusion or operation were discussed with the patient. The patient  concurred with the proposed plan, giving informed consent.  The site of surgery was properly noted/marked if necessary per policy. The patient has been actively warmed in preoperative area. Preoperative antibiotics have been ordered and given within 1 hours of incision. Venous thrombosis prophylaxis are not indicated.    Procedure Details: Patient was taken to the operating room placed under general anesthesia.  He was then placed in the dorsolithotomy position and prepped and draped in a sterile manner.  The patient underwent cystoscopy which revealed a mildly occlusive prostate.  Inspection of bladder revealed that the left ureteral stone was emanating from the left ureteral orifice.  With a cystoscopic grasper I was able to push the stone out of the orifice and into the bladder and then I grasped the stone from the bladder and removed it.  Next a guidewire was placed up the ureter and the patient underwent ureteroscopy.  Because the distal ureter was unable to be visualized on CT scan because of artifact from his hip, I wanted to be sure that there were no other stones in the nonvisualized ureter.  The open-ended Flexi-Tip catheter was placed up the ureter into the kidney and a guidewire was placed.  The ureteroscope was then passed per urethra into the bladder and the entire left ureter was scoped.  No other stones were seen.  There was some distal edema from the stone but I did not feel a stent was warranted.  The guidewire was removed.  The bladder was emptied.  The patient was awakened and taken to the recovery room in stable condition.  Complications:  None; patient tolerated the procedure well.    Disposition: PACU - hemodynamically stable.  Condition: stable         Additional Details:     Attending Attestation: I performed the procedure.    Goldy Alston  Phone Number: [phone]

## 2024-05-03 NOTE — PROGRESS NOTES
05/03/24 0829   Discharge Planning   Living Arrangements Alone   Support Systems Children   Assistance Needed A&OX3; independent with ADLs with walker; drives (but limited now due to recent knee replacement; room air baseline and currently room air   Type of Residence Private residence   Number of Stairs to Enter Residence 0   Number of Stairs Within Residence 0   Do you have animals or pets at home? No   Who is requesting discharge planning? Provider   Home or Post Acute Services In home services   Patient expects to be discharged to: Patient to have cysto/lithotripsy today. Dispo remains home with resumption of [facility]. Updates sent via [employer] to agency and they were made aware of procedure and plan   Does the patient need discharge transport arranged? Yes   RoundTrip coordination needed? Yes   Has discharge transport been arranged? No   Financial Resource Strain   How hard is it for you to pay for the very basics like food, housing, medical care, and heating? Not hard   Housing Stability   In the last 12 months, was there a time when you were not able to pay the mortgage or rent on time? N   In the last 12 months, how many places have you lived? 1   In the last 12 months, was there a time when you did not have a steady place to sleep or slept in a shelter (including now)? N   Transportation Needs   In the past 12 months, has lack of transportation kept you from medical appointments or from getting medications? no   In the past 12 months, has lack of transportation kept you from meetings, work, or from getting things needed for daily living? No

## 2024-05-03 NOTE — ANESTHESIA PROCEDURE NOTES
Airway  Date/Time: 5/3/2024 2:39 PM  Urgency: elective    Airway not difficult    Staffing  Performed: attending   Authorized by: Zander Anderson MD    Performed by: Zander Anderson MD  Patient location during procedure: OR    Indications and Patient Condition  Indications for airway management: anesthesia  Spontaneous Ventilation: absent  Sedation level: deep  Preoxygenated: yes  Patient position: sniffing  MILS maintained throughout  Mask difficulty assessment: 0 - not attempted    Final Airway Details  Final airway type: supraglottic airway      Successful airway: Size 4     Number of attempts at approach: 1

## 2024-05-03 NOTE — PROGRESS NOTES
Occupational Therapy                 Therapy Communication Note    Patient Name: Abraham Rodriguez  MRN: [id number]  Today's Date: 5/3/2024     Discipline: Occupational Therapy    Missed Visit Reason: Missed Visit Reason: Patient refused (Pt stated he already ambulated in hallway with nursing staff and wants to rest his knee. Reports needing to rest before renal calculus operation later today. No OT eval this date.)    Missed Time: Attempt    Comment: 6629

## 2024-05-04 VITALS
DIASTOLIC BLOOD PRESSURE: 70 MMHG | SYSTOLIC BLOOD PRESSURE: 168 MMHG | BODY MASS INDEX: 27.58 KG/M2 | RESPIRATION RATE: 18 BRPM | OXYGEN SATURATION: 96 % | TEMPERATURE: 96.8 F | HEART RATE: 67 BPM | HEIGHT: 71 IN | WEIGHT: 197 LBS

## 2024-05-04 LAB
ALBUMIN SERPL BCP-MCNC: 3.2 G/DL (ref 3.4–5)
ALP SERPL-CCNC: 50 U/L (ref 33–136)
ALT SERPL W P-5'-P-CCNC: 4 U/L (ref 10–52)
ANION GAP SERPL CALC-SCNC: 11 MMOL/L (ref 10–20)
AST SERPL W P-5'-P-CCNC: 7 U/L (ref 9–39)
BASOPHILS # BLD AUTO: 0.01 X10*3/UL (ref 0–0.1)
BASOPHILS NFR BLD AUTO: 0.2 %
BILIRUB SERPL-MCNC: 0.4 MG/DL (ref 0–1.2)
BUN SERPL-MCNC: 19 MG/DL (ref 6–23)
CALCIUM SERPL-MCNC: 8.6 MG/DL (ref 8.6–10.3)
CHLORIDE SERPL-SCNC: 103 MMOL/L (ref 98–107)
CO2 SERPL-SCNC: 26 MMOL/L (ref 21–32)
CREAT SERPL-MCNC: 1.21 MG/DL (ref 0.5–1.3)
EGFRCR SERPLBLD CKD-EPI 2021: 59 ML/MIN/1.73M*2
EOSINOPHIL # BLD AUTO: 0.01 X10*3/UL (ref 0–0.4)
EOSINOPHIL NFR BLD AUTO: 0.2 %
ERYTHROCYTE [DISTWIDTH] IN BLOOD BY AUTOMATED COUNT: 12.4 % (ref 11.5–14.5)
GLUCOSE SERPL-MCNC: 101 MG/DL (ref 74–99)
HCT VFR BLD AUTO: 33.3 % (ref 41–52)
HGB BLD-MCNC: 11 G/DL (ref 13.5–17.5)
IMM GRANULOCYTES # BLD AUTO: 0.02 X10*3/UL (ref 0–0.5)
IMM GRANULOCYTES NFR BLD AUTO: 0.4 % (ref 0–0.9)
LYMPHOCYTES # BLD AUTO: 0.7 X10*3/UL (ref 0.8–3)
LYMPHOCYTES NFR BLD AUTO: 14.5 %
MCH RBC QN AUTO: 30.2 PG (ref 26–34)
MCHC RBC AUTO-ENTMCNC: 33 G/DL (ref 32–36)
MCV RBC AUTO: 92 FL (ref 80–100)
MONOCYTES # BLD AUTO: 0.5 X10*3/UL (ref 0.05–0.8)
MONOCYTES NFR BLD AUTO: 10.4 %
NEUTROPHILS # BLD AUTO: 3.59 X10*3/UL (ref 1.6–5.5)
NEUTROPHILS NFR BLD AUTO: 74.3 %
NRBC BLD-RTO: 0 /100 WBCS (ref 0–0)
PLATELET # BLD AUTO: 206 X10*3/UL (ref 150–450)
POTASSIUM SERPL-SCNC: 3.9 MMOL/L (ref 3.5–5.3)
PROT SERPL-MCNC: 5.7 G/DL (ref 6.4–8.2)
RBC # BLD AUTO: 3.64 X10*6/UL (ref 4.5–5.9)
SODIUM SERPL-SCNC: 136 MMOL/L (ref 136–145)
WBC # BLD AUTO: 4.8 X10*3/UL (ref 4.4–11.3)

## 2024-05-04 PROCEDURE — 84075 ASSAY ALKALINE PHOSPHATASE: CPT | Performed by: UROLOGY

## 2024-05-04 PROCEDURE — 97161 PT EVAL LOW COMPLEX 20 MIN: CPT | Mod: GP

## 2024-05-04 PROCEDURE — 85025 COMPLETE CBC W/AUTO DIFF WBC: CPT | Performed by: UROLOGY

## 2024-05-04 PROCEDURE — 36415 COLL VENOUS BLD VENIPUNCTURE: CPT | Performed by: UROLOGY

## 2024-05-04 PROCEDURE — 2500000001 HC RX 250 WO HCPCS SELF ADMINISTERED DRUGS (ALT 637 FOR MEDICARE OP): Performed by: UROLOGY

## 2024-05-04 PROCEDURE — 2500000004 HC RX 250 GENERAL PHARMACY W/ HCPCS (ALT 636 FOR OP/ED): Performed by: UROLOGY

## 2024-05-04 PROCEDURE — 99239 HOSP IP/OBS DSCHRG MGMT >30: CPT | Performed by: STUDENT IN AN ORGANIZED HEALTH CARE EDUCATION/TRAINING PROGRAM

## 2024-05-04 PROCEDURE — 2500000004 HC RX 250 GENERAL PHARMACY W/ HCPCS (ALT 636 FOR OP/ED): Mod: JZ | Performed by: UROLOGY

## 2024-05-04 RX ORDER — FUROSEMIDE 40 MG/1
40 TABLET ORAL DAILY PRN
Start: 2024-05-04

## 2024-05-04 RX ADMIN — ASPIRIN 325 MG: 325 TABLET ORAL at 09:13

## 2024-05-04 RX ADMIN — PANTOPRAZOLE SODIUM 40 MG: 40 TABLET, DELAYED RELEASE ORAL at 09:13

## 2024-05-04 RX ADMIN — CEFAZOLIN SODIUM 2000 MG: 2 INJECTION, SOLUTION INTRAVENOUS at 09:13

## 2024-05-04 RX ADMIN — DULOXETINE HYDROCHLORIDE 30 MG: 30 CAPSULE, DELAYED RELEASE ORAL at 09:13

## 2024-05-04 RX ADMIN — CEFAZOLIN SODIUM 2000 MG: 2 INJECTION, SOLUTION INTRAVENOUS at 02:19

## 2024-05-04 RX ADMIN — LORATADINE 10 MG: 10 TABLET ORAL at 09:13

## 2024-05-04 RX ADMIN — METOPROLOL SUCCINATE 50 MG: 50 TABLET, EXTENDED RELEASE ORAL at 09:13

## 2024-05-04 ASSESSMENT — COGNITIVE AND FUNCTIONAL STATUS - GENERAL
MOVING TO AND FROM BED TO CHAIR: A LITTLE
MOBILITY SCORE: 20
STANDING UP FROM CHAIR USING ARMS: A LITTLE
STANDING UP FROM CHAIR USING ARMS: A LITTLE
MOBILITY SCORE: 20
CLIMB 3 TO 5 STEPS WITH RAILING: A LITTLE
WALKING IN HOSPITAL ROOM: A LITTLE
CLIMB 3 TO 5 STEPS WITH RAILING: A LITTLE
MOVING TO AND FROM BED TO CHAIR: A LITTLE
WALKING IN HOSPITAL ROOM: A LITTLE

## 2024-05-04 ASSESSMENT — PAIN - FUNCTIONAL ASSESSMENT: PAIN_FUNCTIONAL_ASSESSMENT: 0-10

## 2024-05-04 ASSESSMENT — PAIN SCALES - GENERAL: PAINLEVEL_OUTOF10: 8

## 2024-05-04 NOTE — PROGRESS NOTES
"Abraham Rodriguez is a 83 y.o. male on day 2 of admission presenting with Renal calculus, left.    Subjective   ***       Objective     Physical Exam    Last Recorded Vitals  Blood pressure 168/70, pulse 67, temperature 36 °C (96.8 °F), temperature source Temporal, resp. rate 18, height 1.803 m (5' 11\"), weight 89.4 kg (197 lb), SpO2 96%.  Intake/Output last 3 Shifts:  I/O last 3 completed shifts:  In: 3324.8 (37.2 mL/kg) [P.O.:220; I.V.:2904.8 (32.5 mL/kg); IV Piggyback:200]  Out: 900 (10.1 mL/kg) [Urine:900 (0.3 mL/kg/hr)]  Weight: 89.4 kg     Relevant Results  {If you would like to pull in Medications, type .meds     If you would like to pull in Lab results for the last 24 hours, type .[id number]    If you would like to pull in Imaging results, type .imgrslt :99}    {Link to Stroke Scoring tools - Link :99}                       Assessment/Plan   Principal Problem:    Renal calculus, left  Active Problems:    Calculus of ureter    ***     {This patient does not have an ACP note on file for this encounter, please fill one out - Advance Care Planning Activity :99}      Marcy Morelos RN      "

## 2024-05-04 NOTE — DISCHARGE SUMMARY
Discharge Diagnosis  Renal calculus, left    Issues Requiring Follow-Up  Post hospital follow up    Discharge Meds     Your medication list        CHANGE how you take these medications        Instructions Last Dose Given Next Dose Due   furosemide 40 mg tablet  Commonly known as: Lasix  What changed:   when to take this  reasons to take this  additional instructions      Take 1 tablet (40 mg) by mouth once daily as needed (swelling). Takes as needed              CONTINUE taking these medications        Instructions Last Dose Given Next Dose Due   aspirin 325 mg EC tablet           Dexilant 60 mg DR capsule  Generic drug: dexlansoprazole           docusate sodium 100 mg capsule  Commonly known as: Colace           DULoxetine 30 mg DR capsule  Commonly known as: Cymbalta      Take 1 capsule (30 mg) by mouth once daily. Do not crush or chew.       finasteride 5 mg tablet  Commonly known as: Proscar           fluticasone 50 mcg/actuation nasal spray  Commonly known as: Flonase      Administer 1 spray into each nostril once daily as needed for rhinitis. Shake gently. Before first use, prime pump. After use, clean tip and replace cap. As needed       irbesartan 150 mg tablet  Commonly known as: Avapro           metoprolol succinate XL 50 mg 24 hr tablet  Commonly known as: Toprol-XL      TAKE 1 TABLET DAILY       naloxone 4 mg/0.1 mL nasal spray  Commonly known as: Narcan           oxyCODONE 5 mg immediate release tablet  Commonly known as: Roxicodone      Take 1 tablet (5 mg) by mouth 4 times a day as needed for severe pain (7 - 10) for up to 28 days. Do not fill before April 29, 2024.       oxyCODONE 5 mg immediate release tablet  Commonly known as: Roxicodone  Start taking on: May 27, 2024      Take 1 tablet (5 mg) by mouth 4 times a day as needed for severe pain (7 - 10) for up to 28 days. Do not fill before May 27, 2024.       oxyCODONE 5 mg immediate release tablet  Commonly known as: Roxicodone  Start taking on:  June 24, 2024      Take 1 tablet (5 mg) by mouth 4 times a day as needed for severe pain (7 - 10) for up to 28 days. Do not fill before June 24, 2024.       tamsulosin 0.4 mg 24 hr capsule  Commonly known as: Flomax           tiZANidine 2 mg tablet  Commonly known as: Zanaflex      Take 1 tablet (2 mg) by mouth every 8 hours if needed for muscle spasms.                 Where to Get Your Medications        Information about where to get these medications is not yet available    Ask your nurse or doctor about these medications  furosemide 40 mg tablet         Test Results Pending At Discharge  Pending Labs       Order Current Status    Calculi (Stone) Analysis In process            Hospital Course   Abraham Rodriguez is an 83 y.o. male presenting with left flank pain. He was recently discharged from this hospital on 4/25/2024 after undergoing right TKR. He has obstructing left ureteral stone. Urology was consulted. He underwent lithotripsy. Post op, pt did well. Pt is hemodynamically stable for discharge at this time with close outpatient follow ups.     The patient was discharged in satisfactory condition.   Medications and side effect profile reviewed with patient.  More than 30 minutes were spent in coordinating patient discharge     Pertinent Physical Exam At Time of Discharge  Physical Exam  Constitutional: Awake, alert, NAD.  Eyes: PERRLA, EOMI. No erythema or exudate. No proptosis or lid lag.   ENMT: MMM, no nasal congestion, no oral lesions, oropharynx clear without tonsillar erythema or exudate.  Head/Neck: NCAT, neck supple. Full active ROM of the neck. No thyromegaly or mass. No JVP.  Respiratory/Thorax: CTAB, no increased work of breathing, no increased respiratory effort, no wheeze, rales, or rhonchi.  Cardiovascular: Regular rate and rhythm, normal S1 and S2, no murmurs, rubs, or gallops.  Gastrointestinal: Soft, nontender to palpation, nondistended, no guarding or rebound, normoactive bowel  sounds  Musculoskeletal: Strength 5/5. MAEx4. No muscle wasting.  Extremities: 2+ RPs, no cyanosis or edema  Neurological: CN II-XII intact. Normal gait. No gross deficits.  Lymphatic: No lymphadenopathy.  Skin: Warm and well perfused.     Outpatient Follow-Up  Future Appointments   Date Time Provider Department Center   5[facility]/2024 12:20 [id number]   6/12/2024 11:20 AM SHAD Roque-CNP [id number] [facility]   7/17/2024  1:00 PM Angella García APRNCNP [id number]   11/22/2024 10:00 AM SHAD RestrepoCNP [id number] [place]       Lynn Feliz MD  hospitalist

## 2024-05-04 NOTE — PROGRESS NOTES
05/04/24 1006   Discharge Planning   Who is requesting discharge planning? Provider   Home or Post Acute Services In home services   Type of Home Care Services Home nursing visits;Home OT;Home PT   Patient expects to be discharged to: home with [facility], discharge today. f2f and AVs sent via [employer].

## 2024-05-04 NOTE — PROGRESS NOTES
Occupational Therapy                 Therapy Communication Note- Screen and Discontinue    Patient Name: Abraham Rodriguez  MRN: [id number]  Today's Date: 5/4/2024     Discipline: Occupational Therapy    Missed Visit Reason: Missed Visit Reason: Cancel (Per PT pt is near baseline with functional transfers and owns/uses several pieces of AE/DME for ADL. No OT needs/goals identified, will discontinue OT order and defer  home-going rehab needs to physical therapy.)    Missed Time: Cancel

## 2024-05-04 NOTE — PROGRESS NOTES
Physical Therapy    Physical Therapy Evaluation    Patient Name: Abraham Rodriguez  MRN: [id number]  Today's Date: 5/4/2024   Time Calculation  Start Time: 0926  Stop Time: 0937  Time Calculation (min): 11 min    Assessment/Plan   PT Assessment  PT Assessment Results: Decreased strength, Decreased range of motion, Impaired balance, Decreased mobility  Rehab Prognosis: Good  Barriers to Discharge: none  Evaluation/Treatment Tolerance: Patient tolerated treatment well  Medical Staff Made Aware: Yes  End of Session Communication: Bedside nurse  Assessment Comment: pt demonstrates post-op TKA strength deficits, balance/gait impairments, and transfer safety impairments. Recommend continued skilled PT post-TKA for further management. Use of FWW for maintained sfaety  End of Session Patient Position:  (bed, breakfast in place. No alarm per RN)  IP OR SWING BED PT PLAN  Inpatient or Swing Bed: Inpatient  PT Plan  Treatment/Interventions: Transfer training, Gait training, Balance training, Strengthening  PT Plan: Skilled PT  PT Frequency: 3 times per week  PT Discharge Recommendations: Low intensity level of continued care  PT Recommended Transfer Status: Stand by assist  PT - OK to Discharge: Yes (per PT POC)      Subjective   General Visit Information:  General  Reason for Referral: 83 YOM admitted with L flank pain s/p lithotripsy  Referred By: SUSANNA Thomas  Past Medical History Relevant to Rehab: PMH: plantar fascitis, COPD,bells palsy, MI, prostate CA, HTN, chronic pan  Family/Caregiver Present: No  Prior to Session Communication: Bedside nurse  Patient Position Received: Alarm off, not on at start of session  General Comment: sitting up in bed, cleared for session per RN. Pt agreeable to participate, reports pending DC this date. IV in place. Recent R TKA, incision visualized and in tact. Erythema noted, pt reports US DVT completed, (-) findings  Home Living:  Home Living  Type of Home:  (cabin)  Lives With: Alone (dtr lives  nearby)  Home Adaptive Equipment:  (WC, cane, crutches, FWW, rollator, adjustable bed, long handled toools)  Home Layout: One level  Home Access: No concerns  Prior Level of Function:  Prior Function Per Pt/Caregiver Report  Level of [place]: Independent with ADLs and functional transfers, Independent with homemaking with ambulation  Receives Help From: Family  Prior Function Comments: recent at [facility] bed for rehab following TKA approx 1 month ago. [place], not yet started HHPT. ambulatory with cane vs. FWW at rehab  Precautions:  Precautions  Medical Precautions: Fall precautions  Post-Surgical Precautions: Right total knee precautions  Vital Signs:       Objective   Pain:  Pain Assessment  Pain Assessment: 0-10  Pain Score: 8  Pain Location:  (Rknee)  Pain Interventions:  (offered ice, pt declined. Repositioned for comfort)  Cognition:  Cognition  Overall Cognitive Status:  (at least oriented to self and situation as assessed in conversation)    General Assessments:                Dynamic Sitting Balance  Dynamic Sitting-Comments: S/I without difficulty    Dynamic Standing Balance  Dynamic Standing-Comments: B UE support with FWW, SB/S  Functional Assessments:       Bed Mobility  Bed Mobility: Yes  Bed Mobility 1  Bed Mobility 1: Supine to sitting, Sitting to supine  Level of Assistance 1: Modified independent  Bed Mobility Comments 1: HOB elevated >30 degrees, use of bedrail    Transfers  Transfer: Yes  Transfer 1  Transfer From 1: Sit to  Transfer to 1: Stand  Technique 1: Sit to stand, Stand to sit  Transfer Device 1: Walker  Transfer Level of Assistance 1: Close supervision  Trials/Comments 1: cues for hand placement and safety. Mild impulsivity observed    Ambulation/Gait Training  Ambulation/Gait Training Performed: Yes  Ambulation/Gait Training 1  Surface 1: Level tile  Device 1: Rolling walker  Assistance 1: Close supervision  Quality of Gait 1:  (flexed trunk, decreased foot clearance. Swing  through, mild antalgic)  Comments/Distance (ft) 1: 100 feet with directional change    Outcome Measures:  [facility] Basic Mobility  Turning from your back to your side while in a flat bed without using bedrails: None  Moving from lying on your back to sitting on the side of a flat bed without using bedrails: None  Moving to and from bed to chair (including a wheelchair): A little  Standing up from a chair using your arms (e.g. wheelchair or bedside chair): A little  To walk in hospital room: A little  Climbing 3-5 steps with railing: A little  Basic Mobility - Total Score: 20    Encounter Problems       Encounter Problems (Active)       Mobility       >300 feet feet with/without use of DME and S/I assist  necessary to initiate return to PLOF'       Start:  05/04/24    Expected End:  05/18/24            X15+ reps ther ex to increase general strength and improve functional independence         Start:  05/04/24    Expected End:  05/18/24               PT Transfers       Pt will complete all functional transfers with S/I necessary to initiate return to PLOF         Start:  05/04/24    Expected End:  05/18/24               Safety       LTG - Patient will demonstrate safety requirements appropriate to situation/environment       Start:  05/02/24            LTG - Patient will utilize safety techniques       Start:  05/02/24            STG - Patient uses call light consistently to request assistance with transfers       Start:  05/02/24                   Education Documentation  Mobility Training, taught by Freida Grover, PT at 5/4/2024 10:08 AM.  Learner: Patient  Readiness: Acceptance  Method: Explanation  Response: Verbalizes Understanding    Education Comments  No comments found.

## 2024-05-04 NOTE — CARE PLAN
The patient's goals for the shift include  pain control    The clinical goals for the shift include pt pain will be well controlled during shift      Problem: Pain  Goal: Turns in bed with improved pain control throughout the shift  Outcome: Progressing     Problem: Pain  Goal: Walks with improved pain control throughout the shift  Outcome: Progressing     Problem: Safety  Goal: I will remain free of falls  Outcome: Progressing     Problem: Daily Care  Goal: Daily care needs are met  Outcome: Progressing     Problem: Fall/Injury  Goal: Not fall by end of shift  Outcome: Progressing

## 2024-05-04 NOTE — NURSING NOTE
Discharge instructions reviewed with patient. Education on change in medication, side effects and follow up appointments provided. No additional questions. IV removed, intact.

## 2024-05-04 NOTE — CARE PLAN
Problem: Pain  Goal: Walks with improved pain control throughout the shift  Note: Pain assessments   The patient's goals for the shift include      The clinical goals for the shift include Pt will have controlled pain this shift    Over the shift, the patient did make progress toward the following goals. Pt had no c/o pain overnight. VSS. Ambulating in room with assistance. Urine red to tea-colored by end of shift

## 2024-05-06 ENCOUNTER — PATIENT OUTREACH (OUTPATIENT)
Dept: PRIMARY CARE | Facility: CLINIC | Age: 84
End: 2024-05-06
Payer: COMMERCIAL

## 2024-05-06 NOTE — OP NOTE
PREOPERATIVE DIAGNOSIS:  Left hip end-stage bone-on-bone osteoarthritis.    POSTOPERATIVE DIAGNOSIS:  Left hip end-stage bone-on-bone osteoarthritis.    OPERATION/PROCEDURE:  Left direct anterior approach total hip arthroplasty.    SURGEON:  Scott Arora DO.    ASSISTANT(S):  First assistant is BENY Bridges.  Second assistant is Cody Tracy DO, PGY2, orthopedic resident.     ANESTHESIA:    IMPLANTS UTILIZED:  1. Sahra Insignia 132-degree femoral stem, size 3, high offset.  2. [place] Trident II PSL acetabular cup, size 54, code E.  3. Sahra X3 0-degree nonlipped polyethylene liner, size 36, code E.  4. Biolox Delta ceramic femoral head, size 36, +0 mm length.    INTRAOPERATIVE PATHOLOGY AND FINDINGS/OPERATIVE INDICATIONS:  The patient is a very pleasant 82-year-old male with longstanding  history of poly joint arthralgias and pain, [facility].  He has a history of  previous right total hip arthroplasty, from which he has been doing  reasonably well.  He certainly does have acquired limb length  inequality with his right leg much shorter than his left, history of  bilateral knee osteoarthritis as well as severe left hip  osteoarthritis with a fixed external rotation contracture.  Physical  exam revealed obligate external rotation with flexion, flexion to  approximately 75 degrees, abduction to approximately 20 degrees.  Subtle hip flexor contracture also appreciated with accentuation of  his lumbar lordosis with attempted extension of his hip.  Preoperative radiographs revealed severe mixed cam/pincer-type  osteoarthritic change to the left hip, somewhat varus neck angle,  large pistol  deformity of the femoral head, complete joint space  loss, bone-on-bone apposition, flattening of the sourcil of the  superior acetabulum with associated subchondral cystic changes,  subchondral sclerosis, and large parrot-beak osteophyte about  the  inferomedial aspect of the acetabulum with kissing inferomedial  femoral head and neck junction osteophytes.  Treatment options were  discussed in detail with the patient and he did choose to move  forward with left total hip arthroplasty as his quality of life was  severely affected by his hip to the point where he really was  functionally limited and was starting to develop a sacral decubitus  from his lack of mobility.  Exam of the sacrum on the day of surgery  revealed a stage I sacral decubitus at the proximal aspect of the  gluteal cleft with some underlying scabbing, probably technically in  late stage I or early stage II.  No indication of infection or open  wound.  Intraoperatively, at the time of the surgery, exam under  anesthesia again revealed an obvious limb length discrepancy, left  leg longer than the right, left femoral length longer than the right  with Galeazzi exam and overall limb length longer by approximately an  inch and a half.  Intraoperatively, the patient was found to have a  severely ankylosed hip, hypertrophic hip capsule consistent with an  ankylosed nature, contracture of the short external rotators.  Arthritic related joint effusion was noted.  Multiple osseous loose  bodies appreciated.  Calcified labral tissue noted circumferentially.   Osteophytic formation circumferentially around the acetabular rim  including a very large parrot-beak osteophyte inferomedial.  Calcifications within the fovea.  Complete articular cartilage loss  noted about the weightbearing aspect of the femoral head and  acetabulum with a gouge through the femoral head from acetabular  osteophyte at the anterosuperior head, consistent with a large  anterior pincer osteophyte.  Bone quality was severely sclerotic.  Certainly, no indications of osteopenia or osteoporosis.  Gluteal  complex was intact at the time of the procedure.  There was no  indication of infection at the time of the procedure.      PROCEDURE IN DETAIL:  The patient was correctly identified and marked in preoperative  holding.  Risks, benefits, alternatives, reasonable expectations for  outcomes had previously been discussed.  The patient was then  escorted to operative suite #4 at [facility].  Once in the  operative suite, surgical pause/time-out was performed, preoperative  antibiotics were administered, intravenous tranexamic acid was  administered, and general anesthetic with endotracheal intubation and  complete chemical paralysis was provided by the Department of  Anesthesia.  The patient was positioned supine on a standard  operative table, bony prominences well padded, hips positioned over  the break of the bed, bilateral hips and lower extremities then  sterilely prepped and draped in standard fashion from umbilicus to  ankles.  A surgical window was cut through the drapes at the level of  the left hip.  This area was then covered with an Ioban.  Anatomic  landmarks were identified and marked with a sterile marking pen.  At  this time, a 10 blade was used to make a 10 cm incision beginning 3  cm lateral to the anterior-superior iliac spine, carried distally and  obliquely, aiming towards the lateral femoral epicondyle of the knee.   Careful dissection through subcutaneous tissues utilizing Bovie  cautery to achieve hemostasis was performed until the investing  fascia of tensor fascia francisco was identified.  This was then incised  in line with the skin incision.  Muscle belly of tensor fascia francisco  was then retracted laterally.  A superior extracapsular retractor was  then inserted.  Circumflex vessels were identified, coagulated with  Aquamantys, and sharply incised with Bovie cautery.  An inferior  extracapsular retractor was then inserted followed by insertion of a  lateral retractor to reflect the tensor fascia francisco.  Precapsular fat  was then identified, sharply excised with Bovie cautery.  A bump was  placed under the  left knee.  Rutledge elevator was used to elevate the  rectus femoris off the anterior hip capsule and an anterior  acetabular retractor was then inserted.  Aquamantys was then used to  achieve hemostasis along the intertrochanteric line and the anterior  hip capsule.  Adhesions of gluteus minimus to the superior hip  capsule were lysed with Bovie cautery and the superior extracapsular  retractor was then inserted.  Adhesion of the psoas muscle belly at  the inferior hip capsule was lysed with Bovie cautery and the  inferior extra-articular retractor was then reinserted.  At this  time, Bovie cautery was used to make a standard Z arthrotomy of the  anterior hip capsule, tagging the superior and inferior leaflets for  later closure.  Extracapsular retractors were then moved  intracapsularly.  Anatomic landmarks were identified and marked about  the anterior femoral neck with Bovie cautery.  A wafer cut of the  native femoral neck was then performed in freehand fashion.  Napkin  ring of bone was removed.  Native femoral head was then removed with  a corkscrew on power.  Acetabular retractors were then inserted,  acetabular labrum sharply excised circumferentially with Bovie  cautery, pulvinar coagulated with Aquamantys and sharply incised with  Bovie cautery.  Pincer osteophytes were removed with a rongeur.  The  native femoral head measured 48 mm in size.  As such, we began  reaming with a size 48 reamer, going up by 2 mm increments to a size  54 reamer, utilizing fluoroscopic imaging to ensure accuracy of depth  and positioning of ream.  Once adequate bleeding bony bed had been  achieved circumferentially, the wound was copiously irrigated with  sterile saline via Simpulse lavage and a size 54 PSL cup was then  malleted into position.  Screws were oriented within the safe zone.  Fluoroscopic imaging was utilized again to ensure accuracy of fit,  fill, and positioning of the cup.  The cup was found to have  excellent  fixation.  At this time, a curved osteotome and a rongeur  were used to remove the large inferomedial parrot-beak acetabular  osteophyte.  Wound was once again copiously irrigated with sterile  saline.  Polyethylene then malleted into the locking mechanism of the  cup and found to have excellent fixation.  We then turned our  attention to the femur.  The femur was positioned for preparation.  Superior and inferior capsular releases were completed, femoral  elevation and exposure were achieved.  A box chisel was passed,  followed by a lateralizing rasp, followed by sequential broaching,  beginning with a size 0 broach, going up to a size 3 broach.  Calcar  planer was utilized.  A standard offset 132-degree neck was attached  with a 36, +0 mm head.  Open reduction of the hip was performed, leg  lengths were assessed.  The left leg was still found to be long by  comparison to his short right hip, but probably by 0.5 inch at this  point.  Range of motion of the hip revealed that it was very stable;  however, there was some greater trochanteric impingement with  abduction.  Fluoroscopic imaging was then utilized in both AP and  frog-leg lateral views, demonstrating adequacy of fit, fill, and  positioning of the trial femoral broach.  Live fluoroscopic exam did  confirm palpated greater trochanteric impingement.  At this time, a  bone hook manual traction external rotation was used to dislocate the  trial components.  Repeat femoral exposure was achieved.  Standard  offset trial neck was removed.  A high-offset, 132-degree trial neck  was attached again with a 36+, 0 mm length trial head.  Repeat open  reduction of the hip was again performed.  Leg lengths and stability  were again assessed and found to be excellent again with a  pre-existing limb length inequality with the left leg being longer  than the right, slightly corrected at this point.  The hip was tested  in external rotation, hyperextension with external  rotation,  hyperflexion, hyperflexion with adduction, hyperflexion with  adduction and internal rotation, the position of sleep as well as  figure-of-4 position and then abduction was again tested with  resolution of the greater trochanteric impingement on the acetabulum.   Fluoroscopic imaging was then also used.  The hip was taken past 45  degrees of abduction before any indication of greater trochanteric  impingement occurred.  This was felt to be inherently quite stable  with excellent range of motion.  At this time, a bone hook manual  traction external rotation was used to dislocate the trial  components.  Repeat femoral exposure was achieved.  Trial femoral  components were removed.  Final femoral components were impacted into  place and found to have excellent fixation.  Final femoral head was  malleted onto the Crooks taper and found to have excellent fixation.  Final open reduction of the hip was then performed.  Stability and  leg lengths were again assessed and final fluoroscopic imaging was  obtained on both AP and frog-leg lateral views, demonstrating  well-positioned total hip arthroplasty without obvious associated  complications.  At this time, the wound was copiously irrigated with  a pre-made dilute Betadine irrigation solution, allowed to soak x3  minutes followed by copious irrigation with sterile saline via  Simpulse lavage.  Zuri powder was placed into the deep hip capsule.   Anterior tag sutures were removed from the capsule.  Anterior  capsule was then reapproximated with #1 Vicryl ligature in  interrupted simple stitch fashion.  Zuri powder was then placed  into the deep fascial planes to help with hemostasis.  Pericapsular  pain injection was then provided in the form of ropivacaine, Toradol,  Duramorph, and saline with epinephrine.  At this time, the investing  fascia of tensor fascia francisco was reapproximated with a #1 Vicryl  ligature in a running locked segmental fashion, deep  subcutaneous  tissues were closed with 2-0 Vicryl ligature in buried interrupted  fashion, skin reapproximated with a 3-0 V-Loc in a running  subcuticular fashion followed by application of a surgical glue.  Once the surgical glue was completely dried, self-adherent Mepilex  silver dressing was applied over top of the wound.  The patient was  then awoken, extubated, bilateral thigh-high NOE hose applied.  The  patient was transferred to a hospital bed and returned to PACU in  stable condition.  Counts were correct.  Case was clean, elective.     COMPLICATIONS:  None.    SPECIMENS:  None.    ESTIMATED BLOOD LOSS:  150 cc.    The patient will receive a consultation from Wound Care while he is  here in the hospital, to evaluate the stage I sacral decubitus  identified preoperatively and we will have a Mepilex sacral pad in  place and begin early mobilization.       Scott Boswell DO    DD:  05/31/2023 10:42:18 EST  DT:  06/01/2023 07:28:04 EST  DICTATION NUMBER:  [id number]  INTERNAL JOB NUMBER:  [id number]    CC:  Scott Boswell DO, Fax: [phone]        Electronic Signatures:  SCOTT BOSWELL () (Signed on 02-Jun-2023 19:16)   Authored  Unsigned, Draft (SYS GENERATED) (Entered on 01-Jun-2023 07:28)   Entered    Last Updated: 02-Jun-2023 19:16 by SCOTT BOSWELL ()

## 2024-05-08 LAB
APPEARANCE STONE: NORMAL
COMPN STONE: NORMAL
SPECIMEN WT: 36 MG

## 2024-05-08 NOTE — SIGNIFICANT EVENT
Follow Up Phone Call    Outgoing phone call    Spoke to: Abraham Rodriguez Relationship:self   Phone number: [phone]      Outcome: contacted patient/ family   No chief complaint on file.         Diagnosis:Not applicable    .States he is feeling better. Denies fever or chills. Voiding without difficulty clear yellow per patient. No further questions or concerns.

## 2024-05-15 DIAGNOSIS — M51.36 DEGENERATION OF INTERVERTEBRAL DISC OF LUMBAR REGION: ICD-10-CM

## 2024-05-15 DIAGNOSIS — M54.12 CERVICAL RADICULITIS: ICD-10-CM

## 2024-05-15 DIAGNOSIS — M47.816 LUMBAR SPONDYLOSIS: ICD-10-CM

## 2024-05-15 DIAGNOSIS — M48.062 LUMBAR STENOSIS WITH NEUROGENIC CLAUDICATION: ICD-10-CM

## 2024-05-15 RX ORDER — DULOXETIN HYDROCHLORIDE 30 MG/1
30 CAPSULE, DELAYED RELEASE ORAL DAILY
Qty: 90 CAPSULE | Refills: 3 | Status: SHIPPED | OUTPATIENT
Start: 2024-05-15 | End: 2025-05-15

## 2024-05-15 RX ORDER — OXYCODONE HYDROCHLORIDE 5 MG/1
5 TABLET ORAL 4 TIMES DAILY PRN
Qty: 112 TABLET | Refills: 0 | Status: SHIPPED | OUTPATIENT
Start: 2024-06-24 | End: 2024-07-22

## 2024-05-15 RX ORDER — OXYCODONE HYDROCHLORIDE 5 MG/1
5 TABLET ORAL 4 TIMES DAILY PRN
Qty: 112 TABLET | Refills: 0 | Status: SHIPPED | OUTPATIENT
Start: 2024-05-27 | End: 2024-06-24

## 2024-05-15 NOTE — TELEPHONE ENCOUNTER
Pt asked that duloxetine be sent to his mail order pharmacy.  And his two remaining oxycodone be sent to [facility].

## 2024-05-17 LAB
ATRIAL RATE: 86 BPM
P AXIS: 50 DEGREES
P OFFSET: 161 MS
P ONSET: 106 MS
PR INTERVAL: 208 MS
Q ONSET: 210 MS
QRS COUNT: 14 BEATS
QRS DURATION: 146 MS
QT INTERVAL: 420 MS
QTC CALCULATION(BAZETT): 502 MS
QTC FREDERICIA: 473 MS
R AXIS: -38 DEGREES
T AXIS: 21 DEGREES
T OFFSET: 420 MS
VENTRICULAR RATE: 86 BPM

## 2024-05-28 ENCOUNTER — OFFICE VISIT (OUTPATIENT)
Dept: PRIMARY CARE | Facility: CLINIC | Age: 84
End: 2024-05-28
Payer: MEDICARE

## 2024-05-28 VITALS
OXYGEN SATURATION: 95 % | WEIGHT: 177 LBS | SYSTOLIC BLOOD PRESSURE: 151 MMHG | BODY MASS INDEX: 24.78 KG/M2 | DIASTOLIC BLOOD PRESSURE: 96 MMHG | HEIGHT: 71 IN | HEART RATE: 94 BPM

## 2024-05-28 DIAGNOSIS — Z09 HOSPITAL DISCHARGE FOLLOW-UP: Primary | ICD-10-CM

## 2024-05-28 DIAGNOSIS — N20.0 RENAL CALCULUS, LEFT: ICD-10-CM

## 2024-05-28 DIAGNOSIS — G89.4 CHRONIC PAIN SYNDROME: ICD-10-CM

## 2024-05-28 DIAGNOSIS — M54.50 CHRONIC LOW BACK PAIN, UNSPECIFIED BACK PAIN LATERALITY, UNSPECIFIED WHETHER SCIATICA PRESENT: ICD-10-CM

## 2024-05-28 DIAGNOSIS — L89.312 PRESSURE INJURY OF RIGHT BUTTOCK, STAGE 2 (MULTI): ICD-10-CM

## 2024-05-28 DIAGNOSIS — Z96.651 STATUS POST TOTAL RIGHT KNEE REPLACEMENT: ICD-10-CM

## 2024-05-28 DIAGNOSIS — G89.29 CHRONIC LOW BACK PAIN, UNSPECIFIED BACK PAIN LATERALITY, UNSPECIFIED WHETHER SCIATICA PRESENT: ICD-10-CM

## 2024-05-28 PROCEDURE — 3080F DIAST BP >= 90 MM HG: CPT | Performed by: NURSE PRACTITIONER

## 2024-05-28 PROCEDURE — 1160F RVW MEDS BY RX/DR IN RCRD: CPT | Performed by: NURSE PRACTITIONER

## 2024-05-28 PROCEDURE — 99495 TRANSJ CARE MGMT MOD F2F 14D: CPT | Performed by: NURSE PRACTITIONER

## 2024-05-28 PROCEDURE — 1158F ADVNC CARE PLAN TLK DOCD: CPT | Performed by: NURSE PRACTITIONER

## 2024-05-28 PROCEDURE — 3077F SYST BP >= 140 MM HG: CPT | Performed by: NURSE PRACTITIONER

## 2024-05-28 PROCEDURE — 1036F TOBACCO NON-USER: CPT | Performed by: NURSE PRACTITIONER

## 2024-05-28 PROCEDURE — 1111F DSCHRG MED/CURRENT MED MERGE: CPT | Performed by: NURSE PRACTITIONER

## 2024-05-28 PROCEDURE — 1123F ACP DISCUSS/DSCN MKR DOCD: CPT | Performed by: NURSE PRACTITIONER

## 2024-05-28 PROCEDURE — 1159F MED LIST DOCD IN RCRD: CPT | Performed by: NURSE PRACTITIONER

## 2024-05-28 NOTE — PATIENT INSTRUCTIONS
Please call dr Alston to schedule follow up for kidney stones.   Duoderm- cut into 2x2 pieces- place over the ulcer on RT cheek. Leave in place at least 5 days. If it falls off that's ok you can replace it. But do not peel off

## 2024-05-28 NOTE — PROGRESS NOTES
"Subjective   Patient ID: Abraham Rodriguez is a 83 y.o. male who presents for Follow-up (Patient had his right knee replaced 03/20/24 and he has not been doing well. Patient has been in the rehab for this but now is at home. ).    83 year old here for hospital and rehab follow up.   Eating and drinking well. States never eats lunch.   No nicotene  No ETOH  Drives self  No falls since discharge from hospital/rehab  No CP, SOB; occasional constipation.   He was recently discharged from this hospital on 4/25/2024 after undergoing right TKR. He was then readmitted for obstructing left ureteral stone. Urology was consulted. He underwent lithotripsy. Post op, pt did well. Has not scheduled with urology, Dr Alston.   No change in meds while in the rehab. Was started in Lasix for RT LE edema-40mg every day. Now he is back non the PRN if LE edema  RT knee healing and feeling better. Thinks he still needs out pt PT. Saw Dr Arora last week. Will start PT in [place].   Chronic pain- sees pain management on cymbalta and opiate. Due for follow up June.   Has a pressure wound on sacrum. Pain with sitting. Cannot see to put the creams on -barrier. Small amt bloody drainage on clothing after sitting too long.   Live alone and drives  No refills needed at this time          Review of Systems    Objective   BP (!) 151/96   Pulse 94   Ht 1.803 m (5' 11\")   Wt 80.3 kg (177 lb)   SpO2 95%   BMI 24.69 kg/m²     Physical Exam  Constitutional:       Appearance: Normal appearance.   HENT:      Head: Normocephalic and atraumatic.   Eyes:      Extraocular Movements: Extraocular movements intact.      Pupils: Pupils are equal, round, and reactive to light.   Cardiovascular:      Rate and Rhythm: Normal rate and regular rhythm.      Pulses: Normal pulses.      Heart sounds: Normal heart sounds.   Pulmonary:      Effort: Pulmonary effort is normal.      Breath sounds: Normal breath sounds.   Abdominal:      General: Abdomen is flat.      " Palpations: Abdomen is soft.   Musculoskeletal:         General: Normal range of motion.      Cervical back: Normal range of motion and neck supple.   Skin:     General: Skin is warm.             Comments: Stage 2 pressure sore inner aspect gluteal fold. No discharge. Surrounding skin clean   Neurological:      General: No focal deficit present.      Mental Status: He is alert and oriented to person, place, and time. Mental status is at baseline.   Psychiatric:         Mood and Affect: Mood normal.         Behavior: Behavior normal.         Thought Content: Thought content normal.       Assessment/Plan   Diagnoses and all orders for this visit:  Hospital discharge follow-up  Comments:  Overall doing much better.  Multiple diagnostic testing has been completed and reviewed.  At this time I see no reason to repeat any blood work  Renal calculus, left  Comments:  Status post lithotripsy.  Need follow-up with urology.  Continue Flomax and Proscar  Chronic low back pain, unspecified back pain laterality, unspecified whether sciatica present  Comments:  Defer management to pain management  Status post total right knee replacement  Comments:  Has completed follow-up with orthopedic surgeon.  Will start outpatient physical therapy  Chronic pain syndrome  Comments:  Pain management follows  Pressure injury of right buttock, stage 2 (Multi)  Comments:  Try DuoDERM.  I instructed on the use.  This should be over-the-counter if there is any issues let me know.  His daughter will help put it on  Other orders  -     Follow Up In Primary Care - Established  -     Follow Up In Primary Care - Established; Future    Follow-up in 1 month and as needed.  Call as needed

## 2024-05-30 ENCOUNTER — APPOINTMENT (OUTPATIENT)
Dept: PRIMARY CARE | Facility: CLINIC | Age: 84
End: 2024-05-30
Payer: COMMERCIAL

## 2024-06-06 ENCOUNTER — APPOINTMENT (OUTPATIENT)
Dept: PHYSICAL THERAPY | Facility: HOSPITAL | Age: 84
End: 2024-06-06
Payer: MEDICARE

## 2024-06-06 ENCOUNTER — DOCUMENTATION (OUTPATIENT)
Dept: PHYSICAL THERAPY | Facility: HOSPITAL | Age: 84
End: 2024-06-06
Payer: COMMERCIAL

## 2024-06-06 NOTE — PROGRESS NOTES
Physical Therapy                 Therapy Communication Note    Patient Name: Abraham Rodriguez  MRN: [id number]  Today's Date: 6/6/2024     Discipline: Physical Therapy    Missed Visit Reason:      Missed Time: Canceled and rescheduled appointment for tomorrow 6/7/2024.    Comment:

## 2024-06-07 ENCOUNTER — EVALUATION (OUTPATIENT)
Dept: PHYSICAL THERAPY | Facility: HOSPITAL | Age: 84
End: 2024-06-07
Payer: MEDICARE

## 2024-06-07 DIAGNOSIS — Z96.651 PRESENCE OF RIGHT ARTIFICIAL KNEE JOINT: Primary | ICD-10-CM

## 2024-06-07 DIAGNOSIS — M17.11 UNILATERAL PRIMARY OSTEOARTHRITIS, RIGHT KNEE: ICD-10-CM

## 2024-06-07 PROCEDURE — 97110 THERAPEUTIC EXERCISES: CPT | Mod: GP | Performed by: PHYSICAL THERAPIST

## 2024-06-07 PROCEDURE — 97161 PT EVAL LOW COMPLEX 20 MIN: CPT | Mod: GP | Performed by: PHYSICAL THERAPIST

## 2024-06-07 ASSESSMENT — PAIN DESCRIPTION - DESCRIPTORS: DESCRIPTORS: TIGHTNESS

## 2024-06-07 ASSESSMENT — PAIN SCALES - GENERAL: PAINLEVEL_OUTOF10: 6

## 2024-06-07 ASSESSMENT — ENCOUNTER SYMPTOMS
LOSS OF SENSATION IN FEET: 0
OCCASIONAL FEELINGS OF UNSTEADINESS: 0
DEPRESSION: 0

## 2024-06-07 ASSESSMENT — PAIN - FUNCTIONAL ASSESSMENT: PAIN_FUNCTIONAL_ASSESSMENT: 0-10

## 2024-06-07 NOTE — PROGRESS NOTES
Physical Therapy  Physical Therapy Evaluation and treatment     Patient Name: Abraham Rodriguez  MRN: [id number]  Today's Date: 6/7/2024  Time Calculation  Start Time: 0820  Stop Time: 0856  Time Calculation (min): 36 min    Today's Charges  PT Evaluation Time Entry  PT Evaluation (Low) Time Entry: 26  PT Therapeutic Procedures Time Entry  Therapeutic Exercise Time Entry: 10      Insurance:  Visit number: 1 of 12  Authorization info: no auth required  Payor: MEDICARE / Plan: MEDICARE PART A AND B / Product Type: *No Product type* /     Current Problem  1. Presence of right artificial knee joint  Referral to Physical Therapy    Follow Up In Physical Therapy      2. Unilateral primary osteoarthritis, right knee  Referral to Physical Therapy        Problem List Items Addressed This Visit             ICD-10-CM    Presence of right artificial knee joint - Primary Z96.651    Relevant Orders    Follow Up In Physical Therapy     Other Visit Diagnoses         Codes    Unilateral primary osteoarthritis, right knee     M17.11            General:  General  Reason for Referral: right knee pain s/p TKA 3/20/2024  Referred By: Dr. Arora    Precautions:   Precautions  STEADI Fall Risk Score (The score of 4 or more indicates an increased risk of falling): 4  LE Weight Bearing Status: Weight Bearing as Tolerated  Medical Precautions: Fall precautions    Medical History Form: Reviewed (scanned into chart)    Subjective:   Subjective   Chief Complaint: Patient is an 83 year old male who presents to clinic status post right total knee arthroplasty on 3/20/2024.  Onset Date: 3/20/2024   ANTHONY: Chronic    Current Condition:   Better    Pain:  Pain Assessment: 0-10  Pain Score: 6  Pain Location: Knee  Pain Orientation: Right  Pain Descriptors: Tightness  Highest: 10/10 pain  Lowest: 5/10 pain  Aggravating Factors:  unable to state  Relieving Factors:  Rest and Ice    Relevant Information (PMH & Previous Tests/Imaging): total knee arthroplasty on  3/20/2024  Previous Interventions/Treatments: Physical Therapy    Prior Level of Function (PLOF)  Patient previously independent with all ADLs  Exercise/Physical Activity: home exercise program, walking  Work/School: retired  Hobbies: work on cars    Patients Living Environment: Reviewed and no concern  Home Living  Home Living Comment: Patient has no mobility issues/concerns.  Prior Function Per Pt/Caregiver Report  Level of [place]: Independent with ADLs and functional transfers, Independent with homemaking with ambulation  Primary Language: English    Patient's Goal(s) for Therapy: Patient wants to be able to walk without a cane.    Red Flags: Do you have any of the following? No  Fever/chills, unexplained weight changes, dizziness/fainting, unexplained change in bowel or bladder functions, unexplained malaise or muscle weakness, night pain/sweats, numbness or tingling    Objective:  Objective     Hip AROM  Hip AROM WFL: yes    Specific Lower Extremity MMT  R Iliopsoas: (5/5): 4/5  L Iliopsoas: (5/5): 4+/5  R Gluteals (sidelying): (5/5): functionally observed >3+/5  L Gluteals (sidelying): (5/5): functionally observed >3+/5    Functional Rating Scale  LEFS /80: 8    Knee AROM  R knee flexion: (140°): 110  L knee flexion: (140°): 128  R knee extension: (0°): 0  L knee extension: (0°): 0    Knee MMT  R knee flexion: (5/5): 4/5  L knee flexion: (5/5): 5/5  R knee extension: (5/5): 4/5  L knee extension: (5/5): 5/5    Ankle AROM  Ankle AROM WFL: yes    Ankle MMT  R ankle dorsiflexion: (5/5): 5/5  L ankle dorsiflexion: (5/5): 5/5  R ankle plantarflexion: (5/5): 5/5  L ankle plantarflexion: (5/5): 5/5      Functional Screening    Posture: shoulder rounded forward and head forward    Lower Extremity Functional Movements  Transfers: Modified Independent  Gait: antalgic  Assistive Device: cane  DL Squat: decreased weight bearing through his right lower extremity   SL Squat: not tested     Balance  Feet Together: 30  "sec    Outcome Measures:  Other Measures  Lower Extremity Funtional Score (LEFS): 8/80     Treatment Performed:  Therapeutic Exercise  Therapeutic Exercise Performed: Yes  Therapeutic Exercise Activity 1: QS with heel propped x5  Therapeutic Exercise Activity 2: heel slides arom x10  Therapeutic Exercise Activity 3: SLR x10  Therapeutic Exercise Activity 4: heel raises x10  Therapeutic Exercise Activity 5: standing hip abd x10  Therapeutic Exercise Activity 6: LAQ x10  Therapeutic Exercise Activity 7: NBOS x30\"    EDUCATION:   Individual(s) Educated: patient   Education Provided: Home exercise program, plan of care, activity modifications, pain management, and injury pathology  Handout(s) Provided: Scanned into chart  Home Program: reviewed home exercise program   Risk and Benefits Discussed with Patient/Caregiver/Other: Yes   Patient/Caregiver Demonstrated Understanding: Yes   Plan of Care Discussed and Agreed Upon: Yes   Patient Response to Education: Patient/Caregiver verbalized understanding of information and Patient/Caregiver performed return demonstration of exercises/activities    Assessment: Patient presents with right knee pain following right total knee arthroplasty on 3/20/2024 resulting in limited participation in pain-free ADLs and inability to perform at their prior level of function. Patient demonstrated impaired strength, range of motion, balance, and gait. Skilled PT warranted to address the above stated impairments, so the patient can perform FA's without increased pain or difficulty.    PT Assessment Results: Decreased strength, Decreased range of motion, Impaired balance, Decreased mobility, Pain  Rehab Prognosis: Good  Evaluation/Treatment Tolerance: Patient tolerated treatment well    Clinical Presentation: Stable and/or uncomplicated characteristics    Plan:  Treatment/Interventions: Education/ Instruction, Gait training, Manual therapy, Neuromuscular re-education, Therapeutic activities, " Therapeutic exercises  PT Plan: Skilled PT  PT Frequency: 2 times per week  Duration: 6 weeks  Onset Date: 03/20/24  Certification Period Start Date: 06/07/24  Certification Period End Date: 07/19/24  Number of Treatments Authorized: 1 of 12  Rehab Potential: Good  Plan of Care Agreement: Patient    Goals: Set and discussed today  Active       PT Problem       Patient will maintain single leg stand on each leg for 10 sec with no upper extremity support.       Start:  06/07/24    Expected End:  07/19/24            Patient will be able to maintain dynamic standing balance during walking over obstacles       Start:  06/07/24    Expected End:  07/19/24            Patient will ambulate with normal gait pattern with no device       Start:  06/07/24    Expected End:  07/19/24            Patient will ambulate up and down a curb/single step with independent using no device       Start:  06/07/24    Expected End:  07/19/24            Patient will achieve right knee ROM of  0-120 degrees        Start:  06/07/24    Expected End:  07/19/24            Patient will achieve bilateral hip abduction strength of at least 4+/5       Start:  06/07/24    Expected End:  07/19/24            Patient will achieve bilateral knee flexion strength of at least 4+/5       Start:  06/07/24    Expected End:  07/19/24            Patient will achieve bilateral knee extension strength of at least 4+/5       Start:  06/07/24    Expected End:  07/19/24            Patient will demonstrate independence in home program for support of progression       Start:  06/07/24    Expected End:  06/21/24            Patient will report pain of no more than 2/10 demonstrating a reduction of overall pain       Start:  06/07/24    Expected End:  07/19/24            Patient will show a significant change in LEFS (8 to 17) patient reported outcome tool to demonstrate subjective imporovement       Start:  06/07/24    Expected End:  07/19/24                Plan of care was  developed with input and agreement by the patient    Mirza Todd, PT

## 2024-06-10 ENCOUNTER — TREATMENT (OUTPATIENT)
Dept: PHYSICAL THERAPY | Facility: HOSPITAL | Age: 84
End: 2024-06-10
Payer: MEDICARE

## 2024-06-10 DIAGNOSIS — Z96.651 PRESENCE OF RIGHT ARTIFICIAL KNEE JOINT: ICD-10-CM

## 2024-06-10 PROCEDURE — 97110 THERAPEUTIC EXERCISES: CPT | Mod: GP

## 2024-06-10 ASSESSMENT — PAIN - FUNCTIONAL ASSESSMENT: PAIN_FUNCTIONAL_ASSESSMENT: 0-10

## 2024-06-10 ASSESSMENT — PAIN SCALES - GENERAL: PAINLEVEL_OUTOF10: 7

## 2024-06-10 NOTE — PROGRESS NOTES
Physical Therapy Treatment    Patient Name: Abraham Rodriguez  MRN: [id number]  Today's Date: 6/10/2024  Payor: MEDICARE / Plan: MEDICARE PART A AND B / Product Type: *No Product type* /   Time Calculation  Start Time: 0828  Stop Time: 0909  Time Calculation (min): 41 min        PT Therapeutic Procedures Time Entry  Therapeutic Exercise Time Entry: 41     Current Problem  1. Presence of right artificial knee joint  Follow Up In Physical Therapy        Problem List Items Addressed This Visit             ICD-10-CM       Musculoskeletal and Injuries    Presence of right artificial knee joint Z96.651       General  Reason for Referral: right knee pain s/p TKA 3/20/2024  Referred By: Dr. Arora    Subjective   Current Condition:   Same  Patient reports feeling extra sore today d/t weather. States HEP has been going well; reports minor soreness but states he works through it.     Performing HEP?: Yes    Precautions  Precautions  LE Weight Bearing Status: Weight Bearing as Tolerated  Medical Precautions: Fall precautions  Pain  Pain Assessment: 0-10  Pain Score: 7  Pain Type: Surgical pain  Pain Location: Knee  Pain Orientation: Right    Objective     R knee ext: 0 degrees  R knee flex: 123 degrees    Treatments:    Therapeutic Exercise  Therapeutic Exercise Activity 1: bike x5 min for warm up.  Therapeutic Exercise Activity 2: Knee flex and ext stretch on stairs 3x20 sec ea  Therapeutic Exercise Activity 3: 1x5 fang step ups using 6'' step  Therapeutic Exercise Activity 4: 2x10 heel raises  Therapeutic Exercise Activity 5: 2x10 fang HS curls  Therapeutic Exercise Activity 6: 2x10 mini squats  Therapeutic Exercise Activity 7: 2x10 fang hip abd  Therapeutic Exercise Activity 8: 2x10 fang hip ext  Therapeutic Exercise Activity 9: 2x10 QS (prior to knee ext measurement)  Therapeutic Exercise Activity 10: 2x10 heel slides w strap (prior to performing knee flex measurement)  Therapeutic Exercise Activity 11: 2x10 fang SLR    EDUCATION:    Individual(s) Educated: Patient  Education Provided: HEP  Handout(s) Provided: Scanned into chart  Home Program: same as previous  Risk and Benefits Discussed with Patient/Caregiver/Other: Yes   Patient/Caregiver Demonstrated Understanding: Yes   Patient Response to Education: Patient/Caregiver verbalized understanding of information, Patient/Caregiver performed return demonstration of exercises/activities, and Patient/Caregiver asked appropriate questions    Assessment:   Pt tolerated session well. Pt given intermittent rest breaks to allow knee to rest and to relieve pain. Some exercises adjusted as needed to accommodate for pt back pain. Pt cont to demo knee ROM and strength deficits, as well as increased pain. Pt requires cont skilled PT intervention to address deficits and progress towards goals.     Plan: Continue with POC.   OP PT Plan  Treatment/Interventions: Education/ Instruction, Gait training, Manual therapy, Neuromuscular re-education, Therapeutic activities, Therapeutic exercises  PT Plan: Skilled PT  PT Frequency: 2 times per week  Duration: 6 weeks  Onset Date: 03/20/24  Certification Period Start Date: 06/07/24  Certification Period End Date: 07/19/24  Number of Treatments Authorized: 2 of 12  Rehab Potential: Good  Plan of Care Agreement: Patient    Goals:  Active       PT Problem       Patient will maintain single leg stand on each leg for 10 sec with no upper extremity support.       Start:  06/07/24    Expected End:  07/19/24            Patient will be able to maintain dynamic standing balance during walking over obstacles       Start:  06/07/24    Expected End:  07/19/24            Patient will ambulate with normal gait pattern with no device (Progressing)       Start:  06/07/24    Expected End:  07/19/24            Patient will ambulate up and down a curb/single step with independent using no device       Start:  06/07/24    Expected End:  07/19/24            Patient will achieve right knee  ROM of  0-120 degrees  (Progressing)       Start:  06/07/24    Expected End:  07/19/24            Patient will achieve bilateral hip abduction strength of at least 4+/5       Start:  06/07/24    Expected End:  07/19/24            Patient will achieve bilateral knee flexion strength of at least 4+/5 (Progressing)       Start:  06/07/24    Expected End:  07/19/24            Patient will achieve bilateral knee extension strength of at least 4+/5 (Progressing)       Start:  06/07/24    Expected End:  07/19/24            Patient will demonstrate independence in home program for support of progression       Start:  06/07/24    Expected End:  06/21/24            Patient will report pain of no more than 2/10 demonstrating a reduction of overall pain (Progressing)       Start:  06/07/24    Expected End:  07/19/24            Patient will show a significant change in LEFS (8 to 17) patient reported outcome tool to demonstrate subjective imporovement (Progressing)       Start:  06/07/24    Expected End:  07/19/24                 Patricia Smith, PT

## 2024-06-12 ENCOUNTER — APPOINTMENT (OUTPATIENT)
Dept: PRIMARY CARE | Facility: CLINIC | Age: 84
End: 2024-06-12
Payer: COMMERCIAL

## 2024-06-12 DIAGNOSIS — I10 BENIGN ESSENTIAL HYPERTENSION: Primary | ICD-10-CM

## 2024-06-12 DIAGNOSIS — E78.5 DYSLIPIDEMIA: ICD-10-CM

## 2024-06-12 DIAGNOSIS — I10 PRIMARY HYPERTENSION: ICD-10-CM

## 2024-06-12 DIAGNOSIS — J44.9 COPD, MILD (MULTI): ICD-10-CM

## 2024-06-13 ENCOUNTER — APPOINTMENT (OUTPATIENT)
Dept: PRIMARY CARE | Facility: CLINIC | Age: 84
End: 2024-06-13
Payer: COMMERCIAL

## 2024-06-14 ENCOUNTER — TREATMENT (OUTPATIENT)
Dept: PHYSICAL THERAPY | Facility: HOSPITAL | Age: 84
End: 2024-06-14
Payer: MEDICARE

## 2024-06-14 DIAGNOSIS — Z96.651 PRESENCE OF RIGHT ARTIFICIAL KNEE JOINT: ICD-10-CM

## 2024-06-14 PROCEDURE — 97140 MANUAL THERAPY 1/> REGIONS: CPT | Mod: GP,CQ

## 2024-06-14 PROCEDURE — 97110 THERAPEUTIC EXERCISES: CPT | Mod: GP,CQ

## 2024-06-14 ASSESSMENT — PAIN - FUNCTIONAL ASSESSMENT: PAIN_FUNCTIONAL_ASSESSMENT: 0-10

## 2024-06-14 ASSESSMENT — PAIN DESCRIPTION - DESCRIPTORS: DESCRIPTORS: ACHING;TIGHTNESS

## 2024-06-14 ASSESSMENT — PAIN SCALES - GENERAL: PAINLEVEL_OUTOF10: 7

## 2024-06-14 NOTE — PROGRESS NOTES
Physical Therapy Treatment    Patient Name: Abraham Rodriguez  MRN: [id number]  Today's Date: 6/14/2024  Time Calculation  Start Time: 0910  Stop Time: 0953  Time Calculation (min): 43 min      Current Problem  1. Presence of right artificial knee joint  Follow Up In Physical Therapy          General  Reason for Referral: right knee pain s/p TKA 3/20/2024  Referred By: Dr. Arora    Subjective   Current Condition:   Better  Patient reports he has some pain and stiffness in his R knee/ankle but he knows it will take some time.    Performing HEP?: Yes    Precautions  Precautions  STEADI Fall Risk Score (The score of 4 or more indicates an increased risk of falling): 4  LE Weight Bearing Status: Weight Bearing as Tolerated  Medical Precautions: Fall precautions    Pain  Pain Assessment: 0-10  Pain Score: 7  Pain Type: Surgical pain  Pain Location: Knee (R ankle pain)  Pain Orientation: Right  Pain Descriptors: Aching, Tightness    Objective   KNEE  Observation    Pt. Has good ROM in his R Knee, but he lascks flexibility in surrounding muscles, and presents with weak hips, ankles, quads    Knee AROM   8 -122 degrees R Knee  Gait    With straight cane slight limp on R  Flexibility    Lacks HS, gastroc , ankle flexibility    Treatments:    Therapeutic Exercise  Therapeutic Exercise Performed: Yes  Therapeutic Exercise Activity 1: bike x5 min for warm up.  Therapeutic Exercise Activity 2: Knee flex and ext stretch on stairs 3x20 sec ea  Therapeutic Exercise Activity 3: step ups x 10 6 inch step  Therapeutic Exercise Activity 4: 2x10 heel raises  Therapeutic Exercise Activity 5: star trac 15 # knee ext, 25 # knee flex x 15 ea.  Therapeutic Exercise Activity 7: R hip 3 way x 10 ea.  Therapeutic Exercise Activity 9: 2x10 QS (prior to knee ext measurement)  Therapeutic Exercise Activity 10: 2x10 heel slides w strap (prior to performing knee flex measurement)  Therapeutic Exercise Activity 11: 2x10 fang SLR    Manual Therapy  Manual  Therapy Performed: Yes  Manual Therapy Activity 1: patella mobes sup/inf, mede/lat to improve ROM  Manual Therapy Activity 2: gentle occilsations to decrease pain and muscle guarding  Manual Therapy Activity 3: manual knee ext    EDUCATION:   Outpatient Education  Individual(s) Educated: Patient  Education Provided: Home Exercise Program  Patient/Caregiver Demonstrated Understanding: yes    Assessment: Pt. Able to complete all exercises without too much pain, and demonstrated good ROM in flexion, but presented with quad lag with SLR, and lacks flexibility in HS, quad, and ankles.  PT Assessment  PT Assessment Results: Decreased strength, Decreased range of motion, Impaired balance, Decreased mobility, Pain  Rehab Prognosis: Good  Evaluation/Treatment Tolerance: Patient tolerated treatment well    Plan: continue with PT POC with focus on R knee ROM and strength, as well as gait and ankle/hip ROM and strength.  OP PT Plan  Treatment/Interventions: Education/ Instruction  PT Plan: Skilled PT  PT Frequency: 2 times per week  Duration: 6 weeks  Onset Date: 03/20/24  Certification Period Start Date: 06/07/24  Certification Period End Date: 07/19/24  Number of Treatments Authorized: 3 of 12  Rehab Potential: Good  Plan of Care Agreement: Patient    Goals:  Active       PT Problem       Patient will maintain single leg stand on each leg for 10 sec with no upper extremity support.       Start:  06/07/24    Expected End:  07/19/24            Patient will be able to maintain dynamic standing balance during walking over obstacles       Start:  06/07/24    Expected End:  07/19/24            Patient will ambulate with normal gait pattern with no device (Progressing)       Start:  06/07/24    Expected End:  07/19/24            Patient will ambulate up and down a curb/single step with independent using no device       Start:  06/07/24    Expected End:  07/19/24            Patient will achieve right knee ROM of  0-120 degrees   (Progressing)       Start:  06/07/24    Expected End:  07/19/24            Patient will achieve bilateral hip abduction strength of at least 4+/5       Start:  06/07/24    Expected End:  07/19/24            Patient will achieve bilateral knee flexion strength of at least 4+/5 (Progressing)       Start:  06/07/24    Expected End:  07/19/24            Patient will achieve bilateral knee extension strength of at least 4+/5 (Progressing)       Start:  06/07/24    Expected End:  07/19/24            Patient will demonstrate independence in home program for support of progression       Start:  06/07/24    Expected End:  06/21/24            Patient will report pain of no more than 2/10 demonstrating a reduction of overall pain (Progressing)       Start:  06/07/24    Expected End:  07/19/24            Patient will show a significant change in LEFS (8 to 17) patient reported outcome tool to demonstrate subjective imporovement (Progressing)       Start:  06/07/24    Expected End:  07/19/24                 Candy Zuniga, PTA   DISPLAY PLAN FREE TEXT

## 2024-06-18 ENCOUNTER — TREATMENT (OUTPATIENT)
Dept: PHYSICAL THERAPY | Facility: HOSPITAL | Age: 84
End: 2024-06-18
Payer: MEDICARE

## 2024-06-18 DIAGNOSIS — Z96.651 PRESENCE OF RIGHT ARTIFICIAL KNEE JOINT: ICD-10-CM

## 2024-06-18 PROCEDURE — 97110 THERAPEUTIC EXERCISES: CPT | Mod: GP,CQ

## 2024-06-18 PROCEDURE — 97140 MANUAL THERAPY 1/> REGIONS: CPT | Mod: GP,CQ

## 2024-06-18 ASSESSMENT — PAIN - FUNCTIONAL ASSESSMENT: PAIN_FUNCTIONAL_ASSESSMENT: 0-10

## 2024-06-18 ASSESSMENT — PAIN DESCRIPTION - DESCRIPTORS: DESCRIPTORS: ACHING;TIGHTNESS

## 2024-06-18 ASSESSMENT — PAIN SCALES - GENERAL: PAINLEVEL_OUTOF10: 7

## 2024-06-18 NOTE — PROGRESS NOTES
Physical Therapy Treatment    Patient Name: Abraham Rodriguez  MRN: [id number]  Today's Date: 6/18/2024  Time Calculation  Start Time: 1007  Stop Time: 1048  Time Calculation (min): 41 min  Current Problem  1. Presence of right artificial knee joint  Follow Up In Physical Therapy          General  Reason for Referral: right knee pain s/p TKA 3/20/2024  Referred By: Dr. Arora    Subjective   Current Condition:   Better  Patient reports his knee does not hurt much, his painis in his R ankle, and his lateral R LE.    Performing HEP?: Yes    Precautions  Precautions  LE Weight Bearing Status: Weight Bearing as Tolerated  Medical Precautions: Fall precautions    Pain  Pain Assessment: 0-10  Pain Score: 7  Pain Type: Surgical pain  Pain Location: Knee  Pain Orientation: Right  Pain Descriptors: Aching, Tightness    Objective   KNEE  Observation   Pt. Is improving with stretching program.  Knee Palpation/Joint Mobility   Pain and tightness present in R HS, ITband  Knee PROM   6-120   Gait    With straight cane decreased step/stride length, flexed posture.  Flexibility   Limited greatly in HS, IT Band, gastroc, achilles    Treatments:    Therapeutic Exercise  Therapeutic Exercise Performed: Yes  Therapeutic Exercise Activity 1: seated stepper Lv 5 x 5 mi8n.  Therapeutic Exercise Activity 2: Knee flex and ext stretch on stairs 3x20 sec ea  Therapeutic Exercise Activity 3: step ups x 10 6 inch step  Therapeutic Exercise Activity 4: 2x10 heel raises  Therapeutic Exercise Activity 5: star trac 15 # knee ext, 25 # knee flex x 15 ea.  Therapeutic Exercise Activity 7: R hip 3 way x 10 ea.  Therapeutic Exercise Activity 8: gastroc stretch x 1 min.  Therapeutic Exercise Activity 9: 2x10 QS (prior to knee ext measurement)    Manual Therapy  Manual Therapy Performed: Yes  Manual Therapy Activity 1: patella mobes sup/inf, mede/lat to improve ROM  Manual Therapy Activity 2: gentle occilsations to decrease pain and muscle guarding  Manual  Therapy Activity 3: manual knee ext  Manual Therapy Activity 4: DTM to R IT band to decrease pain.    Assessment:Pt. Able to tolerate all exercises as well as manual therapy, and responded well to manual LE stretching and ROM. Pt. Encouraged to continue progress at home and work diligently for better outcomes.  PT Assessment  PT Assessment Results: Decreased strength, Decreased range of motion, Impaired balance, Decreased mobility, Pain  Rehab Prognosis: Good  Evaluation/Treatment Tolerance: Patient tolerated treatment well, Patient limited by pain    Plan:continue with PT POC with focus on strengthening of R LE along with stretching of tight musculature, as muscle tightness is affecting his progress in therapy.  OP PT Plan  PT Plan: Skilled PT  PT Frequency: 2 times per week  Duration: 6 weeks  Onset Date: 03/20/24  Certification Period Start Date: 06/07/24  Certification Period End Date: 07/19/24  Number of Treatments Authorized: 4 of 12  Rehab Potential: Good  Plan of Care Agreement: Patient    Goals:  Active       PT Problem       Patient will maintain single leg stand on each leg for 10 sec with no upper extremity support.       Start:  06/07/24    Expected End:  07/19/24            Patient will be able to maintain dynamic standing balance during walking over obstacles       Start:  06/07/24    Expected End:  07/19/24            Patient will ambulate with normal gait pattern with no device (Progressing)       Start:  06/07/24    Expected End:  07/19/24            Patient will ambulate up and down a curb/single step with independent using no device       Start:  06/07/24    Expected End:  07/19/24            Patient will achieve right knee ROM of  0-120 degrees  (Progressing)       Start:  06/07/24    Expected End:  07/19/24            Patient will achieve bilateral hip abduction strength of at least 4+/5       Start:  06/07/24    Expected End:  07/19/24            Patient will achieve bilateral knee flexion  strength of at least 4+/5 (Progressing)       Start:  06/07/24    Expected End:  07/19/24            Patient will achieve bilateral knee extension strength of at least 4+/5 (Progressing)       Start:  06/07/24    Expected End:  07/19/24            Patient will demonstrate independence in home program for support of progression       Start:  06/07/24    Expected End:  06/21/24            Patient will report pain of no more than 2/10 demonstrating a reduction of overall pain (Progressing)       Start:  06/07/24    Expected End:  07/19/24            Patient will show a significant change in LEFS (8 to 17) patient reported outcome tool to demonstrate subjective imporovement (Progressing)       Start:  06/07/24    Expected End:  07/19/24                 Candy Zuniga, PTA

## 2024-06-21 ENCOUNTER — TREATMENT (OUTPATIENT)
Dept: PHYSICAL THERAPY | Facility: HOSPITAL | Age: 84
End: 2024-06-21
Payer: MEDICARE

## 2024-06-21 DIAGNOSIS — Z96.651 PRESENCE OF RIGHT ARTIFICIAL KNEE JOINT: ICD-10-CM

## 2024-06-21 PROCEDURE — 97140 MANUAL THERAPY 1/> REGIONS: CPT | Mod: GP,CQ

## 2024-06-21 PROCEDURE — 97110 THERAPEUTIC EXERCISES: CPT | Mod: GP,CQ

## 2024-06-21 ASSESSMENT — PAIN DESCRIPTION - DESCRIPTORS: DESCRIPTORS: ACHING;TIGHTNESS

## 2024-06-21 ASSESSMENT — PAIN - FUNCTIONAL ASSESSMENT: PAIN_FUNCTIONAL_ASSESSMENT: 0-10

## 2024-06-21 ASSESSMENT — PAIN SCALES - GENERAL: PAINLEVEL_OUTOF10: 7

## 2024-06-21 NOTE — PROGRESS NOTES
Physical Therapy Treatment    Patient Name: Abraham Rodriguez  MRN: [id number]  Today's Date: 6/21/2024  Time Calculation  Start Time: 1036  Stop Time: 1124  Time Calculation (min): 48 min    Current Problem  1. Presence of right artificial knee joint  Follow Up In Physical Therapy          General  Reason for Referral: right knee pain s/p TKA 3/20/2024  Referred By: Dr. Arora    Subjective   Current Condition:   Same  Patient reports he has some relief after therapy, but the pain always returns. Pt.states he is compliant with HEP but nothing helps.    Performing HEP?: Partially    Precautions  Precautions  LE Weight Bearing Status: Weight Bearing as Tolerated  Medical Precautions: Fall precautions    Pain  Pain Assessment: 0-10  0-10 (Numeric) Pain Score: 7  Pain Type: Surgical pain  Pain Location: Knee  Pain Orientation: Right  Pain Descriptors: Aching, Tightness    Objective   KNEE  Knee Palpation/Joint Mobility   Pain with palpation R IT band origin area  Knee AROM   AAROM  degrees R knee    Gait   With straight cane festinating  Flexibility   Limited in HS, quads, IT bands, LB ext.  Treatments:    Therapeutic Exercise  Therapeutic Exercise Performed: Yes  Therapeutic Exercise Activity 1: seated stepper Lv 5 x 5 mi8n.  Therapeutic Exercise Activity 2: Knee flex and ext stretch on stairs 3x20 sec ea  Therapeutic Exercise Activity 3: step ups x 10 6 inch step  Therapeutic Exercise Activity 5: star trac 15 # knee ext, 25 # knee flex x 15 ea.  Therapeutic Exercise Activity 6: STS x 10 focused on using R LE  Therapeutic Exercise Activity 8: gastroc stretch x 1 min.  Therapeutic Exercise Activity 9: QS x 10  Therapeutic Exercise Activity 10: 2x10 heel slides w strap  Therapeutic Exercise Activity 11: QS with SLR x 10    Manual Therapy  Manual Therapy Performed: Yes  Manual Therapy Activity 2: gentle occilsations to decrease pain and muscle guarding  Manual Therapy Activity 3: manual knee ext  Manual Therapy Activity  4: DTM to R IT band to decrease pain.    Assessment: Pt. Able to tolerate exercises, but stretching was painful although he did tolerate it. Pt. Continues to c/o LBP along with R quad and IT band pain and tightness of all surrounding muscles.  PT Assessment  PT Assessment Results: Decreased strength, Decreased range of motion, Impaired balance, Decreased mobility, Pain  Rehab Prognosis: Good  Evaluation/Treatment Tolerance: Patient tolerated treatment well    Plan:continue with PT POC and advance as tolerated. Focus on strengthening and stretching of R knee and surrounding musculature for return to normal functional activities.  OP PT Plan  PT Plan: Skilled PT  PT Frequency: 2 times per week  Duration: 6 w2eeks  Onset Date: 03/20/24  Certification Period Start Date: 06/07/24  Certification Period End Date: 07/19/24  Number of Treatments Authorized: 5 of 12  Rehab Potential: Good  Plan of Care Agreement: Patient    Goals:  Active       PT Problem       Patient will maintain single leg stand on each leg for 10 sec with no upper extremity support.       Start:  06/07/24    Expected End:  07/19/24            Patient will be able to maintain dynamic standing balance during walking over obstacles       Start:  06/07/24    Expected End:  07/19/24            Patient will ambulate with normal gait pattern with no device (Progressing)       Start:  06/07/24    Expected End:  07/19/24            Patient will ambulate up and down a curb/single step with independent using no device       Start:  06/07/24    Expected End:  07/19/24            Patient will achieve right knee ROM of  0-120 degrees  (Progressing)       Start:  06/07/24    Expected End:  07/19/24            Patient will achieve bilateral hip abduction strength of at least 4+/5       Start:  06/07/24    Expected End:  07/19/24            Patient will achieve bilateral knee flexion strength of at least 4+/5 (Progressing)       Start:  06/07/24    Expected End:   07/19/24            Patient will achieve bilateral knee extension strength of at least 4+/5 (Progressing)       Start:  06/07/24    Expected End:  07/19/24            Patient will demonstrate independence in home program for support of progression       Start:  06/07/24    Expected End:  06/21/24            Patient will report pain of no more than 2/10 demonstrating a reduction of overall pain (Progressing)       Start:  06/07/24    Expected End:  07/19/24            Patient will show a significant change in LEFS (8 to 17) patient reported outcome tool to demonstrate subjective imporovement (Progressing)       Start:  06/07/24    Expected End:  07/19/24                 Candy Zuniga, PTA

## 2024-06-25 ENCOUNTER — DOCUMENTATION (OUTPATIENT)
Dept: PHYSICAL THERAPY | Facility: HOSPITAL | Age: 84
End: 2024-06-25
Payer: COMMERCIAL

## 2024-06-25 ENCOUNTER — APPOINTMENT (OUTPATIENT)
Dept: PHYSICAL THERAPY | Facility: HOSPITAL | Age: 84
End: 2024-06-25
Payer: MEDICARE

## 2024-06-25 NOTE — PROGRESS NOTES
Physical Therapy                 Therapy Communication Note    Patient Name: Abraham Rodriguez  MRN: [id number]  Today's Date: 6/25/2024     Discipline: Physical Therapy    Missed Visit Reason:      Missed Time: Cancel today's appointment and rescheduled for Wed 6/26.    Comment:

## 2024-06-26 ENCOUNTER — DOCUMENTATION (OUTPATIENT)
Dept: PHYSICAL THERAPY | Facility: HOSPITAL | Age: 84
End: 2024-06-26
Payer: COMMERCIAL

## 2024-06-26 NOTE — PROGRESS NOTES
Physical Therapy                 Therapy Communication Note    Patient Name: Abraham Rodriguez  MRN: [id number]  Today's Date: 6/26/2024     Discipline: Physical Therapy    Missed Time: No Show    Comment: pt did not show for appointment, pt is scheduled for another appointment on 6/28/2024.

## 2024-06-28 ENCOUNTER — TREATMENT (OUTPATIENT)
Dept: PHYSICAL THERAPY | Facility: HOSPITAL | Age: 84
End: 2024-06-28
Payer: MEDICARE

## 2024-06-28 DIAGNOSIS — Z96.651 PRESENCE OF RIGHT ARTIFICIAL KNEE JOINT: ICD-10-CM

## 2024-06-28 PROCEDURE — 97110 THERAPEUTIC EXERCISES: CPT | Mod: GP,CQ

## 2024-06-28 PROCEDURE — 97140 MANUAL THERAPY 1/> REGIONS: CPT | Mod: GP,CQ

## 2024-06-28 ASSESSMENT — PAIN DESCRIPTION - DESCRIPTORS: DESCRIPTORS: ACHING;TIGHTNESS

## 2024-06-28 ASSESSMENT — PAIN SCALES - GENERAL: PAINLEVEL_OUTOF10: 7

## 2024-06-28 ASSESSMENT — PAIN - FUNCTIONAL ASSESSMENT: PAIN_FUNCTIONAL_ASSESSMENT: 0-10

## 2024-06-28 NOTE — PROGRESS NOTES
Physical Therapy Treatment    Patient Name: Abraham Rodriguez  MRN: [id number]  Today's Date: 6/28/2024  Time Calculation  Start Time: 1035  Stop Time: 1115  Time Calculation (min): 40 min    Current Problem  1. Presence of right artificial knee joint  Follow Up In Physical Therapy          General  Reason for Referral: right knee pain s/p TKA 3/20/2024  Referred By: Dr. Arora    Subjective   Current Condition:   Better  Patient reports pain decreases after therapy, but does not stay. Pain today is at 7 in R knee.    Performing HEP?: Yes    Precautions  Precautions  LE Weight Bearing Status: Weight Bearing as Tolerated  Medical Precautions: Fall precautions    Pain  Pain Assessment: 0-10  0-10 (Numeric) Pain Score: 7  Pain Type: Surgical pain  Pain Location: Knee  Pain Orientation: Right  Pain Descriptors: Aching, Tightness    Objective   KNEE  Knee AROM   WFL  Gait   With cane  slightly more upright today  Flexibility   Limited in HS/ Gastroc B  Treatments:    Therapeutic Exercise  Therapeutic Exercise Performed: Yes  Therapeutic Exercise Activity 1: bike seat 8 LV 3 x 5 mi.  Therapeutic Exercise Activity 2: Knee flex and ext stretch on stairs 3x20 sec ea  Therapeutic Exercise Activity 3: step ups x 10 6 inch step  Therapeutic Exercise Activity 4: 2x10 heel raises  Therapeutic Exercise Activity 5: star trac 15 # knee ext, 25 # knee flex 2 x 15 ea.  Therapeutic Exercise Activity 6: STS x 10 focused on using R LE  Therapeutic Exercise Activity 7: R hip 3 way with 1.5# x 15  Therapeutic Exercise Activity 8: gastroc stretch x 1 min.  Therapeutic Exercise Activity 11: QS with SLR x 10    Manual Therapy  Manual Therapy Performed: Yes  Manual Therapy Activity 1: patella mobes sup/inf, mede/lat to improve ROM  Manual Therapy Activity 2: gentle occilsations to decrease pain and muscle guarding  Manual Therapy Activity 3: manual knee ext  Manual Therapy Activity 4: DTM to R IT band to decrease pain.    Assessment:Pt. Was able to  tolerate all exercises and stretching with some c/o pain with HS/Gastroc stretch. Pt. Has experienced tight LE muscles for a long while now, and does not seem to get his legs to stretch out too easily. Pt. Did feel better after multiple manual techniques. Pain decreased to 5-6/10 at end of therapy  PT Assessment  PT Assessment Results: Decreased strength, Decreased range of motion, Impaired balance, Decreased mobility, Pain  Rehab Prognosis: Good  Evaluation/Treatment Tolerance: Patient tolerated treatment well    Plan:continue with PT POC with focus on strengthening of R LE and pain control for return to normal functional activies  OP PT Plan  PT Plan: Skilled PT  PT Frequency: 2 times per week  Duration: 6 weeks  Onset Date: 03/20/24  Certification Period Start Date: 06/07/24  Certification Period End Date: 07/19/24  Number of Treatments Authorized: 6 of 12  Rehab Potential: Good  Plan of Care Agreement: Parent    Goals:  Active       PT Problem       Patient will maintain single leg stand on each leg for 10 sec with no upper extremity support.       Start:  06/07/24    Expected End:  07/19/24            Patient will be able to maintain dynamic standing balance during walking over obstacles       Start:  06/07/24    Expected End:  07/19/24            Patient will ambulate with normal gait pattern with no device (Progressing)       Start:  06/07/24    Expected End:  07/19/24            Patient will ambulate up and down a curb/single step with independent using no device       Start:  06/07/24    Expected End:  07/19/24            Patient will achieve right knee ROM of  0-120 degrees  (Progressing)       Start:  06/07/24    Expected End:  07/19/24            Patient will achieve bilateral hip abduction strength of at least 4+/5       Start:  06/07/24    Expected End:  07/19/24            Patient will achieve bilateral knee flexion strength of at least 4+/5 (Progressing)       Start:  06/07/24    Expected End:   07/19/24            Patient will achieve bilateral knee extension strength of at least 4+/5 (Progressing)       Start:  06/07/24    Expected End:  07/19/24            Patient will demonstrate independence in home program for support of progression       Start:  06/07/24    Expected End:  06/21/24            Patient will report pain of no more than 2/10 demonstrating a reduction of overall pain (Progressing)       Start:  06/07/24    Expected End:  07/19/24            Patient will show a significant change in LEFS (8 to 17) patient reported outcome tool to demonstrate subjective imporovement (Progressing)       Start:  06/07/24    Expected End:  07/19/24                 Candy Zuniga, PTA

## 2024-07-01 ENCOUNTER — APPOINTMENT (OUTPATIENT)
Dept: PRIMARY CARE | Facility: CLINIC | Age: 84
End: 2024-07-01
Payer: COMMERCIAL

## 2024-07-01 DIAGNOSIS — I10 BENIGN ESSENTIAL HYPERTENSION: Primary | ICD-10-CM

## 2024-07-01 RX ORDER — IRBESARTAN 150 MG/1
150 TABLET ORAL DAILY
Qty: 90 TABLET | Refills: 3 | Status: SHIPPED | OUTPATIENT
Start: 2024-07-01

## 2024-07-03 ENCOUNTER — TREATMENT (OUTPATIENT)
Dept: PHYSICAL THERAPY | Facility: HOSPITAL | Age: 84
End: 2024-07-03
Payer: MEDICARE

## 2024-07-03 DIAGNOSIS — Z96.651 PRESENCE OF RIGHT ARTIFICIAL KNEE JOINT: Primary | ICD-10-CM

## 2024-07-03 PROCEDURE — 97110 THERAPEUTIC EXERCISES: CPT | Mod: GP,CQ

## 2024-07-03 PROCEDURE — 97140 MANUAL THERAPY 1/> REGIONS: CPT | Mod: GP,CQ

## 2024-07-03 ASSESSMENT — PAIN - FUNCTIONAL ASSESSMENT: PAIN_FUNCTIONAL_ASSESSMENT: 0-10

## 2024-07-03 ASSESSMENT — PAIN SCALES - GENERAL: PAINLEVEL_OUTOF10: 7

## 2024-07-03 NOTE — PROGRESS NOTES
"  Physical Therapy Treatment    Patient Name: Abraham Rodriguez  MRN: [id number]  Today's Date: 7/3/2024  Time Calculation  Start Time: 1648  Stop Time: 1730  Time Calculation (min): 42 min        PT Therapeutic Procedures Time Entry  Manual Therapy Time Entry: 10  Therapeutic Exercise Time Entry: 32                Current Problem  1. Presence of right artificial knee joint            General  Reason for Referral: right knee pain s/p TKA 3/20/2024  Referred By: Dr. Arora    Subjective   Current Condition:   Better  Patient reports he feels like he is improving, however, it has been slow.  States he is feeling a little more pain d/t the rainy weather.     Performing HEP?: Yes    Precautions  Precautions  LE Weight Bearing Status: Weight Bearing as Tolerated  Medical Precautions: Fall precautions  Post-Surgical Precautions: Right total knee precautions  Pain  Pain Assessment: 0-10  0-10 (Numeric) Pain Score: 7  Pain Location: Knee  Pain Orientation: Right    Objective   KNEE    Knee AROM: Pt lacking 5 degrees of R knee extension      Knee PROM     Treatments:    Therapeutic Exercise  Therapeutic Exercise Performed: Yes  Therapeutic Exercise Activity 1: Bike Level 3 x5'  Therapeutic Exercise Activity 2: Gastroc stretch 1'  Therapeutic Exercise Activity 3: Hamstring Stretch 1'  Therapeutic Exercise Activity 4: Step ups with opposite knee drive 6\" x10 R/L  Therapeutic Exercise Activity 5: star trac 15 # knee ext, 35 # knee flex 2 x 15 ea.  Therapeutic Exercise Activity 6: Lateral step up with opposite knee drive 6\" x20  Therapeutic Exercise Activity 7: split stance weight shifts x20  Therapeutic Exercise Activity 8: TKE Black x15  Therapeutic Exercise Activity 9: STS x 10 focused on using R LE         Manual Therapy  Manual Therapy Performed: Yes  Manual Therapy Activity 1: Patella mobs inf glides  Manual Therapy Activity 2: R knee extension mobs to increase mobility  Manual Therapy Activity 3: R knee extension manual stretch   "     EDUCATION:   Individual(s) Educated: Patient  Education Provided: HEP  Risk and Benefits Discussed with Patient/Caregiver/Other: Yes   Patient/Caregiver Demonstrated Understanding: Yes   Patient Response to Education: Patient/Caregiver verbalized understanding of information    Assessment: Pt limited with repetitions with CKC exercises d/t c/o overall fatigue. Pt able to tolerate manual techniques without c/o pain.  Noted improvement with R knee extension after completing manual techniques.  Pt able to complete session without increase with R knee pain level.    PT Assessment  PT Assessment Results: Decreased strength, Decreased range of motion, Impaired balance, Decreased mobility, Pain  Rehab Prognosis: Good    Plan: Continue to progress current POC as tolerated to facilitate ability to perform functional activities.   OP PT Plan  PT Plan: Skilled PT  PT Frequency: 2 times per week  Duration: 6 weeks  Onset Date: 03/20/24  Certification Period Start Date: 06/07/24  Certification Period End Date: 07/19/24  Number of Treatments Authorized: 7 of 12    Goals:  Active       PT Problem       Patient will maintain single leg stand on each leg for 10 sec with no upper extremity support.       Start:  06/07/24    Expected End:  07/19/24            Patient will be able to maintain dynamic standing balance during walking over obstacles       Start:  06/07/24    Expected End:  07/19/24            Patient will ambulate with normal gait pattern with no device (Progressing)       Start:  06/07/24    Expected End:  07/19/24            Patient will ambulate up and down a curb/single step with independent using no device       Start:  06/07/24    Expected End:  07/19/24            Patient will achieve right knee ROM of  0-120 degrees  (Progressing)       Start:  06/07/24    Expected End:  07/19/24            Patient will achieve bilateral hip abduction strength of at least 4+/5       Start:  06/07/24    Expected End:  07/19/24             Patient will achieve bilateral knee flexion strength of at least 4+/5 (Progressing)       Start:  06/07/24    Expected End:  07/19/24            Patient will achieve bilateral knee extension strength of at least 4+/5 (Progressing)       Start:  06/07/24    Expected End:  07/19/24            Patient will demonstrate independence in home program for support of progression       Start:  06/07/24    Expected End:  06/21/24            Patient will report pain of no more than 2/10 demonstrating a reduction of overall pain (Progressing)       Start:  06/07/24    Expected End:  07/19/24            Patient will show a significant change in LEFS (8 to 17) patient reported outcome tool to demonstrate subjective imporovement (Progressing)       Start:  06/07/24    Expected End:  07/19/24                 Jorge Shin, PTA

## 2024-07-08 ENCOUNTER — TREATMENT (OUTPATIENT)
Dept: PHYSICAL THERAPY | Facility: HOSPITAL | Age: 84
End: 2024-07-08
Payer: MEDICARE

## 2024-07-08 DIAGNOSIS — Z96.651 PRESENCE OF RIGHT ARTIFICIAL KNEE JOINT: ICD-10-CM

## 2024-07-08 PROCEDURE — 97140 MANUAL THERAPY 1/> REGIONS: CPT | Mod: GP,CQ

## 2024-07-08 PROCEDURE — 97110 THERAPEUTIC EXERCISES: CPT | Mod: GP,CQ

## 2024-07-08 ASSESSMENT — PAIN - FUNCTIONAL ASSESSMENT: PAIN_FUNCTIONAL_ASSESSMENT: 0-10

## 2024-07-08 ASSESSMENT — PAIN SCALES - GENERAL: PAINLEVEL_OUTOF10: 5 - MODERATE PAIN

## 2024-07-08 NOTE — PROGRESS NOTES
"  Physical Therapy Treatment    Patient Name: Abraham Rodriguez  MRN: [id number]  Today's Date: 7/8/2024  Time Calculation  Start Time: 1000  Stop Time: 1041  Time Calculation (min): 41 min        PT Therapeutic Procedures Time Entry  Manual Therapy Time Entry: 12  Therapeutic Exercise Time Entry: 29                Current Problem  1. Presence of right artificial knee joint  Follow Up In Physical Therapy          General  Reason for Referral: right knee pain s/p TKA 3/20/2024  Referred By: Dr. Arora    Subjective   Current Condition:   Better  Patient reports no adverse response to last PT session.  States his knee is feeling better since     Performing HEP?: Yes    Precautions     Pain  Pain Assessment: 0-10  0-10 (Numeric) Pain Score: 5 - Moderate pain  Pain Location: Knee  Pain Orientation: Right    Objective   KNEE       Knee AROM     Knee PROM         Treatments:    Therapeutic Exercise  Therapeutic Exercise Performed: Yes  Therapeutic Exercise Activity 1: Nu Step Level 3 x5' seat 10  Therapeutic Exercise Activity 2: Gastroc stretch 1'  Therapeutic Exercise Activity 4: Step ups with opposite knee drive 6\" x10 R/L  Therapeutic Exercise Activity 5: star trac 15 # knee ext, 35 # knee flex 2 x 15 ea.  Therapeutic Exercise Activity 6: Lateral step up with opposite knee drive 6\" x10  Therapeutic Exercise Activity 7: Standing Hip ABD Yellow x10 R/L  Therapeutic Exercise Activity 8: TKE Black x15  Therapeutic Exercise Activity 9: STS x 10         Manual Therapy  Manual Therapy Performed: Yes  Manual Therapy Activity 1: Patella mobs inf glides  Manual Therapy Activity 2: R knee extension mobs to increase mobility  Manual Therapy Activity 3: R knee extension manual stretch  Manual Therapy Activity 4: STM R quads to decrease mm tension                   EDUCATION:   Individual(s) Educated: Patient  Education Provided:   Risk and Benefits Discussed with Patient/Caregiver/Other:   Patient/Caregiver Demonstrated Understanding: "   Patient Response to Education:     Assessment: Noted significant tightness with palpation to the R quadriceps, with some improvement after STM. Pt requires cuing to extend his trunk during standing TE's with fair follow through demonstrated.   PT Assessment  PT Assessment Results: Decreased strength, Decreased range of motion, Impaired balance, Decreased mobility, Pain  Rehab Prognosis: Good  Continue to progress current POC as tolerated to facilitate ability to perform functional activities.   OP PT Plan  PT Plan: Skilled PT  PT Frequency: 2 times per week  Duration: 6 weeks  Onset Date: 03/20/24  Certification Period Start Date: 06/07/24  Certification Period End Date: 07/19/24  Number of Treatments Authorized: 8 of 12    Goals:  Active       PT Problem       Patient will maintain single leg stand on each leg for 10 sec with no upper extremity support.       Start:  06/07/24    Expected End:  07/19/24            Patient will be able to maintain dynamic standing balance during walking over obstacles       Start:  06/07/24    Expected End:  07/19/24            Patient will ambulate with normal gait pattern with no device (Progressing)       Start:  06/07/24    Expected End:  07/19/24            Patient will ambulate up and down a curb/single step with independent using no device       Start:  06/07/24    Expected End:  07/19/24            Patient will achieve right knee ROM of  0-120 degrees  (Progressing)       Start:  06/07/24    Expected End:  07/19/24            Patient will achieve bilateral hip abduction strength of at least 4+/5       Start:  06/07/24    Expected End:  07/19/24            Patient will achieve bilateral knee flexion strength of at least 4+/5 (Progressing)       Start:  06/07/24    Expected End:  07/19/24            Patient will achieve bilateral knee extension strength of at least 4+/5 (Progressing)       Start:  06/07/24    Expected End:  07/19/24            Patient will demonstrate  independence in home program for support of progression       Start:  06/07/24    Expected End:  06/21/24            Patient will report pain of no more than 2/10 demonstrating a reduction of overall pain (Progressing)       Start:  06/07/24    Expected End:  07/19/24            Patient will show a significant change in LEFS (8 to 17) patient reported outcome tool to demonstrate subjective imporovement (Progressing)       Start:  06/07/24    Expected End:  07/19/24                 Jorge Shin, PTA

## 2024-07-09 ENCOUNTER — APPOINTMENT (OUTPATIENT)
Dept: PRIMARY CARE | Facility: CLINIC | Age: 84
End: 2024-07-09
Payer: MEDICARE

## 2024-07-09 VITALS
OXYGEN SATURATION: 97 % | SYSTOLIC BLOOD PRESSURE: 133 MMHG | WEIGHT: 180 LBS | HEIGHT: 71 IN | DIASTOLIC BLOOD PRESSURE: 75 MMHG | HEART RATE: 90 BPM | BODY MASS INDEX: 25.2 KG/M2

## 2024-07-09 DIAGNOSIS — Z00.00 ROUTINE GENERAL MEDICAL EXAMINATION AT HEALTH CARE FACILITY: Primary | ICD-10-CM

## 2024-07-09 DIAGNOSIS — I50.32 CHRONIC HEART FAILURE WITH PRESERVED EJECTION FRACTION (MULTI): ICD-10-CM

## 2024-07-09 DIAGNOSIS — E78.5 DYSLIPIDEMIA: ICD-10-CM

## 2024-07-09 DIAGNOSIS — Z00.00 WELLNESS EXAMINATION: ICD-10-CM

## 2024-07-09 PROCEDURE — 1170F FXNL STATUS ASSESSED: CPT | Performed by: NURSE PRACTITIONER

## 2024-07-09 PROCEDURE — G0439 PPPS, SUBSEQ VISIT: HCPCS | Performed by: NURSE PRACTITIONER

## 2024-07-09 PROCEDURE — 3078F DIAST BP <80 MM HG: CPT | Performed by: NURSE PRACTITIONER

## 2024-07-09 PROCEDURE — 1036F TOBACCO NON-USER: CPT | Performed by: NURSE PRACTITIONER

## 2024-07-09 PROCEDURE — 3075F SYST BP GE 130 - 139MM HG: CPT | Performed by: NURSE PRACTITIONER

## 2024-07-09 PROCEDURE — 1160F RVW MEDS BY RX/DR IN RCRD: CPT | Performed by: NURSE PRACTITIONER

## 2024-07-09 PROCEDURE — 1158F ADVNC CARE PLAN TLK DOCD: CPT | Performed by: NURSE PRACTITIONER

## 2024-07-09 PROCEDURE — 1159F MED LIST DOCD IN RCRD: CPT | Performed by: NURSE PRACTITIONER

## 2024-07-09 PROCEDURE — 1123F ACP DISCUSS/DSCN MKR DOCD: CPT | Performed by: NURSE PRACTITIONER

## 2024-07-09 ASSESSMENT — PATIENT HEALTH QUESTIONNAIRE - PHQ9
SUM OF ALL RESPONSES TO PHQ9 QUESTIONS 1 AND 2: 0
1. LITTLE INTEREST OR PLEASURE IN DOING THINGS: NOT AT ALL
2. FEELING DOWN, DEPRESSED OR HOPELESS: NOT AT ALL

## 2024-07-09 ASSESSMENT — ENCOUNTER SYMPTOMS
OCCASIONAL FEELINGS OF UNSTEADINESS: 1
LOSS OF SENSATION IN FEET: 0
DEPRESSION: 0

## 2024-07-09 ASSESSMENT — ACTIVITIES OF DAILY LIVING (ADL)
GROCERY_SHOPPING: INDEPENDENT
DOING_HOUSEWORK: INDEPENDENT
BATHING: INDEPENDENT
MANAGING_FINANCES: INDEPENDENT
DRESSING: INDEPENDENT
TAKING_MEDICATION: INDEPENDENT

## 2024-07-09 NOTE — PROGRESS NOTES
"Subjective   Patient ID: Abraham Rodriguez is a 83 y.o. male who presents for Medicare Annual Wellness Visit Subsequent (Patient is here today for a medicare annual. ).    83 year old male here for annual medicare wellness  No acute concerns  Chroonic CAD, ASHD, CHF- treated by cardio Giselle Johnson. On BB and lasix PRN and Avapro    Prevention:  Colon Ca Screen: no fam HX. 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 Sees pulm Dr Cisneros q year  Prostate Ca Screen: not indicated.   CT Cardiac Score: no CV disease. Need FLP updated. Not on statin  Diabetes Screen: no diabetes. Due for fasting BS  Opthal: annual. Due on 3 weeks [facility]  Dental: some missing teeth. Q 6 mo visit, no acute concern  Exercise- PT post RTK  Diet: Breakfast, Dinner. Has always skipped lunch. Wt stable. Not losing weight  Nicotene- none  ETOH: none  Still drives              Review of Systems    Objective   /75   Pulse 90   Ht 1.803 m (5' 11\")   Wt 81.6 kg (180 lb)   SpO2 97%   BMI 25.10 kg/m²     Physical Exam  Constitutional:       Appearance: Normal appearance.   HENT:      Head: Normocephalic and atraumatic.   Neck:      Vascular: No carotid bruit.   Cardiovascular:      Rate and Rhythm: Normal rate and regular rhythm.      Pulses: Normal pulses.      Heart sounds: Normal heart sounds.   Pulmonary:      Effort: Pulmonary effort is normal.      Breath sounds: Normal breath sounds.   Abdominal:      General: Abdomen is flat. Bowel sounds are normal.      Palpations: Abdomen is soft.   Musculoskeletal:         General: Normal range of motion.      Cervical back: Normal range of motion and neck supple.   Lymphadenopathy:      Cervical: No cervical adenopathy.   Skin:     General: Skin is warm and dry.   Neurological:      General: No focal deficit present.      Mental Status: He is alert and oriented to person, place, and time. Mental status is at baseline.   Psychiatric:         Mood and Affect: Mood normal.        "  Behavior: Behavior normal.         Thought Content: Thought content normal.         Judgment: Judgment normal.         Assessment/Plan   Diagnoses and all orders for this visit:  Routine general medical examination at health care facility  -     1 Year Follow Up In Primary Care - Wellness Exam; Future  Dyslipidemia  Comments:  not on a statin  Orders:  -     Lipid Panel; Future  Wellness examination  -     CBC and Auto Differential; Future  -     Comprehensive Metabolic Panel; Future  Chronic heart failure with preserved ejection fraction (Multi)  Comments:  managed by cardio  Orders:  -     CBC and Auto Differential; Future  Other orders  -     Follow Up In Primary Care - Established  -     Follow Up In Primary Care - Established; Future

## 2024-07-11 ENCOUNTER — TREATMENT (OUTPATIENT)
Dept: PHYSICAL THERAPY | Facility: HOSPITAL | Age: 84
End: 2024-07-11
Payer: MEDICARE

## 2024-07-11 DIAGNOSIS — Z96.651 PRESENCE OF RIGHT ARTIFICIAL KNEE JOINT: ICD-10-CM

## 2024-07-11 PROCEDURE — 97110 THERAPEUTIC EXERCISES: CPT | Mod: GP,CQ

## 2024-07-11 ASSESSMENT — PAIN - FUNCTIONAL ASSESSMENT: PAIN_FUNCTIONAL_ASSESSMENT: 0-10

## 2024-07-11 ASSESSMENT — PAIN SCALES - GENERAL: PAINLEVEL_OUTOF10: 7

## 2024-07-11 NOTE — PROGRESS NOTES
"  Physical Therapy Treatment    Patient Name: Abraham Rodriguez  MRN: [id number]  Today's Date: 7/11/2024  Time Calculation  Start Time: 1001  Stop Time: 1041  Time Calculation (min): 40 min        PT Therapeutic Procedures Time Entry  Manual Therapy Time Entry: 12  Therapeutic Exercise Time Entry: 28                Current Problem  1. Presence of right artificial knee joint  Follow Up In Physical Therapy          General  Reason for Referral: right knee pain s/p TKA 3/20/2024  Referred By: Dr. Arora    Subjective   Current Condition:   Better  Patient reports it feels like it it looser around the kneecap and posterior/anterior thigh area.  States he is having more pain today d/t the dampness with the weather, states it feels much better when the sun is out.     Performing HEP?: Yes    Precautions  Precautions  LE Weight Bearing Status: Weight Bearing as Tolerated  Medical Precautions: Fall precautions  Post-Surgical Precautions: Right total knee precautions  Pain  Pain Assessment: 0-10  0-10 (Numeric) Pain Score: 7  Pain Location: Knee  Pain Orientation: Right    Objective   KNEE  Knee AROM: R Extension lacking 3 degrees        Treatments:    Therapeutic Exercise  Therapeutic Exercise Performed: Yes  Therapeutic Exercise Activity 1: SciFit Bike Level 4 5'  Therapeutic Exercise Activity 3: Airex 1/2 tandem 30\" R/L  Therapeutic Exercise Activity 4: Step ups with opposite knee drive 6\" x10 R/L  Therapeutic Exercise Activity 5: star trac 15 # knee ext, 35 # knee flex 2 x 15 ea.  Therapeutic Exercise Activity 7: Standing Hip ABD and Extension Yellow x10 R/L  Therapeutic Exercise Activity 8: TKE Black x15  Therapeutic Exercise Activity 9: STS x 10  Therapeutic Exercise Activity 10: TKE with HR Black x15         Manual Therapy  Manual Therapy Performed: Yes  Manual Therapy Activity 1: Patella mobs inf glides  Manual Therapy Activity 2: R knee extension mobs to increase mobility  Manual Therapy Activity 3: R knee extension manual " stretch  Manual Therapy Activity 4: STM R quads to decrease mm tension                   EDUCATION:   Individual(s) Educated: Patient  Education Provided:   Risk and Benefits Discussed with Patient/Caregiver/Other:   Patient/Caregiver Demonstrated Understanding:   Patient Response to Education:     Assessment: Pt limited with some of the standing exercises d/t chronic LBP. Pt demonstrating good improvement with R knee extension. Noted decreased mm tension in the vastus lateralis with STM performed today.   PT Assessment  PT Assessment Results: Decreased strength, Decreased range of motion, Impaired balance, Decreased mobility, Pain  Rehab Prognosis: Good    Plan: Continue to progress current POC as tolerated to facilitate ability to perform functional activities. OP PT Plan  PT Plan: Skilled PT  PT Frequency: 2 times per week  Duration: 6 weeks  Onset Date: 03/20/24  Certification Period Start Date: 06/07/24  Certification Period End Date: 07/19/24  Number of Treatments Authorized: 9 of 12    Goals:  Active       PT Problem       Patient will maintain single leg stand on each leg for 10 sec with no upper extremity support.       Start:  06/07/24    Expected End:  07/19/24            Patient will be able to maintain dynamic standing balance during walking over obstacles       Start:  06/07/24    Expected End:  07/19/24            Patient will ambulate with normal gait pattern with no device (Progressing)       Start:  06/07/24    Expected End:  07/19/24            Patient will ambulate up and down a curb/single step with independent using no device       Start:  06/07/24    Expected End:  07/19/24            Patient will achieve right knee ROM of  0-120 degrees  (Progressing)       Start:  06/07/24    Expected End:  07/19/24            Patient will achieve bilateral hip abduction strength of at least 4+/5       Start:  06/07/24    Expected End:  07/19/24            Patient will achieve bilateral knee flexion strength  of at least 4+/5 (Progressing)       Start:  06/07/24    Expected End:  07/19/24            Patient will achieve bilateral knee extension strength of at least 4+/5 (Progressing)       Start:  06/07/24    Expected End:  07/19/24            Patient will demonstrate independence in home program for support of progression       Start:  06/07/24    Expected End:  06/21/24            Patient will report pain of no more than 2/10 demonstrating a reduction of overall pain (Progressing)       Start:  06/07/24    Expected End:  07/19/24            Patient will show a significant change in LEFS (8 to 17) patient reported outcome tool to demonstrate subjective imporovement (Progressing)       Start:  06/07/24    Expected End:  07/19/24                 Jorge Shin, PTA

## 2024-07-15 ENCOUNTER — TREATMENT (OUTPATIENT)
Dept: PHYSICAL THERAPY | Facility: HOSPITAL | Age: 84
End: 2024-07-15
Payer: MEDICARE

## 2024-07-15 DIAGNOSIS — Z96.651 PRESENCE OF RIGHT ARTIFICIAL KNEE JOINT: ICD-10-CM

## 2024-07-15 PROCEDURE — 97110 THERAPEUTIC EXERCISES: CPT | Mod: GP | Performed by: PHYSICAL THERAPIST

## 2024-07-15 ASSESSMENT — PAIN SCALES - GENERAL: PAINLEVEL_OUTOF10: 7

## 2024-07-15 ASSESSMENT — PAIN - FUNCTIONAL ASSESSMENT: PAIN_FUNCTIONAL_ASSESSMENT: 0-10

## 2024-07-15 NOTE — PROGRESS NOTES
"  Physical Therapy Treatment    Patient Name: Abraham Rodriguez  MRN: [id number]  Today's Date: 7/15/2024  Time Calculation  Start Time: 1001  Stop Time: 1040  Time Calculation (min): 39 min        PT Therapeutic Procedures Time Entry  Therapeutic Exercise Time Entry: 39                Current Problem  1. Presence of right artificial knee joint  Follow Up In Physical Therapy        Problem List Items Addressed This Visit             ICD-10-CM    Presence of right artificial knee joint Z96.651       General  Reason for Referral: right knee pain s/p TKA 3/20/2024  Referred By: Dr. Arora    Subjective   Current Condition:   Better  Patient reports increased knee pain and stiffness today due to the weather.    Performing HEP?: Yes    Precautions  Precautions  LE Weight Bearing Status: Weight Bearing as Tolerated  Medical Precautions: Fall precautions  Post-Surgical Precautions: Right total knee precautions  Pain  Pain Assessment: 0-10  0-10 (Numeric) Pain Score: 7  Pain Location: Knee  Pain Orientation: Right    Objective     Treatments:  Therapeutic Exercise  Therapeutic Exercise Performed: Yes  Therapeutic Exercise Activity 1: SciFit Bike Level 4 5'  Therapeutic Exercise Activity 2: Gastroc stretch 1'  Therapeutic Exercise Activity 3: Airex 1/2 tandem 30\" R/L  Therapeutic Exercise Activity 4: Step ups with opposite knee drive 6\" x10 R/L  Therapeutic Exercise Activity 5: star trac 15 # knee ext, 35 # knee flex 2 x 15 ea.  Therapeutic Exercise Activity 7: Standing Hip ABD and Extension Yellow x10 R/L  Therapeutic Exercise Activity 9: STS x 10  Therapeutic Exercise Activity 10: TKE with HR Black x15  Therapeutic Exercise Activity 11: Hamstring Stretch 3x20\"       EDUCATION:   Individual(s) Educated: Patient  Education Provided: yes  Handout(s) Provided: Scanned into chart  Home Program: reviewed home exercise program   Risk and Benefits Discussed with Patient/Caregiver/Other: Yes   Patient/Caregiver Demonstrated Understanding: " Yes   Patient Response to Education: Patient/Caregiver verbalized understanding of information and Patient/Caregiver performed return demonstration of exercises/activities    Assessment: Patient demonstrated need for occasional seated rest breaks due to right knee pain. Patient appeared to have difficulty performing knee extension and demonstrated increased hamstring and gastroc tightness. Patient was encouraged to continue with the stretches and range of motion exercises. Patient verbalized good understanding.  PT Assessment  PT Assessment Results: Decreased strength, Decreased range of motion, Impaired balance, Decreased mobility, Pain  Rehab Prognosis: Good  Evaluation/Treatment Tolerance: Patient tolerated treatment well    Plan: Continue with POC. Progress range of motion, strength, and balance, as tolerated.  OP PT Plan  Treatment/Interventions: Education/ Instruction, Gait training, Manual therapy, Neuromuscular re-education, Therapeutic activities, Therapeutic exercises  PT Plan: Skilled PT  PT Frequency: 2 times per week  Duration: 6 weeks  Onset Date: 03/20/24  Certification Period Start Date: 06/07/24  Certification Period End Date: 07/19/24  Number of Treatments Authorized: 10 of 12  Rehab Potential: Good  Plan of Care Agreement: Patient    Goals:  Active       PT Problem       Patient will maintain single leg stand on each leg for 10 sec with no upper extremity support.       Start:  06/07/24    Expected End:  07/19/24            Patient will be able to maintain dynamic standing balance during walking over obstacles       Start:  06/07/24    Expected End:  07/19/24            Patient will ambulate with normal gait pattern with no device (Progressing)       Start:  06/07/24    Expected End:  07/19/24            Patient will ambulate up and down a curb/single step with independent using no device       Start:  06/07/24    Expected End:  07/19/24            Patient will achieve right knee ROM of  0-120  degrees  (Progressing)       Start:  06/07/24    Expected End:  07/19/24            Patient will achieve bilateral hip abduction strength of at least 4+/5       Start:  06/07/24    Expected End:  07/19/24            Patient will achieve bilateral knee flexion strength of at least 4+/5 (Progressing)       Start:  06/07/24    Expected End:  07/19/24            Patient will achieve bilateral knee extension strength of at least 4+/5 (Progressing)       Start:  06/07/24    Expected End:  07/19/24            Patient will demonstrate independence in home program for support of progression       Start:  06/07/24    Expected End:  06/21/24            Patient will report pain of no more than 2/10 demonstrating a reduction of overall pain (Progressing)       Start:  06/07/24    Expected End:  07/19/24            Patient will show a significant change in LEFS (8 to 17) patient reported outcome tool to demonstrate subjective imporovement (Progressing)       Start:  06/07/24    Expected End:  07/19/24                 Mirza Todd, PT

## 2024-07-17 ENCOUNTER — OFFICE VISIT (OUTPATIENT)
Dept: PAIN MEDICINE | Facility: CLINIC | Age: 84
End: 2024-07-17
Payer: MEDICARE

## 2024-07-17 VITALS
OXYGEN SATURATION: 97 % | HEART RATE: 61 BPM | SYSTOLIC BLOOD PRESSURE: 161 MMHG | RESPIRATION RATE: 17 BRPM | DIASTOLIC BLOOD PRESSURE: 87 MMHG

## 2024-07-17 DIAGNOSIS — M47.816 LUMBAR SPONDYLOSIS: ICD-10-CM

## 2024-07-17 DIAGNOSIS — M48.062 LUMBAR STENOSIS WITH NEUROGENIC CLAUDICATION: ICD-10-CM

## 2024-07-17 DIAGNOSIS — Z79.891 ENCOUNTER FOR LONG-TERM USE OF OPIATE ANALGESIC: ICD-10-CM

## 2024-07-17 DIAGNOSIS — M51.36 DEGENERATION OF INTERVERTEBRAL DISC OF LUMBAR REGION: ICD-10-CM

## 2024-07-17 DIAGNOSIS — M54.12 CERVICAL RADICULITIS: Primary | ICD-10-CM

## 2024-07-17 PROCEDURE — 3077F SYST BP >= 140 MM HG: CPT | Performed by: NURSE PRACTITIONER

## 2024-07-17 PROCEDURE — 99213 OFFICE O/P EST LOW 20 MIN: CPT | Performed by: NURSE PRACTITIONER

## 2024-07-17 PROCEDURE — 1123F ACP DISCUSS/DSCN MKR DOCD: CPT | Performed by: NURSE PRACTITIONER

## 2024-07-17 PROCEDURE — 1159F MED LIST DOCD IN RCRD: CPT | Performed by: NURSE PRACTITIONER

## 2024-07-17 PROCEDURE — 1125F AMNT PAIN NOTED PAIN PRSNT: CPT | Performed by: NURSE PRACTITIONER

## 2024-07-17 PROCEDURE — 1036F TOBACCO NON-USER: CPT | Performed by: NURSE PRACTITIONER

## 2024-07-17 PROCEDURE — 3079F DIAST BP 80-89 MM HG: CPT | Performed by: NURSE PRACTITIONER

## 2024-07-17 PROCEDURE — 1160F RVW MEDS BY RX/DR IN RCRD: CPT | Performed by: NURSE PRACTITIONER

## 2024-07-17 RX ORDER — OXYCODONE HYDROCHLORIDE 5 MG/1
5 TABLET ORAL 4 TIMES DAILY PRN
Qty: 112 TABLET | Refills: 0 | Status: SHIPPED | OUTPATIENT
Start: 2024-09-16 | End: 2024-10-14

## 2024-07-17 RX ORDER — OXYCODONE HYDROCHLORIDE 5 MG/1
5 TABLET ORAL 4 TIMES DAILY PRN
Qty: 112 TABLET | Refills: 0 | Status: SHIPPED | OUTPATIENT
Start: 2024-08-19 | End: 2024-09-16

## 2024-07-17 RX ORDER — GABAPENTIN 300 MG/1
600 CAPSULE ORAL 3 TIMES DAILY
Qty: 180 CAPSULE | Refills: 2 | Status: SHIPPED | OUTPATIENT
Start: 2024-07-17 | End: 2024-10-15

## 2024-07-17 RX ORDER — NALOXONE HYDROCHLORIDE 4 MG/.1ML
1 SPRAY NASAL AS NEEDED
Qty: 2 EACH | Refills: 0 | Status: SHIPPED | OUTPATIENT
Start: 2024-07-17

## 2024-07-17 RX ORDER — OXYCODONE HYDROCHLORIDE 5 MG/1
5 TABLET ORAL 4 TIMES DAILY PRN
Qty: 112 TABLET | Refills: 0 | Status: SHIPPED | OUTPATIENT
Start: 2024-07-22 | End: 2024-08-19

## 2024-07-17 ASSESSMENT — ENCOUNTER SYMPTOMS
BACK PAIN: 1
SEIZURES: 0
WEAKNESS: 1
FACIAL ASYMMETRY: 0
CONSTITUTIONAL NEGATIVE: 1
LIGHT-HEADEDNESS: 0
TREMORS: 0
NUMBNESS: 0
CARDIOVASCULAR NEGATIVE: 1
HEMATOLOGIC/LYMPHATIC NEGATIVE: 1
EYES NEGATIVE: 1
PSYCHIATRIC NEGATIVE: 1
ARTHRALGIAS: 1
DIZZINESS: 0
RESPIRATORY NEGATIVE: 1
ENDOCRINE NEGATIVE: 1
GASTROINTESTINAL NEGATIVE: 1
HEADACHES: 0
ALLERGIC/IMMUNOLOGIC NEGATIVE: 1
MYALGIAS: 1
SPEECH DIFFICULTY: 0

## 2024-07-17 ASSESSMENT — PAIN SCALES - GENERAL: PAINLEVEL: 7

## 2024-07-17 NOTE — PROGRESS NOTES
Subjective   Patient ID: Abraham Rodriguez is a 83 y.o. male who presents for Med Refill and Pain (Low Back/Joints ).  HPI    Abraham follows up for interval reevaluation of chronic knee joint pain from arthritis.  He is with total right hip replacement 2020.  Is also with low back pain from degenerative disc and spondylosis.  Chronic posterior cervical pain from degenerative disc and spondylosis.      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  Does have pain with movements of flexion and extension.  Is to start physical therapy tomorrow.  Advised that he take his oxycodone 5 mg plus Tylenol 650 mg an hour prior to physical therapy.     Oxycodone without Tylenol 5 mg 4 times daily reduces pain improves function up to 50% and with average pain score 7 out of 10 to the right knee and left knee. No left hip pain. No right hip pain. Denies negative side effects from medications.    Is also on Cymbalta 30 mg once daily.  Not sure if this does help to reduce pain or improve function.    [facility].  Would like to restart medication for his back pain.  Gave instructions for a taper increase of 300 mg 2 capsules up to 3 times a day with an increase of 1 capsule every 3 days.     Toxicology consistent April 23, 2024.  Risk tool and contract scanned into the chart dated January 29, 2024.     History of renal calculus inside a scope he with laser lithotripsy May 1, 2024.    For continuity:   Given the patient's report of reduced pain and improved functional ability without adverse effects, it is reasonable to treat with narcotic medications. The terms of the opioid agreement as well as the potential risks and adverse effects of the patient's medication regimen were discussed in detail. This includes if applicable due to dosage of medication permission to discuss and coordinate care with other treatment providers relevant to the patients condition. The  patient verbalized understanding.      Risks and side effects of chronic opioid therapy including but not limited to tolerance, dependence, constipation, hyperalgesia, cognitive side effects, addiction and possible death due to overuse and or misuse were discussed. I also discussed that such medications when co-administered with other sedative agents including but not limited to alcohol, benzodiazepines, sedative hypnotics and illegal drugs could pose life threatening consequences including death. I also explained the impact that the administration of such medication has on a patient with obstructive sleep apnea and continued recommendations for use of apnea devices if ordered are prescribed by other physicians. In order to effectively and safely treat the pain, I also emphasized the importance of compliance with the treatment plan, as well as compliance with the terms of the opioid agreement, which was reviewed in detail. I explained the importance of being responsible with the medications and to take these only as prescribed, never in excess and never for reasons other than pain reduction. The patient was counseled on keeping the medications safe and locked away from children and other adults as well as disposal methods and options. The patient understood the risks and instructions.      I also discussed with the patient in detail that based on the clinical response to the opioid medications and improvements of activities of daily living, sleep, and work performance in light of compliance with the treatment plan we can continue this form of therapy for the above chronic pain. The goal and rationale used for current treatment with chronic opioid medication is to control the pain and alleviate disability induced by the chronic pain condition noted above after failures of other non-opioid and nonpharmacological modalities to treat the chronic pain and the symptoms associated with have failed. The patient understood  the goals in terms of the above treatment plan and had no further questions prior to leaving the office today.      Of note, the above-mentioned diagnoses/conditions and expected fluctuating nature of pain, and pain characteristic changes may lead to prolonged functional impairment requiring frequent and multiple reassessments with continued high level medical decision making. As noted, medication and medication management may require opiate therapy in excess of a routine less than 30 day medication requirement. The patient may require daily opiate therapy necessitating month-long prescription medication as noted above in order to perform activities of daily living and achieve acceptable quality of life with respect to their chronic pain condition for the foreseeable future. We monitor our patient's carefully through drug monitoring, medication counts, urine drug testing specific to their medication as well as a myriad of other substances and with frequent follow-ups with interval reassement of the chronic pain condition, its pathophysiology and prognosis.      The level of clinical decision making at this office visit is high due to high risks and complications including mortality and morbidity related to acute and chronic pain with respects to life, bodily function, and treatment. Risks and clinical decisions with respect to under treatment, failure to maintain adequate treatment, and/or overtreatment complications and outcomes were discussed with the patient with respect to their chronic pain conditions, interventional therapies, as well as the use of various medications including possible controlled/dangerous medications. The amount and complexity of reviewed data at this in subsequent office visits is high given patient's fluctuating clinical presentation, laboratory and radiographic reports, prescription monitoring program data, and medication history as well as other relevant data as noted above. Pertinent  negatives and positives data was used in consideration for the above-mentioned high complexity.       Given the patient's total MED, general use of daily opiates, or other coadministered medications in various classes the patient was offered a prescription for Narcan. I instructed the patient that it is important that patient fill this medication in order to demonstrate understanding of the gravity of possible side effects including respiratory depression and risk of overdose of this opiate load or medication combination. As such patient will be required to bring Narcan prescription to follow-up appointments as part of compliance with continued opiate care.      With respect to opiate induced constipation I discussed multiple ways to combat this problem including staying hydrated and taking over-the-counter medications such as Dulcolax, Miralax and Senna. If these treatments are not effective we could consider such medications as Amitiza, Linzess and Movantik.      Disclaimer: This note was transcribed using an audio transcription device. As such, minor errors may be present with regard to spelling, punctuation, and inadvertent word insertion. Please disregard such errors. Abraham follows up for interval reevaluation for chronic       Narrative & Impression  Interpreted By:  Jaswinder Colunga,   STUDY:  CT KNEE RIGHT WO IV CONTRAST;  4/15/2024 10:48 pm      INDICATION:  Signs/Symptoms:concern for septic right knee s/p total knee  replacement.      COMPARISON:  3/27/2024      ACCESSION NUMBER(S):  [id number]      ORDERING CLINICIAN:  VIOLETA BOWDEN      TECHNIQUE:  Contiguous axial images of the knee were obtained without intravenous  contrast. Coronal and sagittal reformatted images were obtained from  the axial images.      FINDINGS:  There is postsurgical change of right knee arthroplasty resulting in  beam hardening artifact and limiting evaluation. No definite evidence  of acute displaced periprosthetic knee  fracture. There is stable  appearance of previously described 16 mm x 9 mm calcific/ossific  density in the posterolateral soft tissues adjacent to the knee.  There is also stable calcific density superior to the patella. Small  to moderate suprapatellar knee effusion is again noted.      IMPRESSION:  Postsurgical change of right knee arthroplasty resulting in beam  hardening artifact and limiting evaluation. No definite evidence of  acute displaced periprosthetic knee fracture.      Suprapatellar knee effusion is again noted; clinical correlation  recommended if there is concern for infection given the clinical  history.      MACRO:  None      Signed by: Jaswinder Colunga 4/16/2024 2:14 AM  Dictation workstation:   [id number]    Narrative & Impression   MRN: [id number]  Patient Name: ARTEMIO MORALES     STUDY:  MRI L-SPINE WO;  1/28/2022 3:14 pm     INDICATION:  low back pain  M47.816: Lumbar spondylosis M54.16: Lumbar  radiculopathy.     COMPARISON:  10/08/2021 MR     ACCESSION NUMBER(S):  [id number]     ORDERING CLINICIAN:  KAYLEIGH MORSE     TECHNIQUE:  Sagittal T1, T2, STIR, and axial T1 weighted images of the lumbar  spine were acquired. Patient was unable to tolerate further images  due to severe pain.     FINDINGS:  Alignment: There are 5 lumbar type vertebrae. The lumbar spine is  straightened.     Vertebrae/Intervertebral Discs: The vertebral bodies demonstrate  expected height.The marrow has patchy bands of fatty transformation  most prominent adjacent to the endplates anteriorly at L1-2 and along  the L5-S1 endplates. The discs have near complete loss of height  throughout the lumbar region with degenerative desiccation as well,  unchanged.     Conus: The lower thoracic cord appears unremarkable. The conus  terminates at T12-L1.     T10-11: No stenosis is noted on the sagittal images.     T11-12: The thecal sac is mildly indented by disc bulge on the  sagittal images.     T12-L1: The facet joints are moderately  arthritic, unchanged. No  stenosis is noted.     L1-2: The lateral recesses are mildly stenosed more so on the right  by facet hypertrophy and ligament thickening as well as disc bulge  more so on the right. The facet joints are moderately arthritic.     L2-3: The spinal canal and lateral recesses are mildly stenosed by  ligament thickening and facet hypertrophy with disc bulge similar to  the prior study. The left neural foramen is mildly stenosed by  endplate spurring and disc bulge. The facet joints are moderately  arthritic.     L3-4: The lateral recesses are moderately stenosed more so on the  left with mild-to-moderate spinal canal stenosis secondary to disc  bulge, endplate spurring, ligament thickening and facet hypertrophy.  The left neural foramen is mildly stenosed. The facet joints are  moderately arthritic.     L4-5: The lateral recesses and spinal canal are moderately stenosed  secondary to ligament thickening, facet hypertrophy and disc  protrusion similar to the prior exam. The facet joints are moderately  arthritic. The neural foramina are mildly stenosed more so on the  left by endplate spurring, disc bulge and facet hypertrophy.     L5-S1: Left lateral/far lateral disc bulge abuts the exiting left L5  nerve, unchanged. The facet joints are mildly to moderately  arthritic. No central stenosis is noted.        The prevertebral and posterior paraspinous soft tissues are  unremarkable.     IMPRESSION:  1. The T2 axial images were not obtained as the patient could not  tolerate additional imaging due to pain.     2. Multilevel degenerative changes are present as noted similar to  the prior examination.     Narrative & Impression   MRN: [id number]  Patient Name: ARTEMIO MORALES     STUDY:  NR MRI CERVICAL WO; 12/19/2016 10:57 am     INDICATION:  rt sided weakness.     COMPARISON:  None.     ACCESSION NUMBER(S):  [id number]     ORDERING CLINICIAN:  CRISTIN HUTTON     TECHNIQUE:  T1 weighted and T2 weighted  sagittal axial images of the cervical  spine were obtained.     FINDINGS:     ALIGNMENT:  Minimal retrolisthesis of C5 on C6 and minimal anterolisthesis of C6  on C7. Minimal anterolisthesis of C7 on T1 and T1 on T2.     VERTEBRAL BODIES/MARROW:  Unremarkable     CORD:  Visualized cord is normal in caliber and signal.     C2-3:  No canal or foraminal stenosis     C3-4:  Minimal disc bulging and osteophyte formation. No canal or foraminal  stenosis     C4-5:  Small broad-based central disc herniation causing minimal effacement  of the ventral thecal sac. Facet hypertrophy which is markedly more  pronounced on the left than the right. Moderate left foraminal  encroachment. No canal stenosis.     C5-6:  Minimally effacement of the ventral thecal sac but osteophyte  formation. Bilateral facet hypertrophy. No canal stenosis. Mild right  foraminal encroachment.     C6-7:  Mild circumferential disc bulging. No canal stenosis or foraminal  encroachment.     C7-T1:  Mild bilateral facet hypertrophy. No significant canal or foraminal  stenosis.     ADDITIONAL FINDINGS:  None.     IMPRESSION:  Mild degenerative changes, as detailed above, including a small  broad-based central disc herniation at the C4-5 level. No canal  stenosis at any level. Varying degrees of foraminal encroachment, as  above.       Review of Systems   Constitutional: Negative.    HENT: Negative.     Eyes: Negative.    Respiratory: Negative.     Cardiovascular: Negative.    Gastrointestinal: Negative.    Endocrine: Negative.    Genitourinary: Negative.    Musculoskeletal:  Positive for arthralgias, back pain, gait problem and myalgias.   Skin: Negative.    Allergic/Immunologic: Negative.    Neurological:  Positive for weakness. Negative for dizziness, tremors, seizures, syncope, facial asymmetry, speech difficulty, light-headedness, numbness and headaches.   Hematological: Negative.    Psychiatric/Behavioral: Negative.         Objective   Physical  Exam  Vitals and nursing note reviewed.   Constitutional:       Appearance: Normal appearance.   HENT:      Head: Normocephalic and atraumatic.   Cardiovascular:      Pulses: Normal pulses.   Pulmonary:      Effort: Pulmonary effort is normal. No respiratory distress.   Musculoskeletal:      Right lower leg: No edema.      Left lower leg: No edema.      Comments: Active range of motion of right knee with extension and flexion limited secondary to pain and stiffness.  Well-healed linear scar overlies right knee.  No outward signs of infection of the right knee such as open wound, drainage, warmth or erythema.   Skin:     General: Skin is warm and dry.      Capillary Refill: Capillary refill takes 2 to 3 seconds.   Neurological:      Mental Status: He is alert and oriented to person, place, and time.      Cranial Nerves: No cranial nerve deficit.      Sensory: No sensory deficit.      Motor: Weakness present.      Gait: Gait abnormal.      Comments: Weakness with right knee extension 4 out of 5.  Ambulates with mildly antalgic gait.         Assessment/Plan   Problem List Items Addressed This Visit             ICD-10-CM    Degeneration of intervertebral disc of lumbar region M51.36    Relevant Medications    oxyCODONE (Roxicodone) 5 mg immediate release tablet (Start on 7/22/2024)    oxyCODONE (Roxicodone) 5 mg immediate release tablet (Start on 8/19/2024)    oxyCODONE (Roxicodone) 5 mg immediate release tablet (Start on 9/16/2024)    Cervical radiculitis - Primary M54.12    Relevant Medications    oxyCODONE (Roxicodone) 5 mg immediate release tablet (Start on 7/22/2024)    oxyCODONE (Roxicodone) 5 mg immediate release tablet (Start on 8/19/2024)    oxyCODONE (Roxicodone) 5 mg immediate release tablet (Start on 9/16/2024)    gabapentin (Neurontin) 300 mg capsule    Lumbar spondylosis M47.816    Relevant Medications    oxyCODONE (Roxicodone) 5 mg immediate release tablet (Start on 7/22/2024)    oxyCODONE (Roxicodone) 5  mg immediate release tablet (Start on 8/19/2024)    oxyCODONE (Roxicodone) 5 mg immediate release tablet (Start on 9/16/2024)    Lumbar stenosis with neurogenic claudication M48.062    Relevant Medications    oxyCODONE (Roxicodone) 5 mg immediate release tablet (Start on 7/22/2024)    oxyCODONE (Roxicodone) 5 mg immediate release tablet (Start on 8/19/2024)    oxyCODONE (Roxicodone) 5 mg immediate release tablet (Start on 9/16/2024)     Other Visit Diagnoses         Codes    Encounter for long-term use of opiate analgesic     Z79.891    Relevant Medications    naloxone (Narcan) 4 mg/0.1 mL nasal spray          Follow-up 12 weeks.       ARCHANA Barrientos 07/17/24 2:06 PM

## 2024-07-18 ENCOUNTER — APPOINTMENT (OUTPATIENT)
Dept: PHYSICAL THERAPY | Facility: HOSPITAL | Age: 84
End: 2024-07-18
Payer: MEDICARE

## 2024-07-20 ENCOUNTER — APPOINTMENT (OUTPATIENT)
Dept: CARDIOLOGY | Facility: HOSPITAL | Age: 84
End: 2024-07-20
Payer: MEDICARE

## 2024-07-20 ENCOUNTER — APPOINTMENT (OUTPATIENT)
Dept: RADIOLOGY | Facility: HOSPITAL | Age: 84
End: 2024-07-20
Payer: MEDICARE

## 2024-07-20 ENCOUNTER — HOSPITAL ENCOUNTER (EMERGENCY)
Facility: HOSPITAL | Age: 84
Discharge: HOME | End: 2024-07-20
Attending: STUDENT IN AN ORGANIZED HEALTH CARE EDUCATION/TRAINING PROGRAM
Payer: MEDICARE

## 2024-07-20 VITALS
OXYGEN SATURATION: 98 % | HEIGHT: 71 IN | RESPIRATION RATE: 18 BRPM | TEMPERATURE: 97.6 F | HEART RATE: 96 BPM | BODY MASS INDEX: 24.78 KG/M2 | WEIGHT: 177 LBS | DIASTOLIC BLOOD PRESSURE: 90 MMHG | SYSTOLIC BLOOD PRESSURE: 168 MMHG

## 2024-07-20 DIAGNOSIS — R19.7 DIARRHEA, UNSPECIFIED TYPE: ICD-10-CM

## 2024-07-20 DIAGNOSIS — R10.84 ABDOMINAL PAIN, GENERALIZED: Primary | ICD-10-CM

## 2024-07-20 LAB
ALBUMIN SERPL BCP-MCNC: 4.1 G/DL (ref 3.4–5)
ALP SERPL-CCNC: 61 U/L (ref 33–136)
ALT SERPL W P-5'-P-CCNC: 6 U/L (ref 10–52)
ANION GAP SERPL CALC-SCNC: 19 MMOL/L (ref 10–20)
APPEARANCE UR: CLEAR
AST SERPL W P-5'-P-CCNC: 13 U/L (ref 9–39)
BASOPHILS # BLD AUTO: 0.03 X10*3/UL (ref 0–0.1)
BASOPHILS NFR BLD AUTO: 0.4 %
BILIRUB SERPL-MCNC: 0.8 MG/DL (ref 0–1.2)
BILIRUB UR STRIP.AUTO-MCNC: NEGATIVE MG/DL
BUN SERPL-MCNC: 19 MG/DL (ref 6–23)
CALCIUM SERPL-MCNC: 9.6 MG/DL (ref 8.6–10.3)
CARDIAC TROPONIN I PNL SERPL HS: 14 NG/L (ref 0–20)
CHLORIDE SERPL-SCNC: 103 MMOL/L (ref 98–107)
CO2 SERPL-SCNC: 19 MMOL/L (ref 21–32)
COLOR UR: YELLOW
CREAT SERPL-MCNC: 0.98 MG/DL (ref 0.5–1.3)
EGFRCR SERPLBLD CKD-EPI 2021: 77 ML/MIN/1.73M*2
EOSINOPHIL # BLD AUTO: 0.07 X10*3/UL (ref 0–0.4)
EOSINOPHIL NFR BLD AUTO: 1 %
ERYTHROCYTE [DISTWIDTH] IN BLOOD BY AUTOMATED COUNT: 13 % (ref 11.5–14.5)
GLUCOSE SERPL-MCNC: 119 MG/DL (ref 74–99)
GLUCOSE UR STRIP.AUTO-MCNC: NORMAL MG/DL
HCT VFR BLD AUTO: 45.6 % (ref 41–52)
HGB BLD-MCNC: 15.2 G/DL (ref 13.5–17.5)
IMM GRANULOCYTES # BLD AUTO: 0.02 X10*3/UL (ref 0–0.5)
IMM GRANULOCYTES NFR BLD AUTO: 0.3 % (ref 0–0.9)
KETONES UR STRIP.AUTO-MCNC: ABNORMAL MG/DL
LACTATE SERPL-SCNC: 1.6 MMOL/L (ref 0.4–2)
LEUKOCYTE ESTERASE UR QL STRIP.AUTO: NEGATIVE
LIPASE SERPL-CCNC: <3 U/L (ref 9–82)
LYMPHOCYTES # BLD AUTO: 1.42 X10*3/UL (ref 0.8–3)
LYMPHOCYTES NFR BLD AUTO: 20.4 %
MAGNESIUM SERPL-MCNC: 2.33 MG/DL (ref 1.6–2.4)
MCH RBC QN AUTO: 29.2 PG (ref 26–34)
MCHC RBC AUTO-ENTMCNC: 33.3 G/DL (ref 32–36)
MCV RBC AUTO: 88 FL (ref 80–100)
MONOCYTES # BLD AUTO: 0.66 X10*3/UL (ref 0.05–0.8)
MONOCYTES NFR BLD AUTO: 9.5 %
MUCOUS THREADS #/AREA URNS AUTO: NORMAL /LPF
NEUTROPHILS # BLD AUTO: 4.75 X10*3/UL (ref 1.6–5.5)
NEUTROPHILS NFR BLD AUTO: 68.4 %
NITRITE UR QL STRIP.AUTO: NEGATIVE
NRBC BLD-RTO: 0 /100 WBCS (ref 0–0)
PH UR STRIP.AUTO: 6 [PH]
PHOSPHATE SERPL-MCNC: 2.9 MG/DL (ref 2.5–4.9)
PLATELET # BLD AUTO: 178 X10*3/UL (ref 150–450)
POTASSIUM SERPL-SCNC: 3.5 MMOL/L (ref 3.5–5.3)
PROT SERPL-MCNC: 7.3 G/DL (ref 6.4–8.2)
PROT UR STRIP.AUTO-MCNC: ABNORMAL MG/DL
RBC # BLD AUTO: 5.21 X10*6/UL (ref 4.5–5.9)
RBC # UR STRIP.AUTO: NEGATIVE /UL
RBC #/AREA URNS AUTO: NORMAL /HPF
SODIUM SERPL-SCNC: 137 MMOL/L (ref 136–145)
SP GR UR STRIP.AUTO: 1.02
UROBILINOGEN UR STRIP.AUTO-MCNC: NORMAL MG/DL
WBC # BLD AUTO: 7 X10*3/UL (ref 4.4–11.3)
WBC #/AREA URNS AUTO: NORMAL /HPF

## 2024-07-20 PROCEDURE — 96360 HYDRATION IV INFUSION INIT: CPT

## 2024-07-20 PROCEDURE — 74176 CT ABD & PELVIS W/O CONTRAST: CPT | Mod: FOREIGN READ | Performed by: RADIOLOGY

## 2024-07-20 PROCEDURE — 83605 ASSAY OF LACTIC ACID: CPT | Performed by: STUDENT IN AN ORGANIZED HEALTH CARE EDUCATION/TRAINING PROGRAM

## 2024-07-20 PROCEDURE — 2500000004 HC RX 250 GENERAL PHARMACY W/ HCPCS (ALT 636 FOR OP/ED): Performed by: STUDENT IN AN ORGANIZED HEALTH CARE EDUCATION/TRAINING PROGRAM

## 2024-07-20 PROCEDURE — 96361 HYDRATE IV INFUSION ADD-ON: CPT

## 2024-07-20 PROCEDURE — 81001 URINALYSIS AUTO W/SCOPE: CPT | Performed by: STUDENT IN AN ORGANIZED HEALTH CARE EDUCATION/TRAINING PROGRAM

## 2024-07-20 PROCEDURE — 80053 COMPREHEN METABOLIC PANEL: CPT | Performed by: STUDENT IN AN ORGANIZED HEALTH CARE EDUCATION/TRAINING PROGRAM

## 2024-07-20 PROCEDURE — 36415 COLL VENOUS BLD VENIPUNCTURE: CPT | Performed by: STUDENT IN AN ORGANIZED HEALTH CARE EDUCATION/TRAINING PROGRAM

## 2024-07-20 PROCEDURE — 84100 ASSAY OF PHOSPHORUS: CPT | Performed by: STUDENT IN AN ORGANIZED HEALTH CARE EDUCATION/TRAINING PROGRAM

## 2024-07-20 PROCEDURE — 96374 THER/PROPH/DIAG INJ IV PUSH: CPT

## 2024-07-20 PROCEDURE — 74176 CT ABD & PELVIS W/O CONTRAST: CPT

## 2024-07-20 PROCEDURE — 93005 ELECTROCARDIOGRAM TRACING: CPT

## 2024-07-20 PROCEDURE — 85025 COMPLETE CBC W/AUTO DIFF WBC: CPT | Performed by: STUDENT IN AN ORGANIZED HEALTH CARE EDUCATION/TRAINING PROGRAM

## 2024-07-20 PROCEDURE — 83690 ASSAY OF LIPASE: CPT | Performed by: STUDENT IN AN ORGANIZED HEALTH CARE EDUCATION/TRAINING PROGRAM

## 2024-07-20 PROCEDURE — 83735 ASSAY OF MAGNESIUM: CPT | Performed by: STUDENT IN AN ORGANIZED HEALTH CARE EDUCATION/TRAINING PROGRAM

## 2024-07-20 PROCEDURE — 84484 ASSAY OF TROPONIN QUANT: CPT | Performed by: STUDENT IN AN ORGANIZED HEALTH CARE EDUCATION/TRAINING PROGRAM

## 2024-07-20 PROCEDURE — 96375 TX/PRO/DX INJ NEW DRUG ADDON: CPT

## 2024-07-20 PROCEDURE — 99284 EMERGENCY DEPT VISIT MOD MDM: CPT | Mod: 25

## 2024-07-20 RX ORDER — ONDANSETRON HYDROCHLORIDE 2 MG/ML
4 INJECTION, SOLUTION INTRAVENOUS ONCE
Status: COMPLETED | OUTPATIENT
Start: 2024-07-20 | End: 2024-07-20

## 2024-07-20 RX ORDER — MORPHINE SULFATE 4 MG/ML
4 INJECTION, SOLUTION INTRAMUSCULAR; INTRAVENOUS ONCE
Status: COMPLETED | OUTPATIENT
Start: 2024-07-20 | End: 2024-07-20

## 2024-07-20 RX ADMIN — ONDANSETRON 4 MG: 2 INJECTION INTRAMUSCULAR; INTRAVENOUS at 11:24

## 2024-07-20 RX ADMIN — SODIUM CHLORIDE, SODIUM LACTATE, POTASSIUM CHLORIDE, AND CALCIUM CHLORIDE 1000 ML: 600; 310; 30; 20 INJECTION, SOLUTION INTRAVENOUS at 12:50

## 2024-07-20 RX ADMIN — MORPHINE SULFATE 4 MG: 4 INJECTION, SOLUTION INTRAMUSCULAR; INTRAVENOUS at 11:25

## 2024-07-20 ASSESSMENT — PAIN - FUNCTIONAL ASSESSMENT: PAIN_FUNCTIONAL_ASSESSMENT: 0-10

## 2024-07-20 ASSESSMENT — PAIN SCALES - GENERAL: PAINLEVEL_OUTOF10: 7

## 2024-07-20 ASSESSMENT — PAIN DESCRIPTION - ORIENTATION: ORIENTATION: RIGHT

## 2024-07-20 ASSESSMENT — PAIN DESCRIPTION - PAIN TYPE: TYPE: ACUTE PAIN

## 2024-07-20 ASSESSMENT — PAIN DESCRIPTION - LOCATION: LOCATION: ABDOMEN

## 2024-07-21 LAB — HOLD SPECIMEN: NORMAL

## 2024-07-22 ENCOUNTER — APPOINTMENT (OUTPATIENT)
Dept: PHYSICAL THERAPY | Facility: HOSPITAL | Age: 84
End: 2024-07-22
Payer: MEDICARE

## 2024-07-22 DIAGNOSIS — Z96.651 PRESENCE OF RIGHT ARTIFICIAL KNEE JOINT: Primary | ICD-10-CM

## 2024-07-22 LAB
ATRIAL RATE: 95 BPM
P AXIS: 82 DEGREES
P OFFSET: 148 MS
P ONSET: 102 MS
PR INTERVAL: 210 MS
Q ONSET: 207 MS
QRS COUNT: 16 BEATS
QRS DURATION: 152 MS
QT INTERVAL: 408 MS
QTC CALCULATION(BAZETT): 512 MS
QTC FREDERICIA: 475 MS
R AXIS: -63 DEGREES
T AXIS: 4 DEGREES
T OFFSET: 411 MS
VENTRICULAR RATE: 95 BPM

## 2024-07-22 PROCEDURE — 97110 THERAPEUTIC EXERCISES: CPT | Mod: GP | Performed by: PHYSICAL THERAPIST

## 2024-07-22 ASSESSMENT — PAIN SCALES - GENERAL: PAINLEVEL_OUTOF10: 6

## 2024-07-22 ASSESSMENT — PAIN - FUNCTIONAL ASSESSMENT: PAIN_FUNCTIONAL_ASSESSMENT: 0-10

## 2024-07-22 NOTE — PROGRESS NOTES
"  Physical Therapy Re-Evaluation and Treatment    Patient Name: Abraham Rodriguez  MRN: [id number]  Today's Date: 7/22/2024  Time Calculation  Start Time: 1516  Stop Time: 1556  Time Calculation (min): 40 min        PT Therapeutic Procedures Time Entry  Therapeutic Exercise Time Entry: 40                Current Problem  1. Presence of right artificial knee joint  Follow Up In Physical Therapy        Problem List Items Addressed This Visit             ICD-10-CM    Presence of right artificial knee joint - Primary Z96.651       General  Reason for Referral: right knee pain s/p TKA 3/20/2024  Referred By: Dr. Arora    Subjective   Current Condition:   Better  Patient reports needing to go to the emergency department on Saturday due to a bad stomach. He states that he's feeling better today.    Performing HEP?: Yes    Precautions  Precautions  LE Weight Bearing Status: Weight Bearing as Tolerated  Medical Precautions: Fall precautions  Post-Surgical Precautions: Right total knee precautions  Pain  Pain Assessment: 0-10  0-10 (Numeric) Pain Score: 6  Pain Location: Knee  Pain Orientation: Right    Objective   HIP    Specific Lower Extremity MMT  R Iliopsoas: (5/5): 4/5  L Iliopsoas: (5/5): 5/5  R Gluteals (sidelying): (5/5): 4/5  L Gluteals (sidelying): (5/5): 4/5    Functional Rating Scale  LEFS /80: 15    Knee AROM  R knee flexion: (140°): 118  L knee flexion: (140°): 128  R knee extension: (0°): 0  L knee extension: (0°): 0    Knee MMT  R knee flexion: (5/5): 4/5  L knee flexion: (5/5): 5/5  R knee extension: (5/5): 4/5  L knee extension: (5/5): 5/5    Ankle MMT  Ankle MMT WFL: yes    Outcome Measures:  Other Measures  Lower Extremity Funtional Score (LEFS): 15/80    Treatments:  Therapeutic Exercise  Therapeutic Exercise Performed: Yes  Therapeutic Exercise Activity 1: Sci[employer] Bike Level 4 5'  Therapeutic Exercise Activity 3: Airex 1/2 tandem 30\" R/L  Therapeutic Exercise Activity 5: star trac 15 # knee ext, 35 # knee flex 2 " x 15 ea.         EDUCATION:   Individual(s) Educated: Patient  Education Provided: yes  Handout(s) Provided: Scanned into chart  Home Program: reviewed home exercise program   Risk and Benefits Discussed with Patient/Caregiver/Other: Yes   Patient/Caregiver Demonstrated Understanding: Yes   Patient Response to Education: Patient/Caregiver verbalized understanding of information and Patient/Caregiver performed return demonstration of exercises/activities    Assessment: Patient is progressing slowly towards his physical therapy goals. Patient's range of motion and strength are improving, but he has not met the goals yet. Patient continues to use a single point cane during gait and uses a step to pattern on stairs. Patient is unable to perform single limb stance due to weakness/pain.  PT Assessment  PT Assessment Results: Decreased strength, Decreased range of motion, Impaired balance, Decreased mobility, Pain  Rehab Prognosis: Good  Evaluation/Treatment Tolerance: Patient tolerated treatment well    Plan: Continue with POC. Progress range of motion, strength, balance, and gait as tolerated.  OP PT Plan  Treatment/Interventions: Education/ Instruction, Gait training, Manual therapy, Neuromuscular re-education, Therapeutic activities, Therapeutic exercises  PT Plan: Skilled PT  PT Frequency: 2 times per week  Duration: 4 weeks  Onset Date: 03/20/24  Certification Period Start Date: 06/07/24  Certification Period End Date: 08/19/24  Number of Treatments Authorized: 11 of 19  Rehab Potential: Good  Plan of Care Agreement: Patient    Goals:  Active       PT Problem       Patient will maintain single leg stand on each leg for 10 sec with no upper extremity support. (Progressing)       Start:  06/07/24    Expected End:  08/19/24            Patient will be able to maintain dynamic standing balance during walking over obstacles (Progressing)       Start:  06/07/24    Expected End:  08/19/24            Patient will ambulate  with normal gait pattern with no device (Progressing)       Start:  06/07/24    Expected End:  08/19/24            Patient will ambulate up and down a curb/single step with independent using no device (Progressing)       Start:  06/07/24    Expected End:  08/19/24            Patient will achieve right knee ROM of  0-120 degrees  (Progressing)       Start:  06/07/24    Expected End:  08/19/24            Patient will achieve bilateral hip abduction strength of at least 4+/5 (Progressing)       Start:  06/07/24    Expected End:  08/19/24            Patient will achieve bilateral knee flexion strength of at least 4+/5 (Progressing)       Start:  06/07/24    Expected End:  08/19/24            Patient will achieve bilateral knee extension strength of at least 4+/5 (Progressing)       Start:  06/07/24    Expected End:  08/19/24            Patient will demonstrate independence in home program for support of progression (Met)       Start:  06/07/24    Expected End:  06/21/24    Resolved:  07/22/24         Patient will report pain of no more than 2/10 demonstrating a reduction of overall pain (Progressing)       Start:  06/07/24    Expected End:  08/19/24            Patient will show a significant change in LEFS (8 to 17) patient reported outcome tool to demonstrate subjective imporovement (Progressing)       Start:  06/07/24    Expected End:  08/19/24                 Mirza Todd, PT

## 2024-07-24 ENCOUNTER — APPOINTMENT (OUTPATIENT)
Dept: PHYSICAL THERAPY | Facility: HOSPITAL | Age: 84
End: 2024-07-24
Payer: MEDICARE

## 2024-07-25 ENCOUNTER — APPOINTMENT (OUTPATIENT)
Dept: PRIMARY CARE | Facility: CLINIC | Age: 84
End: 2024-07-25
Payer: COMMERCIAL

## 2024-07-26 ENCOUNTER — APPOINTMENT (OUTPATIENT)
Dept: RADIOLOGY | Facility: HOSPITAL | Age: 84
End: 2024-07-26
Payer: MEDICARE

## 2024-07-26 ENCOUNTER — APPOINTMENT (OUTPATIENT)
Dept: PHYSICAL THERAPY | Facility: HOSPITAL | Age: 84
End: 2024-07-26
Payer: MEDICARE

## 2024-07-26 ENCOUNTER — APPOINTMENT (OUTPATIENT)
Dept: CARDIOLOGY | Facility: HOSPITAL | Age: 84
End: 2024-07-26
Payer: MEDICARE

## 2024-07-26 ENCOUNTER — DOCUMENTATION (OUTPATIENT)
Dept: PHYSICAL THERAPY | Facility: HOSPITAL | Age: 84
End: 2024-07-26
Payer: COMMERCIAL

## 2024-07-26 ENCOUNTER — HOSPITAL ENCOUNTER (EMERGENCY)
Facility: HOSPITAL | Age: 84
Discharge: HOME | End: 2024-07-26
Attending: STUDENT IN AN ORGANIZED HEALTH CARE EDUCATION/TRAINING PROGRAM
Payer: MEDICARE

## 2024-07-26 VITALS
OXYGEN SATURATION: 100 % | WEIGHT: 177 LBS | BODY MASS INDEX: 24.78 KG/M2 | HEIGHT: 71 IN | HEART RATE: 87 BPM | DIASTOLIC BLOOD PRESSURE: 90 MMHG | TEMPERATURE: 97.1 F | RESPIRATION RATE: 18 BRPM | SYSTOLIC BLOOD PRESSURE: 167 MMHG

## 2024-07-26 DIAGNOSIS — R10.9 ABDOMINAL PAIN, UNSPECIFIED ABDOMINAL LOCATION: Primary | ICD-10-CM

## 2024-07-26 DIAGNOSIS — T14.8XXA CHRONIC WOUND: ICD-10-CM

## 2024-07-26 LAB
ALBUMIN SERPL BCP-MCNC: 4 G/DL (ref 3.4–5)
ALP SERPL-CCNC: 52 U/L (ref 33–136)
ALT SERPL W P-5'-P-CCNC: 10 U/L (ref 10–52)
ANION GAP SERPL CALC-SCNC: 10 MMOL/L (ref 10–20)
APPEARANCE UR: CLEAR
AST SERPL W P-5'-P-CCNC: 14 U/L (ref 9–39)
BASOPHILS # BLD AUTO: 0.01 X10*3/UL (ref 0–0.1)
BASOPHILS NFR BLD AUTO: 0.2 %
BILIRUB SERPL-MCNC: 0.6 MG/DL (ref 0–1.2)
BILIRUB UR STRIP.AUTO-MCNC: NEGATIVE MG/DL
BUN SERPL-MCNC: 14 MG/DL (ref 6–23)
CALCIUM SERPL-MCNC: 9.3 MG/DL (ref 8.6–10.3)
CARDIAC TROPONIN I PNL SERPL HS: 7 NG/L (ref 0–20)
CHLORIDE SERPL-SCNC: 104 MMOL/L (ref 98–107)
CO2 SERPL-SCNC: 29 MMOL/L (ref 21–32)
COLOR UR: NORMAL
CREAT SERPL-MCNC: 0.87 MG/DL (ref 0.5–1.3)
EGFRCR SERPLBLD CKD-EPI 2021: 86 ML/MIN/1.73M*2
EOSINOPHIL # BLD AUTO: 0.09 X10*3/UL (ref 0–0.4)
EOSINOPHIL NFR BLD AUTO: 1.5 %
ERYTHROCYTE [DISTWIDTH] IN BLOOD BY AUTOMATED COUNT: 13.2 % (ref 11.5–14.5)
GLUCOSE SERPL-MCNC: 97 MG/DL (ref 74–99)
GLUCOSE UR STRIP.AUTO-MCNC: NORMAL MG/DL
HCT VFR BLD AUTO: 42.7 % (ref 41–52)
HGB BLD-MCNC: 13.8 G/DL (ref 13.5–17.5)
IMM GRANULOCYTES # BLD AUTO: 0.01 X10*3/UL (ref 0–0.5)
IMM GRANULOCYTES NFR BLD AUTO: 0.2 % (ref 0–0.9)
KETONES UR STRIP.AUTO-MCNC: NEGATIVE MG/DL
LACTATE SERPL-SCNC: 1.8 MMOL/L (ref 0.4–2)
LEUKOCYTE ESTERASE UR QL STRIP.AUTO: NEGATIVE
LIPASE SERPL-CCNC: <3 U/L (ref 9–82)
LYMPHOCYTES # BLD AUTO: 1.72 X10*3/UL (ref 0.8–3)
LYMPHOCYTES NFR BLD AUTO: 28.7 %
MAGNESIUM SERPL-MCNC: 2.13 MG/DL (ref 1.6–2.4)
MCH RBC QN AUTO: 28.7 PG (ref 26–34)
MCHC RBC AUTO-ENTMCNC: 32.3 G/DL (ref 32–36)
MCV RBC AUTO: 89 FL (ref 80–100)
MONOCYTES # BLD AUTO: 0.54 X10*3/UL (ref 0.05–0.8)
MONOCYTES NFR BLD AUTO: 9 %
NEUTROPHILS # BLD AUTO: 3.62 X10*3/UL (ref 1.6–5.5)
NEUTROPHILS NFR BLD AUTO: 60.4 %
NITRITE UR QL STRIP.AUTO: NEGATIVE
NRBC BLD-RTO: 0 /100 WBCS (ref 0–0)
PH UR STRIP.AUTO: 6.5 [PH]
PLATELET # BLD AUTO: 183 X10*3/UL (ref 150–450)
POTASSIUM SERPL-SCNC: 3.1 MMOL/L (ref 3.5–5.3)
PROT SERPL-MCNC: 6.8 G/DL (ref 6.4–8.2)
PROT UR STRIP.AUTO-MCNC: NEGATIVE MG/DL
RBC # BLD AUTO: 4.81 X10*6/UL (ref 4.5–5.9)
RBC # UR STRIP.AUTO: NEGATIVE /UL
SODIUM SERPL-SCNC: 140 MMOL/L (ref 136–145)
SP GR UR STRIP.AUTO: 1.01
UROBILINOGEN UR STRIP.AUTO-MCNC: NORMAL MG/DL
WBC # BLD AUTO: 6 X10*3/UL (ref 4.4–11.3)

## 2024-07-26 PROCEDURE — 84484 ASSAY OF TROPONIN QUANT: CPT | Performed by: STUDENT IN AN ORGANIZED HEALTH CARE EDUCATION/TRAINING PROGRAM

## 2024-07-26 PROCEDURE — 74176 CT ABD & PELVIS W/O CONTRAST: CPT

## 2024-07-26 PROCEDURE — 80053 COMPREHEN METABOLIC PANEL: CPT | Performed by: STUDENT IN AN ORGANIZED HEALTH CARE EDUCATION/TRAINING PROGRAM

## 2024-07-26 PROCEDURE — 83605 ASSAY OF LACTIC ACID: CPT | Performed by: STUDENT IN AN ORGANIZED HEALTH CARE EDUCATION/TRAINING PROGRAM

## 2024-07-26 PROCEDURE — 85025 COMPLETE CBC W/AUTO DIFF WBC: CPT | Performed by: STUDENT IN AN ORGANIZED HEALTH CARE EDUCATION/TRAINING PROGRAM

## 2024-07-26 PROCEDURE — 83690 ASSAY OF LIPASE: CPT | Performed by: STUDENT IN AN ORGANIZED HEALTH CARE EDUCATION/TRAINING PROGRAM

## 2024-07-26 PROCEDURE — 74176 CT ABD & PELVIS W/O CONTRAST: CPT | Mod: FOREIGN READ | Performed by: RADIOLOGY

## 2024-07-26 PROCEDURE — 96361 HYDRATE IV INFUSION ADD-ON: CPT

## 2024-07-26 PROCEDURE — 83735 ASSAY OF MAGNESIUM: CPT | Performed by: STUDENT IN AN ORGANIZED HEALTH CARE EDUCATION/TRAINING PROGRAM

## 2024-07-26 PROCEDURE — 81003 URINALYSIS AUTO W/O SCOPE: CPT | Performed by: STUDENT IN AN ORGANIZED HEALTH CARE EDUCATION/TRAINING PROGRAM

## 2024-07-26 PROCEDURE — 96375 TX/PRO/DX INJ NEW DRUG ADDON: CPT

## 2024-07-26 PROCEDURE — 2500000004 HC RX 250 GENERAL PHARMACY W/ HCPCS (ALT 636 FOR OP/ED): Performed by: STUDENT IN AN ORGANIZED HEALTH CARE EDUCATION/TRAINING PROGRAM

## 2024-07-26 PROCEDURE — 99285 EMERGENCY DEPT VISIT HI MDM: CPT | Mod: 25

## 2024-07-26 PROCEDURE — 36415 COLL VENOUS BLD VENIPUNCTURE: CPT | Performed by: STUDENT IN AN ORGANIZED HEALTH CARE EDUCATION/TRAINING PROGRAM

## 2024-07-26 PROCEDURE — 93005 ELECTROCARDIOGRAM TRACING: CPT

## 2024-07-26 PROCEDURE — 96374 THER/PROPH/DIAG INJ IV PUSH: CPT

## 2024-07-26 RX ORDER — ONDANSETRON HYDROCHLORIDE 2 MG/ML
4 INJECTION, SOLUTION INTRAVENOUS ONCE
Status: COMPLETED | OUTPATIENT
Start: 2024-07-26 | End: 2024-07-26

## 2024-07-26 RX ORDER — MORPHINE SULFATE 4 MG/ML
4 INJECTION, SOLUTION INTRAMUSCULAR; INTRAVENOUS ONCE
Status: COMPLETED | OUTPATIENT
Start: 2024-07-26 | End: 2024-07-26

## 2024-07-26 RX ADMIN — MORPHINE SULFATE 4 MG: 4 INJECTION, SOLUTION INTRAMUSCULAR; INTRAVENOUS at 11:56

## 2024-07-26 RX ADMIN — ONDANSETRON 4 MG: 2 INJECTION INTRAMUSCULAR; INTRAVENOUS at 11:57

## 2024-07-26 RX ADMIN — SODIUM CHLORIDE, POTASSIUM CHLORIDE, SODIUM LACTATE AND CALCIUM CHLORIDE 1000 ML: 600; 310; 30; 20 INJECTION, SOLUTION INTRAVENOUS at 11:57

## 2024-07-26 ASSESSMENT — COLUMBIA-SUICIDE SEVERITY RATING SCALE - C-SSRS
1. IN THE PAST MONTH, HAVE YOU WISHED YOU WERE DEAD OR WISHED YOU COULD GO TO SLEEP AND NOT WAKE UP?: NO
6. HAVE YOU EVER DONE ANYTHING, STARTED TO DO ANYTHING, OR PREPARED TO DO ANYTHING TO END YOUR LIFE?: NO
2. HAVE YOU ACTUALLY HAD ANY THOUGHTS OF KILLING YOURSELF?: NO

## 2024-07-26 ASSESSMENT — PAIN DESCRIPTION - DESCRIPTORS: DESCRIPTORS: SHARP

## 2024-07-26 ASSESSMENT — PAIN DESCRIPTION - LOCATION
LOCATION: ABDOMEN
LOCATION: ABDOMEN

## 2024-07-26 ASSESSMENT — PAIN - FUNCTIONAL ASSESSMENT: PAIN_FUNCTIONAL_ASSESSMENT: 0-10

## 2024-07-26 ASSESSMENT — PAIN DESCRIPTION - ORIENTATION
ORIENTATION: RIGHT;UPPER
ORIENTATION: RIGHT;UPPER

## 2024-07-26 ASSESSMENT — PAIN SCALES - GENERAL
PAINLEVEL_OUTOF10: 2
PAINLEVEL_OUTOF10: 10 - WORST POSSIBLE PAIN
PAINLEVEL_OUTOF10: 7

## 2024-07-26 NOTE — PROGRESS NOTES
Physical Therapy                 Therapy Communication Note    Patient Name: Abraham Rodriguez  MRN: [id number]  Today's Date: 7/26/2024     Discipline: Physical Therapy    Missed Visit Reason:      Missed Time: Cancel    Comment:  Not feeling well, going to the emergency department.

## 2024-07-26 NOTE — ED PROVIDER NOTES
Chief Complaint: Abdominal pain   HPI: This is an 83-year-old male, past medical history significant for chronic pain in his knee and back, presenting to the emergency department for abdominal pain which has been persistent over the last 6 days.  Patient states he was seen and evaluated in the emergency department 6 days ago, and was discharged home to follow-up with his primary care provider.  He states the pain has increased in severity, and so he presents back to the emergency department.  He also complains of some nausea however denies any vomiting or diarrhea.  He states that he has not had a bowel movement in the last 3 days.    Past Medical History:   Diagnosis Date    Allergic dermatitis 09/14/2023    Allergic rhinitis due to pollen 10/23/2014    Hay fever    Arthritis     Chronic pain disorder     Clotting disorder (Multi)     dvt april 2020     P.E. april 2020    COPD (chronic obstructive pulmonary disease) (Multi)     hx asbestos    Coronary artery disease     Encounter for other preprocedural examination 06/24/2014    Pre-procedural examination    Encounter for screening for malignant neoplasm of prostate     Encounter for screening for malignant neoplasm of prostate    Fissure in skin of foot 09/14/2023    Hyperlipidemia     Hypertension     Localized edema 05/11/2020    Pedal edema    Lung infiltrate 09/14/2023    Myocardial infarction (Multi)     Other conditions influencing health status     Carcinoma Of The Tongue    Pain in unspecified knee 12/22/2014    Joint pain, knee    Personal history of diseases of the skin and subcutaneous tissue 04/23/2013    History of eczema    Personal history of other diseases of the musculoskeletal system and connective tissue 06/19/2014    Personal history of arthritis    Personal history of other diseases of the nervous system and sense organs 08/10/2021    History of Bell's palsy    Personal history of other diseases of the respiratory system 11/12/2014    History  of asbestosis    Personal history of other diseases of the respiratory system 08/13/2014    History of chronic obstructive lung disease    Personal history of other specified conditions 08/20/2013    History of edema    Plantar fascial fibromatosis 07/22/2013    Plantar fasciitis    Polyp of colon     Polyp of sigmoid colon    Syncope and collapse 01/23/2015    Syncope and collapse    Unspecified abdominal pain 12/22/2014    Stomach pain    Unspecified disorder of eyelid 10/23/2014    Eyelid disorder      Past Surgical History:   Procedure Laterality Date    CARDIAC CATHETERIZATION      CHOLECYSTECTOMY  04/23/2013    Cholecystectomy Laparoscopic    OTHER SURGICAL HISTORY  04/27/2021    Meniscus repair    OTHER SURGICAL HISTORY Left 05/31/2023    Hip replacement    OTHER SURGICAL HISTORY  04/23/2013    Biopsy Tongue    SHOULDER SURGERY  04/23/2013    Shoulder Surgery    TOTAL KNEE ARTHROPLASTY Right 03/27/2024       Physical Exam  Constitutional:       Appearance: Normal appearance.   HENT:      Head: Normocephalic and atraumatic.      Mouth/Throat:      Mouth: Mucous membranes are moist.   Eyes:      Extraocular Movements: Extraocular movements intact.   Cardiovascular:      Rate and Rhythm: Normal rate and regular rhythm.   Pulmonary:      Effort: Pulmonary effort is normal.   Abdominal:      General: Abdomen is flat.      Palpations: Abdomen is soft.      Tenderness: There is generalized abdominal tenderness. There is no guarding or rebound.   Skin:     General: Skin is warm.   Neurological:      General: No focal deficit present.      Mental Status: He is alert.   Psychiatric:         Mood and Affect: Mood normal.            ED Course/MDM  Diagnoses as of 07/26/24 1458   Abdominal pain, unspecified abdominal location   Chronic wound     EKG interpreted by myself (ED attending physician): Normal sinus rhythm, rate of 67, left axis deviation, sinus arrhythmia, however normal intervals, no ST segment changes,  nonischemic EKG      This is a 83 y.o. male presenting to the ED for evaluation of abdominal pain which began 6 days ago and increased in severity today.  Patient states that he was previously seen and evaluated in the emergency department for this abdominal pain when it first started, and was discharged home.  He states that at that time he was also having diarrhea, however no longer is having diarrhea and is now having constipation, no bowel movement in 3 days.  He states that he does have an outpatient appointment with his surgeon, however this is not for another week and a half, and he is unable to control his pain at home.  On physical exam, the patient is resting comfortably in bed, no acute distress.  Heart is regular rate and rhythm, lungs are clear to auscultation bilaterally.  Abdomen is diffusely tender without rebound, guarding, rigidity.  Lab work was overall grossly unremarkable.  CT abdomen pelvis without IV contrast was obtained and Was overall grossly unremarkable apart from left renal calculi, which are in the kidney and unlikely to be causing the patient's pain.  Incidentally, the patient also mentions that he has a wound on his right buttock, that has been present for a while, however he is concerned that the scab may have ruptured.  This appears to be a chronic pressure ulcer type wound without any concern for infection.  It is overall unclear the cause of the patient's abdominal pain, however I do feel that is safe and stable for continued outpatient follow-up.  He was discharged home in stable condition, and advised to return to the emergency department for any new or worsening symptoms.    Final Impression  1.  Abdominal pain  3.  Chronic wound  Disposition/Plan: Discharge home  Condition at disposition: Stable.     Vivi Lou DO  Emergency Medicine Physician     Vivi Lou,   07/26/24 1504

## 2024-07-26 NOTE — ED TRIAGE NOTES
Patient presents by [facility] for c/o RUQ pain. Pain 7, 10 with palpation. No bm x 3 days. Patient has strong belching/dry heaves on arrival to ED which just started

## 2024-07-30 LAB
ATRIAL RATE: 67 BPM
P AXIS: 86 DEGREES
P OFFSET: 142 MS
P ONSET: 113 MS
PR INTERVAL: 190 MS
Q ONSET: 208 MS
QRS COUNT: 11 BEATS
QRS DURATION: 154 MS
QT INTERVAL: 448 MS
QTC CALCULATION(BAZETT): 473 MS
QTC FREDERICIA: 465 MS
R AXIS: -39 DEGREES
T AXIS: 26 DEGREES
T OFFSET: 432 MS
VENTRICULAR RATE: 67 BPM

## 2024-07-31 NOTE — ED PROVIDER NOTES
Chief Complaint: Abdominal pain  HPI: This is an 83-year-old male, presenting to the emergency department for evaluation of right upper quadrant abdominal pain as well as nausea and vomiting and diarrhea for the last few days.  Patient states the pain is a 7 out of 10.  He states that the diarrhea has yellow mucus and it.  He denies any dysuria or hematuria.  Denies any chest pain or shortness of breath.    Past Medical History:   Diagnosis Date    Allergic dermatitis 09/14/2023    Allergic rhinitis due to pollen 10/23/2014    Hay fever    Arthritis     Chronic pain disorder     Clotting disorder (Multi)     dvt april 2020     P.E. april 2020    COPD (chronic obstructive pulmonary disease) (Multi)     hx asbestos    Coronary artery disease     Encounter for other preprocedural examination 06/24/2014    Pre-procedural examination    Encounter for screening for malignant neoplasm of prostate     Encounter for screening for malignant neoplasm of prostate    Fissure in skin of foot 09/14/2023    Hyperlipidemia     Hypertension     Localized edema 05/11/2020    Pedal edema    Lung infiltrate 09/14/2023    Myocardial infarction (Multi)     Other conditions influencing health status     Carcinoma Of The Tongue    Pain in unspecified knee 12/22/2014    Joint pain, knee    Personal history of diseases of the skin and subcutaneous tissue 04/23/2013    History of eczema    Personal history of other diseases of the musculoskeletal system and connective tissue 06/19/2014    Personal history of arthritis    Personal history of other diseases of the nervous system and sense organs 08/10/2021    History of Bell's palsy    Personal history of other diseases of the respiratory system 11/12/2014    History of asbestosis    Personal history of other diseases of the respiratory system 08/13/2014    History of chronic obstructive lung disease    Personal history of other specified conditions 08/20/2013    History of edema    Plantar  fascial fibromatosis 07/22/2013    Plantar fasciitis    Polyp of colon     Polyp of sigmoid colon    Syncope and collapse 01/23/2015    Syncope and collapse    Unspecified abdominal pain 12/22/2014    Stomach pain    Unspecified disorder of eyelid 10/23/2014    Eyelid disorder      Past Surgical History:   Procedure Laterality Date    CARDIAC CATHETERIZATION      CHOLECYSTECTOMY  04/23/2013    Cholecystectomy Laparoscopic    OTHER SURGICAL HISTORY  04/27/2021    Meniscus repair    OTHER SURGICAL HISTORY Left 05/31/2023    Hip replacement    OTHER SURGICAL HISTORY  04/23/2013    Biopsy Tongue    SHOULDER SURGERY  04/23/2013    Shoulder Surgery    TOTAL KNEE ARTHROPLASTY Right 03/27/2024       Physical Exam  Constitutional:       Appearance: Normal appearance.   HENT:      Head: Normocephalic and atraumatic.      Mouth/Throat:      Mouth: Mucous membranes are moist.   Eyes:      Extraocular Movements: Extraocular movements intact.   Pulmonary:      Effort: Pulmonary effort is normal.   Abdominal:      General: Abdomen is flat.      Palpations: Abdomen is soft.      Tenderness: There is generalized abdominal tenderness. There is no guarding or rebound.   Skin:     General: Skin is warm.   Neurological:      General: No focal deficit present.      Mental Status: He is alert.   Psychiatric:         Mood and Affect: Mood normal.            ED Course/MDM  Diagnoses as of 07/31/24 1025   Abdominal pain, generalized   Diarrhea, unspecified type     EKG interpreted by myself (ED attending physician): Sinus rhythm, rate of 95 right bundle branch block, left axis deviation, normal intervals, T wave inversions diffusely, nonspecific ST segment changes, nonischemic EKG    This is a 83 y.o. male presenting to the ED for evaluation of abdominal pain, nausea, vomiting, and diarrhea for the last 2 days..  Patient states that the diarrhea is yellow and mucousy, however denies any blood.  He denies any fevers or chills.  He denies any  chest pain or shortness of breath.  On physical exam, the patient is resting comfortably in the bed, no acute distress.  Heart is regular rate and rhythm, lungs are clear to auscultation bilaterally.  Abdomen is diffusely tender without any rebound, guarding, rigidity.  Lab work was overall grossly unremarkable, and CT abdomen pelvis was also overall grossly unremarkable apart from kidney stones inside the left kidney, unlikely the cause of the patient's pain.  Patient was given morphine and Zofran as well as IV fluids, with improvement in his pain.  I feel that the patient's symptoms are likely secondary to a viral gastroenteritis.  He was able to tolerate p.o. intake, and was ultimately discharged home in stable condition, advised to return to the emergency department for any new or worsening symptoms and to follow-up with his primary care provider in the next few days.    Final Impression  1.  Abdominal pain  2.  Diarrhea  Disposition/Plan: Discharge home  Condition at disposition: Stable.     Vivi Lou DO  Emergency Medicine Physician     Vivi Lou DO  07/31/24 1037

## 2024-08-02 ENCOUNTER — APPOINTMENT (OUTPATIENT)
Dept: SURGERY | Facility: CLINIC | Age: 84
End: 2024-08-02
Payer: MEDICARE

## 2024-08-02 ENCOUNTER — LAB (OUTPATIENT)
Dept: LAB | Facility: LAB | Age: 84
End: 2024-08-02
Payer: MEDICARE

## 2024-08-02 VITALS
WEIGHT: 179 LBS | DIASTOLIC BLOOD PRESSURE: 79 MMHG | BODY MASS INDEX: 25.06 KG/M2 | TEMPERATURE: 97.3 F | HEART RATE: 78 BPM | HEIGHT: 71 IN | SYSTOLIC BLOOD PRESSURE: 173 MMHG

## 2024-08-02 DIAGNOSIS — E78.5 DYSLIPIDEMIA: ICD-10-CM

## 2024-08-02 DIAGNOSIS — R11.0 NAUSEA: ICD-10-CM

## 2024-08-02 DIAGNOSIS — Z00.00 WELLNESS EXAMINATION: ICD-10-CM

## 2024-08-02 DIAGNOSIS — I50.32 CHRONIC HEART FAILURE WITH PRESERVED EJECTION FRACTION (MULTI): ICD-10-CM

## 2024-08-02 DIAGNOSIS — R10.31 ABDOMINAL WALL PAIN IN RIGHT LOWER QUADRANT: Primary | ICD-10-CM

## 2024-08-02 LAB
ALBUMIN SERPL BCP-MCNC: 3.9 G/DL (ref 3.4–5)
ALP SERPL-CCNC: 50 U/L (ref 33–136)
ALT SERPL W P-5'-P-CCNC: 6 U/L (ref 10–52)
ANION GAP SERPL CALC-SCNC: 12 MMOL/L (ref 10–20)
AST SERPL W P-5'-P-CCNC: 10 U/L (ref 9–39)
BASOPHILS # BLD AUTO: 0.03 X10*3/UL (ref 0–0.1)
BASOPHILS NFR BLD AUTO: 0.5 %
BILIRUB SERPL-MCNC: 0.5 MG/DL (ref 0–1.2)
BUN SERPL-MCNC: 16 MG/DL (ref 6–23)
CALCIUM SERPL-MCNC: 9.3 MG/DL (ref 8.6–10.3)
CHLORIDE SERPL-SCNC: 102 MMOL/L (ref 98–107)
CHOLEST SERPL-MCNC: 159 MG/DL (ref 0–199)
CHOLESTEROL/HDL RATIO: 4.8
CO2 SERPL-SCNC: 28 MMOL/L (ref 21–32)
CREAT SERPL-MCNC: 0.99 MG/DL (ref 0.5–1.3)
EGFRCR SERPLBLD CKD-EPI 2021: 76 ML/MIN/1.73M*2
EOSINOPHIL # BLD AUTO: 0.1 X10*3/UL (ref 0–0.4)
EOSINOPHIL NFR BLD AUTO: 1.6 %
ERYTHROCYTE [DISTWIDTH] IN BLOOD BY AUTOMATED COUNT: 13.1 % (ref 11.5–14.5)
GLUCOSE SERPL-MCNC: 104 MG/DL (ref 74–99)
HCT VFR BLD AUTO: 43 % (ref 41–52)
HDLC SERPL-MCNC: 33.2 MG/DL
HGB BLD-MCNC: 14 G/DL (ref 13.5–17.5)
IMM GRANULOCYTES # BLD AUTO: 0.02 X10*3/UL (ref 0–0.5)
IMM GRANULOCYTES NFR BLD AUTO: 0.3 % (ref 0–0.9)
LDLC SERPL CALC-MCNC: 101 MG/DL
LYMPHOCYTES # BLD AUTO: 1.42 X10*3/UL (ref 0.8–3)
LYMPHOCYTES NFR BLD AUTO: 23.3 %
MCH RBC QN AUTO: 28.9 PG (ref 26–34)
MCHC RBC AUTO-ENTMCNC: 32.6 G/DL (ref 32–36)
MCV RBC AUTO: 89 FL (ref 80–100)
MONOCYTES # BLD AUTO: 0.46 X10*3/UL (ref 0.05–0.8)
MONOCYTES NFR BLD AUTO: 7.5 %
NEUTROPHILS # BLD AUTO: 4.07 X10*3/UL (ref 1.6–5.5)
NEUTROPHILS NFR BLD AUTO: 66.8 %
NON HDL CHOLESTEROL: 126 MG/DL (ref 0–149)
NRBC BLD-RTO: 0 /100 WBCS (ref 0–0)
PLATELET # BLD AUTO: 178 X10*3/UL (ref 150–450)
POTASSIUM SERPL-SCNC: 3.8 MMOL/L (ref 3.5–5.3)
PROT SERPL-MCNC: 6.8 G/DL (ref 6.4–8.2)
RBC # BLD AUTO: 4.84 X10*6/UL (ref 4.5–5.9)
SODIUM SERPL-SCNC: 138 MMOL/L (ref 136–145)
TRIGL SERPL-MCNC: 122 MG/DL (ref 0–149)
VLDL: 24 MG/DL (ref 0–40)
WBC # BLD AUTO: 6.1 X10*3/UL (ref 4.4–11.3)

## 2024-08-02 PROCEDURE — 80053 COMPREHEN METABOLIC PANEL: CPT

## 2024-08-02 PROCEDURE — 3077F SYST BP >= 140 MM HG: CPT | Performed by: SURGERY

## 2024-08-02 PROCEDURE — 1160F RVW MEDS BY RX/DR IN RCRD: CPT | Performed by: SURGERY

## 2024-08-02 PROCEDURE — 1123F ACP DISCUSS/DSCN MKR DOCD: CPT | Performed by: SURGERY

## 2024-08-02 PROCEDURE — 1159F MED LIST DOCD IN RCRD: CPT | Performed by: SURGERY

## 2024-08-02 PROCEDURE — 85025 COMPLETE CBC W/AUTO DIFF WBC: CPT

## 2024-08-02 PROCEDURE — 1036F TOBACCO NON-USER: CPT | Performed by: SURGERY

## 2024-08-02 PROCEDURE — 99213 OFFICE O/P EST LOW 20 MIN: CPT | Performed by: SURGERY

## 2024-08-02 PROCEDURE — 80061 LIPID PANEL: CPT

## 2024-08-02 PROCEDURE — 3078F DIAST BP <80 MM HG: CPT | Performed by: SURGERY

## 2024-08-02 RX ORDER — DICYCLOMINE HYDROCHLORIDE 10 MG/1
10 CAPSULE ORAL 4 TIMES DAILY
Qty: 120 CAPSULE | Refills: 11 | Status: SHIPPED | OUTPATIENT
Start: 2024-08-02 | End: 2025-08-02

## 2024-08-02 RX ORDER — ONDANSETRON 4 MG/1
4 TABLET, FILM COATED ORAL EVERY 8 HOURS PRN
Qty: 20 TABLET | Refills: 0 | Status: SHIPPED | OUTPATIENT
Start: 2024-08-02 | End: 2024-09-01

## 2024-08-02 ASSESSMENT — ENCOUNTER SYMPTOMS: ABDOMINAL PAIN: 1

## 2024-08-02 NOTE — PROGRESS NOTES
Subjective   Patient ID: Abraham Rodriguez is a 83 y.o. male who presents for abdominal pain    HPI   The patient was well until about 3 weeks ago when he developed severe lower abdominal pain more in the right side.  This was severe and radiated to the lower abdomen.  He had some nausea but no vomiting.  He came to the emergency room and a CT scan was unremarkable except for nonobstructing stones in the left kidney.  He was sent home and we presented 1 week later with a single similar occurrence of the pain again.  Repeat CT scan was unremarkable.  The patient has a history of C. difficile colitis.  Indicates that the pain occurs out of the blue.  Nothing makes it worse nothing makes it better.    Past Medical History:   Diagnosis Date    Allergic dermatitis 09/14/2023    Allergic rhinitis due to pollen 10/23/2014    Hay fever    Arthritis     Chronic pain disorder     Clotting disorder (Multi)     dvt april 2020     P.E. april 2020    COPD (chronic obstructive pulmonary disease) (Multi)     hx asbestos    Coronary artery disease     Encounter for other preprocedural examination 06/24/2014    Pre-procedural examination    Encounter for screening for malignant neoplasm of prostate     Encounter for screening for malignant neoplasm of prostate    Fissure in skin of foot 09/14/2023    Hyperlipidemia     Hypertension     Localized edema 05/11/2020    Pedal edema    Lung infiltrate 09/14/2023    Myocardial infarction (Multi)     Other conditions influencing health status     Carcinoma Of The Tongue    Pain in unspecified knee 12/22/2014    Joint pain, knee    Personal history of diseases of the skin and subcutaneous tissue 04/23/2013    History of eczema    Personal history of other diseases of the musculoskeletal system and connective tissue 06/19/2014    Personal history of arthritis    Personal history of other diseases of the nervous system and sense organs 08/10/2021    History of Bell's palsy    Personal history of  other diseases of the respiratory system 11/12/2014    History of asbestosis    Personal history of other diseases of the respiratory system 08/13/2014    History of chronic obstructive lung disease    Personal history of other specified conditions 08/20/2013    History of edema    Plantar fascial fibromatosis 07/22/2013    Plantar fasciitis    Polyp of colon     Polyp of sigmoid colon    Syncope and collapse 01/23/2015    Syncope and collapse    Unspecified abdominal pain 12/22/2014    Stomach pain    Unspecified disorder of eyelid 10/23/2014    Eyelid disorder        Current Outpatient Medications on File Prior to Visit   Medication Sig Dispense Refill    aspirin 325 mg EC tablet Take 1 tablet (325 mg) by mouth 2 times a day.      dexlansoprazole (Dexilant) 60 mg DR capsule Take 1 capsule (60 mg) by mouth once daily.      docusate sodium (Colace) 100 mg capsule Take 1 capsule (100 mg) by mouth 2 times a day as needed for constipation.      DULoxetine (Cymbalta) 30 mg DR capsule Take 1 capsule (30 mg) by mouth once daily. Do not crush or chew. 90 capsule 3    finasteride (Proscar) 5 mg tablet Take 1 tablet (5 mg) by mouth once daily in the evening. Do not crush, chew, or split.      fluticasone (Flonase) 50 mcg/actuation nasal spray Administer 1 spray into each nostril once daily as needed for rhinitis. Shake gently. Before first use, prime pump. After use, clean tip and replace cap. As needed 16 g 12    furosemide (Lasix) 40 mg tablet Take 1 tablet (40 mg) by mouth once daily as needed (swelling). Takes as needed      gabapentin (Neurontin) 300 mg capsule Take 2 capsules (600 mg) by mouth 3 times a day. 180 capsule 2    irbesartan (Avapro) 150 mg tablet TAKE 1 TABLET DAILY 90 tablet 3    metoprolol succinate XL (Toprol-XL) 50 mg 24 hr tablet TAKE 1 TABLET DAILY 90 tablet 3    naloxone (Narcan) 4 mg/0.1 mL nasal spray Administer 1 spray (4 mg) into affected nostril(s) if needed for opioid reversal or respiratory  depression. 2 each 0    [START ON 9/16/2024] oxyCODONE (Roxicodone) 5 mg immediate release tablet Take 1 tablet (5 mg) by mouth 4 times a day as needed for severe pain (7 - 10) for up to 28 days. Do not fill before September 16, 2024. 112 tablet 0    tamsulosin (Flomax) 0.4 mg 24 hr capsule Take 2 capsules (0.8 mg) by mouth once daily in the evening.      tiZANidine (Zanaflex) 2 mg tablet Take 1 tablet (2 mg) by mouth every 8 hours if needed for muscle spasms. 30 tablet 0    oxyCODONE (Roxicodone) 5 mg immediate release tablet Take 1 tablet (5 mg) by mouth 4 times a day as needed for severe pain (7 - 10) for up to 28 days. Do not fill before July 22, 2024. (Patient not taking: Reported on 8/2/2024) 112 tablet 0    [START ON 8/19/2024] oxyCODONE (Roxicodone) 5 mg immediate release tablet Take 1 tablet (5 mg) by mouth 4 times a day as needed for severe pain (7 - 10) for up to 28 days. Do not fill before August 19, 2024. (Patient not taking: Reported on 8/2/2024 Do not fill before August 19, 2024.) 112 tablet 0     No current facility-administered medications on file prior to visit.        Review of Systems   Gastrointestinal:  Positive for abdominal pain.   All other systems reviewed and are negative.      Vitals:    08/02/24 1247   BP: 173/79   Pulse: 78   Temp: 36.3 °C (97.3 °F)        Objective     Physical Exam  Constitutional:       Appearance: Normal appearance.   Abdominal:      Palpations: Abdomen is soft. There is no mass.      Tenderness: There is no abdominal tenderness. There is no guarding.      Comments: Locally tender right rectus abdominis muscle         Problem List Items Addressed This Visit       Abdominal wall pain in right lower quadrant - Primary    Relevant Medications    dicyclomine (Bentyl) 10 mg capsule    ondansetron (Zofran) 4 mg tablet    Nausea        Assessment/Plan   Plan-continue Bentyl.  Recommend topical Voltaren gel to the locally tender area in the right lower abdomen.  Follow as  needed.    Waldemar Hameed MD

## 2024-08-02 NOTE — PATIENT INSTRUCTIONS
You have had 2 episodes of severe right-sided abdominal pain.  You appear to have tenderness in the abdominal wall which could be a rectus abdominis muscle issue.  I want you to continue taking her Bentyl.  I recommend applying topical Voltaren gel to the locally tender area in your abdominal wall.  I will see you as needed.

## 2024-08-06 ENCOUNTER — TREATMENT (OUTPATIENT)
Dept: PHYSICAL THERAPY | Facility: HOSPITAL | Age: 84
End: 2024-08-06
Payer: MEDICARE

## 2024-08-06 ENCOUNTER — APPOINTMENT (OUTPATIENT)
Dept: PRIMARY CARE | Facility: CLINIC | Age: 84
End: 2024-08-06
Payer: MEDICARE

## 2024-08-06 VITALS
OXYGEN SATURATION: 95 % | HEART RATE: 70 BPM | SYSTOLIC BLOOD PRESSURE: 176 MMHG | BODY MASS INDEX: 25.34 KG/M2 | HEIGHT: 71 IN | DIASTOLIC BLOOD PRESSURE: 80 MMHG | WEIGHT: 181 LBS

## 2024-08-06 DIAGNOSIS — Z96.651 PRESENCE OF RIGHT ARTIFICIAL KNEE JOINT: ICD-10-CM

## 2024-08-06 DIAGNOSIS — L89.312 PRESSURE INJURY OF RIGHT BUTTOCK, STAGE 2 (MULTI): ICD-10-CM

## 2024-08-06 DIAGNOSIS — R10.84 GENERALIZED ABDOMINAL PAIN: Primary | ICD-10-CM

## 2024-08-06 PROCEDURE — 1123F ACP DISCUSS/DSCN MKR DOCD: CPT | Performed by: NURSE PRACTITIONER

## 2024-08-06 PROCEDURE — 97110 THERAPEUTIC EXERCISES: CPT | Mod: GP,CQ

## 2024-08-06 PROCEDURE — 3079F DIAST BP 80-89 MM HG: CPT | Performed by: NURSE PRACTITIONER

## 2024-08-06 PROCEDURE — 1160F RVW MEDS BY RX/DR IN RCRD: CPT | Performed by: NURSE PRACTITIONER

## 2024-08-06 PROCEDURE — 97140 MANUAL THERAPY 1/> REGIONS: CPT | Mod: GP,CQ

## 2024-08-06 PROCEDURE — 99214 OFFICE O/P EST MOD 30 MIN: CPT | Performed by: NURSE PRACTITIONER

## 2024-08-06 PROCEDURE — 1158F ADVNC CARE PLAN TLK DOCD: CPT | Performed by: NURSE PRACTITIONER

## 2024-08-06 PROCEDURE — 1159F MED LIST DOCD IN RCRD: CPT | Performed by: NURSE PRACTITIONER

## 2024-08-06 PROCEDURE — 1036F TOBACCO NON-USER: CPT | Performed by: NURSE PRACTITIONER

## 2024-08-06 PROCEDURE — 3077F SYST BP >= 140 MM HG: CPT | Performed by: NURSE PRACTITIONER

## 2024-08-06 ASSESSMENT — PAIN SCALES - GENERAL: PAINLEVEL_OUTOF10: 6

## 2024-08-06 ASSESSMENT — PAIN - FUNCTIONAL ASSESSMENT: PAIN_FUNCTIONAL_ASSESSMENT: 0-10

## 2024-08-06 ASSESSMENT — ENCOUNTER SYMPTOMS: ABDOMINAL PAIN: 1

## 2024-08-06 NOTE — PROGRESS NOTES
"  Physical Therapy Treatment    Patient Name: Abraham Rodriguez  MRN: [id number]  Today's Date: 8/6/2024  Time Calculation  Start Time: 1304  Stop Time: 1350  Time Calculation (min): 46 min        PT Therapeutic Procedures Time Entry  Manual Therapy Time Entry: 15  Therapeutic Exercise Time Entry: 31                Current Problem  1. Presence of right artificial knee joint  Follow Up In Physical Therapy          General  Reason for Referral: right knee pain s/p TKA 3/20/2024  Referred By: Dr. Arora    Subjective   Current Condition:   Better  Patient reports he had been sick and in the hospital for stomach problems and had missed a few appts. Pt. Is compliant with HEP.    Performing HEP?: Yes    Precautions  Precautions  LE Weight Bearing Status: Weight Bearing as Tolerated  Medical Precautions: Fall precautions  Post-Surgical Precautions: Right total knee precautions    Pain  Pain Assessment: 0-10  0-10 (Numeric) Pain Score: 6  Pain Location: Knee  Pain Orientation: Right    Objective   KNEE  Observation   Pt. Walks with flexed posture.  Knee AROM   3-115 degrees  R knee  Gait   With straight cane  Flexibility   Lacks HS length  Treatments:    Therapeutic Exercise  Therapeutic Exercise Performed: Yes  Therapeutic Exercise Activity 1: SciFit Bike Level 4 5'  Therapeutic Exercise Activity 2: Gastroc stretch 1'  Therapeutic Exercise Activity 3: Hamstring Stretch 3x20\"  Therapeutic Exercise Activity 4: Step ups with opposite knee drive 6\" x10 R/L  Therapeutic Exercise Activity 5: star trac 15 # knee ext, 35 # knee flex 2 x 15 ea.  Therapeutic Exercise Activity 6: heel raises with 1.5# x 15  Therapeutic Exercise Activity 7: Standing Hip ABD and Extension 2 x10 R/Lwith 1.5#  Therapeutic Exercise Activity 8: TKE Black x15  Therapeutic Exercise Activity 9: STS x 10  Therapeutic Exercise Activity 10: knee raises x 10 with 1.5#  Therapeutic Exercise Activity 11: Hamstring Stretch 3x20\"    Manual Therapy  Manual Therapy Performed: " Yes  Manual Therapy Activity 1: Patella mobs inf glides  Manual Therapy Activity 2: R knee extension mobs to increase mobility  Manual Therapy Activity 3: light occilations  EDUCATION:   Outpatient Education  Education Comment: pt. has no questions about HEP    Assessment: Pt. Able to tolerate all exercises and manual therapy to increase ROM and decrease pain. Pt. Reported that he had been sick and missed the last few sessions, but he was feeling better now.  PT Assessment  PT Assessment Results: Decreased strength, Decreased range of motion, Impaired balance, Decreased mobility, Pain  Rehab Prognosis: Good  Evaluation/Treatment Tolerance: Patient tolerated treatment well    Plan:continue with current POC with focus on strengthening, stretching of R LE for return to normal functional activities  without pain.  OP PT Plan  PT Plan: Skilled PT  PT Frequency: 2 times per week  Duration: 4 weeks  Onset Date: 03/20/24  Certification Period Start Date: 06/07/24  Certification Period End Date: 08/19/24  Number of Treatments Authorized: 12 of 19  Rehab Potential: Good  Plan of Care Agreement: Patient    Goals:  Active       PT Problem       Patient will maintain single leg stand on each leg for 10 sec with no upper extremity support. (Progressing)       Start:  06/07/24    Expected End:  08/19/24            Patient will be able to maintain dynamic standing balance during walking over obstacles (Progressing)       Start:  06/07/24    Expected End:  08/19/24            Patient will ambulate with normal gait pattern with no device (Progressing)       Start:  06/07/24    Expected End:  08/19/24            Patient will ambulate up and down a curb/single step with independent using no device (Progressing)       Start:  06/07/24    Expected End:  08/19/24            Patient will achieve right knee ROM of  0-120 degrees  (Progressing)       Start:  06/07/24    Expected End:  08/19/24            Patient will achieve bilateral hip  abduction strength of at least 4+/5 (Progressing)       Start:  06/07/24    Expected End:  08/19/24            Patient will achieve bilateral knee flexion strength of at least 4+/5 (Progressing)       Start:  06/07/24    Expected End:  08/19/24            Patient will achieve bilateral knee extension strength of at least 4+/5 (Progressing)       Start:  06/07/24    Expected End:  08/19/24            Patient will demonstrate independence in home program for support of progression (Met)       Start:  06/07/24    Expected End:  06/21/24    Resolved:  07/22/24         Patient will report pain of no more than 2/10 demonstrating a reduction of overall pain (Progressing)       Start:  06/07/24    Expected End:  08/19/24            Patient will show a significant change in LEFS (8 to 17) patient reported outcome tool to demonstrate subjective imporovement (Progressing)       Start:  06/07/24    Expected End:  08/19/24                 Candy Zuniga, PTA

## 2024-08-06 NOTE — PROGRESS NOTES
"Subjective   Patient ID: Abraham Rodriguez is a 83 y.o. male who presents for Abdominal Pain (Patient mentioned he was in the ER for abdominal pain and lose stool that lasted for about a week. Patient  that the pain has now subsided. ).    Here for review ED visit July 20 for abdominal pain nausea vomiting diarrhea.  Workup complete was negative.  He was discharged home he returned to the emergency room again July 26 with abdominal pain that was worsening.  Again workup was complete and nothing acute was diagnosed.  While in the emergency room during the second visit he mentioned to the ED staff that he had a wound on his sacrum.  He appropriately helped with cleansing and provided a dressing change.  He is asking that I look at that and change the dressing today.  Sacral ulcer dressing placed 5 days ago in the ED. patient states is feeling much better.  He does not feel the rubbing and burning that he had been.  He has been trying his best to keep this clean as he can    He states he is back to his usual health no abdominal pain nausea vomiting diarrhea.  Denies fever or chills.  He has a known renal calculi but he states that it does not cause any discomfort.  Recent urinalysis reviewed and was negative.    He is already scheduled for routine follow-up in the near future.    Abdominal Pain         Review of Systems   Gastrointestinal:  Positive for abdominal pain.       Objective   /80   Pulse 70   Ht 1.803 m (5' 11\")   Wt 82.1 kg (181 lb)   SpO2 95%   BMI 25.24 kg/m²     Physical Exam  Constitutional:       Appearance: Normal appearance.   Cardiovascular:      Rate and Rhythm: Normal rate and regular rhythm.      Pulses: Normal pulses.   Pulmonary:      Effort: Pulmonary effort is normal.      Breath sounds: Normal breath sounds.   Abdominal:      General: Abdomen is flat.      Palpations: Abdomen is soft.   Musculoskeletal:         General: Normal range of motion.   Skin:     General: Skin is warm and " dry.      Comments: Right inner gluteal fold ulceration pretty much healed.  Mild dry skin.  I cleaned it in the office with antibacterial soap patted dry applied the skin cleanser that he brought with him patted dry then applied a Tegaderm.  I explained to the patient went to remove the Tegaderm once this falls off or is removed he should be able to keep it open to air   Neurological:      General: No focal deficit present.      Mental Status: He is alert and oriented to person, place, and time. Mental status is at baseline.   Psychiatric:         Mood and Affect: Mood normal.         Behavior: Behavior normal.         Assessment/Plan   Diagnoses and all orders for this visit:  Generalized abdominal pain  Comments:  Resolved.  Most likely viral gastroenteritis.  This was reviewed with patient.  I asked that he call or return to the emergency room if the pain show return  Pressure injury of right buttock, stage 2 (Multi)  Comments:  Pretty much healed.  Now stage I.  Continue cleansing and keeping it dry to prevent irritation

## 2024-08-09 ENCOUNTER — DOCUMENTATION (OUTPATIENT)
Dept: PHYSICAL THERAPY | Facility: HOSPITAL | Age: 84
End: 2024-08-09
Payer: COMMERCIAL

## 2024-08-09 ENCOUNTER — APPOINTMENT (OUTPATIENT)
Dept: PHYSICAL THERAPY | Facility: HOSPITAL | Age: 84
End: 2024-08-09
Payer: MEDICARE

## 2024-08-09 NOTE — PROGRESS NOTES
Physical Therapy                 Therapy Communication Note    Patient Name: Abraham Rodriguez  MRN: [id number]  Today's Date: 8/9/2024     Discipline: Physical Therapy    Missed Time: Cancel today's appointment due to not feeling well.

## 2024-08-13 ENCOUNTER — APPOINTMENT (OUTPATIENT)
Dept: PHYSICAL THERAPY | Facility: HOSPITAL | Age: 84
End: 2024-08-13
Payer: MEDICARE

## 2024-08-16 ENCOUNTER — TREATMENT (OUTPATIENT)
Dept: PHYSICAL THERAPY | Facility: HOSPITAL | Age: 84
End: 2024-08-16
Payer: MEDICARE

## 2024-08-16 DIAGNOSIS — Z96.651 PRESENCE OF RIGHT ARTIFICIAL KNEE JOINT: Primary | ICD-10-CM

## 2024-08-16 PROCEDURE — 97110 THERAPEUTIC EXERCISES: CPT | Mod: GP | Performed by: PHYSICAL THERAPIST

## 2024-08-16 ASSESSMENT — PAIN - FUNCTIONAL ASSESSMENT: PAIN_FUNCTIONAL_ASSESSMENT: 0-10

## 2024-08-16 ASSESSMENT — PAIN SCALES - GENERAL: PAINLEVEL_OUTOF10: 8

## 2024-08-16 NOTE — PROGRESS NOTES
"  Physical Therapy Treatment    Patient Name: Abraham Rodriguez  MRN: [id number]  Today's Date: 8/16/2024  Time Calculation  Start Time: 1246  Stop Time: 1328  Time Calculation (min): 42 min        PT Therapeutic Procedures Time Entry  Therapeutic Exercise Time Entry: 42                Current Problem  1. Presence of right artificial knee joint  Follow Up In Physical Therapy        Problem List Items Addressed This Visit             ICD-10-CM    Presence of right artificial knee joint - Primary Z96.651       General  Reason for Referral: right knee pain s/p TKA 3/20/2024  Referred By: Dr. Arora    Subjective   Current Condition:   Same  Patient reports that he has been having difficulty performing his home exercise program due to his abdominal pain. Patient states that his knee has not stiffened up on him.    Performing HEP?: Partially    Precautions  Precautions  LE Weight Bearing Status: Weight Bearing as Tolerated  Medical Precautions: Fall precautions  Post-Surgical Precautions: Right total knee precautions  Pain  Pain Assessment: 0-10  0-10 (Numeric) Pain Score: 8  Pain Location: Abdomen  Pain Orientation: Left, Upper    Objective     Treatments:  Therapeutic Exercise  Therapeutic Exercise Performed: Yes  Therapeutic Exercise Activity 1: SciFit Bike Level 3 5'  Therapeutic Exercise Activity 2: Gastroc stretch 1'  Therapeutic Exercise Activity 3: Airex 1/2 tandem 30\" R/L  Therapeutic Exercise Activity 4: Step ups fwd 6\" x10 R/L  Therapeutic Exercise Activity 5: star trac 15 # knee ext, 35 # knee flex x15 ea.  Therapeutic Exercise Activity 7: Standing Hip ABD and Extension  x R/L  Therapeutic Exercise Activity 8: TKE with HR Black x10  Therapeutic Exercise Activity 9: STS x 10 19\" height  Therapeutic Exercise Activity 12: heel slides arom x20  Therapeutic Exercise Activity 13: SKTC 3x10\"  Therapeutic Exercise Activity 14: manual piriformis stretch 3x20\"    Manual Therapy  Manual Therapy Performed: No    EDUCATION: "   Individual(s) Educated: Patient  Education Provided: yes  Handout(s) Provided: Scanned into chart  Home Program: reviewed home exercise program   Risk and Benefits Discussed with Patient/Caregiver/Other: Yes   Patient/Caregiver Demonstrated Understanding: Yes   Patient Response to Education: Patient/Caregiver verbalized understanding of information and Patient/Caregiver performed return demonstration of exercises/activities    Assessment: Patient demonstrated difficulty participating in physical therapy due to abdominal pain. Resistance was decreased to try to decrease his intra-abdominal pressure.  Performed hip and low back stretches due to general stiffness. Patient had no complaints of right knee pain throughout the session.  PT Assessment  PT Assessment Results: Decreased strength, Decreased range of motion, Impaired balance, Decreased mobility, Pain  Rehab Prognosis: Good  Evaluation/Treatment Tolerance: Patient tolerated treatment well, Patient limited by pain    Plan: Continue with POC. Progress exercises as tolerated.  OP PT Plan  Treatment/Interventions: Education/ Instruction, Gait training, Manual therapy, Neuromuscular re-education, Therapeutic activities, Therapeutic exercises  PT Plan: Skilled PT  PT Frequency: 2 times per week  Duration: 4 weeks  Onset Date: 03/20/24  Certification Period Start Date: 06/07/24  Certification Period End Date: 08/19/24  Number of Treatments Authorized: 13 of 19  Rehab Potential: Good  Plan of Care Agreement: Patient    Goals:  Active       PT Problem       Patient will maintain single leg stand on each leg for 10 sec with no upper extremity support. (Progressing)       Start:  06/07/24    Expected End:  08/19/24            Patient will be able to maintain dynamic standing balance during walking over obstacles (Progressing)       Start:  06/07/24    Expected End:  08/19/24            Patient will ambulate with normal gait pattern with no device (Progressing)        Start:  06/07/24    Expected End:  08/19/24            Patient will ambulate up and down a curb/single step with independent using no device (Progressing)       Start:  06/07/24    Expected End:  08/19/24            Patient will achieve right knee ROM of  0-120 degrees  (Progressing)       Start:  06/07/24    Expected End:  08/19/24            Patient will achieve bilateral hip abduction strength of at least 4+/5 (Progressing)       Start:  06/07/24    Expected End:  08/19/24            Patient will achieve bilateral knee flexion strength of at least 4+/5 (Progressing)       Start:  06/07/24    Expected End:  08/19/24            Patient will achieve bilateral knee extension strength of at least 4+/5 (Progressing)       Start:  06/07/24    Expected End:  08/19/24            Patient will demonstrate independence in home program for support of progression (Met)       Start:  06/07/24    Expected End:  06/21/24    Resolved:  07/22/24         Patient will report pain of no more than 2/10 demonstrating a reduction of overall pain (Progressing)       Start:  06/07/24    Expected End:  08/19/24            Patient will show a significant change in LEFS (8 to 17) patient reported outcome tool to demonstrate subjective imporovement (Progressing)       Start:  06/07/24    Expected End:  08/19/24                 Mirza Todd, PT

## 2024-08-20 ENCOUNTER — TREATMENT (OUTPATIENT)
Dept: PHYSICAL THERAPY | Facility: HOSPITAL | Age: 84
End: 2024-08-20
Payer: MEDICARE

## 2024-08-20 DIAGNOSIS — Z96.651 PRESENCE OF RIGHT ARTIFICIAL KNEE JOINT: Primary | ICD-10-CM

## 2024-08-20 DIAGNOSIS — M51.36 DEGENERATION OF INTERVERTEBRAL DISC OF LUMBAR REGION: ICD-10-CM

## 2024-08-20 PROCEDURE — 97110 THERAPEUTIC EXERCISES: CPT | Mod: GP | Performed by: PHYSICAL THERAPIST

## 2024-08-20 ASSESSMENT — PAIN - FUNCTIONAL ASSESSMENT: PAIN_FUNCTIONAL_ASSESSMENT: 0-10

## 2024-08-20 ASSESSMENT — PAIN SCALES - GENERAL: PAINLEVEL_OUTOF10: 7

## 2024-08-20 NOTE — PROGRESS NOTES
"  Physical Therapy Re-Evaluation and Treatment    Patient Name: Abraham Rodriguez  MRN: [id number]  Today's Date: 8/20/2024  Time Calculation  Start Time: 1301  Stop Time: 1344  Time Calculation (min): 43 min        PT Therapeutic Procedures Time Entry  Therapeutic Exercise Time Entry: 43        Current Problem  1. Presence of right artificial knee joint  Follow Up In Physical Therapy        Problem List Items Addressed This Visit             ICD-10-CM    Presence of right artificial knee joint - Primary Z96.651       General  Reason for Referral: right knee pain s/p TKA 3/20/2024  Referred By: Dr. Arora    Subjective   Current Condition:   Same  Patient reports continued stomach issues. Patient also has complaints of right distal knee pain.    Performing HEP?: Yes    Precautions  Precautions  LE Weight Bearing Status: Weight Bearing as Tolerated  Medical Precautions: Fall precautions  Post-Surgical Precautions: Right total knee precautions  Pain  Pain Assessment: 0-10  0-10 (Numeric) Pain Score: 7  Pain Location: Knee  Pain Orientation: Right    Objective     Specific Lower Extremity MMT  R Iliopsoas: (5/5): 4/5  L Iliopsoas: (5/5): 5/5    Functional Rating Scale  LEFS /80: 10    Knee AROM  R knee flexion: (140°): 116  R knee extension: (0°): 0    Knee MMT  Knee MMT WFL:  (limited by pain)  R knee flexion: (5/5): 4/5  L knee flexion: (5/5): 5/5  R knee extension: (5/5): 4-/5 (limited by pain)  L knee extension: (5/5): 5/5    Flexibility  R hamstrings: mod restriction  L hamstrings: mod restriction    Outcome Measures:  Other Measures  Lower Extremity Funtional Score (LEFS): 10/80    Treatments:  Therapeutic Exercise  Therapeutic Exercise Performed: Yes  Therapeutic Exercise Activity 1: SciFit Bike Level 3 5'  Therapeutic Exercise Activity 4: Step ups fwd 6\" x10 R/L  Therapeutic Exercise Activity 7: Standing Hip ABD and Extension  x10 R/L  Therapeutic Exercise Activity 9: STS x 10 20\" height  Therapeutic Exercise Activity " "11: Hamstring Stretch 3x20\"  Therapeutic Exercise Activity 14: manual piriformis stretch 3x20\"  Therapeutic Exercise Activity 15: SLR hob elevated x10       EDUCATION:   Individual(s) Educated: Patient  Education Provided: yes  Handout(s) Provided: Scanned into chart  Home Program: reviewed home exercise program   Risk and Benefits Discussed with Patient/Caregiver/Other: Yes   Patient/Caregiver Demonstrated Understanding: Yes   Patient Response to Education: Patient/Caregiver verbalized understanding of information and Patient/Caregiver performed return demonstration of exercises/activities    Assessment: Patient demonstrated difficulty walking and participating in physical therapy due to abdominal and right knee pain. Patient was encouraged to consult with his primary care doctor in regards to his continued abdominal pain and his orthopedic doctor regarding his continued right knee pain. Patient verbalized understanding. Patient has not progressed towards his physical therapy goals since his last recheck.  PT Assessment  PT Assessment Results: Decreased strength, Decreased range of motion, Impaired balance, Decreased mobility, Pain  Rehab Prognosis: Good  Evaluation/Treatment Tolerance: Patient tolerated treatment well, Patient limited by pain    Plan: Continue with POC. Progress exercises as tolerated.  OP PT Plan  Treatment/Interventions: Education/ Instruction, Gait training, Manual therapy, Neuromuscular re-education, Therapeutic activities, Therapeutic exercises  PT Plan: Skilled PT  PT Frequency: 2 times per week  Duration: 4 weeks  Onset Date: 03/20/24  Certification Period Start Date: 06/07/24  Certification Period End Date: 09/17/24  Number of Treatments Authorized: 14 of 19  Rehab Potential: Good  Plan of Care Agreement: Patient    Goals:  Active       PT Problem       Patient will maintain single leg stand on each leg for 10 sec with no upper extremity support. (Not Progressing)       Start:  06/07/24    " Expected End:  09/17/24         Goal Note       Too much pain to test 8/20/2024.              Patient will be able to maintain dynamic standing balance during walking over obstacles (Met)       Start:  06/07/24    Expected End:  08/19/24    Resolved:  08/20/24         Patient will ambulate with normal gait pattern with no device (Not Progressing)       Start:  06/07/24    Expected End:  09/17/24            Patient will ambulate up and down a curb/single step with independent using no device (Not Progressing)       Start:  06/07/24    Expected End:  09/17/24            Patient will achieve right knee ROM of  0-120 degrees  (Not Progressing)       Start:  06/07/24    Expected End:  09/17/24            Patient will achieve bilateral hip abduction strength of at least 4+/5 (Progressing)       Start:  06/07/24    Expected End:  09/17/24            Patient will achieve bilateral knee flexion strength of at least 4+/5 (Not Progressing)       Start:  06/07/24    Expected End:  09/17/24            Patient will achieve bilateral knee extension strength of at least 4+/5 (Not Progressing)       Start:  06/07/24    Expected End:  09/17/24            Patient will demonstrate independence in home program for support of progression (Met)       Start:  06/07/24    Expected End:  06/21/24    Resolved:  07/22/24         Patient will report pain of no more than 2/10 demonstrating a reduction of overall pain (Not Progressing)       Start:  06/07/24    Expected End:  09/17/24            Patient will show a significant change in LEFS (8 to 17) patient reported outcome tool to demonstrate subjective imporovement (Progressing)       Start:  06/07/24    Expected End:  09/17/24                 Mirza Todd, PT

## 2024-08-21 RX ORDER — OXYCODONE HYDROCHLORIDE 5 MG/1
5 TABLET ORAL 4 TIMES DAILY PRN
Qty: 112 TABLET | Refills: 0 | Status: SHIPPED | OUTPATIENT
Start: 2024-08-21 | End: 2024-09-18

## 2024-08-22 ENCOUNTER — DOCUMENTATION (OUTPATIENT)
Dept: PHYSICAL THERAPY | Facility: HOSPITAL | Age: 84
End: 2024-08-22
Payer: COMMERCIAL

## 2024-08-22 NOTE — PROGRESS NOTES
Physical Therapy                 Therapy Communication Note    Patient Name: Abraham Rodriguez  MRN: [id number]  Today's Date: 8/22/2024     Discipline: Physical Therapy    Missed Visit Reason:  Called and spoke with patient, states he was sleeping and is not able to attend PT today d/t stomach pain. States his PCP is looking for a specialist for him to see for a consult, to address this problem.  Pt plans on attending next scheduled appointment.     Missed Time: No Show

## 2024-08-23 ENCOUNTER — APPOINTMENT (OUTPATIENT)
Dept: PHYSICAL THERAPY | Facility: HOSPITAL | Age: 84
End: 2024-08-23
Payer: MEDICARE

## 2024-08-27 ENCOUNTER — TREATMENT (OUTPATIENT)
Dept: PHYSICAL THERAPY | Facility: HOSPITAL | Age: 84
End: 2024-08-27
Payer: MEDICARE

## 2024-08-27 DIAGNOSIS — Z96.651 PRESENCE OF RIGHT ARTIFICIAL KNEE JOINT: ICD-10-CM

## 2024-08-27 PROCEDURE — 97140 MANUAL THERAPY 1/> REGIONS: CPT | Mod: GP,CQ

## 2024-08-27 PROCEDURE — 97110 THERAPEUTIC EXERCISES: CPT | Mod: GP,CQ

## 2024-08-27 ASSESSMENT — PAIN SCALES - GENERAL: PAINLEVEL_OUTOF10: 7

## 2024-08-27 ASSESSMENT — PAIN - FUNCTIONAL ASSESSMENT: PAIN_FUNCTIONAL_ASSESSMENT: 0-10

## 2024-08-27 NOTE — PROGRESS NOTES
"  Physical Therapy Treatment    Patient Name: Abraham Rodriguez  MRN: [id number]  Today's Date: 8/27/2024  Time Calculation  Start Time: 1350  Stop Time: 1428  Time Calculation (min): 38 min        PT Therapeutic Procedures Time Entry  Manual Therapy Time Entry: 8  Therapeutic Exercise Time Entry: 35                Current Problem  1. Presence of right artificial knee joint  Follow Up In Physical Therapy          General  Reason for Referral: right knee pain s/p TKA 3/20/2024  Referred By: Dr. Arora    Subjective   Current Condition:   Same  Patient reports he is having a better day with the stomach pain today. States his knee is feeling looser, however, continues to have pain.      Performing HEP?: Yes    Precautions  Precautions  LE Weight Bearing Status: Weight Bearing as Tolerated  Medical Precautions: Fall precautions  Post-Surgical Precautions: Right total knee precautions  Pain  Pain Assessment: 0-10  0-10 (Numeric) Pain Score: 7  Pain Location: Knee  Pain Orientation: Right    Objective   KNEE        Knee AROM: R Flexion 116     Treatments:    Therapeutic Exercise  Therapeutic Exercise Performed: Yes  Therapeutic Exercise Activity 1: SciFit Bike Level 3 5'  Therapeutic Exercise Activity 4: Step ups fwd 6\" x10 R/L  Therapeutic Exercise Activity 5: Knee Extension 15# x20 bilateral  Therapeutic Exercise Activity 6: Knee Flexion 35# x20 Bilateral  Therapeutic Exercise Activity 7: side step yellow 6' x4  Therapeutic Exercise Activity 8: Hip Extension x10 R/L  Therapeutic Exercise Activity 9: TKE with HR Black x10  Therapeutic Exercise Activity 12: heel slides arom x20  Therapeutic Exercise Activity 13: Heel slides with strap x3 20\"         Manual Therapy  Manual Therapy Performed: Yes  Manual Therapy Activity 1: Patella mobs inf/sup, med/lat glides  Manual Therapy Activity 2: R knee flexion and extension mobs to increase mobility                   EDUCATION:   Individual(s) Educated: Patient  Education Provided: HEP "   Risk and Benefits Discussed with Patient/Caregiver/Other: Yes   Patient/Caregiver Demonstrated Understanding: Yes   Patient Response to Education: Patient/Caregiver verbalized understanding of information    Assessment: Pt required cuing to correct technique and pace with TE's with good follow through demonstrated. Pt unsteady with balance exercises on compliant surface, required close SBA for safety. Pt able to complete session without c/o increased pain in the R knee.   PT Assessment  PT Assessment Results: Decreased strength, Decreased range of motion, Impaired balance, Decreased mobility, Pain  Rehab Prognosis: Good    Plan: Continue to progress current POC as tolerated to facilitate ability to perform functional activities.   OP PT Plan  PT Plan: Skilled PT  PT Frequency: 2 times per week  Duration: 4 weeks  Onset Date: 03/20/24  Certification Period Start Date: 06/07/24  Certification Period End Date: 09/17/24  Number of Treatments Authorized: 15 of 19    Goals:  Active       PT Problem       Patient will maintain single leg stand on each leg for 10 sec with no upper extremity support. (Not Progressing)       Start:  06/07/24    Expected End:  09/17/24         Goal Note       Too much pain to test 8/20/2024.              Patient will be able to maintain dynamic standing balance during walking over obstacles (Met)       Start:  06/07/24    Expected End:  08/19/24    Resolved:  08/20/24         Patient will ambulate with normal gait pattern with no device (Not Progressing)       Start:  06/07/24    Expected End:  09/17/24            Patient will ambulate up and down a curb/single step with independent using no device (Not Progressing)       Start:  06/07/24    Expected End:  09/17/24            Patient will achieve right knee ROM of  0-120 degrees  (Not Progressing)       Start:  06/07/24    Expected End:  09/17/24            Patient will achieve bilateral hip abduction strength of at least 4+/5 (Progressing)        Start:  06/07/24    Expected End:  09/17/24            Patient will achieve bilateral knee flexion strength of at least 4+/5 (Not Progressing)       Start:  06/07/24    Expected End:  09/17/24            Patient will achieve bilateral knee extension strength of at least 4+/5 (Not Progressing)       Start:  06/07/24    Expected End:  09/17/24            Patient will demonstrate independence in home program for support of progression (Met)       Start:  06/07/24    Expected End:  06/21/24    Resolved:  07/22/24         Patient will report pain of no more than 2/10 demonstrating a reduction of overall pain (Not Progressing)       Start:  06/07/24    Expected End:  09/17/24            Patient will show a significant change in LEFS (8 to 17) patient reported outcome tool to demonstrate subjective imporovement (Progressing)       Start:  06/07/24    Expected End:  09/17/24                 Jorge Shin, PTA

## 2024-08-30 ENCOUNTER — TREATMENT (OUTPATIENT)
Dept: PHYSICAL THERAPY | Facility: HOSPITAL | Age: 84
End: 2024-08-30
Payer: MEDICARE

## 2024-08-30 DIAGNOSIS — Z96.651 PRESENCE OF RIGHT ARTIFICIAL KNEE JOINT: Primary | ICD-10-CM

## 2024-08-30 PROCEDURE — 97110 THERAPEUTIC EXERCISES: CPT | Mod: GP | Performed by: PHYSICAL THERAPIST

## 2024-08-30 ASSESSMENT — PAIN SCALES - GENERAL: PAINLEVEL_OUTOF10: 6

## 2024-08-30 ASSESSMENT — PAIN - FUNCTIONAL ASSESSMENT: PAIN_FUNCTIONAL_ASSESSMENT: 0-10

## 2024-08-30 NOTE — PROGRESS NOTES
"  Physical Therapy Treatment    Patient Name: Abraham Rodriguez  MRN: [id number]  Today's Date: 8/30/2024  Time Calculation  Start Time: 1247  Stop Time: 1326  Time Calculation (min): 39 min        PT Therapeutic Procedures Time Entry  Therapeutic Exercise Time Entry: 39        Current Problem  1. Presence of right artificial knee joint  Follow Up In Physical Therapy        Problem List Items Addressed This Visit             ICD-10-CM    Presence of right artificial knee joint - Primary Z96.651       General  Reason for Referral: right knee pain s/p TKA 3/20/2024  Referred By: Dr. Arora    Subjective   Current Condition:   Better  Patient reports improved knee pain today.    Performing HEP?: Yes    Precautions  Precautions  LE Weight Bearing Status: Weight Bearing as Tolerated  Medical Precautions: Fall precautions  Post-Surgical Precautions: Right total knee precautions    Pain  Pain Assessment: 0-10  0-10 (Numeric) Pain Score: 6  Pain Location: Knee  Pain Orientation: Right    Objective   KNEE  Knee AROM  R knee flexion: (140°): 117    Treatments:  Therapeutic Exercise  Therapeutic Exercise Performed: Yes  Therapeutic Exercise Activity 1: SciFit Bike Level 3 5'  Therapeutic Exercise Activity 2: Gastroc stretch 1'  Therapeutic Exercise Activity 4: Step ups fwd 6\" x20 R/L  Therapeutic Exercise Activity 5: Knee Extension 15# x20 bilateral  Therapeutic Exercise Activity 6: Knee Flexion 35# x20 Bilateral  Therapeutic Exercise Activity 7: side step yellow 6' x4  Therapeutic Exercise Activity 8: Hip Extension x10 R/L  Therapeutic Exercise Activity 9: TKE with HR Black x10  Therapeutic Exercise Activity 12: heel slides arom x20  Therapeutic Exercise Activity 13: Heel slides with strap x3 20\"  Therapeutic Exercise Activity 15: SLR hob elevated x10  Therapeutic Exercise Activity 16: Hamstring Stretch 3x20\"    EDUCATION:   Individual(s) Educated: Patient  Education Provided: yes  Handout(s) Provided: Scanned into chart  Home " Program: reviewed home exercise program   Risk and Benefits Discussed with Patient/Caregiver/Other: Yes   Patient/Caregiver Demonstrated Understanding: Yes   Patient Response to Education: Patient/Caregiver verbalized understanding of information and Patient/Caregiver performed return demonstration of exercises/activities    Assessment: Patient demonstrated good tolerance to exercises without complaints of increased knee pain. Patient fatigued with exercises and benefited from a few seated rest breaks. Patient benefited from verbal cues for proper exercise technique with good follow through. Patient continues to walk with an antalgic gait and required verbal cues to hold his cane in his left hand instead of his right.  PT Assessment  PT Assessment Results: Decreased strength, Decreased range of motion, Impaired balance, Decreased mobility, Pain  Rehab Prognosis: Good  Evaluation/Treatment Tolerance: Patient tolerated treatment well    Plan: Continue with POC. Progress exercises as tolerated.  OP PT Plan  Treatment/Interventions: Education/ Instruction, Gait training, Manual therapy, Neuromuscular re-education, Therapeutic activities, Therapeutic exercises  PT Plan: Skilled PT  PT Frequency: 2 times per week  Duration: 4 weeks  Onset Date: 03/20/24  Certification Period Start Date: 06/07/24  Certification Period End Date: 09/17/24  Number of Treatments Authorized: 16 of 19  Rehab Potential: Good  Plan of Care Agreement: Patient    Goals:  Active       PT Problem       Patient will maintain single leg stand on each leg for 10 sec with no upper extremity support. (Not Progressing)       Start:  06/07/24    Expected End:  09/17/24         Goal Note       Too much pain to test 8/20/2024.              Patient will be able to maintain dynamic standing balance during walking over obstacles (Met)       Start:  06/07/24    Expected End:  08/19/24    Resolved:  08/20/24         Patient will ambulate with normal gait pattern  with no device (Not Progressing)       Start:  06/07/24    Expected End:  09/17/24            Patient will ambulate up and down a curb/single step with independent using no device (Not Progressing)       Start:  06/07/24    Expected End:  09/17/24            Patient will achieve right knee ROM of  0-120 degrees  (Not Progressing)       Start:  06/07/24    Expected End:  09/17/24            Patient will achieve bilateral hip abduction strength of at least 4+/5 (Progressing)       Start:  06/07/24    Expected End:  09/17/24            Patient will achieve bilateral knee flexion strength of at least 4+/5 (Not Progressing)       Start:  06/07/24    Expected End:  09/17/24            Patient will achieve bilateral knee extension strength of at least 4+/5 (Not Progressing)       Start:  06/07/24    Expected End:  09/17/24            Patient will demonstrate independence in home program for support of progression (Met)       Start:  06/07/24    Expected End:  06/21/24    Resolved:  07/22/24         Patient will report pain of no more than 2/10 demonstrating a reduction of overall pain (Not Progressing)       Start:  06/07/24    Expected End:  09/17/24            Patient will show a significant change in LEFS (8 to 17) patient reported outcome tool to demonstrate subjective imporovement (Progressing)       Start:  06/07/24    Expected End:  09/17/24                 Mirza Todd, PT

## 2024-09-05 ENCOUNTER — DOCUMENTATION (OUTPATIENT)
Dept: PHYSICAL THERAPY | Facility: HOSPITAL | Age: 84
End: 2024-09-05
Payer: COMMERCIAL

## 2024-09-05 NOTE — PROGRESS NOTES
NPhysical Therapy                 Therapy Communication Note    Patient Name: Abraham Rodriguez  MRN: [id number]  Today's Date: 9/5/2024     Discipline: Physical Therapy    Missed Visit Reason:  No Show    Missed Time: 2:30

## 2024-09-10 ENCOUNTER — TREATMENT (OUTPATIENT)
Dept: PHYSICAL THERAPY | Facility: HOSPITAL | Age: 84
End: 2024-09-10
Payer: MEDICARE

## 2024-09-10 DIAGNOSIS — Z96.651 PRESENCE OF RIGHT ARTIFICIAL KNEE JOINT: Primary | ICD-10-CM

## 2024-09-10 PROCEDURE — 97110 THERAPEUTIC EXERCISES: CPT | Mod: GP | Performed by: PHYSICAL THERAPIST

## 2024-09-10 ASSESSMENT — PAIN SCALES - GENERAL: PAINLEVEL_OUTOF10: 7

## 2024-09-10 ASSESSMENT — PAIN - FUNCTIONAL ASSESSMENT: PAIN_FUNCTIONAL_ASSESSMENT: 0-10

## 2024-09-10 NOTE — PROGRESS NOTES
"  Physical Therapy Treatment    Patient Name: Abraham Rodriguez  MRN: [id number]  Today's Date: 9/10/2024  Time Calculation  Start Time: 1309  Stop Time: 1345  Time Calculation (min): 36 min (arrived 9' late to appointment)        PT Therapeutic Procedures Time Entry  Therapeutic Exercise Time Entry: 36                Current Problem  1. Presence of right artificial knee joint  Follow Up In Physical Therapy        Problem List Items Addressed This Visit             ICD-10-CM    Presence of right artificial knee joint - Primary Z96.651       General  Reason for Referral: right knee pain s/p TKA 3/20/2024  Referred By: Dr. Arora    Subjective   Current Condition:   Same  Patient reports being limited to walking about 25' due to his right knee pain. Patient states that his knee loosens up during his exercises, but it tightens right back up. Patient states that his right ankle is hurting him today.    Performing HEP?: Yes    Precautions  Precautions  LE Weight Bearing Status: Weight Bearing as Tolerated  Medical Precautions: Fall precautions  Post-Surgical Precautions: Right total knee precautions  Pain  Pain Assessment: 0-10  0-10 (Numeric) Pain Score: 7  Pain Location: Knee  Pain Orientation: Right    Objective     Treatments:  Therapeutic Exercise  Therapeutic Exercise Performed: Yes  Therapeutic Exercise Activity 1: SciFit Bike Level 3 5'  Therapeutic Exercise Activity 2: Gastroc stretch on wedge 1'  Therapeutic Exercise Activity 4: Step ups fwd 6\" x20 R/L  Therapeutic Exercise Activity 5: Knee Extension 20# x20 bilateral  Therapeutic Exercise Activity 6: Knee Flexion 45# x20 Bilateral    EDUCATION:   Individual(s) Educated: Patient  Education Provided: yes  Handout(s) Provided: Scanned into chart  Home Program: reviewed home exercise program   Risk and Benefits Discussed with Patient/Caregiver/Other: Yes   Patient/Caregiver Demonstrated Understanding: Yes   Patient Response to Education: Patient/Caregiver verbalized " understanding of information and Patient/Caregiver performed return demonstration of exercises/activities    Assessment: Patient's session was limited by need to use the restroom multiple times due to ongoing stomach issues. Patient demonstrates impaired hamstring flexibility which is limiting his knee extension. Patient was encouraged to perform his stretches and range of motion exercises to maintain/improve his knee range of motion. Patient verbalized understanding.  PT Assessment  PT Assessment Results: Decreased strength, Decreased range of motion, Impaired balance, Decreased mobility, Pain  Rehab Prognosis: Good  Evaluation/Treatment Tolerance: Patient tolerated treatment well    Plan: Continue with POC. Progress range of motion, strength, and balance as tolerated.  OP PT Plan  Treatment/Interventions: Education/ Instruction, Gait training, Manual therapy, Neuromuscular re-education, Therapeutic activities, Therapeutic exercises  PT Plan: Skilled PT  PT Frequency: 2 times per week  Duration: 4 weeks  Onset Date: 03/20/24  Certification Period Start Date: 06/07/24  Certification Period End Date: 09/17/24  Number of Treatments Authorized: 17 of 19  Rehab Potential: Good  Plan of Care Agreement: Patient    Goals:  Active       PT Problem       Patient will maintain single leg stand on each leg for 10 sec with no upper extremity support. (Not Progressing)       Start:  06/07/24    Expected End:  09/17/24         Goal Note       Too much pain to test 8/20/2024.              Patient will be able to maintain dynamic standing balance during walking over obstacles (Met)       Start:  06/07/24    Expected End:  08/19/24    Resolved:  08/20/24         Patient will ambulate with normal gait pattern with no device (Not Progressing)       Start:  06/07/24    Expected End:  09/17/24            Patient will ambulate up and down a curb/single step with independent using no device (Not Progressing)       Start:  06/07/24     Expected End:  09/17/24            Patient will achieve right knee ROM of  0-120 degrees  (Not Progressing)       Start:  06/07/24    Expected End:  09/17/24            Patient will achieve bilateral hip abduction strength of at least 4+/5 (Progressing)       Start:  06/07/24    Expected End:  09/17/24            Patient will achieve bilateral knee flexion strength of at least 4+/5 (Not Progressing)       Start:  06/07/24    Expected End:  09/17/24            Patient will achieve bilateral knee extension strength of at least 4+/5 (Not Progressing)       Start:  06/07/24    Expected End:  09/17/24            Patient will demonstrate independence in home program for support of progression (Met)       Start:  06/07/24    Expected End:  06/21/24    Resolved:  07/22/24         Patient will report pain of no more than 2/10 demonstrating a reduction of overall pain (Not Progressing)       Start:  06/07/24    Expected End:  09/17/24            Patient will show a significant change in LEFS (8 to 17) patient reported outcome tool to demonstrate subjective imporovement (Progressing)       Start:  06/07/24    Expected End:  09/17/24                 Mirza Todd, PT

## 2024-09-12 ENCOUNTER — TREATMENT (OUTPATIENT)
Dept: PHYSICAL THERAPY | Facility: HOSPITAL | Age: 84
End: 2024-09-12
Payer: MEDICARE

## 2024-09-12 DIAGNOSIS — Z96.651 PRESENCE OF RIGHT ARTIFICIAL KNEE JOINT: Primary | ICD-10-CM

## 2024-09-12 PROCEDURE — 97140 MANUAL THERAPY 1/> REGIONS: CPT | Mod: GP | Performed by: PHYSICAL THERAPIST

## 2024-09-12 PROCEDURE — 97110 THERAPEUTIC EXERCISES: CPT | Mod: GP | Performed by: PHYSICAL THERAPIST

## 2024-09-12 ASSESSMENT — PAIN SCALES - GENERAL: PAINLEVEL_OUTOF10: 6

## 2024-09-12 ASSESSMENT — PAIN - FUNCTIONAL ASSESSMENT: PAIN_FUNCTIONAL_ASSESSMENT: 0-10

## 2024-09-12 NOTE — PROGRESS NOTES
"  Physical Therapy Treatment    Patient Name: Abraham Rodriguez  MRN: [id number]  Today's Date: 9/12/2024  Time Calculation  Start Time: 1300  Stop Time: 1342  Time Calculation (min): 42 min        PT Therapeutic Procedures Time Entry  Manual Therapy Time Entry: 8  Therapeutic Exercise Time Entry: 34                Current Problem  1. Presence of right artificial knee joint  Follow Up In Physical Therapy        Problem List Items Addressed This Visit             ICD-10-CM    Presence of right artificial knee joint - Primary Z96.651       General  Reason for Referral: right knee pain s/p TKA 3/20/2024  Referred By: Dr. Arora    Subjective   Current Condition:   Same  Patient reports missing his gastroenterology appointment yesterday. Patient states that his knee loosens up with exercise, but tightens up quickly when relaxing.    Performing HEP?: Yes    Precautions  Precautions  LE Weight Bearing Status: Weight Bearing as Tolerated  Medical Precautions: Fall precautions  Post-Surgical Precautions: Right total knee precautions  Pain  Pain Assessment: 0-10  0-10 (Numeric) Pain Score: 6  Pain Location: Knee  Pain Orientation: Right    Objective     Treatments:  Therapeutic Exercise  Therapeutic Exercise Performed: Yes  Therapeutic Exercise Activity 1: SciFit Bike Level 3 5' set #10  Therapeutic Exercise Activity 2: Gastroc stretch on wedge 1'  Therapeutic Exercise Activity 4: Step ups fwd 6\" x20 R/L  Therapeutic Exercise Activity 5: Knee Extension 15# x20 bilateral  Therapeutic Exercise Activity 6: Knee Flexion 45# x20 Bilateral  Therapeutic Exercise Activity 15: SLR hob elevated x15  Therapeutic Exercise Activity 16: Hamstring Stretch 3x20\"         Manual Therapy  Manual Therapy Performed: Yes  Manual Therapy Activity 1: Patella mobs inf/sup, med/lat glides  Manual Therapy Activity 3: IASTM quads    EDUCATION:   Individual(s) Educated: Patient  Education Provided: yes  Handout(s) Provided: Scanned into chart  Home Program: " reviewed home exercise program   Risk and Benefits Discussed with Patient/Caregiver/Other: Yes   Patient/Caregiver Demonstrated Understanding: Yes   Patient Response to Education: Patient/Caregiver verbalized understanding of information and Patient/Caregiver performed return demonstration of exercises/activities    Assessment: Patient demonstrated good tolerance to exercise and manual techniques without complaints of increased knee pain. Patient demonstrated an extension lag during straight leg raise. Patient had a bout of dizziness after sitting up from supine.   PT Assessment  PT Assessment Results: Decreased strength, Decreased range of motion, Impaired balance, Decreased mobility, Pain  Rehab Prognosis: Good  Evaluation/Treatment Tolerance: Patient tolerated treatment well    Plan: Continue with POC. Progress range of motion, strength, and balance as tolerated.  OP PT Plan  Treatment/Interventions: Education/ Instruction, Gait training, Manual therapy, Neuromuscular re-education, Therapeutic activities, Therapeutic exercises  PT Plan: Skilled PT  PT Frequency: 2 times per week  Duration: 4 weeks  Onset Date: 03/20/24  Certification Period Start Date: 06/07/24  Certification Period End Date: 09/17/24  Number of Treatments Authorized: 18 of 19  Rehab Potential: Good  Plan of Care Agreement: Patient    Goals:  Active       PT Problem       Patient will maintain single leg stand on each leg for 10 sec with no upper extremity support. (Not Progressing)       Start:  06/07/24    Expected End:  09/17/24         Goal Note       Too much pain to test 8/20/2024.              Patient will be able to maintain dynamic standing balance during walking over obstacles (Met)       Start:  06/07/24    Expected End:  08/19/24    Resolved:  08/20/24         Patient will ambulate with normal gait pattern with no device (Not Progressing)       Start:  06/07/24    Expected End:  09/17/24            Patient will ambulate up and down a  curb/single step with independent using no device (Not Progressing)       Start:  06/07/24    Expected End:  09/17/24            Patient will achieve right knee ROM of  0-120 degrees  (Not Progressing)       Start:  06/07/24    Expected End:  09/17/24            Patient will achieve bilateral hip abduction strength of at least 4+/5 (Progressing)       Start:  06/07/24    Expected End:  09/17/24            Patient will achieve bilateral knee flexion strength of at least 4+/5 (Not Progressing)       Start:  06/07/24    Expected End:  09/17/24            Patient will achieve bilateral knee extension strength of at least 4+/5 (Not Progressing)       Start:  06/07/24    Expected End:  09/17/24            Patient will demonstrate independence in home program for support of progression (Met)       Start:  06/07/24    Expected End:  06/21/24    Resolved:  07/22/24         Patient will report pain of no more than 2/10 demonstrating a reduction of overall pain (Not Progressing)       Start:  06/07/24    Expected End:  09/17/24            Patient will show a significant change in LEFS (8 to 17) patient reported outcome tool to demonstrate subjective imporovement (Progressing)       Start:  06/07/24    Expected End:  09/17/24                 Mirza Todd, PT

## 2024-09-17 ENCOUNTER — TREATMENT (OUTPATIENT)
Dept: PHYSICAL THERAPY | Facility: HOSPITAL | Age: 84
End: 2024-09-17
Payer: MEDICARE

## 2024-09-17 DIAGNOSIS — Z96.651 PRESENCE OF RIGHT ARTIFICIAL KNEE JOINT: Primary | ICD-10-CM

## 2024-09-17 PROCEDURE — 97110 THERAPEUTIC EXERCISES: CPT | Mod: GP | Performed by: PHYSICAL THERAPIST

## 2024-09-17 ASSESSMENT — PAIN - FUNCTIONAL ASSESSMENT: PAIN_FUNCTIONAL_ASSESSMENT: 0-10

## 2024-09-17 ASSESSMENT — PAIN SCALES - GENERAL: PAINLEVEL_OUTOF10: 10 - WORST POSSIBLE PAIN

## 2024-09-17 NOTE — PROGRESS NOTES
"  Physical Therapy Re-Evaluation and Treatment    Patient Name: Abraham Rodriguez  MRN: [id number]  Today's Date: 9/17/2024  Time Calculation  Start Time: 1148  Stop Time: 1215  Time Calculation (min): 27 min        PT Therapeutic Procedures Time Entry  Therapeutic Exercise Time Entry: 27                Current Problem  1. Presence of right artificial knee joint  Follow Up In Physical Therapy        Problem List Items Addressed This Visit             ICD-10-CM    Presence of right artificial knee joint - Primary Z96.651       General  Reason for Referral: right knee pain s/p TKA 3/20/2024  Referred By: Dr. Arora    Subjective   Current Condition:   Worse  Patient reports increased right knee starting on Saturday. Patient states that he is still taking 5 mg Oxycodone 4 times/day to help with his knee pain.    Performing HEP?: No due to increased right     Precautions  Precautions  LE Weight Bearing Status: Weight Bearing as Tolerated  Medical Precautions: Fall precautions  Post-Surgical Precautions: Right total knee precautions  Pain  Pain Assessment: 0-10  0-10 (Numeric) Pain Score: 10 - Worst possible pain  Pain Location: Knee  Pain Orientation: Right    Objective   HIP    Functional Rating Scale  LEFS   /80: 9    Specific Lower Extremity MMT  R Iliopsoas: (5/5): 4-/5  L Iliopsoas: (5/5): 5/5    Knee AROM  R knee flexion: (140°): 116  R knee extension: (0°): 10    Knee MMT  R knee flexion: (5/5): 4+/5  L knee flexion: (5/5): 5/5  R knee extension: (5/5): 4+/5  L knee extension: (5/5): 5/5    Outcome Measures:  Other Measures  Lower Extremity Funtional Score (LEFS): 9/80    Treatments:  Therapeutic Exercise  Therapeutic Exercise Performed: Yes  Therapeutic Exercise Activity 1: SciFit Bike Level 3 5' set #10  Therapeutic Exercise Activity 2: Gastroc stretch on wedge 1'  Therapeutic Exercise Activity 3: SAQ x20  Therapeutic Exercise Activity 4: Step ups fwd 4\" x20 R/L  Therapeutic Exercise Activity 12: heel slides arom " x20         EDUCATION:   Individual(s) Educated: Patient  Education Provided: yes  Handout(s) Provided: Scanned into chart  Home Program: reviewed home exercise program   Risk and Benefits Discussed with Patient/Caregiver/Other: Yes   Patient/Caregiver Demonstrated Understanding: Yes   Patient Response to Education: Patient/Caregiver verbalized understanding of information and Patient/Caregiver performed return demonstration of exercises/activities    Assessment: Patient is progressing slowly towards his physical therapy goals. Patient's right knee pain has significantly increased since his last visit. Patient demonstrated impaired tolerance to weight bearing and knee flexion/extension. Patient was encouraged to reach out to his surgeon and follow up given his increased knee pain. Skilled physical therapy is warranted to address the above stated impairments, so the patient can perform functional activities and work duties without increased pain or difficulty.   PT Assessment  PT Assessment Results: Decreased strength, Decreased range of motion, Impaired balance, Decreased mobility, Pain  Rehab Prognosis: Good  Evaluation/Treatment Tolerance: Patient limited by pain, Patient tolerated treatment well    Plan: Continue with POC. Patient will schedule after he follows up with his orthopedic surgeon.  OP PT Plan  Treatment/Interventions: Education/ Instruction, Gait training, Manual therapy, Neuromuscular re-education, Therapeutic activities, Therapeutic exercises  PT Plan: Skilled PT  PT Frequency: 2 times per week  Duration: 4 weeks  Onset Date: 03/20/24  Certification Period Start Date: 06/07/24  Certification Period End Date: 10/15/24  Number of Treatments Authorized: 19 of 27  Rehab Potential: Good  Plan of Care Agreement: Patient    Goals:  Active       PT Problem       Patient will maintain single leg stand on each leg for 10 sec with no upper extremity support. (Not Progressing)       Start:  06/07/24    Expected  End:  10/15/24         Goal Note       Unable to test due to right knee pain.              Patient will be able to maintain dynamic standing balance during walking over obstacles (Met)       Start:  06/07/24    Expected End:  08/19/24    Resolved:  08/20/24         Patient will ambulate with normal gait pattern with no device (Not Progressing)       Start:  06/07/24    Expected End:  10/15/24         Goal Note       Still using a single point cane and demonstrated antalgic gait pattern.              Patient will ambulate up and down a curb/single step with independent using no device (Not Progressing)       Start:  06/07/24    Expected End:  10/15/24            Patient will achieve right knee ROM of  0-120 degrees  (Not Progressing)       Start:  06/07/24    Expected End:  10/15/24            Patient will achieve bilateral hip abduction strength of at least 4+/5 (Progressing)       Start:  06/07/24    Expected End:  10/15/24            Patient will achieve bilateral knee flexion strength of at least 4+/5 (Met)       Start:  06/07/24    Expected End:  09/17/24    Resolved:  09/17/24         Patient will achieve bilateral knee extension strength of at least 4+/5 (Met)       Start:  06/07/24    Expected End:  09/17/24    Resolved:  09/17/24         Patient will demonstrate independence in home program for support of progression (Met)       Start:  06/07/24    Expected End:  06/21/24    Resolved:  07/22/24         Patient will report pain of no more than 2/10 demonstrating a reduction of overall pain (Not Progressing)       Start:  06/07/24    Expected End:  10/15/24            Patient will show a significant change in LEFS (8 to 17) patient reported outcome tool to demonstrate subjective imporovement (Progressing)       Start:  06/07/24    Expected End:  10/15/24                 Mirza Todd, PT

## 2024-10-09 ENCOUNTER — TREATMENT (OUTPATIENT)
Dept: PHYSICAL THERAPY | Facility: HOSPITAL | Age: 84
End: 2024-10-09
Payer: MEDICARE

## 2024-10-09 DIAGNOSIS — Z96.651 PRESENCE OF RIGHT ARTIFICIAL KNEE JOINT: Primary | ICD-10-CM

## 2024-10-09 PROCEDURE — 97110 THERAPEUTIC EXERCISES: CPT | Mod: GP | Performed by: PHYSICAL THERAPIST

## 2024-10-09 ASSESSMENT — PAIN - FUNCTIONAL ASSESSMENT: PAIN_FUNCTIONAL_ASSESSMENT: 0-10

## 2024-10-09 ASSESSMENT — PAIN SCALES - GENERAL: PAINLEVEL_OUTOF10: 6

## 2024-10-09 NOTE — PROGRESS NOTES
"  Physical Therapy Re-Evaluation and Treatment    Patient Name: Abraham Rodriguez  MRN: [id number]  Today's Date: 10/9/2024  Time Calculation  Start Time: 1733  Stop Time: 1812  Time Calculation (min): 39 min        PT Therapeutic Procedures Time Entry  Therapeutic Exercise Time Entry: 39                Current Problem  1. Presence of right artificial knee joint  Follow Up In Physical Therapy        Problem List Items Addressed This Visit             ICD-10-CM    Presence of right artificial knee joint - Primary Z96.651       General  Reason for Referral: right knee pain s/p TKA 3/20/2024  Referred By: Dr. Arora    Subjective   Current Condition:   Better  Patient reports improved pain since he was here last. Patient states that he saw Dr. Arora and the doctor states that he needs more physical therapy for strengthening.    Performing HEP?: Partially    Precautions  Precautions  LE Weight Bearing Status: Weight Bearing as Tolerated  Medical Precautions: Fall precautions  Post-Surgical Precautions: Right total knee precautions  Pain  Pain Assessment: 0-10  0-10 (Numeric) Pain Score: 6  Pain Location: Knee  Pain Orientation: Right    Objective     Specific Lower Extremity MMT  R Iliopsoas: (5/5): 4/5  L Iliopsoas: (5/5): 5/5  R Gluteals (sidelying): (5/5): 4-/5  L Gluteals (sidelying): (5/5): 4/5    Functional Rating Scale  LEFS /80: 13    Knee AROM  R knee flexion: (140°): 120  L knee flexion: (140°): 125  R knee extension: (0°): -8  L knee extension: (0°): 0    Outcome Measures:  Other Measures  Lower Extremity Funtional Score (LEFS): 13/80    Treatments:  Therapeutic Exercise  Therapeutic Exercise Performed: Yes  Therapeutic Exercise Activity 1: SciFit Bike Level 3 5' set #10  Therapeutic Exercise Activity 4: Step ups fwd 6\" x10 R/L  Therapeutic Exercise Activity 7: supine knee ext stretch towel under heel 5#wt x1'  Therapeutic Exercise Activity 8: bridges x10  Therapeutic Exercise Activity 10: LTR x10  Therapeutic " "Exercise Activity 11: SKTC 5x3\"  Therapeutic Exercise Activity 12: heel slides arom x20              EDUCATION:   Individual(s) Educated: Patient  Education Provided: yes  Handout(s) Provided: Scanned into chart  Home Program: reviewed home exercise program   Risk and Benefits Discussed with Patient/Caregiver/Other: Yes   Patient/Caregiver Demonstrated Understanding: Yes   Patient Response to Education: Patient/Caregiver verbalized understanding of information and Patient/Caregiver performed return demonstration of exercises/activities    Assessment: Patient returned today after several weeks. Patient's knee flexion appears to be slightly improved and he did not appear to lose much strength and knee extension since his last visit. Patient's back issues may be contributing to the slow progress in his knee range of motion/strength. Anticipate adding in some core/trunk stability and stretching may assist patient's progress.   PT Assessment  PT Assessment Results: Decreased strength, Decreased range of motion, Impaired balance, Decreased mobility, Pain  Rehab Prognosis: Good  Evaluation/Treatment Tolerance: Patient tolerated treatment well    Plan: Continue with POC. Progress range of motion, strength, and balance as tolerated.  OP PT Plan  Treatment/Interventions: Education/ Instruction, Gait training, Manual therapy, Neuromuscular re-education, Therapeutic activities, Therapeutic exercises  PT Plan: Skilled PT  PT Frequency: 2 times per week  Duration: 4 weeks  Onset Date: 03/20/24  Certification Period Start Date: 06/07/24  Certification Period End Date: 11/06/24  Number of Treatments Authorized: 20 of 28  Rehab Potential: Good  Plan of Care Agreement: Patient    Goals:  Active       PT Problem       Patient will maintain single leg stand on each leg for 10 sec with no upper extremity support. (Not Progressing)       Start:  06/07/24    Expected End:  11/06/24            Patient will be able to maintain dynamic " standing balance during walking over obstacles (Met)       Start:  06/07/24    Expected End:  08/19/24    Resolved:  08/20/24         Patient will ambulate with normal gait pattern with no device (Not Progressing)       Start:  06/07/24    Expected End:  11/06/24            Patient will ambulate up and down a curb/single step with independent using no device (Not Progressing)       Start:  06/07/24    Expected End:  11/06/24            Patient will achieve right knee ROM of  0-120 degrees  (Progressing)       Start:  06/07/24    Expected End:  11/06/24            Patient will achieve bilateral hip abduction strength of at least 4+/5 (Not Progressing)       Start:  06/07/24    Expected End:  11/06/24            Patient will achieve bilateral knee flexion strength of at least 4+/5 (Met)       Start:  06/07/24    Expected End:  09/17/24    Resolved:  09/17/24         Patient will achieve bilateral knee extension strength of at least 4+/5 (Met)       Start:  06/07/24    Expected End:  09/17/24    Resolved:  09/17/24         Patient will demonstrate independence in home program for support of progression (Met)       Start:  06/07/24    Expected End:  06/21/24    Resolved:  07/22/24         Patient will report pain of no more than 2/10 demonstrating a reduction of overall pain (Not Progressing)       Start:  06/07/24    Expected End:  11/06/24            Patient will show a significant change in LEFS (8 to 17) patient reported outcome tool to demonstrate subjective imporovement (Progressing)       Start:  06/07/24    Expected End:  11/06/24                 Mirza Todd, PT

## 2024-10-11 ENCOUNTER — DOCUMENTATION (OUTPATIENT)
Dept: PHYSICAL THERAPY | Facility: HOSPITAL | Age: 84
End: 2024-10-11
Payer: MEDICARE

## 2024-10-11 ENCOUNTER — APPOINTMENT (OUTPATIENT)
Dept: PHYSICAL THERAPY | Facility: HOSPITAL | Age: 84
End: 2024-10-11
Payer: MEDICARE

## 2024-10-11 NOTE — PROGRESS NOTES
Physical Therapy                 Therapy Communication Note    Patient Name: Abraham Rodriguez  MRN: [id number]  Department:   Room: Room/bed info not found  Today's Date: 10/11/2024     Discipline: Physical Therapy    Missed Visit Reason:  d/t illness    Missed Time: Cancel 3:00

## 2024-10-16 ENCOUNTER — TREATMENT (OUTPATIENT)
Dept: PHYSICAL THERAPY | Facility: HOSPITAL | Age: 84
End: 2024-10-16
Payer: MEDICARE

## 2024-10-16 DIAGNOSIS — Z96.651 PRESENCE OF RIGHT ARTIFICIAL KNEE JOINT: Primary | ICD-10-CM

## 2024-10-16 PROCEDURE — 97110 THERAPEUTIC EXERCISES: CPT | Mod: GP | Performed by: PHYSICAL THERAPIST

## 2024-10-16 ASSESSMENT — PAIN SCALES - GENERAL: PAINLEVEL_OUTOF10: 8

## 2024-10-16 ASSESSMENT — PAIN - FUNCTIONAL ASSESSMENT: PAIN_FUNCTIONAL_ASSESSMENT: 0-10

## 2024-10-16 NOTE — PROGRESS NOTES
"  Physical Therapy Treatment    Patient Name: Abraham Rodriguez  MRN: [id number]  Today's Date: 10/16/2024  Time Calculation  Start Time: 0917  Stop Time: 0957  Time Calculation (min): 40 min        PT Therapeutic Procedures Time Entry  Manual Therapy Time Entry: 5  Therapeutic Exercise Time Entry: 35                Current Problem  1. Presence of right artificial knee joint  Follow Up In Physical Therapy        Problem List Items Addressed This Visit             ICD-10-CM    Presence of right artificial knee joint - Primary Z96.651       General  Reason for Referral: right knee pain s/p TKA 3/20/2024  Referred By: Dr. Arora    Subjective   Current Condition:   Same  Patient reports increased knee pain today due to the dampness and he did not take any pain medication today. Patient states that he hasn't taken any pain medication yet today due to not having anything to eat yet.    Performing HEP?: Yes    Precautions  Precautions  LE Weight Bearing Status: Weight Bearing as Tolerated  Medical Precautions: Fall precautions  Post-Surgical Precautions: Right total knee precautions  Pain  Pain Assessment: 0-10  0-10 (Numeric) Pain Score: 8  Pain Location: Knee  Pain Orientation: Right    Objective     Treatments:  Therapeutic Exercise  Therapeutic Exercise Performed: Yes  Therapeutic Exercise Activity 1: SciFit Bike Level 3 5' set #10  Therapeutic Exercise Activity 2: Gastroc stretch on wedge 1'  Therapeutic Exercise Activity 4: Step ups fwd 6\" x10 R/L  Therapeutic Exercise Activity 5: Knee Extension 15# x20 bilateral  Therapeutic Exercise Activity 6: Knee Flexion 45# x20 Bilateral  Therapeutic Exercise Activity 9: seated trunk flexion stretch x3  Therapeutic Exercise Activity 10: LTR x20  Therapeutic Exercise Activity 11: SKTC 10x3\"  Therapeutic Exercise Activity 14: piriformis stretch 3x20\"  Therapeutic Exercise Activity 15: SLR x10  Therapeutic Exercise Activity 16: Hamstring Stretch 3x20\"    Manual Therapy  Manual Therapy " Performed: Yes  Manual Therapy Activity 1: Patella mobs inf/sup, med/lat glides    EDUCATION:   Individual(s) Educated: Patient  Education Provided: yes  Handout(s) Provided: Scanned into chart  Home Program: reviewed home exercise program   Risk and Benefits Discussed with Patient/Caregiver/Other: Yes   Patient/Caregiver Demonstrated Understanding: Yes   Patient Response to Education: Patient/Caregiver verbalized understanding of information, Patient/Caregiver performed return demonstration of exercises/activities, and Patient/Caregiver asked appropriate questions    Assessment: Patient demonstrated increased stiffness today. Patient had 1 bout of decreased control when going to sit on a chair due to stiffness and pain. Patient demonstrated significant piriformis/glut tightness during piriformis and trunk flexion stretches. Patient was encouraged to perform daily as part of his home exercise program.  PT Assessment  PT Assessment Results: Decreased strength, Decreased range of motion, Impaired balance, Decreased mobility, Pain  Rehab Prognosis: Good  Evaluation/Treatment Tolerance: Patient tolerated treatment well    Plan: Continue with POC. Progress exercises as tolerated.  OP PT Plan  Treatment/Interventions: Education/ Instruction, Gait training, Manual therapy, Neuromuscular re-education, Therapeutic activities, Therapeutic exercises  PT Plan: Skilled PT  PT Frequency: 2 times per week  Duration: 4 weeks  Onset Date: 03/20/24  Certification Period Start Date: 06/07/24  Certification Period End Date: 11/06/24  Number of Treatments Authorized: 21 of 28  Rehab Potential: Good  Plan of Care Agreement: Patient    Goals:  Active       PT Problem       Patient will maintain single leg stand on each leg for 10 sec with no upper extremity support. (Not Progressing)       Start:  06/07/24    Expected End:  11/06/24            Patient will be able to maintain dynamic standing balance during walking over obstacles (Met)        Start:  06/07/24    Expected End:  08/19/24    Resolved:  08/20/24         Patient will ambulate with normal gait pattern with no device (Not Progressing)       Start:  06/07/24    Expected End:  11/06/24            Patient will ambulate up and down a curb/single step with independent using no device (Not Progressing)       Start:  06/07/24    Expected End:  11/06/24            Patient will achieve right knee ROM of  0-120 degrees  (Progressing)       Start:  06/07/24    Expected End:  11/06/24            Patient will achieve bilateral hip abduction strength of at least 4+/5 (Not Progressing)       Start:  06/07/24    Expected End:  11/06/24            Patient will achieve bilateral knee flexion strength of at least 4+/5 (Met)       Start:  06/07/24    Expected End:  09/17/24    Resolved:  09/17/24         Patient will achieve bilateral knee extension strength of at least 4+/5 (Met)       Start:  06/07/24    Expected End:  09/17/24    Resolved:  09/17/24         Patient will demonstrate independence in home program for support of progression (Met)       Start:  06/07/24    Expected End:  06/21/24    Resolved:  07/22/24         Patient will report pain of no more than 2/10 demonstrating a reduction of overall pain (Not Progressing)       Start:  06/07/24    Expected End:  11/06/24            Patient will show a significant change in LEFS (8 to 17) patient reported outcome tool to demonstrate subjective imporovement (Progressing)       Start:  06/07/24    Expected End:  11/06/24                 Mirza Todd, PT

## 2024-10-18 ENCOUNTER — OFFICE VISIT (OUTPATIENT)
Dept: PAIN MEDICINE | Facility: CLINIC | Age: 84
End: 2024-10-18
Payer: MEDICARE

## 2024-10-18 VITALS
SYSTOLIC BLOOD PRESSURE: 166 MMHG | HEART RATE: 78 BPM | DIASTOLIC BLOOD PRESSURE: 60 MMHG | RESPIRATION RATE: 16 BRPM | OXYGEN SATURATION: 97 %

## 2024-10-18 DIAGNOSIS — M48.062 LUMBAR STENOSIS WITH NEUROGENIC CLAUDICATION: ICD-10-CM

## 2024-10-18 DIAGNOSIS — M54.12 CERVICAL RADICULITIS: ICD-10-CM

## 2024-10-18 DIAGNOSIS — M47.816 LUMBAR SPONDYLOSIS: ICD-10-CM

## 2024-10-18 DIAGNOSIS — M51.16 LUMBAR DISC DISEASE WITH RADICULOPATHY: Primary | ICD-10-CM

## 2024-10-18 PROCEDURE — 99213 OFFICE O/P EST LOW 20 MIN: CPT | Performed by: NURSE PRACTITIONER

## 2024-10-18 PROCEDURE — 1036F TOBACCO NON-USER: CPT | Performed by: NURSE PRACTITIONER

## 2024-10-18 PROCEDURE — 1125F AMNT PAIN NOTED PAIN PRSNT: CPT | Performed by: NURSE PRACTITIONER

## 2024-10-18 PROCEDURE — 3077F SYST BP >= 140 MM HG: CPT | Performed by: NURSE PRACTITIONER

## 2024-10-18 PROCEDURE — 1159F MED LIST DOCD IN RCRD: CPT | Performed by: NURSE PRACTITIONER

## 2024-10-18 PROCEDURE — 3078F DIAST BP <80 MM HG: CPT | Performed by: NURSE PRACTITIONER

## 2024-10-18 PROCEDURE — 1123F ACP DISCUSS/DSCN MKR DOCD: CPT | Performed by: NURSE PRACTITIONER

## 2024-10-18 PROCEDURE — 1160F RVW MEDS BY RX/DR IN RCRD: CPT | Performed by: NURSE PRACTITIONER

## 2024-10-18 RX ORDER — OXYCODONE HYDROCHLORIDE 5 MG/1
5 TABLET ORAL 4 TIMES DAILY PRN
Qty: 112 TABLET | Refills: 0 | Status: SHIPPED | OUTPATIENT
Start: 2024-11-15 | End: 2024-12-13

## 2024-10-18 RX ORDER — OXYCODONE HYDROCHLORIDE 5 MG/1
5 TABLET ORAL 4 TIMES DAILY PRN
Qty: 112 TABLET | Refills: 0 | Status: SHIPPED | OUTPATIENT
Start: 2024-12-13 | End: 2025-01-10

## 2024-10-18 RX ORDER — OXYCODONE HYDROCHLORIDE 5 MG/1
5 TABLET ORAL 4 TIMES DAILY PRN
Qty: 112 TABLET | Refills: 0 | Status: SHIPPED | OUTPATIENT
Start: 2024-10-18 | End: 2024-11-15

## 2024-10-18 ASSESSMENT — ENCOUNTER SYMPTOMS
EYES NEGATIVE: 1
WEAKNESS: 1
DIZZINESS: 0
TREMORS: 0
NUMBNESS: 0
RESPIRATORY NEGATIVE: 1
GASTROINTESTINAL NEGATIVE: 1
LIGHT-HEADEDNESS: 0
NECK STIFFNESS: 0
ARTHRALGIAS: 1
NECK PAIN: 0
BACK PAIN: 1
PSYCHIATRIC NEGATIVE: 1
HEADACHES: 0
MYALGIAS: 1
ENDOCRINE NEGATIVE: 1
ALLERGIC/IMMUNOLOGIC NEGATIVE: 1
HEMATOLOGIC/LYMPHATIC NEGATIVE: 1
SEIZURES: 0
SPEECH DIFFICULTY: 0
FACIAL ASYMMETRY: 0
JOINT SWELLING: 0
CARDIOVASCULAR NEGATIVE: 1
CONSTITUTIONAL NEGATIVE: 1

## 2024-10-18 ASSESSMENT — PAIN - FUNCTIONAL ASSESSMENT: PAIN_FUNCTIONAL_ASSESSMENT: 0-10

## 2024-10-18 ASSESSMENT — PAIN SCALES - GENERAL
PAINLEVEL_OUTOF10: 7
PAINLEVEL_OUTOF10: 7

## 2024-10-18 NOTE — PROGRESS NOTES
Subjective   Patient ID: Abraham Rodriguez is a 84 y.o. male who presents for Back Pain and Knee Pain (Right knee post op pain).  Back Pain  Associated symptoms include weakness. Pertinent negatives include no headaches or numbness.   Knee Pain   Pertinent negatives include no numbness.       Abraham follows up for interval reevaluation of chronic knee joint pain from arthritis.  He is with total right hip replacement 2020.  Is also with low back pain from degenerative disc and spondylosis.  Chronic posterior cervical pain from degenerative disc and spondylosis.      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  Does have pain with movements of flexion and extension.  Restarted physical therapy and has improvement with right knee pain and function.     Oxycodone without Tylenol 5 mg 4 times daily reduces pain improves function up to 50% and with average pain score 7 out of 10 to the right knee and left knee. No left hip pain. No right hip pain. Denies negative side effects from medications.    Is also on Cymbalta 30 mg once daily.  Not sure if this does help to reduce pain or improve function.  Advise he discontinue medication to see if he has any benefits from the medication or not.    Increase gabapentin to a total of 1800 mg/day since last office visit to help cover back pain, leg pain and knee pain.  Reports he does not feel a difference.  After he stops the Cymbalta for a week he can subtract 1 gabapentin every 3 days until off to see if this was helpful or not in reducing pain or improving function.     Toxicology consistent April 23, 2024.  Risk tool and contract scanned into the chart dated January 29, 2024.     History of renal calculus inside a scope he with laser lithotripsy May 1, 2024.  Condition resolved.    For continuity:   Given the patient's report of reduced pain and improved functional ability without adverse effects, it is reasonable to treat with narcotic medications.  The terms of the opioid agreement as well as the potential risks and adverse effects of the patient's medication regimen were discussed in detail. This includes if applicable due to dosage of medication permission to discuss and coordinate care with other treatment providers relevant to the patients condition. The patient verbalized understanding.      Risks and side effects of chronic opioid therapy including but not limited to tolerance, dependence, constipation, hyperalgesia, cognitive side effects, addiction and possible death due to overuse and or misuse were discussed. I also discussed that such medications when co-administered with other sedative agents including but not limited to alcohol, benzodiazepines, sedative hypnotics and illegal drugs could pose life threatening consequences including death. I also explained the impact that the administration of such medication has on a patient with obstructive sleep apnea and continued recommendations for use of apnea devices if ordered are prescribed by other physicians. In order to effectively and safely treat the pain, I also emphasized the importance of compliance with the treatment plan, as well as compliance with the terms of the opioid agreement, which was reviewed in detail. I explained the importance of being responsible with the medications and to take these only as prescribed, never in excess and never for reasons other than pain reduction. The patient was counseled on keeping the medications safe and locked away from children and other adults as well as disposal methods and options. The patient understood the risks and instructions.      I also discussed with the patient in detail that based on the clinical response to the opioid medications and improvements of activities of daily living, sleep, and work performance in light of compliance with the treatment plan we can continue this form of therapy for the above chronic pain. The goal and rationale used  for current treatment with chronic opioid medication is to control the pain and alleviate disability induced by the chronic pain condition noted above after failures of other non-opioid and nonpharmacological modalities to treat the chronic pain and the symptoms associated with have failed. The patient understood the goals in terms of the above treatment plan and had no further questions prior to leaving the office today.      Of note, the above-mentioned diagnoses/conditions and expected fluctuating nature of pain, and pain characteristic changes may lead to prolonged functional impairment requiring frequent and multiple reassessments with continued high level medical decision making. As noted, medication and medication management may require opiate therapy in excess of a routine less than 30 day medication requirement. The patient may require daily opiate therapy necessitating month-long prescription medication as noted above in order to perform activities of daily living and achieve acceptable quality of life with respect to their chronic pain condition for the foreseeable future. We monitor our patient's carefully through drug monitoring, medication counts, urine drug testing specific to their medication as well as a myriad of other substances and with frequent follow-ups with interval reassement of the chronic pain condition, its pathophysiology and prognosis.      The level of clinical decision making at this office visit is high due to high risks and complications including mortality and morbidity related to acute and chronic pain with respects to life, bodily function, and treatment. Risks and clinical decisions with respect to under treatment, failure to maintain adequate treatment, and/or overtreatment complications and outcomes were discussed with the patient with respect to their chronic pain conditions, interventional therapies, as well as the use of various medications including possible  controlled/dangerous medications. The amount and complexity of reviewed data at this in subsequent office visits is high given patient's fluctuating clinical presentation, laboratory and radiographic reports, prescription monitoring program data, and medication history as well as other relevant data as noted above. Pertinent negatives and positives data was used in consideration for the above-mentioned high complexity.       Given the patient's total MED, general use of daily opiates, or other coadministered medications in various classes the patient was offered a prescription for Narcan. I instructed the patient that it is important that patient fill this medication in order to demonstrate understanding of the gravity of possible side effects including respiratory depression and risk of overdose of this opiate load or medication combination. As such patient will be required to bring Narcan prescription to follow-up appointments as part of compliance with continued opiate care.      With respect to opiate induced constipation I discussed multiple ways to combat this problem including staying hydrated and taking over-the-counter medications such as Dulcolax, Miralax and Senna. If these treatments are not effective we could consider such medications as Amitiza, Linzess and Movantik.      Disclaimer: This note was transcribed using an audio transcription device. As such, minor errors may be present with regard to spelling, punctuation, and inadvertent word insertion. Please disregard such errors. Abraham follows up for interval reevaluation for chronic       Narrative & Impression  Interpreted By:  Jaswinder Colunga,   STUDY:  CT KNEE RIGHT WO IV CONTRAST;  4/15/2024 10:48 pm      INDICATION:  Signs/Symptoms:concern for septic right knee s/p total knee  replacement.      COMPARISON:  3/27/2024      ACCESSION NUMBER(S):  [id number]      ORDERING CLINICIAN:  VIOLETA BOWDEN      TECHNIQUE:  Contiguous axial images of the knee  were obtained without intravenous  contrast. Coronal and sagittal reformatted images were obtained from  the axial images.      FINDINGS:  There is postsurgical change of right knee arthroplasty resulting in  beam hardening artifact and limiting evaluation. No definite evidence  of acute displaced periprosthetic knee fracture. There is stable  appearance of previously described 16 mm x 9 mm calcific/ossific  density in the posterolateral soft tissues adjacent to the knee.  There is also stable calcific density superior to the patella. Small  to moderate suprapatellar knee effusion is again noted.      IMPRESSION:  Postsurgical change of right knee arthroplasty resulting in beam  hardening artifact and limiting evaluation. No definite evidence of  acute displaced periprosthetic knee fracture.      Suprapatellar knee effusion is again noted; clinical correlation  recommended if there is concern for infection given the clinical  history.      MACRO:  None      Signed by: Jaswinder Colunga 4/16/2024 2:14 AM  Dictation workstation:   [id number]    Narrative & Impression   MRN: [id number]  Patient Name: ARTEMIO MORALES     STUDY:  MRI L-SPINE WO;  1/28/2022 3:14 pm     INDICATION:  low back pain  M47.816: Lumbar spondylosis M54.16: Lumbar  radiculopathy.     COMPARISON:  10/08/2021 MR     ACCESSION NUMBER(S):  [id number]     ORDERING CLINICIAN:  KAYLEIGH MORSE     TECHNIQUE:  Sagittal T1, T2, STIR, and axial T1 weighted images of the lumbar  spine were acquired. Patient was unable to tolerate further images  due to severe pain.     FINDINGS:  Alignment: There are 5 lumbar type vertebrae. The lumbar spine is  straightened.     Vertebrae/Intervertebral Discs: The vertebral bodies demonstrate  expected height.The marrow has patchy bands of fatty transformation  most prominent adjacent to the endplates anteriorly at L1-2 and along  the L5-S1 endplates. The discs have near complete loss of height  throughout the lumbar region with  degenerative desiccation as well,  unchanged.     Conus: The lower thoracic cord appears unremarkable. The conus  terminates at T12-L1.     T10-11: No stenosis is noted on the sagittal images.     T11-12: The thecal sac is mildly indented by disc bulge on the  sagittal images.     T12-L1: The facet joints are moderately arthritic, unchanged. No  stenosis is noted.     L1-2: The lateral recesses are mildly stenosed more so on the right  by facet hypertrophy and ligament thickening as well as disc bulge  more so on the right. The facet joints are moderately arthritic.     L2-3: The spinal canal and lateral recesses are mildly stenosed by  ligament thickening and facet hypertrophy with disc bulge similar to  the prior study. The left neural foramen is mildly stenosed by  endplate spurring and disc bulge. The facet joints are moderately  arthritic.     L3-4: The lateral recesses are moderately stenosed more so on the  left with mild-to-moderate spinal canal stenosis secondary to disc  bulge, endplate spurring, ligament thickening and facet hypertrophy.  The left neural foramen is mildly stenosed. The facet joints are  moderately arthritic.     L4-5: The lateral recesses and spinal canal are moderately stenosed  secondary to ligament thickening, facet hypertrophy and disc  protrusion similar to the prior exam. The facet joints are moderately  arthritic. The neural foramina are mildly stenosed more so on the  left by endplate spurring, disc bulge and facet hypertrophy.     L5-S1: Left lateral/far lateral disc bulge abuts the exiting left L5  nerve, unchanged. The facet joints are mildly to moderately  arthritic. No central stenosis is noted.        The prevertebral and posterior paraspinous soft tissues are  unremarkable.     IMPRESSION:  1. The T2 axial images were not obtained as the patient could not  tolerate additional imaging due to pain.     2. Multilevel degenerative changes are present as noted similar to  the  prior examination.     Narrative & Impression   MRN: [id number]  Patient Name: ARTEMIO MORALES     STUDY:  NR MRI CERVICAL WO; 12/19/2016 10:57 am     INDICATION:  rt sided weakness.     COMPARISON:  None.     ACCESSION NUMBER(S):  [id number]     ORDERING CLINICIAN:  CRISTIN HUTTON     TECHNIQUE:  T1 weighted and T2 weighted sagittal axial images of the cervical  spine were obtained.     FINDINGS:     ALIGNMENT:  Minimal retrolisthesis of C5 on C6 and minimal anterolisthesis of C6  on C7. Minimal anterolisthesis of C7 on T1 and T1 on T2.     VERTEBRAL BODIES/MARROW:  Unremarkable     CORD:  Visualized cord is normal in caliber and signal.     C2-3:  No canal or foraminal stenosis     C3-4:  Minimal disc bulging and osteophyte formation. No canal or foraminal  stenosis     C4-5:  Small broad-based central disc herniation causing minimal effacement  of the ventral thecal sac. Facet hypertrophy which is markedly more  pronounced on the left than the right. Moderate left foraminal  encroachment. No canal stenosis.     C5-6:  Minimally effacement of the ventral thecal sac but osteophyte  formation. Bilateral facet hypertrophy. No canal stenosis. Mild right  foraminal encroachment.     C6-7:  Mild circumferential disc bulging. No canal stenosis or foraminal  encroachment.     C7-T1:  Mild bilateral facet hypertrophy. No significant canal or foraminal  stenosis.     ADDITIONAL FINDINGS:  None.     IMPRESSION:  Mild degenerative changes, as detailed above, including a small  broad-based central disc herniation at the C4-5 level. No canal  stenosis at any level. Varying degrees of foraminal encroachment, as  above.       Review of Systems   Constitutional: Negative.    HENT: Negative.     Eyes: Negative.    Respiratory: Negative.     Cardiovascular: Negative.    Gastrointestinal: Negative.    Endocrine: Negative.    Genitourinary: Negative.    Musculoskeletal:  Positive for arthralgias, back pain, gait problem and myalgias.  Negative for joint swelling, neck pain and neck stiffness.   Skin: Negative.    Allergic/Immunologic: Negative.    Neurological:  Positive for weakness. Negative for dizziness, tremors, seizures, syncope, facial asymmetry, speech difficulty, light-headedness, numbness and headaches.   Hematological: Negative.    Psychiatric/Behavioral: Negative.         Objective   Physical Exam  Vitals and nursing note reviewed.   Constitutional:       Appearance: Normal appearance.   HENT:      Head: Normocephalic and atraumatic.   Cardiovascular:      Pulses: Normal pulses.   Pulmonary:      Effort: Pulmonary effort is normal. No respiratory distress.   Musculoskeletal:      Right lower leg: No edema.      Left lower leg: No edema.      Comments: Active range of motion of right knee with extension and flexion limited secondary to pain and stiffness.  Well-healed linear scar overlies right knee.  No outward signs of infection of the right knee such as open wound, drainage, warmth or erythema.   Skin:     General: Skin is warm and dry.      Capillary Refill: Capillary refill takes 2 to 3 seconds.   Neurological:      Mental Status: He is alert and oriented to person, place, and time.      Cranial Nerves: No cranial nerve deficit.      Sensory: No sensory deficit.      Motor: Weakness present.      Gait: Gait abnormal.      Comments: Weakness with right knee extension 4 out of 5.  Ambulates with mildly antalgic gait.       Assessment/Plan   Problem List Items Addressed This Visit             ICD-10-CM    Degeneration of intervertebral disc of lumbar region M51.369    Cervical radiculitis M54.12    Lumbar spondylosis M47.816    Lumbar stenosis with neurogenic claudication M48.062     Follow-up 12 weeks.       SHAD Barrientos-CNP 10/18/24 9:35 AM

## 2024-10-21 ENCOUNTER — APPOINTMENT (OUTPATIENT)
Dept: PHYSICAL THERAPY | Facility: HOSPITAL | Age: 84
End: 2024-10-21
Payer: MEDICARE

## 2024-10-22 ENCOUNTER — APPOINTMENT (OUTPATIENT)
Dept: PHYSICAL THERAPY | Facility: HOSPITAL | Age: 84
End: 2024-10-22
Payer: MEDICARE

## 2024-10-24 ENCOUNTER — DOCUMENTATION (OUTPATIENT)
Dept: PHYSICAL THERAPY | Facility: HOSPITAL | Age: 84
End: 2024-10-24
Payer: COMMERCIAL

## 2024-10-24 NOTE — PROGRESS NOTES
Physical Therapy                 Therapy Communication Note    Patient Name: Abraham Rodriguez  MRN: [id number]  Department:   Room: Room/bed info not found  Today's Date: 10/24/2024     Discipline: Physical Therapy    Missed Visit Reason:  called and left message on pt's voicemail     Missed Time: No Show

## 2024-10-29 ENCOUNTER — TREATMENT (OUTPATIENT)
Dept: PHYSICAL THERAPY | Facility: HOSPITAL | Age: 84
End: 2024-10-29
Payer: MEDICARE

## 2024-10-29 DIAGNOSIS — Z96.651 PRESENCE OF RIGHT ARTIFICIAL KNEE JOINT: Primary | ICD-10-CM

## 2024-10-29 PROCEDURE — 97110 THERAPEUTIC EXERCISES: CPT | Mod: GP,CQ

## 2024-10-29 ASSESSMENT — PAIN - FUNCTIONAL ASSESSMENT: PAIN_FUNCTIONAL_ASSESSMENT: 0-10

## 2024-10-29 ASSESSMENT — PAIN SCALES - GENERAL: PAINLEVEL_OUTOF10: 7

## 2024-10-31 ENCOUNTER — TREATMENT (OUTPATIENT)
Dept: PHYSICAL THERAPY | Facility: HOSPITAL | Age: 84
End: 2024-10-31
Payer: MEDICARE

## 2024-10-31 ENCOUNTER — DOCUMENTATION (OUTPATIENT)
Dept: PHYSICAL THERAPY | Facility: HOSPITAL | Age: 84
End: 2024-10-31
Payer: COMMERCIAL

## 2024-10-31 DIAGNOSIS — Z96.651 PRESENCE OF RIGHT ARTIFICIAL KNEE JOINT: Primary | ICD-10-CM

## 2024-11-01 ENCOUNTER — TREATMENT (OUTPATIENT)
Dept: PHYSICAL THERAPY | Facility: HOSPITAL | Age: 84
End: 2024-11-01
Payer: MEDICARE

## 2024-11-01 DIAGNOSIS — Z96.651 PRESENCE OF RIGHT ARTIFICIAL KNEE JOINT: ICD-10-CM

## 2024-11-01 PROCEDURE — 97110 THERAPEUTIC EXERCISES: CPT | Mod: GP,CQ

## 2024-11-01 ASSESSMENT — PAIN SCALES - GENERAL: PAINLEVEL_OUTOF10: 5 - MODERATE PAIN

## 2024-11-01 ASSESSMENT — PAIN - FUNCTIONAL ASSESSMENT: PAIN_FUNCTIONAL_ASSESSMENT: 0-10

## 2024-11-04 ENCOUNTER — APPOINTMENT (OUTPATIENT)
Dept: PHYSICAL THERAPY | Facility: HOSPITAL | Age: 84
End: 2024-11-04
Payer: MEDICARE

## 2024-11-05 ENCOUNTER — TREATMENT (OUTPATIENT)
Dept: PHYSICAL THERAPY | Facility: HOSPITAL | Age: 84
End: 2024-11-05
Payer: MEDICARE

## 2024-11-05 DIAGNOSIS — Z96.651 PRESENCE OF RIGHT ARTIFICIAL KNEE JOINT: ICD-10-CM

## 2024-11-07 ENCOUNTER — TREATMENT (OUTPATIENT)
Dept: PHYSICAL THERAPY | Facility: HOSPITAL | Age: 84
End: 2024-11-07
Payer: MEDICARE

## 2024-11-07 DIAGNOSIS — Z96.651 PRESENCE OF RIGHT ARTIFICIAL KNEE JOINT: ICD-10-CM

## 2024-11-07 PROCEDURE — 97110 THERAPEUTIC EXERCISES: CPT | Mod: GP,CQ

## 2024-11-07 ASSESSMENT — PAIN SCALES - GENERAL: PAINLEVEL_OUTOF10: 8

## 2024-11-07 ASSESSMENT — PAIN - FUNCTIONAL ASSESSMENT: PAIN_FUNCTIONAL_ASSESSMENT: 0-10

## 2024-11-07 NOTE — PROGRESS NOTES
"  Physical Therapy Treatment    Patient Name: Abraham Rodriguez  MRN: [id number]  Today's Date: 11/7/2024  Time Calculation  Start Time: 1126  Stop Time: 1210  Time Calculation (min): 44 min        PT Therapeutic Procedures Time Entry  Manual Therapy Time Entry: 3  Therapeutic Exercise Time Entry: 41                Current Problem  1. Presence of right artificial knee joint  Follow Up In Physical Therapy          General  Reason for Referral: right knee pain s/p TKA 3/20/2024  Referred By: Dr. Arora  Preferred Learning Style: visual, written  General Comment: Pt reports 5/10 pain and ache in knee with increased discomfort in back today during gait as well.    Subjective   Current Condition:   Same  Patient reports he's been trying to adjust his medications as he is not feeling good. States he missed his appointments earlier this week d/t having diarrhea.     Performing HEP?: Yes    Precautions  Precautions  LE Weight Bearing Status: Weight Bearing as Tolerated  Medical Precautions: Fall precautions  Post-Surgical Precautions: Right total knee precautions  Pain  Pain Assessment: 0-10  0-10 (Numeric) Pain Score: 8 (and back)  Pain Location: Knee  Pain Orientation: Right    Objective         Treatments:    Therapeutic Exercise  Therapeutic Exercise Performed: Yes  Therapeutic Exercise Activity 1: Sci[employer] Bike Level 3 5' set #11  Therapeutic Exercise Activity 2: 2x30 sec hamstring stretch on step BLE  Therapeutic Exercise Activity 3: x10 4 inch heel tap-  Therapeutic Exercise Activity 4: Step ups fwd 6\" x10 R/L  Therapeutic Exercise Activity 5: Bridge x10  Therapeutic Exercise Activity 6: STS with OH press 2# MB x10  Therapeutic Exercise Activity 7: TA x10  Therapeutic Exercise Activity 8: clamshells x15 R, Iso Hip ABD Red L  Therapeutic Exercise Activity 9: x10 Calf raise off step  Therapeutic Exercise Activity 10: Gastroc stretch on wedge 1'  Therapeutic Exercise Activity 11: Rows Blue x15  Therapeutic Exercise Activity 12: " Extension Blue x10  Therapeutic Exercise Activity 13: Standing Hip Extension x10 R/L  Therapeutic Exercise Activity 14: Dynadisc Hip Toss and golf swing  2# MB x10 R/L  Therapeutic Exercise Activity 15: side step Yellow 6' x3  Therapeutic Exercise Activity 16: LTR x20         Manual Therapy  Manual Therapy Performed: Yes  Manual Therapy Activity 1: TPR/DTM R medial HS to decrease mm tension             EDUCATION:   Individual(s) Educated: Patient  Education Provided: TPR technique   Risk and Benefits Discussed with Patient/Caregiver/Other: Yes   Patient/Caregiver Demonstrated Understanding: Yes   Patient Response to Education: Patient/Caregiver verbalized understanding of information    Assessment: Pt was able to perform strengthening exercises without c/o R knee or LBP  Pt had pain and mm tension with palpation to the R medial hamstrings, which decreased with TPR/DTM.   PT Assessment  PT Assessment Results: Decreased strength, Decreased range of motion, Impaired balance, Decreased mobility, Pain  Rehab Prognosis: Good    Plan: Continue to progress current POC as tolerated to facilitate ability to perform functional activities.   OP PT Plan  Treatment/Interventions: Education/ Instruction, Gait training, Manual therapy, Neuromuscular re-education, Therapeutic activities, Therapeutic exercises  PT Plan: Skilled PT  PT Frequency: 2 times per week  Duration: 4 weeks  Onset Date: 03/20/24  Certification Period Start Date: 06/07/24  Certification Period End Date: 11/06/24  Number of Treatments Authorized: 24 of 28  Rehab Potential: Good  Plan of Care Agreement: Patient    Goals:  Active       PT Problem       Patient will maintain single leg stand on each leg for 10 sec with no upper extremity support. (Not Progressing)       Start:  06/07/24    Expected End:  11/06/24            Patient will be able to maintain dynamic standing balance during walking over obstacles (Met)       Start:  06/07/24    Expected End:  08/19/24     Resolved:  08/20/24         Patient will ambulate with normal gait pattern with no device (Not Progressing)       Start:  06/07/24    Expected End:  11/06/24            Patient will ambulate up and down a curb/single step with independent using no device (Not Progressing)       Start:  06/07/24    Expected End:  11/06/24            Patient will achieve right knee ROM of  0-120 degrees  (Progressing)       Start:  06/07/24    Expected End:  11/06/24            Patient will achieve bilateral hip abduction strength of at least 4+/5 (Not Progressing)       Start:  06/07/24    Expected End:  11/06/24            Patient will achieve bilateral knee flexion strength of at least 4+/5 (Met)       Start:  06/07/24    Expected End:  09/17/24    Resolved:  09/17/24         Patient will achieve bilateral knee extension strength of at least 4+/5 (Met)       Start:  06/07/24    Expected End:  09/17/24    Resolved:  09/17/24         Patient will demonstrate independence in home program for support of progression (Met)       Start:  06/07/24    Expected End:  06/21/24    Resolved:  07/22/24         Patient will report pain of no more than 2/10 demonstrating a reduction of overall pain (Not Progressing)       Start:  06/07/24    Expected End:  11/06/24            Patient will show a significant change in LEFS (8 to 17) patient reported outcome tool to demonstrate subjective imporovement (Progressing)       Start:  06/07/24    Expected End:  11/06/24                 Jorge Shin PTA

## 2024-11-12 ENCOUNTER — APPOINTMENT (OUTPATIENT)
Dept: PHYSICAL THERAPY | Facility: HOSPITAL | Age: 84
End: 2024-11-12
Payer: MEDICARE

## 2024-11-12 ENCOUNTER — DOCUMENTATION (OUTPATIENT)
Dept: PHYSICAL THERAPY | Facility: HOSPITAL | Age: 84
End: 2024-11-12
Payer: COMMERCIAL

## 2024-11-12 NOTE — PROGRESS NOTES
Physical Therapy                 Therapy Communication Note    Patient Name: Abraham Rodriguez  MRN: [id number]  Department:   Room: Room/bed info not found  Today's Date: 11/12/2024     Discipline: Physical Therapy    Missed Visit Reason:  d/t illness    Missed Time: Cancel 13:45

## 2024-11-14 ENCOUNTER — TREATMENT (OUTPATIENT)
Dept: PHYSICAL THERAPY | Facility: HOSPITAL | Age: 84
End: 2024-11-14
Payer: MEDICARE

## 2024-11-14 DIAGNOSIS — Z96.651 PRESENCE OF RIGHT ARTIFICIAL KNEE JOINT: Primary | ICD-10-CM

## 2024-11-14 PROCEDURE — 97110 THERAPEUTIC EXERCISES: CPT | Mod: GP | Performed by: PHYSICAL THERAPIST

## 2024-11-14 ASSESSMENT — PAIN - FUNCTIONAL ASSESSMENT: PAIN_FUNCTIONAL_ASSESSMENT: 0-10

## 2024-11-14 ASSESSMENT — PAIN SCALES - GENERAL: PAINLEVEL_OUTOF10: 8

## 2024-11-14 NOTE — PROGRESS NOTES
Physical Therapy Re-Evaluation and Treatment    Patient Name: Abraham Rodriguez  MRN: [id number]  Today's Date: 11/14/2024  Time Calculation  Start Time: 1352  Stop Time: 1430  Time Calculation (min): 38 min        PT Therapeutic Procedures Time Entry  Therapeutic Exercise Time Entry: 38                Current Problem  1. Presence of right artificial knee joint  Follow Up In Physical Therapy        Problem List Items Addressed This Visit             ICD-10-CM    Presence of right artificial knee joint - Primary Z96.651       General  Reason for Referral: right knee pain s/p TKA 3/20/2024  Referred By: Dr. Arora  Preferred Learning Style: visual, written    Subjective   Current Condition:   Same  Patient reports that his knee feels hot if he sits too long or takes a nap.     Performing HEP?: Partially    Precautions  Precautions  LE Weight Bearing Status: Weight Bearing as Tolerated  Medical Precautions: Fall precautions  Post-Surgical Precautions: Right total knee precautions  Pain  Pain Assessment: 0-10  0-10 (Numeric) Pain Score: 8  Pain Location: Knee  Pain Orientation: Right    Objective     Specific Lower Extremity MMT  R Iliopsoas: (5/5): 4+/5  L Iliopsoas: (5/5): 5/5  R Gluteals (sidelying): (5/5): 3+/5  L Gluteals (sidelying): (5/5): 4-/5    Functional Rating Scale  LEFS /80: 14    Knee AROM  R knee flexion: (140°): 120  R knee extension: (0°): 0    Knee MMT  R knee flexion: (5/5): 4+/5  L knee flexion: (5/5): 5/5  R knee extension: (5/5): 4+/5  L knee extension: (5/5): 5/5    Outcome Measures:  Other Measures  Lower Extremity Funtional Score (LEFS): 14/80    Treatments:  Therapeutic Exercise  Therapeutic Exercise Performed: Yes  Therapeutic Exercise Activity 1: SciFit Bike Level 3 5' set #11  Therapeutic Exercise Activity 6: STS  x20  Therapeutic Exercise Activity 17: seated heel slides x10  Therapeutic Exercise Activity 18: SLR x10 hob elevated      EDUCATION:   Individual(s) Educated: Patient  Education  Provided: yes  Handout(s) Provided: Scanned into chart  Home Program: reviewed home exercise program   Risk and Benefits Discussed with Patient/Caregiver/Other: Yes   Patient/Caregiver Demonstrated Understanding: Yes   Patient Response to Education: Patient/Caregiver verbalized understanding of information, Patient/Caregiver performed return demonstration of exercises/activities, and Patient/Caregiver asked appropriate questions    Assessment: Patient is progressing slowly towards his physical therapy goals. Patient has met his range of motion goal, but continues to have decreased strength and balance. Physical therapy will decrease the patient's frequency at this time due to slow progress. Discussed with patient likely discharge after the next month if he continues to have difficulty progressing with physical therapy. Patient verbalized understanding.  PT Assessment  PT Assessment Results: Decreased strength, Decreased range of motion, Impaired balance, Decreased mobility, Pain  Rehab Prognosis: Good  Evaluation/Treatment Tolerance: Patient tolerated treatment well    Plan: Continue with POC. Progress exercises as tolerated.  OP PT Plan  Treatment/Interventions: Education/ Instruction, Gait training, Manual therapy, Neuromuscular re-education, Therapeutic activities, Therapeutic exercises  PT Plan: Skilled PT  PT Frequency: 1 time per week  Duration: 4 weeks  Onset Date: 03/20/24  Certification Period Start Date: 06/07/24  Certification Period End Date: 12/12/24  Number of Treatments Authorized: 25 of 29  Rehab Potential: Good  Plan of Care Agreement: Patient    Goals:  Active       PT Problem       Patient will maintain single leg stand on each leg for 10 sec with no upper extremity support. (Not met)       Start:  06/07/24    Expected End:  11/06/24    Resolved:  11/14/24    Updated to: Patient will maintain NBOS for 10 sec with no upper extremity support.    Update reason: lack of progress         Patient will  be able to maintain dynamic standing balance during walking over obstacles (Met)       Start:  06/07/24    Expected End:  08/19/24    Resolved:  08/20/24         Patient will ambulate with normal gait pattern with no device (Not Progressing)       Start:  06/07/24    Expected End:  12/12/24            Patient will ambulate up and down a curb/single step with independent using no device (Not Progressing)       Start:  06/07/24    Expected End:  12/12/24            Patient will achieve right knee ROM of  0-120 degrees  (Met)       Start:  06/07/24    Expected End:  11/06/24    Resolved:  11/14/24         Patient will achieve bilateral hip abduction strength of at least 4+/5 (Not Progressing)       Start:  06/07/24    Expected End:  12/12/24            Patient will achieve bilateral knee flexion strength of at least 4+/5 (Met)       Start:  06/07/24    Expected End:  09/17/24    Resolved:  09/17/24         Patient will achieve bilateral knee extension strength of at least 4+/5 (Met)       Start:  06/07/24    Expected End:  09/17/24    Resolved:  09/17/24         Patient will demonstrate independence in home program for support of progression (Met)       Start:  06/07/24    Expected End:  06/21/24    Resolved:  07/22/24         Patient will report pain of no more than 2/10 demonstrating a reduction of overall pain (Not Progressing)       Start:  06/07/24    Expected End:  12/12/24            Patient will show a significant change in LEFS (8 to 17) patient reported outcome tool to demonstrate subjective imporovement (Progressing)       Start:  06/07/24    Expected End:  12/12/24            Patient will maintain NBOS for 10 sec with no upper extremity support.       Start:  11/14/24    Expected End:  12/12/24                     Mirza Todd, PT

## 2024-11-20 ENCOUNTER — TREATMENT (OUTPATIENT)
Dept: PHYSICAL THERAPY | Facility: HOSPITAL | Age: 84
End: 2024-11-20
Payer: MEDICARE

## 2024-11-20 DIAGNOSIS — Z96.651 PRESENCE OF RIGHT ARTIFICIAL KNEE JOINT: ICD-10-CM

## 2024-11-20 PROCEDURE — 97112 NEUROMUSCULAR REEDUCATION: CPT | Mod: GP,CQ

## 2024-11-20 PROCEDURE — 97110 THERAPEUTIC EXERCISES: CPT | Mod: GP,CQ

## 2024-11-20 ASSESSMENT — PAIN - FUNCTIONAL ASSESSMENT: PAIN_FUNCTIONAL_ASSESSMENT: 0-10

## 2024-11-20 ASSESSMENT — PAIN SCALES - GENERAL: PAINLEVEL_OUTOF10: 8

## 2024-11-20 NOTE — PROGRESS NOTES
"  Physical Therapy Treatment    Patient Name: Abraham Rodriguez  MRN: [id number]  Today's Date: 11/20/2024  Time Calculation  Start Time: 1348  Stop Time: 1427  Time Calculation (min): 39 min        PT Therapeutic Procedures Time Entry  Manual Therapy Time Entry: 5  Neuromuscular Re-Education Time Entry: 8  Therapeutic Exercise Time Entry: 26                Current Problem  1. Presence of right artificial knee joint  Follow Up In Physical Therapy          General  Reason for Referral: right knee pain s/p TKA 3/20/2024  Referred By: Dr. Arora  Preferred Learning Style: visual, written    Subjective   Current Condition:   Same  Patient reports he feels good after exercising however it does not last. States it gets tight after about an hour after completing. States he is able to walk short distances in his home without the cane.     Performing HEP?: Yes    Precautions  Precautions  LE Weight Bearing Status: Weight Bearing as Tolerated  Medical Precautions: Fall precautions  Post-Surgical Precautions: Right total knee precautions  Pain  Pain Assessment: 0-10  0-10 (Numeric) Pain Score: 8  Pain Type: Chronic pain, Surgical pain  Pain Location: Knee  Pain Orientation: Right    Objective      Treatments:    Therapeutic Exercise  Therapeutic Exercise Performed: Yes  Therapeutic Exercise Activity 1: SciFit Bike Level 3 5' set #11  Therapeutic Exercise Activity 4: Step ups fwd 6\" x10 R/L  Therapeutic Exercise Activity 5: Knee Extension 15# x20 bilateral  Therapeutic Exercise Activity 6: STS with OH Press 2# x10  Therapeutic Exercise Activity 7: Knee Flexion 45# x20 Bilateral  Therapeutic Exercise Activity 8: Standing Hip ABD Yellow x10 R/L  Therapeutic Exercise Activity 13: Standing Hip Extension Yellow x10 R/L  Therapeutic Exercise Activity 15: side step Yellow 6' x3  Therapeutic Exercise Activity 17: seated heel slides x10  Therapeutic Exercise Activity 18: SLR x10 hob elevated    Balance/Neuromuscular " "Re-Education  Balance/Neuromuscular Re-Education Activity Performed: Yes  Balance/Neuromuscular Re-Education Activity 1: Airex Chops 2# x15 R/L  Balance/Neuromuscular Re-Education Activity 2: Airex NBOS 30\"    Manual Therapy  Manual Therapy Performed: Yes  Manual Therapy Activity 1: STM/DTM R medial Hamstring to decrease mm tension           EDUCATION:   Individual(s) Educated: Patient  Education Provided:   Risk and Benefits Discussed with Patient/Caregiver/Other: Yes   Patient/Caregiver Demonstrated Understanding: Yes   Patient Response to Education: Patient/Caregiver verbalized understanding of information    Assessment: Noted decreased mm tension in the medial hamstring since last treatment with this therapist. Pt was able to maintain upright posture with resisted standing exercises, without c/o LBP.   PT Assessment  PT Assessment Results: Decreased strength, Decreased range of motion, Impaired balance, Decreased mobility, Pain  Rehab Prognosis: Good    Plan: Continue to progress current POC as tolerated to facilitate ability to perform functional activities.   OP PT Plan  Treatment/Interventions: Education/ Instruction, Gait training, Manual therapy, Neuromuscular re-education, Therapeutic activities, Therapeutic exercises  PT Plan: Skilled PT  PT Frequency: 1 time per week  Duration: 4 weeks  Onset Date: 03/20/24  Certification Period Start Date: 06/07/24  Certification Period End Date: 12/12/24  Number of Treatments Authorized: 26 of 29  Rehab Potential: Good  Plan of Care Agreement: Patient    Goals:  Active       PT Problem       Patient will maintain single leg stand on each leg for 10 sec with no upper extremity support. (Not met)       Start:  06/07/24    Expected End:  11/06/24    Resolved:  11/14/24    Updated to: Patient will maintain NBOS for 10 sec with no upper extremity support.    Update reason: lack of progress         Patient will be able to maintain dynamic standing balance during walking over " obstacles (Met)       Start:  06/07/24    Expected End:  08/19/24    Resolved:  08/20/24         Patient will ambulate with normal gait pattern with no device (Not Progressing)       Start:  06/07/24    Expected End:  12/12/24            Patient will ambulate up and down a curb/single step with independent using no device (Not Progressing)       Start:  06/07/24    Expected End:  12/12/24            Patient will achieve right knee ROM of  0-120 degrees  (Met)       Start:  06/07/24    Expected End:  11/06/24    Resolved:  11/14/24         Patient will achieve bilateral hip abduction strength of at least 4+/5 (Not Progressing)       Start:  06/07/24    Expected End:  12/12/24            Patient will achieve bilateral knee flexion strength of at least 4+/5 (Met)       Start:  06/07/24    Expected End:  09/17/24    Resolved:  09/17/24         Patient will achieve bilateral knee extension strength of at least 4+/5 (Met)       Start:  06/07/24    Expected End:  09/17/24    Resolved:  09/17/24         Patient will demonstrate independence in home program for support of progression (Met)       Start:  06/07/24    Expected End:  06/21/24    Resolved:  07/22/24         Patient will report pain of no more than 2/10 demonstrating a reduction of overall pain (Not Progressing)       Start:  06/07/24    Expected End:  12/12/24            Patient will show a significant change in LEFS (8 to 17) patient reported outcome tool to demonstrate subjective imporovement (Progressing)       Start:  06/07/24    Expected End:  12/12/24            Patient will maintain NBOS for 10 sec with no upper extremity support.       Start:  11/14/24    Expected End:  12/12/24                     Jorge Shin PTA

## 2024-11-22 ENCOUNTER — APPOINTMENT (OUTPATIENT)
Dept: CARDIOLOGY | Facility: CLINIC | Age: 84
End: 2024-11-22
Payer: COMMERCIAL

## 2024-11-27 ENCOUNTER — DOCUMENTATION (OUTPATIENT)
Dept: PHYSICAL THERAPY | Facility: HOSPITAL | Age: 84
End: 2024-11-27
Payer: COMMERCIAL

## 2024-11-27 NOTE — PROGRESS NOTES
Physical Therapy                 Therapy Communication Note    Patient Name: Abraham Rodriguez  MRN: [id number]  Department:   Room: Room/bed info not found  Today's Date: 11/27/2024     Discipline: Physical Therapy    Missed Visit Reason:  No Show    Missed Time: No Show 11:30

## 2024-12-04 ENCOUNTER — APPOINTMENT (OUTPATIENT)
Dept: PHYSICAL THERAPY | Facility: HOSPITAL | Age: 84
End: 2024-12-04
Payer: MEDICARE

## 2024-12-11 ENCOUNTER — TREATMENT (OUTPATIENT)
Dept: PHYSICAL THERAPY | Facility: HOSPITAL | Age: 84
End: 2024-12-11
Payer: MEDICARE

## 2024-12-11 DIAGNOSIS — Z96.651 PRESENCE OF RIGHT ARTIFICIAL KNEE JOINT: Primary | ICD-10-CM

## 2024-12-11 PROCEDURE — 97110 THERAPEUTIC EXERCISES: CPT | Mod: GP,KX | Performed by: PHYSICAL THERAPIST

## 2024-12-11 ASSESSMENT — PAIN SCALES - GENERAL: PAINLEVEL_OUTOF10: 7

## 2024-12-11 ASSESSMENT — PAIN - FUNCTIONAL ASSESSMENT: PAIN_FUNCTIONAL_ASSESSMENT: 0-10

## 2024-12-11 NOTE — PROGRESS NOTES
"  Physical Therapy Re-Evaluation and Treatment    Patient Name: Abraham Rodriguez  MRN: [id number]  Today's Date: 12/11/2024  Time Calculation  Start Time: 1141  Stop Time: 1214  Time Calculation (min): 33 min patient arrived 10 minutes late        PT Therapeutic Procedures Time Entry  Neuromuscular Re-Education Time Entry: 5  Therapeutic Exercise Time Entry: 28        Current Problem  1. Presence of right artificial knee joint  Follow Up In Physical Therapy        Problem List Items Addressed This Visit             ICD-10-CM    Presence of right artificial knee joint - Primary Z96.651       General  Reason for Referral: right knee pain s/p TKA 3/20/2024  Referred By: Dr. Arora  Preferred Learning Style: visual, written    Subjective   Current Condition:   Better  Patient reports walking around his house without use of single point cane.    Performing HEP?: Yes    Precautions  Precautions  LE Weight Bearing Status: Weight Bearing as Tolerated  Medical Precautions: Fall precautions  Post-Surgical Precautions: Right total knee precautions  Pain  Pain Assessment: 0-10  0-10 (Numeric) Pain Score: 7  Pain Location: Knee  Pain Orientation: Right    Objective     Specific Lower Extremity MMT  R Iliopsoas: (5/5): 5/5  L Iliopsoas: (5/5): 5/5  R Gluteals (sidelying): (5/5): 4-/5  L Gluteals (sidelying): (5/5): 4/5    Functional Rating Scale  LEFS /80: 16    Knee AROM  R knee flexion: (140°): 120  R knee extension: (0°): 0     Knee MMT  R knee flexion: (5/5): 5/5  L knee flexion: (5/5): 5/5  R knee extension: (5/5): 4+/5  L knee extension: (5/5): 5/5    Outcome Measures:  Other Measures  Lower Extremity Funtional Score (LEFS): 16/80    Treatments:  Therapeutic Exercise  Therapeutic Exercise Performed: Yes  Therapeutic Exercise Activity 1: SciFit Bike Level 3 5' set #11  Therapeutic Exercise Activity 4: Step ups fwd opposite leg kick 6\" x10 R/L  Therapeutic Exercise Activity 5: Knee Extension 15# x20 bilateral  Therapeutic Exercise " "Activity 7: Knee Flexion 45# x20 Bilateral    Balance/Neuromuscular Re-Education  Balance/Neuromuscular Re-Education Activity Performed: Yes  Balance/Neuromuscular Re-Education Activity 3: CTMSIB 3/4 x30\", position 4 x5\"    EDUCATION:   Individual(s) Educated: Patient  Education Provided: yes  Handout(s) Provided: Scanned into chart  Home Program: reviewed home exercise program   Risk and Benefits Discussed with Patient/Caregiver/Other: Yes   Patient/Caregiver Demonstrated Understanding: Yes   Patient Response to Education: Patient/Caregiver verbalized understanding of information, Patient/Caregiver performed return demonstration of exercises/activities, and Patient/Caregiver asked appropriate questions    Assessment: Patient demonstrated slow improvement towards his goals. Patient demonstrated good right knee range of motion and improved ability to perform balance activities. Patient demonstrated impaired tolerance to standing on compliant surface with his eyes closed. Patient demonstrates a flexed postured during gait with and without assistive device. Patient was encouraged to walk without his single point cane around his house and familiar environments. Patient verbalized understanding. Skilled physical therapy is warranted to address the above stated impairments, so the patient can perform functional activities and work duties without increased pain or difficulty.   PT Assessment  PT Assessment Results: Impaired balance, Decreased mobility, Pain, Decreased strength  Rehab Prognosis: Good  Evaluation/Treatment Tolerance: Patient tolerated treatment well    Plan: Continue with POC. Progress exercises as tolerated.  OP PT Plan  Treatment/Interventions: Education/ Instruction, Gait training, Manual therapy, Neuromuscular re-education, Therapeutic activities, Therapeutic exercises  PT Plan: Skilled PT  PT Frequency: 1 time per week  Duration: 4 weeks  Onset Date: 03/20/24  Certification Period Start Date: " 06/07/24  Certification Period End Date: 01/08/25  Number of Treatments Authorized: 27 of 31  Rehab Potential: Good  Plan of Care Agreement: Patient    Goals:  Active       PT Problem       Patient will maintain single leg stand on each leg for 10 sec with no upper extremity support. (Not met)       Start:  06/07/24    Expected End:  11/06/24    Resolved:  11/14/24    Updated to: Patient will maintain NBOS for 10 sec with no upper extremity support.    Update reason: lack of progress         Patient will be able to maintain dynamic standing balance during walking over obstacles (Met)       Start:  06/07/24    Expected End:  08/19/24    Resolved:  08/20/24         Patient will ambulate with normal gait pattern with no device (Progressing)       Start:  06/07/24    Expected End:  01/08/25         Goal Note       Patient walks with flexed posture, but able to walk throughout the gym without use of single point cane.              Patient will ambulate up and down a curb/single step with independent using no device (Met)       Start:  06/07/24    Expected End:  12/12/24    Resolved:  12/11/24         Patient will achieve right knee ROM of  0-120 degrees  (Met)       Start:  06/07/24    Expected End:  11/06/24    Resolved:  11/14/24         Patient will achieve bilateral hip abduction strength of at least 4+/5 (Progressing)       Start:  06/07/24    Expected End:  01/08/25            Patient will achieve bilateral knee flexion strength of at least 4+/5 (Met)       Start:  06/07/24    Expected End:  09/17/24    Resolved:  09/17/24         Patient will achieve bilateral knee extension strength of at least 4+/5 (Met)       Start:  06/07/24    Expected End:  09/17/24    Resolved:  09/17/24         Patient will demonstrate independence in home program for support of progression (Met)       Start:  06/07/24    Expected End:  06/21/24    Resolved:  07/22/24         Patient will report pain of no more than 2/10 demonstrating a  reduction of overall pain (Not Progressing)       Start:  06/07/24    Expected End:  01/08/25            Patient will show a significant change in LEFS (8 to 17) patient reported outcome tool to demonstrate subjective imporovement (Progressing)       Start:  06/07/24    Expected End:  01/08/25            Patient will maintain NBOS for 10 sec with no upper extremity support. (Met)       Start:  11/14/24    Expected End:  12/12/24    Resolved:  12/11/24                  Mirza Todd, PT

## 2024-12-17 ENCOUNTER — DOCUMENTATION (OUTPATIENT)
Dept: PHYSICAL THERAPY | Facility: HOSPITAL | Age: 84
End: 2024-12-17

## 2024-12-17 ENCOUNTER — APPOINTMENT (OUTPATIENT)
Dept: PHYSICAL THERAPY | Facility: HOSPITAL | Age: 84
End: 2024-12-17
Payer: MEDICARE

## 2024-12-17 NOTE — PROGRESS NOTES
Physical Therapy                 Therapy Communication Note    Patient Name: Abraham Rodriguez  MRN: [id number]  Today's Date: 12/17/2024     Discipline: Physical Therapy    Missed Time: Canceled today's appointment and rescheduled.

## 2024-12-23 ENCOUNTER — APPOINTMENT (OUTPATIENT)
Dept: PHYSICAL THERAPY | Facility: HOSPITAL | Age: 84
End: 2024-12-23
Payer: MEDICARE

## 2024-12-23 ENCOUNTER — DOCUMENTATION (OUTPATIENT)
Dept: PHYSICAL THERAPY | Facility: HOSPITAL | Age: 84
End: 2024-12-23
Payer: COMMERCIAL

## 2024-12-23 NOTE — PROGRESS NOTES
Physical Therapy                 Therapy Communication Note    Patient Name: Abraham Rodriguez  MRN: [id number]  Today's Date: 12/23/2024     Discipline: Physical Therapy    Missed Time: Cancel due to shoulder pain.

## 2024-12-28 ENCOUNTER — HOSPITAL ENCOUNTER (EMERGENCY)
Facility: HOSPITAL | Age: 84
Discharge: HOME | End: 2024-12-28
Attending: EMERGENCY MEDICINE
Payer: COMMERCIAL

## 2024-12-28 ENCOUNTER — APPOINTMENT (OUTPATIENT)
Dept: RADIOLOGY | Facility: HOSPITAL | Age: 84
End: 2024-12-28
Payer: COMMERCIAL

## 2024-12-28 ENCOUNTER — OFFICE VISIT (OUTPATIENT)
Dept: URGENT CARE | Facility: URGENT CARE | Age: 84
End: 2024-12-28
Payer: OTHER GOVERNMENT

## 2024-12-28 VITALS
BODY MASS INDEX: 24.78 KG/M2 | WEIGHT: 177 LBS | RESPIRATION RATE: 16 BRPM | SYSTOLIC BLOOD PRESSURE: 154 MMHG | DIASTOLIC BLOOD PRESSURE: 92 MMHG | TEMPERATURE: 98.1 F | OXYGEN SATURATION: 98 % | HEIGHT: 71 IN | HEART RATE: 68 BPM

## 2024-12-28 VITALS
TEMPERATURE: 98.3 F | OXYGEN SATURATION: 96 % | SYSTOLIC BLOOD PRESSURE: 138 MMHG | DIASTOLIC BLOOD PRESSURE: 67 MMHG | HEART RATE: 102 BPM | RESPIRATION RATE: 20 BRPM

## 2024-12-28 DIAGNOSIS — M25.511 ACUTE PAIN OF RIGHT SHOULDER: ICD-10-CM

## 2024-12-28 DIAGNOSIS — S40.851A FOREIGN BODY OF RIGHT UPPER ARM: ICD-10-CM

## 2024-12-28 DIAGNOSIS — S42.001A CLOSED NONDISPLACED FRACTURE OF RIGHT CLAVICLE, UNSPECIFIED PART OF CLAVICLE, INITIAL ENCOUNTER: Primary | ICD-10-CM

## 2024-12-28 DIAGNOSIS — S51.811A SKIN TEAR OF RIGHT FOREARM WITHOUT COMPLICATION, INITIAL ENCOUNTER: ICD-10-CM

## 2024-12-28 DIAGNOSIS — S40.811A ABRASION OF RIGHT UPPER EXTREMITY, INITIAL ENCOUNTER: Primary | ICD-10-CM

## 2024-12-28 DIAGNOSIS — M25.531 RIGHT WRIST PAIN: ICD-10-CM

## 2024-12-28 PROCEDURE — 99284 EMERGENCY DEPT VISIT MOD MDM: CPT | Performed by: EMERGENCY MEDICINE

## 2024-12-28 PROCEDURE — 3075F SYST BP GE 130 - 139MM HG: CPT | Performed by: PHYSICIAN ASSISTANT

## 2024-12-28 PROCEDURE — 1159F MED LIST DOCD IN RCRD: CPT | Performed by: PHYSICIAN ASSISTANT

## 2024-12-28 PROCEDURE — 73030 X-RAY EXAM OF SHOULDER: CPT | Mod: RIGHT SIDE | Performed by: RADIOLOGY

## 2024-12-28 PROCEDURE — 1123F ACP DISCUSS/DSCN MKR DOCD: CPT | Performed by: PHYSICIAN ASSISTANT

## 2024-12-28 PROCEDURE — 73030 X-RAY EXAM OF SHOULDER: CPT | Mod: RT

## 2024-12-28 PROCEDURE — 73110 X-RAY EXAM OF WRIST: CPT | Mod: RT

## 2024-12-28 PROCEDURE — 99214 OFFICE O/P EST MOD 30 MIN: CPT | Performed by: PHYSICIAN ASSISTANT

## 2024-12-28 PROCEDURE — 73110 X-RAY EXAM OF WRIST: CPT | Mod: RIGHT SIDE | Performed by: RADIOLOGY

## 2024-12-28 PROCEDURE — 3078F DIAST BP <80 MM HG: CPT | Performed by: PHYSICIAN ASSISTANT

## 2024-12-28 ASSESSMENT — PAIN SCALES - GENERAL
PAINLEVEL_OUTOF10: 8
PAINLEVEL_OUTOF10: 3
PAINLEVEL_OUTOF10: 6

## 2024-12-28 ASSESSMENT — PAIN DESCRIPTION - PROGRESSION
CLINICAL_PROGRESSION: GRADUALLY IMPROVING
CLINICAL_PROGRESSION: NOT CHANGED

## 2024-12-28 ASSESSMENT — PAIN - FUNCTIONAL ASSESSMENT
PAIN_FUNCTIONAL_ASSESSMENT: 0-10
PAIN_FUNCTIONAL_ASSESSMENT: 0-10

## 2024-12-28 ASSESSMENT — PAIN DESCRIPTION - LOCATION: LOCATION: SHOULDER

## 2024-12-28 ASSESSMENT — PAIN DESCRIPTION - PAIN TYPE
TYPE: ACUTE PAIN
TYPE: ACUTE PAIN

## 2024-12-28 ASSESSMENT — PAIN DESCRIPTION - DESCRIPTORS: DESCRIPTORS: ACHING

## 2024-12-28 ASSESSMENT — PAIN DESCRIPTION - ORIENTATION: ORIENTATION: RIGHT

## 2024-12-28 NOTE — PROGRESS NOTES
Subjective   Patient ID: Abraham Rodriguez is a 84 y.o. male. They present today with a chief complaint of No chief complaint on file..    History of Present Illness  HPI  Pt presents with daughter, he fell last night carrying a bag and tripped on rock landing on concrete. He had immedate pain in right arm, large cut with bleeding.  Past Medical History  Allergies as of 12/28/2024 - Reviewed 12/28/2024   Allergen Reaction Noted    Iodinated contrast media Unknown and Anaphylaxis 11/09/2015    Tobramycin-dexamethasone Itching and Rash 08/10/2023    Cinnamon GI Upset 04/11/2024    [place] GI Upset 04/11/2024    Lipitor [atorvastatin] Other and Unknown 09/14/2023    Lisinopril Unknown 11/21/2023       (Not in a hospital admission)       Past Medical History:   Diagnosis Date    Allergic dermatitis 09/14/2023    Allergic rhinitis due to pollen 10/23/2014    Hay fever    Arthritis     Chronic pain disorder     Clotting disorder (Multi)     dvt april 2020     P.E. april 2020    COPD (chronic obstructive pulmonary disease) (Multi)     hx asbestos    Coronary artery disease     Encounter for other preprocedural examination 06/24/2014    Pre-procedural examination    Encounter for screening for malignant neoplasm of prostate     Encounter for screening for malignant neoplasm of prostate    Fissure in skin of foot 09/14/2023    Hyperlipidemia     Hypertension     Localized edema 05/11/2020    Pedal edema    Lung infiltrate 09/14/2023    Myocardial infarction (Multi)     Other conditions influencing health status     Carcinoma Of The Tongue    Pain in unspecified knee 12/22/2014    Joint pain, knee    Personal history of diseases of the skin and subcutaneous tissue 04/23/2013    History of eczema    Personal history of other diseases of the musculoskeletal system and connective tissue 06/19/2014    Personal history of arthritis    Personal history of other diseases of the nervous system and sense organs 08/10/2021    History of  Bell's palsy    Personal history of other diseases of the respiratory system 11/12/2014    History of asbestosis    Personal history of other diseases of the respiratory system 08/13/2014    History of chronic obstructive lung disease    Personal history of other specified conditions 08/20/2013    History of edema    Plantar fascial fibromatosis 07/22/2013    Plantar fasciitis    Polyp of colon     Polyp of sigmoid colon    Syncope and collapse 01/23/2015    Syncope and collapse    Unspecified abdominal pain 12/22/2014    Stomach pain    Unspecified disorder of eyelid 10/23/2014    Eyelid disorder       Past Surgical History:   Procedure Laterality Date    CARDIAC CATHETERIZATION      CHOLECYSTECTOMY  04/23/2013    Cholecystectomy Laparoscopic    OTHER SURGICAL HISTORY  04/27/2021    Meniscus repair    OTHER SURGICAL HISTORY Left 05/31/2023    Hip replacement    OTHER SURGICAL HISTORY  04/23/2013    Biopsy Tongue    SHOULDER SURGERY  04/23/2013    Shoulder Surgery    TOTAL KNEE ARTHROPLASTY Right 03/27/2024        reports that he quit smoking about 42 years ago. His smoking use included cigarettes. He has never used smokeless tobacco. He reports that he does not currently use drugs. He reports that he does not drink alcohol.    Review of Systems  Review of Systems                               Objective    Vitals:    12/28/24 1430   BP: 138/67   BP Location: Left arm   Patient Position: Sitting   BP Cuff Size: Adult   Pulse: 102   Resp: 20   Temp: 36.8 °C (98.3 °F)   SpO2: 96%     No LMP for male patient.    Physical Exam  Vitals and nursing note reviewed.   Constitutional:       Appearance: Normal appearance.   Musculoskeletal:      Comments: Swelling and tenderness over entire right wrist unable to move wrist. Able to make okay sign and thumbs up however with pain in wrist. Unable to lift arm due to shoulder pain   Skin:     General: Skin is warm and dry.      Comments: Large abrasion to right forearm with  extensive bruising around, gauze stuck to open wound unable to remove after soaking due to pain   Neurological:      Mental Status: He is alert.         Procedures    Point of Care Test & Imaging Results from this visit  No results found for this visit on 12/28/24.       Diagnostic study results (if any) were reviewed by Devika Zafar PA-C.    Assessment/Plan   Allergies, medications, history, and pertinent labs/EKGs/Imaging reviewed by Devika Zafar PA-C.     Medical Decision Making  83 yo M presents with daughter, he fell last night carrying a bag and tripped on rock landing on concrete. He had immedate pain in right arm, large cut with bleeding. Reviewed vitals , SpO2 96%. Exam remarkable for Swelling and tenderness over entire right wrist unable to move wrist. Able to make okay sign and thumbs up however with pain in wrist. Unable to lift arm due to shoulder pain.  Large abrasion to right forearm with extensive bruising around, gauze stuck to open wound unable to remove after soaking due to pain   Discussed with patient and family Accidental fall onto concrete resulting in large abrasion to right forearm with pain in right wrist and shoulder- unable to remove gauze from wound. Advised need for higher level of care, pt and daughter agreeable to plan and will drive to [facility]    Orders and Diagnoses  Diagnoses and all orders for this visit:  Abrasion of right upper extremity, initial encounter  Foreign body of right upper arm  Right wrist pain  Acute pain of right shoulder      Medical Admin Record      Patient disposition: ED I personally gave report to [place] ER nurse    Electronically signed by Devika Zafar PA-C  11:25 PM

## 2024-12-28 NOTE — DISCHARGE INSTRUCTIONS
Sling for comfort.    Continue normal pain medications.    Ice to the distal clavicle help control pain as well.    Call Dr. Booth's office in the morning on Monday to arrange outpatient follow-up for the clavicle fracture.    Call your primary care doctor's office Monday morning for outpatient follow-up later in the week regarding the skin tears on your right forearm.    Remove the dressing placed here in 24 hours then wash the area once per day with antibacterial soap apply bacitracin and a Band-Aid.    Return for worsening symptoms or concerns

## 2024-12-28 NOTE — PATIENT INSTRUCTIONS
Accidental fall onto concrete, large abrasion to right forearm with pain in right wrist and shoulder- unable to remove gauze from wound. Advised need for higher level of care, daughter will drive to [facility]

## 2024-12-28 NOTE — ED PROVIDER NOTES
[facility]   ED  Provider Note  12/28/2024  3:18 PM  AC10/AC10      Chief Complaint   Patient presents with    Shoulder Injury        History of Present Illness:   Abraham Rodriguez is a 84 y.o. male presenting to the ED for fall yesterday.  The complaint has been persistent, moderate in severity, and worsened by movement.  Patient has been to to urgent care centers for dressing change.  They cannot get the gauze off of his wounds.  He also has pain in the right shoulder.  He was using his cane to walk down to his cabin carrying a loaf of bread the other hand.  He had sudden pain in his left shoulder and dropped low for bed and fell.  He had a recent history of shoulder injection with steroids for arthritic pain.  He feels the shoulder is worse than it was before the injection.      Review of Systems:   Pertinent positives and review of systems as noted above.  Remaining 10 review of systems is negative or noncontributory to today's episode of care.  Review of Systems       --------------------------------------------- PAST HISTORY ---------------------------------------------  Past Medical History:   Past Medical History:   Diagnosis Date    Allergic dermatitis 09/14/2023    Allergic rhinitis due to pollen 10/23/2014    Hay fever    Arthritis     Chronic pain disorder     Clotting disorder (Multi)     dvt april 2020     P.E. april 2020    COPD (chronic obstructive pulmonary disease) (Multi)     hx asbestos    Coronary artery disease     Encounter for other preprocedural examination 06/24/2014    Pre-procedural examination    Encounter for screening for malignant neoplasm of prostate     Encounter for screening for malignant neoplasm of prostate    Fissure in skin of foot 09/14/2023    Hyperlipidemia     Hypertension     Localized edema 05/11/2020    Pedal edema    Lung infiltrate 09/14/2023    Myocardial infarction (Multi)     Other conditions influencing health status     Carcinoma Of The Tongue    Pain in  unspecified knee 12/22/2014    Joint pain, knee    Personal history of diseases of the skin and subcutaneous tissue 04/23/2013    History of eczema    Personal history of other diseases of the musculoskeletal system and connective tissue 06/19/2014    Personal history of arthritis    Personal history of other diseases of the nervous system and sense organs 08/10/2021    History of Bell's palsy    Personal history of other diseases of the respiratory system 11/12/2014    History of asbestosis    Personal history of other diseases of the respiratory system 08/13/2014    History of chronic obstructive lung disease    Personal history of other specified conditions 08/20/2013    History of edema    Plantar fascial fibromatosis 07/22/2013    Plantar fasciitis    Polyp of colon     Polyp of sigmoid colon    Syncope and collapse 01/23/2015    Syncope and collapse    Unspecified abdominal pain 12/22/2014    Stomach pain    Unspecified disorder of eyelid 10/23/2014    Eyelid disorder        Past Surgical History:   Past Surgical History:   Procedure Laterality Date    CARDIAC CATHETERIZATION      CHOLECYSTECTOMY  04/23/2013    Cholecystectomy Laparoscopic    OTHER SURGICAL HISTORY  04/27/2021    Meniscus repair    OTHER SURGICAL HISTORY Left 05/31/2023    Hip replacement    OTHER SURGICAL HISTORY  04/23/2013    Biopsy Tongue    SHOULDER SURGERY  04/23/2013    Shoulder Surgery    TOTAL KNEE ARTHROPLASTY Right 03/27/2024        Social History:   Social History     Social History Narrative    Not on file        Family History: family history includes Hypothyroidism in his brother. Unless otherwise noted, family history is non contributory    Patient's Medications   New Prescriptions    No medications on file   Previous Medications    ASPIRIN 325 MG EC TABLET    Take 1 tablet (325 mg) by mouth 2 times a day.    ATORVASTATIN (LIPITOR) 20 MG TABLET    Take 1 tablet (20 mg) by mouth once daily.    CHLORHEXIDINE (PERIDEX) 0.12 %  SOLUTION    Use 15 mL in the mouth or throat if needed.    DEXLANSOPRAZOLE (DEXILANT) 60 MG DR CAPSULE    Take 1 capsule (60 mg) by mouth once daily.    DICYCLOMINE (BENTYL) 10 MG CAPSULE    Take 1 capsule (10 mg) by mouth 4 times a day.    DOCUSATE SODIUM (COLACE) 100 MG CAPSULE    Take 1 capsule (100 mg) by mouth 2 times a day as needed for constipation.    DULOXETINE (CYMBALTA) 30 MG DR CAPSULE    Take 1 capsule (30 mg) by mouth once daily. Do not crush or chew.    FINASTERIDE (PROSCAR) 5 MG TABLET    Take 1 tablet (5 mg) by mouth once daily in the evening. Do not crush, chew, or split.    FLUTICASONE (FLONASE) 50 MCG/ACTUATION NASAL SPRAY    Administer 1 spray into each nostril once daily as needed for rhinitis. Shake gently. Before first use, prime pump. After use, clean tip and replace cap. As needed    FUROSEMIDE (LASIX) 40 MG TABLET    Take 1 tablet (40 mg) by mouth once daily as needed (swelling). Takes as needed    GABAPENTIN (NEURONTIN) 300 MG CAPSULE    Take 2 capsules (600 mg) by mouth 3 times a day.    HYOSCYAMINE 0.125 MG DISINTEGRATING TABLET    Place 1 tablet (0.125 mg) under the tongue.    IRBESARTAN (AVAPRO) 150 MG TABLET    TAKE 1 TABLET DAILY    LOSARTAN (COZAAR) 100 MG TABLET    Take 1 tablet (100 mg) by mouth early in the morning..    METOPROLOL SUCCINATE XL (TOPROL-XL) 50 MG 24 HR TABLET    TAKE 1 TABLET DAILY    MIRTAZAPINE (REMERON) 7.5 MG TABLET    Take 1 tablet (7.5 mg) by mouth once daily at bedtime.    NALOXONE (NARCAN) 4 MG/0.1 ML NASAL SPRAY    Administer 1 spray (4 mg) into affected nostril(s) if needed for opioid reversal or respiratory depression.    ONDANSETRON (ZOFRAN) 4 MG TABLET    Take 1 tablet (4 mg) by mouth.    OXYCODONE (ROXICODONE) 5 MG IMMEDIATE RELEASE TABLET    Take 1 tablet (5 mg) by mouth 4 times a day as needed for severe pain (7 - 10) for up to 28 days. Do not fill before December 13, 2024.    OXYCODONE (ROXICODONE) 5 MG IMMEDIATE RELEASE TABLET    Take 1 tablet (5  mg) by mouth 4 times a day as needed for severe pain (7 - 10) for up to 28 days. Do not fill before November 15, 2024.    OXYCODONE (ROXICODONE) 5 MG IMMEDIATE RELEASE TABLET    Take 1 tablet (5 mg) by mouth 4 times a day as needed for severe pain (7 - 10) for up to 28 days.    TAMSULOSIN (FLOMAX) 0.4 MG 24 HR CAPSULE    Take 2 capsules (0.8 mg) by mouth once daily in the evening.    TIZANIDINE (ZANAFLEX) 2 MG TABLET    Take 1 tablet (2 mg) by mouth every 8 hours if needed for muscle spasms.    TRIAMCINOLONE (KENALOG) 0.025 % CREAM    Apply 1 Application topically.   Modified Medications    No medications on file   Discontinued Medications    No medications on file      The patient’s home medications have been reviewed.    Allergies: Iodinated contrast media, Tobramycin-dexamethasone, Cinnamon, Cucumber, Lipitor [atorvastatin], and Lisinopril    -------------------------------------------------- RESULTS -------------------------------------------------  All laboratory and radiology results have been personally reviewed by myself   LABS:  Labs Reviewed - No data to display      RADIOLOGY:  Interpreted by Radiologist.  XR wrist right 3+ views   Final Result   1. No evidence of acute fracture or dislocation.   2. Degenerative changes, as described above.        MACRO:   None.        Signed by: Carlos James 12/28/2024 4:41 PM   Dictation workstation:   [id number]      XR shoulder right 2+ views   Final Result   1. Distal right clavicle fracture, as above.        MACRO:   None.        Signed by: Carlos James 12/28/2024 4:40 PM   Dictation workstation:   [id number]          Encounter Date: 07/26/24   ECG 12 lead   Result Value    Ventricular Rate 67    Atrial Rate 67    OK Interval 190    QRS Duration 154    QT Interval 448    QTC Calculation(Bazett) 473    P Axis 86    R Axis -39    T Axis 26    QRS Count 11    Q Onset 208    P Onset 113    P Offset 142    T Offset 432    QTC Fredericia 465    Narrative    Sinus  "rhythm with Premature supraventricular complexes  Left axis deviation  Right bundle branch block  Abnormal ECG  When compared with ECG of 20-JUL-2024 10:22,  Premature supraventricular complexes are now Present  Minimal criteria for Inferior infarct are no longer Present  See ED provider note for full interpretation and clinical correlation  Confirmed by Keila Valadez ([id number]) on 7/30/2024 6:30:19 PM     ------------------------- NURSING NOTES AND VITALS REVIEWED ---------------------------   The nursing notes within the ED encounter and vital signs as below have been reviewed.   /86 (BP Location: Right arm, Patient Position: Sitting)   Pulse 70   Temp 36.7 °C (98.1 °F) (Temporal)   Resp 16   Ht 1.803 m (5' 11\")   Wt 80.3 kg (177 lb)   SpO2 100%   BMI 24.69 kg/m²   Oxygen Saturation Interpretation: Normal      ---------------------------------------------------PHYSICAL EXAM--------------------------------------  Physical Exam   Constitutional/General: Alert,  well appearing, non toxic in NAD  Head: Normocephalic and atraumatic  Eyes: PERRL, EOMI, conjunctiva normal, sclera non icteric  Mouth: Oropharynx clear, handling secretions, no trismus, no asymmetry of the posterior oropharynx or uvular edema  Neck: Supple, full ROM, non tender to palpation in the midline, no stridor, no crepitus, no meningeal signs  Respiratory: Lungs clear to auscultation bilaterally, no wheezes, rales, or rhonchi. Not in respiratory distress  Cardiovascular:  Regular rate. Regular rhythm. No murmurs, gallops, or rubs. 2+ distal pulses  Chest: No chest wall tenderness  GI:  Abdomen Soft, Non tender, Non distended.  +BS. No organomegaly, no palpable masses,  No rebound, guarding, or rigidity.   Musculoskeletal: Moves all extremities x 4. Warm and well perfused, no clubbing, cyanosis, or edema. Capillary refill <3 seconds patient has pain over the distal AC joint and shoulder with movement.  No obvious shoulder deformity or " dislocation  Integument: skin warm and dry.  Superficial skin tears to the right forearm.  No evidence of localized infection or foreign body.  Lymphatic: no lymphadenopathy noted  Neurologic: No focal deficits, symmetric strength 5/5 in the upper and lower extremities bilaterally  Psychiatric: Normal Affect    Procedures  Gauze dressing was gently moved by the physician.    ------------------------------ ED COURSE/MEDICAL DECISION MAKING----------------------  Diagnoses as of 12/28/24 1709   Closed nondisplaced fracture of right clavicle, unspecified part of clavicle, initial encounter   Skin tear of right forearm without complication, initial encounter      Patient has skin tears on his right forearm which have been cleaned and dressed.  X-rays reveal a fracture of the distal right clavicle.  The patient be placed in a sling and given tramadol for pain.  He is to take his dressing off once a day wash gently with soap and water pat dry and redress it.  He has a follow-up with his primary care doctor on Tuesday for outpatient care.  I referred him to Dr. Booth for care of his fractured clavicle.    Patient's arm was examined after the wounds were dressed and sling applied.  Neurovascular exam to the hand was intact.  No evidence of acute vascular or neural compromise.  SHAHNAZ Husain MD      Medical Decision Making:   Discharged to home  Diagnoses as of 12/28/24 1709   Closed nondisplaced fracture of right clavicle, unspecified part of clavicle, initial encounter   Skin tear of right forearm without complication, initial encounter      Counseling:   The emergency provider has spoken with the patient and discussed today’s results, in addition to providing specific details for the plan of care and counseling regarding the diagnosis and prognosis.  Questions are answered at this time and they are agreeable with the plan.      --------------------------------- IMPRESSION AND DISPOSITION  ---------------------------------        IMPRESSION  1. Closed nondisplaced fracture of right clavicle, unspecified part of clavicle, initial encounter    2. Skin tear of right forearm without complication, initial encounter        DISPOSITION  Disposition: Discharge to home  Patient condition is fair      Billing Provider Critical Care Time: 0 minutes     Sacha Husain MD  12/28/24 1709       Sacha Husain MD  01/10/25 0937

## 2024-12-30 ENCOUNTER — APPOINTMENT (OUTPATIENT)
Dept: PHYSICAL THERAPY | Facility: HOSPITAL | Age: 84
End: 2024-12-30
Payer: MEDICARE

## 2025-01-09 ENCOUNTER — APPOINTMENT (OUTPATIENT)
Dept: PRIMARY CARE | Facility: CLINIC | Age: 85
End: 2025-01-09
Payer: COMMERCIAL

## 2025-01-09 ENCOUNTER — OFFICE VISIT (OUTPATIENT)
Dept: PRIMARY CARE | Facility: CLINIC | Age: 85
End: 2025-01-09
Payer: MEDICARE

## 2025-01-09 ENCOUNTER — APPOINTMENT (OUTPATIENT)
Dept: PAIN MEDICINE | Facility: CLINIC | Age: 85
End: 2025-01-09
Payer: MEDICARE

## 2025-01-09 VITALS
DIASTOLIC BLOOD PRESSURE: 80 MMHG | SYSTOLIC BLOOD PRESSURE: 184 MMHG | BODY MASS INDEX: 25.06 KG/M2 | HEART RATE: 79 BPM | OXYGEN SATURATION: 97 % | WEIGHT: 179 LBS | HEIGHT: 71 IN

## 2025-01-09 DIAGNOSIS — Z91.81 STATUS POST FALL: ICD-10-CM

## 2025-01-09 DIAGNOSIS — S42.001P CLOSED DISPLACED FRACTURE OF RIGHT CLAVICLE WITH MALUNION, UNSPECIFIED PART OF CLAVICLE, SUBSEQUENT ENCOUNTER: Primary | ICD-10-CM

## 2025-01-09 DIAGNOSIS — T14.8XXA SKIN ABRASION: ICD-10-CM

## 2025-01-09 PROCEDURE — 3077F SYST BP >= 140 MM HG: CPT | Performed by: NURSE PRACTITIONER

## 2025-01-09 PROCEDURE — 1159F MED LIST DOCD IN RCRD: CPT | Performed by: NURSE PRACTITIONER

## 2025-01-09 PROCEDURE — 1036F TOBACCO NON-USER: CPT | Performed by: NURSE PRACTITIONER

## 2025-01-09 PROCEDURE — 99214 OFFICE O/P EST MOD 30 MIN: CPT | Performed by: NURSE PRACTITIONER

## 2025-01-09 PROCEDURE — 3079F DIAST BP 80-89 MM HG: CPT | Performed by: NURSE PRACTITIONER

## 2025-01-09 PROCEDURE — 1160F RVW MEDS BY RX/DR IN RCRD: CPT | Performed by: NURSE PRACTITIONER

## 2025-01-09 PROCEDURE — 1123F ACP DISCUSS/DSCN MKR DOCD: CPT | Performed by: NURSE PRACTITIONER

## 2025-01-09 NOTE — PROGRESS NOTES
"Subjective   Patient ID: Abraham Rodriguez is a 84 y.o. male who presents for Follow-up (Patient is here today for a hospital follow up. Patient tripped and fell causing him to fracture his clavicle. Patient has not seen a orthopedist due to trying to get an appointment with Dr. Arora and has not heard back from them. ).    84-year-old male here for emergency room follow-up secondary to a fall.    [facility]. Received a cortisone inj RT shoulder without benefit. He will occasionally lose the ability to hold anything and will drop stuff. This occurred 12/27/24. He was walking and carrying a bag, dropped bag, lost balance and fell. Went to the urgent care in [place] 12/28/24, sent to [place] ED. RT FA large abrasion cleaned and covered. RT clavicle mild displaced fracture.  Pain RT UE, cannot use the arm. Was told to wear a sling, he does not. Was told to see ortho, he knows Dr Arora but they could not get him in until the end of Feb. [facility], Dr Booth can see him Feb 6th.  He has been taking care of the abrasion on the right forearm asked me to look at it to see if everything looks good.           Review of Systems    Objective   BP (!) 184/80   Pulse 79   Ht 1.803 m (5' 11\")   Wt 81.2 kg (179 lb)   SpO2 97%   BMI 24.97 kg/m²     Physical Exam  Constitutional:       Appearance: Normal appearance.   Cardiovascular:      Pulses: Normal pulses.   Musculoskeletal:        Arms:       Comments: Site of pain, decreased ROM.  Palpable reproducible pain   Skin:            Comments: Large abrasion covered by bandage. Edges well approx. large area of nonintact skin scant amount of serosanguineous drainage.   Neurological:      Mental Status: He is alert.         Assessment/Plan   Diagnoses and all orders for this visit:  Closed displaced fracture of right clavicle with malunion, unspecified part of clavicle, subsequent encounter  Comments:  Staff assisted with getting him scheduled with orthopedics in " the meantime I asked that he wear the sling that the ER gave him to keep his shoulder stable  Status post fall  Skin abrasion  Comments:  I explained how to keep it clean pat dry apply Bactroban and keep it covered  Other orders  -     Follow Up In Primary Care - Established    I explained to him that he has a broken bone he did not understand this.  I explained that it is a displaced broken bone.  I explained risk versus benefit if he continues to use his right upper extremity he needs to keep it in a sling to support the fracture until he sees orthopedics.

## 2025-01-14 ENCOUNTER — OFFICE VISIT (OUTPATIENT)
Dept: PAIN MEDICINE | Facility: CLINIC | Age: 85
End: 2025-01-14
Payer: MEDICARE

## 2025-01-14 VITALS
RESPIRATION RATE: 18 BRPM | DIASTOLIC BLOOD PRESSURE: 79 MMHG | SYSTOLIC BLOOD PRESSURE: 175 MMHG | HEART RATE: 61 BPM | OXYGEN SATURATION: 98 %

## 2025-01-14 DIAGNOSIS — S42.001D CLOSED DISPLACED FRACTURE OF RIGHT CLAVICLE WITH ROUTINE HEALING, UNSPECIFIED PART OF CLAVICLE, SUBSEQUENT ENCOUNTER: Primary | ICD-10-CM

## 2025-01-14 DIAGNOSIS — M51.16 LUMBAR DISC DISEASE WITH RADICULOPATHY: ICD-10-CM

## 2025-01-14 DIAGNOSIS — M54.12 CERVICAL RADICULITIS: ICD-10-CM

## 2025-01-14 DIAGNOSIS — M47.816 LUMBAR SPONDYLOSIS: ICD-10-CM

## 2025-01-14 DIAGNOSIS — M48.062 LUMBAR STENOSIS WITH NEUROGENIC CLAUDICATION: ICD-10-CM

## 2025-01-14 PROCEDURE — 1036F TOBACCO NON-USER: CPT | Performed by: NURSE PRACTITIONER

## 2025-01-14 PROCEDURE — 1159F MED LIST DOCD IN RCRD: CPT | Performed by: NURSE PRACTITIONER

## 2025-01-14 PROCEDURE — 1125F AMNT PAIN NOTED PAIN PRSNT: CPT | Performed by: NURSE PRACTITIONER

## 2025-01-14 PROCEDURE — 3078F DIAST BP <80 MM HG: CPT | Performed by: NURSE PRACTITIONER

## 2025-01-14 PROCEDURE — 3077F SYST BP >= 140 MM HG: CPT | Performed by: NURSE PRACTITIONER

## 2025-01-14 PROCEDURE — 1160F RVW MEDS BY RX/DR IN RCRD: CPT | Performed by: NURSE PRACTITIONER

## 2025-01-14 PROCEDURE — 99214 OFFICE O/P EST MOD 30 MIN: CPT | Performed by: NURSE PRACTITIONER

## 2025-01-14 PROCEDURE — 1123F ACP DISCUSS/DSCN MKR DOCD: CPT | Performed by: NURSE PRACTITIONER

## 2025-01-14 RX ORDER — OXYCODONE HYDROCHLORIDE 5 MG/1
5 TABLET ORAL
Qty: 140 TABLET | Refills: 0 | Status: SHIPPED | OUTPATIENT
Start: 2025-03-11 | End: 2025-04-08

## 2025-01-14 RX ORDER — OXYCODONE HYDROCHLORIDE 5 MG/1
5 TABLET ORAL
Qty: 140 TABLET | Refills: 0 | Status: SHIPPED | OUTPATIENT
Start: 2025-02-11 | End: 2025-03-11

## 2025-01-14 RX ORDER — OXYCODONE HYDROCHLORIDE 5 MG/1
5 TABLET ORAL
Qty: 140 TABLET | Refills: 0 | Status: SHIPPED | OUTPATIENT
Start: 2025-01-14 | End: 2025-02-11

## 2025-01-14 ASSESSMENT — ENCOUNTER SYMPTOMS
HEMATOLOGIC/LYMPHATIC NEGATIVE: 1
MYALGIAS: 1
ALLERGIC/IMMUNOLOGIC NEGATIVE: 1
JOINT SWELLING: 0
TREMORS: 0
RESPIRATORY NEGATIVE: 1
HEADACHES: 0
SPEECH DIFFICULTY: 0
EYES NEGATIVE: 1
NECK PAIN: 0
NUMBNESS: 0
SEIZURES: 0
NECK STIFFNESS: 0
PAIN: 1
WEAKNESS: 1
GASTROINTESTINAL NEGATIVE: 1
PSYCHIATRIC NEGATIVE: 1
ARTHRALGIAS: 1
BACK PAIN: 1
LIGHT-HEADEDNESS: 0
ENDOCRINE NEGATIVE: 1
FACIAL ASYMMETRY: 0
CARDIOVASCULAR NEGATIVE: 1
CONSTITUTIONAL NEGATIVE: 1
DIZZINESS: 0

## 2025-01-14 ASSESSMENT — PAIN SCALES - GENERAL: PAINLEVEL_OUTOF10: 8

## 2025-01-14 NOTE — PROGRESS NOTES
Subjective   Patient ID: Abraham Rodriguez is a 84 y.o. male who presents for Med Refill and Pain (Low Back/Neck/Legs ).  Back Pain  Associated symptoms include weakness. Pertinent negatives include no headaches or numbness.   Knee Pain   Pertinent negatives include no numbness.   Med Refill  Associated symptoms include arthralgias, myalgias and weakness. Pertinent negatives include no headaches, joint swelling, neck pain or numbness.   Pain  Associated symptoms include weakness. Pertinent negatives include no headaches or joint swelling.       Abraham follows up for interval reevaluation of chronic knee joint pain from arthritis and is right total knee joint replacement March 20, 2024.  He is with total right hip replacement 2020.  Is also with low back pain from degenerative disc and spondylosis.  Chronic posterior cervical pain from degenerative disc and spondylosis.      Abraham is with increased right upper extremity pain including the clavicle, shoulder, forearm and wrist following a fall December 27, 2024.  Went to the ED and was hospitalized from December 28 through December 29, 2024.  He is scheduled to see orthopedic surgeon February 6, 2025.  He is with a wound to his right forearm since the fall.  This is a skin tear and is covered with a dry and intact dressing.    Is with right upper extremity pain since the fall.  Having a hard time control it as it can be high as an 8 out of 10 with all movement and makes this very difficult for sleep.  Reports medication is not as effective as it used to be since the fall.  Will increase his oxycodone 5 mg 4 times a day to 5 times a day to help with this new pain from the fall after discussing benefits and risks.      Is also on Cymbalta 30 mg once daily.  Not sure if this does help to reduce pain or improve function.  Advise he discontinue medication to see if he has any benefits from the medication or not.    Increase gabapentin to a total of 1800 mg/day since last office  visit to help cover back pain, leg pain and knee pain.  Reports he does not feel a difference.  After he stops the Cymbalta for a week he can subtract 1 gabapentin every 3 days until off to see if this was helpful or not in reducing pain or improving function.     Toxicology consistent April 23, 2024.  Risk tool and contract scanned into the chart dated January 14, 2025.     History of renal calculus inside a scope he with laser lithotripsy May 1, 2024.  Condition resolved.    For continuity:   Given the patient's report of reduced pain and improved functional ability without adverse effects, it is reasonable to treat with narcotic medications. The terms of the opioid agreement as well as the potential risks and adverse effects of the patient's medication regimen were discussed in detail. This includes if applicable due to dosage of medication permission to discuss and coordinate care with other treatment providers relevant to the patients condition. The patient verbalized understanding.      Risks and side effects of chronic opioid therapy including but not limited to tolerance, dependence, constipation, hyperalgesia, cognitive side effects, addiction and possible death due to overuse and or misuse were discussed. I also discussed that such medications when co-administered with other sedative agents including but not limited to alcohol, benzodiazepines, sedative hypnotics and illegal drugs could pose life threatening consequences including death. I also explained the impact that the administration of such medication has on a patient with obstructive sleep apnea and continued recommendations for use of apnea devices if ordered are prescribed by other physicians. In order to effectively and safely treat the pain, I also emphasized the importance of compliance with the treatment plan, as well as compliance with the terms of the opioid agreement, which was reviewed in detail. I explained the importance of being  responsible with the medications and to take these only as prescribed, never in excess and never for reasons other than pain reduction. The patient was counseled on keeping the medications safe and locked away from children and other adults as well as disposal methods and options. The patient understood the risks and instructions.      I also discussed with the patient in detail that based on the clinical response to the opioid medications and improvements of activities of daily living, sleep, and work performance in light of compliance with the treatment plan we can continue this form of therapy for the above chronic pain. The goal and rationale used for current treatment with chronic opioid medication is to control the pain and alleviate disability induced by the chronic pain condition noted above after failures of other non-opioid and nonpharmacological modalities to treat the chronic pain and the symptoms associated with have failed. The patient understood the goals in terms of the above treatment plan and had no further questions prior to leaving the office today.      Of note, the above-mentioned diagnoses/conditions and expected fluctuating nature of pain, and pain characteristic changes may lead to prolonged functional impairment requiring frequent and multiple reassessments with continued high level medical decision making. As noted, medication and medication management may require opiate therapy in excess of a routine less than 30 day medication requirement. The patient may require daily opiate therapy necessitating month-long prescription medication as noted above in order to perform activities of daily living and achieve acceptable quality of life with respect to their chronic pain condition for the foreseeable future. We monitor our patient's carefully through drug monitoring, medication counts, urine drug testing specific to their medication as well as a myriad of other substances and with frequent  follow-ups with interval reassement of the chronic pain condition, its pathophysiology and prognosis.      The level of clinical decision making at this office visit is high due to high risks and complications including mortality and morbidity related to acute and chronic pain with respects to life, bodily function, and treatment. Risks and clinical decisions with respect to under treatment, failure to maintain adequate treatment, and/or overtreatment complications and outcomes were discussed with the patient with respect to their chronic pain conditions, interventional therapies, as well as the use of various medications including possible controlled/dangerous medications. The amount and complexity of reviewed data at this in subsequent office visits is high given patient's fluctuating clinical presentation, laboratory and radiographic reports, prescription monitoring program data, and medication history as well as other relevant data as noted above. Pertinent negatives and positives data was used in consideration for the above-mentioned high complexity.       Given the patient's total MED, general use of daily opiates, or other coadministered medications in various classes the patient was offered a prescription for Narcan. I instructed the patient that it is important that patient fill this medication in order to demonstrate understanding of the gravity of possible side effects including respiratory depression and risk of overdose of this opiate load or medication combination. As such patient will be required to bring Narcan prescription to follow-up appointments as part of compliance with continued opiate care.      With respect to opiate induced constipation I discussed multiple ways to combat this problem including staying hydrated and taking over-the-counter medications such as Dulcolax, Miralax and Senna. If these treatments are not effective we could consider such medications as Amitiza, Linzess and  Movantik.      Disclaimer: This note was transcribed using an audio transcription device. As such, minor errors may be present with regard to spelling, punctuation, and inadvertent word insertion. Please disregard such errors.     Narrative & Impression  Interpreted By:  Carlos James,   STUDY:  XR WRIST RIGHT 3+ VIEWS  12/28/2024 4:16 pm      INDICATION:  Signs/Symptoms:fall and pain      COMPARISON:  None.      ACCESSION NUMBER(S):  [id number]      ORDERING CLINICIAN:  MALORIE LOWERY      TECHNIQUE:  Four views of the right wrist including AP , oblique , navicular cone  down and lateral projections were obtained.      FINDINGS:  There is no evidence of acute fracture or dislocation identified.  Severe joint space narrowing and minimal marginal osteophyte  formation is seen the scaphoid trapezium articulation. Minimal  degenerative changes are seen elsewhere in the right wrist.      IMPRESSION:  1. No evidence of acute fracture or dislocation.  2. Degenerative changes, as described above.      MACRO:  None.      Signed by: Carlos James 12/28/2024 4:41 PM  Dictation workstation:   [id number]    Narrative & Impression   Interpreted By:  Carlos James,   STUDY:  XR SHOULDER RIGHT 2+ VIEWS 12/28/2024 4:16 pm      INDICATION:  Signs/Symptoms:Fall with shoulder pain      COMPARISON:  None.      ACCESSION NUMBER(S):  [id number]      ORDERING CLINICIAN:  MALORIE LOWERY      TECHNIQUE:  4 views of the right shoulder including AP , Grashey and scapular  Y-views were obtained.      FINDINGS:  A mildly displaced fracture is seen through the distal right  clavicle. No additional fracture or evidence of dislocation is seen.  The joint spaces are well preserved without significant degenerative  changes.      IMPRESSION:  1. Distal right clavicle fracture, as above.      MACRO:  None.      Signed by: Carlos James 12/28/2024 4:40 PM  Dictation workstation:   [id number]       Narrative & Impression  Interpreted By:   Jaswinder Colunga,   STUDY:  CT KNEE RIGHT WO IV CONTRAST;  4/15/2024 10:48 pm      INDICATION:  Signs/Symptoms:concern for septic right knee s/p total knee  replacement.      COMPARISON:  3/27/2024      ACCESSION NUMBER(S):  [id number]      ORDERING CLINICIAN:  VIOLETA BOWDEN      TECHNIQUE:  Contiguous axial images of the knee were obtained without intravenous  contrast. Coronal and sagittal reformatted images were obtained from  the axial images.      FINDINGS:  There is postsurgical change of right knee arthroplasty resulting in  beam hardening artifact and limiting evaluation. No definite evidence  of acute displaced periprosthetic knee fracture. There is stable  appearance of previously described 16 mm x 9 mm calcific/ossific  density in the posterolateral soft tissues adjacent to the knee.  There is also stable calcific density superior to the patella. Small  to moderate suprapatellar knee effusion is again noted.      IMPRESSION:  Postsurgical change of right knee arthroplasty resulting in beam  hardening artifact and limiting evaluation. No definite evidence of  acute displaced periprosthetic knee fracture.      Suprapatellar knee effusion is again noted; clinical correlation  recommended if there is concern for infection given the clinical  history.      MACRO:  None      Signed by: Jaswinder Colunga 4/16/2024 2:14 AM  Dictation workstation:   [id number]    Narrative & Impression   MRN: [id number]  Patient Name: ARTEMIO MORALES     STUDY:  MRI L-SPINE WO;  1/28/2022 3:14 pm     INDICATION:  low back pain  M47.816: Lumbar spondylosis M54.16: Lumbar  radiculopathy.     COMPARISON:  10/08/2021 MR     ACCESSION NUMBER(S):  [id number]     ORDERING CLINICIAN:  KAYLEIGH MORSE     TECHNIQUE:  Sagittal T1, T2, STIR, and axial T1 weighted images of the lumbar  spine were acquired. Patient was unable to tolerate further images  due to severe pain.     FINDINGS:  Alignment: There are 5 lumbar type vertebrae. The lumbar spine  is  straightened.     Vertebrae/Intervertebral Discs: The vertebral bodies demonstrate  expected height.The marrow has patchy bands of fatty transformation  most prominent adjacent to the endplates anteriorly at L1-2 and along  the L5-S1 endplates. The discs have near complete loss of height  throughout the lumbar region with degenerative desiccation as well,  unchanged.     Conus: The lower thoracic cord appears unremarkable. The conus  terminates at T12-L1.     T10-11: No stenosis is noted on the sagittal images.     T11-12: The thecal sac is mildly indented by disc bulge on the  sagittal images.     T12-L1: The facet joints are moderately arthritic, unchanged. No  stenosis is noted.     L1-2: The lateral recesses are mildly stenosed more so on the right  by facet hypertrophy and ligament thickening as well as disc bulge  more so on the right. The facet joints are moderately arthritic.     L2-3: The spinal canal and lateral recesses are mildly stenosed by  ligament thickening and facet hypertrophy with disc bulge similar to  the prior study. The left neural foramen is mildly stenosed by  endplate spurring and disc bulge. The facet joints are moderately  arthritic.     L3-4: The lateral recesses are moderately stenosed more so on the  left with mild-to-moderate spinal canal stenosis secondary to disc  bulge, endplate spurring, ligament thickening and facet hypertrophy.  The left neural foramen is mildly stenosed. The facet joints are  moderately arthritic.     L4-5: The lateral recesses and spinal canal are moderately stenosed  secondary to ligament thickening, facet hypertrophy and disc  protrusion similar to the prior exam. The facet joints are moderately  arthritic. The neural foramina are mildly stenosed more so on the  left by endplate spurring, disc bulge and facet hypertrophy.     L5-S1: Left lateral/far lateral disc bulge abuts the exiting left L5  nerve, unchanged. The facet joints are mildly to  moderately  arthritic. No central stenosis is noted.        The prevertebral and posterior paraspinous soft tissues are  unremarkable.     IMPRESSION:  1. The T2 axial images were not obtained as the patient could not  tolerate additional imaging due to pain.     2. Multilevel degenerative changes are present as noted similar to  the prior examination.     Narrative & Impression   MRN: [id number]  Patient Name: ARTEMIO MORALES     STUDY:  NR MRI CERVICAL WO; 12/19/2016 10:57 am     INDICATION:  rt sided weakness.     COMPARISON:  None.     ACCESSION NUMBER(S):  [id number]     ORDERING CLINICIAN:  CRISTIN HUTTON     TECHNIQUE:  T1 weighted and T2 weighted sagittal axial images of the cervical  spine were obtained.     FINDINGS:     ALIGNMENT:  Minimal retrolisthesis of C5 on C6 and minimal anterolisthesis of C6  on C7. Minimal anterolisthesis of C7 on T1 and T1 on T2.     VERTEBRAL BODIES/MARROW:  Unremarkable     CORD:  Visualized cord is normal in caliber and signal.     C2-3:  No canal or foraminal stenosis     C3-4:  Minimal disc bulging and osteophyte formation. No canal or foraminal  stenosis     C4-5:  Small broad-based central disc herniation causing minimal effacement  of the ventral thecal sac. Facet hypertrophy which is markedly more  pronounced on the left than the right. Moderate left foraminal  encroachment. No canal stenosis.     C5-6:  Minimally effacement of the ventral thecal sac but osteophyte  formation. Bilateral facet hypertrophy. No canal stenosis. Mild right  foraminal encroachment.     C6-7:  Mild circumferential disc bulging. No canal stenosis or foraminal  encroachment.     C7-T1:  Mild bilateral facet hypertrophy. No significant canal or foraminal  stenosis.     ADDITIONAL FINDINGS:  None.     IMPRESSION:  Mild degenerative changes, as detailed above, including a small  broad-based central disc herniation at the C4-5 level. No canal  stenosis at any level. Varying degrees of foraminal  encroachment, as  above.       Review of Systems   Constitutional: Negative.    HENT: Negative.     Eyes: Negative.    Respiratory: Negative.     Cardiovascular: Negative.    Gastrointestinal: Negative.    Endocrine: Negative.    Genitourinary: Negative.    Musculoskeletal:  Positive for arthralgias, back pain, gait problem and myalgias. Negative for joint swelling, neck pain and neck stiffness.   Skin: Negative.    Allergic/Immunologic: Negative.    Neurological:  Positive for weakness. Negative for dizziness, tremors, seizures, syncope, facial asymmetry, speech difficulty, light-headedness, numbness and headaches.   Hematological: Negative.    Psychiatric/Behavioral: Negative.         Objective   Physical Exam  Vitals and nursing note reviewed.   Constitutional:       Appearance: Normal appearance.   HENT:      Head: Normocephalic and atraumatic.   Cardiovascular:      Pulses: Normal pulses.   Pulmonary:      Effort: Pulmonary effort is normal. No respiratory distress.   Musculoskeletal:      Right lower leg: No edema.      Left lower leg: No edema.      Comments: Active range of motion of right knee with extension and flexion limited secondary to pain and stiffness.  Well-healed linear scar overlies right knee.  No outward signs of infection of the right knee such as open wound, drainage, warmth or erythema.   Skin:     General: Skin is warm and dry.      Capillary Refill: Capillary refill takes 2 to 3 seconds.   Neurological:      Mental Status: He is alert and oriented to person, place, and time.      Cranial Nerves: No cranial nerve deficit.      Sensory: No sensory deficit.      Motor: Weakness present.      Gait: Gait abnormal.      Comments: Weakness with right knee extension 4 out of 5.  Ambulates with mildly antalgic gait.         Assessment/Plan   Problem List Items Addressed This Visit             ICD-10-CM    Cervical radiculitis M54.12    Relevant Medications    oxyCODONE (Roxicodone) 5 mg immediate  release tablet    oxyCODONE (Roxicodone) 5 mg immediate release tablet (Start on 2/11/2025)    oxyCODONE (Roxicodone) 5 mg immediate release tablet (Start on 3/11/2025)    Lumbar spondylosis M47.816    Relevant Medications    oxyCODONE (Roxicodone) 5 mg immediate release tablet    oxyCODONE (Roxicodone) 5 mg immediate release tablet (Start on 2/11/2025)    oxyCODONE (Roxicodone) 5 mg immediate release tablet (Start on 3/11/2025)    Lumbar stenosis with neurogenic claudication M48.062    Relevant Medications    oxyCODONE (Roxicodone) 5 mg immediate release tablet    oxyCODONE (Roxicodone) 5 mg immediate release tablet (Start on 2/11/2025)    oxyCODONE (Roxicodone) 5 mg immediate release tablet (Start on 3/11/2025)     Other Visit Diagnoses         Codes    Closed displaced fracture of right clavicle with routine healing, unspecified part of clavicle, subsequent encounter    -  Primary S42.001D    Lumbar disc disease with radiculopathy     M51.16    Relevant Medications    oxyCODONE (Roxicodone) 5 mg immediate release tablet    oxyCODONE (Roxicodone) 5 mg immediate release tablet (Start on 2/11/2025)    oxyCODONE (Roxicodone) 5 mg immediate release tablet (Start on 3/11/2025)          Follow-up 12 weeks.       SHAD Barrientos-CNP 01/14/25 3:01 PM

## 2025-01-28 DIAGNOSIS — M19.019 SHOULDER ARTHRITIS: ICD-10-CM

## 2025-01-29 ENCOUNTER — HOSPITAL ENCOUNTER (OUTPATIENT)
Dept: RADIOLOGY | Facility: HOSPITAL | Age: 85
Discharge: HOME | End: 2025-01-29
Payer: MEDICARE

## 2025-01-29 ENCOUNTER — APPOINTMENT (OUTPATIENT)
Dept: ORTHOPEDIC SURGERY | Facility: CLINIC | Age: 85
End: 2025-01-29
Payer: MEDICARE

## 2025-01-29 VITALS — WEIGHT: 177 LBS | BODY MASS INDEX: 24.78 KG/M2 | HEIGHT: 71 IN

## 2025-01-29 DIAGNOSIS — M25.511 RIGHT SHOULDER PAIN, UNSPECIFIED CHRONICITY: ICD-10-CM

## 2025-01-29 DIAGNOSIS — S43.431A LABRAL TEAR OF SHOULDER, RIGHT, INITIAL ENCOUNTER: ICD-10-CM

## 2025-01-29 DIAGNOSIS — M75.121 COMPLETE TEAR OF RIGHT ROTATOR CUFF, UNSPECIFIED WHETHER TRAUMATIC: Primary | ICD-10-CM

## 2025-01-29 DIAGNOSIS — M75.41 IMPINGEMENT SYNDROME OF RIGHT SHOULDER: ICD-10-CM

## 2025-01-29 DIAGNOSIS — M75.21 BICEPS TENDINITIS OF RIGHT SHOULDER: ICD-10-CM

## 2025-01-29 DIAGNOSIS — M75.111 INCOMPLETE TEAR OF RIGHT ROTATOR CUFF, UNSPECIFIED WHETHER TRAUMATIC: ICD-10-CM

## 2025-01-29 PROCEDURE — 1159F MED LIST DOCD IN RCRD: CPT | Performed by: ORTHOPAEDIC SURGERY

## 2025-01-29 PROCEDURE — 99214 OFFICE O/P EST MOD 30 MIN: CPT | Performed by: ORTHOPAEDIC SURGERY

## 2025-01-29 PROCEDURE — 1123F ACP DISCUSS/DSCN MKR DOCD: CPT | Performed by: ORTHOPAEDIC SURGERY

## 2025-01-29 PROCEDURE — 20610 DRAIN/INJ JOINT/BURSA W/O US: CPT | Performed by: ORTHOPAEDIC SURGERY

## 2025-01-29 PROCEDURE — 73030 X-RAY EXAM OF SHOULDER: CPT | Mod: RIGHT SIDE | Performed by: RADIOLOGY

## 2025-01-29 PROCEDURE — 1160F RVW MEDS BY RX/DR IN RCRD: CPT | Performed by: ORTHOPAEDIC SURGERY

## 2025-01-29 PROCEDURE — 73030 X-RAY EXAM OF SHOULDER: CPT | Mod: RT

## 2025-01-29 PROCEDURE — 1036F TOBACCO NON-USER: CPT | Performed by: ORTHOPAEDIC SURGERY

## 2025-01-29 RX ORDER — TRIAMCINOLONE ACETONIDE 40 MG/ML
40 INJECTION, SUSPENSION INTRA-ARTICULAR; INTRAMUSCULAR
Status: COMPLETED | OUTPATIENT
Start: 2025-01-29 | End: 2025-01-29

## 2025-01-29 RX ORDER — MELOXICAM 7.5 MG/1
7.5 TABLET ORAL DAILY
Qty: 30 TABLET | Refills: 11 | Status: SHIPPED | OUTPATIENT
Start: 2025-01-29 | End: 2026-01-29

## 2025-01-29 RX ORDER — LIDOCAINE HYDROCHLORIDE 5 MG/ML
4 INJECTION, SOLUTION INFILTRATION; PERINEURAL
Status: COMPLETED | OUTPATIENT
Start: 2025-01-29 | End: 2025-01-29

## 2025-01-29 RX ADMIN — TRIAMCINOLONE ACETONIDE 40 MG: 40 INJECTION, SUSPENSION INTRA-ARTICULAR; INTRAMUSCULAR at 16:47

## 2025-01-29 RX ADMIN — LIDOCAINE HYDROCHLORIDE 4 ML: 5 INJECTION, SOLUTION INFILTRATION; PERINEURAL at 16:47

## 2025-01-29 ASSESSMENT — PAIN - FUNCTIONAL ASSESSMENT: PAIN_FUNCTIONAL_ASSESSMENT: 0-10

## 2025-01-29 ASSESSMENT — PAIN SCALES - GENERAL: PAINLEVEL_OUTOF10: 10 - WORST POSSIBLE PAIN

## 2025-01-29 NOTE — PROGRESS NOTES
84-year-old male whose had 1 month of right shoulder pain since December 27, 2024 when he fell on his right shoulder.  He states that shoulder pains been getting worse.  He is been treating it with ice heat and rest.  He states is worse with activity better with rest    Patients' self reported past medical history, medications, allergies, surgical history, family and social history as well as a 10 point review of systems has been documented in the new patient intake form and scanned into the patient's electronic medical record.  The intake form was reviewed by Dr Bullock during the office visit and signed by Dr. Bullock and the patient.  Pertinent findings are documented in the HPI.    General Multi-System Physical Exam:  Constitutional  General appearance:  Alert, oriented, and in no acute distress.  Well developed, well nourished.  Head and Face  Head and face:  Normocephalic and atraumatic.  Ears, Nose, Mouth, and Throat  External inspection of ears and nose: Normal.  Eyes:  Pupils are equal and round.  Neck  Neck:  no neck mass was observed.  Pulmonary  Respiratory effort:  no respiratory distress.  Cardiovascular  Intact distal pulses.  Lymphatic  Palpation of lymph nodes in the affected extremity:  Normal.  Skin  Skin and subcutaneous tissue:  Normal skin color and pigmentation.  Normal skin turgor.  No rashes.  Neurologic  Sensation:  normal to light touch.  Psychiatric  Judgement and insight:  Intact.  Mood and affect:  Normal.  Musculoskeletal  Right shoulder the patient only has 30 degrees of active abduction and forward flexion of the right shoulder with pseudoparalysis consistent with large rotator cuff tear.  Passively is 120 degrees of abduction forward flexion 45 degrees of external rotation internal rotates to sacrum he has positive biceps tenderness positive acromioclavicular joint tenderness positive crossarm test positive Neer positive Landry positive Jobes with pain and extreme weakness neurovasc  "intact right upper extremity    X-rays of the patient were ordered by Dr Bullock and obtained today.  Dr Bullock personally reviewed the results of the x-rays.    In addition, Dr Bullock independently interpreted the patient's x-rays (performed by the Radiology department) by viewing the x-ray images and this is Dr. Bullock's personal interpretation:     Right shoulder x-rays show acromioclavicular joint arthritis but no arthritis of the glenohumeral joint    I explained the patient that he most likely has a traumatic rotator cuff tear in the right shoulder.  We talked about how early surgery generally does better but the patient wanted to start with conservative treatment.    We had a long discussion in regards to the patient's shoulder pain.  The differential diagnosis of the patient's shoulder pain include: shoulder impingement, rotator cuff tendinopathy, rotator cuff tearing, and biceps tendinopathy as all being potential sources of the pain.  There are numerous non-operative and operative treatment options for each of these conditions.     We will start off by treating the patient's shoulder conservatively (AKA non-operatively).  We gave the patient a prescription for physical therapy to work on increasing the range of motion and strength in the shoulder.   We also gave the patient a \"home exercise protocol\" list of exercises that they could work on to strengthen their shoulder at home.  We also called in a prescription for anti-inflammatories for the patient.  The patient was informed that there are rare risks of using nonsteroidal antiinflammatory (NSAID) medications.  Risks of NSAIDS include, but are not limited to, upset stomach, ulcers in the stomach and other places in the gastrointestinal tract, and a mild increase in cardiovascular risk as a result of the antiinflammatory medications.  In addition, there is an increased risk in bleeding as a result of the medications.  The patient was advised to stop taking " "the NSAIDs if they cause them to have an upset stomach.  The patient was instructed to take the medication on a p.r.n. basis as needed only.  NSAIDs are not supposed to be taken every day for more than a few weeks.  If they have any questions or problems with the antiinflammatory medications, they should stop taking the medication immediately and call the office.    The patient's height and weight were documented today in the vitals tab in the patient's EPIC chart, and the patient's BMI was calculated.  A follow up plan was then developed by Dr. Bullock, per  mandated guidelines, based upon the patient's BMI and the follow up plan was documented in the \"Patient Instructions\" section of the chart by including the \"Weight loss treatments\" attachment.    We offered the patient an injection of steroids into their shoulder.  I explained to the patient that there are some rare risks of steroid injections.      Rare risks of steroid injections into the shoulder include pain at the site of the injection, local swelling, irritation from the injection or the skin spray, local discoloration of the skin, risk of bleeding, and a risk of shoulder infection.  If the patient does have a flareup of pain in the evening following the injection, they should ice the shoulder 15 minutes at a time 3 times a day for up to 3 days.  If the pain gets too severe, they should go to a local Emergency Room right away.      The patient decided to proceed with the injection.  After sterilely prepping the posterior aspect of the shoulder joint with Betadyne, 5 mL of 0.25% Marcaine and 1 mL of Kenalog 40 mg were injected into the subacromial bursa from a posterior approach.  There were no complications.  A bandage was applied over the site of the injection. The patient tolerated the procedure well.    We will see the patient back in 6-8 weeks to reevaluate their shoulder pain. If they are still having pain at that time, we would then need to order a " "MRI to look for possible rotator cuff tears or biceps tearing in the shoulder.  The patient should call the office during business hours (9am-3pm; Monday - Friday)  with any questions or problems.  If the patient has any urgent issues outside of business hours, they should go to a local Emergency Room.      The patient's height and weight were documented today in the vitals tab in the patient's EPIC chart, and the patient's BMI was calculated.  A follow up plan was then developed by Dr. Bullock, per  mandated guidelines, based upon the patient's BMI and the follow up plan was documented in the \"Patient Instructions\" section of the chart by including the \"Weight loss treatments\" attachment.        This patient has a new, acute, previously-undiagnosed problem of their affected extremity.  We will begin treatment as listed here and monitor treatment based upon their progression and response to treatment.  Due to the fact that we are just beginning treatment on this issue, this is currently considered an undiagnosed new problem with uncertain prognosis.  The exact diagnosis and their prognosis will depend upon their response to treatment and progression of their condition as time progresses.    Due to this patient's condition, they are at a moderate risk of morbidity from additional diagnostic testing / treatment.      To help them with their pain, I wrote them a prescription for prescription strength anti-inflammatories.  The patient was informed that there are risks of using nonsteroidal antiinflammatory (NSAID) medications.    Risks of NSAIDS include, but are not limited to, upset stomach, ulcers in the stomach and other places in the gastrointestinal tract, and a mild increase in cardiovascular risk as a result of the antiinflammatory medications.  In addition, there is an increased risk in bleeding as a result of the medications.    The patient was advised to stop taking the NSAIDs if they cause them to have an " upset stomach.  NSAIDs are not supposed to be taken every day for more than a few weeks.  If they have any questions or problems with the antiinflammatory medications, they should stop taking the medication immediately and call the office.      Patient ID: Abraham Rodriguez is a 84 y.o. male.    L Inj/Asp: R subacromial bursa on 1/29/2025 4:47 PM  Indications: pain  Details: 22 G needle, posterior approach  Medications: 40 mg triamcinolone acetonide 40 mg/mL; 4 mL lidocaine 5 mg/mL (0.5 %)  Outcome: tolerated well, no immediate complications  Procedure, treatment alternatives, risks and benefits explained, specific risks discussed. Consent was given by the patient. Immediately prior to procedure a time out was called to verify the correct patient, procedure, equipment, support staff and site/side marked as required. Patient was prepped and draped in the usual sterile fashion.

## 2025-02-04 ENCOUNTER — TELEPHONE (OUTPATIENT)
Dept: ORTHOPEDIC SURGERY | Facility: CLINIC | Age: 85
End: 2025-02-04
Payer: MEDICARE

## 2025-02-04 NOTE — TELEPHONE ENCOUNTER
01/29/25 rt shoulder pain  Patient called and stated he had to stop taking Meloxicam. He was having sweats and a upset stomach. He would like to know if there is anything else he can take? He does have Voltaren gel at home but wasn't sure if that's something he can apply to his shoulder.      [facility], [place]

## 2025-02-06 ENCOUNTER — APPOINTMENT (OUTPATIENT)
Dept: ORTHOPEDIC SURGERY | Facility: CLINIC | Age: 85
End: 2025-02-06
Payer: COMMERCIAL

## 2025-02-12 ENCOUNTER — DOCUMENTATION (OUTPATIENT)
Dept: PHYSICAL THERAPY | Facility: HOSPITAL | Age: 85
End: 2025-02-12

## 2025-02-12 ENCOUNTER — EVALUATION (OUTPATIENT)
Dept: PHYSICAL THERAPY | Facility: HOSPITAL | Age: 85
End: 2025-02-12
Payer: MEDICARE

## 2025-02-12 DIAGNOSIS — M25.511 RIGHT SHOULDER PAIN, UNSPECIFIED CHRONICITY: Primary | ICD-10-CM

## 2025-02-12 PROCEDURE — 97110 THERAPEUTIC EXERCISES: CPT | Mod: GP | Performed by: PHYSICAL THERAPIST

## 2025-02-12 PROCEDURE — 97162 PT EVAL MOD COMPLEX 30 MIN: CPT | Mod: GP | Performed by: PHYSICAL THERAPIST

## 2025-02-12 ASSESSMENT — PAIN DESCRIPTION - DESCRIPTORS: DESCRIPTORS: DULL

## 2025-02-12 ASSESSMENT — PAIN SCALES - GENERAL: PAINLEVEL_OUTOF10: 9

## 2025-02-12 ASSESSMENT — ENCOUNTER SYMPTOMS
LOSS OF SENSATION IN FEET: 1
DEPRESSION: 0
OCCASIONAL FEELINGS OF UNSTEADINESS: 0

## 2025-02-12 ASSESSMENT — PAIN - FUNCTIONAL ASSESSMENT: PAIN_FUNCTIONAL_ASSESSMENT: 0-10

## 2025-02-12 NOTE — PROGRESS NOTES
Physical Therapy    Discharge Summary    Name: Abraham Rodriguez  MRN: [id number]  : 1940  Date: 25    Discharge Summary: PT    Discharge Information: Date of discharge 2025, Date of last visit 2024, Date of evaluation 2024, Number of attended visits 27, Referred by Dr. Arora, and Referred for right knee pain    Therapy Summary: Patient is an 83 year old male who presents to clinic status post right total knee arthroplasty on 3/20/2024.     Discharge Status: Patient demonstrated slow improvement towards his goals. Patient demonstrated good right knee range of motion and improved ability to perform balance activities. Patient demonstrated impaired tolerance to standing on compliant surface with his eyes closed. Patient demonstrates a flexed postured during gait with and without assistive device. Patient was encouraged to walk without his single point cane around his house and familiar environments. Patient verbalized understanding. Skilled physical therapy is warranted to address the above stated impairments, so the patient can perform functional activities and work duties without increased pain or difficulty.      Rehab Discharge Reason: Attendance inconsistent, Failed to schedule and/or keep follow-up appointment(s), and Progress plateaued; further improvement possible

## 2025-02-12 NOTE — PROGRESS NOTES
Physical Therapy  Physical Therapy Evaluation and Treatment    Patient Name: Abraham Rodriguez  MRN: [id number]  Today's Date: 2/12/2025  Time Calculation  Start Time: 1133  Stop Time: 1211  Time Calculation (min): 38 min    PT Evaluation Time Entry  PT Evaluation (Moderate) Time Entry: 30  PT Therapeutic Procedures Time Entry  Therapeutic Exercise Time Entry: 8     Insurance:  Visit number: 1 of 10    Authorization info: no auth required  Payor: MEDICARE / Plan: MEDICARE PART A AND B / Product Type: *No Product type* /     Current Problem  1. Right shoulder pain, unspecified chronicity  Referral to Physical Therapy    Follow Up In Physical Therapy        Problem List Items Addressed This Visit             ICD-10-CM    Right shoulder pain - Primary M25.511    Relevant Orders    Follow Up In Physical Therapy       General:  General  Reason for Referral: right shoulder pain  Referred By: Dr. 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, VILLARREAL, PE, cervicalgia, right total hip arthroplasty, left lower extremity pain ,right lower extremity pain, chronic left shoulder pain, chronic low back pain, lumbago with sciatica right and left, SIJ dysfunction, cervical facet arthropathy, chronic pain syndrome, lumbar stenosis with neurogenic claudication, right cervical radiculopathy, cervical DDD, COPD  Preferred Learning Style: visual, written    Precautions:   Precautions  STEADI Fall Risk Score (The score of 4 or more indicates an increased risk of falling): 8  Medical Precautions: Fall precautions    Medical History Form: Reviewed (scanned into chart)    Subjective:   Subjective   Chief Complaint: Patient is an 84 year old male who presents to clinic with complaints of right shoulder pain. Patient states that his right lower extremity gave out on him and he fell directly onto his right shoulder. Patient states that his whole right upper extremity hurts and he states that he has difficulty  gripping a cup of coffee. Patient states that he has to use his left hand to help him do anything with his right. 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  Onset Date: 1/27/2025  ANTHONY: Fall    Current Condition:   Same    Pain:  Pain Assessment: 0-10  0-10 (Numeric) Pain Score: 9  Pain Type: Acute pain  Pain Location: Shoulder  Pain Orientation: Right  Pain Descriptors: Dull  Highest: 10/10 pain  Lowest: 7/10 pain  Aggravating Factors:  Reaching Overhead and Reaching Behind Back, lifting/carrying objects with his right upper extremity.  Relieving Factors:  Rest and Ice    Relevant Information (PMH & Previous Tests/Imaging):   Right shoulder xray 1/29/2025:  FINDINGS:  No acute fracture or malalignment. Moderate acromioclavicular joint degenerative changes with joint space loss and osteophytes. Glenohumeral joint space is maintained. Soft tissues are unremarkable.    Previous Interventions/Treatments: Injections    Prior Level of Function (PLOF)  Patient previously independent with all ADLs  Exercise/Physical Activity: none stated  Work/School: retired  Hobbies: none stated    Hand dominance: right     Patients Living Environment: Reviewed and no concern          Primary Language: English    Patient's Goal(s) for Therapy: decrease his shoulder pain, be able to use it better    Red Flags: Do you have any of the following? No  Fever/chills, unexplained weight changes, dizziness/fainting, unexplained change in bowel or bladder functions, unexplained malaise or muscle weakness, night pain/sweats, numbness or  tingling    Objective:  Objective     Shoulder AROM  Shoulder AROM WFL:  (aarom)    Functional Rating Scale  Quick Dash: 59.09    Shoulder AROM   Shoulder AROM WFL:  (aarom)  R Shoulder flexion: (180°): 107  L Shoulder flexion: (180°): 153  R shoulder abduction: (180°): 88  L Shoulder abduction: (180°): 126  R Shoulder ER: (90°): 71  L shoulder ER: (90°): 73    Shoulder Strength  R shoulder flexion: (5/5): 3-/5  L shoulder flexion: (5/5): 4/5  R shoulder abduction: (5/5): <3-/5  L shoulder abduction: (5/5): 4/5  R shoulder ER: (5/5): 3+/5  L shoulder ER: (5/5): 4/5  R shoulder IR: (5/5) : 3+/5  L shoulder IR: (5/5): 4/5    Elbow AROM  Elbow AROM WFL: yes    Elbow Strength  Elbow Strength WFL: yes   Strength   R  strength: 50% of Left  L  strength: WFL    Posture: shoulder rounded forward, head forward, and left shoulder level is slightly higher    Palpation: tenderness upon palpation of shoulder grossly especially AC joint and anterior shoulder    Joint Mobility: hypomobile bilateral shoulders      Special Tests    RTC/Impingement   Neer Impingement: +   Gris Kaushik: +   Empty Can: unable to test   Drop Arm: not tested    Painful Arc: not tested    Lift Off Test: not tested   AC Joint   Cross Arm Test: not tested       Outcome Measures:  Other Measures  Disability of Arm Shoulder Hand (DASH): 59.09     Treatment Performed:  Therapeutic Exercise  Therapeutic Exercise Performed: Yes  Therapeutic Exercise Activity 1: shoulder flex aarom with cane x5  Therapeutic Exercise Activity 2: standing shoulder abd aarom with cane x5  Therapeutic Exercise Activity 3: scap retraction x10         EDUCATION:   Individual(s) Educated: patient   Education Provided: Home exercise program, plan of care, activity modifications, pain management, and injury pathology  Handout(s) Provided: Scanned into chart  Home Program: Access Code: [email or web address]   Risk and Benefits Discussed with Patient/Caregiver/Other: Yes    Patient/Caregiver Demonstrated Understanding: Yes   Plan of Care Discussed and Agreed Upon: Yes   Patient Response to Education: Patient/Caregiver verbalized understanding of information, Patient/Caregiver performed return demonstration of exercises/activities, and Patient/Caregiver asked appropriate questions    Assessment: Patient presents with complaints of right shoulder pain following a fall. Patient demonstrated impaired range of motion and strength resulting in limited participation in pain-free ADLs and inability to perform at their prior level of function. Patient expressed pain with palpation of the shoulder grossly and demonstrated difficulty relaxing his shoulder during passive range of motion. Patient demonstrated difficulty participating in shoulder special tests due to pain. Skilled PT warranted to address the above stated impairments, so the patient can perform FA's without increased pain or difficulty.    PT Assessment Results: Decreased strength, Decreased range of motion, Pain  Rehab Prognosis: Good  Evaluation/Treatment Tolerance: Patient limited by pain, Patient tolerated treatment well    Complexity: moderate    Plan:  Treatment/Interventions: Education/ Instruction, Gait training, Manual therapy, Neuromuscular re-education, Therapeutic activities, Therapeutic exercises  PT Plan: Skilled PT  PT Frequency: 2 times per week  Duration: 5 weeks  Onset Date: 01/29/25  Certification Period Start Date: 02/12/25  Certification Period End Date: 03/19/25  Number of Treatments Authorized: 1 of 10  Rehab Potential: Good  Plan of Care Agreement: Patient    Goals: Set and discussed today  Active       PT Problem       Patient will achieve bilateral shoulder flexion of at least 160 degrees       Start:  02/12/25    Expected End:  03/19/25            Patient will achieve bilateral shoulder abduction of at least 160 degrees       Start:  02/12/25    Expected End:  03/19/25            Patient will achieve  bilateral shoulder internal rotation of T9 when reaching behind his back to assist with ADLs.       Start:  02/12/25    Expected End:  03/19/25            Patient will achieve bilateral shoulder flexion strength of at least 4+/5       Start:  02/12/25    Expected End:  03/19/25            Patient will achieve bilateral shoulder abduction strength of at least 4+/5       Start:  02/12/25    Expected End:  03/19/25            Patient will achieve bilateral shoulder external rotation strength of at least 4+/5 in neutral       Start:  02/12/25    Expected End:  03/19/25            Patient will achieve bilateral shoulder internal rotation strength of at least 4+/5 in neutral       Start:  02/12/25    Expected End:  03/19/25            Patient will achieve bilateral elbow flexion strength of at least 4+/5       Start:  02/12/25    Expected End:  03/19/25            Patient will demonstrate independence in home program for support of progression       Start:  02/12/25    Expected End:  03/19/25            Patient will report pain of no more than 2/10 demonstrating a reduction of overall pain       Start:  02/12/25    Expected End:  03/19/25            Patient will show a significant change in Quick Dash (59.09 to 51.09) patient reported outcome tool to demonstrate subjective imporovement       Start:  02/12/25    Expected End:  03/19/25                  Plan of care was developed with input and agreement by the patient    Ambulatory Screenings Summary       Screening  Frequency  Date Last Completed   Spiritual and Cultural Beliefs   Screening  each visit or episode of care 2/12/2025   Falls Risk Screening  every ambulatory visit 2/12/2025 11:50 AM   Pain Screening  annually at primary care visit  1/14/2025   Domestic Violence screening  annually at primary care visit 2/12/2025   Elder Abuse Screening  annually at primary care visit    Depression Screening  annually in the primary care setting 7/9/2024   Suicide Risk  Screening  annually in the primary care setting 12/28/2024   Nutrition and Food Insecurity   Screening  at least annually at primary care visit     Key Learner  annually in the primary care setting 2/12/2025   Drug Screen  1/29/2025 11:49 AM   Alcohol Screen  1/29/2025 11:49 AM   Advance Directive           Mirza Todd, PT

## 2025-02-14 ENCOUNTER — TREATMENT (OUTPATIENT)
Dept: PHYSICAL THERAPY | Facility: HOSPITAL | Age: 85
End: 2025-02-14
Payer: MEDICARE

## 2025-02-14 DIAGNOSIS — M25.511 RIGHT SHOULDER PAIN, UNSPECIFIED CHRONICITY: ICD-10-CM

## 2025-02-14 PROCEDURE — 97110 THERAPEUTIC EXERCISES: CPT | Mod: GP,CQ

## 2025-02-14 ASSESSMENT — PAIN SCALES - GENERAL: PAINLEVEL_OUTOF10: 7

## 2025-02-14 ASSESSMENT — PAIN - FUNCTIONAL ASSESSMENT: PAIN_FUNCTIONAL_ASSESSMENT: 0-10

## 2025-02-14 NOTE — PROGRESS NOTES
Physical Therapy Treatment    Patient Name: Abraham Rodriguez  MRN: [id number]  Today's Date: 2/14/2025  Time Calculation  Start Time: 0948  Stop Time: 1026  Time Calculation (min): 38 min        PT Therapeutic Procedures Time Entry  Therapeutic Exercise Time Entry: 38                Current Problem  1. Right shoulder pain, unspecified chronicity  Follow Up In Physical Therapy          General  Reason for Referral: right shoulder pain  Referred By: Dr. 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     Performing HEP?: Yes    Precautions  Precautions  Medical Precautions: Fall precautions  Pain  Pain Assessment: 0-10  0-10 (Numeric) Pain Score: 7 (with movement)  Pain Location: Shoulder  Pain Orientation: Right    Objective      Shoulder AROM: R shoulder Flexion 80 seated       Treatments:    Therapeutic Exercise  Therapeutic Exercise Performed: Yes  Therapeutic Exercise Activity 1: shoulder flex aarom with cane x10  Therapeutic Exercise Activity 2: standing shoulder abd aarom with cane x5  Therapeutic Exercise Activity 3: scap retraction x10  Therapeutic Exercise Activity 4: AAROM ER seated x15  Therapeutic Exercise Activity 5: Supine punch with cane x10  Therapeutic Exercise Activity 6: Salutes x10  Therapeutic Exercise Activity 7: Supine Iso ext/ER x10  Therapeutic Exercise Activity 8: Squeeze ball with biceps Iso x10  Therapeutic Exercise Activity  "9: PROM L shoulder flexion, ABD, and ER to increase ROM  Therapeutic Exercise Activity 10: Short lever arm flexion prayer position with thumb  Therapeutic Exercise Activity 11: Ball Rolls on table x20  Therapeutic Exercise Activity 12: Iso ABD x10 3\"            EDUCATION:   Individual(s) Educated: Patient  Education Provided: HEP   Risk and Benefits Discussed with Patient/Caregiver/Other: Yes   Patient/Caregiver Demonstrated Understanding: Yes   Patient Response to Education: Patient/Caregiver verbalized understanding of information    Assessment: Pt noted pain in the R hand with ball rolls on table. Pt required cuing to perform isometrics with sub max contraction, with good follow through demonstrated. Pt noted soreness at the end of session, however, no increased pain.   PT Assessment  PT Assessment Results: Decreased strength, Decreased range of motion, Pain  Rehab Prognosis: Good    Plan: Continue to progress current POC as tolerated to facilitate ability to perform functional activities.   OP PT Plan  Treatment/Interventions: Education/ Instruction, Gait training, Manual therapy, Neuromuscular re-education, Therapeutic activities, Therapeutic exercises  PT Plan: Skilled PT  PT Frequency: 2 times per week  Duration: 5 weeks  Onset Date: 01/29/25  Certification Period Start Date: 02/12/25  Certification Period End Date: 03/19/25  Number of Treatments Authorized: 2 of 10  Rehab Potential: Good  Plan of Care Agreement: Patient    Goals:  Physical Therapy - Physical Therapy - June 2024 Problems       Physical Therapy - Physical Therapy - June 2024 Problems (Active)       PT Problem       Patient will maintain single leg stand on each leg for 10 sec with no upper extremity support. (Not met)       Start:  06/07/24    Expected End:  11/06/24    Resolved:  11/14/24    Updated to: Patient will maintain NBOS for 10 sec with no upper extremity support.    Update reason: lack of progress         Patient will be able to " maintain dynamic standing balance during walking over obstacles (Met)       Start:  06/07/24    Expected End:  08/19/24    Resolved:  08/20/24         Patient will ambulate with normal gait pattern with no device (Progressing)       Start:  06/07/24    Expected End:  01/08/25         Goal Note       Patient walks with flexed posture, but able to walk throughout the gym without use of single point cane.              Patient will ambulate up and down a curb/single step with independent using no device (Met)       Start:  06/07/24    Expected End:  12/12/24    Resolved:  12/11/24         Patient will achieve right knee ROM of  0-120 degrees  (Met)       Start:  06/07/24    Expected End:  11/06/24    Resolved:  11/14/24         Patient will achieve bilateral hip abduction strength of at least 4+/5 (Progressing)       Start:  06/07/24    Expected End:  01/08/25            Patient will achieve bilateral knee flexion strength of at least 4+/5 (Met)       Start:  06/07/24    Expected End:  09/17/24    Resolved:  09/17/24         Patient will achieve bilateral knee extension strength of at least 4+/5 (Met)       Start:  06/07/24    Expected End:  09/17/24    Resolved:  09/17/24         Patient will demonstrate independence in home program for support of progression (Met)       Start:  06/07/24    Expected End:  06/21/24    Resolved:  07/22/24         Patient will report pain of no more than 2/10 demonstrating a reduction of overall pain (Not Progressing)       Start:  06/07/24    Expected End:  01/08/25            Patient will show a significant change in LEFS (8 to 17) patient reported outcome tool to demonstrate subjective imporovement (Progressing)       Start:  06/07/24    Expected End:  01/08/25            Patient will maintain NBOS for 10 sec with no upper extremity support. (Met)       Start:  11/14/24    Expected End:  12/12/24    Resolved:  12/11/24                  right shoulder pain Problems       right shoulder  pain Problems (Active)       PT Problem       Patient will achieve bilateral shoulder flexion of at least 160 degrees       Start:  02/12/25    Expected End:  03/19/25            Patient will achieve bilateral shoulder abduction of at least 160 degrees       Start:  02/12/25    Expected End:  03/19/25            Patient will achieve bilateral shoulder internal rotation of T9 when reaching behind his back to assist with ADLs.       Start:  02/12/25    Expected End:  03/19/25            Patient will achieve bilateral shoulder flexion strength of at least 4+/5       Start:  02/12/25    Expected End:  03/19/25            Patient will achieve bilateral shoulder abduction strength of at least 4+/5       Start:  02/12/25    Expected End:  03/19/25            Patient will achieve bilateral shoulder external rotation strength of at least 4+/5 in neutral       Start:  02/12/25    Expected End:  03/19/25            Patient will achieve bilateral shoulder internal rotation strength of at least 4+/5 in neutral       Start:  02/12/25    Expected End:  03/19/25            Patient will achieve bilateral elbow flexion strength of at least 4+/5       Start:  02/12/25    Expected End:  03/19/25            Patient will demonstrate independence in home program for support of progression       Start:  02/12/25    Expected End:  03/19/25            Patient will report pain of no more than 2/10 demonstrating a reduction of overall pain       Start:  02/12/25    Expected End:  03/19/25            Patient will show a significant change in Quick Dash (59.09 to 51.09) patient reported outcome tool to demonstrate subjective imporovement       Start:  02/12/25    Expected End:  03/19/25                    Jorge Shin, PTA

## 2025-02-17 ENCOUNTER — DOCUMENTATION (OUTPATIENT)
Dept: PHYSICAL THERAPY | Facility: HOSPITAL | Age: 85
End: 2025-02-17
Payer: COMMERCIAL

## 2025-02-17 ENCOUNTER — APPOINTMENT (OUTPATIENT)
Dept: PHYSICAL THERAPY | Facility: HOSPITAL | Age: 85
End: 2025-02-17
Payer: MEDICARE

## 2025-02-17 NOTE — PROGRESS NOTES
Physical Therapy                 Therapy Communication Note    Patient Name: Abraham Rodriguez  MRN: [id number]  Department: [facility]  Room: Room/bed info not found  Today's Date: 2/17/2025     Discipline: Physical Therapy          Missed Visit Reason:  d/t weather    Missed Time: Cancel 10:00

## 2025-02-19 ENCOUNTER — APPOINTMENT (OUTPATIENT)
Dept: PHYSICAL THERAPY | Facility: HOSPITAL | Age: 85
End: 2025-02-19
Payer: MEDICARE

## 2025-02-19 ENCOUNTER — DOCUMENTATION (OUTPATIENT)
Dept: PHYSICAL THERAPY | Facility: HOSPITAL | Age: 85
End: 2025-02-19
Payer: COMMERCIAL

## 2025-02-19 NOTE — PROGRESS NOTES
Physical Therapy                 Therapy Communication Note    Patient Name: Abraham Rodriguez  MRN: [id number]  Department:   Room: Room/bed info not found  Today's Date: 2/19/2025     Discipline: Physical Therapy          Missed Visit Reason:   spoke with PSR    Missed Time: Cancel    Comment: r/s

## 2025-02-21 ENCOUNTER — APPOINTMENT (OUTPATIENT)
Dept: PHYSICAL THERAPY | Facility: HOSPITAL | Age: 85
End: 2025-02-21
Payer: MEDICARE

## 2025-02-21 ENCOUNTER — DOCUMENTATION (OUTPATIENT)
Dept: PHYSICAL THERAPY | Facility: HOSPITAL | Age: 85
End: 2025-02-21

## 2025-02-21 NOTE — PROGRESS NOTES
Physical Therapy                 Therapy Communication Note    Patient Name: Abraham Rodriguez  MRN: [id number]  Department:   Room: Room/bed info not found  Today's Date: 2/21/2025     Discipline: Physical Therapy          Missed Visit Reason:   car buried in snow and cannot shovel it clean.    Missed Time: Cancel    Comment:

## 2025-02-24 ENCOUNTER — TREATMENT (OUTPATIENT)
Dept: PHYSICAL THERAPY | Facility: HOSPITAL | Age: 85
End: 2025-02-24
Payer: MEDICARE

## 2025-02-24 DIAGNOSIS — M25.511 RIGHT SHOULDER PAIN, UNSPECIFIED CHRONICITY: ICD-10-CM

## 2025-02-24 PROCEDURE — 97110 THERAPEUTIC EXERCISES: CPT | Mod: GP,CQ

## 2025-02-24 ASSESSMENT — PAIN - FUNCTIONAL ASSESSMENT: PAIN_FUNCTIONAL_ASSESSMENT: 0-10

## 2025-02-24 ASSESSMENT — PAIN SCALES - GENERAL: PAINLEVEL_OUTOF10: 8

## 2025-02-24 NOTE — PROGRESS NOTES
Physical Therapy Treatment    Patient Name: Abraham Rodriguez  MRN: [id number]  Encounter Date: 2/24/2025  Time Calculation  Start Time: 1047  Stop Time: 1127  Time Calculation (min): 40 min        PT Therapeutic Procedures Time Entry  Therapeutic Exercise Time Entry: 40     Current Problem  Problem List Items Addressed This Visit             ICD-10-CM    Right shoulder pain M25.511     1. Right shoulder pain, unspecified chronicity  Follow Up In Physical Therapy          General  Reason for Referral: right shoulder pain  Referred By: Dr. 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    Subjective   Current Condition:   Better  Patient reports feeling increased pain and tightness today in shoulder today with increased activity over the weekend.     Performing HEP?: Yes    Precautions  Precautions  Medical Precautions: Fall precautions  Pain  Pain Assessment: 0-10  0-10 (Numeric) Pain Score: 8  Pain Type: Chronic pain  Pain Location: Shoulder  Pain Interventions: Therapeutic presence  Response to Interventions: Decrease in pain    Objective     Treatments:    Therapeutic Exercise  Therapeutic Exercise Performed: Yes  Therapeutic Exercise Activity 1: shoulder flex aarom with cane x10  Therapeutic Exercise Activity 2: standing shoulder abd aarom with cane x5  Therapeutic Exercise Activity 3: scap retraction 2x10 3 sec holds  Therapeutic Exercise Activity 4: AAROM ER seated  "x15  Therapeutic Exercise Activity 5: Supine punch with cane 2x10  Therapeutic Exercise Activity 6: Salutes x10  Therapeutic Exercise Activity 7: Supine Iso ext/ER 20 x 3 sec holds  Therapeutic Exercise Activity 8: Squeeze ball with biceps Iso x10  Therapeutic Exercise Activity 9: PROM L shoulder flexion, ABD, and ER to increase ROM  Therapeutic Exercise Activity 10: Short lever arm flexion prayer position with thumb  Therapeutic Exercise Activity 11: 2x15 Shoulder ext GTB palms down  Therapeutic Exercise Activity 12: Iso ABD x10 3\"  Therapeutic Exercise Activity 13: 2x10 Shoulder rows GTB  Therapeutic Exercise Activity 14: 2x10 prone bent over shoulder ext       EDUCATION:   Individual(s) Educated: Patient  Education Provided: Added seated scap retraction to HEP  Handout(s) Provided: Scanned into chart  Home Program: 2x20 with 3 sec holds    Risk and Benefits Discussed with Patient/Caregiver/Other: Yes   Patient/Caregiver Demonstrated Understanding: Yes   Patient Response to Education: Patient/Caregiver verbalized understanding of information, Patient/Caregiver performed return demonstration of exercises/activities, and Patient/Caregiver asked appropriate questions    Assessment: Pt cont to require posture cues to reduce rounded shoulder posture compensations and aid in scap strength and stability TE with focus on improving GH neutral positioning. Pt demonstrated improved mobility post manual work and light isometrics added with reps increased and no increase pain reported.     PT Assessment  PT Assessment Results: Decreased strength, Decreased range of motion, Pain  Rehab Prognosis: Good  Evaluation/Treatment Tolerance: Patient tolerated treatment well    Plan: Continue with POC. Continue with core stability, UE strengthening, ROM, flexibility, and scapular stability, so the patient can perform FA's without increased pain or difficulty.  Focused on light strength tasks to aid in better posture.    OP PT " Plan  Treatment/Interventions: Education/ Instruction, Gait training, Manual therapy, Neuromuscular re-education, Therapeutic activities, Therapeutic exercises  PT Plan: Skilled PT  PT Frequency: 2 times per week  Duration: 5 weeks  Onset Date: 01/29/25  Certification Period Start Date: 02/12/25  Certification Period End Date: 03/19/25  Number of Treatments Authorized: 3 of 10  Rehab Potential: Good  Plan of Care Agreement: Patient  Payor: MEDICARE / Plan: MEDICARE PART A AND B / Product Type: *No Product type* /     Visit count this episode: 3 visits    Goals:  Active       PT Problem       Patient will achieve bilateral shoulder flexion of at least 160 degrees (Progressing)       Start:  02/12/25    Expected End:  03/19/25            Patient will achieve bilateral shoulder abduction of at least 160 degrees (Progressing)       Start:  02/12/25    Expected End:  03/19/25            Patient will achieve bilateral shoulder internal rotation of T9 when reaching behind his back to assist with ADLs. (Progressing)       Start:  02/12/25    Expected End:  03/19/25            Patient will achieve bilateral shoulder flexion strength of at least 4+/5       Start:  02/12/25    Expected End:  03/19/25            Patient will achieve bilateral shoulder abduction strength of at least 4+/5 (Progressing)       Start:  02/12/25    Expected End:  03/19/25            Patient will achieve bilateral shoulder external rotation strength of at least 4+/5 in neutral (Progressing)       Start:  02/12/25    Expected End:  03/19/25            Patient will achieve bilateral shoulder internal rotation strength of at least 4+/5 in neutral (Progressing)       Start:  02/12/25    Expected End:  03/19/25            Patient will achieve bilateral elbow flexion strength of at least 4+/5 (Progressing)       Start:  02/12/25    Expected End:  03/19/25            Patient will demonstrate independence in home program for support of progression        Start:  02/12/25    Expected End:  03/19/25            Patient will report pain of no more than 2/10 demonstrating a reduction of overall pain       Start:  02/12/25    Expected End:  03/19/25            Patient will show a significant change in Quick Dash (59.09 to 51.09) patient reported outcome tool to demonstrate subjective imporovement       Start:  02/12/25    Expected End:  03/19/25                 Shira Acosta, PTA

## 2025-02-26 ENCOUNTER — TREATMENT (OUTPATIENT)
Dept: PHYSICAL THERAPY | Facility: HOSPITAL | Age: 85
End: 2025-02-26
Payer: MEDICARE

## 2025-02-26 DIAGNOSIS — M25.511 RIGHT SHOULDER PAIN, UNSPECIFIED CHRONICITY: ICD-10-CM

## 2025-02-26 PROCEDURE — 97110 THERAPEUTIC EXERCISES: CPT | Mod: GP,CQ

## 2025-02-26 ASSESSMENT — PAIN DESCRIPTION - DESCRIPTORS: DESCRIPTORS: SHARP;SHOOTING

## 2025-02-26 ASSESSMENT — PAIN SCALES - GENERAL: PAINLEVEL_OUTOF10: 6

## 2025-02-26 ASSESSMENT — PAIN - FUNCTIONAL ASSESSMENT: PAIN_FUNCTIONAL_ASSESSMENT: 0-10

## 2025-02-26 NOTE — PROGRESS NOTES
Physical Therapy Treatment    Patient Name: Abraham Rodriguez  MRN: [id number]  Encounter Date: 2/26/2025  Time Calculation  Start Time: 1049  Stop Time: 1128  Time Calculation (min): 39 min        PT Therapeutic Procedures Time Entry  Therapeutic Exercise Time Entry: 39      Current Problem  Problem List Items Addressed This Visit             ICD-10-CM    Right shoulder pain M25.511     1. Right shoulder pain, unspecified chronicity  Follow Up In Physical Therapy          General  Reason for Referral: right shoulder pain  Referred By: Dr. 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    Subjective   Current Condition:   Better  Patient reports overall having slightly less pain and is able to use and have more ROM in R shoulder now.  Cont to have weakness and difficulty with certain movements but overall better.      Performing HEP?: Yes    Precautions  Precautions  Medical Precautions: Fall precautions  Pain  Pain Assessment: 0-10  0-10 (Numeric) Pain Score: 6  Pain Type: Chronic pain  Pain Location: Shoulder  Pain Orientation: Right  Pain Descriptors: Sharp, Shooting  Pain Frequency: Intermittent  Pain Onset: Gradual  Patient's Stated Pain Goal: No pain  Pain Interventions: Therapeutic presence, Therapeutic touch  Response to Interventions: No change in pain    Objective     Treatments:    Therapeutic Exercise  Therapeutic Exercise Performed:  "Yes  Therapeutic Exercise Activity 1: 3 min pulleys  flexion  Therapeutic Exercise Activity 2: UBE 2min fwd/back  Therapeutic Exercise Activity 3: scap retraction 2x10 3 sec holds  Therapeutic Exercise Activity 4: AAROM seated flexion with dowel 10x  Therapeutic Exercise Activity 5: 2x20 Sitting bicep curls #4`  Therapeutic Exercise Activity 6: Salutes x10  Therapeutic Exercise Activity 7: Supine Iso ext/ER 20 x 3 sec holds  Therapeutic Exercise Activity 8: Squeeze ball with biceps Iso x10  Therapeutic Exercise Activity 9: 2x10 RTB ER with scap retraction  Therapeutic Exercise Activity 10: Short lever arm flexion prayer position with thumb  Therapeutic Exercise Activity 11: 2x15 Shoulder ext GTB palms down  Therapeutic Exercise Activity 12: Iso ABD/EXT/Flex/ADD x20 3\" into ball  Therapeutic Exercise Activity 13: 2x15 Shoulder rows GTB  Therapeutic Exercise Activity 14: 2x10 prone bent over shoulder ext       EDUCATION:   Individual(s) Educated: Patient  Education Provided: Cont with HEP    Handout(s) Provided: Scanned into chart  Home Program: Same as previous with stressed importance on posture   Risk and Benefits Discussed with Patient/Caregiver/Other: Yes   Patient/Caregiver Demonstrated Understanding: Yes   Patient Response to Education: Patient/Caregiver verbalized understanding of information, Patient/Caregiver performed return demonstration of exercises/activities, and Patient/Caregiver asked appropriate questions    Assessment: Pt require tc and visual posture cues x 4 during standing and sitting strength challenges to reduce rounded shoulder and forward head posture compensations to allow increased mobility in pain free regions.  Required slight increased rest breaks and time in between sets this date due to overall fatigue.     PT Assessment  PT Assessment Results: Decreased strength, Decreased range of motion, Pain  Rehab Prognosis: Good    Plan: Continue with POC. Continue with core stability, UE " strengthening, ROM, flexibility, and scapular stability, so the patient can perform FA's without increased pain or difficulty.  Focused on submax isometric strength tasks and posterior chain /scap strength challenges to aid in posture and reduce rounded shoulder head forward posture compensations.      OP PT Plan  Treatment/Interventions: Education/ Instruction, Gait training, Manual therapy, Neuromuscular re-education, Therapeutic activities, Therapeutic exercises  PT Plan: Skilled PT  PT Frequency: 2 times per week  Duration: 5 weeks  Onset Date: 01/29/25  Certification Period Start Date: 02/12/25  Certification Period End Date: 03/19/25  Number of Treatments Authorized: 4 of 10  Rehab Potential: Good  Plan of Care Agreement: Patient  Payor: MEDICARE / Plan: MEDICARE PART A AND B / Product Type: *No Product type* /     Visit count this episode: 4 visits    Goals:  Active       PT Problem       Patient will achieve bilateral shoulder flexion of at least 160 degrees (Progressing)       Start:  02/12/25    Expected End:  03/19/25            Patient will achieve bilateral shoulder abduction of at least 160 degrees (Progressing)       Start:  02/12/25    Expected End:  03/19/25            Patient will achieve bilateral shoulder internal rotation of T9 when reaching behind his back to assist with ADLs. (Progressing)       Start:  02/12/25    Expected End:  03/19/25            Patient will achieve bilateral shoulder flexion strength of at least 4+/5       Start:  02/12/25    Expected End:  03/19/25            Patient will achieve bilateral shoulder abduction strength of at least 4+/5 (Progressing)       Start:  02/12/25    Expected End:  03/19/25            Patient will achieve bilateral shoulder external rotation strength of at least 4+/5 in neutral (Progressing)       Start:  02/12/25    Expected End:  03/19/25            Patient will achieve bilateral shoulder internal rotation strength of at least 4+/5 in neutral  (Progressing)       Start:  02/12/25    Expected End:  03/19/25            Patient will achieve bilateral elbow flexion strength of at least 4+/5 (Progressing)       Start:  02/12/25    Expected End:  03/19/25            Patient will demonstrate independence in home program for support of progression (Progressing)       Start:  02/12/25    Expected End:  03/19/25         Goal Note       Patient will demonstrate independence in home program for support of progression              Patient will report pain of no more than 2/10 demonstrating a reduction of overall pain       Start:  02/12/25    Expected End:  03/19/25            Patient will show a significant change in Quick Dash (59.09 to 51.09) patient reported outcome tool to demonstrate subjective imporovement       Start:  02/12/25    Expected End:  03/19/25                 Shira Acosta, PTA

## 2025-03-03 ENCOUNTER — APPOINTMENT (OUTPATIENT)
Dept: PHYSICAL THERAPY | Facility: HOSPITAL | Age: 85
End: 2025-03-03
Payer: MEDICARE

## 2025-03-03 ENCOUNTER — DOCUMENTATION (OUTPATIENT)
Dept: PHYSICAL THERAPY | Facility: HOSPITAL | Age: 85
End: 2025-03-03
Payer: COMMERCIAL

## 2025-03-03 NOTE — PROGRESS NOTES
Physical Therapy                 Therapy Communication Note    Patient Name: Abraham Rodriguez  MRN: [id number]  Today's Date: 3/3/2025     Discipline: Physical Therapy    Missed Time: Cancel, rescheduled for Friday due to illness.

## 2025-03-05 ENCOUNTER — APPOINTMENT (OUTPATIENT)
Dept: PHYSICAL THERAPY | Facility: HOSPITAL | Age: 85
End: 2025-03-05
Payer: MEDICARE

## 2025-03-07 ENCOUNTER — APPOINTMENT (OUTPATIENT)
Dept: PHYSICAL THERAPY | Facility: HOSPITAL | Age: 85
End: 2025-03-07
Payer: MEDICARE

## 2025-03-07 ENCOUNTER — DOCUMENTATION (OUTPATIENT)
Dept: PHYSICAL THERAPY | Facility: HOSPITAL | Age: 85
End: 2025-03-07
Payer: COMMERCIAL

## 2025-03-07 NOTE — PROGRESS NOTES
Physical Therapy                 Therapy Communication Note    Patient Name: Abraham Rodriguez  MRN: [id number]  Today's Date: 3/7/2025     Discipline: Physical Therapy    Missed Time: Cancel due to being in too much pain.

## 2025-03-14 ENCOUNTER — TREATMENT (OUTPATIENT)
Dept: PHYSICAL THERAPY | Facility: HOSPITAL | Age: 85
End: 2025-03-14
Payer: MEDICARE

## 2025-03-14 DIAGNOSIS — M25.511 RIGHT SHOULDER PAIN, UNSPECIFIED CHRONICITY: Primary | ICD-10-CM

## 2025-03-14 PROCEDURE — 97110 THERAPEUTIC EXERCISES: CPT | Mod: GP | Performed by: PHYSICAL THERAPIST

## 2025-03-14 ASSESSMENT — PAIN - FUNCTIONAL ASSESSMENT: PAIN_FUNCTIONAL_ASSESSMENT: 0-10

## 2025-03-14 ASSESSMENT — PAIN DESCRIPTION - DESCRIPTORS: DESCRIPTORS: SHARP

## 2025-03-14 ASSESSMENT — PAIN SCALES - GENERAL: PAINLEVEL_OUTOF10: 7

## 2025-03-14 NOTE — PROGRESS NOTES
Physical Therapy Reassessment and Treatment    Patient Name: Abraham Rodriguez  MRN: [id number]  Today's Date: 3/14/2025  Time Calculation  Start Time: 0901  Stop Time: 0945  Time Calculation (min): 44 min        PT Therapeutic Procedures Time Entry  Therapeutic Exercise Time Entry: 44                Insurance:  Visit Limit: MN  Co-Pay: $0    Current Problem  1. Right shoulder pain, unspecified chronicity  Follow Up In Physical Therapy        Problem List Items Addressed This Visit             ICD-10-CM    Right shoulder pain - Primary M25.511       General  Reason for Referral: right shoulder pain  Referred By: Dr. 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    Performing HEP?: Yes    Precautions  Precautions  Medical Precautions: Fall precautions  Pain  Pain Assessment: 0-10  0-10 (Numeric) Pain Score: 7  Pain Type: Chronic pain  Pain Location: Shoulder  Pain Orientation: Right  Pain Descriptors: Sharp  Pain Frequency: Intermittent  Patient's Stated Pain Goal: No pain    Objective   Shoulder  Shoulder AROM  R Shoulder flexion: (180°): 149 (aarom)  L Shoulder flexion: (180°): 153  R shoulder abduction: (180°): 101 (aarom)  L Shoulder abduction: (180°): 120  R Shoulder ER: (90°): 73 (aarom)  L shoulder ER: (90°): 73  R shoulder IR: (70°): L1  L shoulder IR: (70°): T10    Shoulder Strength  R shoulder flexion: (5/5): 3+/5  L shoulder flexion: (5/5):  4+/5  R shoulder abduction: (5/5): 3-/5  L shoulder abduction: (5/5): 4+/5  R shoulder ER: (5/5): 4/5  L shoulder ER: (5/5): 4+/5  R shoulder IR: (5/5) : 4/5  L shoulder IR: (5/5): 4+/5    Elbow AROM  Elbow AROM WFL: yes    Elbow Strength  Elbow Strength WFL: yes    Outcome Measures:  Other Measures  Disability of Arm Shoulder Hand (DASH): 61.36    Treatments:  Therapeutic Exercise  Therapeutic Exercise Performed: Yes  Therapeutic Exercise Activity 1: 3 min pulleys  flexion  Therapeutic Exercise Activity 2: UBE 2min fwd/back  Therapeutic Exercise Activity 3: scap retraction 2x10 3 sec holds  Therapeutic Exercise Activity 4: AAROM seated flexion with dowel 10x  Therapeutic Exercise Activity 5: x20 Sitting bicep curls #5  Therapeutic Exercise Activity 6: Salutes x10  Therapeutic Exercise Activity 10: Short lever arm flexion prayer position with thumb x10         EDUCATION:   Individual(s) Educated: Patient  Education Provided: yes  Handout(s) Provided: Scanned into chart  Home Program: reviewed home exercise program   Risk and Benefits Discussed with Patient/Caregiver/Other: Yes   Patient/Caregiver Demonstrated Understanding: Yes   Patient Response to Education: Patient/Caregiver verbalized understanding of information, Patient/Caregiver performed return demonstration of exercises/activities, and Patient/Caregiver asked appropriate questions    Assessment: Patient is progressing slowly towards his physical therapy goals. Patient attendance has been inconsistent due to weather and illness. Patient's strength and range of motion appear to be improving per today's measurements. Patient appeared to have more difficulty performing shoulder flexion supine compared to sitting due to shoulder pain. Patient states that he is unable to vacuum due to his shoulder pain.  PT Assessment  PT Assessment Results: Decreased strength, Decreased range of motion, Pain  Rehab Prognosis: Good  Evaluation/Treatment Tolerance: Patient  tolerated treatment well    Plan: Continue with POC. Progress exercises.  OP PT Plan  Treatment/Interventions: Education/ Instruction, Gait training, Manual therapy, Neuromuscular re-education, Therapeutic activities, Therapeutic exercises  PT Plan: Skilled PT  PT Frequency: 2 times per week  Duration: 5 weeks  Onset Date: 01/29/25  Certification Period Start Date: 02/12/25  Certification Period End Date: 04/18/25  Number of Treatments Authorized: 5 of 15  Rehab Potential: Good  Plan of Care Agreement: Patient    Goals:  Active       PT Problem       Patient will achieve bilateral shoulder flexion of at least 160 degrees (Progressing)       Start:  02/12/25    Expected End:  04/18/25            Patient will achieve bilateral shoulder abduction of at least 160 degrees (Progressing)       Start:  02/12/25    Expected End:  04/18/25            Patient will achieve bilateral shoulder internal rotation of T9 when reaching behind his back to assist with ADLs. (Progressing)       Start:  02/12/25    Expected End:  04/18/25            Patient will achieve bilateral shoulder flexion strength of at least 4+/5 (Progressing)       Start:  02/12/25    Expected End:  04/18/25            Patient will achieve bilateral shoulder abduction strength of at least 4+/5 (Progressing)       Start:  02/12/25    Expected End:  04/18/25            Patient will achieve bilateral shoulder external rotation strength of at least 4+/5 in neutral (Progressing)       Start:  02/12/25    Expected End:  04/18/25            Patient will achieve bilateral shoulder internal rotation strength of at least 4+/5 in neutral (Progressing)       Start:  02/12/25    Expected End:  04/18/25            Patient will achieve bilateral elbow flexion strength of at least 4+/5 (Met)       Start:  02/12/25    Expected End:  03/19/25    Resolved:  03/14/25         Patient will demonstrate independence in home program for support of progression (Met)       Start:   02/12/25    Expected End:  03/19/25    Resolved:  03/14/25         Patient will report pain of no more than 2/10 demonstrating a reduction of overall pain (Not Progressing)       Start:  02/12/25    Expected End:  04/18/25            Patient will show a significant change in Quick Dash (59.09 to 51.09) patient reported outcome tool to demonstrate subjective imporovement (Not Progressing)       Start:  02/12/25    Expected End:  04/18/25            Patient will vacuum his house without pain.        Start:  03/14/25    Expected End:  04/18/25                     Mirza Todd, PT

## 2025-03-19 ENCOUNTER — TREATMENT (OUTPATIENT)
Dept: PHYSICAL THERAPY | Facility: HOSPITAL | Age: 85
End: 2025-03-19
Payer: MEDICARE

## 2025-03-19 DIAGNOSIS — M25.511 RIGHT SHOULDER PAIN, UNSPECIFIED CHRONICITY: ICD-10-CM

## 2025-03-19 PROCEDURE — 97110 THERAPEUTIC EXERCISES: CPT | Mod: GP,CQ

## 2025-03-19 ASSESSMENT — PAIN SCALES - GENERAL: PAINLEVEL_OUTOF10: 6

## 2025-03-19 ASSESSMENT — PAIN - FUNCTIONAL ASSESSMENT: PAIN_FUNCTIONAL_ASSESSMENT: 0-10

## 2025-03-19 NOTE — PROGRESS NOTES
Physical Therapy Treatment    Patient Name: Abraham Rodriguez  MRN: [id number]  Encounter Date: 3/19/2025  Time Calculation  Start Time: 1142  Stop Time: 1222  Time Calculation (min): 40 min        PT Therapeutic Procedures Time Entry  Therapeutic Exercise Time Entry: 40      This visit was supervised and reviewed by PTA for clinical competency and accuracy.  PTA is in agreement with session.      Current Problem  Problem List Items Addressed This Visit             ICD-10-CM    Right shoulder pain M25.511     1. Right shoulder pain, unspecified chronicity  Follow Up In Physical Therapy               Subjective   Current Condition:   Better  Patient reports decreased pain in shoulder couple of days, reports instability in left leg.    Performing HEP?: Yes    Precautions  Precautions  Medical Precautions: Fall precautions  Pain  Pain Assessment: 0-10  0-10 (Numeric) Pain Score: 6  Pain Type: Chronic pain  Pain Location: Shoulder  Pain Orientation: Right    Objective     Treatments:    Therapeutic Exercise  Therapeutic Exercise Activity 9: PROM L shoulder flexion pain free limits  TE: Shoulder flex AAROM wand 2x10  Wall slides flex 10x  Scap retractions 2x10  Wall slides flex to abd 10x   Supine scap punch 2x10 wand   Supine iso shoulder Ext 3 sec holds 10x    2x10 Shoulder ext GTB palms down   Iso flex/ER/IR [id number] hold   2x10 Shoulder rows GTB     EDUCATION:   Individual(s) Educated: Patient  Education Provided: Review HEP  Handout(s) Provided: Scanned into chart  Home Program: Continue HEP  Risk and Benefits Discussed with Patient/Caregiver/Other: Yes   Patient/Caregiver Demonstrated Understanding: Yes   Patient Response to Education: Patient/Caregiver verbalized understanding of information, Patient/Caregiver performed return demonstration of exercises/activities, and Patient/Caregiver asked appropriate questions    Assessment: Pt required VC to reduce trunk/weight shifting into ball to improve shoulder isolating  tasks.. Pt demonstrated and verbalized pain in painful arc during supine AAROM, modification made to stay within pain free limits. VC given to reduce rounded shoulder posture during theraband exercises to improve posterior chain posture.  PT Assessment  PT Assessment Results: Decreased strength, Decreased range of motion, Pain  Rehab Prognosis: Good    Plan: Continue with POC. Continue with POC to maintain focus on improving posture and functional use of UE.  OP PT Plan  Treatment/Interventions: Education/ Instruction, Gait training, Manual therapy, Neuromuscular re-education, Therapeutic activities, Therapeutic exercises  PT Plan: Skilled PT  PT Frequency: 2 times per week  Duration: 5 weeks  Onset Date: 01/29/25  Certification Period Start Date: 02/12/25  Certification Period End Date: 03/19/25  Number of Treatments Authorized: 6 of 10  Rehab Potential: Good  Plan of Care Agreement: Patient  Payor: MEDICARE / Plan: MEDICARE PART A AND B / Product Type: *No Product type* /     Visit count this episode: 6 visits    Goals:  Active       PT Problem       Patient will achieve bilateral shoulder flexion of at least 160 degrees (Progressing)       Start:  02/12/25    Expected End:  04/18/25            Patient will achieve bilateral shoulder abduction of at least 160 degrees (Progressing)       Start:  02/12/25    Expected End:  04/18/25            Patient will achieve bilateral shoulder internal rotation of T9 when reaching behind his back to assist with ADLs. (Progressing)       Start:  02/12/25    Expected End:  04/18/25            Patient will achieve bilateral shoulder flexion strength of at least 4+/5 (Progressing)       Start:  02/12/25    Expected End:  04/18/25            Patient will achieve bilateral shoulder abduction strength of at least 4+/5 (Progressing)       Start:  02/12/25    Expected End:  04/18/25            Patient will achieve bilateral shoulder external rotation strength of at least 4+/5 in neutral  (Progressing)       Start:  02/12/25    Expected End:  04/18/25            Patient will achieve bilateral shoulder internal rotation strength of at least 4+/5 in neutral (Progressing)       Start:  02/12/25    Expected End:  04/18/25            Patient will achieve bilateral elbow flexion strength of at least 4+/5 (Met)       Start:  02/12/25    Expected End:  03/19/25    Resolved:  03/14/25         Patient will demonstrate independence in home program for support of progression (Met)       Start:  02/12/25    Expected End:  03/19/25    Resolved:  03/14/25         Patient will report pain of no more than 2/10 demonstrating a reduction of overall pain (Progressing)       Start:  02/12/25    Expected End:  04/18/25            Patient will show a significant change in Quick Dash (59.09 to 51.09) patient reported outcome tool to demonstrate subjective imporovement (Not Progressing)       Start:  02/12/25    Expected End:  04/18/25            Patient will vacuum his house without pain.  (Progressing)       Start:  03/14/25    Expected End:  04/18/25                     Shira Acosta, PTA

## 2025-03-21 ENCOUNTER — APPOINTMENT (OUTPATIENT)
Dept: PHYSICAL THERAPY | Facility: HOSPITAL | Age: 85
End: 2025-03-21
Payer: MEDICARE

## 2025-03-21 ENCOUNTER — DOCUMENTATION (OUTPATIENT)
Dept: PHYSICAL THERAPY | Facility: HOSPITAL | Age: 85
End: 2025-03-21
Payer: COMMERCIAL

## 2025-03-21 NOTE — PROGRESS NOTES
Physical Therapy                 Therapy Communication Note    Patient Name: Abraham Rodriguez  MRN: [id number]  Department:   Room: Room/bed info not found  Today's Date: 3/21/2025     Discipline: Physical Therapy          Missed Visit Reason:  d/t illness    Missed Time: Cancel 12:45

## 2025-03-24 ENCOUNTER — DOCUMENTATION (OUTPATIENT)
Dept: PHYSICAL THERAPY | Facility: HOSPITAL | Age: 85
End: 2025-03-24

## 2025-03-24 ENCOUNTER — APPOINTMENT (OUTPATIENT)
Dept: PHYSICAL THERAPY | Facility: HOSPITAL | Age: 85
End: 2025-03-24
Payer: MEDICARE

## 2025-03-24 NOTE — PROGRESS NOTES
Physical Therapy                 Therapy Communication Note    Patient Name: Abraham Rodriguez  MRN: [id number]  Department:   Room: Room/bed info not found  Today's Date: 3/24/2025     Discipline: Physical Therapy          Missed Visit Reason:      Missed Time: No Show    Comment:

## 2025-04-01 ENCOUNTER — TREATMENT (OUTPATIENT)
Dept: PHYSICAL THERAPY | Facility: HOSPITAL | Age: 85
End: 2025-04-01
Payer: MEDICARE

## 2025-04-01 DIAGNOSIS — M25.511 RIGHT SHOULDER PAIN, UNSPECIFIED CHRONICITY: ICD-10-CM

## 2025-04-01 PROCEDURE — 97110 THERAPEUTIC EXERCISES: CPT | Mod: GP,CQ

## 2025-04-01 ASSESSMENT — PAIN - FUNCTIONAL ASSESSMENT: PAIN_FUNCTIONAL_ASSESSMENT: 0-10

## 2025-04-01 ASSESSMENT — PAIN SCALES - GENERAL: PAINLEVEL_OUTOF10: 6

## 2025-04-01 NOTE — PROGRESS NOTES
Physical Therapy Treatment    Patient Name: Abraham Rodriguez  MRN: [id number]  Today's Date: 4/1/2025  Time Calculation  Start Time: 1433  Stop Time: 1512  Time Calculation (min): 39 min        PT Therapeutic Procedures Time Entry  Therapeutic Exercise Time Entry: 39                Current Problem  1. Right shoulder pain, unspecified chronicity  Follow Up In Physical Therapy          General  Reason for Referral: right shoulder pain  Referred By: Dr. 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    Subjective   Current Condition:   Better  Patient reports     Performing HEP?: Yes    Precautions  Precautions  Medical Precautions: Fall precautions  Pain  Pain Assessment: 0-10  0-10 (Numeric) Pain Score: 6  Pain Location: Shoulder  Pain Orientation: Right    Objective      Shoulder AROM: R Shoulder Flexion 135 Seated       Treatments:    Therapeutic Exercise  Therapeutic Exercise Performed: Yes  Therapeutic Exercise Activity 1: shoulder flex aarom with cane x10  Therapeutic Exercise Activity 2: wall slides flex x10  Therapeutic Exercise Activity 3: scap retraction 2x10 3 sec holds  Therapeutic Exercise Activity 5: Supine punch with cane 2x10  Therapeutic Exercise Activity 6: Ball at wall x20 cw/ccw  Therapeutic Exercise Activity 7: Supine Iso ext 20 x 3 sec holds  Therapeutic Exercise Activity 8: Supine punch with wand 3# x20  Therapeutic Exercise Activity 9: PROM L shoulder  flexion pain free limits  Therapeutic Exercise Activity 10: AAROM shoulder ABD x20  Therapeutic Exercise Activity 11: 2x10 Shoulder ext GTB palms down  Therapeutic Exercise Activity 12: Iso flex/ER/IR [id number] hold  Therapeutic Exercise Activity 13: 2x10 Shoulder rows GTB  Therapeutic Exercise Activity 14: Supine AROM Flexion x15                        EDUCATION:   Individual(s) Educated: Patient  Education Provided: HEP   Risk and Benefits Discussed with Patient/Caregiver/Other: Yes   Patient/Caregiver Demonstrated Understanding: Yes   Patient Response to Education: Patient/Caregiver verbalized understanding of information    Assessment: Good progress noted with AROM flexion since last measurement in seated position. Patient required intermittent support during AROM flexion in supine to control eccentric phase d/t weakness/pain.   PT Assessment  PT Assessment Results: Decreased strength, Decreased range of motion, Pain  Rehab Prognosis: Good    Plan: Continue to progress current POC as tolerated to facilitate ability to perform functional activities.   OP PT Plan  Treatment/Interventions: Education/ Instruction, Gait training, Manual therapy, Neuromuscular re-education, Therapeutic activities, Therapeutic exercises  PT Plan: Skilled PT  PT Frequency: 2 times per week  Duration: 5 weeks  Onset Date: 01/29/25  Certification Period Start Date: 02/12/25  Certification Period End Date: 03/19/25  Number of Treatments Authorized: 7 of 10  Rehab Potential: Good  Plan of Care Agreement: Patient    Goals:  Active       PT Problem       Patient will achieve bilateral shoulder flexion of at least 160 degrees (Progressing)       Start:  02/12/25    Expected End:  04/18/25            Patient will achieve bilateral shoulder abduction of at least 160 degrees (Progressing)       Start:  02/12/25    Expected End:  04/18/25            Patient will achieve bilateral shoulder internal rotation of T9 when reaching behind his back to  assist with ADLs. (Progressing)       Start:  02/12/25    Expected End:  04/18/25            Patient will achieve bilateral shoulder flexion strength of at least 4+/5 (Progressing)       Start:  02/12/25    Expected End:  04/18/25            Patient will achieve bilateral shoulder abduction strength of at least 4+/5 (Progressing)       Start:  02/12/25    Expected End:  04/18/25            Patient will achieve bilateral shoulder external rotation strength of at least 4+/5 in neutral (Progressing)       Start:  02/12/25    Expected End:  04/18/25            Patient will achieve bilateral shoulder internal rotation strength of at least 4+/5 in neutral (Progressing)       Start:  02/12/25    Expected End:  04/18/25            Patient will achieve bilateral elbow flexion strength of at least 4+/5 (Met)       Start:  02/12/25    Expected End:  03/19/25    Resolved:  03/14/25         Patient will demonstrate independence in home program for support of progression (Met)       Start:  02/12/25    Expected End:  03/19/25    Resolved:  03/14/25         Patient will report pain of no more than 2/10 demonstrating a reduction of overall pain (Progressing)       Start:  02/12/25    Expected End:  04/18/25            Patient will show a significant change in Quick Dash (59.09 to 51.09) patient reported outcome tool to demonstrate subjective imporovement (Not Progressing)       Start:  02/12/25    Expected End:  04/18/25            Patient will vacuum his house without pain.  (Progressing)       Start:  03/14/25    Expected End:  04/18/25                     Jorge Shin, PTA

## 2025-04-07 ENCOUNTER — OFFICE VISIT (OUTPATIENT)
Dept: PAIN MEDICINE | Facility: CLINIC | Age: 85
End: 2025-04-07
Payer: MEDICARE

## 2025-04-07 ENCOUNTER — TREATMENT (OUTPATIENT)
Dept: PHYSICAL THERAPY | Facility: HOSPITAL | Age: 85
End: 2025-04-07
Payer: MEDICARE

## 2025-04-07 VITALS — DIASTOLIC BLOOD PRESSURE: 100 MMHG | HEART RATE: 84 BPM | RESPIRATION RATE: 20 BRPM | SYSTOLIC BLOOD PRESSURE: 161 MMHG

## 2025-04-07 DIAGNOSIS — M25.511 RIGHT SHOULDER PAIN, UNSPECIFIED CHRONICITY: Primary | ICD-10-CM

## 2025-04-07 DIAGNOSIS — M51.369 DEGENERATION OF INTERVERTEBRAL DISC OF LUMBAR REGION: ICD-10-CM

## 2025-04-07 DIAGNOSIS — M47.816 LUMBAR SPONDYLOSIS: ICD-10-CM

## 2025-04-07 DIAGNOSIS — M48.062 LUMBAR STENOSIS WITH NEUROGENIC CLAUDICATION: ICD-10-CM

## 2025-04-07 DIAGNOSIS — M51.16 LUMBAR DISC DISEASE WITH RADICULOPATHY: ICD-10-CM

## 2025-04-07 DIAGNOSIS — M54.12 CERVICAL RADICULITIS: ICD-10-CM

## 2025-04-07 PROCEDURE — G2211 COMPLEX E/M VISIT ADD ON: HCPCS | Performed by: ANESTHESIOLOGY

## 2025-04-07 PROCEDURE — 97110 THERAPEUTIC EXERCISES: CPT | Mod: GP | Performed by: PHYSICAL THERAPIST

## 2025-04-07 PROCEDURE — 1036F TOBACCO NON-USER: CPT | Performed by: ANESTHESIOLOGY

## 2025-04-07 PROCEDURE — 99213 OFFICE O/P EST LOW 20 MIN: CPT | Performed by: ANESTHESIOLOGY

## 2025-04-07 PROCEDURE — 1159F MED LIST DOCD IN RCRD: CPT | Performed by: ANESTHESIOLOGY

## 2025-04-07 PROCEDURE — 3080F DIAST BP >= 90 MM HG: CPT | Performed by: ANESTHESIOLOGY

## 2025-04-07 PROCEDURE — 3077F SYST BP >= 140 MM HG: CPT | Performed by: ANESTHESIOLOGY

## 2025-04-07 PROCEDURE — 1123F ACP DISCUSS/DSCN MKR DOCD: CPT | Performed by: ANESTHESIOLOGY

## 2025-04-07 PROCEDURE — 1125F AMNT PAIN NOTED PAIN PRSNT: CPT | Performed by: ANESTHESIOLOGY

## 2025-04-07 RX ORDER — OXYCODONE HYDROCHLORIDE 5 MG/1
5 TABLET ORAL
Qty: 140 TABLET | Refills: 0 | Status: SHIPPED | OUTPATIENT
Start: 2025-06-03 | End: 2025-04-09 | Stop reason: SDUPTHER

## 2025-04-07 RX ORDER — OXYCODONE HYDROCHLORIDE 5 MG/1
5 TABLET ORAL
Qty: 140 TABLET | Refills: 0 | Status: SHIPPED | OUTPATIENT
Start: 2025-05-06 | End: 2025-04-09 | Stop reason: SDUPTHER

## 2025-04-07 RX ORDER — DULOXETIN HYDROCHLORIDE 30 MG/1
30 CAPSULE, DELAYED RELEASE ORAL DAILY
Qty: 90 CAPSULE | Refills: 11 | Status: SHIPPED | OUTPATIENT
Start: 2025-04-07 | End: 2028-03-22

## 2025-04-07 RX ORDER — OXYCODONE HYDROCHLORIDE 5 MG/1
5 TABLET ORAL
Qty: 140 TABLET | Refills: 0 | Status: SHIPPED | OUTPATIENT
Start: 2025-04-08 | End: 2025-04-09 | Stop reason: SDUPTHER

## 2025-04-07 ASSESSMENT — ENCOUNTER SYMPTOMS
OCCASIONAL FEELINGS OF UNSTEADINESS: 0
LOSS OF SENSATION IN FEET: 0
DEPRESSION: 0

## 2025-04-07 ASSESSMENT — PAIN - FUNCTIONAL ASSESSMENT
PAIN_FUNCTIONAL_ASSESSMENT: 0-10
PAIN_FUNCTIONAL_ASSESSMENT: 0-10

## 2025-04-07 ASSESSMENT — PAIN DESCRIPTION - DESCRIPTORS: DESCRIPTORS: RADIATING

## 2025-04-07 ASSESSMENT — PAIN SCALES - GENERAL
PAINLEVEL_OUTOF10: 5 - MODERATE PAIN
PAINLEVEL_OUTOF10: 6

## 2025-04-07 NOTE — PROGRESS NOTES
Subjective   Patient ID: Abraham Rodriguez is a 84 y.o. male with a past medical history of COPD, CAD, hypertension, hyperlipidemia presents as follow-up for low back and leg pain, neck pain and shoulder pain      HPI:   84-year-old male who presents as follow-up for multiple pain complaints including low back and legs, neck, and shoulder/clavicle.  Patient reports the low back and leg pain is the worst at this time.  Scribes pain as aching sometimes sharp radiates to the right lower extremity in the lateral aspect of the leg down to the dorsal aspect of the foot.  He reports associated numbness and tingling sensation with this pain.  Pain is worse with transitional movements and bending forward.  Patient also reports chronic neck pain described as dull and aching worse with quick neck turning without radiating symptoms.  Patient currently taking oxycodone 5 mg 5 times a day and Cymbalta 30 mg which helps 50% with pain and function.  Denies side effects from medications.  Patient also recently had a clavicle fracture and had a right shoulder subacromial injection with Dr. Bullock in January with 40 to 50% relief of pain.  Patient currently doing occupational therapy for the right shoulder.  Denies new symptoms of weakness, no bowel or bladder incontinence, no dizziness or falls.    I have personally reviewed the OARRS report for Abraham Rodriguez I have considered the risks of abuse, dependence, addiction and diversion    OARRS:  No data recorded  I have personally reviewed the OARRS report for Abraham Rodriguez. I have considered the risks of abuse, dependence, addiction and diversion    Is the patient prescribed a combination of a benzodiazepine and opioid?  No  Controlled Substance Agreement:  Reviewed Controlled Substance Agreement including but not limited to the benefits, risks, and alternatives to treatment with a Controlled Substance medication(s).    Review of Systems   13-point ROS done and negative except for HPI.      Current Outpatient Medications   Medication Instructions    aspirin 325 mg, 2 times daily    atorvastatin (LIPITOR) 20 mg, Daily    chlorhexidine (Peridex) 0.12 % solution 15 mL, As needed    dexlansoprazole (DEXILANT) 60 mg, Daily    dicyclomine (BENTYL) 10 mg, oral, 4 times daily    docusate sodium (COLACE) 100 mg, 2 times daily PRN    DULoxetine (CYMBALTA) 30 mg, oral, Daily, Do not crush or chew.    finasteride (PROSCAR) 5 mg, Every evening    fluticasone (Flonase) 50 mcg/actuation nasal spray 1 spray, Each Nostril, Daily PRN, Shake gently. Before first use, prime pump. After use, clean tip and replace cap. As needed    furosemide (LASIX) 40 mg, oral, Daily PRN, Takes as needed    hyoscyamine (ANASPAZ) 0.125 mg    irbesartan (AVAPRO) 150 mg, oral, Daily    losartan (Cozaar) 100 mg tablet 1 tablet, Daily (0630)    meloxicam (MOBIC) 7.5 mg, oral, Daily    metoprolol succinate XL (TOPROL-XL) 50 mg, oral, Daily    mirtazapine (REMERON) 7.5 mg, Nightly    naloxone (NARCAN) 4 mg, nasal, As needed    ondansetron (ZOFRAN) 4 mg    oxyCODONE (ROXICODONE) 5 mg, oral, 5 times daily    oxyCODONE (ROXICODONE) 5 mg, oral, 5 times daily    oxyCODONE (ROXICODONE) 5 mg, oral, 5 times daily    tamsulosin (FLOMAX) 0.8 mg, Every evening    tiZANidine (ZANAFLEX) 2 mg, oral, Every 8 hours PRN    triamcinolone (Kenalog) 0.025 % cream 1 Application       Past Medical History:   Diagnosis Date    Allergic dermatitis 09/14/2023    Allergic rhinitis due to pollen 10/23/2014    Hay fever    Arthritis     Chronic pain disorder     Clotting disorder (Multi)     dvt april 2020     P.E. april 2020    COPD (chronic obstructive pulmonary disease) (Multi)     hx asbestos    Coronary artery disease     Encounter for other preprocedural examination 06/24/2014    Pre-procedural examination    Encounter for screening for malignant neoplasm of prostate     Encounter for screening for malignant neoplasm of prostate    Fissure in skin of foot  09/14/2023    Hyperlipidemia     Hypertension     Localized edema 05/11/2020    Pedal edema    Lung infiltrate 09/14/2023    Myocardial infarction (Multi)     Other conditions influencing health status     Carcinoma Of The Tongue    Pain in unspecified knee 12/22/2014    Joint pain, knee    Personal history of diseases of the skin and subcutaneous tissue 04/23/2013    History of eczema    Personal history of other diseases of the musculoskeletal system and connective tissue 06/19/2014    Personal history of arthritis    Personal history of other diseases of the nervous system and sense organs 08/10/2021    History of Bell's palsy    Personal history of other diseases of the respiratory system 11/12/2014    History of asbestosis    Personal history of other diseases of the respiratory system 08/13/2014    History of chronic obstructive lung disease    Personal history of other specified conditions 08/20/2013    History of edema    Plantar fascial fibromatosis 07/22/2013    Plantar fasciitis    Polyp of colon     Polyp of sigmoid colon    Syncope and collapse 01/23/2015    Syncope and collapse    Unspecified abdominal pain 12/22/2014    Stomach pain    Unspecified disorder of eyelid 10/23/2014    Eyelid disorder        Past Surgical History:   Procedure Laterality Date    CARDIAC CATHETERIZATION      CHOLECYSTECTOMY  04/23/2013    Cholecystectomy Laparoscopic    OTHER SURGICAL HISTORY  04/27/2021    Meniscus repair    OTHER SURGICAL HISTORY Left 05/31/2023    Hip replacement    OTHER SURGICAL HISTORY  04/23/2013    Biopsy Tongue    SHOULDER SURGERY  04/23/2013    Shoulder Surgery    TOTAL KNEE ARTHROPLASTY Right 03/27/2024        Family History   Problem Relation Name Age of Onset    Hypothyroidism Brother          Allergies   Allergen Reactions    Iodinated Contrast Media Unknown and Anaphylaxis    Tobramycin-Dexamethasone Itching and Rash    Atorvastatin Other and Unknown     Felt like I was floating after taking     Cinnamon GI Upset    [place] GI Upset     Unable to eat cucumber with seeds only; seedless cucumbers ok.    Lisinopril Unknown        Objective     Vitals:    04/07/25 1040   BP: (!) 161/100   Pulse: 84   Resp: 20        Physical Exam  General: NAD, well groomed, well nourished  Eyes: Non-icteric sclera, EOMI  Ears, Nose, Mouth, and Throat: External ears and nose appear to be without deformity or rash. No lesions or masses noted. Hearing is grossly intact.   Neck: Trachea midline  Respiratory: Nonlabored breathing   Cardiovascular: no peripheral edema   Skin: No rashes or open lesions/ulcers identified on skin.    Back:   Palpation: No tenderness to palpation over lumbar paraspinous muscles.   Straight leg raise: positive at 30 degrees on the right   OVI Maneuver does not reproduce pain bilaterally    Neurologic:   Cranial nerves grossly intact.   Strength 5/5 and symmetric plantar/dorsiflexion   Sensation: Normal to light touch throughout, pinprick intact throughout.  DTRs:normal and symmetric throughout  Espinosa: absent  Clonus: absent    Psychiatric: Alert, orientation to person, place, and time. Cooperative.    Imaging personally reviewed and independently interpreted:   MRI L spine 2022  L1-2: The lateral recesses are mildly stenosed more so on the right  by facet hypertrophy and ligament thickening as well as disc bulge  more so on the right. The facet joints are moderately arthritic.     L2-3: The spinal canal and lateral recesses are mildly stenosed by  ligament thickening and facet hypertrophy with disc bulge similar to  the prior study. The left neural foramen is mildly stenosed by  endplate spurring and disc bulge. The facet joints are moderately  arthritic.     L3-4: The lateral recesses are moderately stenosed more so on the  left with mild-to-moderate spinal canal stenosis secondary to disc  bulge, endplate spurring, ligament thickening and facet hypertrophy.  The left neural foramen is mildly  stenosed. The facet joints are  moderately arthritic.     L4-5: The lateral recesses and spinal canal are moderately stenosed  secondary to ligament thickening, facet hypertrophy and disc  protrusion similar to the prior exam. The facet joints are moderately  arthritic. The neural foramina are mildly stenosed more so on the  left by endplate spurring, disc bulge and facet hypertrophy.     L5-S1: Left lateral/far lateral disc bulge abuts the exiting left L5  nerve, unchanged. The facet joints are mildly to moderately  arthritic. No central stenosis is noted.    Assessment/Plan   84-year-old male who presents with chronic low back and leg pain as well as neck pain.  Patient reports his leg and low back pain is more bothersome at this time.  Pain radiates to the right lower extremity bilateral aspect only down to the foot.  Patient had positive straight leg raise at 40 degrees on the right on physical exam.  Pain likely secondary to lumbar spondylosis with lumbar radiculopathy.  Discussed plan to do right L4-5 transforaminal epidural steroid injection given that he had great response to this procedure in the past.  Encourage patient to continue physical therapy and home exercise program.    Plan:  - Right L4-5 transforaminal epidural steroid injection under fluoroscopy  - Continue medications as prescribed  - Continue physical therapy and home exercise program    We discussed  the risks, benefits and alternatives of the procedure including but not limited to: , Lack of efficacy , Transiently worsening pain , Bleeding, Infection , and Nerve Damage    Follow up: After procedure    The patient was invited to contact us back anytime with any questions or concerns and follow-up with us in the office as needed.     Diagnoses and all orders for this visit:  Lumbar disc disease with radiculopathy  Cervical radiculitis  Lumbar spondylosis  Lumbar stenosis with neurogenic claudication  Degeneration of intervertebral disc of  lumbar region    Medical necessity: The patient is presenting primarily with Lumbar pain and radicular symptoms.  The pain is constant and of moderate severity that interferes with activities of daily living and sleep. The patient failed conservative therapy to include Tylenol/NSAIDS and physical therapy/supervised home exercise program.  Lumbar spine MRI which I personally reviewed showed L4-5 lateral recess stenosis with moderate foraminal stenosis and L5-S1 lateral disc bulge abutting left L5 nerve.  Plan is to proceed with Lumbar Transforaminal Epidural Steroid Injection at Right L4-5 and L5-S1 with fluoroscopy.  We discussed the risks, benefits and alternatives to the procedure(s) and the patient would like to proceed.  This procedure is part of a comprehensive and multimodal treatment plan to facility physical therapy and a supervised home exercise program.   This note was generated with the aid of dictation software, there may be typos despite my attempts at proofreading.     Patient seen and plan of care discussed with Dr. Glenn England MD  Pain Fellow

## 2025-04-07 NOTE — PROGRESS NOTES
Physical Therapy Treatment    Patient Name: Abraham Rodriguez  MRN: [id number]  Today's Date: 4/7/2025  Time Calculation  Start Time: 1258  Stop Time: 1341  Time Calculation (min): 43 min        PT Therapeutic Procedures Time Entry  Therapeutic Exercise Time Entry: 43                Insurance:  Visit Limit: MN  Co-Pay: $0    Current Problem  1. Right shoulder pain, unspecified chronicity  Follow Up In Physical Therapy        Problem List Items Addressed This Visit             ICD-10-CM    Right shoulder pain - Primary M25.511       General  Reason for Referral: right shoulder pain  Referred By: Dr. 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, [facility], PE, cervicalgia, right total hip arthroplasty, left lower extremity pain ,right lower extremity pain, chronic left shoulder pain, chronic low back pain, lumbago with sciatica right and left, SIJ dysfunction, cervical facet arthropathy, chronic pain syndrome, lumbar stenosis with neurogenic claudication, right cervical radiculopathy, cervical DDD, COPD    Subjective   Current Condition:   Better  Patient reports having improved shoulder range of motion.     Performing HEP?: Yes    Precautions  Precautions  Medical Precautions: Fall precautions  Pain  Pain Assessment: 0-10  0-10 (Numeric) Pain Score: 6  Pain Type: Chronic pain  Pain Location: Shoulder  Pain Orientation: Right    Objective   Shoulder    Shoulder AROM  R Shoulder flexion: (180°): 143 (aarom)  R shoulder abduction: (180°): 100    Treatments:  Therapeutic Exercise  Therapeutic Exercise Performed: Yes  Therapeutic Exercise Activity 1: shoulder flex aarom with cane x20  Therapeutic Exercise Activity 2: wall slides flex x10  Therapeutic Exercise Activity 5: Supine punch with cane 2x10  Therapeutic Exercise Activity 7: Supine Iso ext 20 x 3 sec holds  Therapeutic Exercise Activity 9: PROM L shoulder flexion pain free limits  Therapeutic Exercise Activity 11: 2x10 Shoulder ext GTB  palms down  Therapeutic Exercise Activity 12: Iso flex/ER/IR [id number] hold  Therapeutic Exercise Activity 13: 2x10 Shoulder rows GTB  Therapeutic Exercise Activity 14: Supine AROM Flexion x10  Therapeutic Exercise Activity 15: UBE 4min fwd lvl2         EDUCATION:   Individual(s) Educated: Patient  Education Provided: yes  Handout(s) Provided: Scanned into chart  Home Program: reviewed home exercise program   Risk and Benefits Discussed with Patient/Caregiver/Other: Yes   Patient/Caregiver Demonstrated Understanding: Yes   Patient Response to Education: Patient/Caregiver verbalized understanding of information, Patient/Caregiver performed return demonstration of exercises/activities, and Patient/Caregiver asked appropriate questions    Assessment: Patient demonstrated difficulty performing exercises due to right shoulder pain. Patient states that his shoulder pain is better after physical therapy.   PT Assessment  PT Assessment Results: Decreased strength, Decreased range of motion, Pain  Rehab Prognosis: Good  Evaluation/Treatment Tolerance: Patient limited by pain, Patient tolerated treatment well    Plan: Continue with POC. Progress exercises as tolerated.  OP PT Plan  Treatment/Interventions: Education/ Instruction, Gait training, Manual therapy, Neuromuscular re-education, Therapeutic activities, Therapeutic exercises  PT Plan: Skilled PT  PT Frequency: 2 times per week  Duration: 5 weeks  Onset Date: 01/29/25  Certification Period Start Date: 02/12/25  Certification Period End Date: 04/18/25  Number of Treatments Authorized: 8 of 10  Rehab Potential: Good  Plan of Care Agreement: Patient    Goals:  Active       PT Problem       Patient will achieve bilateral shoulder flexion of at least 160 degrees (Progressing)       Start:  02/12/25    Expected End:  04/18/25            Patient will achieve bilateral shoulder abduction of at least 160 degrees (Progressing)       Start:  02/12/25    Expected End:  04/18/25             Patient will achieve bilateral shoulder internal rotation of T9 when reaching behind his back to assist with ADLs. (Progressing)       Start:  02/12/25    Expected End:  04/18/25            Patient will achieve bilateral shoulder flexion strength of at least 4+/5 (Progressing)       Start:  02/12/25    Expected End:  04/18/25            Patient will achieve bilateral shoulder abduction strength of at least 4+/5 (Progressing)       Start:  02/12/25    Expected End:  04/18/25            Patient will achieve bilateral shoulder external rotation strength of at least 4+/5 in neutral (Progressing)       Start:  02/12/25    Expected End:  04/18/25            Patient will achieve bilateral shoulder internal rotation strength of at least 4+/5 in neutral (Progressing)       Start:  02/12/25    Expected End:  04/18/25            Patient will achieve bilateral elbow flexion strength of at least 4+/5 (Met)       Start:  02/12/25    Expected End:  03/19/25    Resolved:  03/14/25         Patient will demonstrate independence in home program for support of progression (Met)       Start:  02/12/25    Expected End:  03/19/25    Resolved:  03/14/25         Patient will report pain of no more than 2/10 demonstrating a reduction of overall pain (Progressing)       Start:  02/12/25    Expected End:  04/18/25            Patient will show a significant change in Quick Dash (59.09 to 51.09) patient reported outcome tool to demonstrate subjective imporovement (Not Progressing)       Start:  02/12/25    Expected End:  04/18/25            Patient will vacuum his house without pain.  (Progressing)       Start:  03/14/25    Expected End:  04/18/25                     Mirza Todd, PT

## 2025-04-09 DIAGNOSIS — M47.816 LUMBAR SPONDYLOSIS: ICD-10-CM

## 2025-04-09 DIAGNOSIS — M51.16 LUMBAR DISC DISEASE WITH RADICULOPATHY: ICD-10-CM

## 2025-04-09 DIAGNOSIS — M48.062 LUMBAR STENOSIS WITH NEUROGENIC CLAUDICATION: ICD-10-CM

## 2025-04-09 DIAGNOSIS — M54.12 CERVICAL RADICULITIS: ICD-10-CM

## 2025-04-09 RX ORDER — OXYCODONE HYDROCHLORIDE 5 MG/1
5 TABLET ORAL
Qty: 140 TABLET | Refills: 0 | Status: SHIPPED | OUTPATIENT
Start: 2025-04-09 | End: 2025-05-07

## 2025-04-09 RX ORDER — OXYCODONE HYDROCHLORIDE 5 MG/1
5 TABLET ORAL
Qty: 140 TABLET | Refills: 0 | Status: SHIPPED | OUTPATIENT
Start: 2025-05-07 | End: 2025-06-04

## 2025-04-09 RX ORDER — OXYCODONE HYDROCHLORIDE 5 MG/1
5 TABLET ORAL
Qty: 140 TABLET | Refills: 0 | Status: SHIPPED | OUTPATIENT
Start: 2025-06-04 | End: 2025-07-02

## 2025-04-10 ENCOUNTER — TELEPHONE (OUTPATIENT)
Dept: PAIN MEDICINE | Facility: CLINIC | Age: 85
End: 2025-04-10
Payer: COMMERCIAL

## 2025-04-10 NOTE — TELEPHONE ENCOUNTER
[facility] and [facility] was re-sent to [facility] as requested.  Waiting for the insurance to catch up.    Talked with [employer] and [employer] multiple times.  They will try to get the insurance reversal put through so it can be covered at [facility] through insurance.     Patient is aware that he can get it covered with [employer] card for $25, if the pharmacist can't get the over ride for insurance.

## 2025-04-10 NOTE — TELEPHONE ENCOUNTER
Patient states [facility] said they cannot fill his script until the end of the month. I looked on his chart, and it looks like his April script was discontinued. Please call patient and update. Thanks

## 2025-04-15 DIAGNOSIS — M54.16 LUMBAR RADICULOPATHY: ICD-10-CM

## 2025-04-15 RX ORDER — PREDNISONE 20 MG/1
TABLET ORAL
Qty: 2 TABLET | Refills: 0 | Status: SHIPPED | OUTPATIENT
Start: 2025-04-15

## 2025-04-15 RX ORDER — DIPHENHYDRAMINE HCL 25 MG
CAPSULE ORAL
Qty: 30 CAPSULE | Refills: 0 | Status: SHIPPED | OUTPATIENT
Start: 2025-04-15

## 2025-04-16 ENCOUNTER — TELEPHONE (OUTPATIENT)
Dept: PHYSICAL THERAPY | Facility: HOSPITAL | Age: 85
End: 2025-04-16
Payer: COMMERCIAL

## 2025-04-16 ENCOUNTER — APPOINTMENT (OUTPATIENT)
Dept: PHYSICAL THERAPY | Facility: HOSPITAL | Age: 85
End: 2025-04-16
Payer: MEDICARE

## 2025-04-17 ENCOUNTER — TELEPHONE (OUTPATIENT)
Dept: PAIN MEDICINE | Facility: CLINIC | Age: 85
End: 2025-04-17
Payer: COMMERCIAL

## 2025-04-17 ENCOUNTER — HOSPITAL ENCOUNTER (OUTPATIENT)
Dept: PAIN MEDICINE | Facility: CLINIC | Age: 85
Discharge: HOME | End: 2025-04-17
Payer: COMMERCIAL

## 2025-04-17 VITALS
RESPIRATION RATE: 16 BRPM | SYSTOLIC BLOOD PRESSURE: 182 MMHG | HEART RATE: 73 BPM | OXYGEN SATURATION: 95 % | DIASTOLIC BLOOD PRESSURE: 89 MMHG

## 2025-04-17 DIAGNOSIS — M48.062 LUMBAR STENOSIS WITH NEUROGENIC CLAUDICATION: ICD-10-CM

## 2025-04-17 PROCEDURE — 64484 NJX AA&/STRD TFRM EPI L/S EA: CPT | Performed by: ANESTHESIOLOGY

## 2025-04-17 PROCEDURE — 2550000001 HC RX 255 CONTRASTS: Performed by: ANESTHESIOLOGY

## 2025-04-17 PROCEDURE — 64483 NJX AA&/STRD TFRM EPI L/S 1: CPT | Performed by: ANESTHESIOLOGY

## 2025-04-17 PROCEDURE — 2500000004 HC RX 250 GENERAL PHARMACY W/ HCPCS (ALT 636 FOR OP/ED): Mod: JZ | Performed by: ANESTHESIOLOGY

## 2025-04-17 RX ORDER — LIDOCAINE HYDROCHLORIDE 5 MG/ML
INJECTION, SOLUTION INFILTRATION; INTRAVENOUS AS NEEDED
Status: COMPLETED | OUTPATIENT
Start: 2025-04-17 | End: 2025-04-17

## 2025-04-17 RX ORDER — INDOMETHACIN 25 MG/1
CAPSULE ORAL AS NEEDED
Status: COMPLETED | OUTPATIENT
Start: 2025-04-17 | End: 2025-04-17

## 2025-04-17 RX ORDER — DEXAMETHASONE SODIUM PHOSPHATE 10 MG/ML
INJECTION INTRAMUSCULAR; INTRAVENOUS AS NEEDED
Status: COMPLETED | OUTPATIENT
Start: 2025-04-17 | End: 2025-04-17

## 2025-04-17 RX ADMIN — IOHEXOL 5 ML: 240 INJECTION, SOLUTION INTRATHECAL; INTRAVASCULAR; INTRAVENOUS; ORAL at 11:47

## 2025-04-17 RX ADMIN — SODIUM BICARBONATE 1 MEQ: 84 INJECTION, SOLUTION INTRAVENOUS at 11:47

## 2025-04-17 RX ADMIN — DEXAMETHASONE SODIUM PHOSPHATE 10 MG: 10 INJECTION, SOLUTION INTRAMUSCULAR; INTRAVENOUS at 11:47

## 2025-04-17 RX ADMIN — LIDOCAINE HYDROCHLORIDE 11 ML: 5 INJECTION, SOLUTION INFILTRATION at 11:44

## 2025-04-17 ASSESSMENT — PAIN - FUNCTIONAL ASSESSMENT: PAIN_FUNCTIONAL_ASSESSMENT: 0-10

## 2025-04-17 ASSESSMENT — PAIN SCALES - GENERAL
PAINLEVEL_OUTOF10: 0 - NO PAIN
PAINLEVEL_OUTOF10: 6

## 2025-04-17 ASSESSMENT — PAIN DESCRIPTION - DESCRIPTORS: DESCRIPTORS: RADIATING

## 2025-04-17 NOTE — DISCHARGE INSTRUCTIONS
[facility]  [facility] [address]  [address] [id number] [phone]    POST PROCEDURE INSTRUCTIONS    Activity  Medication used during your procedure may cause some temporary weakness or numbness in your arms or legs, depending on the type of injection you received. It is recommended that you do not drive or operate machinery the day of your injection.  You do not need to stay in bed when you get home. You should be able to resume your normal activities, including work. However, any pre-existing physical restriction you had prior to the procedure may still remain.   Have a responsible adult with you if you received sedation for the procedure. Do not drive or operate machinery for 12 hours.    Pain  Immediate pain relief from the local anesthetic will wear off after several hours.  Prolonged relief from the steroid may take 3-14 days to occur.  Some patients may experience a “flare up” of their normal pain for a few days after the procedure. You may apply ice packs to the sore area for 15minutes on/ 2 hours off and take your prescribed or over the counter analgesics as needed.  Common side effects from the steroid include facial flushing, headaches,fluid retention,trouble sleeping,and cold like symptoms for 24-48 hours post procedure.  Patients receiving diagnostic injections (no steroid): pain relief is intended to be temporary. Pay attention to the percentage of pain relief that occurs compared to before your injection so you can report this to your doctor. Pain scores before and after the procedure need to be documented.    Medications  Resume your normal medications unless otherwise instructed  If you held your blood thinning medication for the procedure,you will be instructed on when to restart.    Injection site care  Keep the injection site clean and dry. You may shower and remove the Band Aid after you arrive home.  If you notice excessive  bleeding (slow general oozing that completely soaks the dressing or fresh bright red bleeding),apply pressure and call our office immediately.  Observe for signs of infection: increasing pain at injection site, redness, swelling, drainage with a foul smell, any associated fever or chills                        If you notice any of these, call our office immediately.    Diabetic patients   You may notice an elevation in blood sugar if steroid is used. Notify your primary care doctor if blood sugar levels do not return to normal.    Follow up  Make a virtual injection follow up appointment  with Dr. Elizabeth in 3-4 weeks      Call our office at [phone] to speak to the clinical staff with any concerns or problems.  Go to the nearest emergency room if you are not able to reach us and your problem is urgent.  Call [phone] if you develop serious symptoms such as: chest pain or difficulty breathing.

## 2025-04-17 NOTE — H&P
History Of Present Illness  Abraham Rodriguez is a 84 y.o. male presenting with low back pain radiating posterior lateral aspect of right lower extremity.  Patient scheduled for a right L4 and L5 transforaminal epidural steroid injection     Past Medical History  He has a past medical history of Allergic dermatitis (09/14/2023), Allergic rhinitis due to pollen (10/23/2014), Arthritis, Chronic pain disorder, Clotting disorder (Multi), COPD (chronic obstructive pulmonary disease) (Multi), Coronary artery disease, Encounter for other preprocedural examination (06/24/2014), Encounter for screening for malignant neoplasm of prostate, Fissure in skin of foot (09/14/2023), Hyperlipidemia, Hypertension, Localized edema (05/11/2020), Lung infiltrate (09/14/2023), Myocardial infarction (Multi), Other conditions influencing health status, Pain in unspecified knee (12/22/2014), Personal history of diseases of the skin and subcutaneous tissue (04/23/2013), Personal history of other diseases of the musculoskeletal system and connective tissue (06/19/2014), Personal history of other diseases of the nervous system and sense organs (08/10/2021), Personal history of other diseases of the respiratory system (11/12/2014), Personal history of other diseases of the respiratory system (08/13/2014), Personal history of other specified conditions (08/20/2013), Plantar fascial fibromatosis (07/22/2013), Polyp of colon, Syncope and collapse (01/23/2015), Unspecified abdominal pain (12/22/2014), and Unspecified disorder of eyelid (10/23/2014).    Surgical History  He has a past surgical history that includes Other surgical history (04/27/2021); Other surgical history (Left, 05/31/2023); Cholecystectomy (04/23/2013); Shoulder surgery (04/23/2013); Other surgical history (04/23/2013); Cardiac catheterization; and Total knee arthroplasty (Right, 03/27/2024).     Social History  He reports that he quit smoking about 42 years ago. His smoking use  included cigarettes. He has never used smokeless tobacco. He reports that he does not currently use drugs. He reports that he does not drink alcohol.    Family History  Family History[1]     Allergies  Iodinated contrast media, Tobramycin-dexamethasone, Atorvastatin, Cinnamon, Cucumber, and Lisinopril    Review of systems:   13-point review of systems done and negative except as noted in HPI      Physical Exam   Vital signs: Reviewed  Constitutional: No acute distress, well appearing and well nourished. Patient appears stated age.   Eyes: Conjunctiva non-icteric and eye lids are without obvious rash or drooping. Pupils are symmetric.   Ears, Nose, Mouth, and Throat: External ears and nose appear to be without deformity or rash. No lesions or masses noted. Hearing is grossly intact.   Neck:. No JVD noted, tracheal position is midline.   Head and Face: Examination of the head and face revealed no abnormalities.   Respiratory: No gasping or shortness of breath noted, no use of accessory muscles noted.   Cardiovascular: Examination for edema is normal.   GI: Abdomen nontender to palpation.   Skin: No rashes or open lesions/ulcers identified on skin.   Musk: Digits/nails show no clubbing or cyanosis. No asymmetry or masses noted of the musculature. Examination of the muscles/joints/bones show normal range of motion. Gait is grossly [].  [] to walk on toes and heels.   Neurologic: Cranial nerves II-XII intact, motor strength 5/5 muscle strength of the lower extremities bilaterally and equal. 5/5 muscle strength of the upper extremities bilaterally and equal.   Reflexes: normal.   Sensation: []  Provocative tests:       Last Recorded Vitals  BP (!) 182/89   Pulse 73   Resp 16   SpO2 95%     Relevant Results            Last OARRS Review: No data recorded    I have personally reviewed the OARRS report for Abraham Rodriguez I have considered the risks of abuse, dependence, addiction and diversion       Assessment/Plan    Assessment & Plan  Lumbar stenosis with neurogenic claudication      As discussed above       This note was generated with the aid of dictation software, there may be typos despite my attempts at proofreading.    Macho Elizabeth MD        [1]   Family History  Problem Relation Name Age of Onset    Hypothyroidism Brother

## 2025-04-17 NOTE — TELEPHONE ENCOUNTER
Inconsistent toxicology. Pt counseled. Per Dr MENDEZ,needs repeat prior to any further opioid prescriptions. Future RX cx. Pt aware. He will come in for nurse visit in 30-40 days

## 2025-04-23 ENCOUNTER — TREATMENT (OUTPATIENT)
Dept: PHYSICAL THERAPY | Facility: HOSPITAL | Age: 85
End: 2025-04-23
Payer: MEDICARE

## 2025-04-23 DIAGNOSIS — M25.511 RIGHT SHOULDER PAIN, UNSPECIFIED CHRONICITY: Primary | ICD-10-CM

## 2025-04-23 PROCEDURE — 97110 THERAPEUTIC EXERCISES: CPT | Mod: GP | Performed by: PHYSICAL THERAPIST

## 2025-04-23 ASSESSMENT — PAIN SCALES - GENERAL: PAINLEVEL_OUTOF10: 6

## 2025-04-23 ASSESSMENT — PAIN - FUNCTIONAL ASSESSMENT: PAIN_FUNCTIONAL_ASSESSMENT: 0-10

## 2025-04-23 NOTE — PROGRESS NOTES
Physical Therapy Reassessment and Treatment    Patient Name: Abraham Rodriguez  MRN: [id number]  Today's Date: 4/23/2025  Time Calculation  Start Time: 1307  Stop Time: 1345  Time Calculation (min): 38 min (arrived late)        PT Therapeutic Procedures Time Entry  Therapeutic Exercise Time Entry: 38                Insurance:  Visit Limit: MN  Co-Pay: $0    Current Problem  1. Right shoulder pain, unspecified chronicity  Follow Up In Physical Therapy        Problem List Items Addressed This Visit           ICD-10-CM    Right shoulder pain - Primary M25.511       General  Reason for Referral: right shoulder pain  Referred By: Dr. 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, [facility], PE, cervicalgia, right total hip arthroplasty, left lower extremity pain ,right lower extremity pain, chronic left shoulder pain, chronic low back pain, lumbago with sciatica right and left, SIJ dysfunction, cervical facet arthropathy, chronic pain syndrome, lumbar stenosis with neurogenic claudication, right cervical radiculopathy, cervical DDD, COPD  Preferred Learning Style: visual, written    Subjective   Current Condition:   Same  Patient reports increased right knee pain after his back injection due to kneeling on his right lower extremity.     Performing HEP?: Partially    Precautions  Precautions  Medical Precautions: Fall precautions  Pain  Pain Assessment: 0-10  0-10 (Numeric) Pain Score: 6 (7/10 r knee pain)  Pain Location: Shoulder  Pain Orientation: Right    Objective     Functional Rating Scale  Quick Dash: 45.45    Shoulder AROM  R Shoulder flexion: (180°): 145  R shoulder abduction: (180°): 106  L Shoulder abduction: (180°): 120  R Shoulder ER: (90°): WFL  L shoulder ER: (90°): WFL  R shoulder IR: (70°): L1  L shoulder IR: (70°): T10    Shoulder Strength  R shoulder flexion: (5/5): 3+/5  L shoulder flexion: (5/5): 4+/5  R shoulder abduction: (5/5): 3-/5  L shoulder abduction: (5/5): 4+/5  R  shoulder ER: (5/5): 4+/5  L shoulder ER: (5/5): 4+/5  R shoulder IR: (5/5) : 4+/5  L shoulder IR: (5/5): 4+/5    Elbow AROM  Elbow AROM WFL: yes    Elbow Strength  Elbow Strength WFL: yes    Outcome Measures:  Other Measures  Disability of Arm Shoulder Hand (DASH): 45.45    Treatments:  Therapeutic Exercise  Therapeutic Exercise Performed: Yes  Therapeutic Exercise Activity 1: shoulder flex aarom with cane x10  Therapeutic Exercise Activity 2: step and reach @ wall x10  Therapeutic Exercise Activity 5: Supine punch with cane 2x10  Therapeutic Exercise Activity 15: UBE 2' fwd/bwd ea lvl2         EDUCATION:   Individual(s) Educated: Patient  Education Provided: yes  Handout(s) Provided: Scanned into chart  Home Program: reviewed importance of performing his home exercise program regularly  Risk and Benefits Discussed with Patient/Caregiver/Other: Yes   Patient/Caregiver Demonstrated Understanding: Yes   Patient Response to Education: Patient/Caregiver verbalized understanding of information, Patient/Caregiver performed return demonstration of exercises/activities, and Patient/Caregiver asked appropriate questions    Assessment: Patient is progressing slowly with physical therapy. Patient's attendance has been inconsistent. Patient was educated in the importance of attending consistently and performing home exercise program regularly. Patient verbalized understanding. Skilled physical therapy is warranted to address the above stated impairments, so the patient can perform functional activities and work duties without increased pain or difficulty.   PT Assessment  PT Assessment Results: Decreased strength, Decreased range of motion, Pain  Rehab Prognosis: Good  Evaluation/Treatment Tolerance: Patient tolerated treatment well    Plan: Continue with POC. Progress exercises as tolerated.  OP PT Plan  Treatment/Interventions: Education/ Instruction, Gait training, Manual therapy, Neuromuscular re-education, Therapeutic  activities, Therapeutic exercises  PT Plan: Skilled PT  PT Frequency: 1 time per week  Duration: 5 weeks  Onset Date: 01/29/25  Certification Period Start Date: 02/12/25  Certification Period End Date: 05/28/25  Number of Treatments Authorized: 9 of 14  Rehab Potential: Good  Plan of Care Agreement: Patient    Goals:  Active       PT Problem       Patient will achieve bilateral shoulder flexion of at least 160 degrees (Progressing)       Start:  02/12/25    Expected End:  05/28/25            Patient will achieve bilateral shoulder abduction of at least 160 degrees (Progressing)       Start:  02/12/25    Expected End:  05/28/25            Patient will achieve bilateral shoulder internal rotation of T9 when reaching behind his back to assist with ADLs. (Not Progressing)       Start:  02/12/25    Expected End:  05/28/25            Patient will achieve bilateral shoulder flexion strength of at least 4+/5 (Progressing)       Start:  02/12/25    Expected End:  05/28/25            Patient will achieve bilateral shoulder abduction strength of at least 4+/5 (Not Progressing)       Start:  02/12/25    Expected End:  05/28/25            Patient will achieve bilateral shoulder external rotation strength of at least 4+/5 in neutral (Met)       Start:  02/12/25    Expected End:  04/18/25    Resolved:  04/23/25         Patient will achieve bilateral shoulder internal rotation strength of at least 4+/5 in neutral (Met)       Start:  02/12/25    Expected End:  04/18/25    Resolved:  04/23/25         Patient will achieve bilateral elbow flexion strength of at least 4+/5 (Met)       Start:  02/12/25    Expected End:  03/19/25    Resolved:  03/14/25         Patient will demonstrate independence in home program for support of progression (Met)       Start:  02/12/25    Expected End:  03/19/25    Resolved:  03/14/25         Patient will report pain of no more than 2/10 demonstrating a reduction of overall pain (Not Progressing)        Start:  02/12/25    Expected End:  05/28/25            Patient will show a significant change in Quick Dash (59.09 to 51.09) patient reported outcome tool to demonstrate subjective imporovement (Met)       Start:  02/12/25    Expected End:  04/18/25    Resolved:  04/23/25         Patient will vacuum his house without pain.  (Not Progressing)       Start:  03/14/25    Expected End:  05/28/25                     Mirza Todd, PT

## 2025-04-30 ENCOUNTER — OFFICE VISIT (OUTPATIENT)
Dept: ORTHOPEDIC SURGERY | Facility: CLINIC | Age: 85
End: 2025-04-30
Payer: MEDICARE

## 2025-04-30 ENCOUNTER — DOCUMENTATION (OUTPATIENT)
Dept: PHYSICAL THERAPY | Facility: HOSPITAL | Age: 85
End: 2025-04-30

## 2025-04-30 ENCOUNTER — HOSPITAL ENCOUNTER (OUTPATIENT)
Dept: RADIOLOGY | Facility: HOSPITAL | Age: 85
Discharge: HOME | End: 2025-04-30
Payer: MEDICARE

## 2025-04-30 VITALS — WEIGHT: 177 LBS | HEIGHT: 71 IN | BODY MASS INDEX: 24.78 KG/M2

## 2025-04-30 DIAGNOSIS — S43.431A LABRAL TEAR OF SHOULDER, RIGHT, INITIAL ENCOUNTER: ICD-10-CM

## 2025-04-30 DIAGNOSIS — S46.011A TRAUMATIC TEAR OF RIGHT ROTATOR CUFF, UNSPECIFIED TEAR EXTENT, INITIAL ENCOUNTER: ICD-10-CM

## 2025-04-30 DIAGNOSIS — M25.511 RIGHT SHOULDER PAIN, UNSPECIFIED CHRONICITY: ICD-10-CM

## 2025-04-30 DIAGNOSIS — M75.121 COMPLETE TEAR OF RIGHT ROTATOR CUFF, UNSPECIFIED WHETHER TRAUMATIC: Primary | ICD-10-CM

## 2025-04-30 DIAGNOSIS — M75.111 INCOMPLETE TEAR OF RIGHT ROTATOR CUFF, UNSPECIFIED WHETHER TRAUMATIC: ICD-10-CM

## 2025-04-30 DIAGNOSIS — M75.21 BICEPS TENDINITIS OF RIGHT SHOULDER: ICD-10-CM

## 2025-04-30 DIAGNOSIS — M75.41 IMPINGEMENT SYNDROME OF RIGHT SHOULDER: ICD-10-CM

## 2025-04-30 PROCEDURE — 1159F MED LIST DOCD IN RCRD: CPT | Performed by: ORTHOPAEDIC SURGERY

## 2025-04-30 PROCEDURE — 1123F ACP DISCUSS/DSCN MKR DOCD: CPT | Performed by: ORTHOPAEDIC SURGERY

## 2025-04-30 PROCEDURE — 1125F AMNT PAIN NOTED PAIN PRSNT: CPT | Performed by: ORTHOPAEDIC SURGERY

## 2025-04-30 PROCEDURE — 73030 X-RAY EXAM OF SHOULDER: CPT | Mod: RT

## 2025-04-30 PROCEDURE — 99214 OFFICE O/P EST MOD 30 MIN: CPT | Performed by: ORTHOPAEDIC SURGERY

## 2025-04-30 PROCEDURE — 73221 MRI JOINT UPR EXTREM W/O DYE: CPT | Mod: RT

## 2025-04-30 PROCEDURE — 1160F RVW MEDS BY RX/DR IN RCRD: CPT | Performed by: ORTHOPAEDIC SURGERY

## 2025-04-30 PROCEDURE — 73030 X-RAY EXAM OF SHOULDER: CPT | Mod: RIGHT SIDE | Performed by: RADIOLOGY

## 2025-04-30 PROCEDURE — 1036F TOBACCO NON-USER: CPT | Performed by: ORTHOPAEDIC SURGERY

## 2025-04-30 ASSESSMENT — PAIN SCALES - GENERAL: PAINLEVEL_OUTOF10: 8

## 2025-04-30 ASSESSMENT — PAIN - FUNCTIONAL ASSESSMENT: PAIN_FUNCTIONAL_ASSESSMENT: 0-10

## 2025-04-30 NOTE — PROGRESS NOTES
Physical Therapy                 Therapy Communication Note    Patient Name: Abraham Rodriguez  MRN: [id number]  Today's Date: 4/30/2025     Discipline: Physical Therapy    Missed Time: Cancel due to getting an MRI on his shoulder today.

## 2025-04-30 NOTE — PROGRESS NOTES
84-year-old male with significant worsening of his right shoulder pain.  Patient stated on April 25, 2025 he fell landing on the right shoulder.  He developed significant bruising around the right shoulder has had a lot of more pain in the right shoulder ever since.  He is now unable to raise his arm due to pain    Patients' self reported past medical history, medications, allergies, surgical history, family and social history as well as a 10 point review of systems has been documented in the new patient intake form and scanned into the patient's electronic medical record.  The intake form was reviewed by Dr Bullock during the office visit and signed by Dr. Bullock and the patient.  Pertinent findings are documented in the HPI.    General Multi-System Physical Exam:  Constitutional  General appearance:  Alert, oriented, and in no acute distress.  Well developed, well nourished.  Head and Face  Head and face:  Normocephalic and atraumatic.  Ears, Nose, Mouth, and Throat  External inspection of ears and nose: Normal.  Eyes:  Pupils are equal and round.  Neck  Neck:  no neck mass was observed.  Pulmonary  Respiratory effort:  no respiratory distress.  Cardiovascular  Intact distal pulses.  Lymphatic  Palpation of lymph nodes in the affected extremity:  Normal.  Skin  Skin and subcutaneous tissue:  Normal skin color and pigmentation.  Normal skin turgor.  No rashes.  Neurologic  Sensation:  normal to light touch.  Psychiatric  Judgement and insight:  Intact.  Mood and affect:  Normal.  Musculoskeletal  Right shoulder has 60 degrees of active abduction forward flexion 120 degrees of passive abduction forward flexion 50 degrees of external rotation internal rotates to sacrum positive biceps tenderness positive acromioclavicular joint tenderness positive crossarm test positive Neer positive Landry positive Jobes with pain and extreme weakness neurovasc intact right upper extremity    X-rays of the patient were ordered by   "[facility] and obtained today.  Dr Bullock personally reviewed the results of the x-rays.    In addition, Dr Bullock independently interpreted the patient's x-rays (performed by the Radiology department) by viewing the x-ray images and this is Dr. Bullock's personal interpretation:     No acute changes in the x-rays of the right shoulder in comparison to prior x-rays    I explained the patient that I am concerned about possible occult fracture in the right shoulder versus acute traumatic rotator cuff tear.  He had a fall 5 days ago and has had much worse pain and much worse motion since then.  We need to get a stat MRI of his right shoulder to evaluate for traumatic rotator cuff tear versus occult fracture and I will see him back afterwards.  Will hold off on any physical therapy till I see him back again    This patient had a traumatic injury to their shoulder and based upon their history and physical examination, I am concerned that they have a traumatic full-thickness rotator cuff tear of their shoulder.  Excellent literature has proven over and over again that traumatic full-thickness rotator cuff tears do much better with acute surgical repair and have much worse results with delayed surgery.  One such piece of literature includes the publication, \"Early Repair of Traumatic Rotator Cuff Tears Improves Functional Outcomes\" by Huy Harrison et al., published in the Journal of Shoulder and Elbow Surgery, year 2021, 30th Edition, Pages 2913-5346.  In this publication, they clearly show that patients who have traumatic full-thickness rotator cuff tears do much better with surgical repair of the rotator cuff within 3 weeks of their traumatic injury.  When surgery was performed greater than 4 months after the traumatic injury, outcomes from the surgery are much worse.  In their publication, they specifically state, \"It is important for all providers to be aware that surgical timing in traumatic rotator cuff tears is " "important and early MRI diagnosis is also important.  In our experience, the mean time to initial presentation to an orthopedic surgeon was nearly 2 months after the injury.  Owing to the delayed presentation to an orthopedic surgeon and a gradual decline in outcomes that occurs with a delayed operation, it is imperative for providers to obtain MRI scans expediently to allow for the best outcomes.\"    As a result of this publication and other similar publications that prove traumatic full-thickness rotator cuff tears do much better with surgery within 3 weeks of the injury and have much worse results when surgery is delayed more than 4 months after the injury, we must obtain a STAT MRI of this patient's shoulder to evaluate for full-thickness rotator cuff tear.  The patient had a traumatic injury and if they have a full-thickness rotator cuff tear, excellent research shows much better outcomes with surgery as quick as possible after the injury.  We will therefore order a stat MRI of the patient's shoulder to evaluate for full-thickness rotator cuff tear and see the patient back as soon as the MRI has been completed to discuss the results.      The patient's height and weight were documented today in the vitals tab in the patient's EPIC chart, and the patient's BMI was calculated.  A follow up plan was then developed by Dr. Bullock, per  mandated guidelines, based upon the patient's BMI and the follow up plan was documented in the \"Patient Instructions\" section of the chart.  Weight loss can be achieved by decreasing the amount of calories consumed daily and by increasing daily physical activity.  We recommend finding physical activities that you can participate in regularly and you enjoy which do not worsen your current joint pains.       This patient has a new, acute, previously-undiagnosed problem of their affected extremity.  We will begin treatment as listed here and monitor treatment based upon their " progression and response to treatment.  Due to the fact that we are just beginning treatment on this issue, this is currently considered an undiagnosed new problem with uncertain prognosis.  The exact diagnosis and their prognosis will depend upon their response to treatment and progression of their condition as time progresses.    Due to this patient's condition, they are at a moderate risk of morbidity from additional diagnostic testing / treatment.

## 2025-05-05 ENCOUNTER — TELEPHONE (OUTPATIENT)
Dept: PAIN MEDICINE | Facility: CLINIC | Age: 85
End: 2025-05-05
Payer: COMMERCIAL

## 2025-05-05 NOTE — TELEPHONE ENCOUNTER
Patient would like an appointment he fell after his injection and is having a flare up. Please call him as we are booked and let me know when we should get him in.

## 2025-05-06 ENCOUNTER — TELEPHONE (OUTPATIENT)
Dept: OPERATING ROOM | Facility: HOSPITAL | Age: 85
End: 2025-05-06
Payer: COMMERCIAL

## 2025-05-06 NOTE — TELEPHONE ENCOUNTER
Pt called to report he had a fall a few days ago and is having increased shoulder pain since. Reports back pain was much improved after spinal injection a few weeks ago but is now back to baseline since his fall. He is not sure why he fell,denies any new neurological deficits. Denies dizziness or LOC. He did not go to Er or PCP after fall.He does have an appt with ortho tomorrow to evaluate shoulder pain.He was calling to make injection follow up appt. He will keep us updated on ortho's input

## 2025-05-07 ENCOUNTER — APPOINTMENT (OUTPATIENT)
Dept: ORTHOPEDIC SURGERY | Facility: CLINIC | Age: 85
End: 2025-05-07
Payer: MEDICARE

## 2025-05-07 VITALS — WEIGHT: 177 LBS | BODY MASS INDEX: 24.78 KG/M2 | HEIGHT: 71 IN

## 2025-05-07 DIAGNOSIS — M19.011 ARTHRITIS OF RIGHT SHOULDER REGION: ICD-10-CM

## 2025-05-07 DIAGNOSIS — M75.121 COMPLETE TEAR OF RIGHT ROTATOR CUFF, UNSPECIFIED WHETHER TRAUMATIC: Primary | ICD-10-CM

## 2025-05-07 DIAGNOSIS — M75.21 BICEPS TENDINITIS OF RIGHT SHOULDER: ICD-10-CM

## 2025-05-07 PROCEDURE — 1036F TOBACCO NON-USER: CPT | Performed by: ORTHOPAEDIC SURGERY

## 2025-05-07 PROCEDURE — 1160F RVW MEDS BY RX/DR IN RCRD: CPT | Performed by: ORTHOPAEDIC SURGERY

## 2025-05-07 PROCEDURE — 99215 OFFICE O/P EST HI 40 MIN: CPT | Performed by: ORTHOPAEDIC SURGERY

## 2025-05-07 PROCEDURE — 1159F MED LIST DOCD IN RCRD: CPT | Performed by: ORTHOPAEDIC SURGERY

## 2025-05-07 PROCEDURE — 1125F AMNT PAIN NOTED PAIN PRSNT: CPT | Performed by: ORTHOPAEDIC SURGERY

## 2025-05-07 PROCEDURE — L3670 SO ACRO/CLAV CAN WEB PRE OTS: HCPCS | Performed by: ORTHOPAEDIC SURGERY

## 2025-05-07 ASSESSMENT — PAIN - FUNCTIONAL ASSESSMENT: PAIN_FUNCTIONAL_ASSESSMENT: 0-10

## 2025-05-07 ASSESSMENT — PAIN SCALES - GENERAL: PAINLEVEL_OUTOF10: 8

## 2025-05-07 NOTE — TELEPHONE ENCOUNTER
Spoke to patient about oxycodone prescription, told him he would need to come in for another urine drug screen before he can get another prescription sent to the pharmacy. Patient will come in on 5/16/25 at 10am to repeat UDS. Patient agreeable.

## 2025-05-07 NOTE — PROGRESS NOTES
84-year-old male who is actually quite healthy with continued significant problems in his right shoulder.  The patient is already had a right shoulder steroid injection and physical therapy that temporarily improved his pain but the pain and weakness got much worse.  He now has significant weakness raising his right arm with great deal of pain from the right shoulder.  He is already failed extensive conservative treatment with physical therapy anti-inflammatories and steroid injections    Patients' self reported past medical history, medications, allergies, surgical history, family and social history as well as a 10 point review of systems has been documented in the new patient intake form and scanned into the patient's electronic medical record.  The intake form was reviewed by Dr Bullock during the office visit and signed by Dr. Bullock and the patient.  Pertinent findings are documented in the HPI.    General Multi-System Physical Exam:  Constitutional  General appearance:  Alert, oriented, and in no acute distress.  Well developed, well nourished.  Head and Face  Head and face:  Normocephalic and atraumatic.  Ears, Nose, Mouth, and Throat  External inspection of ears and nose: Normal.  Eyes:  Pupils are equal and round.  Neck  Neck:  no neck mass was observed.  Pulmonary  Respiratory effort:  no respiratory distress.  Cardiovascular  Intact distal pulses.  Lymphatic  Palpation of lymph nodes in the affected extremity:  Normal.  Skin  Skin and subcutaneous tissue:  Normal skin color and pigmentation.  Normal skin turgor.  No rashes.  Neurologic  Sensation:  normal to light touch.  Psychiatric  Judgement and insight:  Intact.  Mood and affect:  Normal.  Musculoskeletal  Left shoulder which is normal shoulder is 120 degrees of active abduction forward flexion  Right shoulder which is painful shoulder is 80 degrees of active abduction forward flexion with significant weakness on 130 degrees of passive abduction  forward flexion 60 degrees of external rotation internal rotates to greater trochanter he has positive Neer positive Landry positive Jobes with pain and severe weakness positive biceps tenderness neurovasc intact right upper extremity    Dr Bullock independently interpreted the patient's MRI (performed by the Radiology department) by viewing the MRI images and this is Dr. Bullock's personal interpretation:     MRI of the right shoulder shows massive rotator cuff tear with grade 2 fatty atrophy and biceps tendon damage    We had a very long discussion regards the right shoulder.  We talked about his massive rotator cuff tear and the biceps tendon damage.  We talked about nonoperative treatment however this is failed to help his problems.  We talked about a right shoulder reverse total shoulder arthroplasty with biceps tenodesis.  We talked about the risks of this including the risk of dislocation axillary nerve injury and infection.  After long discussion patient decided he wants to proceed with surgery and signed appropriate surgical consents.  He will call us for a date to schedule surgery        I, Dr. Bullock, had a long discussion with the patient / patient's family regarding the risks versus benefits of surgery and all of the treatment options, including nonoperative treatment and surgical treatment options. We discussed the risks of surgery.      The risks of surgery include, but are not limited to, nerve damage, artery damage, muscle damage, risk of bleeding or infection, risk of bone fracture, risk of post-operative stiffness, risk of failure of the surgery with possible continued pain or possible need for additional surgery.  Other risks include the risk of hardware pain and possible need for removal of the surgical hardware at another time.   The risks of undergoing anesthesia including, but are not limited to, stroke, heart attack or death.      After a long discussion, the patient / patient's family  "understood all of the risks versus benefits of surgery and decided that they wanted to proceed with surgery. The patient / guardian signed appropriate surgical consent forms.    Since this patient will be undergoing surgery, I expect the patient to be in significant additional pain in the immediate postoperative period as a result of the surgical procedure. Non-opioid pain medications will not be sufficient to treat this acute, sharp, postoperative pain. Therefore we will be prescribing the patient narcotic pain medications for the immediate postoperative period in addition to numerous non-narcotic pain medications in order to try to help the patient with their post-operative pain.  However, as the pain from surgery subsides, we will work diligently to wean the patient off of all narcotics as quickly as possible.   If the patient requires more narcotic pain medications than we typically see with patients undergoing this surgery, we will refer the patient to a pain management specialist within the first few weeks after their surgical procedure. The patient's OAR[facility] report was reviewed within the last 90 days.      The patient's height and weight were documented today in the vitals tab in the patient's EPIC chart, and the patient's BMI was calculated.  A follow up plan was then developed by Dr. Bullock, per  mandated guidelines, based upon the patient's BMI and the follow up plan was documented in the \"Patient Instructions\" section of the chart.  Weight loss can be achieved by decreasing the amount of calories consumed daily and by increasing daily physical activity.  We recommend finding physical activities that you can participate in regularly and you enjoy which do not worsen your current joint pains.       This patient has had longstanding pain and weakness in their affected extremity which has gotten significantly worse over the last few months.  Non-operative treatment has failed to help this patient and the " pain is severe enough to warrent surgery as the appropriate treatment at this time.  That would classify this problem as a chronic illness with severe exacerbation and progression.    We discussed their surgery at great length.  This is an elective major surgery which is warranted in this case due to the patient's failure of non-operative treatment and their significant level of pain and progression of the disease.  We discussed identified patient and procedural risk factors and how these risk factors may increase the risk of the surgery or influence the outcome of the surgical procedure.  With this procedure, procedural risk factors include, but are not limited to, the risk of infection, bleeding, possible nerve or vasculature injury, lucila fracture, and the possible risk of continued pain or weakness after the operation.  We also discussed how delaying surgery and continuing non-operative treatment may lead to a progression of the disease and high risk of further morbidity.

## 2025-05-09 ENCOUNTER — CLINICAL SUPPORT (OUTPATIENT)
Dept: PAIN MEDICINE | Facility: CLINIC | Age: 85
End: 2025-05-09
Payer: COMMERCIAL

## 2025-05-12 ENCOUNTER — TELEPHONE (OUTPATIENT)
Dept: ORTHOPEDIC SURGERY | Facility: CLINIC | Age: 85
End: 2025-05-12
Payer: COMMERCIAL

## 2025-05-12 PROBLEM — M19.011 ARTHRITIS OF RIGHT SHOULDER REGION: Status: ACTIVE | Noted: 2025-05-07

## 2025-05-12 PROBLEM — M75.121 COMPLETE TEAR OF RIGHT ROTATOR CUFF: Status: ACTIVE | Noted: 2025-05-07

## 2025-05-12 PROBLEM — M75.21 BICEPS TENDINITIS OF RIGHT SHOULDER: Status: ACTIVE | Noted: 2025-05-07

## 2025-05-12 NOTE — TELEPHONE ENCOUNTER
Patient called stating he was to pick a date for alvin with .  He would like a call [phone]

## 2025-05-13 ENCOUNTER — TELEPHONE (OUTPATIENT)
Dept: PAIN MEDICINE | Facility: CLINIC | Age: 85
End: 2025-05-13
Payer: COMMERCIAL

## 2025-05-13 NOTE — TELEPHONE ENCOUNTER
Pt called for toxicology results. Still inconsistent. Advised to call to schedule nurse visit for repeat in a few weeks

## 2025-05-16 ENCOUNTER — APPOINTMENT (OUTPATIENT)
Dept: PAIN MEDICINE | Facility: CLINIC | Age: 85
End: 2025-05-16
Payer: COMMERCIAL

## 2025-05-19 ENCOUNTER — APPOINTMENT (OUTPATIENT)
Dept: CARDIOLOGY | Facility: CLINIC | Age: 85
End: 2025-05-19
Payer: MEDICARE

## 2025-05-19 VITALS
SYSTOLIC BLOOD PRESSURE: 171 MMHG | DIASTOLIC BLOOD PRESSURE: 84 MMHG | BODY MASS INDEX: 24.78 KG/M2 | OXYGEN SATURATION: 96 % | WEIGHT: 177 LBS | HEART RATE: 73 BPM | HEIGHT: 71 IN

## 2025-05-19 DIAGNOSIS — O26.819 PREGNANCY RELATED FATIGUE, ANTEPARTUM (HHS-HCC): ICD-10-CM

## 2025-05-19 DIAGNOSIS — Z01.818 PRE-OPERATIVE CLEARANCE: Primary | ICD-10-CM

## 2025-05-19 DIAGNOSIS — I10 BENIGN ESSENTIAL HYPERTENSION: ICD-10-CM

## 2025-05-19 DIAGNOSIS — R06.02 SHORTNESS OF BREATH: ICD-10-CM

## 2025-05-19 DIAGNOSIS — I10 HYPERTENSION, UNSPECIFIED TYPE: ICD-10-CM

## 2025-05-19 PROCEDURE — 99204 OFFICE O/P NEW MOD 45 MIN: CPT | Performed by: INTERNAL MEDICINE

## 2025-05-19 PROCEDURE — 3077F SYST BP >= 140 MM HG: CPT | Performed by: INTERNAL MEDICINE

## 2025-05-19 PROCEDURE — 3079F DIAST BP 80-89 MM HG: CPT | Performed by: INTERNAL MEDICINE

## 2025-05-19 RX ORDER — ONDANSETRON 4 MG/1
TABLET, ORALLY DISINTEGRATING ORAL
COMMUNITY
Start: 2025-04-23

## 2025-05-19 RX ORDER — PENICILLIN V POTASSIUM 500 MG/1
TABLET, FILM COATED ORAL
COMMUNITY
Start: 2025-05-06

## 2025-05-20 ENCOUNTER — HOSPITAL ENCOUNTER (OUTPATIENT)
Dept: CARDIOLOGY | Facility: HOSPITAL | Age: 85
Discharge: HOME | End: 2025-05-20
Payer: MEDICARE

## 2025-05-20 DIAGNOSIS — I10 BENIGN ESSENTIAL HYPERTENSION: ICD-10-CM

## 2025-05-20 LAB
ATRIAL RATE: 75 BPM
P AXIS: 66 DEGREES
P OFFSET: 158 MS
P ONSET: 104 MS
PR INTERVAL: 210 MS
Q ONSET: 209 MS
QRS COUNT: 13 BEATS
QRS DURATION: 146 MS
QT INTERVAL: 436 MS
QTC CALCULATION(BAZETT): 486 MS
QTC FREDERICIA: 469 MS
R AXIS: -50 DEGREES
T AXIS: 29 DEGREES
T OFFSET: 427 MS
VENTRICULAR RATE: 75 BPM

## 2025-05-20 PROCEDURE — 93005 ELECTROCARDIOGRAM TRACING: CPT

## 2025-05-21 ENCOUNTER — APPOINTMENT (OUTPATIENT)
Dept: RADIOLOGY | Facility: HOSPITAL | Age: 85
End: 2025-05-21
Payer: MEDICARE

## 2025-05-21 ENCOUNTER — APPOINTMENT (OUTPATIENT)
Dept: CARDIOLOGY | Facility: HOSPITAL | Age: 85
End: 2025-05-21
Payer: MEDICARE

## 2025-05-22 ENCOUNTER — APPOINTMENT (OUTPATIENT)
Dept: RADIOLOGY | Facility: HOSPITAL | Age: 85
End: 2025-05-22
Payer: MEDICARE

## 2025-05-23 ENCOUNTER — PRE-ADMISSION TESTING (OUTPATIENT)
Dept: PREADMISSION TESTING | Facility: HOSPITAL | Age: 85
End: 2025-05-23
Payer: MEDICARE

## 2025-05-23 VITALS
OXYGEN SATURATION: 97 % | RESPIRATION RATE: 17 BRPM | HEART RATE: 78 BPM | TEMPERATURE: 98 F | DIASTOLIC BLOOD PRESSURE: 83 MMHG | HEIGHT: 71 IN | BODY MASS INDEX: 24.61 KG/M2 | SYSTOLIC BLOOD PRESSURE: 148 MMHG | WEIGHT: 175.8 LBS

## 2025-05-23 DIAGNOSIS — M75.121 COMPLETE TEAR OF RIGHT ROTATOR CUFF, UNSPECIFIED WHETHER TRAUMATIC: ICD-10-CM

## 2025-05-23 DIAGNOSIS — Z01.818 PRE-OP EVALUATION: Primary | ICD-10-CM

## 2025-05-23 DIAGNOSIS — J44.9 COPD, MILD (MULTI): ICD-10-CM

## 2025-05-23 DIAGNOSIS — I10 BENIGN ESSENTIAL HYPERTENSION: ICD-10-CM

## 2025-05-23 LAB
ANION GAP SERPL CALC-SCNC: 11 MMOL/L (ref 10–20)
BUN SERPL-MCNC: 21 MG/DL (ref 6–23)
CALCIUM SERPL-MCNC: 9.3 MG/DL (ref 8.6–10.3)
CHLORIDE SERPL-SCNC: 104 MMOL/L (ref 98–107)
CO2 SERPL-SCNC: 27 MMOL/L (ref 21–32)
CREAT SERPL-MCNC: 0.87 MG/DL (ref 0.5–1.3)
EGFRCR SERPLBLD CKD-EPI 2021: 85 ML/MIN/1.73M*2
ERYTHROCYTE [DISTWIDTH] IN BLOOD BY AUTOMATED COUNT: 12.3 % (ref 11.5–14.5)
GLUCOSE SERPL-MCNC: 111 MG/DL (ref 74–99)
HCT VFR BLD AUTO: 44.2 % (ref 41–52)
HGB BLD-MCNC: 14.7 G/DL (ref 13.5–17.5)
MCH RBC QN AUTO: 29.6 PG (ref 26–34)
MCHC RBC AUTO-ENTMCNC: 33.3 G/DL (ref 32–36)
MCV RBC AUTO: 89 FL (ref 80–100)
NRBC BLD-RTO: 0 /100 WBCS (ref 0–0)
PLATELET # BLD AUTO: 197 X10*3/UL (ref 150–450)
POTASSIUM SERPL-SCNC: 3.8 MMOL/L (ref 3.5–5.3)
RBC # BLD AUTO: 4.97 X10*6/UL (ref 4.5–5.9)
SODIUM SERPL-SCNC: 138 MMOL/L (ref 136–145)
WBC # BLD AUTO: 6.9 X10*3/UL (ref 4.4–11.3)

## 2025-05-23 PROCEDURE — 36415 COLL VENOUS BLD VENIPUNCTURE: CPT

## 2025-05-23 PROCEDURE — 80048 BASIC METABOLIC PNL TOTAL CA: CPT

## 2025-05-23 PROCEDURE — 85027 COMPLETE CBC AUTOMATED: CPT

## 2025-05-23 PROCEDURE — 99204 OFFICE O/P NEW MOD 45 MIN: CPT | Performed by: CLINICAL NURSE SPECIALIST

## 2025-05-23 ASSESSMENT — DUKE ACTIVITY SCORE INDEX (DASI)
CAN YOU DO YARD WORK LIKE RAKING LEAVES, WEEDING OR PUSHING A MOWER: NO
CAN YOU DO LIGHT WORK AROUND THE HOUSE LIKE DUSTING OR WASHING DISHES: YES
CAN YOU HAVE SEXUAL RELATIONS: NO
CAN YOU CLIMB A FLIGHT OF STAIRS OR WALK UP A HILL: NO
CAN YOU WALK A BLOCK OR TWO ON LEVEL GROUND: NO
CAN YOU DO MODERATE WORK AROUND THE HOUSE LIKE VACUUMING, SWEEPING FLOORS OR CARRYING GROCERIES: NO
CAN YOU DO HEAVY WORK AROUND THE HOUSE LIKE SCRUBBING FLOORS OR LIFTING AND MOVING HEAVY FURNITURE: NO
CAN YOU TAKE CARE OF YOURSELF (EAT, DRESS, BATHE, OR USE TOILET): YES
CAN YOU PARTICIPATE IN STRENOUS SPORTS LIKE SWIMMING, SINGLES TENNIS, FOOTBALL, BASKETBALL, OR SKIING: NO
CAN YOU RUN A SHORT DISTANCE: NO
CAN YOU WALK INDOORS, SUCH AS AROUND YOUR HOUSE: YES

## 2025-05-23 ASSESSMENT — ENCOUNTER SYMPTOMS
HEMATOLOGIC/LYMPHATIC NEGATIVE: 1
GASTROINTESTINAL NEGATIVE: 1
EYES NEGATIVE: 1
MYALGIAS: 1
ARTHRALGIAS: 1
ENDOCRINE NEGATIVE: 1
PSYCHIATRIC NEGATIVE: 1
CARDIOVASCULAR NEGATIVE: 1
ALLERGIC/IMMUNOLOGIC NEGATIVE: 1
RESPIRATORY NEGATIVE: 1
CONSTITUTIONAL NEGATIVE: 1
BACK PAIN: 1

## 2025-05-23 ASSESSMENT — LIFESTYLE VARIABLES: SMOKING_STATUS: NONSMOKER

## 2025-05-23 ASSESSMENT — PAIN SCALES - GENERAL: PAINLEVEL_OUTOF10: 9

## 2025-05-23 NOTE — PREPROCEDURE INSTRUCTIONS
Medication List            Accurate as of May 23, 2025 10:55 AM. Always use your most recent med list.                aspirin 325 mg EC tablet     atorvastatin 20 mg tablet  Commonly known as: Lipitor     chlorhexidine 0.12 % solution  Commonly known as: Peridex     Dexilant 60 mg DR capsule  Generic drug: dexlansoprazole     dicyclomine 10 mg capsule  Commonly known as: Bentyl  Take 1 capsule (10 mg) by mouth 4 times a day.     diphenhydrAMINE 25 mg capsule  Commonly known as: BENADryl  Take one cap po the pm prior to the procedure and take one cap po the morning of procedure for contrast allergy     docusate sodium 100 mg capsule  Commonly known as: Colace     DULoxetine 30 mg DR capsule  Commonly known as: Cymbalta  Take 1 capsule (30 mg) by mouth once daily. Do not crush or chew.     finasteride 5 mg tablet  Commonly known as: Proscar     fluticasone 50 mcg/actuation nasal spray  Commonly known as: Flonase  Administer 1 spray into each nostril once daily as needed for rhinitis. Shake gently. Before first use, prime pump. After use, clean tip and replace cap. As needed     furosemide 40 mg tablet  Commonly known as: Lasix  Take 1 tablet (40 mg) by mouth once daily as needed (swelling). Takes as needed     hyoscyamine 0.125 mg disintegrating tablet  Commonly known as: Anaspaz     irbesartan 150 mg tablet  Commonly known as: Avapro  TAKE 1 TABLET DAILY     losartan 100 mg tablet  Commonly known as: Cozaar     lubricating eye drops ophthalmic solution     meloxicam 7.5 mg tablet  Commonly known as: Mobic  Take 1 tablet (7.5 mg) by mouth once daily.     metoprolol succinate XL 50 mg 24 hr tablet  Commonly known as: Toprol-XL  TAKE 1 TABLET DAILY     mirtazapine 7.5 mg tablet  Commonly known as: Remeron     naloxone 4 mg/0.1 mL nasal spray  Commonly known as: Narcan  Administer 1 spray (4 mg) into affected nostril(s) if needed for opioid reversal or respiratory depression.     ondansetron 4 mg tablet  Commonly  known as: Zofran     ondansetron ODT 4 mg disintegrating tablet  Commonly known as: Zofran-ODT     oxyCODONE 5 mg immediate release tablet  Commonly known as: Roxicodone  Take 1 tablet (5 mg) by mouth 5 times a day for 28 days. PRN severe pain.     penicillin v potassium 500 mg tablet  Commonly known as: Veetid     predniSONE 20 mg tablet  Commonly known as: Deltasone  Take one tab  po the pm prior to procedure and take one tab the morning of procedure for contrast allergy     tamsulosin 0.4 mg 24 hr capsule  Commonly known as: Flomax     tiZANidine 2 mg tablet  Commonly known as: Zanaflex  Take 1 tablet (2 mg) by mouth every 8 hours if needed for muscle spasms.     triamcinolone 0.025 % cream  Commonly known as: Kenalog                     PRE-SURGERY INSTRUCTIONS:     Take Metoprolol and Irbesartan and Cymbalta the morning of surgery with sip of water .    Do not drink any liquid after midnight the night before your surgery  Do not eat any food after midnight the night before your surgery/procedure.  Candy, gum, mints and smoking of cigarettes, marijuana or vaping is not permitted after midnight prior to your surgery   Do not drink Alcohol 24 hours prior to surgery      Increase fluid intake day before surgery    Additional Instructions:      Review your medication instructions, take indicated medications    Wear  comfortable loose fitting clothing  Do not use moisturizers, creams, lotions or perfume  All jewelry and valuables should be left at home. May bring glasses and partials.    Stop blood thinning medications as instructed by ordering physician or surgeon    Shower or bathe the night before or day of surgery.   Brush teeth and avoid perfumes, colognes, powders, makeup, aftershave and hair spray    Go to Registration, in the main lobby, upon arrival on the day of surgery and have 's license and medical insurance card available.    Call [phone] the day before your surgery/procedure to find out what  time you are to arrive the next day.  If you don't call them they will call you in the afternoon before your surgery.     Please have a responsible adult to drive you home and be available to help you as needed after surgery.   Cannot use public transportation or an insurance service for your ride home.             Exercises to do after surgery when you go home.      Deep Breathing Exercises after surgery:   Breathe deeply using your lungs as fully as possible to move   secretions and clear lungs more easily.  Do a cycle of five deep breaths every hour.      Start by placing your hands on your ribs and take a deep breath in through your nose, expanding your lower chest.  You should feel your ribs push against your hands.  Breathe out slowly through your mouth until all the air is gone.    For every other breath, hold your breath for three seconds.  This will help keep the lungs fully open.     Coughing : Coughing is necessary to clear secretions that may accumulate in your lungs.  This should be done after breathing exercises.    If lying down, bend your knees and support you incision with a pillow or your hands to make it more comfortable.    If sitting, support your incision, lean forward and keep your feet on the floor.  After your five deep breaths, breathe in and cough out sharply.  Repeat this cycle twice or for as long as you have secretions to clear.  ( You will not put your incision at risk by coughing.)    Leg Exercises:  Leg exercises are important to maintain good blood circulation in your legs, maintain muscle strength and prevent joint stiffness.  Do each exercise for 5 repetitions every hour while awake for the first few days or until you are up and walking around.         A. Pump your feet up and down at the ankles        B. With your legs straight, make circles with your feet.        C. Bend and straighten your knees by sliding your heels up and down the bed.         D. With your legs straight  tighten the muscle above your knee and push the back of your knee down             Into the bed.  Hold for five seconds and then relax.  Alternate legs.

## 2025-05-23 NOTE — H&P (VIEW-ONLY)
History Of Present Illness  Abraham Rodriguez is a very pleasant 84 y.o. male presenting with severe right shoulder and arm pain s/p fall in December 2024. Patient states his pain is severe, constant, and keeps him awake at night. Scheduled for a right reverse total shoulder replacement under general/block anesthesia per Dr. Bullock on 6/3/25.      Past Medical History  Past Medical History:   Diagnosis Date    Allergic dermatitis 09/14/2023    Allergic rhinitis due to pollen 10/23/2014    Hay fever    Arthritis     Chronic pain disorder     Clotting disorder (Multi)     dvt april 2020     P.E. april 2020    COPD (chronic obstructive pulmonary disease) (Multi)     hx asbestos    Coronary artery disease     Encounter for other preprocedural examination 06/24/2014    Pre-procedural examination    Encounter for screening for malignant neoplasm of prostate     Encounter for screening for malignant neoplasm of prostate    Fissure in skin of foot 09/14/2023    Hyperlipidemia     Hypertension     Localized edema 05/11/2020    Pedal edema    Lung infiltrate 09/14/2023    Myocardial infarction (Multi)     Other conditions influencing health status     Carcinoma Of The Tongue    Pain in unspecified knee 12/22/2014    Joint pain, knee    Personal history of diseases of the skin and subcutaneous tissue 04/23/2013    History of eczema    Personal history of other diseases of the musculoskeletal system and connective tissue 06/19/2014    Personal history of arthritis    Personal history of other diseases of the nervous system and sense organs 08/10/2021    History of Bell's palsy    Personal history of other diseases of the respiratory system 11/12/2014    History of asbestosis    Personal history of other diseases of the respiratory system 08/13/2014    History of chronic obstructive lung disease    Personal history of other specified conditions 08/20/2013    History of edema    Plantar fascial fibromatosis 07/22/2013    Plantar  fasciitis    Polyp of colon     Polyp of sigmoid colon    Syncope and collapse 01/23/2015    Syncope and collapse    Unspecified abdominal pain 12/22/2014    Stomach pain    Unspecified disorder of eyelid 10/23/2014    Eyelid disorder       Surgical History  Past Surgical History:   Procedure Laterality Date    CARDIAC CATHETERIZATION      CHOLECYSTECTOMY  04/23/2013    Cholecystectomy Laparoscopic    HIP ARTHROPLASTY Right     OTHER SURGICAL HISTORY  04/27/2021    Meniscus repair    OTHER SURGICAL HISTORY Left 05/31/2023    Hip replacement    OTHER SURGICAL HISTORY  04/23/2013    Biopsy Tongue    SHOULDER SURGERY  04/23/2013    Shoulder Surgery    TOTAL KNEE ARTHROPLASTY Right 03/27/2024        Social History  He reports that he quit smoking about 42 years ago. His smoking use included cigarettes. He has never used smokeless tobacco. He reports that he does not currently use drugs. He reports that he does not drink alcohol.    Family History  Family History   Problem Relation Name Age of Onset    Hypothyroidism Brother      Diabetes Brother      Myasthenia gravis Brother          Allergies  Iodinated contrast media, Tobramycin-dexamethasone, Atorvastatin, Cinnamon, Cucumber, and Lisinopril    Review of Systems   Constitutional: Negative.    HENT: Negative.     Eyes: Negative.    Respiratory: Negative.     Cardiovascular: Negative.    Gastrointestinal: Negative.    Endocrine: Negative.    Genitourinary: Negative.    Musculoskeletal:  Positive for arthralgias, back pain, gait problem and myalgias.        9/10 pain, severe. Painful and decreased ROM. States he has lost all of his  strength in his right hand. Patient needs to use his left hand to steady his right arm d/t weakness.   Patient states he is right hand dominant.      Skin: Negative.    Allergic/Immunologic: Negative.    Hematological: Negative.    Psychiatric/Behavioral: Negative.     All other systems reviewed and are negative.       Physical  "Exam  Vitals and nursing note reviewed.   Constitutional:       Appearance: Normal appearance.   HENT:      Head: Normocephalic.      Nose: Nose normal.      Mouth/Throat:      Mouth: Mucous membranes are moist.      Pharynx: Oropharynx is clear.      Comments:   Mallampati: 2  TMD: >3  Finger breadth: 3  Dentition:  recently had x4 teeth pulled approx 2 weeks ago to get ready for upcoming surgery.  Neck ROM: full  Eyes:      Pupils: Pupils are equal, round, and reactive to light.   Cardiovascular:      Rate and Rhythm: Normal rate and regular rhythm.      Heart sounds: Normal heart sounds, S1 normal and S2 normal.   Pulmonary:      Effort: Pulmonary effort is normal.      Breath sounds: Normal breath sounds.      Comments: Lungs clear throughout all fields.   Abdominal:      General: Bowel sounds are normal.      Palpations: Abdomen is soft.      Comments: Bowel sounds active x4 quads    Musculoskeletal:         General: Normal range of motion.      Cervical back: Normal range of motion and neck supple.      Right lower leg: No edema.      Left lower leg: No edema.      Comments: Right shoulder pain/ painful and decreased ROM.   Using wheelchair and cane today. Drove self.    Skin:     General: Skin is warm and dry.   Neurological:      General: No focal deficit present.      Mental Status: He is alert and oriented to person, place, and time.   Psychiatric:         Mood and Affect: Mood normal.         Behavior: Behavior normal.         Thought Content: Thought content normal.         Judgment: Judgment normal.          Last Recorded Vitals  Blood pressure 148/83, pulse 78, temperature 36.7 °C (98 °F), temperature source Temporal, resp. rate 17, height 1.803 m (5' 11\"), weight 79.7 kg (175 lb 12.8 oz), SpO2 97%.      DASI Risk Score      Flowsheet Row Pre-Admission Testing from 5/23/2025 in  [facility] Pre-Admission Testing from 3/12/2024 in [facility]   Can you take care of yourself " (eat, dress, bathe, or use toilet)?  2.75 filed at 05/23/2025 1053 2.75 filed at 03/12/2024 1302   Can you walk indoors, such as around your house? 1.75 filed at 05/23/2025 1053 1.75 filed at 03/12/2024 1302   Can you walk a block or two on level ground?  0 filed at 05/23/2025 1053 0 filed at 03/12/2024 1302   Can you climb a flight of stairs or walk up a hill? 0 filed at 05/23/2025 1053 0 filed at 03/12/2024 1302   Can you run a short distance? 0 filed at 05/23/2025 1053 0 filed at 03/12/2024 1302   Can you do light work around the house like dusting or washing dishes? 2.7 filed at 05/23/2025 1053 2.7 filed at 03/12/2024 1302   Can you do moderate work around the house like vacuuming, sweeping floors or carrying groceries? 0 filed at 05/23/2025 1053 0 filed at 03/12/2024 1302   Can you do heavy work around the house like scrubbing floors or lifting and moving heavy furniture?  0 filed at 05/23/2025 1053 0 filed at 03/12/2024 1302   Can you do yard work like raking leaves, weeding or pushing a mower? 0 filed at 05/23/2025 1053 0 filed at 03/12/2024 1302   Can you have sexual relations? 0 filed at 05/23/2025 1053 0 filed at 03/12/2024 1302   Can you participate in moderate recreational activities like golf, bowling, dancing, doubles tennis or throwing a baseball or football? -- 0 filed at 03/12/2024 1302   Can you participate in strenous sports like swimming, singles tennis, football, basketball, or skiing? 0 filed at 05/23/2025 1053 0 filed at 03/12/2024 1302   DASI SCORE -- 7.2 filed at 03/12/2024 1302   METS Score (Will be calculated only when all the questions are answered) -- 3.6 filed at 03/12/2024 1302          Caprini DVT Assessment      Flowsheet Row Pre-Admission Testing from 5/23/2025 in  [facility] Admission (Discharged) from 5/1/2024 in [facility] with Lynn Feliz MD   DVT Score (IF A SCORE IS NOT CALCULATING, MUST SELECT A BMI TO COMPLETE) 10 filed at 05/23/2025  1035 4 filed at 05/02/2024 0126   Medical Factors COPD, History of DVT/PE filed at 05/23/2025 1035 --   Surgical Factors Major surgery planned, including arthroscopic and laproscopic (1-2 hours) filed at 05/23/2025 1035 --   BMI (BMI MUST BE CHOSEN) 30 or less filed at 05/23/2025 1035 30 or less filed at 05/02/2024 0126   RETIRED: Age -- Over 75 years filed at 05/02/2024 0126          Modified Frailty Index    No data to display       [id number] Stroke Risk Points  Current as of just now        N/A 0 to 9 Points:      Last Change: N/A          The [id number] risk score (Lip JUNG, et al. 2009. © 2010 [employer]) quantifies the risk of stroke for a patient with atrial fibrillation. For patients without atrial fibrillation or under the age of 18 this score appears as N/A. Higher score values generally indicate higher risk of stroke.        This score is not applicable to this patient. Components are not calculated.          Revised Cardiac Risk Index      Flowsheet Row Pre-Admission Testing from 5/23/2025 in  [facility] Pre-Admission Testing from 3/12/2024 in [facility]   High-Risk Surgery (Intraperitoneal, Intrathoracic,Suprainguinal vascular) 1 filed at 05/23/2025 1036 0 filed at 03/12/2024 1324   History of ischemic heart disease (History of MI, History of positive exercuse test, Current chest paint considered due to myocardial ischemia, Use of nitrate therapy, ECG with pathological Q Waves) 1 filed at 05/23/2025 1036 1 filed at 03/12/2024 1324   History of congestive heart failure (pulmonary edemia, bilateral rales or S3 gallop, Paroxysmal nocturnal dyspnea, CXR showing pulmonary vascular redistribution) 1 filed at 05/23/2025 1036 1 filed at 03/12/2024 1324   History of cerebrovascular disease (Prior TIA or stroke) 1 filed at 05/23/2025 1036 1 filed at 03/12/2024 1324   Pre-operative insulin treatment 0 filed at 05/23/2025 1036 0 filed at 03/12/2024 1324    Pre-operative creatinine>2 mg/dl 0 filed at 05/23/2025 1036 0 filed at 03/12/2024 1324   Revised Cardiac Risk Calculator 4 filed at 05/23/2025 1036 3 filed at 03/12/2024 1324          Apfel Simplified Score      Flowsheet Row Pre-Admission Testing from 5/23/2025 in  [facility] Pre-Admission Testing from 3/12/2024 in [facility]   Smoking status 1 filed at 05/23/2025 1036 1 filed at 03/12/2024 1324   History of motion sickness or PONV  0 filed at 05/23/2025 1036 0 filed at 03/12/2024 1324   Use of postoperative opioids 1 filed at 05/23/2025 1036 1 filed at 03/12/2024 1324   Gender - Female 0=No filed at 05/23/2025 1036 --   Apfel Simplified Score Calculator 2 filed at 05/23/2025 1036 2 filed at 03/12/2024 1324          Risk Analysis Index Results This Encounter    No data found in the last 10 encounters.       Stop Bang Score      Flowsheet Row Pre-Admission Testing from 5/23/2025 in  [facility] Pre-Admission Testing from 3/12/2024 in [facility]   Do you snore loudly? 0 filed at 05/23/2025 1052 0 filed at 03/12/2024 1301   Do you often feel tired or fatigued after your sleep? 1 filed at 05/23/2025 1052 1 filed at 03/12/2024 1301   Has anyone ever observed you stop breathing in your sleep? 0 filed at 05/23/2025 1052 0 filed at 03/12/2024 1301   Do you have or are you being treated for high blood pressure? 1 filed at 05/23/2025 1052 1 filed at 03/12/2024 1301   Recent BMI (Calculated) 24.7 filed at 05/23/2025 1052 26.4 filed at 03/12/2024 1301   Is BMI greater than 35 kg/m2? 0=No filed at 05/23/2025 1052 0=No filed at 03/12/2024 1301   Age older than 50 years old? 1=Yes filed at 05/23/2025 1052 1=Yes filed at 03/12/2024 1301   Is your neck circumference greater than 17 inches (Male) or 16 inches (Female)? -- 0 filed at 03/12/2024 1301   Gender - Male 1=Yes filed at 05/23/2025 1052 1=Yes filed at 03/12/2024 1301   STOP-BANG Total Score -- 4 filed at 03/12/2024  1301          Prodigy: High Risk  Total Score: 24              Prodigy Age Score      Prodigy Gender Score          ARISCAT Score for Postoperative Pulmonary Complications      Flowsheet Row Pre-Admission Testing from 5/23/2025 in  [facility]   Age Calculated Score 16 filed at 05/23/2025 1037   Preoperative SpO2 0 filed at 05/23/2025 1037   Respiratory infection in the last month Either upper or lower (i.e., URI, bronchitis, pneumonia), with fever and antibiotic treatment 0 filed at 05/23/2025 1037   Preoperative anemia (Hgb less than 10 g/dl) 0 filed at 05/23/2025 1037   Surgical incision  0 filed at 05/23/2025 1037   Duration of surgery  0 filed at 05/23/2025 1037   Emergency Procedure  0 filed at 05/23/2025 1037   ARISCAT Total Score  16 filed at 05/23/2025 1037          Douglas Perioperative Risk for Myocardial Infarction or Cardiac Arrest (EDWARD)      Flowsheet Row Pre-Admission Testing from 5/23/2025 in  [facility]   Calculated Age Score 1.68 filed at 05/23/2025 1037   Functional Status  0 filed at 05/23/2025 1037   ASA Class  -1.92 filed at 05/23/2025 1037   Creatinine -0.10 filed at 05/23/2025 1037   Type of Procedure  0.80 filed at 05/23/2025 1037   EDWARD Total Score  -4.79 filed at 05/23/2025 1037   EDWARD % 0.82 filed at 05/23/2025 1037          Relevant Results  Results for orders placed or performed in visit on 05/23/25 (from the past 24 hours)   Basic Metabolic Panel   Result Value Ref Range    Glucose 111 (H) 74 - 99 mg/dL    Sodium 138 136 - 145 mmol/L    Potassium 3.8 3.5 - 5.3 mmol/L    Chloride 104 98 - 107 mmol/L    Bicarbonate 27 21 - 32 mmol/L    Anion Gap 11 10 - 20 mmol/L    Urea Nitrogen 21 6 - 23 mg/dL    Creatinine 0.87 0.50 - 1.30 mg/dL    eGFR 85 >60 mL/min/1.73m*2    Calcium 9.3 8.6 - 10.3 mg/dL   CBC   Result Value Ref Range    WBC 6.9 4.4 - 11.3 x10*3/uL    nRBC 0.0 0.0 - 0.0 /100 WBCs    RBC 4.97 4.50 - 5.90 x10*6/uL    Hemoglobin 14.7 13.5 - 17.5 g/dL     Hematocrit 44.2 41.0 - 52.0 %    MCV 89 80 - 100 fL    MCH 29.6 26.0 - 34.0 pg    MCHC 33.3 32.0 - 36.0 g/dL    RDW 12.3 11.5 - 14.5 %    Platelets 197 150 - 450 x10*3/uL     *Note: Due to a large number of results and/or encounters for the requested time period, some results have not been displayed. A complete set of results can be found in Results Review.               Problem List Items Addressed This Visit       Benign essential hypertension    Relevant Orders    Basic Metabolic Panel (Completed)    CBC (Completed)    COPD, mild (Multi)    Relevant Orders    Basic Metabolic Panel (Completed)    CBC (Completed)    Complete tear of right rotator cuff    Relevant Orders    Basic Metabolic Panel (Completed)    CBC (Completed)     Other Visit Diagnoses         Pre-op evaluation    -  Primary    Relevant Orders    Basic Metabolic Panel (Completed)    CBC (Completed)            severe right shoulder and arm pain s/p fall in December 2024. Patient states his pain is severe, constant, and keeps him awake at night. Scheduled for a right reverse total shoulder replacement under general/block anesthesia per Dr. Bullock on 6/3/25.   CBC, BMP ordered. Reviewed and these are WNL and acceptable for upcoming surgery.   Hx of HTN, COPD. 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 97% on RA today.   H&P completed today.  Bp was 148/83 today. We will plan to have him take his losartan and metoprolol with a small sip of water the morning of surgery.  Hold lasix morning of surgery.   All surgery instructions reviewed with patient by RN. Verbalized understanding.   Encouraged early ambulation, DVT/ PE prevention, constipation prevention, and C&DB post operatively. Patient verbalized understanding.   Patient states there was initially some confusion several years ago as to if he had a true DVT/PE. At that time, patient states he was placed on , but he currently no longer takes ASA after  talking thru this with one of his other providers. I discussed the case with Dr. Bullock and Dr. Raphael (patient's cardiologist), and both are OK if the patient continues to hold his ASA for 10 days prior to surgery. Patient will be at higher risk for DVT/PE post op because of his potential history. A cardiac clearance is on the chart dated 5/19/25 stating he's 'acceptable risk' pending results of his stress/echo testing. Patient states testing is scheduled in [place] for next week. We will await those results.  Hx of falls; Fall Risk.         Genevieve Mercer, APRN-CNS

## 2025-05-23 NOTE — H&P
History Of Present Illness  Abraham Rodriguez is a very pleasant 84 y.o. male presenting with severe right shoulder and arm pain s/p fall in December 2024. Patient states his pain is severe, constant, and keeps him awake at night. Scheduled for a right reverse total shoulder replacement under general/block anesthesia per Dr. Bullock on 6/3/25.      Past Medical History  Past Medical History:   Diagnosis Date    Allergic dermatitis 09/14/2023    Allergic rhinitis due to pollen 10/23/2014    Hay fever    Arthritis     Chronic pain disorder     Clotting disorder (Multi)     dvt april 2020     P.E. april 2020    COPD (chronic obstructive pulmonary disease) (Multi)     hx asbestos    Coronary artery disease     Encounter for other preprocedural examination 06/24/2014    Pre-procedural examination    Encounter for screening for malignant neoplasm of prostate     Encounter for screening for malignant neoplasm of prostate    Fissure in skin of foot 09/14/2023    Hyperlipidemia     Hypertension     Localized edema 05/11/2020    Pedal edema    Lung infiltrate 09/14/2023    Myocardial infarction (Multi)     Other conditions influencing health status     Carcinoma Of The Tongue    Pain in unspecified knee 12/22/2014    Joint pain, knee    Personal history of diseases of the skin and subcutaneous tissue 04/23/2013    History of eczema    Personal history of other diseases of the musculoskeletal system and connective tissue 06/19/2014    Personal history of arthritis    Personal history of other diseases of the nervous system and sense organs 08/10/2021    History of Bell's palsy    Personal history of other diseases of the respiratory system 11/12/2014    History of asbestosis    Personal history of other diseases of the respiratory system 08/13/2014    History of chronic obstructive lung disease    Personal history of other specified conditions 08/20/2013    History of edema    Plantar fascial fibromatosis 07/22/2013    Plantar  fasciitis    Polyp of colon     Polyp of sigmoid colon    Syncope and collapse 01/23/2015    Syncope and collapse    Unspecified abdominal pain 12/22/2014    Stomach pain    Unspecified disorder of eyelid 10/23/2014    Eyelid disorder       Surgical History  Past Surgical History:   Procedure Laterality Date    CARDIAC CATHETERIZATION      CHOLECYSTECTOMY  04/23/2013    Cholecystectomy Laparoscopic    HIP ARTHROPLASTY Right     OTHER SURGICAL HISTORY  04/27/2021    Meniscus repair    OTHER SURGICAL HISTORY Left 05/31/2023    Hip replacement    OTHER SURGICAL HISTORY  04/23/2013    Biopsy Tongue    SHOULDER SURGERY  04/23/2013    Shoulder Surgery    TOTAL KNEE ARTHROPLASTY Right 03/27/2024        Social History  He reports that he quit smoking about 42 years ago. His smoking use included cigarettes. He has never used smokeless tobacco. He reports that he does not currently use drugs. He reports that he does not drink alcohol.    Family History  Family History   Problem Relation Name Age of Onset    Hypothyroidism Brother      Diabetes Brother      Myasthenia gravis Brother          Allergies  Iodinated contrast media, Tobramycin-dexamethasone, Atorvastatin, Cinnamon, Cucumber, and Lisinopril    Review of Systems   Constitutional: Negative.    HENT: Negative.     Eyes: Negative.    Respiratory: Negative.     Cardiovascular: Negative.    Gastrointestinal: Negative.    Endocrine: Negative.    Genitourinary: Negative.    Musculoskeletal:  Positive for arthralgias, back pain, gait problem and myalgias.        9/10 pain, severe. Painful and decreased ROM. States he has lost all of his  strength in his right hand. Patient needs to use his left hand to steady his right arm d/t weakness.   Patient states he is right hand dominant.      Skin: Negative.    Allergic/Immunologic: Negative.    Hematological: Negative.    Psychiatric/Behavioral: Negative.     All other systems reviewed and are negative.       Physical  "Exam  Vitals and nursing note reviewed.   Constitutional:       Appearance: Normal appearance.   HENT:      Head: Normocephalic.      Nose: Nose normal.      Mouth/Throat:      Mouth: Mucous membranes are moist.      Pharynx: Oropharynx is clear.      Comments:   Mallampati: 2  TMD: >3  Finger breadth: 3  Dentition:  recently had x4 teeth pulled approx 2 weeks ago to get ready for upcoming surgery.  Neck ROM: full  Eyes:      Pupils: Pupils are equal, round, and reactive to light.   Cardiovascular:      Rate and Rhythm: Normal rate and regular rhythm.      Heart sounds: Normal heart sounds, S1 normal and S2 normal.   Pulmonary:      Effort: Pulmonary effort is normal.      Breath sounds: Normal breath sounds.      Comments: Lungs clear throughout all fields.   Abdominal:      General: Bowel sounds are normal.      Palpations: Abdomen is soft.      Comments: Bowel sounds active x4 quads    Musculoskeletal:         General: Normal range of motion.      Cervical back: Normal range of motion and neck supple.      Right lower leg: No edema.      Left lower leg: No edema.      Comments: Right shoulder pain/ painful and decreased ROM.   Using wheelchair and cane today. Drove self.    Skin:     General: Skin is warm and dry.   Neurological:      General: No focal deficit present.      Mental Status: He is alert and oriented to person, place, and time.   Psychiatric:         Mood and Affect: Mood normal.         Behavior: Behavior normal.         Thought Content: Thought content normal.         Judgment: Judgment normal.          Last Recorded Vitals  Blood pressure 148/83, pulse 78, temperature 36.7 °C (98 °F), temperature source Temporal, resp. rate 17, height 1.803 m (5' 11\"), weight 79.7 kg (175 lb 12.8 oz), SpO2 97%.      DASI Risk Score      Flowsheet Row Pre-Admission Testing from 5/23/2025 in  [facility] Pre-Admission Testing from 3/12/2024 in [facility]   Can you take care of yourself " (eat, dress, bathe, or use toilet)?  2.75 filed at 05/23/2025 1053 2.75 filed at 03/12/2024 1302   Can you walk indoors, such as around your house? 1.75 filed at 05/23/2025 1053 1.75 filed at 03/12/2024 1302   Can you walk a block or two on level ground?  0 filed at 05/23/2025 1053 0 filed at 03/12/2024 1302   Can you climb a flight of stairs or walk up a hill? 0 filed at 05/23/2025 1053 0 filed at 03/12/2024 1302   Can you run a short distance? 0 filed at 05/23/2025 1053 0 filed at 03/12/2024 1302   Can you do light work around the house like dusting or washing dishes? 2.7 filed at 05/23/2025 1053 2.7 filed at 03/12/2024 1302   Can you do moderate work around the house like vacuuming, sweeping floors or carrying groceries? 0 filed at 05/23/2025 1053 0 filed at 03/12/2024 1302   Can you do heavy work around the house like scrubbing floors or lifting and moving heavy furniture?  0 filed at 05/23/2025 1053 0 filed at 03/12/2024 1302   Can you do yard work like raking leaves, weeding or pushing a mower? 0 filed at 05/23/2025 1053 0 filed at 03/12/2024 1302   Can you have sexual relations? 0 filed at 05/23/2025 1053 0 filed at 03/12/2024 1302   Can you participate in moderate recreational activities like golf, bowling, dancing, doubles tennis or throwing a baseball or football? -- 0 filed at 03/12/2024 1302   Can you participate in strenous sports like swimming, singles tennis, football, basketball, or skiing? 0 filed at 05/23/2025 1053 0 filed at 03/12/2024 1302   DASI SCORE -- 7.2 filed at 03/12/2024 1302   METS Score (Will be calculated only when all the questions are answered) -- 3.6 filed at 03/12/2024 1302          Caprini DVT Assessment      Flowsheet Row Pre-Admission Testing from 5/23/2025 in  [facility] Admission (Discharged) from 5/1/2024 in [facility] with Lynn Feliz MD   DVT Score (IF A SCORE IS NOT CALCULATING, MUST SELECT A BMI TO COMPLETE) 10 filed at 05/23/2025  1035 4 filed at 05/02/2024 0126   Medical Factors COPD, History of DVT/PE filed at 05/23/2025 1035 --   Surgical Factors Major surgery planned, including arthroscopic and laproscopic (1-2 hours) filed at 05/23/2025 1035 --   BMI (BMI MUST BE CHOSEN) 30 or less filed at 05/23/2025 1035 30 or less filed at 05/02/2024 0126   RETIRED: Age -- Over 75 years filed at 05/02/2024 0126          Modified Frailty Index    No data to display       [id number] Stroke Risk Points  Current as of just now        N/A 0 to 9 Points:      Last Change: N/A          The [id number] risk score (Lip JUNG, et al. 2009. © 2010 [employer]) quantifies the risk of stroke for a patient with atrial fibrillation. For patients without atrial fibrillation or under the age of 18 this score appears as N/A. Higher score values generally indicate higher risk of stroke.        This score is not applicable to this patient. Components are not calculated.          Revised Cardiac Risk Index      Flowsheet Row Pre-Admission Testing from 5/23/2025 in  [facility] Pre-Admission Testing from 3/12/2024 in [facility]   High-Risk Surgery (Intraperitoneal, Intrathoracic,Suprainguinal vascular) 1 filed at 05/23/2025 1036 0 filed at 03/12/2024 1324   History of ischemic heart disease (History of MI, History of positive exercuse test, Current chest paint considered due to myocardial ischemia, Use of nitrate therapy, ECG with pathological Q Waves) 1 filed at 05/23/2025 1036 1 filed at 03/12/2024 1324   History of congestive heart failure (pulmonary edemia, bilateral rales or S3 gallop, Paroxysmal nocturnal dyspnea, CXR showing pulmonary vascular redistribution) 1 filed at 05/23/2025 1036 1 filed at 03/12/2024 1324   History of cerebrovascular disease (Prior TIA or stroke) 1 filed at 05/23/2025 1036 1 filed at 03/12/2024 1324   Pre-operative insulin treatment 0 filed at 05/23/2025 1036 0 filed at 03/12/2024 1324    Pre-operative creatinine>2 mg/dl 0 filed at 05/23/2025 1036 0 filed at 03/12/2024 1324   Revised Cardiac Risk Calculator 4 filed at 05/23/2025 1036 3 filed at 03/12/2024 1324          Apfel Simplified Score      Flowsheet Row Pre-Admission Testing from 5/23/2025 in  [facility] Pre-Admission Testing from 3/12/2024 in [facility]   Smoking status 1 filed at 05/23/2025 1036 1 filed at 03/12/2024 1324   History of motion sickness or PONV  0 filed at 05/23/2025 1036 0 filed at 03/12/2024 1324   Use of postoperative opioids 1 filed at 05/23/2025 1036 1 filed at 03/12/2024 1324   Gender - Female 0=No filed at 05/23/2025 1036 --   Apfel Simplified Score Calculator 2 filed at 05/23/2025 1036 2 filed at 03/12/2024 1324          Risk Analysis Index Results This Encounter    No data found in the last 10 encounters.       Stop Bang Score      Flowsheet Row Pre-Admission Testing from 5/23/2025 in  [facility] Pre-Admission Testing from 3/12/2024 in [facility]   Do you snore loudly? 0 filed at 05/23/2025 1052 0 filed at 03/12/2024 1301   Do you often feel tired or fatigued after your sleep? 1 filed at 05/23/2025 1052 1 filed at 03/12/2024 1301   Has anyone ever observed you stop breathing in your sleep? 0 filed at 05/23/2025 1052 0 filed at 03/12/2024 1301   Do you have or are you being treated for high blood pressure? 1 filed at 05/23/2025 1052 1 filed at 03/12/2024 1301   Recent BMI (Calculated) 24.7 filed at 05/23/2025 1052 26.4 filed at 03/12/2024 1301   Is BMI greater than 35 kg/m2? 0=No filed at 05/23/2025 1052 0=No filed at 03/12/2024 1301   Age older than 50 years old? 1=Yes filed at 05/23/2025 1052 1=Yes filed at 03/12/2024 1301   Is your neck circumference greater than 17 inches (Male) or 16 inches (Female)? -- 0 filed at 03/12/2024 1301   Gender - Male 1=Yes filed at 05/23/2025 1052 1=Yes filed at 03/12/2024 1301   STOP-BANG Total Score -- 4 filed at 03/12/2024  1301          Prodigy: High Risk  Total Score: 24              Prodigy Age Score      Prodigy Gender Score          ARISCAT Score for Postoperative Pulmonary Complications      Flowsheet Row Pre-Admission Testing from 5/23/2025 in  [facility]   Age Calculated Score 16 filed at 05/23/2025 1037   Preoperative SpO2 0 filed at 05/23/2025 1037   Respiratory infection in the last month Either upper or lower (i.e., URI, bronchitis, pneumonia), with fever and antibiotic treatment 0 filed at 05/23/2025 1037   Preoperative anemia (Hgb less than 10 g/dl) 0 filed at 05/23/2025 1037   Surgical incision  0 filed at 05/23/2025 1037   Duration of surgery  0 filed at 05/23/2025 1037   Emergency Procedure  0 filed at 05/23/2025 1037   ARISCAT Total Score  16 filed at 05/23/2025 1037          Douglas Perioperative Risk for Myocardial Infarction or Cardiac Arrest (EDWARD)      Flowsheet Row Pre-Admission Testing from 5/23/2025 in  [facility]   Calculated Age Score 1.68 filed at 05/23/2025 1037   Functional Status  0 filed at 05/23/2025 1037   ASA Class  -1.92 filed at 05/23/2025 1037   Creatinine -0.10 filed at 05/23/2025 1037   Type of Procedure  0.80 filed at 05/23/2025 1037   EDWARD Total Score  -4.79 filed at 05/23/2025 1037   EDWARD % 0.82 filed at 05/23/2025 1037          Relevant Results  Results for orders placed or performed in visit on 05/23/25 (from the past 24 hours)   Basic Metabolic Panel   Result Value Ref Range    Glucose 111 (H) 74 - 99 mg/dL    Sodium 138 136 - 145 mmol/L    Potassium 3.8 3.5 - 5.3 mmol/L    Chloride 104 98 - 107 mmol/L    Bicarbonate 27 21 - 32 mmol/L    Anion Gap 11 10 - 20 mmol/L    Urea Nitrogen 21 6 - 23 mg/dL    Creatinine 0.87 0.50 - 1.30 mg/dL    eGFR 85 >60 mL/min/1.73m*2    Calcium 9.3 8.6 - 10.3 mg/dL   CBC   Result Value Ref Range    WBC 6.9 4.4 - 11.3 x10*3/uL    nRBC 0.0 0.0 - 0.0 /100 WBCs    RBC 4.97 4.50 - 5.90 x10*6/uL    Hemoglobin 14.7 13.5 - 17.5 g/dL     Hematocrit 44.2 41.0 - 52.0 %    MCV 89 80 - 100 fL    MCH 29.6 26.0 - 34.0 pg    MCHC 33.3 32.0 - 36.0 g/dL    RDW 12.3 11.5 - 14.5 %    Platelets 197 150 - 450 x10*3/uL     *Note: Due to a large number of results and/or encounters for the requested time period, some results have not been displayed. A complete set of results can be found in Results Review.               Problem List Items Addressed This Visit       Benign essential hypertension    Relevant Orders    Basic Metabolic Panel (Completed)    CBC (Completed)    COPD, mild (Multi)    Relevant Orders    Basic Metabolic Panel (Completed)    CBC (Completed)    Complete tear of right rotator cuff    Relevant Orders    Basic Metabolic Panel (Completed)    CBC (Completed)     Other Visit Diagnoses         Pre-op evaluation    -  Primary    Relevant Orders    Basic Metabolic Panel (Completed)    CBC (Completed)            severe right shoulder and arm pain s/p fall in December 2024. Patient states his pain is severe, constant, and keeps him awake at night. Scheduled for a right reverse total shoulder replacement under general/block anesthesia per Dr. Bullock on 6/3/25.   CBC, BMP ordered. Reviewed and these are WNL and acceptable for upcoming surgery.   Hx of HTN, COPD. 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 97% on RA today.   H&P completed today.  Bp was 148/83 today. We will plan to have him take his losartan and metoprolol with a small sip of water the morning of surgery.  Hold lasix morning of surgery.   All surgery instructions reviewed with patient by RN. Verbalized understanding.   Encouraged early ambulation, DVT/ PE prevention, constipation prevention, and C&DB post operatively. Patient verbalized understanding.   Patient states there was initially some confusion several years ago as to if he had a true DVT/PE. At that time, patient states he was placed on , but he currently no longer takes ASA after  talking thru this with one of his other providers. I discussed the case with Dr. Bullock and Dr. Raphael (patient's cardiologist), and both are OK if the patient continues to hold his ASA for 10 days prior to surgery. Patient will be at higher risk for DVT/PE post op because of his potential history. A cardiac clearance is on the chart dated 5/19/25 stating he's 'acceptable risk' pending results of his stress/echo testing. Patient states testing is scheduled in [place] for next week. We will await those results.  Hx of falls; Fall Risk.         Genevieve Mercer, APRN-CNS

## 2025-05-23 NOTE — PROGRESS NOTES
Primary Care Physician: Angella Lima, APRN-CNP  Orthopedic Surgeon: Speedy Bullock. MD    Date of Visit: 05/19/2025  2:40 PM EDT  Location of visit:  [facility]   Type of Visit: Follow up        This is a virtual visit.  A secure audio/video communication was established with the patient.  Patient consented for this visit.    Dear Dr Bullock,     Chief Complaint   Patient presents with    Pre-op Clearance     Here for cardiac clearance for total right shoulder replacement on Rosy 3,2025      DIAGNOSES: Preop cardiac evaluation. 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  Preserved LV systolic function (Echo 8/15/2022).  Abnormal ECG with incomplete trifascicular block, stable compared to previous ECGs.  NYHA class II.  Euvolemic state.  Remote history of DVT/PE.  HTN. HLD. . H/O Asbestosis. DJD. Shoulder pain.  Lumbar radiculopathy.    HPI / Summary:   Abraham Rodriguez is a 84 y.o. male  who returns for preop cardiac evaluation for his shoulder surgery.  Patient is limited by his degenerative joint disease, otherwise feeling well.  Denies any chest pain or shortness of breath on routine daily activities even though the activities are quite limited.      12 system review is negative except as noted above        Medical History:   Medical History[1]    Social History:   Tobacco Use: Medium Risk (5/7/2025)    Patient History     Smoking Tobacco Use: Former     Smokeless Tobacco Use: Never     Passive Exposure: Not on file         MEDICATIONS:   Current Outpatient Medications   Medication Instructions    aspirin 325 mg, 2 times daily    atorvastatin (LIPITOR) 20 mg, Daily    chlorhexidine (Peridex) 0.12 % solution 15 mL, As needed    dexlansoprazole (DEXILANT) 60 mg, Daily    dicyclomine (BENTYL) 10 mg, oral, 4 times daily    diphenhydrAMINE (BENADryl) 25 mg capsule Take one cap po the pm prior to the procedure and take one cap po the morning of procedure for contrast allergy    docusate sodium (COLACE) 100 mg, 2 times daily PRN     DULoxetine (CYMBALTA) 30 mg, oral, Daily, Do not crush or chew.    finasteride (PROSCAR) 5 mg, Every evening    fluticasone (Flonase) 50 mcg/actuation nasal spray 1 spray, Each Nostril, Daily PRN, Shake gently. Before first use, prime pump. After use, clean tip and replace cap. As needed    furosemide (LASIX) 40 mg, oral, Daily PRN, Takes as needed    hyoscyamine (ANASPAZ) 0.125 mg    irbesartan (AVAPRO) 150 mg, oral, Daily    losartan (Cozaar) 100 mg tablet 1 tablet, Daily (0630)    lubricating eye drops (polyvinyl alcohol-povidon,PF,) ophthalmic solution Administer into affected eye(s).    meloxicam (MOBIC) 7.5 mg, oral, Daily    metoprolol succinate XL (TOPROL-XL) 50 mg, oral, Daily    mirtazapine (REMERON) 7.5 mg, Nightly    naloxone (NARCAN) 4 mg, nasal, As needed    ondansetron (ZOFRAN) 4 mg    ondansetron ODT (Zofran-ODT) 4 mg disintegrating tablet     oxyCODONE (ROXICODONE) 5 mg, oral, 5 times daily, PRN severe pain.    penicillin v potassium (Veetid) 500 mg tablet TAKE 2 TABLETS BY MOUTH 1 HOUR PRIOR TO PROCEDURE AND 1 TAB 6 HOURS AFTER PROCEDURE FOR DENTAL WORK    predniSONE (Deltasone) 20 mg tablet Take one tab  po the pm prior to procedure and take one tab the morning of procedure for contrast allergy    tamsulosin (FLOMAX) 0.8 mg, Every evening    tiZANidine (ZANAFLEX) 2 mg, oral, Every 8 hours PRN    triamcinolone (Kenalog) 0.025 % cream 1 Application         LABS:    CBC with Differential:    Lab Results   Component Value Date    WBC 6.1 08/02/2024    RBC 4.84 08/02/2024    HGB 14.0 08/02/2024    HCT 43.0 08/02/2024     08/02/2024    MCV 89 08/02/2024    MCH 28.9 08/02/2024    MCHC 32.6 08/02/2024    RDW 13.1 08/02/2024    NRBC 0.0 08/02/2024    BANDSPCT 4.0 03/13/2020    LYMPHOPCT 23.3 08/02/2024    MONOPCT 7.5 08/02/2024    EOSPCT 1.6 08/02/2024    BASOPCT 0.5 08/02/2024    MONOSABS 0.46 08/02/2024    LYMPHSABS 1.42 08/02/2024    EOSABS 0.10 08/02/2024    BASOSABS 0.03 08/02/2024     CMP:   "  Lab Results   Component Value Date     08/02/2024    K 3.8 08/02/2024     08/02/2024    CO2 28 08/02/2024    BUN 16 08/02/2024    CREATININE 0.99 08/02/2024    GLUCOSE 104 (H) 08/02/2024    PROT 6.8 08/02/2024    CALCIUM 9.3 08/02/2024    BILITOT 0.5 08/02/2024    ALKPHOS 50 08/02/2024    AST 10 08/02/2024    ALT 6 (L) 08/02/2024     BMP:    Lab Results   Component Value Date     08/02/2024    K 3.8 08/02/2024     08/02/2024    CO2 28 08/02/2024    BUN 16 08/02/2024    CREATININE 0.99 08/02/2024    CALCIUM 9.3 08/02/2024    GLUCOSE 104 (H) 08/02/2024     Magnesium:  Lab Results   Component Value Date    MG 2.13 07/26/2024     Troponin:    Lab Results   Component Value Date    TROPHS 7 07/26/2024    TROPHS 14 07/20/2024    TROPHS 6 08/15/2022    TROPHS 7 08/15/2022    TROPHS 5 08/14/2022     BNP:   Lab Results   Component Value Date     (H) 04/15/2024         Lipid Panel:  Lab Results   Component Value Date    HDL 33.2 08/02/2024    CHHDL 4.8 08/02/2024    VLDL 24 08/02/2024    TRIG 122 08/02/2024    NHDL 126 08/02/2024        Lab work and imaging results independently reviewed by me     Visit Vitals  /84   Pulse 73   Ht 1.803 m (5' 11\")   Wt 80.3 kg (177 lb)   SpO2 96%   BMI 24.69 kg/m²   Smoking Status Former   BSA 2.01 m²         ECG dated 5/19/2025 independently reviewed   Sinus rhythm, first-degree heart block, RBBB/LAHB, similar compared to previous ECGs.    DIAGNOSES: Preop cardiac evaluation. 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  Preserved LV systolic function (Echo 8/15/2022).  Abnormal ECG with incomplete trifascicular block, stable compared to previous ECGs.  NYHA class II.  Euvolemic state.  Remote history of DVT/PE.  HTN. HLD. . H/O Asbestosis. DJD. Shoulder pain.  Lumbar radiculopathy.    I am pleased to see that Mr. Rodriguez has been remaining stable from a cardiac perspective, though his activities have been limited.  I have requested an echo and a stress test in view " of his abnormal ECG and prior history of ASCVD/PCI.  In the absence of any significant high risk findings on stress test, he may be taken up for surgery as planned with a acceptable risk of cardiovascular complications.    I advised him to continue with medications as prescribed and regular follow-up with you and Ms. Lima, PCP.    Thank you so much for the opportunity to participate in the care of this very pleasant gentleman. Please do not hesitate to contact me if I could be of any assistance in his future care.    Sincerely        Renea Raphael M.D.        05/22/25 at 10:49 PM - Oleg Raphael MD      Orders:  Orders Placed This Encounter   Procedures    ECG 12 Lead    Transthoracic Echo Complete         Followup Appts:  Future Appointments   Date Time Provider Department [place]   5/23/2025 10:30 AM P[facility] PAT ROOM 02  POR[facility]   5/30/2025 10:30 AM GEA HOSPDR PAINMGT NURSE [facility]   6/30/2025 10:15 AM [facility]   7/10/2025 11:20 AM Angella Lima, APRN-CNP [id number] [facility]           [1]   Past Medical History:  Diagnosis Date    Allergic dermatitis 09/14/2023    Allergic rhinitis due to pollen 10/23/2014    Hay fever    Arthritis     Chronic pain disorder     Clotting disorder (Multi)     dvt april 2020     P.E. april 2020    COPD (chronic obstructive pulmonary disease) (Multi)     hx asbestos    Coronary artery disease     Encounter for other preprocedural examination 06/24/2014    Pre-procedural examination    Encounter for screening for malignant neoplasm of prostate     Encounter for screening for malignant neoplasm of prostate    Fissure in skin of foot 09/14/2023    Hyperlipidemia     Hypertension     Localized edema 05/11/2020    Pedal edema    Lung infiltrate 09/14/2023    Myocardial infarction (Multi)     Other conditions influencing health status     Carcinoma Of The Tongue    Pain in unspecified knee 12/22/2014    Joint pain, knee     Personal history of diseases of the skin and subcutaneous tissue 04/23/2013    History of eczema    Personal history of other diseases of the musculoskeletal system and connective tissue 06/19/2014    Personal history of arthritis    Personal history of other diseases of the nervous system and sense organs 08/10/2021    History of Bell's palsy    Personal history of other diseases of the respiratory system 11/12/2014    History of asbestosis    Personal history of other diseases of the respiratory system 08/13/2014    History of chronic obstructive lung disease    Personal history of other specified conditions 08/20/2013    History of edema    Plantar fascial fibromatosis 07/22/2013    Plantar fasciitis    Polyp of colon     Polyp of sigmoid colon    Syncope and collapse 01/23/2015    Syncope and collapse    Unspecified abdominal pain 12/22/2014    Stomach pain    Unspecified disorder of eyelid 10/23/2014    Eyelid disorder

## 2025-05-27 ENCOUNTER — TELEPHONE (OUTPATIENT)
Dept: ORTHOPEDIC SURGERY | Facility: CLINIC | Age: 85
End: 2025-05-27
Payer: COMMERCIAL

## 2025-05-29 ENCOUNTER — HOSPITAL ENCOUNTER (OUTPATIENT)
Dept: CARDIOLOGY | Facility: HOSPITAL | Age: 85
Discharge: HOME | End: 2025-05-29
Payer: MEDICARE

## 2025-05-29 ENCOUNTER — HOSPITAL ENCOUNTER (OUTPATIENT)
Dept: RADIOLOGY | Facility: HOSPITAL | Age: 85
End: 2025-05-29
Payer: MEDICARE

## 2025-05-29 ENCOUNTER — HOSPITAL ENCOUNTER (OUTPATIENT)
Dept: RADIOLOGY | Facility: HOSPITAL | Age: 85
Discharge: HOME | End: 2025-05-29
Payer: MEDICARE

## 2025-05-29 ENCOUNTER — HOSPITAL ENCOUNTER (OUTPATIENT)
Dept: CARDIOLOGY | Facility: HOSPITAL | Age: 85
End: 2025-05-29
Payer: MEDICARE

## 2025-05-29 DIAGNOSIS — M19.011 ARTHRITIS OF RIGHT SHOULDER REGION: ICD-10-CM

## 2025-05-29 DIAGNOSIS — R06.02 SHORTNESS OF BREATH: ICD-10-CM

## 2025-05-29 DIAGNOSIS — O26.819 PREGNANCY RELATED FATIGUE, ANTEPARTUM (HHS-HCC): ICD-10-CM

## 2025-05-29 DIAGNOSIS — Z01.818 PRE-OPERATIVE CLEARANCE: ICD-10-CM

## 2025-05-29 DIAGNOSIS — I10 BENIGN ESSENTIAL HYPERTENSION: ICD-10-CM

## 2025-05-29 DIAGNOSIS — I10 HYPERTENSION, UNSPECIFIED TYPE: ICD-10-CM

## 2025-05-29 LAB
AORTIC VALVE PEAK VELOCITY: 1.11 M/S
AV PEAK GRADIENT: 5 MMHG
AVA (PEAK VEL): 2.15 CM2
EJECTION FRACTION APICAL 4 CHAMBER: 61.5
EJECTION FRACTION: 63 %
LEFT ATRIUM VOLUME AREA LENGTH INDEX BSA: 19.2 ML/M2
LEFT VENTRICLE INTERNAL DIMENSION DIASTOLE: 3.88 CM (ref 3.5–6)
LEFT VENTRICULAR OUTFLOW TRACT DIAMETER: 2.1 CM
MITRAL VALVE E/A RATIO: 0.59
RIGHT VENTRICLE FREE WALL PEAK S': 10.1 CM/S
RIGHT VENTRICLE PEAK SYSTOLIC PRESSURE: 29 MMHG
TRICUSPID ANNULAR PLANE SYSTOLIC EXCURSION: 2.3 CM

## 2025-05-29 PROCEDURE — 93306 TTE W/DOPPLER COMPLETE: CPT | Performed by: INTERNAL MEDICINE

## 2025-05-29 PROCEDURE — C8929 TTE W OR WO FOL WCON,DOPPLER: HCPCS

## 2025-05-29 PROCEDURE — 2500000004 HC RX 250 GENERAL PHARMACY W/ HCPCS (ALT 636 FOR OP/ED): Mod: JW | Performed by: INTERNAL MEDICINE

## 2025-05-29 PROCEDURE — 93017 CV STRESS TEST TRACING ONLY: CPT

## 2025-05-29 PROCEDURE — A9502 TC99M TETROFOSMIN: HCPCS | Performed by: INTERNAL MEDICINE

## 2025-05-29 PROCEDURE — 3430000001 HC RX 343 DIAGNOSTIC RADIOPHARMACEUTICALS: Performed by: INTERNAL MEDICINE

## 2025-05-29 RX ADMIN — PERFLUTREN 1 ML OF DILUTION: 6.52 INJECTION, SUSPENSION INTRAVENOUS at 12:18

## 2025-05-29 RX ADMIN — TETROFOSMIN 10.5 MILLICURIE: 0.23 INJECTION, POWDER, LYOPHILIZED, FOR SOLUTION INTRAVENOUS at 09:01

## 2025-05-30 ENCOUNTER — HOSPITAL ENCOUNTER (OUTPATIENT)
Dept: CARDIOLOGY | Facility: HOSPITAL | Age: 85
Discharge: HOME | End: 2025-05-30
Payer: MEDICARE

## 2025-05-30 ENCOUNTER — HOSPITAL ENCOUNTER (OUTPATIENT)
Dept: RADIOLOGY | Facility: HOSPITAL | Age: 85
Discharge: HOME | End: 2025-05-30
Payer: MEDICARE

## 2025-05-30 ENCOUNTER — APPOINTMENT (OUTPATIENT)
Dept: PAIN MEDICINE | Facility: CLINIC | Age: 85
End: 2025-05-30
Payer: MEDICARE

## 2025-05-30 ENCOUNTER — APPOINTMENT (OUTPATIENT)
Dept: RADIOLOGY | Facility: HOSPITAL | Age: 85
End: 2025-05-30
Payer: MEDICARE

## 2025-05-30 ENCOUNTER — APPOINTMENT (OUTPATIENT)
Dept: CARDIOLOGY | Facility: HOSPITAL | Age: 85
End: 2025-05-30
Payer: MEDICARE

## 2025-05-30 DIAGNOSIS — I10 BENIGN ESSENTIAL HYPERTENSION: ICD-10-CM

## 2025-05-30 DIAGNOSIS — Z01.818 PRE-OPERATIVE CLEARANCE: ICD-10-CM

## 2025-05-30 DIAGNOSIS — O26.819 PREGNANCY RELATED FATIGUE, ANTEPARTUM (HHS-HCC): ICD-10-CM

## 2025-05-30 DIAGNOSIS — R06.02 SHORTNESS OF BREATH: ICD-10-CM

## 2025-05-30 DIAGNOSIS — I10 HYPERTENSION, UNSPECIFIED TYPE: ICD-10-CM

## 2025-05-30 PROCEDURE — 3430000001 HC RX 343 DIAGNOSTIC RADIOPHARMACEUTICALS: Performed by: INTERNAL MEDICINE

## 2025-05-30 PROCEDURE — 78452 HT MUSCLE IMAGE SPECT MULT: CPT

## 2025-05-30 PROCEDURE — A9502 TC99M TETROFOSMIN: HCPCS | Performed by: INTERNAL MEDICINE

## 2025-05-30 PROCEDURE — 93017 CV STRESS TEST TRACING ONLY: CPT

## 2025-05-30 PROCEDURE — 2500000004 HC RX 250 GENERAL PHARMACY W/ HCPCS (ALT 636 FOR OP/ED): Performed by: INTERNAL MEDICINE

## 2025-05-30 RX ORDER — REGADENOSON 0.08 MG/ML
0.4 INJECTION, SOLUTION INTRAVENOUS ONCE
Status: COMPLETED | OUTPATIENT
Start: 2025-05-30 | End: 2025-05-30

## 2025-05-30 RX ADMIN — TETROFOSMIN 34.2 MILLICURIE: 0.23 INJECTION, POWDER, LYOPHILIZED, FOR SOLUTION INTRAVENOUS at 09:15

## 2025-05-30 RX ADMIN — REGADENOSON 0.4 MG: 0.08 INJECTION, SOLUTION INTRAVENOUS at 09:26

## 2025-06-02 ENCOUNTER — ANESTHESIA EVENT (OUTPATIENT)
Dept: OPERATING ROOM | Facility: HOSPITAL | Age: 85
End: 2025-06-02
Payer: MEDICARE

## 2025-06-02 RX ORDER — IPRATROPIUM BROMIDE AND ALBUTEROL SULFATE 2.5; .5 MG/3ML; MG/3ML
3 SOLUTION RESPIRATORY (INHALATION) ONCE
Status: CANCELLED | OUTPATIENT
Start: 2025-06-03

## 2025-06-03 ENCOUNTER — HOSPITAL ENCOUNTER (OUTPATIENT)
Facility: HOSPITAL | Age: 85
Discharge: HOME | End: 2025-06-04
Attending: ORTHOPAEDIC SURGERY | Admitting: ORTHOPAEDIC SURGERY
Payer: MEDICARE

## 2025-06-03 ENCOUNTER — APPOINTMENT (OUTPATIENT)
Dept: CARDIOLOGY | Facility: HOSPITAL | Age: 85
End: 2025-06-03
Payer: MEDICARE

## 2025-06-03 ENCOUNTER — ANESTHESIA (OUTPATIENT)
Dept: OPERATING ROOM | Facility: HOSPITAL | Age: 85
End: 2025-06-03
Payer: MEDICARE

## 2025-06-03 ENCOUNTER — APPOINTMENT (OUTPATIENT)
Dept: RADIOLOGY | Facility: HOSPITAL | Age: 85
End: 2025-06-03
Payer: MEDICARE

## 2025-06-03 ENCOUNTER — PHARMACY VISIT (OUTPATIENT)
Dept: PHARMACY | Facility: CLINIC | Age: 85
End: 2025-06-03
Payer: COMMERCIAL

## 2025-06-03 DIAGNOSIS — M75.21 BICEPS TENDINITIS OF RIGHT SHOULDER: ICD-10-CM

## 2025-06-03 DIAGNOSIS — M75.121 COMPLETE TEAR OF RIGHT ROTATOR CUFF, UNSPECIFIED WHETHER TRAUMATIC: ICD-10-CM

## 2025-06-03 DIAGNOSIS — M19.011 ARTHRITIS OF RIGHT SHOULDER REGION: ICD-10-CM

## 2025-06-03 DIAGNOSIS — Z96.611 STATUS POST REVERSE TOTAL SHOULDER REPLACEMENT, RIGHT: Primary | ICD-10-CM

## 2025-06-03 PROCEDURE — 7100000011 HC EXTENDED STAY RECOVERY HOURLY - NURSING UNIT

## 2025-06-03 PROCEDURE — 3700000002 HC GENERAL ANESTHESIA TIME - EACH INCREMENTAL 1 MINUTE: Performed by: ORTHOPAEDIC SURGERY

## 2025-06-03 PROCEDURE — 3600000010 HC OR TIME - EACH INCREMENTAL 1 MINUTE - PROCEDURE LEVEL FIVE: Performed by: ORTHOPAEDIC SURGERY

## 2025-06-03 PROCEDURE — 23472 RECONSTRUCT SHOULDER JOINT: CPT

## 2025-06-03 PROCEDURE — 2500000004 HC RX 250 GENERAL PHARMACY W/ HCPCS (ALT 636 FOR OP/ED): Performed by: ORTHOPAEDIC SURGERY

## 2025-06-03 PROCEDURE — A4649 SURGICAL SUPPLIES: HCPCS | Performed by: ORTHOPAEDIC SURGERY

## 2025-06-03 PROCEDURE — 2780000003 HC OR 278 NO HCPCS: Performed by: ORTHOPAEDIC SURGERY

## 2025-06-03 PROCEDURE — 73030 X-RAY EXAM OF SHOULDER: CPT | Mod: RT

## 2025-06-03 PROCEDURE — 23430 REPAIR BICEPS TENDON: CPT | Performed by: ORTHOPAEDIC SURGERY

## 2025-06-03 PROCEDURE — 7100000002 HC RECOVERY ROOM TIME - EACH INCREMENTAL 1 MINUTE: Performed by: ORTHOPAEDIC SURGERY

## 2025-06-03 PROCEDURE — 93005 ELECTROCARDIOGRAM TRACING: CPT

## 2025-06-03 PROCEDURE — 73030 X-RAY EXAM OF SHOULDER: CPT | Mod: RIGHT SIDE | Performed by: RADIOLOGY

## 2025-06-03 PROCEDURE — 3600000005 HC OR TIME - INITIAL BASE CHARGE - PROCEDURE LEVEL FIVE: Performed by: ORTHOPAEDIC SURGERY

## 2025-06-03 PROCEDURE — 2500000001 HC RX 250 WO HCPCS SELF ADMINISTERED DRUGS (ALT 637 FOR MEDICARE OP): Performed by: ANESTHESIOLOGY

## 2025-06-03 PROCEDURE — C1713 ANCHOR/SCREW BN/BN,TIS/BN: HCPCS | Performed by: ORTHOPAEDIC SURGERY

## 2025-06-03 PROCEDURE — C1776 JOINT DEVICE (IMPLANTABLE): HCPCS | Performed by: ORTHOPAEDIC SURGERY

## 2025-06-03 PROCEDURE — 23430 REPAIR BICEPS TENDON: CPT

## 2025-06-03 PROCEDURE — 99221 1ST HOSP IP/OBS SF/LOW 40: CPT

## 2025-06-03 PROCEDURE — 2500000004 HC RX 250 GENERAL PHARMACY W/ HCPCS (ALT 636 FOR OP/ED): Performed by: NURSE ANESTHETIST, CERTIFIED REGISTERED

## 2025-06-03 PROCEDURE — 2500000004 HC RX 250 GENERAL PHARMACY W/ HCPCS (ALT 636 FOR OP/ED)

## 2025-06-03 PROCEDURE — 2500000002 HC RX 250 W HCPCS SELF ADMINISTERED DRUGS (ALT 637 FOR MEDICARE OP, ALT 636 FOR OP/ED)

## 2025-06-03 PROCEDURE — A6213 FOAM DRG >16<=48 SQ IN W/BDR: HCPCS | Performed by: ORTHOPAEDIC SURGERY

## 2025-06-03 PROCEDURE — 2500000005 HC RX 250 GENERAL PHARMACY W/O HCPCS: Performed by: NURSE ANESTHETIST, CERTIFIED REGISTERED

## 2025-06-03 PROCEDURE — 7100000001 HC RECOVERY ROOM TIME - INITIAL BASE CHARGE: Performed by: ORTHOPAEDIC SURGERY

## 2025-06-03 PROCEDURE — RXMED WILLOW AMBULATORY MEDICATION CHARGE

## 2025-06-03 PROCEDURE — 23472 RECONSTRUCT SHOULDER JOINT: CPT | Performed by: ORTHOPAEDIC SURGERY

## 2025-06-03 PROCEDURE — 3700000001 HC GENERAL ANESTHESIA TIME - INITIAL BASE CHARGE: Performed by: ORTHOPAEDIC SURGERY

## 2025-06-03 PROCEDURE — 2500000004 HC RX 250 GENERAL PHARMACY W/ HCPCS (ALT 636 FOR OP/ED): Performed by: ANESTHESIOLOGY

## 2025-06-03 PROCEDURE — 2720000007 HC OR 272 NO HCPCS: Performed by: ORTHOPAEDIC SURGERY

## 2025-06-03 PROCEDURE — 2500000001 HC RX 250 WO HCPCS SELF ADMINISTERED DRUGS (ALT 637 FOR MEDICARE OP)

## 2025-06-03 DEVICE — IMPLANTABLE DEVICE
Type: IMPLANTABLE DEVICE | Site: SHOULDER | Status: FUNCTIONAL
Brand: COMPREHENSIVE® REVERSE SHOULDER

## 2025-06-03 DEVICE — HUMERAL. BEARING, 36MM STD PRLNG: Type: IMPLANTABLE DEVICE | Site: SHOULDER | Status: FUNCTIONAL

## 2025-06-03 DEVICE — IMPLANTABLE DEVICE
Type: IMPLANTABLE DEVICE | Site: SHOULDER | Status: FUNCTIONAL
Brand: STEINMANN PIN

## 2025-06-03 DEVICE — IMPLANTABLE DEVICE
Type: IMPLANTABLE DEVICE | Site: SHOULDER | Status: FUNCTIONAL
Brand: COMPREHENSIVE REVERSE SHOULDER

## 2025-06-03 DEVICE — HUMERAL TRAY, STD +0 TAPER OFFSET, 40MM DIA: Type: IMPLANTABLE DEVICE | Site: SHOULDER | Status: FUNCTIONAL

## 2025-06-03 DEVICE — IMPLANTABLE DEVICE
Type: IMPLANTABLE DEVICE | Site: SHOULDER | Status: FUNCTIONAL
Brand: COMPREHENSIVE SHOULDER SYSTEM

## 2025-06-03 RX ORDER — NALOXONE HYDROCHLORIDE 4 MG/.1ML
4 SPRAY NASAL AS NEEDED
Status: DISCONTINUED | OUTPATIENT
Start: 2025-06-03 | End: 2025-06-04 | Stop reason: HOSPADM

## 2025-06-03 RX ORDER — DOXYCYCLINE 100 MG/1
100 CAPSULE ORAL 2 TIMES DAILY
Qty: 10 CAPSULE | Refills: 0 | Status: SHIPPED | OUTPATIENT
Start: 2025-06-03 | End: 2025-06-08

## 2025-06-03 RX ORDER — LIDOCAINE HYDROCHLORIDE AND EPINEPHRINE 10; 10 UG/ML; MG/ML
INJECTION, SOLUTION INFILTRATION; PERINEURAL AS NEEDED
Status: DISCONTINUED | OUTPATIENT
Start: 2025-06-03 | End: 2025-06-03

## 2025-06-03 RX ORDER — SODIUM CHLORIDE, SODIUM LACTATE, POTASSIUM CHLORIDE, CALCIUM CHLORIDE 600; 310; 30; 20 MG/100ML; MG/100ML; MG/100ML; MG/100ML
125 INJECTION, SOLUTION INTRAVENOUS CONTINUOUS
Status: ACTIVE | OUTPATIENT
Start: 2025-06-03 | End: 2025-06-04

## 2025-06-03 RX ORDER — PHENYLEPHRINE HCL IN 0.9% NACL 1 MG/10 ML
SYRINGE (ML) INTRAVENOUS AS NEEDED
Status: DISCONTINUED | OUTPATIENT
Start: 2025-06-03 | End: 2025-06-03

## 2025-06-03 RX ORDER — FENTANYL CITRATE 50 UG/ML
INJECTION, SOLUTION INTRAMUSCULAR; INTRAVENOUS AS NEEDED
Status: DISCONTINUED | OUTPATIENT
Start: 2025-06-03 | End: 2025-06-03

## 2025-06-03 RX ORDER — ONDANSETRON HYDROCHLORIDE 2 MG/ML
4 INJECTION, SOLUTION INTRAVENOUS EVERY 8 HOURS PRN
Status: DISCONTINUED | OUTPATIENT
Start: 2025-06-03 | End: 2025-06-04 | Stop reason: HOSPADM

## 2025-06-03 RX ORDER — CEFAZOLIN SODIUM 2 G/50ML
2 SOLUTION INTRAVENOUS ONCE
Status: COMPLETED | OUTPATIENT
Start: 2025-06-03 | End: 2025-06-03

## 2025-06-03 RX ORDER — PROPOFOL 10 MG/ML
INJECTION, EMULSION INTRAVENOUS AS NEEDED
Status: DISCONTINUED | OUTPATIENT
Start: 2025-06-03 | End: 2025-06-03

## 2025-06-03 RX ORDER — IBUPROFEN 600 MG/1
600 TABLET, FILM COATED ORAL 3 TIMES DAILY
Status: DISCONTINUED | OUTPATIENT
Start: 2025-06-03 | End: 2025-06-04 | Stop reason: HOSPADM

## 2025-06-03 RX ORDER — OXYCODONE HYDROCHLORIDE 5 MG/1
5 TABLET ORAL EVERY 6 HOURS PRN
Qty: 28 TABLET | Refills: 0 | Status: SHIPPED | OUTPATIENT
Start: 2025-06-03

## 2025-06-03 RX ORDER — LIDOCAINE HCL/PF 100 MG/5ML
SYRINGE (ML) INTRAVENOUS AS NEEDED
Status: DISCONTINUED | OUTPATIENT
Start: 2025-06-03 | End: 2025-06-03

## 2025-06-03 RX ORDER — TAMSULOSIN HYDROCHLORIDE 0.4 MG/1
0.8 CAPSULE ORAL EVERY EVENING
Status: DISCONTINUED | OUTPATIENT
Start: 2025-06-03 | End: 2025-06-04 | Stop reason: HOSPADM

## 2025-06-03 RX ORDER — TRANEXAMIC ACID 1 G/10ML
INJECTION, SOLUTION INTRAVENOUS AS NEEDED
Status: DISCONTINUED | OUTPATIENT
Start: 2025-06-03 | End: 2025-06-03

## 2025-06-03 RX ORDER — ASPIRIN 325 MG
325 TABLET, DELAYED RELEASE (ENTERIC COATED) ORAL 2 TIMES DAILY
Qty: 60 TABLET | Refills: 0 | Status: SHIPPED | OUTPATIENT
Start: 2025-06-03 | End: 2025-07-03

## 2025-06-03 RX ORDER — PHENYLEPHRINE 10 MG/250 ML(40 MCG/ML)IN 0.9 % SOD.CHLORIDE INTRAVENOUS
CONTINUOUS PRN
Status: DISCONTINUED | OUTPATIENT
Start: 2025-06-03 | End: 2025-06-03

## 2025-06-03 RX ORDER — DOXYCYCLINE 100 MG/1
100 CAPSULE ORAL EVERY 12 HOURS SCHEDULED
Status: DISCONTINUED | OUTPATIENT
Start: 2025-06-03 | End: 2025-06-04 | Stop reason: HOSPADM

## 2025-06-03 RX ORDER — MIRTAZAPINE 7.5 MG/1
7.5 TABLET, FILM COATED ORAL NIGHTLY
Status: DISCONTINUED | OUTPATIENT
Start: 2025-06-03 | End: 2025-06-04 | Stop reason: HOSPADM

## 2025-06-03 RX ORDER — NALOXONE HYDROCHLORIDE 1 MG/ML
0.2 INJECTION INTRAMUSCULAR; INTRAVENOUS; SUBCUTANEOUS EVERY 5 MIN PRN
Status: DISCONTINUED | OUTPATIENT
Start: 2025-06-03 | End: 2025-06-04 | Stop reason: HOSPADM

## 2025-06-03 RX ORDER — METOCLOPRAMIDE HYDROCHLORIDE 5 MG/ML
5 INJECTION INTRAMUSCULAR; INTRAVENOUS ONCE
Status: COMPLETED | OUTPATIENT
Start: 2025-06-03 | End: 2025-06-03

## 2025-06-03 RX ORDER — METOPROLOL SUCCINATE 50 MG/1
50 TABLET, EXTENDED RELEASE ORAL DAILY
Status: DISCONTINUED | OUTPATIENT
Start: 2025-06-04 | End: 2025-06-04 | Stop reason: HOSPADM

## 2025-06-03 RX ORDER — FINASTERIDE 5 MG/1
5 TABLET, FILM COATED ORAL EVERY EVENING
Status: DISCONTINUED | OUTPATIENT
Start: 2025-06-03 | End: 2025-06-04 | Stop reason: HOSPADM

## 2025-06-03 RX ORDER — CEFAZOLIN SODIUM 2 G/50ML
2 SOLUTION INTRAVENOUS EVERY 8 HOURS
Status: COMPLETED | OUTPATIENT
Start: 2025-06-03 | End: 2025-06-04

## 2025-06-03 RX ORDER — ACETAMINOPHEN 325 MG/1
650 TABLET ORAL EVERY 6 HOURS SCHEDULED
Status: DISCONTINUED | OUTPATIENT
Start: 2025-06-03 | End: 2025-06-04 | Stop reason: HOSPADM

## 2025-06-03 RX ORDER — HYOSCYAMINE SULFATE 0.12 MG/1
0.12 TABLET, ORALLY DISINTEGRATING ORAL EVERY 6 HOURS PRN
Status: DISCONTINUED | OUTPATIENT
Start: 2025-06-03 | End: 2025-06-04 | Stop reason: HOSPADM

## 2025-06-03 RX ORDER — FAMOTIDINE 10 MG/ML
20 INJECTION, SOLUTION INTRAVENOUS ONCE
Status: COMPLETED | OUTPATIENT
Start: 2025-06-03 | End: 2025-06-03

## 2025-06-03 RX ORDER — ROCURONIUM BROMIDE 50 MG/5 ML
SYRINGE (ML) INTRAVENOUS AS NEEDED
Status: DISCONTINUED | OUTPATIENT
Start: 2025-06-03 | End: 2025-06-03

## 2025-06-03 RX ORDER — OXYCODONE HYDROCHLORIDE 5 MG/1
5 TABLET ORAL EVERY 6 HOURS PRN
Status: DISCONTINUED | OUTPATIENT
Start: 2025-06-03 | End: 2025-06-04 | Stop reason: HOSPADM

## 2025-06-03 RX ORDER — DULOXETIN HYDROCHLORIDE 30 MG/1
30 CAPSULE, DELAYED RELEASE ORAL DAILY
Status: DISCONTINUED | OUTPATIENT
Start: 2025-06-03 | End: 2025-06-04 | Stop reason: HOSPADM

## 2025-06-03 RX ORDER — TALC
3 POWDER (GRAM) TOPICAL NIGHTLY PRN
Status: DISCONTINUED | OUTPATIENT
Start: 2025-06-03 | End: 2025-06-04 | Stop reason: HOSPADM

## 2025-06-03 RX ORDER — FUROSEMIDE 40 MG/1
40 TABLET ORAL DAILY PRN
Status: DISCONTINUED | OUTPATIENT
Start: 2025-06-03 | End: 2025-06-04 | Stop reason: HOSPADM

## 2025-06-03 RX ORDER — CYCLOBENZAPRINE HCL 10 MG
10 TABLET ORAL 3 TIMES DAILY PRN
Status: DISCONTINUED | OUTPATIENT
Start: 2025-06-03 | End: 2025-06-04 | Stop reason: HOSPADM

## 2025-06-03 RX ORDER — MORPHINE SULFATE 2 MG/ML
2 INJECTION, SOLUTION INTRAMUSCULAR; INTRAVENOUS EVERY 5 MIN PRN
Status: DISCONTINUED | OUTPATIENT
Start: 2025-06-03 | End: 2025-06-03 | Stop reason: HOSPADM

## 2025-06-03 RX ORDER — SODIUM CITRATE AND CITRIC ACID MONOHYDRATE 334; 500 MG/5ML; MG/5ML
30 SOLUTION ORAL ONCE
Status: COMPLETED | OUTPATIENT
Start: 2025-06-03 | End: 2025-06-03

## 2025-06-03 RX ORDER — ROPIVACAINE HYDROCHLORIDE 5 MG/ML
INJECTION, SOLUTION EPIDURAL; INFILTRATION; PERINEURAL AS NEEDED
Status: DISCONTINUED | OUTPATIENT
Start: 2025-06-03 | End: 2025-06-03

## 2025-06-03 RX ORDER — ACETAMINOPHEN 10 MG/ML
1000 INJECTION, SOLUTION INTRAVENOUS ONCE
Status: COMPLETED | OUTPATIENT
Start: 2025-06-03 | End: 2025-06-03

## 2025-06-03 RX ORDER — NORETHINDRONE AND ETHINYL ESTRADIOL 0.5-0.035
KIT ORAL AS NEEDED
Status: DISCONTINUED | OUTPATIENT
Start: 2025-06-03 | End: 2025-06-03

## 2025-06-03 RX ORDER — MIDAZOLAM HYDROCHLORIDE 1 MG/ML
INJECTION, SOLUTION INTRAMUSCULAR; INTRAVENOUS AS NEEDED
Status: DISCONTINUED | OUTPATIENT
Start: 2025-06-03 | End: 2025-06-03

## 2025-06-03 RX ORDER — OXYCODONE HYDROCHLORIDE 10 MG/1
10 TABLET ORAL EVERY 4 HOURS PRN
Status: DISCONTINUED | OUTPATIENT
Start: 2025-06-03 | End: 2025-06-04 | Stop reason: HOSPADM

## 2025-06-03 RX ORDER — ACETAMINOPHEN 325 MG/1
650 TABLET ORAL EVERY 4 HOURS PRN
Status: DISCONTINUED | OUTPATIENT
Start: 2025-06-03 | End: 2025-06-03

## 2025-06-03 RX ORDER — ONDANSETRON 4 MG/1
4 TABLET, FILM COATED ORAL EVERY 8 HOURS PRN
Status: DISCONTINUED | OUTPATIENT
Start: 2025-06-03 | End: 2025-06-04 | Stop reason: HOSPADM

## 2025-06-03 RX ORDER — ACETAMINOPHEN 500 MG
1000 TABLET ORAL EVERY 8 HOURS PRN
Qty: 90 TABLET | Refills: 0 | Status: SHIPPED | OUTPATIENT
Start: 2025-06-03

## 2025-06-03 RX ORDER — ASPIRIN 81 MG/1
81 TABLET ORAL 2 TIMES DAILY
Status: DISCONTINUED | OUTPATIENT
Start: 2025-06-03 | End: 2025-06-04 | Stop reason: HOSPADM

## 2025-06-03 RX ORDER — MORPHINE SULFATE 2 MG/ML
1 INJECTION, SOLUTION INTRAMUSCULAR; INTRAVENOUS EVERY 5 MIN PRN
Status: DISCONTINUED | OUTPATIENT
Start: 2025-06-03 | End: 2025-06-03 | Stop reason: HOSPADM

## 2025-06-03 RX ORDER — VANCOMYCIN HYDROCHLORIDE 1 G/20ML
INJECTION, POWDER, LYOPHILIZED, FOR SOLUTION INTRAVENOUS AS NEEDED
Status: DISCONTINUED | OUTPATIENT
Start: 2025-06-03 | End: 2025-06-03 | Stop reason: HOSPADM

## 2025-06-03 RX ORDER — MELOXICAM 15 MG/1
15 TABLET ORAL DAILY PRN
Qty: 30 TABLET | Refills: 0 | Status: SHIPPED | OUTPATIENT
Start: 2025-06-03 | End: 2025-07-03

## 2025-06-03 RX ORDER — DOCUSATE SODIUM 100 MG/1
100 CAPSULE, LIQUID FILLED ORAL 2 TIMES DAILY
Status: DISCONTINUED | OUTPATIENT
Start: 2025-06-03 | End: 2025-06-04 | Stop reason: HOSPADM

## 2025-06-03 RX ORDER — ONDANSETRON HYDROCHLORIDE 2 MG/ML
4 INJECTION, SOLUTION INTRAVENOUS ONCE
Status: COMPLETED | OUTPATIENT
Start: 2025-06-03 | End: 2025-06-03

## 2025-06-03 RX ORDER — LOSARTAN POTASSIUM 50 MG/1
50 TABLET ORAL DAILY
Status: DISCONTINUED | OUTPATIENT
Start: 2025-06-03 | End: 2025-06-04 | Stop reason: HOSPADM

## 2025-06-03 RX ORDER — GABAPENTIN 300 MG/1
300 CAPSULE ORAL NIGHTLY
Qty: 5 CAPSULE | Refills: 0 | Status: SHIPPED | OUTPATIENT
Start: 2025-06-03 | End: 2025-06-08

## 2025-06-03 RX ADMIN — LIDOCAINE HYDROCHLORIDE,EPINEPHRINE BITARTRATE 3 ML: 10; .01 INJECTION, SOLUTION INFILTRATION; PERINEURAL at 10:35

## 2025-06-03 RX ADMIN — Medication 50 MG: at 10:59

## 2025-06-03 RX ADMIN — Medication 100 MCG: at 11:31

## 2025-06-03 RX ADMIN — FAMOTIDINE 20 MG: 10 INJECTION, SOLUTION INTRAVENOUS at 09:54

## 2025-06-03 RX ADMIN — ROPIVACAINE HYDROCHLORIDE 15 ML: 5 INJECTION, SOLUTION EPIDURAL; INFILTRATION; PERINEURAL at 10:36

## 2025-06-03 RX ADMIN — TRANEXAMIC ACID 1000 MG: 1 INJECTION, SOLUTION INTRAVENOUS at 12:08

## 2025-06-03 RX ADMIN — EPHEDRINE SULFATE 5 MG: 50 INJECTION, SOLUTION INTRAVENOUS at 11:21

## 2025-06-03 RX ADMIN — TRANEXAMIC ACID 1000 MG: 1 INJECTION, SOLUTION INTRAVENOUS at 11:33

## 2025-06-03 RX ADMIN — ACETAMINOPHEN 1000 MG: 10 INJECTION, SOLUTION INTRAVENOUS at 14:02

## 2025-06-03 RX ADMIN — SODIUM CITRATE AND CITRIC ACID MONOHYDRATE 30 ML: 500; 334 SOLUTION ORAL at 09:54

## 2025-06-03 RX ADMIN — OXYCODONE HYDROCHLORIDE 10 MG: 10 TABLET ORAL at 17:44

## 2025-06-03 RX ADMIN — DOCUSATE SODIUM 100 MG: 100 CAPSULE, LIQUID FILLED ORAL at 21:16

## 2025-06-03 RX ADMIN — LOSARTAN POTASSIUM 50 MG: 50 TABLET, FILM COATED ORAL at 15:28

## 2025-06-03 RX ADMIN — MORPHINE SULFATE 2 MG: 2 INJECTION, SOLUTION INTRAMUSCULAR; INTRAVENOUS at 13:58

## 2025-06-03 RX ADMIN — Medication 200 MCG: at 11:18

## 2025-06-03 RX ADMIN — FINASTERIDE 5 MG: 5 TABLET, FILM COATED ORAL at 21:16

## 2025-06-03 RX ADMIN — DULOXETINE HYDROCHLORIDE 30 MG: 30 CAPSULE, DELAYED RELEASE ORAL at 15:28

## 2025-06-03 RX ADMIN — Medication 200 MCG: at 11:13

## 2025-06-03 RX ADMIN — MIDAZOLAM 1 MG: 1 INJECTION INTRAMUSCULAR; INTRAVENOUS at 10:29

## 2025-06-03 RX ADMIN — EPHEDRINE SULFATE 5 MG: 50 INJECTION, SOLUTION INTRAVENOUS at 12:19

## 2025-06-03 RX ADMIN — DOXYCYCLINE 100 MG: 100 CAPSULE ORAL at 21:16

## 2025-06-03 RX ADMIN — Medication 100 MCG: at 11:34

## 2025-06-03 RX ADMIN — LIDOCAINE HYDROCHLORIDE 50 MG: 20 INJECTION INTRAVENOUS at 10:58

## 2025-06-03 RX ADMIN — MIRTAZAPINE 7.5 MG: 7.5 TABLET, FILM COATED ORAL at 21:16

## 2025-06-03 RX ADMIN — TAMSULOSIN HYDROCHLORIDE 0.8 MG: 0.4 CAPSULE ORAL at 21:17

## 2025-06-03 RX ADMIN — OXYCODONE HYDROCHLORIDE 10 MG: 10 TABLET ORAL at 22:20

## 2025-06-03 RX ADMIN — METOCLOPRAMIDE 5 MG: 5 INJECTION, SOLUTION INTRAMUSCULAR; INTRAVENOUS at 09:54

## 2025-06-03 RX ADMIN — CEFAZOLIN SODIUM 2 G: 2 SOLUTION INTRAVENOUS at 18:46

## 2025-06-03 RX ADMIN — IBUPROFEN 600 MG: 600 TABLET ORAL at 15:28

## 2025-06-03 RX ADMIN — FENTANYL CITRATE 25 MCG: 50 INJECTION INTRAMUSCULAR; INTRAVENOUS at 10:29

## 2025-06-03 RX ADMIN — IBUPROFEN 600 MG: 600 TABLET ORAL at 21:17

## 2025-06-03 RX ADMIN — FENTANYL CITRATE 25 MCG: 50 INJECTION INTRAMUSCULAR; INTRAVENOUS at 10:58

## 2025-06-03 RX ADMIN — PROPOFOL 150 MG: 10 INJECTION, EMULSION INTRAVENOUS at 10:58

## 2025-06-03 RX ADMIN — EPHEDRINE SULFATE 5 MG: 50 INJECTION, SOLUTION INTRAVENOUS at 11:32

## 2025-06-03 RX ADMIN — MORPHINE SULFATE 2 MG: 2 INJECTION, SOLUTION INTRAMUSCULAR; INTRAVENOUS at 13:49

## 2025-06-03 RX ADMIN — ONDANSETRON 4 MG: 2 INJECTION, SOLUTION INTRAMUSCULAR; INTRAVENOUS at 09:54

## 2025-06-03 RX ADMIN — EPHEDRINE SULFATE 5 MG: 50 INJECTION, SOLUTION INTRAVENOUS at 11:42

## 2025-06-03 RX ADMIN — Medication 100 MCG: at 11:21

## 2025-06-03 RX ADMIN — ACETAMINOPHEN 650 MG: 325 TABLET ORAL at 21:16

## 2025-06-03 RX ADMIN — SODIUM CHLORIDE, POTASSIUM CHLORIDE, SODIUM LACTATE AND CALCIUM CHLORIDE 125 ML/HR: 600; 310; 30; 20 INJECTION, SOLUTION INTRAVENOUS at 09:55

## 2025-06-03 RX ADMIN — Medication 100 MCG: at 11:08

## 2025-06-03 RX ADMIN — CEFAZOLIN SODIUM 2 G: 2 SOLUTION INTRAVENOUS at 11:08

## 2025-06-03 RX ADMIN — Medication 100 MCG: at 11:42

## 2025-06-03 RX ADMIN — ASPIRIN 81 MG: 81 TABLET, COATED ORAL at 21:16

## 2025-06-03 RX ADMIN — PHENYLEPHRINE-NACL IV SOLUTION 10 MG/250ML-0.9% 0.3 MCG/KG/MIN: 10-0.9/25 SOLUTION at 11:46

## 2025-06-03 RX ADMIN — SUGAMMADEX 200 MG: 100 INJECTION, SOLUTION INTRAVENOUS at 12:41

## 2025-06-03 SDOH — SOCIAL STABILITY: SOCIAL INSECURITY: WITHIN THE LAST YEAR, HAVE YOU BEEN HUMILIATED OR EMOTIONALLY ABUSED IN OTHER WAYS BY YOUR PARTNER OR EX-PARTNER?: NO

## 2025-06-03 SDOH — SOCIAL STABILITY: SOCIAL INSECURITY: DO YOU FEEL ANYONE HAS EXPLOITED OR TAKEN ADVANTAGE OF YOU FINANCIALLY OR OF YOUR PERSONAL PROPERTY?: NO

## 2025-06-03 SDOH — SOCIAL STABILITY: SOCIAL INSECURITY: ARE THERE ANY APPARENT SIGNS OF INJURIES/BEHAVIORS THAT COULD BE RELATED TO ABUSE/NEGLECT?: NO

## 2025-06-03 SDOH — ECONOMIC STABILITY: TRANSPORTATION INSECURITY: IN THE PAST 12 MONTHS, HAS LACK OF TRANSPORTATION KEPT YOU FROM MEDICAL APPOINTMENTS OR FROM GETTING MEDICATIONS?: NO

## 2025-06-03 SDOH — SOCIAL STABILITY: SOCIAL INSECURITY: WERE YOU ABLE TO COMPLETE ALL THE BEHAVIORAL HEALTH SCREENINGS?: YES

## 2025-06-03 SDOH — ECONOMIC STABILITY: HOUSING INSECURITY: IN THE PAST 12 MONTHS, HOW MANY TIMES HAVE YOU MOVED WHERE YOU WERE LIVING?: 0

## 2025-06-03 SDOH — ECONOMIC STABILITY: HOUSING INSECURITY: AT ANY TIME IN THE PAST 12 MONTHS, WERE YOU HOMELESS OR LIVING IN A SHELTER (INCLUDING NOW)?: NO

## 2025-06-03 SDOH — ECONOMIC STABILITY: FOOD INSECURITY: WITHIN THE PAST 12 MONTHS, YOU WORRIED THAT YOUR FOOD WOULD RUN OUT BEFORE YOU GOT THE MONEY TO BUY MORE.: NEVER TRUE

## 2025-06-03 SDOH — SOCIAL STABILITY: SOCIAL INSECURITY: DO YOU FEEL UNSAFE GOING BACK TO THE PLACE WHERE YOU ARE LIVING?: NO

## 2025-06-03 SDOH — SOCIAL STABILITY: SOCIAL INSECURITY: WITHIN THE LAST YEAR, HAVE YOU BEEN AFRAID OF YOUR PARTNER OR EX-PARTNER?: NO

## 2025-06-03 SDOH — ECONOMIC STABILITY: FOOD INSECURITY: WITHIN THE PAST 12 MONTHS, THE FOOD YOU BOUGHT JUST DIDN'T LAST AND YOU DIDN'T HAVE MONEY TO GET MORE.: NEVER TRUE

## 2025-06-03 SDOH — SOCIAL STABILITY: SOCIAL INSECURITY
WITHIN THE LAST YEAR, HAVE YOU BEEN RAPED OR FORCED TO HAVE ANY KIND OF SEXUAL ACTIVITY BY YOUR PARTNER OR EX-PARTNER?: NO

## 2025-06-03 SDOH — SOCIAL STABILITY: SOCIAL INSECURITY: HAVE YOU HAD ANY THOUGHTS OF HARMING ANYONE ELSE?: NO

## 2025-06-03 SDOH — SOCIAL STABILITY: SOCIAL INSECURITY
WITHIN THE LAST YEAR, HAVE YOU BEEN KICKED, HIT, SLAPPED, OR OTHERWISE PHYSICALLY HURT BY YOUR PARTNER OR EX-PARTNER?: NO

## 2025-06-03 SDOH — SOCIAL STABILITY: SOCIAL INSECURITY: HAS ANYONE EVER THREATENED TO HURT YOUR FAMILY OR YOUR PETS?: NO

## 2025-06-03 SDOH — ECONOMIC STABILITY: INCOME INSECURITY: IN THE PAST 12 MONTHS HAS THE ELECTRIC, GAS, OIL, OR WATER COMPANY THREATENED TO SHUT OFF SERVICES IN YOUR HOME?: NO

## 2025-06-03 SDOH — ECONOMIC STABILITY: HOUSING INSECURITY: IN THE LAST 12 MONTHS, WAS THERE A TIME WHEN YOU WERE NOT ABLE TO PAY THE MORTGAGE OR RENT ON TIME?: NO

## 2025-06-03 SDOH — SOCIAL STABILITY: SOCIAL INSECURITY: ABUSE: ADULT

## 2025-06-03 SDOH — SOCIAL STABILITY: SOCIAL INSECURITY: DOES ANYONE TRY TO KEEP YOU FROM HAVING/CONTACTING OTHER FRIENDS OR DOING THINGS OUTSIDE YOUR HOME?: NO

## 2025-06-03 SDOH — ECONOMIC STABILITY: FOOD INSECURITY: HOW HARD IS IT FOR YOU TO PAY FOR THE VERY BASICS LIKE FOOD, HOUSING, MEDICAL CARE, AND HEATING?: NOT HARD AT ALL

## 2025-06-03 SDOH — ECONOMIC STABILITY: HOUSING INSECURITY: DO YOU FEEL UNSAFE GOING BACK TO THE PLACE WHERE YOU LIVE?: NO

## 2025-06-03 SDOH — SOCIAL STABILITY: SOCIAL INSECURITY: HAVE YOU HAD THOUGHTS OF HARMING ANYONE ELSE?: NO

## 2025-06-03 SDOH — SOCIAL STABILITY: SOCIAL INSECURITY: ARE YOU OR HAVE YOU BEEN THREATENED OR ABUSED PHYSICALLY, EMOTIONALLY, OR SEXUALLY BY ANYONE?: NO

## 2025-06-03 SDOH — SOCIAL STABILITY: SOCIAL INSECURITY
ASK PARENT OR GUARDIAN: ARE THERE TIMES WHEN YOU, YOUR CHILD(REN), OR ANY MEMBER OF YOUR HOUSEHOLD FEEL UNSAFE, HARMED, OR THREATENED AROUND PERSONS WITH WHOM YOU KNOW OR LIVE?: NO

## 2025-06-03 ASSESSMENT — ACTIVITIES OF DAILY LIVING (ADL)
TOILETING: INDEPENDENT
PATIENT'S MEMORY ADEQUATE TO SAFELY COMPLETE DAILY ACTIVITIES?: YES
WALKS IN HOME: INDEPENDENT
ASSISTIVE_DEVICE: EYEGLASSES;CANE;WALKER
ADEQUATE_TO_COMPLETE_ADL: YES
HEARING - RIGHT EAR: FUNCTIONAL
JUDGMENT_ADEQUATE_SAFELY_COMPLETE_DAILY_ACTIVITIES: YES
FEEDING YOURSELF: INDEPENDENT
GROOMING: INDEPENDENT
LACK_OF_TRANSPORTATION: NO
HEARING - LEFT EAR: FUNCTIONAL
LACK_OF_TRANSPORTATION: NO
DRESSING YOURSELF: INDEPENDENT
BATHING: INDEPENDENT

## 2025-06-03 ASSESSMENT — PAIN SCALES - GENERAL
PAINLEVEL_OUTOF10: 0 - NO PAIN
PAINLEVEL_OUTOF10: 8
PAINLEVEL_OUTOF10: 0 - NO PAIN
PAINLEVEL_OUTOF10: 10 - WORST POSSIBLE PAIN
PAINLEVEL_OUTOF10: 0 - NO PAIN
PAINLEVEL_OUTOF10: 8
PAINLEVEL_OUTOF10: 0 - NO PAIN
PAINLEVEL_OUTOF10: 0 - NO PAIN
PAINLEVEL_OUTOF10: 9
PAINLEVEL_OUTOF10: 8
PAINLEVEL_OUTOF10: 0 - NO PAIN
PAIN_LEVEL: 10
PAINLEVEL_OUTOF10: 10 - WORST POSSIBLE PAIN
PAINLEVEL_OUTOF10: 5 - MODERATE PAIN
PAINLEVEL_OUTOF10: 8
PAINLEVEL_OUTOF10: 0 - NO PAIN
PAINLEVEL_OUTOF10: 9
PAINLEVEL_OUTOF10: 10 - WORST POSSIBLE PAIN
PAINLEVEL_OUTOF10: 8
PAINLEVEL_OUTOF10: 9
PAINLEVEL_OUTOF10: 9

## 2025-06-03 ASSESSMENT — PAIN - FUNCTIONAL ASSESSMENT

## 2025-06-03 ASSESSMENT — COGNITIVE AND FUNCTIONAL STATUS - GENERAL
PATIENT BASELINE BEDBOUND: NO
DRESSING REGULAR LOWER BODY CLOTHING: A LITTLE
DAILY ACTIVITIY SCORE: 18
HELP NEEDED FOR BATHING: A LITTLE
DRESSING REGULAR UPPER BODY CLOTHING: A LITTLE
PERSONAL GROOMING: A LITTLE
EATING MEALS: A LITTLE
MOBILITY SCORE: 24
TOILETING: A LITTLE

## 2025-06-03 ASSESSMENT — COLUMBIA-SUICIDE SEVERITY RATING SCALE - C-SSRS
6. HAVE YOU EVER DONE ANYTHING, STARTED TO DO ANYTHING, OR PREPARED TO DO ANYTHING TO END YOUR LIFE?: NO
2. HAVE YOU ACTUALLY HAD ANY THOUGHTS OF KILLING YOURSELF?: NO
1. IN THE PAST MONTH, HAVE YOU WISHED YOU WERE DEAD OR WISHED YOU COULD GO TO SLEEP AND NOT WAKE UP?: NO

## 2025-06-03 ASSESSMENT — PAIN DESCRIPTION - DESCRIPTORS
DESCRIPTORS: ACHING

## 2025-06-03 ASSESSMENT — PAIN DESCRIPTION - ORIENTATION
ORIENTATION: RIGHT

## 2025-06-03 ASSESSMENT — LIFESTYLE VARIABLES
SKIP TO QUESTIONS 9-10: 1
AUDIT-C TOTAL SCORE: 0
HOW OFTEN DO YOU HAVE A DRINK CONTAINING ALCOHOL: NEVER
AUDIT-C TOTAL SCORE: 0
HOW MANY STANDARD DRINKS CONTAINING ALCOHOL DO YOU HAVE ON A TYPICAL DAY: PATIENT DOES NOT DRINK
HOW OFTEN DO YOU HAVE 6 OR MORE DRINKS ON ONE OCCASION: NEVER

## 2025-06-03 ASSESSMENT — PAIN DESCRIPTION - LOCATION
LOCATION: SHOULDER
LOCATION: BACK
LOCATION: SHOULDER
LOCATION: SHOULDER

## 2025-06-03 ASSESSMENT — PATIENT HEALTH QUESTIONNAIRE - PHQ9
SUM OF ALL RESPONSES TO PHQ9 QUESTIONS 1 & 2: 0
2. FEELING DOWN, DEPRESSED OR HOPELESS: NOT AT ALL
1. LITTLE INTEREST OR PLEASURE IN DOING THINGS: NOT AT ALL

## 2025-06-03 NOTE — DISCHARGE SUMMARY
Discharge Diagnosis  Status post reverse total shoulder replacement, right    Issues Requiring Follow-Up  Status post reverse total shoulder replacement, right    Test Results Pending At Discharge  Pending Labs       No current pending labs.            Hospital Course   Patient was admitted to the hospital on 6/3/2025 following  right reverse total shoulder replacement that was performed without complications.  The patient was admitted for IV antibiotics and observation.  The patient was discharged to home on 6/4/2025 without complications.       Pertinent Physical Exam At Time of Discharge  Right shoulder dressing and immobilizer in place without  concern.     Home Medications     Medication List      START taking these medications     acetaminophen 500 mg tablet; Commonly known as: Tylenol; Take 2 tablets   (1,000 mg) by mouth every 8 hours if needed for mild pain (1 - 3) (Take 2   tablets every 8 hours for 5 days, then after 5 days take 2 tablets as   needed for pain).   doxycycline 100 mg capsule; Commonly known as: Vibramycin; Take 1   capsule (100 mg) by mouth 2 times a day for 5 days. Take with at least 8   ounces (large glass) of water, do not lie down for 30 minutes after   gabapentin 300 mg capsule; Commonly known as: Neurontin; Take 1 capsule   (300 mg) by mouth once daily at bedtime for 5 days.     CHANGE how you take these medications     meloxicam 15 mg tablet; Commonly known as: Mobic; Take 1 tablet (15 mg)   by mouth once daily as needed for moderate pain (4 - 6) (pain).; What   changed: medication strength, how much to take, when to take this, reasons   to take this   oxyCODONE 5 mg immediate release tablet; Commonly known as: Roxicodone;   Take 1 tablet (5 mg) by mouth every 6 hours if needed for severe pain (7 -   10) (Do not take within 3 hours of GABAPENTIN).; What changed: when to   take this, reasons to take this, additional instructions     CONTINUE taking these medications     aspirin 325 mg  EC tablet; Take 1 tablet (325 mg) by mouth 2 times a day.   chlorhexidine 0.12 % solution; Commonly known as: Peridex   diphenhydrAMINE 25 mg capsule; Commonly known as: BENADryl; Take one cap   po the pm prior to the procedure and take one cap po the morning of   procedure for contrast allergy   docusate sodium 100 mg capsule; Commonly known as: Colace   DULoxetine 30 mg DR capsule; Commonly known as: Cymbalta; Take 1 capsule   (30 mg) by mouth once daily. Do not crush or chew.   finasteride 5 mg tablet; Commonly known as: Proscar   fluticasone 50 mcg/actuation nasal spray; Commonly known as: Flonase;   Administer 1 spray into each nostril once daily as needed for rhinitis.   Shake gently. Before first use, prime pump. After use, clean tip and   replace cap. As needed   furosemide 40 mg tablet; Commonly known as: Lasix; Take 1 tablet (40 mg)   by mouth once daily as needed (swelling). Takes as needed   hyoscyamine 0.125 mg disintegrating tablet; Commonly known as: Anaspaz   irbesartan 150 mg tablet; Commonly known as: Avapro; TAKE 1 TABLET DAILY   lubricating eye drops ophthalmic solution   metoprolol succinate XL 50 mg 24 hr tablet; Commonly known as:   Toprol-XL; TAKE 1 TABLET DAILY   mirtazapine 7.5 mg tablet; Commonly known as: Remeron   naloxone 4 mg/0.1 mL nasal spray; Commonly known as: Narcan; Administer   1 spray (4 mg) into affected nostril(s) if needed for opioid reversal or   respiratory depression.   ondansetron 4 mg tablet; Commonly known as: Zofran   ondansetron ODT 4 mg disintegrating tablet; Commonly known as:   Zofran-ODT   penicillin v potassium 500 mg tablet; Commonly known as: Veetid   predniSONE 20 mg tablet; Commonly known as: Deltasone; Take one tab  po   the pm prior to procedure and take one tab the morning of procedure for   contrast allergy   tamsulosin 0.4 mg 24 hr capsule; Commonly known as: Flomax   triamcinolone 0.025 % cream; Commonly known as: Kenalog     ASK your doctor about  these medications     atorvastatin 20 mg tablet; Commonly known as: Lipitor   Dexilant 60 mg DR capsule; Generic drug: dexlansoprazole   dicyclomine 10 mg capsule; Commonly known as: Bentyl; Take 1 capsule (10   mg) by mouth 4 times a day.   losartan 100 mg tablet; Commonly known as: Cozaar   tiZANidine 2 mg tablet; Commonly known as: Zanaflex; Take 1 tablet (2   mg) by mouth every 8 hours if needed for muscle spasms.       Outpatient Follow-Up  Future Appointments   Date Time Provider Department Center   6/20/2025 11:00 AM Ingrid Rucker PA-C [id number] [facility]   6/30/2025 10:15 AM Macho Elizabeth MD [id number] [facility]   7/10/2025 11:20 AM SHAD Cortés-CNP [id number] [facility]       Ingrid Rucker PA-C

## 2025-06-03 NOTE — OP NOTE
RIGHT REVERSE total shoulder replacement; NOT an anatomic total shoulder replacement, bicep tenodesis * Biomet reverse shoulder arthroplasty equipment* 23 HRS OBS* (R) Operative Note     Date: 6/3/2025  OR Location: [facility] OR    Name: Abraham Rodriguez, : 1940, Age: 84 y.o., MRN: [id number], Sex: male    ORTHOPEDIC OPERATIVE REPORT / POST-OP NOTE    SURGEON:  Speedy Bullock MD  ASSISTANT(S):  Ingrid Rucker PA-C; Surgical Assistant  PRE-OPERATIVE DIAGNOSIS: Right shoulder massive rotator cuff tear with glenohumeral arthritis, proximal biceps tendinopathy/partial tearing  POST-OPERATIVE DIAGNOSIS:  Same  PROCEDURE: Right shoulder reverse total shoulder arthroplasty, biceps tenodesis  CPT CODE(S):  47527, 09900-66  ANESTHESIA:  general + regional  ESTIMATED BLOOD LOSS: 200 mL  SPECIMEN:  None  FINDINGS:  Above  COMPLICATIONS:  None  CONDITION:  Stable to the Recovery Room  TASKS PERFORMED BY RNFA OR SURGICAL ASSISTANT:  holding or positioning the extremity, retracting, helping manage the electrocautery, etc.    I, Dr Bullock, was present and scrubbed for the entire surgical procedure, including wound closure.     No qualified Orthopedic Resident was available to assist with this procedure.  Therefore, the assistance of Ingrid Rucker PA-C, was required throughout this entire procedure.      Ingrid Rucker PA-C, is specifically trained and experienced in assisting with this procedure.  During the procedure, she actively assisted Dr. Bullock in completing the operation safely and expeditiously by helping to provide exposure, maintain hemostasis, and served other technical functions, such as suturing, assisting in drilling, etc.    We will be billing for Ingrid Rucker PA-C, as a required First Assistant for this procedure.      INDICATION FOR SURGERY:  84-year-old male with longstanding right shoulder pain and weakness.  We treated the patient's shoulder pain and weakness conservatively with anti-inflammatories, physical  therapy, and steroid injections.  The patient failed to have significant relief.  X-rays and MRI confirmed a massive rotator cuff tear in the shoulder with glenohumeral arthritis.  The patient was also having pain over their proximal biceps tendon.  We discussed a reverse total shoulder replacement and biceps tenodesis done at the same time due to the patient's shoulder pain and weakness and proximal biceps pain.  The patient understood all the risks versus benefits of operative and non-operative treatment options.  The risks of shoulder replacement were discussed, which included but were not limited to: risk of infection or risk of injury to the axillary nerve, risks of bleeding, nerve, artery, or muscle damage, risk of continued pain or fracture either intra-operatively or post-operatively, risk of post-operative acromial stress fracture or displaced fracture, risk of dislocation or disassociation of the reverse shoulder replacement, risk of need for additional surgery, risk of anesthesia including risks of heart attack, stroke, or even death.  The patient wanted to proceed with surgery and signed appropriate surgical consents.  On the morning of surgery, I signed the patient's operative shoulder the preoperative holding area.      PROCEDURE:  The procedure was performed using the [employer] Comprehensive Reverse Shoulder system.  The patient was brought to the operating room after anesthesia performed a block on the patient's operative upper extremity.  The patient was placed supine on the operating room table and all of their bony prominences were padded.  A operating room huddle was performed and the patient received IV antibiotics.  The patient was placed into a beachchair position with all of their lucila prominences padded and SCDs were applied to the lower extremities and used throughout the procedure.     The patient's right upper extremity was prepped and draped in the normal sterile fashion.  A pre-incision  timeout was called.  An incision was made over the deltopectoral skin which was then carried down bluntly to the deltopectoral fascia.  Bleeders were coagulated with electrocautery.  The deltopectoral fascia was incised and the cephalic vein was identified and retracted laterally with the deltoid and protected throughout the procedure.  The pectoralis major was retracted medially throughout the procedure.  The superior aspect the pectoralis major was released in order to aid in visualization.  The biceps tendon was identified and found to be erythematous and partially torn.  The decision was made to proceed with a biceps tenodesis as planned due to the damaged nature of the biceps and the fact that the patient had been having pain in this area preoperatively.  The biceps was sewn to the superior aspect the pectoralis major using #2 FiberWire interrupted sutures and then cut just proximal to the biceps tenodesis, completing the biceps tenodesis.  The subscapularis was then tenotomized 1 cm medial to its insertion on the lesser tuberosity.  The shoulder was externally rotated and a massive rotator cuff tear was identified and glenohumeral arthritis was seen.    Starting reamers were used up to a size 14 reamer which is found to have excellent fit.  The Biomet cutting guide was placed at 30 degrees of external rotation and a 45 degree cut was made on the humeral head.  Osteophytes were removed with a rongeur.    Broaches were used up to a size 14 which was found to have an excellent fit.    Attention was turned to the glenoid and appropriate retractors were placed.  The labrum was excised.  A guidepin was drilled at a 10 degree cephalad angle using the Biomet guide.  The guidepin was overdrilled with the Biomet glenoid reamer.  The Biomet comprehensive reverse shoulder glenosphere mini baseplate was then impacted into the glenoid.  The central non-locking screw was then measured and inserted with excellent compression.   The 4 peripheral locking screws were then drilled, measured, and inserted.  The Biomet comprehensive reverse shoulder glenosphere was then impacted onto the mini baseplate and found to be extremely stable.    The shoulder was then trialed with a standard poly and was found to have excellent range of motion with excellent stability.  The humeral components were removed and the shoulder was thoroughly pulsed with pulse lavage.    A Biomet comprehensive shoulder system mini humeral stem 14 mm in diameter by 83 mm long was impacted in the humerus with excellent fit.  A Biomet comprehensive reverse shoulder system mini humeral tray 0 millimeter taper offset, 40 mm diameter, with highly cross-linked polyethylene was then impacted onto the humeral stem and found to be very stable.  The shoulder was then reduced and found to have excellent range of motion with excellent stability.  The shoulder was then thoroughly pulsed with pulse lavage, followed by Irrisept irrigation, followed by 1 g of vancomycin powder sprinkled over the reverse shoulder replacement.  The subscapularis was repaired to the lesser tuberosity using #2 FiberWire interrupted sutures.  The deltopectoral fascia was loosely reapproximated with 0 Vicryl interrupted sutures.  The skin was closed in layers with 0 Vicryl interrupted sutures for the deep tissue, 2-0 Vicryl interrupted sutures for the subcutaneous tissue, and staples in the skin.  Xeroform, dry sterile dressing, and a gunslinger (0 degree internal/external rotation) shoulder immobilizer was applied.  The patient was then awoken brought to recovery room in good condition.  There were no complications.  Postoperative x-rays were checked in the recovery room and found to have proper location of the reverse shoulder replacement with no fractures seen.

## 2025-06-03 NOTE — ANESTHESIA PROCEDURE NOTES
Airway  Date/Time: 6/3/2025 11:02 AM  Reason: elective    Airway not difficult    Staffing  Performed: CRNA   Authorized by: MAC Cooper    Performed by: MAC Cooper  Patient location during procedure: OR    Patient Condition  Indications for airway management: anesthesia  Patient position: sniffing  Planned trial extubation  Sedation level: deep     Final Airway Details   Preoxygenated: yes  Final airway type: endotracheal airway  Successful airway: ETT  Cuffed: yes   Successful intubation technique: video laryngoscopy ([place]reaux)  Adjuncts used in placement: intubating stylet  Endotracheal tube insertion site: oral  Blade: Yamile  Blade size: #4  ETT size (mm): 7.5  Cormack-Lehane Classification: grade I - full view of glottis  Placement verified by: chest auscultation and capnometry   Cuff volume (mL): 9  Measured from: lips  ETT to lips (cm): 24  Number of attempts at approach: 1    Additional Comments  Atraumatic intubation.

## 2025-06-03 NOTE — CONSULTS
"Inpatient consult to Medicine  Consult performed by: Frances Alex PA-C  Consult ordered by: Ingrid Rucker PA-C  Reason for consult: medical management        [facility] GENERAL MEDICINE CONSULTATION NOTE    ASSESSMENT AND PLAN:     Abraham Rodriguez is a 84 y.o. male with past medical history s/f HTN, HLD, ASHD, remote MI, history of DVT/PE, HFpEF presenting with severe right shoulder and arm pain status post fall in December 2024 for right reverse total shoulder replacement with Dr. Bullock today. Medicine was consulted for medical management.     Complete tear of rotator cuff, bicep tendinitis s/p right reverse total shoulder replacement with Dr. Bullock on 6/3/2025  - Management by primary care team  - Continue IV Ancef for surgical prophylaxis and maintenance IV fluid  - PT OT eval  - Antiemetics PRN and pain control     ASHD, HTN, HLD  - ASHD: Patient without chest pain or anginal equivalent, continue home meds  - HTN: BP well-controlled, continue home antihypertensive therapy  - HLD: Currently not on meds, continue lifestyle modifications    HFpEF  - appears euvolemic on exam, continue home meds    DVT prophylaxis: Aspirin 81 mg twice daily per surgery team recommendations    Other:   -I have reviewed the patient's chart and documentation/notes from Primary Care / PCP / Family Medicine and Cardiology in the inpatient and/or outpatient setting   -I have reviewed the patient's most recent lab values in the chart (greater detail below)  -I have reconciled the patient's outside information and chart during this consultation, including: allergies, medications (confirmed at bedside as well), prior diagnoses, and immunizations.     Care / Plan Discussed With: Patient    Frances FELICIA Alex PA-C  General Inpatient Medicine (\"IMS\") / Hospitalist Service  Available via Panelf[employer] [phone]. Outside of these hours, please contact providers assigned to the team and/or via the Medicine Acute Pager.  Thank you for including the " hospitalist service in the care of your patient. We will continue to follow while they remain admitted.     I spent a total of 35 minutes in the overall professional care of this patient on this date of service.    Dragon dictation software was used to dictate this note and thus there may be minor errors in translation/transcription including garbled speech or misspellings. Please contact for clarification if needed.    HISTORY OF PRESENT ILLNESS:     Primary Source: Patient    History Of Present Illness:    Abraham Rodriguez is a 84 y.o. male with a significant past medical history of HTN, HLD, ASHD, remote MI, history of DVT/PE, HFpEF presenting with severe right shoulder and arm pain status post fall in December 2024 for right reverse total shoulder replacement with Dr. Bullock today. Awaiting for full op-note, does not appear to have major complications from the procedure. Patient is currently resting comfortably in bed, complains of numbness in his fingers. Has some pain in his right scapula. Has appetite and is ordering food. Denies cp/pressure, palpitations, diaphoresis, SOB, VILLARREAL, dizziness / lightheadedness, syncope or near syncope, HA, vision changes, f/c/n/v/d/abd pain. Patient is aware of hospitalist service presence in-house overnight if questions, concerns, or any changes in condition arise. All questions answered to the best of my ability.     Review of Systems:   I performed a complete 12 point review of systems and it is negative except as noted in HPI or above. All other systems have been reviewed and are negative.    PAST HISTORIES:     Past Medical History:  He has a past medical history of Allergic dermatitis (09/14/2023), Allergic rhinitis due to pollen (10/23/2014), Arthritis, Chronic pain disorder, Clotting disorder (Multi), COPD (chronic obstructive pulmonary disease) (Multi), Coronary artery disease, Encounter for other preprocedural examination (06/24/2014), Encounter for screening for malignant  neoplasm of prostate, Fissure in skin of foot (09/14/2023), Hyperlipidemia, Hypertension, Localized edema (05/11/2020), Lung infiltrate (09/14/2023), Myocardial infarction (Multi), Other conditions influencing health status, Pain in unspecified knee (12/22/2014), Personal history of diseases of the skin and subcutaneous tissue (04/23/2013), Personal history of other diseases of the musculoskeletal system and connective tissue (06/19/2014), Personal history of other diseases of the nervous system and sense organs (08/10/2021), Personal history of other diseases of the respiratory system (11/12/2014), Personal history of other diseases of the respiratory system (08/13/2014), Personal history of other specified conditions (08/20/2013), Plantar fascial fibromatosis (07/22/2013), Polyp of colon, Syncope and collapse (01/23/2015), Unspecified abdominal pain (12/22/2014), and Unspecified disorder of eyelid (10/23/2014).    Past Surgical History:  He has a past surgical history that includes Other surgical history (04/27/2021); Other surgical history (Left, 05/31/2023); Cholecystectomy (04/23/2013); Shoulder surgery (04/23/2013); Other surgical history (04/23/2013); Cardiac catheterization; Total knee arthroplasty (Right, 03/27/2024); and Hip Arthroplasty (Right).      Social History:  He reports that he quit smoking about 42 years ago. His smoking use included cigarettes. He has never used smokeless tobacco. He reports that he does not currently use drugs. He reports that he does not drink alcohol.    Family History:  Family History[1]    Allergies:  Iodinated contrast media, Tobramycin-dexamethasone, Atorvastatin, Cinnamon, Cucumber, and Lisinopril    OBJECTIVE:     Last Recorded Vitals:  Vitals:    06/03/25 1402 06/03/25 1410 06/03/25 1420 06/03/25 1445   BP:  127/70 129/69 118/69   Pulse:  65 62 64   Resp:  14 14 14   Temp:    36.8 °C (98.2 °F)   TempSrc:    Temporal   SpO2: 100% 100% 100% 100%   Weight:       Height:    "      /69   Pulse 64   Temp 36.8 °C (98.2 °F) (Temporal)   Resp 14   Ht 1.803 m (5' 11\")   Wt 80.3 kg (177 lb)   SpO2 100%   BMI 24.69 kg/m²      Physical Exam:  Vital signs and nursing notes reviewed.   Constitutional: Pleasant and cooperative. Laying in bed in no acute distress. Conversant.   Skin: Warm and dry; C/D/I dressings in right shoulder, did not remove.   Eyes: EOMI. Anicteric sclera.   ENT: Mucous membranes moist; no obvious injury or deformity appreciated.   Head and Neck: Normocephalic, atraumatic. ROM preserved. Trachea midline. No appreciable JVD.   Respiratory: Nonlabored on RA. Lungs clear to auscultation bilaterally without obvious adventitious sounds. Chest rise is equal.  Cardiovascular: RRR. No gross murmur, gallop, or rub. Extremities are warm and well-perfused.   Chest Wall: No chest wall tenderness.   Gastrointestinal: Abdomen soft, nontender, nondistended. No obvious organomegaly appreciated. Bowel sounds are present.  Genitourinary: No CVA tenderness.   Musculoskeletal: No gross abnormalities appreciated. 2+ radial pulses bilaterally, warm and brisk cap refills in bilateral upper extremities.  Extremities: No cyanosis, edema, or clubbing evident. Neurovascularly intact.   Neurologic: A&Ox3. CN 2-12 grossly intact. Able to respond to questions appropriately and clearly. No acute focal neurologic deficits appreciated.  Psychiatric: Appropriate mood and behavior.    MEDICATIONS:     Inpatient Medications:  Scheduled Medications[2]  PRN Medications[3]    Outpatient Medications:  Prior to Admission medications    Medication Sig Start Date End Date Taking? Authorizing Provider   chlorhexidine (Peridex) 0.12 % solution Use 15 mL in the mouth or throat if needed. 5/10/23  Yes Historical Provider, MD   DULoxetine (Cymbalta) 30 mg DR capsule Take 1 capsule (30 mg) by mouth once daily. Do not crush or chew. 4/7/25 3/22/28 Yes Macho Elizabeth MD   finasteride (Proscar) 5 mg tablet Take " 1 tablet (5 mg) by mouth once daily in the evening. Do not crush, chew, or split.   Yes Historical Provider, MD   furosemide (Lasix) 40 mg tablet Take 1 tablet (40 mg) by mouth once daily as needed (swelling). Takes as needed 5/4/24  Yes Lynn Feliz MD   hyoscyamine 0.125 mg disintegrating tablet Place 1 tablet (0.125 mg) under the tongue. 9/23/24  Yes Historical Provider, MD   irbesartan (Avapro) 150 mg tablet TAKE 1 TABLET DAILY 7/1/24  Yes ARCHANA Restrepo   losartan (Cozaar) 100 mg tablet Take 1 tablet (100 mg) by mouth early in the morning.. 4/4/24  Yes Historical Provider, MD   metoprolol succinate XL (Toprol-XL) 50 mg 24 hr tablet TAKE 1 TABLET DAILY 5/1/24  Yes ARCHANA Restrepo   tamsulosin (Flomax) 0.4 mg 24 hr capsule Take 2 capsules (0.8 mg) by mouth once daily in the evening.   Yes Historical Provider, MD   acetaminophen (Tylenol) 500 mg tablet Take 2 tablets (1,000 mg) by mouth every 8 hours if needed for mild pain (1 - 3) (Take 2 tablets every 8 hours for 5 days, then after 5 days take 2 tablets as needed for pain). 6/3/25   Ingrid Rucker PA-C   aspirin 325 mg EC tablet Take 1 tablet (325 mg) by mouth 2 times a day.  Patient not taking: Reported on 5/23/2025 3/20/24   Narendra Mcclain PA-C   aspirin 325 mg EC tablet Take 1 tablet (325 mg) by mouth 2 times a day. 6/3/25 7/3/25  Ingrid Rucker PA-C   atorvastatin (Lipitor) 20 mg tablet Take 1 tablet (20 mg) by mouth once daily.  Patient not taking: Reported on 5/23/2025    Historical Provider, MD   dexlansoprazole (Dexilant) 60 mg DR capsule Take 1 capsule (60 mg) by mouth once daily.  Patient not taking: Reported on 5/23/2025    Historical Provider, MD   dicyclomine (Bentyl) 10 mg capsule Take 1 capsule (10 mg) by mouth 4 times a day.  Patient not taking: Reported on 5/23/2025 8/2/24 8/2/25  Waldemar Hameed MD   diphenhydrAMINE (BENADryl) 25 mg capsule Take one cap po the pm prior to the procedure and take one cap po the morning of  procedure for contrast allergy 4/15/25   Macho Elizabeth MD   docusate sodium (Colace) 100 mg capsule Take 1 capsule (100 mg) by mouth 2 times a day as needed for constipation. 3/20/24   Narendra Mcclain PA-C   doxycycline (Vibramycin) 100 mg capsule Take 1 capsule (100 mg) by mouth 2 times a day for 5 days. Take with at least 8 ounces (large glass) of water, do not lie down for 30 minutes after 6/3/25 6/8/25  Ingrid Rucker PA-C   fluticasone (Flonase) 50 mcg/actuation nasal spray Administer 1 spray into each nostril once daily as needed for rhinitis. Shake gently. Before first use, prime pump. After use, clean tip and replace cap. As needed 4/25/24   Ramu De Anda MD   gabapentin (Neurontin) 300 mg capsule Take 1 capsule (300 mg) by mouth once daily at bedtime for 5 days. 6/3/25 6/8/25  Ingrid Rucker PA-C   lubricating eye drops (polyvinyl alcohol-povidon,PF,) ophthalmic solution Administer into affected eye(s). 4/4/25   Historical Provider, MD   meloxicam (Mobic) 15 mg tablet Take 1 tablet (15 mg) by mouth once daily as needed for moderate pain (4 - 6) (pain). 6/3/25 7/3/25  Ingrid Rucker PA-C   meloxicam (Mobic) 7.5 mg tablet Take 1 tablet (7.5 mg) by mouth once daily.  Patient not taking: Reported on 5/23/2025 1/29/25 1/29/26  SHAYLEE Bullock MD   mirtazapine (Remeron) 7.5 mg tablet Take 1 tablet (7.5 mg) by mouth once daily at bedtime. 6/9/23   Historical Provider, MD   naloxone (Narcan) 4 mg/0.1 mL nasal spray Administer 1 spray (4 mg) into affected nostril(s) if needed for opioid reversal or respiratory depression. 7/17/24   Angella García, APRN-CNP   ondansetron (Zofran) 4 mg tablet Take 1 tablet (4 mg) by mouth. 9/23/24   Historical Provider, MD   ondansetron ODT (Zofran-ODT) 4 mg disintegrating tablet  4/23/25   Historical Provider, MD   oxyCODONE (Roxicodone) 5 mg immediate release tablet Take 1 tablet (5 mg) by mouth 5 times a day for 28 days. PRN severe pain. 4/9/25 5/7/25  Macho Elizabeth MD    oxyCODONE (Roxicodone) 5 mg immediate release tablet Take 1 tablet (5 mg) by mouth every 6 hours if needed for severe pain (7 - 10) (Do not take within 3 hours of GABAPENTIN). 6/3/25   Ingrid Rucker PA-C   penicillin v potassium (Veetid) 500 mg tablet TAKE 2 TABLETS BY MOUTH 1 HOUR PRIOR TO PROCEDURE AND 1 TAB 6 HOURS AFTER PROCEDURE FOR DENTAL WORK 5/6/25   Historical Provider, MD   predniSONE (Deltasone) 20 mg tablet Take one tab  po the pm prior to procedure and take one tab the morning of procedure for contrast allergy 4/15/25   Macho Elizabeth MD   tiZANidine (Zanaflex) 2 mg tablet Take 1 tablet (2 mg) by mouth every 8 hours if needed for muscle spasms.  Patient not taking: Reported on 4/7/2025 4/25/24   Jade Ochoa APRN-CNP   triamcinolone (Kenalog) 0.025 % cream Apply 1 Application topically. 12/12/23   Historical Provider, MD       LABS AND IMAGING:     Labs:  Recent labs personally reviewed in the EMR (from date: 5/23/25), including: CBC and BMP    Most recent CBC: WBC 6.9, Hgb 14.7, Plts 197.   Most recent chemistries: glucose 111, Na 138, K 3.8, BUN 21, sCr 0.87, alk phos 50, ALT 6, AST 10, bilirubin 0.5. Magnesium 2.13.    Imaging:  Imaging  XR shoulder right 2+ views  Result Date: 6/3/2025  Surgical changes without hardware complication or osseous injury evident.   MACRO: None   Signed by: Atif Guajardo 6/3/2025 1:51 PM Dictation workstation:   [id number]    Nuclear Stress Test  Result Date: 5/30/2025  1. Fixed small-sized moderate to severe perfusion defects involving inferior wall, although there is a preserved wall motion and thickening, prior infarction can not be completely excluded. No evidence of inducible myocardial ischemia. 2. The left ventricle is normal in size. 3. Normal LV wall motion with a post-stress LV EF estimated greater than 65%.   I personally reviewed the images/study and I agree with the findings as stated. This study was interpreted at [facility]  [place], [place].   MACRO: None   Signed by: Bruce Lainez 5/30/2025 12:08 PM Dictation workstation:   [id number]      Cardiology, Vascular, and Other Imaging  Transthoracic Echo Complete  Result Date: 5/29/2025   [facility], [address] [place] [address]              Tel [phone] and Fax [phone] TRANSTHORACIC ECHOCARDIOGRAM REPORT  Patient Name:       MR. ARTEMIO MORALES   Reading Physician:    [id number] Oleg Raphael MD Study Date:         5/29/2025           Ordering Provider:    [id number] OLEG RAPHAEL MRN/PID:            [id number]            Fellow: Accession#:         [id number]        Nurse:                Palak Bridges RN Date of Birth/Age:  1940 / 84      Sonographer:          Norma navarro RDCS Gender assigned at  M                   Additional Staff: Birth: Height:             180.34 cm           Admit Date: Weight:             79.38 kg            Admission Status:     Outpatient BSA / BMI:          1.99 m2 / 24.41     Encounter#:           [id number]                     kg/m2 Blood Pressure:     172/84 mmHg         Department Location: Study Type:    TRANSTHORACIC ECHO (TTE) COMPLETE Diagnosis/ICD: Shortness of breath-R06.02 Indication:    SOB CPT Code:      Echo Complete w Full Doppler-[id number] Patient History: Pertinent History: HTN, PE, Dyspnea, Dyslipidemia and COPD. pre-operative, ASHD,                    HFpEF. Study Detail: The following Echo studies were performed: 2D, M-Mode, Doppler and               color flow. Technically challenging study due to body habitus.               Definity used as a contrast agent for endocardial border               definition and agitated saline used as a contrast agent for               intraseptal flow evaluation. Total contrast  used for this               procedure was 1.5 mL via IV push. The patient was awake.  PHYSICIAN INTERPRETATION: Left Ventricle: Left ventricular ejection fraction is normal by visual estimate at 60-65%. There are no regional left ventricular wall motion abnormalities. The left ventricular cavity size is normal. There is left ventricular concentric remodeling. Spectral Doppler shows a Grade I (impaired relaxation pattern) of left ventricular diastolic filling with normal left atrial filling pressure. Left Atrium: The left atrial size is normal. There is no evidence of a patent foramen ovale. A bubble study using agitated saline was performed. Bubble study is negative. Right Ventricle: The right ventricle is normal in size. There is normal right ventricular global systolic function. Right Atrium: The right atrium is normal in size. Aortic Valve: There is trace aortic valve regurgitation. Mitral Valve: The mitral valve is normal in structure. There is trace mitral valve regurgitation. The E Vmax is 0.50 m/s. Tricuspid Valve: The tricuspid valve is structurally normal. There is trace tricuspid regurgitation. The doppler estimated RVSP is within normal limits with a right ventricular systolic pressure of 29 mmHg. Pulmonic Valve: The pulmonic valve is not well visualized. There is physiologic pulmonic valve regurgitation. Pericardium: There is no pericardial effusion noted. Aorta: The aortic root is normal. There is mild dilatation of the ascending aorta.  CONCLUSIONS:  1. Left ventricular ejection fraction is normal by visual estimate at 60-65%.  2. Spectral Doppler shows a Grade I (impaired relaxation pattern) of left ventricular diastolic filling with normal left atrial filling pressure.  3. There is normal right ventricular global systolic function.  4. There is no evidence of a patent foramen ovale. QUANTITATIVE DATA SUMMARY:  2D MEASUREMENTS:          Normal Ranges: Ao Root d:       3.30 cm  (2.0-3.7cm) LAs:              3.20 cm  (2.7-4.0cm) IVSd:            1.45 cm  (0.6-1.1cm) LVPWd:           1.35 cm  (0.6-1.1cm) LVIDd:           3.88 cm  (3.9-5.9cm) LVIDs:           2.75 cm LV Mass Index:   100 g/m2 LVEDV Index:     51 ml/m2 LV % FS          29.1 %  LEFT ATRIUM:                  Normal Ranges: LA Vol A4C:        38.0 ml    (22+/-6mL/m2) LA Vol A2C:        36.4 ml LA Vol BP:         38.3 ml LA Vol Index A4C:  19.1ml/m2 LA Vol Index A2C:  18.3 ml/m2 LA Vol Index BP:   19.2 ml/m2 LA Area A4C:       15.1 cm2 LA Area A2C:       15.2 cm2 LA Major Axis A4C: 5.1 cm LA Major Axis A2C: 5.4 cm LA Volume Index:   18.0 ml/m2  RIGHT ATRIUM:                 Normal Ranges: RA Vol A4C:        35.8 ml    (8.3-19.5ml) RA Vol Index A4C:  18.0 ml/m2 RA Area A4C:       14.8 cm2 RA Major Axis A4C: 5.2 cm  M-MODE MEASUREMENTS:         Normal Ranges: Ao Root:             3.30 cm (2.0-3.7cm) LAs:                 3.40 cm (2.7-4.0cm)  AORTA MEASUREMENTS:         Normal Ranges: Asc Ao, d:          3.70 cm (2.1-3.4cm)  LV SYSTOLIC FUNCTION:                      Normal Ranges: EF-A4C View:    61 % (>=55%) EF-A2C View:    65 % EF-Biplane:     60 % EF-Visual:      63 % LV EF Reported: 63 %  LV DIASTOLIC FUNCTION:             Normal Ranges: MV Peak E:             0.50 m/s    (0.7-1.2 m/s) MV Peak A:             0.85 m/s    (0.42-0.7 m/s) E/A Ratio:             0.59        (1.0-2.2) MV e'                  0.056 m/s   (>8.0) MV lateral e'          0.06 m/s MV medial e'           0.05 m/s MV A Dur:              151.00 msec E/e' Ratio:            9.03        (<8.0) PulmV Sys Ceasar:         42.40 cm/s PulmV Noe Ceasar:        30.20 cm/s PulmV S/D Ceasar:         1.40 PulmV A Revs Ceasar:      37.40 cm/s PulmV A Revs Dur:      114.00 msec  MITRAL VALVE:          Normal Ranges: MV DT:        378 msec (150-240msec)  AORTIC VALVE:           Normal Ranges: AoV Vmax:      1.11 m/s (<=1.7m/s) AoV Peak P.9 mmHg (<20mmHg) LVOT Max Ceasar:  0.69 m/s (<=1.1m/s) LVOT VTI:       16.60 cm LVOT Diameter: 2.10 cm  (1.8-2.4cm) AoV Area,Vmax: 2.15 cm2 (2.5-4.5cm2)  RIGHT VENTRICLE: RV Basal 2.70 cm RV Mid   1.60 cm RV Major 6.8 cm TAPSE:   22.8 mm RV s'    0.10 m/s  TRICUSPID VALVE/RVSP:          Normal Ranges: Peak TR Velocity:     2.55 m/s Est. RA Pressure:     3 RV Syst Pressure:     29       (< 30mmHg) IVC Diam:             1.94 cm  PULMONIC VALVE:          Normal Ranges: PV Max Ceasar:     1.3 m/s  (0.6-0.9m/s) PV Max P.9 mmHg  PULMONARY VEINS: PulmV A Revs Dur: 114.00 msec PulmV A Revs Ceasar: 37.40 cm/s PulmV Noe Ceasar:   30.20 cm/s PulmV S/D Ceasar:    1.40 PulmV Sys Caesar:    42.40 cm/s  [id number] Oleg Raphael MD Electronically signed on 2025 at 2:01:50 PM  ** Final **               [1]   Family History  Problem Relation Name Age of Onset    Hypothyroidism Brother      Diabetes Brother      Myasthenia gravis Brother     [2] acetaminophen, 650 mg, oral, q6h GUERO  aspirin, 81 mg, oral, BID  ceFAZolin, 2 g, intravenous, q8h  docusate sodium, 100 mg, oral, BID  doxycylcine, 100 mg, oral, q12h GUERO  DULoxetine, 30 mg, oral, Daily  finasteride, 5 mg, oral, q PM  ibuprofen, 600 mg, oral, TID  losartan, 50 mg, oral, Daily  [START ON 2025] metoprolol succinate XL, 50 mg, oral, Daily  mirtazapine, 7.5 mg, oral, Nightly  tamsulosin, 0.8 mg, oral, q PM    [3] PRN medications: acetaminophen, cyclobenzaprine, furosemide, HYDROmorphone, HYDROmorphone, hyoscyamine, melatonin, naloxone, naloxone, ondansetron **OR** ondansetron, oxyCODONE, oxyCODONE

## 2025-06-03 NOTE — ANESTHESIA PREPROCEDURE EVALUATION
Patient: Abraham Rodriguez    Procedure Information       Date/Time: 06/03/25 1040    Procedure: RIGHT REVERSE total shoulder replacement; NOT an anatomic total shoulder replacement * Biomet reverse shoulder arthroplasty equipment* 23 HRS OBS* (Right: Shoulder) - REVERSE total shoulder replacement; NOT an anatomic total shoulder replacement    Location: [facility] OR 06 / Virtual [facility]    Surgeons: SHAYLEE Bullock MD            Relevant Problems   Cardiac   (+) ASHD (arteriosclerotic heart disease)   (+) Benign essential hypertension   (+) Coronary atherosclerosis of native coronary artery   (+) Primary hypertension      Pulmonary   (+) Bilateral pulmonary embolism (Multi)   (+) COPD, mild (Multi)   (+) Dyspnea on exertion   (+) Restrictive lung disease      Neuro   (+) Cervical radiculitis   (+) Idiopathic peripheral neuropathy   (+) Lumbago with sciatica, left side   (+) Lumbago with sciatica, right side   (+) Lumbar radiculitis   (+) Lumbar radiculopathy   (+) Neuropathy, peroneal nerve   (+) Right cervical radiculopathy   (+) Sciatica      GI   (+) Esophageal reflux      /Renal   (+) Benign prostatic hyperplasia   (+) Renal calculus, left      Musculoskeletal   (+) Chronic pain syndrome   (+) Degeneration of intervertebral disc of lumbar region   (+) Degenerative joint disease of left hip   (+) Lumbar stenosis with neurogenic claudication   (+) Osteoarthritis   (+) Primary osteoarthritis of right knee      HEENT   (+) Chronic sinusitis       Clinical information reviewed:   Tobacco  Allergies  Meds  Problems  Med Hx  Surg Hx   Fam Hx  Soc   Hx        NPO Detail:  NPO/Void Status  Date of Last Liquid: 06/02/25  Time of Last Liquid: 1800  Date of Last Solid: 06/02/25  Time of Last Solid: 1800  Last Intake Type: Clear fluids; Light meal         Physical Exam    Airway  Mallampati: II  TM distance: >3 FB  Neck ROM: limited  Mouth opening: 3 or more finger widths     Cardiovascular   Rhythm: regular  Rate: normal      Dental - normal exam     Pulmonary - normal exam   Abdominal            Anesthesia Plan    History of general anesthesia?: yes  History of complications of general anesthesia?: no    ASA 3     general and regional     Anesthetic plan and risks discussed with patient.  Use of blood products discussed with patient who.

## 2025-06-03 NOTE — ANESTHESIA PROCEDURE NOTES
Peripheral Block    Patient location during procedure: pre-op  Medication administered at: 6/3/2025 10:33 AM  End time: 6/3/2025 10:37 AM  Reason for block: at surgeon's request and post-op pain management  Staffing  Performed: CRNA   Authorized by: MAC Cooper    Performed by: MAC Montano  Preanesthetic Checklist  Completed: patient identified, IV checked, site marked, risks and benefits discussed, surgical consent, monitors and equipment checked, pre-op evaluation and timeout performed   Timeout performed at: 6/3/2025 10:33 AM  Peripheral Block  Patient position: sitting  Prep: ChloraPrep  Patient monitoring: heart rate, cardiac monitor and continuous pulse ox  Block type: interscalene  Injection technique: single-shot  Guidance: nerve stimulator and ultrasound guided  Local infiltration: ropivacaine  Infiltration strength: 0.5 %  Dose: 15 mL  Needle  Needle gauge: 22 G  Needle length: 5 cm  Needle localization: nerve stimulator and ultrasound guidance  Assessment  Injection assessment: negative aspiration for heme, no paresthesia on injection, incremental injection and local visualized surrounding nerve on ultrasound  Paresthesia pain: none  Heart rate change: no  Slow fractionated injection: yes  Additional Notes  Anesthesia consult was placed by Dr. Bullock for post procedural analgesia.  The patient's chart was reviewed and they were deemed an appropriate candidate for the procedure. The patient was educated in detail on the risks, benefits, and alternatives to the block including but not limited to: temporary or permanent nerve damage, bleeding, infection, damage to surrounding tissues, possible block failure and the potential for local anesthesia toxicity syndrome.  The patient expressed understanding and all questions were answered prior to the initiation of the procedure.  Informed consent was also signed by the patient and laterality determined per institutional policy.  A  "formal \"time out \"consistent with the institutions rules and regulations was performed by the anesthesia provider and appropriate RN.     Procedure  The patient was placed in the sitting position. The LEFT/RIGHT side was marked and skin prep applied and allowed to dry. Proper monitors were applied with oxygen.  Sedation was provided by administering     Versed 1 mg IV  Fentanyl 25 mcg IV    A high frequency linear ultrasound probe with probe cover and sterile coupling gel was applied over the anterior neck and C-5/6/7 roots tightly located. The fascial planes between the anterior and middle scalene muscles as well as the great vessels of the neck were also identified. The probe was then positioned for a short axis view structural view, allowing for exclusion of any nearby vessels,and doppler mode was engaged to assist in this identification.   A 20G stimuplex needle was then advanced maintaining an in-plane visualization throughout the procedure, under ultrasound guidance from lateral to medial to come to rest just deep to the neurovascular sheath, but avoiding contact of the brachial plexus.   Upon negative aspiration, 15 ml 0.5% Ropivacaine with 4 mg decadron preservative free was administered safely and cautiously between the muscle planes.  Aspiration every 3-5mls was done to avoid potential intravascular injection.  All injections were done without resistance and were free of blood/ CSF.  The patient tolerated the procedure well without report of intense pain, tinnitus, metallic taste, or circumoral numbness.  The block was then evaluated after a few minutes and appeared to be functioning appropriately.                "

## 2025-06-03 NOTE — CARE PLAN
Problem: Skin  Goal: Decreased wound size/increased tissue granulation at next dressing change  Outcome: Progressing  Flowsheets (Taken 6/3/2025 1518)  Decreased wound size/increased tissue granulation at next dressing change: Promote sleep for wound healing  Goal: Participates in plan/prevention/treatment measures  Outcome: Progressing  Flowsheets (Taken 6/3/2025 1518)  Participates in plan/prevention/treatment measures:   Discuss with provider PT/OT consult   Increase activity/out of bed for meals  Goal: Prevent/manage excess moisture  Outcome: Progressing  Flowsheets (Taken 6/3/2025 1518)  Prevent/manage excess moisture: Monitor for/manage infection if present  Goal: Prevent/minimize sheer/friction injuries  Outcome: Progressing  Flowsheets (Taken 6/3/2025 1518)  Prevent/minimize sheer/friction injuries: Turn/reposition every 2 hours/use positioning/transfer devices  Goal: Promote/optimize nutrition  Outcome: Progressing  Flowsheets (Taken 6/3/2025 1518)  Promote/optimize nutrition:   Monitor/record intake including meals   Consume > 50% meals/supplements  Goal: Promote skin healing  Outcome: Progressing  Flowsheets (Taken 6/3/2025 1518)  Promote skin healing: Assess skin/pad under line(s)/device(s)     Problem: Pain - Adult  Goal: Verbalizes/displays adequate comfort level or baseline comfort level  Outcome: Progressing     Problem: Safety - Adult  Goal: Free from fall injury  Outcome: Progressing     Problem: Discharge Planning  Goal: Discharge to home or other facility with appropriate resources  Outcome: Progressing     Problem: Chronic Conditions and Co-morbidities  Goal: Patient's chronic conditions and co-morbidity symptoms are monitored and maintained or improved  Outcome: Progressing     Problem: Nutrition  Goal: Nutrient intake appropriate for maintaining nutritional needs  Outcome: Progressing

## 2025-06-03 NOTE — DISCHARGE INSTRUCTIONS
Wound Care     Your dressing should remain intact and dry until post-op visit in the office. No showering until seen in clinic-sitting in the bathtub to sponge bathe is ok. Place a protective cover (large garbage bag) over your shoulder with sling on while bathing. Do NOT submerge your operative shoulder in bath or pool water, or get any of your dressings or sling wet.     Surgical Dressing     If your dressing becomes soiled or damp, you may remove the dressing and replace the bandage. Please do not remove steri-strips (small pieces of tape) covering your incisions. Please be certain to wash hands thoroughly prior to changing dressing, do not place any ointments over incisions.     Sling/Immobilizer     Anesthesia effects can last up until 48 hours after surgery.  Please be aware that you may experience numbness in your arm for up to 48 hours after surgery.  During this time it is extremely important that you keep your sling in place at all times because you will not have control of your arm due to the anesthesia effects.  Your sling with supporting abduction pillow should be worn at all times.  Maintain your elbow position against the pillow and even with your side or in front of this position to minimize stress on the repair.       You will be asked to keep your sling/immobilizer in place at all times during the first 4-6 weeks following surgery.  Please do not discontinue the use of the sling without clearance from Dr. Bullock.  During the first 4-6 weeks following surgery, it is very important to limit activity with your arm to achieve optimal results from your surgery.  Never remove sling and raise arm up and down as this may cause injury to your surgically repaired shoulder.     Activity     When sleeping or resting, inclined positions (i.e. reclining chair) using a pillow under the forearm for support may provide better comfort  Do not engage in activities which increase pain/swelling over the first 7-10 days  following surgery  Avoid long periods of sitting (without arm supported) or long distance traveling for 2 weeks  May return to ONLY sedentary work  3-4 days after surgery, if pain is tolerable     Continuous icing will help to decrease swelling and provide pain relief.  You may use ice packs every 2 hours for 20 minutes daily until your first post-operative visit.  It is very important to always have protection between the ice pad and your skin.  Never place the ice pad directly on your skin; this could lead to an injury to your skin.  If the ice causes increased pain, skin rash or irritation, discontinue its use and call the office.      Driving     Please do not drive until you are evaluated in the office after surgery.  You are considered an impaired  following surgery, and if you choose to drive, your insurance may not cover any damages that may occur.     Post-operative Medication     1.  Aspirin 325mg 1 tablet twice a day for 4 weeks following surgery.  Aspirin helps to reduce the risk of blood clots following surgery.     2. Colace 1 tablet twice a day when needed for constipation while taking pain medication.    3.  Mobic 15 mg take 1 tablet daily for 5 days, after 5 days take 1 tablet daily only as needed for pain.     4.Tylenol 1gm every 8 hours for 5 days, after 5 days take 1gm every 8 hours only as needed for pain.     5.Gabapentin 300mg daily at bedtime for 5 days, after 5 days you should no longer need this medication.     6. Doxycycline 100mg 1 tablet twice daily for 5 days or 30 days as prescription is written     7. Oxy IR 5mg every 6 hours ONLY TO BE TAKEN FOR SEVERE PAIN.  Do NOT take Oxy IR within 3 hours of taking Gabapentin. Do NOT take Gabapentin within 3 hours of taking Oxy IR. If you are having severe pain and taking Oxy IR at night skip the nightly Gabapentin and only resume when you are not taking Oxy IR at night     Signs and Symptoms of Complications     Although complications are  rare, the following are a list of potential symptoms you should be alert for.    Infection - Increased pain not relieved with medication, fever (temperature of 101.5 degrees Fahrenheit or higher), chills, redness, swelling or drainage (yellow/brown/green) from incision.  Blood Clot - If you experience shortness of breath, chest pain, pain in your chest with deep breaths or difficulty breathing, please report to emergency room immediately.   Persistent Pain - Severe sharp pain not relieved by pain medication.  Persistent and increasing swelling and numbness of the arm.       If a follow-up visit after surgery was not scheduled, please call Dr. Bullock´s office at [phone] during office business hours (Monday to Friday, 8:30am to 3:30pm) to arrange a follow-up appointment for 2 weeks after your surgery.    If you have any questions, please call Dr. Vigil office at [phone] during office business hours (Monday to Friday, 8:30am to 3:30pm).  Please do not call Dr. Vigil office after 3:30pm or on weekends or holidays.  If you have an urgent issue after 3:30pm or on weekends or holidays, please go immediately to a local emergency room to be evaluated.

## 2025-06-03 NOTE — ANESTHESIA POSTPROCEDURE EVALUATION
Patient: Abraham Rodriguez    Procedure Summary       Date: 06/03/25 Room / Location: [facility] 06 / [facility]    Anesthesia Start: 1050 Anesthesia Stop: 1255    Procedure: RIGHT REVERSE total shoulder replacement; NOT an anatomic total shoulder replacement, bicep tenodesis * [employer] reverse shoulder arthroplasty equipment* 23 HRS OBS* (Right: Shoulder) Diagnosis:       Complete tear of right rotator cuff, unspecified whether traumatic      Biceps tendinitis of right shoulder      Arthritis of right shoulder region      (Complete tear of right rotator cuff, unspecified whether traumatic [M75.121])      (Biceps tendinitis of right shoulder [M75.21])      (Arthritis of right shoulder region [M19.011])    Surgeons: SHAYLEE Bullock MD Responsible Provider: MAC Cooper    Anesthesia Type: general, regional ASA Status: 3            Anesthesia Type: general, regional    Vitals Value Taken Time   /69 06/03/25 14:45   Temp 36.8 °C (98.2 °F) 06/03/25 14:45   Pulse 64 06/03/25 14:45   Resp 14 06/03/25 14:45   SpO2 100 % 06/03/25 14:45       Anesthesia Post Evaluation    Patient location during evaluation: PACU  Patient participation: complete - patient participated  Level of consciousness: awake and alert  Pain score: 10  Pain management: adequate  Airway patency: patent  Cardiovascular status: hemodynamically stable  Respiratory status: room air  Hydration status: acceptable  Postoperative Nausea and Vomiting: none  Comments: PT'S VITAL SIGNS DO NOT REFLECT THE INTENSITY OF PAIN PROCLAIMED.  HE RECEIVED A PREOPERATIVE REGIONAL BLOC.        There were no known notable events for this encounter.

## 2025-06-04 VITALS
TEMPERATURE: 97.8 F | HEIGHT: 71 IN | WEIGHT: 177 LBS | BODY MASS INDEX: 24.78 KG/M2 | OXYGEN SATURATION: 96 % | HEART RATE: 92 BPM | DIASTOLIC BLOOD PRESSURE: 62 MMHG | SYSTOLIC BLOOD PRESSURE: 102 MMHG | RESPIRATION RATE: 18 BRPM

## 2025-06-04 PROCEDURE — 97165 OT EVAL LOW COMPLEX 30 MIN: CPT | Mod: GO

## 2025-06-04 PROCEDURE — 2500000004 HC RX 250 GENERAL PHARMACY W/ HCPCS (ALT 636 FOR OP/ED)

## 2025-06-04 PROCEDURE — 2500000001 HC RX 250 WO HCPCS SELF ADMINISTERED DRUGS (ALT 637 FOR MEDICARE OP)

## 2025-06-04 PROCEDURE — 7100000011 HC EXTENDED STAY RECOVERY HOURLY - NURSING UNIT

## 2025-06-04 PROCEDURE — 97535 SELF CARE MNGMENT TRAINING: CPT | Mod: GO

## 2025-06-04 RX ADMIN — CEFAZOLIN SODIUM 2 G: 2 SOLUTION INTRAVENOUS at 04:12

## 2025-06-04 RX ADMIN — IBUPROFEN 600 MG: 600 TABLET ORAL at 09:08

## 2025-06-04 RX ADMIN — OXYCODONE HYDROCHLORIDE 10 MG: 10 TABLET ORAL at 09:07

## 2025-06-04 RX ADMIN — DOXYCYCLINE 100 MG: 100 CAPSULE ORAL at 09:08

## 2025-06-04 RX ADMIN — METOPROLOL SUCCINATE 50 MG: 50 TABLET, EXTENDED RELEASE ORAL at 09:08

## 2025-06-04 RX ADMIN — LOSARTAN POTASSIUM 50 MG: 50 TABLET, FILM COATED ORAL at 09:08

## 2025-06-04 RX ADMIN — ACETAMINOPHEN 650 MG: 325 TABLET ORAL at 09:16

## 2025-06-04 RX ADMIN — ACETAMINOPHEN 650 MG: 325 TABLET ORAL at 04:12

## 2025-06-04 RX ADMIN — ASPIRIN 81 MG: 81 TABLET, COATED ORAL at 09:08

## 2025-06-04 RX ADMIN — DULOXETINE HYDROCHLORIDE 30 MG: 30 CAPSULE, DELAYED RELEASE ORAL at 09:08

## 2025-06-04 RX ADMIN — DOCUSATE SODIUM 100 MG: 100 CAPSULE, LIQUID FILLED ORAL at 09:08

## 2025-06-04 ASSESSMENT — COGNITIVE AND FUNCTIONAL STATUS - GENERAL
TOILETING: A LITTLE
DRESSING REGULAR LOWER BODY CLOTHING: A LITTLE
HELP NEEDED FOR BATHING: A LITTLE
EATING MEALS: A LITTLE
HELP NEEDED FOR BATHING: A LITTLE
DRESSING REGULAR UPPER BODY CLOTHING: A LOT
MOBILITY SCORE: 24
TOILETING: A LITTLE
DRESSING REGULAR UPPER BODY CLOTHING: A LITTLE
DAILY ACTIVITIY SCORE: 16
DRESSING REGULAR LOWER BODY CLOTHING: A LOT
MOBILITY SCORE: 24
DAILY ACTIVITIY SCORE: 20
PERSONAL GROOMING: A LITTLE

## 2025-06-04 ASSESSMENT — PAIN SCALES - GENERAL
PAINLEVEL_OUTOF10: 10 - WORST POSSIBLE PAIN
PAINLEVEL_OUTOF10: 8
PAINLEVEL_OUTOF10: 10 - WORST POSSIBLE PAIN
PAINLEVEL_OUTOF10: 10 - WORST POSSIBLE PAIN

## 2025-06-04 ASSESSMENT — PAIN DESCRIPTION - ORIENTATION: ORIENTATION: RIGHT

## 2025-06-04 ASSESSMENT — ACTIVITIES OF DAILY LIVING (ADL)
HOME_MANAGEMENT_TIME_ENTRY: 28
ADL_ASSISTANCE: INDEPENDENT
BATHING_ASSISTANCE: MINIMAL

## 2025-06-04 ASSESSMENT — PAIN - FUNCTIONAL ASSESSMENT
PAIN_FUNCTIONAL_ASSESSMENT: 0-10

## 2025-06-04 ASSESSMENT — PAIN DESCRIPTION - LOCATION: LOCATION: SHOULDER

## 2025-06-04 NOTE — PROGRESS NOTES
Abraham Rodriguez is a 84 y.o. male on day 0 of admission presenting with Status post reverse total shoulder replacement, right.      Subjective   Abraham Rodriguez is a 84 y.o. male with past medical history s/f HTN, HLD, ASHD, remote MI, history of DVT/PE, HFpEF presenting with severe right shoulder and arm pain status post fall in December 2024 for right reverse total shoulder replacement with Dr. Bullock 6/3/25. Medicine was consulted for medical management.    6/4/2025: No acute events overnight. Vitals stable. No AM labs.     Patient discharged by primary service. Chart reviewed, no contraindication to discharge from this standpoint. Patient left the floor before being seen by medicine service today         Review of Systems       Objective     Last Recorded Vitals  /61 (BP Location: Left arm, Patient Position: Lying)   Pulse 74   Temp 36.2 °C (97.1 °F) (Temporal)   Resp 18   Wt 80.3 kg (177 lb)   SpO2 94%     Image Results  Imaging  XR shoulder right 2+ views  Result Date: 6/3/2025  Surgical changes without hardware complication or osseous injury evident.   MACRO: None   Signed by: Atif Guajardo 6/3/2025 1:51 PM Dictation workstation:   [id number]    Nuclear Stress Test  Result Date: 5/30/2025  1. Fixed small-sized moderate to severe perfusion defects involving inferior wall, although there is a preserved wall motion and thickening, prior infarction can not be completely excluded. No evidence of inducible myocardial ischemia. 2. The left ventricle is normal in size. 3. Normal LV wall motion with a post-stress LV EF estimated greater than 65%.   I personally reviewed the images/study and I agree with the findings as stated. This study was interpreted at [place], [place].   MACRO: None   Signed by: Bruce Lainez 5/30/2025 12:08 PM Dictation workstation:   [id number]      Cardiology, Vascular, and Other Imaging  Cardiology Interpretation Of Nuclear Stress - See  Other Report For Nuclear Portion  Result Date: 6/3/2025   [facility], [address] [address]             Tel [phone] and Fax [phone] Nuclear Pharmacologic Stress Test Patient Name:      ARTEMIO MORALES        Ordering Provider:     [id number] KATHIA BOLTON Study Date:        5/30/2025            Reading Physician:     [id number] Elijah Doss MD MRN/PID:           [id number]             Supervising Physician: [id number] Elijah Doss MD Accession#:        [id number]         Fellow: Date of Birth/Age: 1940 / 84 years Fellow: Gender:            M                    Nurse:                 Amita Rosales RN Admit Date:        5/30/2025            Technician:            Apoorva Vallecillo Admission Status:  Outpatient           Sonographer:           ethan Height:            180.34 cm            Technologist: Weight:            79.38 kg             Additional Staff: BSA:               1.99 m2              Encounter#:            [id number] BMI:               24.41 kg/m2          Patient Location:      [facility] Non                                                                Invasive Study Type:    CARDIOLOGY INTERPRETATION OF NUCLEAR STRESS Diagnosis/ICD: Essential (primary) hypertension-I10; Encounter for other                preprocedural examination-Z01.818; Shortness of breath-R06.02 Indication:    Hypertension, Dyspnea and Pre-Op Evaluation CPT Code:      Stress Test Interpretation-77061; Stress Test Supervision-43581 Falls Risk: Moderate: Patient has a moderate risk for sustaining a fall; a falls prevention plan has been implemented.  Study Details: Correct procedure and correct patient verified verbally and with                ID Band checked.  Patient History: Dyspnea, hypertension, chronic  obstructive pulmonary disease, coronary artery disease and dyslipidemia. Pre op clearance, Hip replacement. Allergies: Atorvastatin,Lisinopril. Smoker:    Former.  Medications: The patient's prescribed medication is ASA,Atorvastatin,Dexilant,Bentyl,Cymbalta,Proscar,Flonase,Lasix,Avapor,Cozaar,Mobic,Metoprolol,Remeron,Flomax,Zanaflex. The patient took medications as prescribed.  Patient Performance: Patient received a total of 0.4 mg of Regadenoson at 9:14:39 AM. The peak heart rate achieved was 116 bpm, which was 86 % of the age predicted target heart rate of 135 bpm. The resting blood pressure was 134/74 mmHg with a heart rate of 88 bpm. The patient developed shortness of breath during the stress exam. The symptoms resolved with rest 4 minutes into recovery. The blood pressure response was normal. The test was terminated due to: end of vasodilator infusion. Patient has met the discharge criteria and is discharged to home.  Baseline ECG: Resting ECG showed normal sinus rhythm with right bundle branch block.  Stress ECG: Stress ECG showed sinus tachycardia, with RBBB.  Stress Stage Data: +---+------+-------+-----------------------+ HR Sys BPDias BPComments                +---+------+-------+-----------------------+ 88 134   74                             +---+------+-------+-----------------------+                 Sp02 97% RA             +---+------+-------+-----------------------+ 91              Divina min 1              +---+------+-------+-----------------------+ [id number]   62     Divina min 2, Sp02 98% RA +---+------+-------+-----------------------+ 112             Divina min 3              +---+------+-------+-----------------------+ 106             Divina min 4              +---+------+-------+-----------------------+  Recovery ECG: Recovery ECG showed normal sinus rhythm, with Occasional PVC.  +------------+---+------+-------+---------+             HR Sys BPDias BPComments   +------------+---+------+-------+---------+ Recovery I  108                       +------------+---+------+-------+---------+ Recovery II [id number]   62     Sp02 % RA +------------+---+------+-------+---------+ Recovery [id number]                       +------------+---+------+-------+---------+ Recovery IV [id number]   62               +------------+---+------+-------+---------+ Recovery V  104                       +------------+---+------+-------+---------+ Recovery                        +------------+---+------+-------+---------+  Summary:  1. Adequate level of stress achieved.  2. No clinical or electrocardiographic evidence for ischemia at a maximal infusion.  3. Nuclear image results are reported separately. [id number] Elijah Doss MD Electronically signed on 6/3/2025 at 7:33:47 PM   ** Final **     Transthoracic Echo Complete  Result Date: 5/29/2025   [facility], [address] [place] [address]              Tel [phone] and Fax [phone] TRANSTHORACIC ECHOCARDIOGRAM REPORT  Patient Name:       MR. ARTEMIO MORALES   Reading Physician:    [id number] Oleg Raphael MD Study Date:         5/29/2025           Ordering Provider:    [id number] OLEG RAPHAEL MRN/PID:            [id number]            Fellow: Accession#:         [id number]        Nurse:                Palak Bridges RN Date of Birth/Age:  1940 / 84      Sonographer:          Norma navarro RDCS Gender assigned at  M                   Additional Staff: Birth: Height:             180.34 cm           Admit Date: Weight:             79.38 kg            Admission Status:     Outpatient BSA / BMI:          1.99 m2 / 24.41     Encounter#:           [id number]                     kg/m2 Blood Pressure:     172/84 mmHg          Department Location: Study Type:    TRANSTHORACIC ECHO (TTE) COMPLETE Diagnosis/ICD: Shortness of breath-R06.02 Indication:    SOB CPT Code:      Echo Complete w Full Doppler-90367 Patient History: Pertinent History: HTN, PE, Dyspnea, Dyslipidemia and COPD. pre-operative, ASHD,                    HFpEF. Study Detail: The following Echo studies were performed: 2D, M-Mode, Doppler and               color flow. Technically challenging study due to body habitus.               Definity used as a contrast agent for endocardial border               definition and agitated saline used as a contrast agent for               intraseptal flow evaluation. Total contrast used for this               procedure was 1.5 mL via IV push. The patient was awake.  PHYSICIAN INTERPRETATION: Left Ventricle: Left ventricular ejection fraction is normal by visual estimate at 60-65%. There are no regional left ventricular wall motion abnormalities. The left ventricular cavity size is normal. There is left ventricular concentric remodeling. Spectral Doppler shows a Grade I (impaired relaxation pattern) of left ventricular diastolic filling with normal left atrial filling pressure. Left Atrium: The left atrial size is normal. There is no evidence of a patent foramen ovale. A bubble study using agitated saline was performed. Bubble study is negative. Right Ventricle: The right ventricle is normal in size. There is normal right ventricular global systolic function. Right Atrium: The right atrium is normal in size. Aortic Valve: There is trace aortic valve regurgitation. Mitral Valve: The mitral valve is normal in structure. There is trace mitral valve regurgitation. The E Vmax is 0.50 m/s. Tricuspid Valve: The tricuspid valve is structurally normal. There is trace tricuspid regurgitation. The doppler estimated RVSP is within normal limits with a right ventricular systolic pressure of 29 mmHg. Pulmonic Valve: The pulmonic valve is not well  visualized. There is physiologic pulmonic valve regurgitation. Pericardium: There is no pericardial effusion noted. Aorta: The aortic root is normal. There is mild dilatation of the ascending aorta.  CONCLUSIONS:  1. Left ventricular ejection fraction is normal by visual estimate at 60-65%.  2. Spectral Doppler shows a Grade I (impaired relaxation pattern) of left ventricular diastolic filling with normal left atrial filling pressure.  3. There is normal right ventricular global systolic function.  4. There is no evidence of a patent foramen ovale. QUANTITATIVE DATA SUMMARY:  2D MEASUREMENTS:          Normal Ranges: Ao Root d:       3.30 cm  (2.0-3.7cm) LAs:             3.20 cm  (2.7-4.0cm) IVSd:            1.45 cm  (0.6-1.1cm) LVPWd:           1.35 cm  (0.6-1.1cm) LVIDd:           3.88 cm  (3.9-5.9cm) LVIDs:           2.75 cm LV Mass Index:   100 g/m2 LVEDV Index:     51 ml/m2 LV % FS          29.1 %  LEFT ATRIUM:                  Normal Ranges: LA Vol A4C:        38.0 ml    (22+/-6mL/m2) LA Vol A2C:        36.4 ml LA Vol BP:         38.3 ml LA Vol Index A4C:  19.1ml/m2 LA Vol Index A2C:  18.3 ml/m2 LA Vol Index BP:   19.2 ml/m2 LA Area A4C:       15.1 cm2 LA Area A2C:       15.2 cm2 LA Major Axis A4C: 5.1 cm LA Major Axis A2C: 5.4 cm LA Volume Index:   18.0 ml/m2  RIGHT ATRIUM:                 Normal Ranges: RA Vol A4C:        35.8 ml    (8.3-19.5ml) RA Vol Index A4C:  18.0 ml/m2 RA Area A4C:       14.8 cm2 RA Major Axis A4C: 5.2 cm  M-MODE MEASUREMENTS:         Normal Ranges: Ao Root:             3.30 cm (2.0-3.7cm) LAs:                 3.40 cm (2.7-4.0cm)  AORTA MEASUREMENTS:         Normal Ranges: Asc Ao, d:          3.70 cm (2.1-3.4cm)  LV SYSTOLIC FUNCTION:                      Normal Ranges: EF-A4C View:    61 % (>=55%) EF-A2C View:    65 % EF-Biplane:     60 % EF-Visual:      63 % LV EF Reported: 63 %  LV DIASTOLIC FUNCTION:             Normal Ranges: MV Peak E:             0.50 m/s    (0.7-1.2 m/s) MV Peak  A:             0.85 m/s    (0.42-0.7 m/s) E/A Ratio:             0.59        (1.0-2.2) MV e'                  0.056 m/s   (>8.0) MV lateral e'          0.06 m/s MV medial e'           0.05 m/s MV A Dur:              151.00 msec E/e' Ratio:            9.03        (<8.0) PulmV Sys Ceasar:         42.40 cm/s PulmV Noe Ceasar:        30.20 cm/s PulmV S/D Ceasar:         1.40 PulmV A Revs Ceasar:      37.40 cm/s PulmV A Revs Dur:      114.00 msec  MITRAL VALVE:          Normal Ranges: MV DT:        378 msec (150-240msec)  AORTIC VALVE:           Normal Ranges: AoV Vmax:      1.11 m/s (<=1.7m/s) AoV Peak P.9 mmHg (<20mmHg) LVOT Max Ecasar:  0.69 m/s (<=1.1m/s) LVOT VTI:      16.60 cm LVOT Diameter: 2.10 cm  (1.8-2.4cm) AoV Area,Vmax: 2.15 cm2 (2.5-4.5cm2)  RIGHT VENTRICLE: RV Basal 2.70 cm RV Mid   1.60 cm RV Major 6.8 cm TAPSE:   22.8 mm RV s'    0.10 m/s  TRICUSPID VALVE/RVSP:          Normal Ranges: Peak TR Velocity:     2.55 m/s Est. RA Pressure:     3 RV Syst Pressure:     29       (< 30mmHg) IVC Diam:             1.94 cm  PULMONIC VALVE:          Normal Ranges: PV Max Ceasar:     1.3 m/s  (0.6-0.9m/s) PV Max P.9 mmHg  PULMONARY VEINS: PulmV A Revs Dur: 114.00 msec PulmV A Revs Ceasar: 37.40 cm/s PulmV Noe Ceasar:   30.20 cm/s PulmV S/D Ceasar:    1.40 PulmV Sys Ceasar:    42.40 cm/s  [id number] Oleg Raphael MD Electronically signed on 2025 at 2:01:50 PM  ** Final **          Lab Results  No results found. However, due to the size of the patient record, not all encounters were searched. Please check Results Review for a complete set of results.     Medications  Scheduled medications:  Scheduled Medications[1]  Continuous medications:  Continuous Medications[2]  PRN medications:  PRN Medications[3]     Physical Exam             Assessment/Plan      Complete tear of rotator cuff, bicep tendinitis s/p right reverse total shoulder replacement with Dr. Bullock on 6/3/2025  - Management by primary care team  - Continue IV Ancef  for surgical prophylaxis and maintenance IV fluid  - PT OT eval  - Antiemetics PRN and pain control      ASHD, HTN, HLD  - ASHD: Patient without chest pain or anginal equivalent, continue home meds  - HTN: BP well-controlled, continue home antihypertensive therapy  - HLD: Currently not on meds, continue lifestyle modifications     HFpEF  - appears euvolemic on exam, continue home meds    Code Status: Full Code                 DVT ppx: Per primary    Thank you for involving us in the care of this very pleasant patient. We will follow along with you while they remain admitted. Please contact Hospitalist service if any clarification needed.      Please see orders for more complete plan    Kimberlee Blair PA-C         [1] acetaminophen, 650 mg, oral, q6h GUERO  aspirin, 81 mg, oral, BID  docusate sodium, 100 mg, oral, BID  doxycylcine, 100 mg, oral, q12h GUERO  DULoxetine, 30 mg, oral, Daily  finasteride, 5 mg, oral, q PM  ibuprofen, 600 mg, oral, TID  losartan, 50 mg, oral, Daily  metoprolol succinate XL, 50 mg, oral, Daily  mirtazapine, 7.5 mg, oral, Nightly  tamsulosin, 0.8 mg, oral, q PM    [2] lactated Ringer's, 125 mL/hr, Last Rate: 125 mL/hr (06/03/25 1050)    [3] PRN medications: cyclobenzaprine, furosemide, HYDROmorphone, HYDROmorphone, hyoscyamine, melatonin, naloxone, naloxone, ondansetron **OR** ondansetron, oxyCODONE, oxyCODONE

## 2025-06-04 NOTE — CARE PLAN
Problem: Skin  Goal: Decreased wound size/increased tissue granulation at next dressing change  Outcome: Progressing  Flowsheets (Taken 6/4/2025 0730)  Decreased wound size/increased tissue granulation at next dressing change: Promote sleep for wound healing  Goal: Participates in plan/prevention/treatment measures  Outcome: Progressing  Flowsheets (Taken 6/4/2025 0730)  Participates in plan/prevention/treatment measures:   Elevate heels   Discuss with provider PT/OT consult  Goal: Prevent/manage excess moisture  Outcome: Progressing  Flowsheets (Taken 6/4/2025 0730)  Prevent/manage excess moisture: Monitor for/manage infection if present  Goal: Prevent/minimize sheer/friction injuries  Outcome: Progressing  Flowsheets (Taken 6/4/2025 0730)  Prevent/minimize sheer/friction injuries: HOB 30 degrees or less  Goal: Promote/optimize nutrition  Outcome: Progressing  Flowsheets (Taken 6/4/2025 0730)  Promote/optimize nutrition:   Monitor/record intake including meals   Consume > 50% meals/supplements  Goal: Promote skin healing  Outcome: Progressing  Flowsheets (Taken 6/4/2025 0730)  Promote skin healing: Assess skin/pad under line(s)/device(s)     Problem: Pain - Adult  Goal: Verbalizes/displays adequate comfort level or baseline comfort level  Outcome: Progressing     Problem: Safety - Adult  Goal: Free from fall injury  Outcome: Progressing     Problem: Discharge Planning  Goal: Discharge to home or other facility with appropriate resources  Outcome: Progressing     Problem: Chronic Conditions and Co-morbidities  Goal: Patient's chronic conditions and co-morbidity symptoms are monitored and maintained or improved  Outcome: Progressing     Problem: Nutrition  Goal: Nutrient intake appropriate for maintaining nutritional needs  Outcome: Progressing

## 2025-06-04 NOTE — PROGRESS NOTES
"Abraham Rodriguez is a 84 y.o. male on day 0 of admission presenting with Status post reverse total shoulder replacement, right.    Subjective   Patient doing well with no complaints today       Objective     Right shoulder dressing clean dry intact neurovascular tact right upper extremity    Last Recorded Vitals  Blood pressure 102/62, pulse 92, temperature 36.6 °C (97.8 °F), temperature source Temporal, resp. rate 18, height 1.803 m (5' 11\"), weight 80.3 kg (177 lb), SpO2 96%.  Intake/Output last 3 Shifts:  I/O last 3 completed shifts:  In: 1646 (20.5 mL/kg) [P.O.:546; I.V.:900 (11.2 mL/kg); IV Piggyback:200]  Out: 200 (2.5 mL/kg) [Blood:200]  Weight: 80.3 kg     Relevant Results      Scheduled medications  Scheduled Medications[1]  Continuous medications  Continuous Medications[2]  PRN medications  PRN Medications[3]  No results found. However, due to the size of the patient record, not all encounters were searched. Please check Results Review for a complete set of results.                         Assessment & Plan  Status post reverse total shoulder replacement, right    Complete tear of right rotator cuff    Biceps tendinitis of right shoulder    Arthritis of right shoulder region          Patient okay for discharge to home now.  No need for home healthcare.  Gunslinger (0 degree internal/external rotation) shoulder immobilizer to be on whenever patient is up and around.  No showering until we see the patient in clinic in 2 weeks.  Sponge bathing only.  Keep dressings on and clean and dry until seen in clinic in 2 weeks.  Postop instructions and prescriptions in chart/prescribed to the hospital pharmacy.  Postoperative follow-up appointment made for 2 weeks (written on postop instructions).  Call Dr Bullock's office during business hours (Monday - Friday; 8:30am - 3:30pm) with any questions ([phone]).         [1] acetaminophen, 650 mg, oral, q6h GUERO  aspirin, 81 mg, oral, BID  docusate sodium, 100 mg, oral, " BID  doxycylcine, 100 mg, oral, q12h GUERO  DULoxetine, 30 mg, oral, Daily  finasteride, 5 mg, oral, q PM  ibuprofen, 600 mg, oral, TID  losartan, 50 mg, oral, Daily  metoprolol succinate XL, 50 mg, oral, Daily  mirtazapine, 7.5 mg, oral, Nightly  tamsulosin, 0.8 mg, oral, q PM    [2]    [3] PRN medications: cyclobenzaprine, furosemide, HYDROmorphone, HYDROmorphone, hyoscyamine, melatonin, naloxone, naloxone, ondansetron **OR** ondansetron, oxyCODONE, oxyCODONE

## 2025-06-04 NOTE — PROGRESS NOTES
Social work consult placed for positive medical risk screen. SW reviewed pt's chart and communicated with TCC. No SW needs foreseen at this time. SW signing off; available upon request.    JILLIAN Victoria, LSW ([id number])   Care Transitions

## 2025-06-04 NOTE — CARE PLAN
Problem: Skin  Goal: Decreased wound size/increased tissue granulation at next dressing change  Outcome: Progressing  Goal: Participates in plan/prevention/treatment measures  Outcome: Progressing  Goal: Prevent/manage excess moisture  Outcome: Progressing  Goal: Prevent/minimize sheer/friction injuries  Outcome: Progressing  Goal: Promote/optimize nutrition  Outcome: Progressing  Goal: Promote skin healing  Outcome: Progressing     Problem: Pain - Adult  Goal: Verbalizes/displays adequate comfort level or baseline comfort level  Outcome: Progressing     Problem: Safety - Adult  Goal: Free from fall injury  Outcome: Progressing     Problem: Discharge Planning  Goal: Discharge to home or other facility with appropriate resources  Outcome: Progressing     Problem: Chronic Conditions and Co-morbidities  Goal: Patient's chronic conditions and co-morbidity symptoms are monitored and maintained or improved  Outcome: Progressing     Problem: Nutrition  Goal: Nutrient intake appropriate for maintaining nutritional needs  Outcome: Progressing   The patient's goals for the shift include  Pt will remain free of injury this shift    The clinical goals for the shift include pt pain will be managaed throughout my shift

## 2025-06-04 NOTE — PROGRESS NOTES
Occupational Therapy    Evaluation/Treatment    Patient Name: Abraham Rodriguez  MRN: [id number]  Department: [facility] 3 E  Room: [facility]3313-A  Today's Date: 06/04/25  Time Calculation  Start Time: 0927  Stop Time: 1020  Time Calculation (min): 53 min       Assessment:  OT Assessment: Pt is 84 year old male s/p R reverse total shoulder replacement. Pt demo'd CGA-SBA for mobility and MIN-MOD A for ADLs. Pt would benefit from skilled OT services during hospital stay to address balance, strength, integration of ROM/WB restrictions, and adaptive techniques for ADLs.  Prognosis: Good  Barriers to Discharge Home: No anticipated barriers  Evaluation/Treatment Tolerance: Patient tolerated treatment well  End of Session Communication: Bedside nurse  End of Session Patient Position: Up in chair, Alarm on  OT Assessment Results: Decreased ADL status, Decreased upper extremity strength, Decreased safe judgment during ADL, Decreased endurance, Decreased functional mobility, Decreased IADLs  Prognosis: Good  Evaluation/Treatment Tolerance: Patient tolerated treatment well  Plan:  Treatment Interventions: ADL retraining, Functional transfer training, UE strengthening/ROM, Endurance training, Patient/family training, Equipment evaluation/education, Compensatory technique education  OT Frequency: 3 times per week  OT Discharge Recommendations: Low intensity level of continued care  OT Recommended Transfer Status: Assist of 1  OT - OK to Discharge: Yes (When medically appropriate)  Treatment Interventions: ADL retraining, Functional transfer training, UE strengthening/ROM, Endurance training, Patient/family training, Equipment evaluation/education, Compensatory technique education    Subjective   Current Problem:  1. Status post reverse total shoulder replacement, right  acetaminophen (Tylenol) 500 mg tablet    aspirin 325 mg EC tablet    gabapentin (Neurontin) 300 mg capsule    meloxicam (Mobic) 15 mg tablet    oxyCODONE (Roxicodone) 5 mg  immediate release tablet    doxycycline (Vibramycin) 100 mg capsule      2. Complete tear of right rotator cuff, unspecified whether traumatic        3. Biceps tendinitis of right shoulder        4. Arthritis of right shoulder region          OT Visit Info:  OT Received On: 06/04/25  General Visit Info:   General  Reason for Referral: POD #1 right reverse total shoulder replacement  Referred By: Chaim  Past Medical History Relevant to Rehab: HTN, HLD, ASHD, remote MI, history of DVT/PE, HFpEF (Recent R clavicle fracture from fall in December 2024)  Family/Caregiver Present: No  Prior to Session Communication: Bedside nurse  Patient Position Received: Bed, 3 rail up, Alarm on  General Comment: Pt supine with HOB elevated. Sling in place. Pt pleasant and cooperative.   Precautions:  UE Weight Bearing Status: Right Non-Weight Bearing  Medical Precautions: Fall precautions  Precautions Comment: No active movement of operative shoulder. Patient to remain in sling at all times unless with therapy. Patient can come out of the sling for active range of motion of elbow, wrist, hand and fingers with therapy only. NWB to operative extremity. For anatomic total shoulder replacement, PROM and AAROM for external rotation not passed 30 degrees.     Date/Time Vitals Session Patient Position Pulse Resp SpO2 BP MAP (mmHg)    06/04/25 0900 --  --  92  18  96 %  102/62  --                 Pain:  Pain Assessment  Pain Assessment: 0-10  0-10 (Numeric) Pain Score: 10 - Worst possible pain  Pain Type: Surgical pain  Pain Location: Shoulder  Pain Orientation: Right  Pain Interventions:  (Offered ice, pt declined. Cold pack left at bedside.)    Objective   Cognition:  Overall Cognitive Status: Within Functional Limits  Orientation Level: Oriented X4  Insight: Mild  Impulsive: Mildly           Home Living:  Type of Home: House  Lives With: Alone  Home Adaptive Equipment: Cane (Rollator)  Home Layout: One level  Home Access: Stairs to enter  without rails  Entrance Stairs-Number of Steps: 1  Bathroom Shower/Tub: Tub/shower unit  Bathroom Toilet: Standard  Bathroom Equipment: Grab bars in shower, Shower chair with back  Home Living Comments: Daughter and son-in-law plan to stay to assist initially following discharge  Prior Function:  Level of C[place]: Independent with ADLs and functional transfers  Receives Help From: Family  ADL Assistance: Independent  Homemaking Assistance: Independent (Able to perform grocery shopping and cooking; Daughter assists with additional household tasks including cleaning)  Ambulatory Assistance: Independent (MOD I with SPC)  Vocational: Retired  Hand Dominance: Right  Prior Function Comments: Last fall in December 2024, no  falls reported in last 6 months  IADL History:  IADL Comments: (+) drive, family typically provides transportation  ADL:  Eating Assistance: Stand by  Grooming Assistance: Stand by  Bathing Assistance: Minimal  UE Dressing Assistance: Moderate  LE Dressing Assistance: Moderate  Toileting Assistance with Device:  (Contact guard)  Functional Assistance:  (Contact guard)  Functional Deficit: Toilet transfer  Activities of Daily Living: UE Bathing  UE Bathing Comments: Declined bahting activities prior to getting dressed    UE Dressing  UE Dressing Level of Assistance: Moderate assistance, Maximum assistance  UE Dressing Where Assessed: Chair  UE Dressing Comments: Donning front opening shirt, instructions on modififed pendulum position. Assistance to lace R sleeve, pt able to lace LUE with partial assistance to navigate shirt around IV site; MAX A and MAX cuing for sling donning/doffing. Hand out provided.    LE Dressing  LE Dressing: Yes  Pants Level of Assistance: Moderate assistance  LE Dressing Where Assessed: Chair  LE Dressing Comments: Donning pants seated in chair, instructions provided for sequencing. Assistance to lace RLE and don over R hip. Assistance to fasten pants and belt,  recommendation to wear elastic waist pants during recovery to promote independence.    Toileting  Toileting Level of Assistance: Contact guard  Where Assessed: Toilet  Toileting Comments: Toileting without void at bathroom level, verbal cuing for positioning of RUE.  Activity Tolerance:  Endurance: Endurance does not limit participation in activity  Activity Tolerance Comments: Denies dizziness or lightheadedness during session, education on pacing of activities following surgery  Functional Standing Tolerance:     Bed Mobility/Transfers: Bed Mobility  Bed Mobility: Yes  Bed Mobility 1  Bed Mobility 1: Supine to sitting  Level of Assistance 1: Contact guard    Transfers  Transfer: Yes  Transfer 1  Transfer From 1: Sit to, Stand to  Transfer to 1: Stand, Sit  Technique 1: Sit to stand, Stand to sit  Transfer Device 1: Cane  Transfer Level of Assistance 1: Contact guard  Trials/Comments 1: Verbal cuing on hand placement  Transfers 2  Transfer From 2: Toilet to, Stand to  Transfer to 2: Stand, Toilet  Technique 2: Sit to stand, Stand to sit  Transfer Device 2: Cane  Transfer Level of Assistance 2: Contact guard      Functional Mobility:  Functional Mobility  Functional Mobility Performed: Yes  Functional Mobility 1  Surface 1: Level tile  Device 1: Single point cane  Assistance 1: Contact guard, Close supervision  Comments 1: Functional mobility for household distance x2 using SPC. No LOB, initally fast paced mobility but improved with education.  Sitting Balance:  Static Sitting Balance  Static Sitting-Balance Support: Left upper extremity supported  Static Sitting-Level of Assistance: Close supervision  Dynamic Sitting Balance  Dynamic Sitting-Balance Support: Left upper extremity supported  Dynamic Sitting-Level of Assistance: Contact guard  Standing Balance:  Static Standing Balance  Static Standing-Balance Support: Left upper extremity supported, No upper extremity supported  Static Standing-Level of Assistance:  Contact guard, Close supervision  Dynamic Standing Balance  Dynamic Standing-Balance Support: Left upper extremity supported  Dynamic Standing-Level of Assistance: Contact guard, Close supervision         Therapy/Activity: Therapeutic Exercise  Therapeutic Exercise Performed: Yes  Therapeutic Exercise Activity 1: Reviewed elbow, forearm, hand and wrist ROM exercises. Pt reports familiar with exercises, politely declined performing all reps at this time.    Therapeutic Activity  Therapeutic Activity Performed: Yes  Therapeutic Activity 1: Education provided on WB and ROM restrictions, provided with handout and discussed adaptive techniques for ADLs including potential use of AE to promote independence and adherence to precautions during functional tasks    Vision:Vision - Basic Assessment  Current Vision: No visual deficits  Sensation:  Sensation Comment: Denies numbness or tingling  Strength:  Strength Comments: LUE grossly 3/5        Hand Function:  Hand Function  Gross Grasp: Functional  Coordination: Functional      Outcome Measures: [facility] Daily Activity  Putting on and taking off regular lower body clothing: A lot  Bathing (including washing, rinsing, drying): A little  Putting on and taking off regular upper body clothing: A lot  Toileting, which includes using toilet, bedpan or urinal: A little  Taking care of personal grooming such as brushing teeth: A little  Eating Meals: A little  Daily Activity - Total Score: 16        Education Documentation  Handouts, taught by Tayla Brown OT at 6/4/2025 10:45 AM.  Learner: Patient  Readiness: Acceptance  Method: Explanation  Response: Verbalizes Understanding  Comment: Education for WB/ROM and adaptive techniques for ADLs. All patient questions answered at time of evaluation. Hand outs provided.    Body Mechanics, taught by Tayla Brown OT at 6/4/2025 10:45 AM.  Learner: Patient  Readiness: Acceptance  Method: Explanation  Response: Verbalizes  Understanding  Comment: Education for WB/ROM and adaptive techniques for ADLs. All patient questions answered at time of evaluation. Hand outs provided.    Precautions, taught by Tayla Brown OT at 6/4/2025 10:45 AM.  Learner: Patient  Readiness: Acceptance  Method: Explanation  Response: Verbalizes Understanding  Comment: Education for WB/ROM and adaptive techniques for ADLs. All patient questions answered at time of evaluation. Hand outs provided.    Home Exercise Program, taught by Tayla Brown OT at 6/4/2025 10:45 AM.  Learner: Patient  Readiness: Acceptance  Method: Explanation  Response: Verbalizes Understanding  Comment: Education for WB/ROM and adaptive techniques for ADLs. All patient questions answered at time of evaluation. Hand outs provided.    ADL Training, taught by Tayla Brown OT at 6/4/2025 10:45 AM.  Learner: Patient  Readiness: Acceptance  Method: Explanation  Response: Verbalizes Understanding  Comment: Education for WB/ROM and adaptive techniques for ADLs. All patient questions answered at time of evaluation. Hand outs provided.    Education Comments  No comments found.        OP EDUCATION:       Goals:  Encounter Problems       Encounter Problems (Active)       ADLs       Patient with complete upper body dressing with stand by assist level of assistance (Progressing)       Start:  06/04/25    Expected End:  06/18/25            Patient with complete lower body dressing with stand by assist level of assistance  with PRN adaptive equipment (Progressing)       Start:  06/04/25    Expected End:  06/18/25            Patient will complete toileting including hygiene clothing management/hygiene with stand by assist level of assistance. (Progressing)       Start:  06/04/25    Expected End:  06/18/25               COGNITION/SAFETY       Patient will recall and adhere to weight bearing and ROM restrictions with all ADL and functional mobility in order to promote healing and safety with  functional tasks (Progressing)       Start:  06/04/25    Expected End:  06/18/25               EXERCISE/STRENGTHENING       Patient will independently complete HEP exercises for 10 reps in order to improve strength and activity for ADL performance.  (Progressing)       Start:  06/04/25    Expected End:  06/18/25               TRANSFERS       Patient will complete functional transfers with least restrictive device with modified independent level of assistance. (Progressing)       Start:  06/04/25    Expected End:  06/18/25

## 2025-06-04 NOTE — PROGRESS NOTES
06/04/25 0919   Discharge Planning   Living Arrangements Alone  (Family staying with him during recovery)   Support Systems Children   Type of Residence Private residence   Number of Stairs to Enter Residence 1   Number of Stairs Within Residence 0   Home or Post Acute Services None   Expected Discharge Disposition Home   Financial Resource Strain   How hard is it for you to pay for the very basics like food, housing, medical care, and heating? Not hard   Stroke Family Assessment   Stroke Family Assessment Needed No   Intensity of Service   Intensity of Service 0-30 min     Spoke with pt regarding his dc plan. Pt plans to return to his home address where he lives alone. His daughter and/or son in law will be staying with him during his recovery, they will be staying @ night as well. The pt lives in a ranch style home with one small step leading into the home. Owns a cane and rollator. Normally drives but when he is unable his family provides transport. PCP is Dr. Lima and prescriptions are filled at [facility] with no barriers noted. The pt denies any difficulty with his living expenses such as utilities/groceries/prescriptions. Has a ride home upon dc. Awaiting therapy brennen, has a dc order. No HHC ordered at this time, pt to follow up with MD at scheduled office visit.

## 2025-06-04 NOTE — NURSING NOTE
Pt being discharged, meds picked up by family yesterday, IV removed per policy. Educated pt on medication admin, follow up appointments and restrictions. Pt verbalized understanding and has no further questions or concerns at this time.

## 2025-06-04 NOTE — PROGRESS NOTES
Physical Therapy Screen                 Therapy Communication Note    Patient Name: Abraham Rodriguez  MRN: [id number]  Department: [facility] 3 E  Room: [facility]33313-A  Today's Date: 6/4/2025     Discipline: Physical Therapy      Comment: Patient up with OT, appears at baseline for functional mobility, no identified acute PT needs. DC from PT. Defer to OT for follow-up recommendations.

## 2025-06-10 ENCOUNTER — TELEPHONE (OUTPATIENT)
Dept: ORTHOPEDIC SURGERY | Facility: CLINIC | Age: 85
End: 2025-06-10
Payer: COMMERCIAL

## 2025-06-10 DIAGNOSIS — I10 BENIGN ESSENTIAL HYPERTENSION: ICD-10-CM

## 2025-06-10 DIAGNOSIS — I10 BENIGN ESSENTIAL HYPERTENSION: Primary | ICD-10-CM

## 2025-06-10 NOTE — TELEPHONE ENCOUNTER
06/03/25 rt rtsa  Patient called and stated he is concerned that his ankles and feet are extremely swollen. He has some redness and a bump on the rt side. Would like a call back.

## 2025-06-11 RX ORDER — METOPROLOL SUCCINATE 50 MG/1
50 TABLET, EXTENDED RELEASE ORAL DAILY
Qty: 90 TABLET | Refills: 3 | Status: SHIPPED | OUTPATIENT
Start: 2025-06-11 | End: 2025-06-15 | Stop reason: HOSPADM

## 2025-06-11 RX ORDER — METOPROLOL SUCCINATE 50 MG/1
50 TABLET, EXTENDED RELEASE ORAL DAILY
Qty: 90 TABLET | Refills: 3 | Status: SHIPPED | OUTPATIENT
Start: 2025-06-11

## 2025-06-11 NOTE — TELEPHONE ENCOUNTER
Patient called back and left a voicemail regarding his feet/ankles. Would like a call back please. Left him a message letting him know you're in clinic. Once I hear back from you I will call him for further information regarding this.

## 2025-06-12 ENCOUNTER — APPOINTMENT (OUTPATIENT)
Dept: RADIOLOGY | Facility: HOSPITAL | Age: 85
End: 2025-06-12
Payer: MEDICARE

## 2025-06-12 ENCOUNTER — HOSPITAL ENCOUNTER (OUTPATIENT)
Facility: HOSPITAL | Age: 85
Setting detail: OBSERVATION
Discharge: HOME | End: 2025-06-15
Attending: INTERNAL MEDICINE | Admitting: INTERNAL MEDICINE
Payer: MEDICARE

## 2025-06-12 ENCOUNTER — APPOINTMENT (OUTPATIENT)
Dept: CARDIOLOGY | Facility: HOSPITAL | Age: 85
End: 2025-06-12
Payer: MEDICARE

## 2025-06-12 DIAGNOSIS — M75.121 COMPLETE TEAR OF RIGHT ROTATOR CUFF, UNSPECIFIED WHETHER TRAUMATIC: ICD-10-CM

## 2025-06-12 DIAGNOSIS — J30.9 ALLERGIC RHINITIS, UNSPECIFIED SEASONALITY, UNSPECIFIED TRIGGER: ICD-10-CM

## 2025-06-12 DIAGNOSIS — M19.011 ARTHRITIS OF RIGHT SHOULDER REGION: ICD-10-CM

## 2025-06-12 DIAGNOSIS — M75.21 BICEPS TENDINITIS OF RIGHT SHOULDER: ICD-10-CM

## 2025-06-12 DIAGNOSIS — R06.02 SOB (SHORTNESS OF BREATH) ON EXERTION: ICD-10-CM

## 2025-06-12 DIAGNOSIS — M79.89 LEG SWELLING: ICD-10-CM

## 2025-06-12 DIAGNOSIS — B35.4 RINGWORM OF BODY: ICD-10-CM

## 2025-06-12 DIAGNOSIS — Z96.611 STATUS POST REVERSE TOTAL SHOULDER REPLACEMENT, RIGHT: ICD-10-CM

## 2025-06-12 DIAGNOSIS — M79.89 SWELLING OF BOTH LOWER EXTREMITIES: Primary | ICD-10-CM

## 2025-06-12 LAB
ALBUMIN SERPL BCP-MCNC: 3.5 G/DL (ref 3.4–5)
ALP SERPL-CCNC: 61 U/L (ref 33–136)
ALT SERPL W P-5'-P-CCNC: 10 U/L (ref 10–52)
ANION GAP SERPL CALC-SCNC: 13 MMOL/L (ref 10–20)
AST SERPL W P-5'-P-CCNC: 13 U/L (ref 9–39)
BASOPHILS # BLD AUTO: 0.03 X10*3/UL (ref 0–0.1)
BASOPHILS NFR BLD AUTO: 0.5 %
BILIRUB SERPL-MCNC: 0.8 MG/DL (ref 0–1.2)
BNP SERPL-MCNC: 64 PG/ML (ref 0–99)
BUN SERPL-MCNC: 21 MG/DL (ref 6–23)
CALCIUM SERPL-MCNC: 9.4 MG/DL (ref 8.6–10.3)
CARDIAC TROPONIN I PNL SERPL HS: 5 NG/L (ref 0–20)
CARDIAC TROPONIN I PNL SERPL HS: 5 NG/L (ref 0–20)
CHLORIDE SERPL-SCNC: 103 MMOL/L (ref 98–107)
CO2 SERPL-SCNC: 27 MMOL/L (ref 21–32)
CREAT SERPL-MCNC: 0.83 MG/DL (ref 0.5–1.3)
EGFRCR SERPLBLD CKD-EPI 2021: 86 ML/MIN/1.73M*2
EOSINOPHIL # BLD AUTO: 0.16 X10*3/UL (ref 0–0.4)
EOSINOPHIL NFR BLD AUTO: 2.4 %
ERYTHROCYTE [DISTWIDTH] IN BLOOD BY AUTOMATED COUNT: 12.5 % (ref 11.5–14.5)
GLUCOSE SERPL-MCNC: 143 MG/DL (ref 74–99)
HCT VFR BLD AUTO: 38.2 % (ref 41–52)
HGB BLD-MCNC: 12.7 G/DL (ref 13.5–17.5)
IMM GRANULOCYTES # BLD AUTO: 0.02 X10*3/UL (ref 0–0.5)
IMM GRANULOCYTES NFR BLD AUTO: 0.3 % (ref 0–0.9)
LYMPHOCYTES # BLD AUTO: 1.26 X10*3/UL (ref 0.8–3)
LYMPHOCYTES NFR BLD AUTO: 19 %
MAGNESIUM SERPL-MCNC: 1.97 MG/DL (ref 1.6–2.4)
MCH RBC QN AUTO: 30.5 PG (ref 26–34)
MCHC RBC AUTO-ENTMCNC: 33.2 G/DL (ref 32–36)
MCV RBC AUTO: 92 FL (ref 80–100)
MONOCYTES # BLD AUTO: 0.51 X10*3/UL (ref 0.05–0.8)
MONOCYTES NFR BLD AUTO: 7.7 %
NEUTROPHILS # BLD AUTO: 4.65 X10*3/UL (ref 1.6–5.5)
NEUTROPHILS NFR BLD AUTO: 70.1 %
NRBC BLD-RTO: 0 /100 WBCS (ref 0–0)
PLATELET # BLD AUTO: 219 X10*3/UL (ref 150–450)
POTASSIUM SERPL-SCNC: 3.5 MMOL/L (ref 3.5–5.3)
PROT SERPL-MCNC: 6.6 G/DL (ref 6.4–8.2)
RBC # BLD AUTO: 4.17 X10*6/UL (ref 4.5–5.9)
SODIUM SERPL-SCNC: 139 MMOL/L (ref 136–145)
WBC # BLD AUTO: 6.6 X10*3/UL (ref 4.4–11.3)

## 2025-06-12 PROCEDURE — 36415 COLL VENOUS BLD VENIPUNCTURE: CPT

## 2025-06-12 PROCEDURE — 99285 EMERGENCY DEPT VISIT HI MDM: CPT | Mod: 25

## 2025-06-12 PROCEDURE — 93970 EXTREMITY STUDY: CPT

## 2025-06-12 PROCEDURE — 2500000004 HC RX 250 GENERAL PHARMACY W/ HCPCS (ALT 636 FOR OP/ED)

## 2025-06-12 PROCEDURE — 83880 ASSAY OF NATRIURETIC PEPTIDE: CPT

## 2025-06-12 PROCEDURE — 93970 EXTREMITY STUDY: CPT | Performed by: STUDENT IN AN ORGANIZED HEALTH CARE EDUCATION/TRAINING PROGRAM

## 2025-06-12 PROCEDURE — 2550000001 HC RX 255 CONTRASTS

## 2025-06-12 PROCEDURE — 85025 COMPLETE CBC W/AUTO DIFF WBC: CPT

## 2025-06-12 PROCEDURE — 71275 CT ANGIOGRAPHY CHEST: CPT | Mod: FOREIGN READ | Performed by: RADIOLOGY

## 2025-06-12 PROCEDURE — 84484 ASSAY OF TROPONIN QUANT: CPT

## 2025-06-12 PROCEDURE — 83735 ASSAY OF MAGNESIUM: CPT

## 2025-06-12 PROCEDURE — 71275 CT ANGIOGRAPHY CHEST: CPT

## 2025-06-12 PROCEDURE — 96374 THER/PROPH/DIAG INJ IV PUSH: CPT | Mod: 59

## 2025-06-12 PROCEDURE — 93005 ELECTROCARDIOGRAM TRACING: CPT

## 2025-06-12 PROCEDURE — 96375 TX/PRO/DX INJ NEW DRUG ADDON: CPT | Mod: 59

## 2025-06-12 PROCEDURE — 80053 COMPREHEN METABOLIC PANEL: CPT

## 2025-06-12 PROCEDURE — G0378 HOSPITAL OBSERVATION PER HR: HCPCS

## 2025-06-12 RX ORDER — POLYETHYLENE GLYCOL 3350 17 G/17G
17 POWDER, FOR SOLUTION ORAL DAILY PRN
Status: DISCONTINUED | OUTPATIENT
Start: 2025-06-12 | End: 2025-06-15 | Stop reason: HOSPADM

## 2025-06-12 RX ORDER — ACETAMINOPHEN 650 MG/1
650 SUPPOSITORY RECTAL EVERY 4 HOURS PRN
Status: DISCONTINUED | OUTPATIENT
Start: 2025-06-12 | End: 2025-06-13

## 2025-06-12 RX ORDER — ONDANSETRON 4 MG/1
4 TABLET, FILM COATED ORAL EVERY 8 HOURS PRN
Status: DISCONTINUED | OUTPATIENT
Start: 2025-06-12 | End: 2025-06-15 | Stop reason: HOSPADM

## 2025-06-12 RX ORDER — ACETAMINOPHEN 160 MG/5ML
650 SOLUTION ORAL EVERY 4 HOURS PRN
Status: DISCONTINUED | OUTPATIENT
Start: 2025-06-12 | End: 2025-06-13

## 2025-06-12 RX ORDER — ACETAMINOPHEN 325 MG/1
650 TABLET ORAL EVERY 4 HOURS PRN
Status: DISCONTINUED | OUTPATIENT
Start: 2025-06-12 | End: 2025-06-13

## 2025-06-12 RX ORDER — ONDANSETRON HYDROCHLORIDE 2 MG/ML
4 INJECTION, SOLUTION INTRAVENOUS EVERY 8 HOURS PRN
Status: DISCONTINUED | OUTPATIENT
Start: 2025-06-12 | End: 2025-06-15 | Stop reason: HOSPADM

## 2025-06-12 RX ORDER — ENOXAPARIN SODIUM 100 MG/ML
40 INJECTION SUBCUTANEOUS EVERY 24 HOURS
Status: DISCONTINUED | OUTPATIENT
Start: 2025-06-12 | End: 2025-06-15 | Stop reason: HOSPADM

## 2025-06-12 RX ORDER — TALC
3 POWDER (GRAM) TOPICAL NIGHTLY PRN
Status: DISCONTINUED | OUTPATIENT
Start: 2025-06-12 | End: 2025-06-15 | Stop reason: HOSPADM

## 2025-06-12 RX ORDER — DIPHENHYDRAMINE HYDROCHLORIDE 50 MG/ML
50 INJECTION, SOLUTION INTRAMUSCULAR; INTRAVENOUS ONCE
Status: COMPLETED | OUTPATIENT
Start: 2025-06-12 | End: 2025-06-12

## 2025-06-12 RX ADMIN — IOHEXOL 75 ML: 350 INJECTION, SOLUTION INTRAVENOUS at 20:40

## 2025-06-12 RX ADMIN — DIPHENHYDRAMINE HYDROCHLORIDE 50 MG: 50 INJECTION INTRAMUSCULAR; INTRAVENOUS at 20:23

## 2025-06-12 RX ADMIN — HYDROMORPHONE HYDROCHLORIDE 0.5 MG: 1 INJECTION, SOLUTION INTRAMUSCULAR; INTRAVENOUS; SUBCUTANEOUS at 18:38

## 2025-06-12 SDOH — SOCIAL STABILITY: SOCIAL NETWORK
DO YOU BELONG TO ANY CLUBS OR ORGANIZATIONS SUCH AS CHURCH GROUPS, UNIONS, FRATERNAL OR ATHLETIC GROUPS, OR SCHOOL GROUPS?: NO

## 2025-06-12 SDOH — ECONOMIC STABILITY: FOOD INSECURITY: WITHIN THE PAST 12 MONTHS, THE FOOD YOU BOUGHT JUST DIDN'T LAST AND YOU DIDN'T HAVE MONEY TO GET MORE.: NEVER TRUE

## 2025-06-12 SDOH — HEALTH STABILITY: PHYSICAL HEALTH: ON AVERAGE, HOW MANY MINUTES DO YOU ENGAGE IN EXERCISE AT THIS LEVEL?: 0 MIN

## 2025-06-12 SDOH — HEALTH STABILITY: PHYSICAL HEALTH
HOW OFTEN DO YOU NEED TO HAVE SOMEONE HELP YOU WHEN YOU READ INSTRUCTIONS, PAMPHLETS, OR OTHER WRITTEN MATERIAL FROM YOUR DOCTOR OR PHARMACY?: SOMETIMES

## 2025-06-12 SDOH — SOCIAL STABILITY: SOCIAL INSECURITY: ARE YOU MARRIED, WIDOWED, DIVORCED, SEPARATED, NEVER MARRIED, OR LIVING WITH A PARTNER?: DIVORCED

## 2025-06-12 SDOH — SOCIAL STABILITY: SOCIAL NETWORK: HOW OFTEN DO YOU ATTEND CHURCH OR RELIGIOUS SERVICES?: NEVER

## 2025-06-12 SDOH — SOCIAL STABILITY: SOCIAL INSECURITY: WERE YOU ABLE TO COMPLETE ALL THE BEHAVIORAL HEALTH SCREENINGS?: YES

## 2025-06-12 SDOH — ECONOMIC STABILITY: INCOME INSECURITY: IN THE PAST 12 MONTHS HAS THE ELECTRIC, GAS, OIL, OR WATER COMPANY THREATENED TO SHUT OFF SERVICES IN YOUR HOME?: NO

## 2025-06-12 SDOH — SOCIAL STABILITY: SOCIAL INSECURITY: WITHIN THE LAST YEAR, HAVE YOU BEEN AFRAID OF YOUR PARTNER OR EX-PARTNER?: NO

## 2025-06-12 SDOH — SOCIAL STABILITY: SOCIAL NETWORK: HOW OFTEN DO YOU ATTEND MEETINGS OF THE CLUBS OR ORGANIZATIONS YOU BELONG TO?: NEVER

## 2025-06-12 SDOH — HEALTH STABILITY: MENTAL HEALTH
DO YOU FEEL STRESS - TENSE, RESTLESS, NERVOUS, OR ANXIOUS, OR UNABLE TO SLEEP AT NIGHT BECAUSE YOUR MIND IS TROUBLED ALL THE TIME - THESE DAYS?: TO SOME EXTENT

## 2025-06-12 SDOH — ECONOMIC STABILITY: FOOD INSECURITY: WITHIN THE PAST 12 MONTHS, YOU WORRIED THAT YOUR FOOD WOULD RUN OUT BEFORE YOU GOT THE MONEY TO BUY MORE.: NEVER TRUE

## 2025-06-12 SDOH — HEALTH STABILITY: PHYSICAL HEALTH: ON AVERAGE, HOW MANY DAYS PER WEEK DO YOU ENGAGE IN MODERATE TO STRENUOUS EXERCISE (LIKE A BRISK WALK)?: 0 DAYS

## 2025-06-12 SDOH — SOCIAL STABILITY: SOCIAL INSECURITY: ABUSE: ADULT

## 2025-06-12 SDOH — SOCIAL STABILITY: SOCIAL INSECURITY: WITHIN THE LAST YEAR, HAVE YOU BEEN HUMILIATED OR EMOTIONALLY ABUSED IN OTHER WAYS BY YOUR PARTNER OR EX-PARTNER?: NO

## 2025-06-12 SDOH — SOCIAL STABILITY: SOCIAL INSECURITY: HAVE YOU HAD ANY THOUGHTS OF HARMING ANYONE ELSE?: NO

## 2025-06-12 SDOH — SOCIAL STABILITY: SOCIAL INSECURITY: HAVE YOU HAD THOUGHTS OF HARMING ANYONE ELSE?: NO

## 2025-06-12 SDOH — SOCIAL STABILITY: SOCIAL NETWORK: HOW OFTEN DO YOU GET TOGETHER WITH FRIENDS OR RELATIVES?: NEVER

## 2025-06-12 SDOH — SOCIAL STABILITY: SOCIAL INSECURITY: HAS ANYONE EVER THREATENED TO HURT YOUR FAMILY OR YOUR PETS?: NO

## 2025-06-12 SDOH — SOCIAL STABILITY: SOCIAL NETWORK: IN A TYPICAL WEEK, HOW MANY TIMES DO YOU TALK ON THE PHONE WITH FAMILY, FRIENDS, OR NEIGHBORS?: THREE TIMES A WEEK

## 2025-06-12 SDOH — SOCIAL STABILITY: SOCIAL INSECURITY: DOES ANYONE TRY TO KEEP YOU FROM HAVING/CONTACTING OTHER FRIENDS OR DOING THINGS OUTSIDE YOUR HOME?: NO

## 2025-06-12 SDOH — SOCIAL STABILITY: SOCIAL INSECURITY: DO YOU FEEL ANYONE HAS EXPLOITED OR TAKEN ADVANTAGE OF YOU FINANCIALLY OR OF YOUR PERSONAL PROPERTY?: NO

## 2025-06-12 SDOH — SOCIAL STABILITY: SOCIAL INSECURITY: ARE YOU OR HAVE YOU BEEN THREATENED OR ABUSED PHYSICALLY, EMOTIONALLY, OR SEXUALLY BY ANYONE?: NO

## 2025-06-12 SDOH — SOCIAL STABILITY: SOCIAL INSECURITY: DO YOU FEEL UNSAFE GOING BACK TO THE PLACE WHERE YOU ARE LIVING?: NO

## 2025-06-12 SDOH — SOCIAL STABILITY: SOCIAL INSECURITY: ARE THERE ANY APPARENT SIGNS OF INJURIES/BEHAVIORS THAT COULD BE RELATED TO ABUSE/NEGLECT?: NO

## 2025-06-12 ASSESSMENT — ACTIVITIES OF DAILY LIVING (ADL)
WALKS IN HOME: NEEDS ASSISTANCE
JUDGMENT_ADEQUATE_SAFELY_COMPLETE_DAILY_ACTIVITIES: YES
BATHING: NEEDS ASSISTANCE
PATIENT'S MEMORY ADEQUATE TO SAFELY COMPLETE DAILY ACTIVITIES?: YES
GROOMING: NEEDS ASSISTANCE
ADEQUATE_TO_COMPLETE_ADL: YES
HEARING - LEFT EAR: FUNCTIONAL
TOILETING: NEEDS ASSISTANCE
FEEDING YOURSELF: NEEDS ASSISTANCE
DRESSING YOURSELF: NEEDS ASSISTANCE
HEARING - RIGHT EAR: FUNCTIONAL
LACK_OF_TRANSPORTATION: NO

## 2025-06-12 ASSESSMENT — COGNITIVE AND FUNCTIONAL STATUS - GENERAL
DAILY ACTIVITIY SCORE: 15
MOVING FROM LYING ON BACK TO SITTING ON SIDE OF FLAT BED WITH BEDRAILS: A LITTLE
HELP NEEDED FOR BATHING: A LOT
STANDING UP FROM CHAIR USING ARMS: A LOT
PERSONAL GROOMING: A LITTLE
CLIMB 3 TO 5 STEPS WITH RAILING: A LOT
MOBILITY SCORE: 14
PATIENT BASELINE BEDBOUND: NO
WALKING IN HOSPITAL ROOM: A LITTLE
MOVING TO AND FROM BED TO CHAIR: A LOT
DRESSING REGULAR LOWER BODY CLOTHING: A LOT
EATING MEALS: A LITTLE
TURNING FROM BACK TO SIDE WHILE IN FLAT BAD: A LOT
DRESSING REGULAR UPPER BODY CLOTHING: A LOT
TOILETING: A LITTLE

## 2025-06-12 ASSESSMENT — LIFESTYLE VARIABLES
HOW OFTEN DO YOU HAVE A DRINK CONTAINING ALCOHOL: NEVER
SKIP TO QUESTIONS 9-10: 1
PRESCIPTION_ABUSE_PAST_12_MONTHS: NO
AUDIT-C TOTAL SCORE: 0
HOW MANY STANDARD DRINKS CONTAINING ALCOHOL DO YOU HAVE ON A TYPICAL DAY: PATIENT DOES NOT DRINK
SUBSTANCE_ABUSE_PAST_12_MONTHS: NO
HOW OFTEN DO YOU HAVE 6 OR MORE DRINKS ON ONE OCCASION: NEVER
AUDIT-C TOTAL SCORE: 0

## 2025-06-12 ASSESSMENT — PAIN SCALES - GENERAL
PAINLEVEL_OUTOF10: 9
PAINLEVEL_OUTOF10: 10 - WORST POSSIBLE PAIN

## 2025-06-12 ASSESSMENT — PAIN - FUNCTIONAL ASSESSMENT
PAIN_FUNCTIONAL_ASSESSMENT: 0-10
PAIN_FUNCTIONAL_ASSESSMENT: 0-10

## 2025-06-12 ASSESSMENT — PAIN DESCRIPTION - LOCATION: LOCATION: LEG

## 2025-06-12 ASSESSMENT — PAIN DESCRIPTION - DESCRIPTORS: DESCRIPTORS: DISCOMFORT

## 2025-06-12 ASSESSMENT — PAIN DESCRIPTION - ORIENTATION: ORIENTATION: LEFT;RIGHT

## 2025-06-12 ASSESSMENT — PAIN DESCRIPTION - PAIN TYPE: TYPE: ACUTE PAIN

## 2025-06-12 NOTE — ED TRIAGE NOTES
Pt has bilateral lower leg/feet swelling and pain that started a few days ago. Pt states he recently had surgery on his R shoulder about a week ago and when pt called his doctor about the swelling, they told him to come to the ED to be seen. Pt states he has diuretics at home as needed, but his doctor told him not to take them

## 2025-06-12 NOTE — ED PROVIDER NOTES
Limitations to history: None  Independent Historians: Family  External Records Reviewed: HIE, OARRS, outpatient notes, inpatient notes, paper charts if needed    History of Present Illness:  Patient is a 84-year-old male with a past medical history positive for COPD, hypertension, BPH, pulmonary embolism, cellulitis, degenerative joint disease who presents to ED with chief complaint of bilateral lower extremity feet and leg swelling that started 2 to 3 days ago, patient also reports increasing right shoulder pain.  Reports he had right shoulder surgery Rosy 3 by Dr. Saavedra.  Patient also reports intermittent shortness of breath.  Patient denies any known chest pain, fevers, chills, nausea, vomiting, diarrhea.  Patient reports he does not take blood thinners.  Patient is alert and oriented x 3 upon examination, nontoxic-appearing.      Denies HA, C/P, ABD pain, Nausea, Vomiting, Diarrhea, Weakness, Dizziness, Fever, Chills.    PMFSH:   As per HPI, otherwise nurses notes reviewed in EMR    Physical Exam:  Appearance: Alert, oriented x3, supine on exam table with head elevated, cooperative, in no acute distress. Well nourished & well hydrated.      Skin: Intact, dry skin, no lesions, rash, petechiae or purpura.     Eyes: PERRLA, EOMs intact, Conjunctiva pink with no redness or exudates. No scleral icterus.     Ears: Hearing grossly intact.      Nose: Nares patent, no epistaxis.     Mouth: Dentition without concerning abnormalities. no obstruction of posterior pharynx.     Neck: Supple, without meningismus. Trachea at midline.     Pulmonary: Clear bilaterally with good chest wall excursion. No rales, rhonchi or wheezing. No accessory muscle use or stridor. Talking in full sentences.     Cardiac: Normal S1, S2 without murmur, rub, gallop or extrasystole.     Abdomen: Soft, nontender to light and deep palpation to all quadrants, normoactive bowel sounds.  No palpable organomegaly.  No rebound or guarding.      Genitourinary: Physical exam deferred.     Musculoskeletal: Lower extremities are moderately swollen bilaterally, distal pulses present equal bilaterally.  Rest of the exam reveals no pain on palpation, instability, or deformity. Pulses full and equal. No cyanosis or clubbing. capillary refill <2 seconds to all examined digits.     Neurological:  Cranial nerves II through XII are grossly intact, normal sensation, no weakness, no focal findings identified.      Psychiatric: Appropriate mood and affect.    EKG interpreted by me shows sinus rhythm with marked sinus arrhythmia, right bundle branch block, no ST changes.  Ventricular rate of 70  bpm  DE interval   200             ms  QTc     422/455                       ms  No T wave elevation or depression    Labs Reviewed   CBC WITH AUTO DIFFERENTIAL - Abnormal       Result Value    WBC 6.6      nRBC 0.0      RBC 4.17 (*)     Hemoglobin 12.7 (*)     Hematocrit 38.2 (*)     MCV 92      MCH 30.5      MCHC 33.2      RDW 12.5      Platelets 219      Neutrophils % 70.1      Immature Granulocytes %, Automated 0.3      Lymphocytes % 19.0      Monocytes % 7.7      Eosinophils % 2.4      Basophils % 0.5      Neutrophils Absolute 4.65      Immature Granulocytes Absolute, Automated 0.02      Lymphocytes Absolute 1.26      Monocytes Absolute 0.51      Eosinophils Absolute 0.16      Basophils Absolute 0.03     COMPREHENSIVE METABOLIC PANEL - Abnormal    Glucose 143 (*)     Sodium 139      Potassium 3.5      Chloride 103      Bicarbonate 27      Anion Gap 13      Urea Nitrogen 21      Creatinine 0.83      eGFR 86      Calcium 9.4      Albumin 3.5      Alkaline Phosphatase 61      Total Protein 6.6      AST 13      Bilirubin, Total 0.8      ALT 10     MAGNESIUM - Normal    Magnesium 1.97     B-TYPE NATRIURETIC PEPTIDE - Normal    BNP 64      Narrative:        <100 pg/mL - Heart failure unlikely  100-299 pg/mL - Intermediate probability of acute heart                  failure  exacerbation. Correlate with clinical                  context and patient history.    >=300 pg/mL - Heart Failure likely. Correlate with clinical                  context and patient history.    BNP testing is performed using different testing methodology at [facility] than at other [facility]. Direct result comparisons should only be made within the same method.      SERIAL TROPONIN-INITIAL - Normal    Troponin I, High Sensitivity 5      Narrative:     Less than 99th percentile of normal range cutoff-  Female and children under 18 years old <14 ng/L; Male <21 ng/L: Negative  Repeat testing should be performed if clinically indicated.     Female and children under 18 years old 14-50 ng/L; Male 21-50 ng/L:  Consistent with possible cardiac damage and possible increased clinical   risk. Serial measurements may help to assess extent of myocardial damage.     >50 ng/L: Consistent with cardiac damage, increased clinical risk and  myocardial infarction. Serial measurements may help assess extent of   myocardial damage.      NOTE: Children less than 1 year old may have higher baseline troponin   levels and results should be interpreted in conjunction with the overall   clinical context.     NOTE: Troponin I testing is performed using a different   testing methodology at [facility] than at other   [facility]. Direct result comparisons should only   be made within the same method.   URINALYSIS WITH REFLEX CULTURE AND MICROSCOPIC    Narrative:     The following orders were created for panel order Urinalysis with Reflex Culture and Microscopic.  Procedure                               Abnormality         Status                     ---------                               -----------         ------                     Urinalysis with Reflex C...[[id number]]                                                 Extra Urine Gray Tube[[id number]]                                                          Please view results for these tests on the individual orders.   TROPONIN SERIES- (INITIAL, 1 HR)    Narrative:     The following orders were created for panel order Troponin I Series, High Sensitivity (0, 1 HR).  Procedure                               Abnormality         Status                     ---------                               -----------         ------                     Troponin I, High Sensiti...[[id number]]  Normal              Final result               Troponin, High Sensitivi...[[id number]]                                                   Please view results for these tests on the individual orders.   URINALYSIS WITH REFLEX CULTURE AND MICROSCOPIC   EXTRA URINE GRAY TUBE   SERIAL TROPONIN, 1 HOUR      Vascular US lower extremity venous duplex bilateral   Final Result   Negative study.  No deep venous thrombosis of the right or left lower   extremity.        MACRO:   None        Signed by: Get Humphreys 6/12/2025 8:23 PM   Dictation workstation:   [id number]      CT angio chest for pulmonary embolism    (Results Pending)                Repeat Evaluation below    Summary:  Medical Decision Making:   Patient presented as described in HPI. Patient case including ROS, PE, and treatment and plan discussed with ED attending if attached as cosigner. Due to patients presentation orders completed include as documented.  Patient evaluated for complaints of lower extremity swelling, shortness of breath upon exertion, patient found to be afebrile, nontachycardic, nonhypoxic, patient on room air.  Patient reports that he had right shoulder surgery on Rosy 3, is also having right shoulder pain.  Reports he does not take blood thinners.,  Denies any fevers and/or chest pain.  Troponin level negative, magnesium 1.97, serum CMP revealed no electrolyte abnormalities, serum CBC revealed no evidence of leukocytosis present, BNP level 64.  Ultrasound imaging revealed no DVT of either lower extremity present.  CT  PE pending at time of signout.  All Case findings also discussed with ED attending Dr. Smith.  Plan is to admit patient for further evaluation treatment of exertional shortness of breath, lower extremity swelling bilaterally.      Diagnoses as of 06/12/25 2117   Swelling of both lower extremities   SOB (shortness of breath) on exertion        Tests/Medications/Escalations of Care considered but not given:    Patient care discussed with: N/A  Social Determinants affecting care: N/A    Final diagnosis and disposition as documented in impression         Disposition:       Hospitalize to _ floor under DrSabi _ per their orders. Discussed findings and treatment done here in ED with admitting physician. It would be a risk to discharge the patient in their condition due to possibility of deterioration in their condition and the need for urgent interventions.    This note has been transcribed using voice recognition and may contain grammatical errors, misplaced words, incorrect words, incorrect phrases or other errors.     Palak Greenfield, SHAD-CNP  06/12/25 2119

## 2025-06-12 NOTE — TELEPHONE ENCOUNTER
06/03/25 RT RTSA  Patient called back and stated he is still having swelling in both feet/ankles after icing and elevating. We instructed patient if this has continued he should go to the ER to rule out blood clot. Patient has to wait for his ride an hour from now. I told him to call them and let them know what is going on so they can take him to the ER to rule this out. Patient understood.

## 2025-06-13 LAB
ANION GAP SERPL CALC-SCNC: 10 MMOL/L (ref 10–20)
APPEARANCE UR: CLEAR
BILIRUB UR STRIP.AUTO-MCNC: NEGATIVE MG/DL
BUN SERPL-MCNC: 17 MG/DL (ref 6–23)
CALCIUM SERPL-MCNC: 8.7 MG/DL (ref 8.6–10.3)
CHLORIDE SERPL-SCNC: 106 MMOL/L (ref 98–107)
CO2 SERPL-SCNC: 28 MMOL/L (ref 21–32)
COLOR UR: YELLOW
CREAT SERPL-MCNC: 0.77 MG/DL (ref 0.5–1.3)
EGFRCR SERPLBLD CKD-EPI 2021: 88 ML/MIN/1.73M*2
ERYTHROCYTE [DISTWIDTH] IN BLOOD BY AUTOMATED COUNT: 12.5 % (ref 11.5–14.5)
GLUCOSE SERPL-MCNC: 86 MG/DL (ref 74–99)
GLUCOSE UR STRIP.AUTO-MCNC: NORMAL MG/DL
HCT VFR BLD AUTO: 34.4 % (ref 41–52)
HGB BLD-MCNC: 11.6 G/DL (ref 13.5–17.5)
KETONES UR STRIP.AUTO-MCNC: NEGATIVE MG/DL
LEUKOCYTE ESTERASE UR QL STRIP.AUTO: ABNORMAL
MCH RBC QN AUTO: 30.7 PG (ref 26–34)
MCHC RBC AUTO-ENTMCNC: 33.7 G/DL (ref 32–36)
MCV RBC AUTO: 91 FL (ref 80–100)
MUCOUS THREADS #/AREA URNS AUTO: NORMAL /LPF
NITRITE UR QL STRIP.AUTO: NEGATIVE
NRBC BLD-RTO: 0 /100 WBCS (ref 0–0)
PH UR STRIP.AUTO: 6.5 [PH]
PLATELET # BLD AUTO: 189 X10*3/UL (ref 150–450)
POTASSIUM SERPL-SCNC: 3.7 MMOL/L (ref 3.5–5.3)
PROT UR STRIP.AUTO-MCNC: NEGATIVE MG/DL
RBC # BLD AUTO: 3.78 X10*6/UL (ref 4.5–5.9)
RBC # UR STRIP.AUTO: NEGATIVE MG/DL
RBC #/AREA URNS AUTO: NORMAL /HPF
SODIUM SERPL-SCNC: 140 MMOL/L (ref 136–145)
SP GR UR STRIP.AUTO: 1.04
UROBILINOGEN UR STRIP.AUTO-MCNC: NORMAL MG/DL
WBC # BLD AUTO: 6.1 X10*3/UL (ref 4.4–11.3)
WBC #/AREA URNS AUTO: NORMAL /HPF

## 2025-06-13 PROCEDURE — 87086 URINE CULTURE/COLONY COUNT: CPT | Mod: GENLAB

## 2025-06-13 PROCEDURE — 81001 URINALYSIS AUTO W/SCOPE: CPT

## 2025-06-13 PROCEDURE — 96375 TX/PRO/DX INJ NEW DRUG ADDON: CPT

## 2025-06-13 PROCEDURE — 99223 1ST HOSP IP/OBS HIGH 75: CPT | Performed by: NURSE PRACTITIONER

## 2025-06-13 PROCEDURE — 96372 THER/PROPH/DIAG INJ SC/IM: CPT | Performed by: INTERNAL MEDICINE

## 2025-06-13 PROCEDURE — 36415 COLL VENOUS BLD VENIPUNCTURE: CPT | Performed by: INTERNAL MEDICINE

## 2025-06-13 PROCEDURE — 97165 OT EVAL LOW COMPLEX 30 MIN: CPT | Mod: GO | Performed by: OCCUPATIONAL THERAPIST

## 2025-06-13 PROCEDURE — 2500000004 HC RX 250 GENERAL PHARMACY W/ HCPCS (ALT 636 FOR OP/ED): Performed by: INTERNAL MEDICINE

## 2025-06-13 PROCEDURE — 2500000001 HC RX 250 WO HCPCS SELF ADMINISTERED DRUGS (ALT 637 FOR MEDICARE OP): Performed by: INTERNAL MEDICINE

## 2025-06-13 PROCEDURE — 2500000001 HC RX 250 WO HCPCS SELF ADMINISTERED DRUGS (ALT 637 FOR MEDICARE OP): Performed by: NURSE PRACTITIONER

## 2025-06-13 PROCEDURE — 82374 ASSAY BLOOD CARBON DIOXIDE: CPT | Performed by: INTERNAL MEDICINE

## 2025-06-13 PROCEDURE — 94760 N-INVAS EAR/PLS OXIMETRY 1: CPT

## 2025-06-13 PROCEDURE — 2500000002 HC RX 250 W HCPCS SELF ADMINISTERED DRUGS (ALT 637 FOR MEDICARE OP, ALT 636 FOR OP/ED): Performed by: NURSE PRACTITIONER

## 2025-06-13 PROCEDURE — G0378 HOSPITAL OBSERVATION PER HR: HCPCS

## 2025-06-13 PROCEDURE — 85027 COMPLETE CBC AUTOMATED: CPT | Performed by: INTERNAL MEDICINE

## 2025-06-13 PROCEDURE — 97161 PT EVAL LOW COMPLEX 20 MIN: CPT | Mod: GP | Performed by: PHYSICAL THERAPIST

## 2025-06-13 RX ORDER — NALOXONE HYDROCHLORIDE 0.4 MG/ML
0.2 INJECTION, SOLUTION INTRAMUSCULAR; INTRAVENOUS; SUBCUTANEOUS EVERY 5 MIN PRN
Status: DISCONTINUED | OUTPATIENT
Start: 2025-06-13 | End: 2025-06-15 | Stop reason: HOSPADM

## 2025-06-13 RX ORDER — OXYCODONE HYDROCHLORIDE 5 MG/1
5 TABLET ORAL EVERY 4 HOURS PRN
Refills: 0 | Status: DISCONTINUED | OUTPATIENT
Start: 2025-06-13 | End: 2025-06-15 | Stop reason: HOSPADM

## 2025-06-13 RX ORDER — FUROSEMIDE 10 MG/ML
20 INJECTION INTRAMUSCULAR; INTRAVENOUS ONCE
Status: COMPLETED | OUTPATIENT
Start: 2025-06-13 | End: 2025-06-13

## 2025-06-13 RX ORDER — ACETAMINOPHEN 325 MG/1
975 TABLET ORAL EVERY 8 HOURS
Status: DISCONTINUED | OUTPATIENT
Start: 2025-06-13 | End: 2025-06-15 | Stop reason: HOSPADM

## 2025-06-13 RX ORDER — LOSARTAN POTASSIUM 50 MG/1
50 TABLET ORAL DAILY
Status: DISCONTINUED | OUTPATIENT
Start: 2025-06-13 | End: 2025-06-15 | Stop reason: HOSPADM

## 2025-06-13 RX ORDER — METOPROLOL SUCCINATE 25 MG/1
50 TABLET, EXTENDED RELEASE ORAL DAILY
Status: DISCONTINUED | OUTPATIENT
Start: 2025-06-13 | End: 2025-06-15 | Stop reason: HOSPADM

## 2025-06-13 RX ORDER — TAMSULOSIN HYDROCHLORIDE 0.4 MG/1
0.8 CAPSULE ORAL EVERY EVENING
Status: DISCONTINUED | OUTPATIENT
Start: 2025-06-13 | End: 2025-06-15 | Stop reason: HOSPADM

## 2025-06-13 RX ORDER — POLYVINYL ALCOHOL 14 MG/ML
1 SOLUTION/ DROPS OPHTHALMIC 3 TIMES DAILY
COMMUNITY

## 2025-06-13 RX ORDER — ZINC OXIDE 20 G/100G
1 OINTMENT TOPICAL 3 TIMES DAILY
Status: DISCONTINUED | OUTPATIENT
Start: 2025-06-13 | End: 2025-06-15 | Stop reason: HOSPADM

## 2025-06-13 RX ORDER — DULOXETIN HYDROCHLORIDE 30 MG/1
30 CAPSULE, DELAYED RELEASE ORAL DAILY
Status: DISCONTINUED | OUTPATIENT
Start: 2025-06-13 | End: 2025-06-15 | Stop reason: HOSPADM

## 2025-06-13 RX ORDER — POLYVINYL ALCOHOL 14 MG/ML
1 SOLUTION/ DROPS OPHTHALMIC 3 TIMES DAILY
Status: DISCONTINUED | OUTPATIENT
Start: 2025-06-13 | End: 2025-06-14

## 2025-06-13 RX ORDER — OXYCODONE HYDROCHLORIDE 5 MG/1
10 TABLET ORAL EVERY 12 HOURS PRN
Refills: 0 | Status: DISCONTINUED | OUTPATIENT
Start: 2025-06-13 | End: 2025-06-13

## 2025-06-13 RX ORDER — ONDANSETRON 4 MG/1
4 TABLET, ORALLY DISINTEGRATING ORAL EVERY 8 HOURS PRN
Status: DISCONTINUED | OUTPATIENT
Start: 2025-06-13 | End: 2025-06-13 | Stop reason: SDUPTHER

## 2025-06-13 RX ORDER — MORPHINE SULFATE 2 MG/ML
1 INJECTION, SOLUTION INTRAMUSCULAR; INTRAVENOUS ONCE
Status: COMPLETED | OUTPATIENT
Start: 2025-06-13 | End: 2025-06-13

## 2025-06-13 RX ORDER — TIZANIDINE 4 MG/1
4 TABLET ORAL 3 TIMES DAILY
Status: DISCONTINUED | OUTPATIENT
Start: 2025-06-13 | End: 2025-06-15 | Stop reason: HOSPADM

## 2025-06-13 RX ORDER — DOCUSATE SODIUM 100 MG/1
100 CAPSULE, LIQUID FILLED ORAL 2 TIMES DAILY PRN
Status: DISCONTINUED | OUTPATIENT
Start: 2025-06-13 | End: 2025-06-15 | Stop reason: HOSPADM

## 2025-06-13 RX ORDER — MUPIROCIN 20 MG/G
1 OINTMENT TOPICAL DAILY
COMMUNITY

## 2025-06-13 RX ORDER — AMMONIUM LACTATE 12 G/100G
LOTION TOPICAL 2 TIMES DAILY
Status: DISCONTINUED | OUTPATIENT
Start: 2025-06-13 | End: 2025-06-15 | Stop reason: HOSPADM

## 2025-06-13 RX ORDER — FINASTERIDE 5 MG/1
5 TABLET, FILM COATED ORAL NIGHTLY
Status: DISCONTINUED | OUTPATIENT
Start: 2025-06-13 | End: 2025-06-15 | Stop reason: HOSPADM

## 2025-06-13 RX ORDER — SODIUM CHLORIDE 9 MG/ML
10 INJECTION, SOLUTION INTRAVENOUS CONTINUOUS PRN
Status: DISCONTINUED | OUTPATIENT
Start: 2025-06-13 | End: 2025-06-15 | Stop reason: HOSPADM

## 2025-06-13 RX ORDER — KETOROLAC TROMETHAMINE 15 MG/ML
15 INJECTION, SOLUTION INTRAMUSCULAR; INTRAVENOUS ONCE
Status: DISCONTINUED | OUTPATIENT
Start: 2025-06-13 | End: 2025-06-13

## 2025-06-13 RX ADMIN — OXYCODONE 5 MG: 5 TABLET ORAL at 11:09

## 2025-06-13 RX ADMIN — ACETAMINOPHEN 975 MG: 325 TABLET, FILM COATED ORAL at 10:52

## 2025-06-13 RX ADMIN — OXYCODONE 5 MG: 5 TABLET ORAL at 15:23

## 2025-06-13 RX ADMIN — Medication: at 16:40

## 2025-06-13 RX ADMIN — MORPHINE SULFATE 1 MG: 2 INJECTION, SOLUTION INTRAMUSCULAR; INTRAVENOUS at 00:43

## 2025-06-13 RX ADMIN — ACETAMINOPHEN 975 MG: 325 TABLET, FILM COATED ORAL at 19:02

## 2025-06-13 RX ADMIN — OXYCODONE 5 MG: 5 TABLET ORAL at 19:02

## 2025-06-13 RX ADMIN — Medication: at 21:32

## 2025-06-13 RX ADMIN — TIZANIDINE 4 MG: 4 TABLET ORAL at 21:31

## 2025-06-13 RX ADMIN — DULOXETINE HYDROCHLORIDE 30 MG: 30 CAPSULE, DELAYED RELEASE ORAL at 17:02

## 2025-06-13 RX ADMIN — TIZANIDINE 4 MG: 4 TABLET ORAL at 15:24

## 2025-06-13 RX ADMIN — METOPROLOL SUCCINATE 50 MG: 25 TABLET, EXTENDED RELEASE ORAL at 17:01

## 2025-06-13 RX ADMIN — LOSARTAN POTASSIUM 50 MG: 50 TABLET, FILM COATED ORAL at 17:02

## 2025-06-13 RX ADMIN — FINASTERIDE 5 MG: 5 TABLET, FILM COATED ORAL at 21:31

## 2025-06-13 RX ADMIN — TAMSULOSIN HYDROCHLORIDE 0.8 MG: 0.4 CAPSULE ORAL at 21:31

## 2025-06-13 RX ADMIN — OXYCODONE 5 MG: 5 TABLET ORAL at 06:21

## 2025-06-13 RX ADMIN — ZINC OXIDE 1 APPLICATION: 200 OINTMENT TOPICAL at 16:40

## 2025-06-13 RX ADMIN — ENOXAPARIN SODIUM 40 MG: 40 INJECTION SUBCUTANEOUS at 00:43

## 2025-06-13 RX ADMIN — FUROSEMIDE 20 MG: 10 INJECTION, SOLUTION INTRAVENOUS at 19:02

## 2025-06-13 ASSESSMENT — COGNITIVE AND FUNCTIONAL STATUS - GENERAL
TURNING FROM BACK TO SIDE WHILE IN FLAT BAD: A LITTLE
TURNING FROM BACK TO SIDE WHILE IN FLAT BAD: A LOT
MOBILITY SCORE: 14
MOVING FROM LYING ON BACK TO SITTING ON SIDE OF FLAT BED WITH BEDRAILS: A LITTLE
CLIMB 3 TO 5 STEPS WITH RAILING: A LOT
WALKING IN HOSPITAL ROOM: A LITTLE
STANDING UP FROM CHAIR USING ARMS: A LITTLE
MOBILITY SCORE: 18
TOILETING: A LITTLE
TOILETING: A LOT
DRESSING REGULAR UPPER BODY CLOTHING: A LOT
DAILY ACTIVITIY SCORE: 15
STANDING UP FROM CHAIR USING ARMS: A LOT
PERSONAL GROOMING: A LITTLE
CLIMB 3 TO 5 STEPS WITH RAILING: A LOT
DRESSING REGULAR UPPER BODY CLOTHING: A LOT
DRESSING REGULAR LOWER BODY CLOTHING: A LOT
EATING MEALS: A LITTLE
DAILY ACTIVITIY SCORE: 14
WALKING IN HOSPITAL ROOM: A LITTLE
HELP NEEDED FOR BATHING: A LOT
EATING MEALS: A LITTLE
HELP NEEDED FOR BATHING: A LOT
MOVING TO AND FROM BED TO CHAIR: A LITTLE
PERSONAL GROOMING: A LITTLE
MOVING TO AND FROM BED TO CHAIR: A LOT
DRESSING REGULAR LOWER BODY CLOTHING: A LOT

## 2025-06-13 ASSESSMENT — PAIN SCALES - GENERAL
PAINLEVEL_OUTOF10: 10 - WORST POSSIBLE PAIN
PAINLEVEL_OUTOF10: 10 - WORST POSSIBLE PAIN
PAINLEVEL_OUTOF10: 6
PAINLEVEL_OUTOF10: 10 - WORST POSSIBLE PAIN
PAINLEVEL_OUTOF10: 10 - WORST POSSIBLE PAIN
PAINLEVEL_OUTOF10: 9
PAINLEVEL_OUTOF10: 10 - WORST POSSIBLE PAIN
PAINLEVEL_OUTOF10: 10 - WORST POSSIBLE PAIN

## 2025-06-13 ASSESSMENT — ACTIVITIES OF DAILY LIVING (ADL)
ADL_ASSISTANCE: INDEPENDENT
LACK_OF_TRANSPORTATION: NO
ADL_ASSISTANCE: INDEPENDENT

## 2025-06-13 ASSESSMENT — PAIN DESCRIPTION - ORIENTATION
ORIENTATION: RIGHT

## 2025-06-13 ASSESSMENT — ENCOUNTER SYMPTOMS
SHORTNESS OF BREATH: 1
CONSTITUTIONAL NEGATIVE: 1
PSYCHIATRIC NEGATIVE: 1
HEMATOLOGIC/LYMPHATIC NEGATIVE: 1
NEUROLOGICAL NEGATIVE: 1
ALLERGIC/IMMUNOLOGIC NEGATIVE: 1
GASTROINTESTINAL NEGATIVE: 1
EYES NEGATIVE: 1
ARTHRALGIAS: 1
ENDOCRINE NEGATIVE: 1

## 2025-06-13 ASSESSMENT — PAIN DESCRIPTION - LOCATION
LOCATION: SHOULDER

## 2025-06-13 ASSESSMENT — PAIN - FUNCTIONAL ASSESSMENT
PAIN_FUNCTIONAL_ASSESSMENT: 0-10

## 2025-06-13 NOTE — PROGRESS NOTES
06/13/25 0842   Discharge Planning   Living Arrangements Alone   Support Systems Children  (daughter and son in law)   Assistance Needed Patient is A&OX4, independent in ADLs, ambulates with a cane or rollator and wears no O2, CPAP or Bipap at baseline. Patient lives home alone but daughter comes over after work and stays overnight with patient. Patient does not drive but daughter or son in law are able to transport him where he needs to go.  No active HHC agency. PCP is Dr. Angella Lima and pharmacy is [facility].   Type of Residence Private residence   Number of Stairs to Enter Residence 1   Number of Stairs Within Residence 0   Do you have animals or pets at home? No   Who is requesting discharge planning? Provider   Home or Post Acute Services Other (Comment)  (TBD)   Expected Discharge Disposition [place]  (TBD- Therapy eval pending. Patient reports he has been to rehab at [facility] and [facility] in the past but anticiaptes he will not need it. Patient unsure if he will want HHC at [place], will await eval for further dc planning.)   Does the patient need discharge transport arranged? No   Financial Resource Strain   How hard is it for you to pay for the very basics like food, housing, medical care, and heating? Not hard   Housing Stability   In the last 12 months, was there a time when you were not able to pay the mortgage or rent on time? N   In the past 12 months, how many times have you moved where you were living? 0   At any time in the past 12 months, were you homeless or living in a shelter (including now)? N   Transportation Needs   In the past 12 months, has lack of transportation kept you from medical appointments or from getting medications? no   In the past 12 months, has lack of transportation kept you from meetings, work, or from getting things needed for daily living? No   Stroke Family Assessment   Stroke Family Assessment Needed No   Intensity of Service   Intensity of Service 0-30 min      6/13/25: TCC working remote.

## 2025-06-13 NOTE — CARE PLAN
The patient's goals for the shift include  pain management    The clinical goals for the shift include no falls or injuries tonight. Pain control    Patient admitted to unit this shift. A+Ox4 and ambulating intermittently with x1 assist and cane. Patient has immobilizer to right shoulder and bandages covering surgical site which he states not to be removed until surgical follow up. Administered one time dose of morphine and PRN oxycodone for pain 10/10. Ice pack applied to area. Interventions somewhat effective per patient. Patient has stage II to right buttocks, nonhealing. Sacral mepilex applied. Patient also has large skin tears to LUE with mepilex in place. BP elevated, overnight provider aware. Patient resting in bed with call light within reach.    Problem: Pain - Adult  Goal: Verbalizes/displays adequate comfort level or baseline comfort level  Outcome: Not Progressing     Problem: Pain  Goal: Turns in bed with improved pain control throughout the shift  Outcome: Not Progressing  Goal: Walks with improved pain control throughout the shift  Outcome: Not Progressing  Goal: Performs ADL's with improved pain control throughout shift  Outcome: Not Progressing  Goal: Participates in PT with improved pain control throughout the shift  Outcome: Not Progressing

## 2025-06-13 NOTE — TELEPHONE ENCOUNTER
Patient called from the ED upset he stated they are not doing anything for him but feeding him and that he has not seen the Dr yet, he stated he would like to talk to Janice or  about what to do on the swelling. I let him know you guys are in clinic and will reach out when you get a chance.    [phone]

## 2025-06-13 NOTE — PROGRESS NOTES
06/13/25 1650   Discharge Planning   Expected Discharge Disposition Home H  (PT/OT recommend LOW level of therapy on discharge. List given. Patient would like [facility]. Referral sent in [facility], awaiting response.)   Intensity of Service   Intensity of Service 0-30 min

## 2025-06-13 NOTE — PROGRESS NOTES
Physical Therapy    Physical Therapy Evaluation    Patient Name: Abraham Rodriguez  MRN: [id number]  Department: [facility]  Room: [address] [place], [place] [address]  Today's Date: 6/13/2025   Time Calculation  Start Time: 1045  Stop Time: 1112  Time Calculation (min): 27 min    Assessment/Plan   PT Assessment  PT Assessment Results: Decreased strength, Decreased range of motion, Impaired balance, Decreased mobility, Pain, Orthopedic restrictions  Rehab Prognosis: Good  Evaluation/Treatment Tolerance: Patient tolerated treatment well  Medical Staff Made Aware: Yes  Strengths: Leisure activity, Living arrangement secure, Support of Caregivers, Rehab experience, Ability to acquire knowledge, Housing layout  Barriers to Participation: Comorbidities  End of Session Communication: Bedside nurse  Assessment Comment: Pt presents with slight decline from functional mobility baseiine s/p admission for B LE swelling and pain limiting functional mobility. Pt indpendent at baseline with cane and daugher assistance for household duties.  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 Pt is close supervision with transfers and ambulation praveen bermeo. Pt demonstrates decreased B LE strength, mobiity, rom, impaired gait mechanics and B LE pain limiting function and mobility. PT warranted to address impairments so pt can progress towards PLOF and/or safe mobility.  End of Session Patient Position: Bed, 2 rail up, Alarm on  IP OR SWING BED PT PLAN  Inpatient or Swing Bed: Inpatient  PT Plan  Treatment/Interventions: Bed mobility, Transfer training, Gait training, Balance training, Neuromuscular re-education, Strengthening, Range of motion, Therapeutic exercise, Therapeutic activity  PT Plan: Ongoing PT  PT Frequency: 3 times per week  PT Discharge Recommendations: Low intensity level of continued care (OP PT for shoulder when able)  PT Recommended Transfer Status: Assist x1, Stand by  assist (supervision/sba, bed mobility pt requires assist with LE at times)  PT - OK to Discharge: Yes  Based on completed evaluation and care plan recommendations, no barriers to discharge to next site of care.       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 Pt reports 10/10 r shoulder and B foot/calf pain with increased tightness at quad tendon area bilaterally. Pt reports pain medication hasnt helped and is awaiting new medication orders. Pt with masimo in place, all needs left in place after evaluation.Nursing in room at conclusion  Home Living:  Home Living  Type of Home:  (cottage)  Lives With: Alone  Home Adaptive Equipment: Walker rolling or standard, Cane  Home Layout: One level  Home Access: No concerns  Bathroom Shower/Tub: Tub/shower unit  Prior Level of Function:  Prior Function Per Pt/Caregiver Report  Level of [place]: Independent with ADLs and functional transfers  Receives Help From: Family  ADL Assistance: Independent  Homemaking Assistance: Independent (daughter helps with household duties and cleaning)  Precautions:  Precautions  Medical Precautions: Fall precautions  Post-Surgical Precautions: Right shoulder precautions (R UE in sling, s/p total shoulder june 3rd. Pt has not had formal OP PT yet, reports he has to wait to see the surgeon june 20th before going to PT.)      Date/Time Vitals Session  Patient Position Pulse Resp SpO2 BP MAP (mmHg)    06/13/25 1247 --  --  85  14  96 %  145/82  103                 Objective   Pain:  Pain Assessment  0-10 (Numeric) Pain Score: 10 - Worst possible pain  Pain Type: Chronic pain, Surgical pain  Pain Location: Shoulder (B LE calf/foot/ankle area)  Pain Orientation: Right (shoulder)  Pain Interventions: Medication (See MAR)  Response to Interventions: No change in pain  Cognition:  Cognition  Overall Cognitive Status: Within Functional Limits  Orientation Level: Oriented X4    General Assessments:                  Activity Tolerance  Endurance: Decreased tolerance for upright activites    Strength  Strength Comments: B LE 4/5 grossly, due to pain, functionally patient able to perform sit to stand and ambulation with close supervision  Static Sitting Balance  Static Sitting-Level of Assistance: Independent  Dynamic Sitting Balance  Dynamic Sitting-Level of Assistance: Modified independent    Static Standing Balance  Static Standing-Level of Assistance: Independent  Dynamic Standing Balance  Dynamic Standing-Level of Assistance: Contact guard, Minimum assistance (decreased dynamic balance due to pain and R UE in sling)  Functional Assessments:  Bed Mobility  Bed Mobility: Yes  Bed Mobility 1  Bed Mobility 1: Supine to sitting, Sitting to supine  Level of Assistance 1: Contact guard, Minimum assistance (min a for LE going sitting to supine, cga supine to sit)    Transfers  Transfer: Yes  Transfer 1  Technique 1: Sit to stand, Stand to sit  Transfer Device 1: Cane  Transfer Level of Assistance 1: Close supervision    Ambulation/Gait Training  Ambulation/Gait Training Performed: Yes  Ambulation/Gait Training 1  Surface 1: Level tile  Device 1: Single point cane  Gait Support Devices: Upper extremity sling  Assistance 1: Close supervision  Quality of Gait 1: Inconsistent stride length  Comments/Distance (ft) 1: 50' x 1 without device, no lob or increased instability noted. pt  reports pain B LE with ambulation but remained the same as prior to ambulation. Pt feels his mobility is only limited due to the pain, shoulder being his main concern and pain limitor to function  Extremity/Trunk Assessments:  RLE   RLE : Within Functional Limits  LLE   LLE : Within Functional Limits  Outcome Measures:  [facility] Basic Mobility  Turning from your back to your side while in a flat bed without using bedrails: None  Moving from lying on your back to sitting on the side of a flat bed without using bedrails: A little  Moving to and from bed to chair (including a wheelchair): A little  Standing up from a chair using your arms (e.g. wheelchair or bedside chair): A little  To walk in hospital room: A little  Climbing 3-5 steps with railing: A lot  Basic Mobility - Total Score: 18    Encounter Problems       Encounter Problems (Active)       Mobility       Patient will ambulate with cane 100' x 2 mod I with improved gait mechanics and decreased pain symptoms.        Start:  06/13/25    Expected End:  06/20/25            Pt will improve supine to sit to supine transfers with mod I        Start:  06/13/25    Expected End:  06/20/25            Pt will improve sit to stand to sit and transfer from bed to chair to bed with cane mod I        Start:  06/13/25    Expected End:  06/20/25            Pt will improve B LE strength by 1/2 grade for improved overall functional mobility        Start:  06/13/25    Expected End:  06/20/25               Pain - Adult              Education Documentation  Mobility Training, taught by Edgardo Todd PT at 6/13/2025  1:32 PM.  Learner: Patient  Readiness: Acceptance  Method: Explanation, Demonstration  Response: Verbalizes Understanding, Demonstrated Understanding  Comment: Pt educated on proper bed mobility technique due to R UE in sling post surgery. Pt also educated on LE exercises/movements in sitting and ankle pumping for swelling along with gait mechanics for  safety    Education Comments  No comments found.             Female

## 2025-06-13 NOTE — TELEPHONE ENCOUNTER
Spoke with patient he's admitted into the hospital. They checked him for DVT which was negative. I told him once discharged if he needs to follow up to give us a call but he is scheduled for 6/20/25 for a follow up so otherwise we would see him then for that. Patient understood and was glad to get a call back.

## 2025-06-13 NOTE — H&P
History Of Present Illness  Abraham Rodriguez is a 84 y.o. male with a past medical hx of BPH, COPD, DJD, DVT, HFpEF (Grade I diastolic dysfunction), HLD, HTN, bilateral PE presenting with ankle swelling that started a couple days ago, along with intermittent increasing sob. Patient is no acute distress on room air.  ED testing reveals: normal chemistries and LFT's, BNP 64 trops neg 5>5. CBC with mild anemia (11.6/34.4) in the setting of recent right reverse total shoulder replacement (6/3/25) -   UA: (-) for infection   CTA: neg for PE  5/29/25 echo (pre op) - nl EF 60-65% /Grade I diastolic dysfunction     Past Medical History  He has a past medical history of Allergic dermatitis (09/14/2023), Allergic rhinitis due to pollen (10/23/2014), Arthritis, Chronic pain disorder, Clotting disorder (Multi), COPD (chronic obstructive pulmonary disease) (Multi), Coronary artery disease, Encounter for other preprocedural examination (06/24/2014), Encounter for screening for malignant neoplasm of prostate, Fissure in skin of foot (09/14/2023), Hyperlipidemia, Hypertension, Localized edema (05/11/2020), Lung infiltrate (09/14/2023), Myocardial infarction (Multi), Other conditions influencing health status, Pain in unspecified knee (12/22/2014), Personal history of diseases of the skin and subcutaneous tissue (04/23/2013), Personal history of other diseases of the musculoskeletal system and connective tissue (06/19/2014), Personal history of other diseases of the nervous system and sense organs (08/10/2021), Personal history of other diseases of the respiratory system (11/12/2014), Personal history of other diseases of the respiratory system (08/13/2014), Personal history of other specified conditions (08/20/2013), Plantar fascial fibromatosis (07/22/2013), Polyp of colon, Syncope and collapse (01/23/2015), Unspecified abdominal pain (12/22/2014), and Unspecified disorder of eyelid (10/23/2014).    Surgical History  He has a past  surgical history that includes Other surgical history (04/27/2021); Other surgical history (Left, 05/31/2023); Cholecystectomy (04/23/2013); Shoulder surgery (04/23/2013); Other surgical history (04/23/2013); Cardiac catheterization; Total knee arthroplasty (Right, 03/27/2024); and Hip Arthroplasty (Right).     Social History  He reports that he quit smoking about 42 years ago. His smoking use included cigarettes. He has never used smokeless tobacco. He reports that he does not currently use drugs. He reports that he does not drink alcohol.    Family History  Family History[1]     Allergies  Iodinated contrast media, Tobramycin-dexamethasone, Atorvastatin, Cinnamon, Cucumber, and Lisinopril    Review of Systems   Constitutional: Negative.    HENT: Negative.     Eyes: Negative.    Respiratory:  Positive for shortness of breath.    Cardiovascular:  Positive for leg swelling.   Gastrointestinal: Negative.    Endocrine: Negative.    Genitourinary: Negative.    Musculoskeletal:  Positive for arthralgias.   Skin: Negative.    Allergic/Immunologic: Negative.    Neurological: Negative.    Hematological: Negative.    Psychiatric/Behavioral: Negative.          Physical Exam  Constitutional:       Appearance: He is ill-appearing.   HENT:      Head: Normocephalic.      Nose: Nose normal.      Mouth/Throat:      Mouth: Mucous membranes are moist.   Eyes:      Extraocular Movements: Extraocular movements intact.   Cardiovascular:      Rate and Rhythm: Normal rate and regular rhythm.      Pulses: Normal pulses.      Heart sounds: Normal heart sounds.   Pulmonary:      Effort: Pulmonary effort is normal.      Breath sounds: Normal breath sounds.   Abdominal:      General: Bowel sounds are normal.      Palpations: Abdomen is soft.   Musculoskeletal:         General: Normal range of motion.      Cervical back: Normal range of motion.      Right lower leg: Edema present.      Left lower leg: Edema present.   Skin:     General: Skin is  warm and dry.      Capillary Refill: Capillary refill takes less than 2 seconds.   Neurological:      General: No focal deficit present.      Mental Status: He is alert and oriented to person, place, and time.   Psychiatric:         Mood and Affect: Mood normal.         Behavior: Behavior normal.          Last Recorded Vitals  /82 (BP Location: Left arm, Patient Position: Lying)   Pulse 85   Temp 36 °C (96.8 °F) (Temporal)   Resp 14   Wt 85.9 kg (189 lb 6 oz)   SpO2 96%     Relevant Results        Scheduled medications  Scheduled Medications[2]  Continuous medications  Continuous Medications[3]  PRN medications  PRN Medications[4]  Results for orders placed or performed during the hospital encounter of 06/12/25 (from the past 24 hours)   CBC and Auto Differential   Result Value Ref Range    WBC 6.6 4.4 - 11.3 x10*3/uL    nRBC 0.0 0.0 - 0.0 /100 WBCs    RBC 4.17 (L) 4.50 - 5.90 x10*6/uL    Hemoglobin 12.7 (L) 13.5 - 17.5 g/dL    Hematocrit 38.2 (L) 41.0 - 52.0 %    MCV 92 80 - 100 fL    MCH 30.5 26.0 - 34.0 pg    MCHC 33.2 32.0 - 36.0 g/dL    RDW 12.5 11.5 - 14.5 %    Platelets 219 150 - 450 x10*3/uL    Neutrophils % 70.1 40.0 - 80.0 %    Immature Granulocytes %, Automated 0.3 0.0 - 0.9 %    Lymphocytes % 19.0 13.0 - 44.0 %    Monocytes % 7.7 2.0 - 10.0 %    Eosinophils % 2.4 0.0 - 6.0 %    Basophils % 0.5 0.0 - 2.0 %    Neutrophils Absolute 4.65 1.60 - 5.50 x10*3/uL    Immature Granulocytes Absolute, Automated 0.02 0.00 - 0.50 x10*3/uL    Lymphocytes Absolute 1.26 0.80 - 3.00 x10*3/uL    Monocytes Absolute 0.51 0.05 - 0.80 x10*3/uL    Eosinophils Absolute 0.16 0.00 - 0.40 x10*3/uL    Basophils Absolute 0.03 0.00 - 0.10 x10*3/uL   Comprehensive metabolic panel   Result Value Ref Range    Glucose 143 (H) 74 - 99 mg/dL    Sodium 139 136 - 145 mmol/L    Potassium 3.5 3.5 - 5.3 mmol/L    Chloride 103 98 - 107 mmol/L    Bicarbonate 27 21 - 32 mmol/L    Anion Gap 13 10 - 20 mmol/L    Urea Nitrogen 21 6 - 23  mg/dL    Creatinine 0.83 0.50 - 1.30 mg/dL    eGFR 86 >60 mL/min/1.73m*2    Calcium 9.4 8.6 - 10.3 mg/dL    Albumin 3.5 3.4 - 5.0 g/dL    Alkaline Phosphatase 61 33 - 136 U/L    Total Protein 6.6 6.4 - 8.2 g/dL    AST 13 9 - 39 U/L    Bilirubin, Total 0.8 0.0 - 1.2 mg/dL    ALT 10 10 - 52 U/L   Magnesium   Result Value Ref Range    Magnesium 1.97 1.60 - 2.40 mg/dL   B-Type Natriuretic Peptide   Result Value Ref Range    BNP 64 0 - 99 pg/mL   Troponin I, High Sensitivity, Initial   Result Value Ref Range    Troponin I, High Sensitivity 5 0 - 20 ng/L   Troponin, High Sensitivity, 1 Hour   Result Value Ref Range    Troponin I, High Sensitivity 5 0 - 20 ng/L   CBC   Result Value Ref Range    WBC 6.1 4.4 - 11.3 x10*3/uL    nRBC 0.0 0.0 - 0.0 /100 WBCs    RBC 3.78 (L) 4.50 - 5.90 x10*6/uL    Hemoglobin 11.6 (L) 13.5 - 17.5 g/dL    Hematocrit 34.4 (L) 41.0 - 52.0 %    MCV 91 80 - 100 fL    MCH 30.7 26.0 - 34.0 pg    MCHC 33.7 32.0 - 36.0 g/dL    RDW 12.5 11.5 - 14.5 %    Platelets 189 150 - 450 x10*3/uL   Basic metabolic panel   Result Value Ref Range    Glucose 86 74 - 99 mg/dL    Sodium 140 136 - 145 mmol/L    Potassium 3.7 3.5 - 5.3 mmol/L    Chloride 106 98 - 107 mmol/L    Bicarbonate 28 21 - 32 mmol/L    Anion Gap 10 10 - 20 mmol/L    Urea Nitrogen 17 6 - 23 mg/dL    Creatinine 0.77 0.50 - 1.30 mg/dL    eGFR 88 >60 mL/min/1.73m*2    Calcium 8.7 8.6 - 10.3 mg/dL   Urinalysis with Reflex Culture and Microscopic   Result Value Ref Range    Color, Urine Yellow Light-Yellow, Yellow, Dark-Yellow    Appearance, Urine Clear Clear    Specific Gravity, Urine 1.037 (N) 1.005 - 1.035    pH, Urine 6.5 5.0, 5.5, 6.0, 6.5, 7.0, 7.5, 8.0    Protein, Urine NEGATIVE NEGATIVE, 10 (TRACE), 20 (TRACE) mg/dL    Glucose, Urine Normal Normal mg/dL    Blood, Urine NEGATIVE NEGATIVE mg/dL    Ketones, Urine NEGATIVE NEGATIVE mg/dL    Bilirubin, Urine NEGATIVE NEGATIVE mg/dL    Urobilinogen, Urine Normal Normal mg/dL    Nitrite, Urine NEGATIVE  NEGATIVE    Leukocyte Esterase, Urine 250 Brandon/uL (A) NEGATIVE   Microscopic Only, Urine   Result Value Ref Range    WBC, Urine 1-5 1-5, NONE /HPF    RBC, Urine 1-2 NONE, 1-2, 3-5 /HPF    Mucus, Urine FEW Reference range not established. /LPF     *Note: Due to a large number of results and/or encounters for the requested time period, some results have not been displayed. A complete set of results can be found in Results Review.     CT angio chest for pulmonary embolism  Result Date: 6/12/2025  STUDY: CT Angiogram of the Chest; 6/12/2025 at 8:59 PM. INDICATION: Recent surgery, shortness of breath. COMPARISON: CTA chest 8/15/2022. ACCESSION NUMBER(S): [id number] ORDERING CLINICIAN: YEIMI COLE TECHNIQUE:  CTA of the chest was performed with intravenous contrast. Images are reviewed and processed at a workstation according to the CT angiogram protocol with 3-D and/or MIP post processing imaging generated.  Omnipaque 350 75 mL was administered intravenously. Automated mA/kV exposure control was utilized and patient examination was performed in strict accordance with principles of ALARA. FINDINGS: Pulmonary arteries are adequately opacified without acute or chronic filling defects.  The thoracic aorta is normal in course and caliber without dissection or aneurysm. The heart is normal in size without pericardial effusion.  Thoracic lymph nodes are not enlarged. There is no pleural effusion, pleural thickening, or pneumothorax. The airways are patent. Lungs are clear without consolidation, interstitial disease, or suspicious nodules. Upper abdomen demonstrates no acute pathology. There are no acute fractures.  No suspicious bony lesions. Right shoulder prosthesis with associated artifact.    No pulmonary embolism. Normal heart size. Mild subsegmental atelectasis and/or patchy infiltrate in the left posterior sulcus, likely subacute or chronic changes; close clinical correlation and follow-up CXR suggested. . Signed by  Donta Nava MD    Vascular US lower extremity venous duplex bilateral  Result Date: 6/12/2025  Interpreted By:  Get Humphreys, STUDY: [facility] US LOWER EXTREMITY VENOUS DUPLEX BILATERAL  6/12/2025 7:44 pm   INDICATION: 83 y/o   M with  Signs/Symptoms:lower extremity swelling, recent surgery. LMP:  Unknown.       COMPARISON: None.   ACCESSION NUMBER(S): [id number]   ORDERING CLINICIAN: YEIMI COLE   TECHNIQUE: Routine ultrasound of the  bilateral lower extremity was performed with duplex Doppler (color and spectral) evaluation.   Static images were obtained for remote interpretation.   FINDINGS: THIGH VEINS:  The common femoral, femoral, popliteal, proximal medial saphenous, and deep femoral veins are patent and free of thrombus. The veins are normally compressible.  They demonstrate normal phasic flow and augmentation response.   CALF VEINS:  The paired peroneal and posterior tibial calf veins are patent.  There is right calf edema.       Negative study.  No deep venous thrombosis of the right or left lower extremity.   MACRO: None   Signed by: Get Humphreys 6/12/2025 8:23 PM Dictation workstation:   [id number]    Cardiology Interpretation Of Nuclear Stress - See Other Report For Nuclear Portion  Result Date: 6/3/2025   [facility], [address] [place] [address]             Tel [phone] and Fax [phone] Nuclear Pharmacologic Stress Test Patient Name:      ARTEMIO MORALES        Ordering Provider:     [id number] KATHIA BOLTON Study Date:        5/30/2025            Reading Physician:     [id number] Doss MD MRN/PID:           [id number]             Supervising Physician: [id number] Elijah Doss MD Accession#:        [id number]         Fellow: Date of Birth/Age: 1940 / 84 years Fellow:  Gender:            M                    Nurse:                 Amita Rosales RN Admit Date:        5/30/2025            Technician:            Apoorva Vallecillo Admission Status:  Outpatient           Sonographer:           ethan Height:            180.34 cm            Technologist: Weight:            79.38 kg             Additional Staff: BSA:               1.99 m2              Encounter#:            [id number] BMI:               24.41 kg/m2          Patient [facility]      [facility] Non                                                                Invasive Study Type:    CARDIOLOGY INTERPRETATION OF NUCLEAR STRESS Diagnosis/ICD: Essential (primary) hypertension-I10; Encounter for other                preprocedural examination-Z01.818; Shortness of breath-R06.02 Indication:    Hypertension, Dyspnea and Pre-Op Evaluation CPT Code:      Stress Test Interpretation-40864; Stress Test Supervision-33248 Falls Risk: Moderate: Patient has a moderate risk for sustaining a fall; a falls prevention plan has been implemented.  Study Details: Correct procedure and correct patient verified verbally and with                ID Band checked.  Patient History: Dyspnea, hypertension, chronic obstructive pulmonary disease, coronary artery disease and dyslipidemia. Pre op clearance, Hip replacement. Allergies: Atorvastatin,Lisinopril. Smoker:    Former.  Medications: The patient's prescribed medication is ASA,Atorvastatin,Dexilant,Bentyl,Cymbalta,Proscar,Flonase,Lasix,Avapor,Cozaar,Mobic,Metoprolol,Remeron,Flomax,Zanaflex. The patient took medications as prescribed.  Patient Performance: Patient received a total of 0.4 mg of Regadenoson at 9:14:39 AM. The peak heart rate achieved was 116 bpm, which was 86 % of the age predicted target heart rate of 135 bpm. The resting blood pressure was 134/74 mmHg with a heart rate of 88 bpm. The patient developed shortness of breath during the stress exam. The symptoms resolved with rest 4 minutes into  recovery. The blood pressure response was normal. The test was terminated due to: end of vasodilator infusion. Patient has met the discharge criteria and is discharged to home.  Baseline ECG: Resting ECG showed normal sinus rhythm with right bundle branch block.  Stress ECG: Stress ECG showed sinus tachycardia, with RBBB.  Stress Stage Data: +---+------+-------+-----------------------+ HR Sys BPDias BPComments                +---+------+-------+-----------------------+ 88 134   74                             +---+------+-------+-----------------------+                 Sp02 97% RA             +---+------+-------+-----------------------+ 91              Divina min 1              +---+------+-------+-----------------------+ [id number]   62     Divina min 2, Sp02 98% RA +---+------+-------+-----------------------+ 112             Divina min 3              +---+------+-------+-----------------------+ 106             Divina min 4              +---+------+-------+-----------------------+  Recovery ECG: Recovery ECG showed normal sinus rhythm, with Occasional PVC.  +------------+---+------+-------+---------+             HR Sys BPDias BPComments  +------------+---+------+-------+---------+ Recovery I  108                       +------------+---+------+-------+---------+ Recovery II [id number]   62     Sp02 % RA +------------+---+------+-------+---------+ Recovery HOP000                       +------------+---+------+-------+---------+ Recovery IV [id number]   62               +------------+---+------+-------+---------+ Recovery V  104                       +------------+---+------+-------+---------+ Recovery                        +------------+---+------+-------+---------+  Summary:  1. Adequate level of stress achieved.  2. No clinical or electrocardiographic evidence for ischemia at a maximal infusion.  3. Nuclear image results are reported separately. [id number]  Elijah Doss MD Electronically signed on 6/3/2025 at 7:33:47 PM   ** Final **     XR shoulder right 2+ views  Result Date: 6/3/2025  Interpreted By:  Atif Guajardo, STUDY: Right shoulder dated  6/3/2025.   INDICATION: Signs/Symptoms:right shoulder replacement   COMPARISON: Radiographs dated 04/30/2025   ACCESSION NUMBER(S): [id number]   ORDERING CLINICIAN: JOURDAN PIMENTEL   TECHNIQUE: Three views of the right shoulder.   FINDINGS: No fracture or dislocation is evident. There is a reverse shoulder arthroplasty. Hardware is intact as visualized. Moderate to severe degenerative changes seen of the acromioclavicular joint as visualized. Subcutaneous emphysema and edema and staples are seen from recent surgery.       Surgical changes without hardware complication or osseous injury evident.   MACRO: None   Signed by: Atif Guajardo 6/3/2025 1:51 PM Dictation workstation:   [id number]    ECG 12 Lead  Result Date: 6/2/2025  Sinus rhythm with 1st degree AV block Right bundle branch block Left anterior fascicular block ** Bifascicular block ** Abnormal ECG When compared with ECG of 26-JUL-2024 11:32, Premature supraventricular complexes are no longer Present Confirmed by Oleg Raphael ([id number]) on 6/2/2025 1:55:35 PM Also confirmed by Oleg Raphael ([id number])  on 6/2/2025 1:57:44 PM    Nuclear Stress Test  Result Date: 5/30/2025  Interpreted By:  Bruce Lainez and Bartolomei Aguilar Christopher STUDY: NUCLEAR STRESS TEST; 5/30/2025 10:34 am   INDICATION: Signs/Symptoms:SOB.   ,I10 Essential (primary) hypertension,I10 Essential (primary) hypertension,Z01.818 Encounter for other preprocedural examination,R06.02 Shortness of breath,O26.819 Pregnancy related exhaustion and fatigue, unspecified trimester ([facility])   COMPARISON: CT chest 11/27/2019.   ACCESSION NUMBER(S): [id number]   ORDERING CLINICIAN: OLEG RAPHAEL   TECHNIQUE: DIVISION OF NUCLEAR MEDICINE STRESS MYOCARDIAL PERFUSION SCAN, TWO DAY PROTOCOL    The patient received an intravenous dose of  10.5 mCi of Tc-99m Myoview and resting emission tomographic (SPECT) images of the myocardium were acquired on 05/29/2025. The patient then received an intravenous infusion of 0.4mg regadenoson (Lexiscan)  followed by an additional dose of  34.2 mCi of Tc-99m  Myoview on 05/30/2025. Stress phase SPECT images of the myocardium were then acquired. These included ECG-gated images to assess and quantify ventricular function.   A low-dose, nondiagnostic regional CT was also performed however unable to be used for attenuation correction purposes.   Limiting Factors: None.   FINDINGS: Quality of the Study: Adequate.   Technical limitations/Significant Artifacts: None.   Cardiac Imaging: Fixed small-sized moderate to severe perfusion defects involving inferior wall, although there is a preserved wall motion and thickening, prior infarction can not be completely excluded. Otherwise, both stress and rest imaging demonstrates grossly normal perfusion throughout the remaining left ventricle.   Attenuated Corrected Images: N/a   Left ventricular size (EDV): 55 (normal less than 120 mL)   Gated images: Normal wall motion and thickening. Left ventricular ejection fraction estimated greater than 65% (Normal 45% or greater)   Nondiagnostic low dose CT does not reveal any definite gross abnormalities suggestive of an emergent/oncologic process. There is redemonstration of bilateral calcified and noncalcified pleural plaques, similar to prior.       1. Fixed small-sized moderate to severe perfusion defects involving inferior wall, although there is a preserved wall motion and thickening, prior infarction can not be completely excluded. No evidence of inducible myocardial ischemia. 2. The left ventricle is normal in size. 3. Normal LV wall motion with a post-stress LV EF estimated greater than 65%.   I personally reviewed the images/study and I agree with the findings as stated. This study  was interpreted at [place], [place].   MACRO: None   Signed by: Bruce Lainez 5/30/2025 12:08 PM Dictation workstation:   [id number]    Transthoracic Echo Complete  Result Date: 5/29/2025   [facility], [address] [place] [address]              Tel [phone] and Fax [phone] TRANSTHORACIC ECHOCARDIOGRAM REPORT  Patient Name:       MR. ARTEMIO MORALES   Reading Physician:    [id number] Oleg Raphael MD Study Date:         5/29/2025           Ordering Provider:    [id number] OLEG RAPHAEL MRN/PID:            [id number]            Fellow: Accession#:         [id number]        Nurse:                Palak Bridges RN Date of Birth/Age:  1940 / 84      Sonographer:          Norma navarro RDCS Gender assigned at                     Additional Staff: Birth: Height:             180.34 cm           Admit Date: Weight:             79.38 kg            Admission Status:     Outpatient BSA / BMI:          1.99 m2 / 24.41     Encounter#:           [id number]                     kg/m2 Blood Pressure:     172/84 mmHg         Department Location: Study Type:    TRANSTHORACIC ECHO (TTE) COMPLETE Diagnosis/ICD: Shortness of breath-R06.02 Indication:    SOB CPT Code:      Echo Complete w Full Doppler-16842 Patient History: Pertinent History: HTN, PE, Dyspnea, Dyslipidemia and COPD. pre-operative, ASHD,                    HFpEF. Study Detail: The following Echo studies were performed: 2D, M-Mode, Doppler and               color flow. Technically challenging study due to body habitus.               Definity used as a contrast agent for endocardial border               definition and agitated saline used as a contrast agent for               intraseptal flow evaluation. Total  contrast used for this               procedure was 1.5 mL via IV push. The patient was awake.  PHYSICIAN INTERPRETATION: Left Ventricle: Left ventricular ejection fraction is normal by visual estimate at 60-65%. There are no regional left ventricular wall motion abnormalities. The left ventricular cavity size is normal. There is left ventricular concentric remodeling. Spectral Doppler shows a Grade I (impaired relaxation pattern) of left ventricular diastolic filling with normal left atrial filling pressure. Left Atrium: The left atrial size is normal. There is no evidence of a patent foramen ovale. A bubble study using agitated saline was performed. Bubble study is negative. Right Ventricle: The right ventricle is normal in size. There is normal right ventricular global systolic function. Right Atrium: The right atrium is normal in size. Aortic Valve: There is trace aortic valve regurgitation. Mitral Valve: The mitral valve is normal in structure. There is trace mitral valve regurgitation. The E Vmax is 0.50 m/s. Tricuspid Valve: The tricuspid valve is structurally normal. There is trace tricuspid regurgitation. The doppler estimated RVSP is within normal limits with a right ventricular systolic pressure of 29 mmHg. Pulmonic Valve: The pulmonic valve is not well visualized. There is physiologic pulmonic valve regurgitation. Pericardium: There is no pericardial effusion noted. Aorta: The aortic root is normal. There is mild dilatation of the ascending aorta.  CONCLUSIONS:  1. Left ventricular ejection fraction is normal by visual estimate at 60-65%.  2. Spectral Doppler shows a Grade I (impaired relaxation pattern) of left ventricular diastolic filling with normal left atrial filling pressure.  3. There is normal right ventricular global systolic function.  4. There is no evidence of a patent foramen ovale. QUANTITATIVE DATA SUMMARY:  2D MEASUREMENTS:          Normal Ranges: Ao Root d:       3.30 cm  (2.0-3.7cm)  LAs:             3.20 cm  (2.7-4.0cm) IVSd:            1.45 cm  (0.6-1.1cm) LVPWd:           1.35 cm  (0.6-1.1cm) LVIDd:           3.88 cm  (3.9-5.9cm) LVIDs:           2.75 cm LV Mass Index:   100 g/m2 LVEDV Index:     51 ml/m2 LV % FS          29.1 %  LEFT ATRIUM:                  Normal Ranges: LA Vol A4C:        38.0 ml    (22+/-6mL/m2) LA Vol A2C:        36.4 ml LA Vol BP:         38.3 ml LA Vol Index A4C:  19.1ml/m2 LA Vol Index A2C:  18.3 ml/m2 LA Vol Index BP:   19.2 ml/m2 LA Area A4C:       15.1 cm2 LA Area A2C:       15.2 cm2 LA Major Axis A4C: 5.1 cm LA Major Axis A2C: 5.4 cm LA Volume Index:   18.0 ml/m2  RIGHT ATRIUM:                 Normal Ranges: RA Vol A4C:        35.8 ml    (8.3-19.5ml) RA Vol Index A4C:  18.0 ml/m2 RA Area A4C:       14.8 cm2 RA Major Axis A4C: 5.2 cm  M-MODE MEASUREMENTS:         Normal Ranges: Ao Root:             3.30 cm (2.0-3.7cm) LAs:                 3.40 cm (2.7-4.0cm)  AORTA MEASUREMENTS:         Normal Ranges: Asc Ao, d:          3.70 cm (2.1-3.4cm)  LV SYSTOLIC FUNCTION:                      Normal Ranges: EF-A4C View:    61 % (>=55%) EF-A2C View:    65 % EF-Biplane:     60 % EF-Visual:      63 % LV EF Reported: 63 %  LV DIASTOLIC FUNCTION:             Normal Ranges: MV Peak E:             0.50 m/s    (0.7-1.2 m/s) MV Peak A:             0.85 m/s    (0.42-0.7 m/s) E/A Ratio:             0.59        (1.0-2.2) MV e'                  0.056 m/s   (>8.0) MV lateral e'          0.06 m/s MV medial e'           0.05 m/s MV A Dur:              151.00 msec E/e' Ratio:            9.03        (<8.0) PulmV Sys Ceasar:         42.40 cm/s PulmV Noe Ceasar:        30.20 cm/s PulmV S/D Ceasar:         1.40 PulmV A Revs Ceasar:      37.40 cm/s PulmV A Revs Dur:      114.00 msec  MITRAL VALVE:          Normal Ranges: MV DT:        378 msec (150-240msec)  AORTIC VALVE:           Normal Ranges: AoV Vmax:      1.11 m/s (<=1.7m/s) AoV Peak P.9 mmHg (<20mmHg) LVOT Max Ceasar:  0.69 m/s (<=1.1m/s) LVOT  VTI:      16.60 cm LVOT Diameter: 2.10 cm  (1.8-2.4cm) AoV Area,Vmax: 2.15 cm2 (2.5-4.5cm2)  RIGHT VENTRICLE: RV Basal 2.70 cm RV Mid   1.60 cm RV Major 6.8 cm TAPSE:   22.8 mm RV s'    0.10 m/s  TRICUSPID VALVE/RVSP:          Normal Ranges: Peak TR Velocity:     2.55 m/s Est. RA Pressure:     3 RV Syst Pressure:     29       (< 30mmHg) IVC Diam:             1.94 cm  PULMONIC VALVE:          Normal Ranges: PV Max Ceasar:     1.3 m/s  (0.6-0.9m/s) PV Max P.9 mmHg  PULMONARY VEINS: PulmV A Revs Dur: 114.00 msec PulmV A Revs Ceasar: 37.40 cm/s PulmV Noe Ceasar:   30.20 cm/s PulmV S/D Ceasar:    1.40 PulmV Sys Ceasar:    42.40 cm/s  [id number] Oleg Raphael MD Electronically signed on 2025 at 2:01:50 PM  ** Final **          Assessment/Plan   Assessment & Plan  Venous insufficiency of both lower extremities    Primary hypertension    Osteoarthritis    Idiopathic peripheral neuropathy    Dyslipidemia    Benign essential hypertension    (HFpEF) heart failure with preserved ejection fraction    Status post reverse total shoulder replacement, right    Venous insufficiency of bilateral lower extremities  - pt has swollen ankles and feet bilaterally   - preserved EF - unlikely cardiac cause  - BNP 64    HTN  HLD  HFpEF  - 25 echo (pre op) - nl EF 60-65% /Grade I diastolic dysfunction   - continue metoprolol, avapro, losartan     Primary osteoarthritis   Idiopathic peripheral neuropathy  Post reverse total shoulder replacement - right   - continue oxycodone 5mg, zanaflex     Depression  - continue cymbalta     BPH  - continue flomax, proscar    DVT: lovenox  Code status: Full   Dispo: pt requires PT/OT evals and overnight stay           Samanta Fox, APRN-CNP         [1]   Family History  Problem Relation Name Age of Onset    Hypothyroidism Brother      Diabetes Brother      Myasthenia gravis Brother     [2] acetaminophen, 975 mg, oral, q8h  ammonium lactate, , Topical, BID  enoxaparin, 40 mg, subcutaneous,  q24h  tiZANidine, 4 mg, oral, TID  zinc oxide, 1 Application, Topical, TID  [3] sodium chloride 0.9%, 10 mL/hr  [4] PRN medications: melatonin, naloxone, ondansetron **OR** ondansetron, oxyCODONE, polyethylene glycol, sodium chloride 0.9%

## 2025-06-13 NOTE — CARE PLAN
The patient's goals for the shift include      The clinical goals for the shift include Patient to be free from injury throughout this shift    Problem: Pain - Adult  Goal: Verbalizes/displays adequate comfort level or baseline comfort level  Outcome: Progressing     Problem: Safety - Adult  Goal: Free from fall injury  Outcome: Progressing     Problem: Discharge Planning  Goal: Discharge to home or other facility with appropriate resources  Outcome: Progressing     Problem: Chronic Conditions and Co-morbidities  Goal: Patient's chronic conditions and co-morbidity symptoms are monitored and maintained or improved  Outcome: Progressing     Problem: Nutrition  Goal: Nutrient intake appropriate for maintaining nutritional needs  Outcome: Progressing     Problem: Skin  Goal: Decreased wound size/increased tissue granulation at next dressing change  Outcome: Progressing  Goal: Participates in plan/prevention/treatment measures  Outcome: Progressing  Flowsheets (Taken 6/13/2025 0643 by Yokasta Wallace RN)  Participates in plan/prevention/treatment measures: Elevate heels  Goal: Prevent/manage excess moisture  Outcome: Progressing  Flowsheets (Taken 6/13/2025 4450)  Prevent/manage excess moisture:   Moisturize dry skin   Follow provider orders for dressing changes  Goal: Prevent/minimize sheer/friction injuries  Outcome: Progressing  Goal: Promote/optimize nutrition  Outcome: Progressing  Goal: Promote skin healing  Outcome: Progressing     Problem: Fall/Injury  Goal: Not fall by end of shift  Outcome: Progressing  Goal: Be free from injury by end of the shift  Outcome: Progressing  Goal: Verbalize understanding of personal risk factors for fall in the hospital  Outcome: Progressing  Goal: Verbalize understanding of risk factor reduction measures to prevent injury from fall in the home  Outcome: Progressing  Goal: Use assistive devices by end of the shift  Outcome: Progressing  Goal: Pace activities to prevent fatigue by  end of the shift  Outcome: Progressing     Problem: Pain  Goal: Turns in bed with improved pain control throughout the shift  Outcome: Progressing  Goal: Walks with improved pain control throughout the shift  Outcome: Progressing  Goal: Performs ADL's with improved pain control throughout shift  Outcome: Progressing  Goal: Participates in PT with improved pain control throughout the shift  Outcome: Progressing  Goal: Free from opioid side effects throughout the shift  Outcome: Progressing  Goal: Free from acute confusion related to pain meds throughout the shift  Outcome: Progressing     Problem: Heart Failure  Goal: Improved gas exchange this shift  Outcome: Progressing  Goal: Improved urinary output this shift  Outcome: Progressing  Goal: Reduction in peripheral edema within 24 hours  Outcome: Progressing  Goal: Report improvement of dyspnea/breathlessness this shift  Outcome: Progressing  Goal: Weight from fluid excess reduced over 2-3 days, then stabilize  Outcome: Progressing  Goal: Increase self care and/or family involvement in 24 hours  Outcome: Progressing

## 2025-06-13 NOTE — PROGRESS NOTES
Occupational Therapy    Evaluation    Patient Name: Abraham Rodriguez  MRN: [id number]  Department: [facility]  Room: [address] [place], [place] [address]  Today's Date: 6/13/2025  Time Calculation  Start Time: 1530  Stop Time: 1555  Time Calculation (min): 25 min    Assessment  IP OT Assessment  OT Assessment: Pt is 83 y/o male s/p hospitalization for LE swelling and SOB. Pt had recent shoulder surgery 10 days ago and is limited in ADL function as a result of only having 1 hand. Currently at baseline of function for his situation. Requires assist for all ADL's to maintain compliance with precautions. Recommend rehab to learn to function 1 handed and maintain compliance with shoulder precautions for safety.  Prognosis: Good  Barriers to Discharge Home: No anticipated barriers  Evaluation/Treatment Tolerance: Patient tolerated treatment well  End of Session Communication: Bedside nurse  End of Session Patient Position: Up in chair, Alarm on  Plan:  Treatment Interventions: ADL retraining, Patient/family training, Compensatory technique education  OT Frequency: 2 times per week  OT Discharge Recommendations: Low intensity level of continued care  OT Recommended Transfer Status: Assist of 1  OT - OK to Discharge: Yes Based on completed evaluation and care plan recommendations, no barriers to discharge to next site of care.       Subjective   Current Problem:  1. Swelling of both lower extremities        2. SOB (shortness of breath) on exertion          OT Visit Info:  OT Received On: 06/13/25  General Visit Info:  General  Reason for Referral: Assess ADL's and safety d/t admission for swelling and SOB with exertion  Referred By: SHAD Knapp-CNP  Past Medical History Relevant to Rehab: COPD, HTN, BPH, PE, cellulitis, DJD  Family/Caregiver Present: No  Prior to Session Communication: Bedside nurse  Patient Position Received: Bed, 2 rail up, Alarm on  General Comment: Pt recently had R rTSR on 6/3/25. Pt had increased complaints of leg swelling,  surgeon advised to go to ER  Precautions:  Medical Precautions: Fall precautions  Post-Surgical Precautions: Right shoulder precautions (no AROM/PROM of R shoulder, no extension of shoulder to prevent dislocation, maintain sling in place. No formal therapy on shoulder until after follow up with surgeon on 6/20)  Braces Applied: R sling    Pain:  Pain Assessment  Pain Assessment: 0-10  0-10 (Numeric) Pain Score: 10 - Worst possible pain  Pain Type: Acute pain  Pain Location: Shoulder  Pain Orientation: Right  Pain Interventions: Repositioned, Distraction, Emotional support (patient medicated prior to session)  Response to Interventions: No change in pain    Objective   Cognition:  Overall Cognitive Status: Within Functional Limits (some forgetfulness demonstrated)  Arousal/Alertness: Appropriate responses to stimuli  Orientation Level: Oriented X4     Home Living:  Type of Home: House ([facility])  Lives With: Alone (daughter has been coming over after work and staying the night)  Home Adaptive Equipment: Walker rolling or standard, Cane  Home Layout: One level  Home Access: Level entry  Bathroom Shower/Tub: Tub/shower unit  Bathroom Toilet: Standard (uses vanity to push off)  Bathroom Equipment: Shower chair with back, Tub transfer bench, Grab bars in shower  Home Living Comments: sleeping in recliner lift chair, just ordered new chair with remote control to operate with left hand   Prior Function:  Level of [place]: Independent with ADLs and functional transfers  Receives Help From: Family  ADL Assistance: Independent  Homemaking Assistance: Needs assistance (daughter assists)  Ambulatory Assistance: Independent (using straight cane)  Vocational: Retired  Leisure: enjoys hot rods and watching TV  Hand Dominance: Right     ADL:  Eating Assistance: Stand by  Eating Deficit: Setup  Grooming Assistance: Stand by  Grooming Deficit: Setup  UE Dressing Assistance: Moderate  LE Dressing Assistance: Moderate  Toileting  Assistance with Device: Moderate  Activity Tolerance:  Endurance: Tolerates 10 - 20 min exercise with multiple rests  Bed Mobility/Transfers: Bed Mobility 1  Bed Mobility 1: Supine to sitting  Level of Assistance 1: Contact guard, Minimal verbal cues (use of bed rail)    Transfer 1  Transfer From 1: Bed to  Transfer to 1: Chair with arms  Transfer Device 1: Cane  Transfer Level of Assistance 1: Close supervision, Minimal verbal cues  Trials/Comments 1: VC for tech for safety and follow through with precautions for R shoulder    Functional Mobility:  Functional Mobility  Functional Mobility Performed: Yes  Functional Mobility 1  Surface 1: Level tile  Device 1: Single point cane  Assistance 1: Close supervision  Sitting Balance:  Static Sitting Balance  Static Sitting-Level of Assistance: Modified Independent  Dynamic Sitting Balance  Dynamic Sitting-Level of Assistance: Modified independent  Standing Balance:  Static Standing Balance  Static Standing-Level of Assistance: Close supervision  Dynamic Standing Balance  Dynamic Standing-Level of Assistance: Contact guard     Vision: Vision - Basic Assessment  Current Vision: Wears glasses only for reading      Hand Function:  Hand Function  Gross Grasp: Functional  Coordination: Functional  Extremities: RUE   RUE : Exceptions to WFL (NTd/t shoulder surgery and restrictions) and LUE   LUE: Within Functional Limits    Outcome Measures: [facility] Daily Activity  Putting on and taking off regular lower body clothing: A lot  Bathing (including washing, rinsing, drying): A lot  Putting on and taking off regular upper body clothing: A lot  Toileting, which includes using toilet, bedpan or urinal: A lot  Taking care of personal grooming such as brushing teeth: A little  Eating Meals: A little  Daily Activity - Total Score: 14    Education Documentation  Precautions, taught by Matt Bond OT at 6/13/2025  4:34 PM.  Learner: Patient  Readiness: Acceptance  Method:  Explanation  Response: Needs Reinforcement  Comment: shoulder precautions and safety    Goals:   Encounter Problems       Encounter Problems (Active)       ADLs       Patient will perform UB and LB bathing with minimal assist  level of assistance using 1 hand tech to comply with precautions       Start:  06/13/25    Expected End:  06/20/25            Patient will complete upper body dressing with minimal assist  level of assistance donning and doffing pullover shirt/sling usng 1 handed tech to comply with shoulder precautions       Start:  06/13/25    Expected End:  06/20/25            Patient with complete lower body dressing with minimal assist donning and doffing pants without a zipper/fasteners with PRN adaptive equipment following 1 handed tech/strategies       Start:  06/13/25    Expected End:  06/20/25            Patient will complete toileting including hygiene clothing management/hygiene with modified independent level of assistance using 1 handed tech       Start:  06/13/25    Expected End:  06/20/25               COGNITION/SAFETY       Patient will be independent with recall of precautions for shoulder with 100% accuracy       Start:  06/13/25    Expected End:  06/20/25

## 2025-06-14 LAB
ANION GAP SERPL CALC-SCNC: 11 MMOL/L (ref 10–20)
BACTERIA UR CULT: NORMAL
BASOPHILS # BLD AUTO: 0.03 X10*3/UL (ref 0–0.1)
BASOPHILS NFR BLD AUTO: 0.5 %
BUN SERPL-MCNC: 19 MG/DL (ref 6–23)
CALCIUM SERPL-MCNC: 9 MG/DL (ref 8.6–10.3)
CHLORIDE SERPL-SCNC: 104 MMOL/L (ref 98–107)
CO2 SERPL-SCNC: 27 MMOL/L (ref 21–32)
CREAT SERPL-MCNC: 0.88 MG/DL (ref 0.5–1.3)
EGFRCR SERPLBLD CKD-EPI 2021: 85 ML/MIN/1.73M*2
EOSINOPHIL # BLD AUTO: 0.29 X10*3/UL (ref 0–0.4)
EOSINOPHIL NFR BLD AUTO: 4.9 %
ERYTHROCYTE [DISTWIDTH] IN BLOOD BY AUTOMATED COUNT: 12.5 % (ref 11.5–14.5)
GLUCOSE SERPL-MCNC: 89 MG/DL (ref 74–99)
HCT VFR BLD AUTO: 35.9 % (ref 41–52)
HGB BLD-MCNC: 12.2 G/DL (ref 13.5–17.5)
IMM GRANULOCYTES # BLD AUTO: 0.04 X10*3/UL (ref 0–0.5)
IMM GRANULOCYTES NFR BLD AUTO: 0.7 % (ref 0–0.9)
LYMPHOCYTES # BLD AUTO: 1.75 X10*3/UL (ref 0.8–3)
LYMPHOCYTES NFR BLD AUTO: 29.8 %
MCH RBC QN AUTO: 31 PG (ref 26–34)
MCHC RBC AUTO-ENTMCNC: 34 G/DL (ref 32–36)
MCV RBC AUTO: 91 FL (ref 80–100)
MONOCYTES # BLD AUTO: 0.58 X10*3/UL (ref 0.05–0.8)
MONOCYTES NFR BLD AUTO: 9.9 %
NEUTROPHILS # BLD AUTO: 3.19 X10*3/UL (ref 1.6–5.5)
NEUTROPHILS NFR BLD AUTO: 54.2 %
NRBC BLD-RTO: 0 /100 WBCS (ref 0–0)
PLATELET # BLD AUTO: 198 X10*3/UL (ref 150–450)
POTASSIUM SERPL-SCNC: 3.9 MMOL/L (ref 3.5–5.3)
RBC # BLD AUTO: 3.93 X10*6/UL (ref 4.5–5.9)
SODIUM SERPL-SCNC: 138 MMOL/L (ref 136–145)
WBC # BLD AUTO: 5.9 X10*3/UL (ref 4.4–11.3)

## 2025-06-14 PROCEDURE — 2500000005 HC RX 250 GENERAL PHARMACY W/O HCPCS: Performed by: INTERNAL MEDICINE

## 2025-06-14 PROCEDURE — 99233 SBSQ HOSP IP/OBS HIGH 50: CPT

## 2025-06-14 PROCEDURE — 80048 BASIC METABOLIC PNL TOTAL CA: CPT | Performed by: NURSE PRACTITIONER

## 2025-06-14 PROCEDURE — 2500000001 HC RX 250 WO HCPCS SELF ADMINISTERED DRUGS (ALT 637 FOR MEDICARE OP): Performed by: NURSE PRACTITIONER

## 2025-06-14 PROCEDURE — 97116 GAIT TRAINING THERAPY: CPT | Mod: GP,CQ

## 2025-06-14 PROCEDURE — 36415 COLL VENOUS BLD VENIPUNCTURE: CPT | Performed by: NURSE PRACTITIONER

## 2025-06-14 PROCEDURE — 2500000001 HC RX 250 WO HCPCS SELF ADMINISTERED DRUGS (ALT 637 FOR MEDICARE OP): Performed by: INTERNAL MEDICINE

## 2025-06-14 PROCEDURE — 2500000004 HC RX 250 GENERAL PHARMACY W/ HCPCS (ALT 636 FOR OP/ED): Performed by: INTERNAL MEDICINE

## 2025-06-14 PROCEDURE — 96372 THER/PROPH/DIAG INJ SC/IM: CPT | Performed by: INTERNAL MEDICINE

## 2025-06-14 PROCEDURE — 2500000001 HC RX 250 WO HCPCS SELF ADMINISTERED DRUGS (ALT 637 FOR MEDICARE OP)

## 2025-06-14 PROCEDURE — G0378 HOSPITAL OBSERVATION PER HR: HCPCS

## 2025-06-14 PROCEDURE — 85025 COMPLETE CBC W/AUTO DIFF WBC: CPT | Performed by: NURSE PRACTITIONER

## 2025-06-14 PROCEDURE — 2500000002 HC RX 250 W HCPCS SELF ADMINISTERED DRUGS (ALT 637 FOR MEDICARE OP, ALT 636 FOR OP/ED): Performed by: NURSE PRACTITIONER

## 2025-06-14 PROCEDURE — 2500000005 HC RX 250 GENERAL PHARMACY W/O HCPCS

## 2025-06-14 PROCEDURE — 94760 N-INVAS EAR/PLS OXIMETRY 1: CPT

## 2025-06-14 PROCEDURE — 97110 THERAPEUTIC EXERCISES: CPT | Mod: GP,CQ

## 2025-06-14 RX ORDER — FUROSEMIDE 20 MG/1
20 TABLET ORAL DAILY
Status: DISCONTINUED | OUTPATIENT
Start: 2025-06-14 | End: 2025-06-15 | Stop reason: HOSPADM

## 2025-06-14 RX ORDER — CLOTRIMAZOLE 1 %
CREAM (GRAM) TOPICAL 2 TIMES DAILY
Status: DISCONTINUED | OUTPATIENT
Start: 2025-06-14 | End: 2025-06-15 | Stop reason: HOSPADM

## 2025-06-14 RX ADMIN — TIZANIDINE 4 MG: 4 TABLET ORAL at 14:01

## 2025-06-14 RX ADMIN — ACETAMINOPHEN 975 MG: 325 TABLET, FILM COATED ORAL at 08:26

## 2025-06-14 RX ADMIN — ZINC OXIDE 1 APPLICATION: 200 OINTMENT TOPICAL at 14:02

## 2025-06-14 RX ADMIN — Medication 3 MG: at 20:35

## 2025-06-14 RX ADMIN — Medication: at 08:26

## 2025-06-14 RX ADMIN — ENOXAPARIN SODIUM 40 MG: 40 INJECTION SUBCUTANEOUS at 00:06

## 2025-06-14 RX ADMIN — CARBOXYMETHYLCELLULOSE SODIUM 1 DROP: 5 SOLUTION/ DROPS OPHTHALMIC at 08:57

## 2025-06-14 RX ADMIN — CARBOXYMETHYLCELLULOSE SODIUM 1 DROP: 5 SOLUTION/ DROPS OPHTHALMIC at 20:35

## 2025-06-14 RX ADMIN — FINASTERIDE 5 MG: 5 TABLET, FILM COATED ORAL at 20:35

## 2025-06-14 RX ADMIN — TIZANIDINE 4 MG: 4 TABLET ORAL at 20:47

## 2025-06-14 RX ADMIN — ACETAMINOPHEN 975 MG: 325 TABLET, FILM COATED ORAL at 16:07

## 2025-06-14 RX ADMIN — LOSARTAN POTASSIUM 50 MG: 50 TABLET, FILM COATED ORAL at 08:26

## 2025-06-14 RX ADMIN — TAMSULOSIN HYDROCHLORIDE 0.8 MG: 0.4 CAPSULE ORAL at 20:35

## 2025-06-14 RX ADMIN — POLYVINYL ALCOHOL 1 DROP: 14 SOLUTION/ DROPS OPHTHALMIC at 08:27

## 2025-06-14 RX ADMIN — FUROSEMIDE 20 MG: 20 TABLET ORAL at 11:21

## 2025-06-14 RX ADMIN — ACETAMINOPHEN 975 MG: 325 TABLET, FILM COATED ORAL at 00:06

## 2025-06-14 RX ADMIN — ENOXAPARIN SODIUM 40 MG: 40 INJECTION SUBCUTANEOUS at 23:36

## 2025-06-14 RX ADMIN — METOPROLOL SUCCINATE 50 MG: 25 TABLET, EXTENDED RELEASE ORAL at 08:26

## 2025-06-14 RX ADMIN — TIZANIDINE 4 MG: 4 TABLET ORAL at 08:26

## 2025-06-14 RX ADMIN — OXYCODONE 5 MG: 5 TABLET ORAL at 20:35

## 2025-06-14 RX ADMIN — OXYCODONE 5 MG: 5 TABLET ORAL at 16:17

## 2025-06-14 RX ADMIN — CARBOXYMETHYLCELLULOSE SODIUM 1 DROP: 5 SOLUTION/ DROPS OPHTHALMIC at 14:01

## 2025-06-14 RX ADMIN — OXYCODONE 5 MG: 5 TABLET ORAL at 00:06

## 2025-06-14 RX ADMIN — DULOXETINE HYDROCHLORIDE 30 MG: 30 CAPSULE, DELAYED RELEASE ORAL at 08:26

## 2025-06-14 RX ADMIN — ZINC OXIDE 1 APPLICATION: 200 OINTMENT TOPICAL at 08:27

## 2025-06-14 ASSESSMENT — PAIN - FUNCTIONAL ASSESSMENT
PAIN_FUNCTIONAL_ASSESSMENT: 0-10

## 2025-06-14 ASSESSMENT — PAIN DESCRIPTION - LOCATION
LOCATION: SHOULDER
LOCATION: SHOULDER

## 2025-06-14 ASSESSMENT — COGNITIVE AND FUNCTIONAL STATUS - GENERAL
DAILY ACTIVITIY SCORE: 17
WALKING IN HOSPITAL ROOM: A LITTLE
STANDING UP FROM CHAIR USING ARMS: A LITTLE
TOILETING: A LITTLE
STANDING UP FROM CHAIR USING ARMS: A LITTLE
CLIMB 3 TO 5 STEPS WITH RAILING: A LITTLE
MOVING FROM LYING ON BACK TO SITTING ON SIDE OF FLAT BED WITH BEDRAILS: A LITTLE
DRESSING REGULAR LOWER BODY CLOTHING: A LITTLE
HELP NEEDED FOR BATHING: A LITTLE
MOBILITY SCORE: 20
EATING MEALS: A LITTLE
PERSONAL GROOMING: A LITTLE
MOBILITY SCORE: 17
DRESSING REGULAR LOWER BODY CLOTHING: A LITTLE
MOVING TO AND FROM BED TO CHAIR: A LITTLE
DAILY ACTIVITIY SCORE: 18
MOVING TO AND FROM BED TO CHAIR: A LITTLE
STANDING UP FROM CHAIR USING ARMS: A LITTLE
TURNING FROM BACK TO SIDE WHILE IN FLAT BAD: A LITTLE
TOILETING: A LITTLE
DRESSING REGULAR UPPER BODY CLOTHING: A LOT
HELP NEEDED FOR BATHING: A LITTLE
DRESSING REGULAR UPPER BODY CLOTHING: A LITTLE
TURNING FROM BACK TO SIDE WHILE IN FLAT BAD: A LITTLE
CLIMB 3 TO 5 STEPS WITH RAILING: A LOT
PERSONAL GROOMING: A LITTLE
WALKING IN HOSPITAL ROOM: A LITTLE
EATING MEALS: A LITTLE
MOVING TO AND FROM BED TO CHAIR: A LITTLE

## 2025-06-14 ASSESSMENT — PAIN SCALES - GENERAL
PAINLEVEL_OUTOF10: 5 - MODERATE PAIN
PAINLEVEL_OUTOF10: 3
PAINLEVEL_OUTOF10: 10 - WORST POSSIBLE PAIN
PAINLEVEL_OUTOF10: 10 - WORST POSSIBLE PAIN
PAINLEVEL_OUTOF10: 6

## 2025-06-14 ASSESSMENT — PAIN DESCRIPTION - ORIENTATION
ORIENTATION: RIGHT
ORIENTATION: RIGHT

## 2025-06-14 ASSESSMENT — PAIN DESCRIPTION - DESCRIPTORS: DESCRIPTORS: DULL;ACHING

## 2025-06-14 NOTE — PROGRESS NOTES
Physical Therapy    Physical Therapy Treatment    Patient Name: Abraham Rodriguez  MRN: [id number]  Department: [facility]  Room: [address] [place], [place] [address]  Today's Date: 6/14/2025  Time Calculation  Start Time: 1025  Stop Time: 1049  Time Calculation (min): 24 min         Assessment/Plan   PT Assessment  PT Assessment Results: Decreased strength, Decreased range of motion, Impaired balance, Decreased mobility, Pain, Orthopedic restrictions  Rehab Prognosis: Good  Barriers to Discharge Home: No anticipated barriers  Evaluation/Treatment Tolerance: Patient tolerated treatment well  Medical Staff Made Aware: Yes  Strengths: Ability to acquire knowledge, Coping skills, Attitude of self, Rehab experience  Barriers to Participation: Comorbidities  End of Session Communication: Bedside nurse  Assessment Comment: Pt able to improve gait safety while up on feet improving gait mechanics post cues with CGA and functional 60 ft gait noted without lob.  Pt required increased skilled cues for ue push off during transfers as well as as safer etienne during turns to reduce NBOS and prevent feet crossing over midline reducing risk for falls. Pt cont to require skilled PT to improve functoinal safety up on feet and gait mechanics to reduce risk for falls and prevent further decline while here in hospital. Pt left in bed, alarm on ,call bell within reach and all needs addressed.  End of Session Patient Position: Bed, 2 rail up, Alarm on     PT Plan  Treatment/Interventions: Bed mobility, Transfer training, Gait training, Strengthening, Endurance training, Therapeutic exercise, Range of motion, Therapeutic activity  PT Plan: Ongoing PT  PT Frequency: 3 times per week  PT Discharge Recommendations: Low intensity level of continued care  PT Recommended Transfer Status: Assist x1, Stand by assist (supervision/sba, bed mobility pt requires assist with LE at times)  PT - OK to Discharge: Yes    PT Visit Info:  PT Received On: 06/14/25  Response to Previous Treatment:  Patient with no complaints from previous session.     General Visit Information:   General  Reason for Referral: impaired mobility 2/2 admission for leg swelling  Referred By: SHAD Knapp-CNP  Past Medical History Relevant to Rehab: COPD, HTN, BPH, PE, cellulitis, DJD  Family/Caregiver Present: No  Prior to Session Communication: Bedside nurse  Patient Position Received: Alarm on, Up in chair  Preferred Learning Style: verbal, kinesthetic  General Comment: Pt pleasant and agreeable to therapy with reports of 10/10 pain R shoulder.  Pt cleared by nursing prior to session.    Subjective My shoulder really hurts and I am just dizzy and tired.   Precautions:  Precautions  UE Weight Bearing Status: Right Non-Weight Bearing  LE Weight Bearing Status: Weight Bearing as Tolerated  Medical Precautions: Fall precautions  Post-Surgical Precautions: Right shoulder precautions (no AROM/PROM of R shoulder, no extension of shoulder to prevent dislocation, maintain sling in place. No formal therapy on shoulder until after follow up with surgeon on 6/20)  Precautions Comment: no AROM/PROM of R shoulder, no extension of shoulder to prevent dislocation, maintain sling in place. No formal therapy on shoulder until after follow up with surgeon on 6/20     Date/Time Vitals Session Patient Position Pulse Resp SpO2 BP MAP (mmHg)    06/14/25 1000 --  --  68  --  92 %  --  --     06/14/25 1002 --  --  --  --  94 %  --  --               Objective   Pain:  Pain Assessment  Pain Assessment: 0-10  0-10 (Numeric) Pain Score: 10 - Worst possible pain  Pain Type: Surgical pain  Pain Location: Shoulder  Pain Orientation: Right  Pain Interventions: Repositioned  Response to Interventions: Resting quietly  Cognition:  Cognition  Overall Cognitive Status: Within Functional Limits  Arousal/Alertness: Appropriate responses to stimuli  Orientation Level: Oriented X4, Appropriate for developmental age  Coordination:  Movements are Fluid and  Coordinated: Yes    Activity Tolerance:  Activity Tolerance  Endurance: Tolerates 10 - 20 min exercise with multiple rests  Treatments:  Therapeutic Exercise  Therapeutic Exercise Performed: Yes  Therapeutic Exercise Activity 1: 2x15 PF/DF  Therapeutic Exercise Activity 2: 2x15 LAQ  Therapeutic Exercise Activity 3: 2x15 long axis hip abd snow angels  Therapeutic Exercise Activity 4: 2x15 alt march       Bed Mobility  Bed Mobility: Yes  Bed Mobility 1  Bed Mobility 1: Sitting to supine  Level of Assistance 1: Minimum assistance  Bed Mobility Comments 1: Increased difficulty with hip hiking and walking due to R shoulder pain with b le min a into bed and dep x 2 to boost up in bed due to shoulder.    Ambulation/Gait Training  Ambulation/Gait Training Performed: Yes  Ambulation/Gait Training 1  Surface 1: Level tile  Device 1: Single point cane  Gait Support Devices: Gait belt, Upper extremity sling  Assistance 1: Contact guard  Quality of Gait 1: Wide base of support, Diminished heel strike, Inconsistent stride length  Comments/Distance (ft) 1: 35 ft x 1 trial with CGA needed with verbal cues for safer SPC plcaement and increased head up erect posture control to reduce risk for falls.  Fair - carryover for posture due to back pain.  Ambulation/Gait Training 2  Surface 2: Level tile  Device 2: Single point cane  Gait Support Devices: Gait belt, Upper extremity sling  Assistance 2: Contact guard  Quality of Gait 2: Wide base of support, Diminished heel strike, Inconsistent stride length  Comments/Distance (ft) 2: 50-55 ft x 1 with CGA with improved upright posture allowing longer stride lengths and safer control when making turns.  Transfers  Transfer: Yes  Transfer 1  Transfer From 1: Chair with arms to  Transfer to 1: Stand  Technique 1: Sit to stand, Stand to sit  Transfer Device 1: Cane, Gait belt  Transfer Level of Assistance 1: Close supervision, Minimal verbal cues  Trials/Comments 1: Cues for ue push off and  static stand prior to moving to reduce dizziness and reduce risk for falls.  Transfers 2  Transfer From 2: Chair with arms to  Transfer to 2: Stand, Bed  Technique 2: Sit to stand, Stand to sit, Stand pivot  Transfer Device 2: Cane, Gait belt  Transfer Level of Assistance 2: Contact guard, Minimal verbal cues  Trials/Comments 2: Increased attempts needed to stand but good etienne during SPT and side stepping tasks chair to bed       Outcome Measures:  [facility] Basic Mobility  Turning from your back to your side while in a flat bed without using bedrails: A little  Moving from lying on your back to sitting on the side of a flat bed without using bedrails: A little  Moving to and from bed to chair (including a wheelchair): A little  Standing up from a chair using your arms (e.g. wheelchair or bedside chair): A little  To walk in hospital room: A little  Climbing 3-5 steps with railing: A lot  Basic Mobility - Total Score: 17    Education Documentation  Home Exercise Program, taught by Shira Acosta PTA at 6/14/2025 11:42 AM.  Learner: Patient  Readiness: Acceptance  Method: Explanation  Response: Verbalizes Understanding  Comment: Education for safer posture and gait mechanics with SPC as well as TE seated HEP for speed and improved standing strength control up on feet.    Mobility Training, taught by Shira Acosta PTA at 6/14/2025 11:42 AM.  Learner: Patient  Readiness: Acceptance  Method: Explanation  Response: Verbalizes Understanding  Comment: Education for safer posture and gait mechanics with SPC as well as TE seated HEP for speed and improved standing strength control up on feet.    Education Comments  No comments found.      Encounter Problems       Encounter Problems (Active)       Mobility       Patient will ambulate with cane 100' x 2 mod I with improved gait mechanics and decreased pain symptoms.  (Progressing)       Start:  06/13/25    Expected End:  06/20/25            Pt will improve supine  to sit to supine transfers with mod I  (Progressing)       Start:  06/13/25    Expected End:  06/20/25            Pt will improve sit to stand to sit and transfer from bed to chair to bed with cane mod I  (Progressing)       Start:  06/13/25    Expected End:  06/20/25            Pt will improve B LE strength by 1/2 grade for improved overall functional mobility  (Progressing)       Start:  06/13/25    Expected End:  06/20/25               Pain - Adult

## 2025-06-14 NOTE — PROGRESS NOTES
"Abraham Rodriguez is a 84 y.o. male on day 0 of admission presenting with Leg swelling.    Subjective   Patient sitting in bed, reports continued weakness, and is reporting one episode of diarrhea and dizziness with ambulation to restroom, which has now resolved.  Will keep overnight for monitoring.  Discussed ongoing treatment plan, patient states understanding, and agrees.         Objective     Physical Exam  Constitutional:       Appearance: Normal appearance.   HENT:      Head: Normocephalic and atraumatic.      Nose: Nose normal.      Mouth/Throat:      Mouth: Mucous membranes are moist.   Eyes:      Extraocular Movements: Extraocular movements intact.      Pupils: Pupils are equal, round, and reactive to light.   Cardiovascular:      Rate and Rhythm: Normal rate and regular rhythm.      Pulses: Normal pulses.      Heart sounds: Normal heart sounds.   Pulmonary:      Effort: Pulmonary effort is normal.      Breath sounds: Normal breath sounds.   Abdominal:      General: Bowel sounds are normal.      Palpations: Abdomen is soft.   Musculoskeletal:         General: Tenderness and signs of injury present. Normal range of motion.      Right lower leg: Edema present.      Left lower leg: Edema present.      Comments: 1+ pitting ankles   Skin:     General: Skin is warm and dry.      Capillary Refill: Capillary refill takes less than 2 seconds.   Neurological:      Mental Status: He is alert and oriented to person, place, and time. Mental status is at baseline.      Motor: Weakness present.      Gait: Gait abnormal.   Psychiatric:         Mood and Affect: Mood normal.         Behavior: Behavior normal.         Last Recorded Vitals  Blood pressure 162/75, pulse 68, temperature 37 °C (98.6 °F), temperature source Temporal, resp. rate 18, height 1.803 m (5' 10.98\"), weight 85.9 kg (189 lb 6 oz), SpO2 94%.  Intake/Output last 3 Shifts:  I/O last 3 completed shifts:  In: 640 (7.5 mL/kg) [P.O.:640]  Out: - (0 mL/kg)   Weight: " 85.9 kg     Relevant Results  Scheduled medications  Scheduled Medications[1]  Continuous medications  Continuous Medications[2]  PRN medications  PRN Medications[3]    CT angio chest for pulmonary embolism  Result Date: 6/12/2025  STUDY: CT Angiogram of the Chest; 6/12/2025 at 8:59 PM. INDICATION: Recent surgery, shortness of breath. COMPARISON: CTA chest 8/15/2022. ACCESSION NUMBER(S): [id number] ORDERING CLINICIAN: YEIMI COLE TECHNIQUE:  CTA of the chest was performed with intravenous contrast. Images are reviewed and processed at a workstation according to the CT angiogram protocol with 3-D and/or MIP post processing imaging generated.  Omnipaque 350 75 mL was administered intravenously. Automated mA/kV exposure control was utilized and patient examination was performed in strict accordance with principles of ALARA. FINDINGS: Pulmonary arteries are adequately opacified without acute or chronic filling defects.  The thoracic aorta is normal in course and caliber without dissection or aneurysm. The heart is normal in size without pericardial effusion.  Thoracic lymph nodes are not enlarged. There is no pleural effusion, pleural thickening, or pneumothorax. The airways are patent. Lungs are clear without consolidation, interstitial disease, or suspicious nodules. Upper abdomen demonstrates no acute pathology. There are no acute fractures.  No suspicious bony lesions. Right shoulder prosthesis with associated artifact.    No pulmonary embolism. Normal heart size. Mild subsegmental atelectasis and/or patchy infiltrate in the left posterior sulcus, likely subacute or chronic changes; close clinical correlation and follow-up CXR suggested. . Signed by Donta Nava MD    Vascular US lower extremity venous duplex bilateral  Result Date: 6/12/2025  Interpreted By:  Get Humphreys, STUDY: VAS US LOWER EXTREMITY VENOUS DUPLEX BILATERAL  6/12/2025 7:44 pm   INDICATION: 85 y/o   M with  Signs/Symptoms:lower extremity  swelling, recent surgery. LMP:  Unknown.       COMPARISON: None.   ACCESSION NUMBER(S): [id number]   ORDERING CLINICIAN: YEIMI COLE   TECHNIQUE: Routine ultrasound of the  bilateral lower extremity was performed with duplex Doppler (color and spectral) evaluation.   Static images were obtained for remote interpretation.   FINDINGS: THIGH VEINS:  The common femoral, femoral, popliteal, proximal medial saphenous, and deep femoral veins are patent and free of thrombus. The veins are normally compressible.  They demonstrate normal phasic flow and augmentation response.   CALF VEINS:  The paired peroneal and posterior tibial calf veins are patent.  There is right calf edema.       Negative study.  No deep venous thrombosis of the right or left lower extremity.   MACRO: None   Signed by: Get Humphreys 6/12/2025 8:23 PM Dictation workstation:   [id number]    Cardiology Interpretation Of Nuclear Stress - See Other Report For Nuclear Portion  Result Date: 6/3/2025   [facility], [address] [place] [address]             Tel [phone] and Fax [phone] Nuclear Pharmacologic Stress Test Patient Name:      ARTEMIO MORALES        Ordering Provider:     [id number] KATHIA BOLTON Study Date:        5/30/2025            Reading Physician:     [id number] Elijah Doss MD MRN/PID:           [id number]             Supervising Physician: [id number] Elijah Doss MD Accession#:        [id number]         Fellow: Date of Birth/Age: 1940 / 84 years Fellow: Gender:            M                    Nurse:                 Amita Rosales RN Admit Date:        5/30/2025            Technician:            Apoorva Vallecillo Admission Status:  Outpatient           Sonographer:           ethan Height:            180.34 cm            Technologist:  Weight:            79.38 kg             Additional Staff: BSA:               1.99 m2              Encounter#:            [id number] BMI:               24.41 kg/m2          Patient Location:      [facility] Non                                                                Invasive Study Type:    CARDIOLOGY INTERPRETATION OF NUCLEAR STRESS Diagnosis/ICD: Essential (primary) hypertension-I10; Encounter for other                preprocedural examination-Z01.818; Shortness of breath-R06.02 Indication:    Hypertension, Dyspnea and Pre-Op Evaluation CPT Code:      Stress Test Interpretation-53131; Stress Test Supervision-94292 Falls Risk: Moderate: Patient has a moderate risk for sustaining a fall; a falls prevention plan has been implemented.  Study Details: Correct procedure and correct patient verified verbally and with                ID Band checked.  Patient History: Dyspnea, hypertension, chronic obstructive pulmonary disease, coronary artery disease and dyslipidemia. Pre op clearance, Hip replacement. Allergies: Atorvastatin,Lisinopril. Smoker:    Former.  Medications: The patient's prescribed medication is ASA,Atorvastatin,Dexilant,Bentyl,Cymbalta,Proscar,Flonase,Lasix,Avapor,Cozaar,Mobic,Metoprolol,Remeron,Flomax,Zanaflex. The patient took medications as prescribed.  Patient Performance: Patient received a total of 0.4 mg of Regadenoson at 9:14:39 AM. The peak heart rate achieved was 116 bpm, which was 86 % of the age predicted target heart rate of 135 bpm. The resting blood pressure was 134/74 mmHg with a heart rate of 88 bpm. The patient developed shortness of breath during the stress exam. The symptoms resolved with rest 4 minutes into recovery. The blood pressure response was normal. The test was terminated due to: end of vasodilator infusion. Patient has met the discharge criteria and is discharged to home.  Baseline ECG: Resting ECG showed normal sinus rhythm with right bundle branch block.  Stress ECG:  Stress ECG showed sinus tachycardia, with RBBB.  Stress Stage Data: +---+------+-------+-----------------------+ HR Sys BPDamanda BPComments                +---+------+-------+-----------------------+ 88 134   74                             +---+------+-------+-----------------------+                 Sp02 97% RA             +---+------+-------+-----------------------+ 91              Divina min 1              +---+------+-------+-----------------------+ 510228   62     Divina min 2, Sp02 98% RA +---+------+-------+-----------------------+ 112             Divina min 3              +---+------+-------+-----------------------+ 106             Divina min 4              +---+------+-------+-----------------------+  Recovery ECG: Recovery ECG showed normal sinus rhythm, with Occasional PVC.  +------------+---+------+-------+---------+             HR Sys BPDias BPComments  +------------+---+------+-------+---------+ Recovery I  108                       +------------+---+------+-------+---------+ Recovery II [id number]   62     Sp02 % RA +------------+---+------+-------+---------+ Recovery [id number]                       +------------+---+------+-------+---------+ Recovery IV [id number]   62               +------------+---+------+-------+---------+ Recovery V  104                       +------------+---+------+-------+---------+ Recovery                        +------------+---+------+-------+---------+  Summary:  1. Adequate level of stress achieved.  2. No clinical or electrocardiographic evidence for ischemia at a maximal infusion.  3. Nuclear image results are reported separately. [id number] Elijah Doss MD Electronically signed on 6/3/2025 at 7:33:47 PM   ** Final **     XR shoulder right 2+ views  Result Date: 6/3/2025  Interpreted By:  Atif Guajardo, STUDY: Right shoulder dated  6/3/2025.   INDICATION: Signs/Symptoms:right shoulder replacement    COMPARISON: Radiographs dated 04/30/2025   ACCESSION NUMBER(S): [id number]   ORDERING CLINICIAN: JOURDAN PIMENTEL   TECHNIQUE: Three views of the right shoulder.   FINDINGS: No fracture or dislocation is evident. There is a reverse shoulder arthroplasty. Hardware is intact as visualized. Moderate to severe degenerative changes seen of the acromioclavicular joint as visualized. Subcutaneous emphysema and edema and staples are seen from recent surgery.       Surgical changes without hardware complication or osseous injury evident.   MACRO: None   Signed by: Atif Guajardo 6/3/2025 1:51 PM Dictation workstation:   [id number]    ECG 12 Lead  Result Date: 6/2/2025  Sinus rhythm with 1st degree AV block Right bundle branch block Left anterior fascicular block ** Bifascicular block ** Abnormal ECG When compared with ECG of 26-JUL-2024 11:32, Premature supraventricular complexes are no longer Present Confirmed by Oleg Raphael ([id number]) on 6/2/2025 1:55:35 PM Also confirmed by Oleg Raphael ([id number])  on 6/2/2025 1:57:44 PM    Nuclear Stress Test  Result Date: 5/30/2025  Interpreted By:  Bruce Lainez and Bartolomei Aguilar Christopher STUDY: NUCLEAR STRESS TEST; 5/30/2025 10:34 am   INDICATION: Signs/Symptoms:SOB.   ,I10 Essential (primary) hypertension,I10 Essential (primary) hypertension,Z01.818 Encounter for other preprocedural examination,R06.02 Shortness of breath,O26.819 Pregnancy related exhaustion and fatigue, unspecified trimester ([facility])   COMPARISON: CT chest 11/27/2019.   ACCESSION NUMBER(S): [id number]   ORDERING CLINICIAN: OLEG RAPHAEL   TECHNIQUE: DIVISION OF NUCLEAR MEDICINE STRESS MYOCARDIAL PERFUSION SCAN, TWO DAY PROTOCOL   The patient received an intravenous dose of  10.5 mCi of Tc-99m Myoview and resting emission tomographic (SPECT) images of the myocardium were acquired on 05/29/2025. The patient then received an intravenous infusion of 0.4mg regadenoson (Lexiscan)  followed by an  additional dose of  34.2 mCi of Tc-99m  Myoview on 05/30/2025. Stress phase SPECT images of the myocardium were then acquired. These included ECG-gated images to assess and quantify ventricular function.   A low-dose, nondiagnostic regional CT was also performed however unable to be used for attenuation correction purposes.   Limiting Factors: None.   FINDINGS: Quality of the Study: Adequate.   Technical limitations/Significant Artifacts: None.   Cardiac Imaging: Fixed small-sized moderate to severe perfusion defects involving inferior wall, although there is a preserved wall motion and thickening, prior infarction can not be completely excluded. Otherwise, both stress and rest imaging demonstrates grossly normal perfusion throughout the remaining left ventricle.   Attenuated Corrected Images: N/a   Left ventricular size (EDV): 55 (normal less than 120 mL)   Gated images: Normal wall motion and thickening. Left ventricular ejection fraction estimated greater than 65% (Normal 45% or greater)   Nondiagnostic low dose CT does not reveal any definite gross abnormalities suggestive of an emergent/oncologic process. There is redemonstration of bilateral calcified and noncalcified pleural plaques, similar to prior.       1. Fixed small-sized moderate to severe perfusion defects involving inferior wall, although there is a preserved wall motion and thickening, prior infarction can not be completely excluded. No evidence of inducible myocardial ischemia. 2. The left ventricle is normal in size. 3. Normal LV wall motion with a post-stress LV EF estimated greater than 65%.   I personally reviewed the images/study and I agree with the findings as stated. This study was interpreted at [facility], [place], [place].   MACRO: None   Signed by: Bruce Lainez 5/30/2025 12:08 PM Dictation workstation:   [id number]    Transthoracic Echo Complete  Result Date: 5/29/2025   [facility], [address] [place] [address]  [place] [address]              Tel [phone] and Fax [phone] TRANSTHORACIC ECHOCARDIOGRAM REPORT  Patient Name:       MR. ARTEMIO MORALES   Reading Physician:    [id number] Raphael MD Study Date:         5/29/2025           Ordering Provider:    [id number] KATHIA RAPHAEL MRN/PID:            [id number]            Fellow: Accession#:         [id number]        Nurse:                Palak Bridges RN Date of Birth/Age:  1940 / 84      Sonographer:          Norma navarro                                     RDBONILLA Gender assigned at  M                   Additional Staff: Birth: Height:             180.34 cm           Admit Date: Weight:             79.38 kg            Admission Status:     Outpatient BSA / BMI:          1.99 m2 / 24.41     Encounter#:           [id number]                     kg/m2 Blood Pressure:     172/84 mmHg         Department Location: Study Type:    TRANSTHORACIC ECHO (TTE) COMPLETE Diagnosis/ICD: Shortness of breath-R06.02 Indication:    SOB CPT Code:      Echo Complete w Full Doppler-37220 Patient History: Pertinent History: HTN, PE, Dyspnea, Dyslipidemia and COPD. pre-operative, ASHD,                    HFpEF. Study Detail: The following Echo studies were performed: 2D, M-Mode, Doppler and               color flow. Technically challenging study due to body habitus.               Definity used as a contrast agent for endocardial border               definition and agitated saline used as a contrast agent for               intraseptal flow evaluation. Total contrast used for this               procedure was 1.5 mL via IV push. The patient was awake.  PHYSICIAN INTERPRETATION: Left Ventricle: Left ventricular ejection fraction is normal by visual estimate at 60-65%. There are no regional left ventricular wall motion  abnormalities. The left ventricular cavity size is normal. There is left ventricular concentric remodeling. Spectral Doppler shows a Grade I (impaired relaxation pattern) of left ventricular diastolic filling with normal left atrial filling pressure. Left Atrium: The left atrial size is normal. There is no evidence of a patent foramen ovale. A bubble study using agitated saline was performed. Bubble study is negative. Right Ventricle: The right ventricle is normal in size. There is normal right ventricular global systolic function. Right Atrium: The right atrium is normal in size. Aortic Valve: There is trace aortic valve regurgitation. Mitral Valve: The mitral valve is normal in structure. There is trace mitral valve regurgitation. The E Vmax is 0.50 m/s. Tricuspid Valve: The tricuspid valve is structurally normal. There is trace tricuspid regurgitation. The doppler estimated RVSP is within normal limits with a right ventricular systolic pressure of 29 mmHg. Pulmonic Valve: The pulmonic valve is not well visualized. There is physiologic pulmonic valve regurgitation. Pericardium: There is no pericardial effusion noted. Aorta: The aortic root is normal. There is mild dilatation of the ascending aorta.  CONCLUSIONS:  1. Left ventricular ejection fraction is normal by visual estimate at 60-65%.  2. Spectral Doppler shows a Grade I (impaired relaxation pattern) of left ventricular diastolic filling with normal left atrial filling pressure.  3. There is normal right ventricular global systolic function.  4. There is no evidence of a patent foramen ovale. QUANTITATIVE DATA SUMMARY:  2D MEASUREMENTS:          Normal Ranges: Ao Root d:       3.30 cm  (2.0-3.7cm) LAs:             3.20 cm  (2.7-4.0cm) IVSd:            1.45 cm  (0.6-1.1cm) LVPWd:           1.35 cm  (0.6-1.1cm) LVIDd:           3.88 cm  (3.9-5.9cm) LVIDs:           2.75 cm LV Mass Index:   100 g/m2 LVEDV Index:     51 ml/m2 LV % FS          29.1 %  LEFT ATRIUM:                   Normal Ranges: LA Vol A4C:        38.0 ml    (22+/-6mL/m2) LA Vol A2C:        36.4 ml LA Vol BP:         38.3 ml LA Vol Index A4C:  19.1ml/m2 LA Vol Index A2C:  18.3 ml/m2 LA Vol Index BP:   19.2 ml/m2 LA Area A4C:       15.1 cm2 LA Area A2C:       15.2 cm2 LA Major Axis A4C: 5.1 cm LA Major Axis A2C: 5.4 cm LA Volume Index:   18.0 ml/m2  RIGHT ATRIUM:                 Normal Ranges: RA Vol A4C:        35.8 ml    (8.3-19.5ml) RA Vol Index A4C:  18.0 ml/m2 RA Area A4C:       14.8 cm2 RA Major Axis A4C: 5.2 cm  M-MODE MEASUREMENTS:         Normal Ranges: Ao Root:             3.30 cm (2.0-3.7cm) LAs:                 3.40 cm (2.7-4.0cm)  AORTA MEASUREMENTS:         Normal Ranges: Asc Ao, d:          3.70 cm (2.1-3.4cm)  LV SYSTOLIC FUNCTION:                      Normal Ranges: EF-A4C View:    61 % (>=55%) EF-A2C View:    65 % EF-Biplane:     60 % EF-Visual:      63 % LV EF Reported: 63 %  LV DIASTOLIC FUNCTION:             Normal Ranges: MV Peak E:             0.50 m/s    (0.7-1.2 m/s) MV Peak A:             0.85 m/s    (0.42-0.7 m/s) E/A Ratio:             0.59        (1.0-2.2) MV e'                  0.056 m/s   (>8.0) MV lateral e'          0.06 m/s MV medial e'           0.05 m/s MV A Dur:              151.00 msec E/e' Ratio:            9.03        (<8.0) PulmV Sys Ceasar:         42.40 cm/s PulmV Noe Ceasar:        30.20 cm/s PulmV S/D Ceasar:         1.40 PulmV A Revs Ceasar:      37.40 cm/s PulmV A Revs Dur:      114.00 msec  MITRAL VALVE:          Normal Ranges: MV DT:        378 msec (150-240msec)  AORTIC VALVE:           Normal Ranges: AoV Vmax:      1.11 m/s (<=1.7m/s) AoV Peak P.9 mmHg (<20mmHg) LVOT Max Ceasar:  0.69 m/s (<=1.1m/s) LVOT VTI:      16.60 cm LVOT Diameter: 2.10 cm  (1.8-2.4cm) AoV Area,Vmax: 2.15 cm2 (2.5-4.5cm2)  RIGHT VENTRICLE: RV Basal 2.70 cm RV Mid   1.60 cm RV Major 6.8 cm TAPSE:   22.8 mm RV s'    0.10 m/s  TRICUSPID VALVE/RVSP:          Normal Ranges: Peak TR Velocity:     2.55  m/s Est. RA Pressure:     3 RV Syst Pressure:     29       (< 30mmHg) IVC Diam:             1.94 cm  PULMONIC VALVE:          Normal Ranges: PV Max Ceasar:     1.3 m/s  (0.6-0.9m/s) PV Max P.9 mmHg  PULMONARY VEINS: PulmV A Revs Dur: 114.00 msec PulmV A Revs Ceasar: 37.40 cm/s PulmV Noe Ceasar:   30.20 cm/s PulmV S/D Ceasar:    1.40 PulmV Sys Ceasar:    42.40 cm/s  [id number] Oleg Raphael MD Electronically signed on 2025 at 2:01:50 PM  ** Final **     Results for orders placed or performed during the hospital encounter of 25 (from the past 24 hours)   Urinalysis with Reflex Culture and Microscopic   Result Value Ref Range    Color, Urine Yellow Light-Yellow, Yellow, Dark-Yellow    Appearance, Urine Clear Clear    Specific Gravity, Urine 1.037 (N) 1.005 - 1.035    pH, Urine 6.5 5.0, 5.5, 6.0, 6.5, 7.0, 7.5, 8.0    Protein, Urine NEGATIVE NEGATIVE, 10 (TRACE), 20 (TRACE) mg/dL    Glucose, Urine Normal Normal mg/dL    Blood, Urine NEGATIVE NEGATIVE mg/dL    Ketones, Urine NEGATIVE NEGATIVE mg/dL    Bilirubin, Urine NEGATIVE NEGATIVE mg/dL    Urobilinogen, Urine Normal Normal mg/dL    Nitrite, Urine NEGATIVE NEGATIVE    Leukocyte Esterase, Urine 250 Brandon/uL (A) NEGATIVE   Microscopic Only, Urine   Result Value Ref Range    WBC, Urine 1-5 1-5, NONE /HPF    RBC, Urine 1-2 NONE, 1-2, 3-5 /HPF    Mucus, Urine FEW Reference range not established. /LPF   CBC and Auto Differential   Result Value Ref Range    WBC 5.9 4.4 - 11.3 x10*3/uL    nRBC 0.0 0.0 - 0.0 /100 WBCs    RBC 3.93 (L) 4.50 - 5.90 x10*6/uL    Hemoglobin 12.2 (L) 13.5 - 17.5 g/dL    Hematocrit 35.9 (L) 41.0 - 52.0 %    MCV 91 80 - 100 fL    MCH 31.0 26.0 - 34.0 pg    MCHC 34.0 32.0 - 36.0 g/dL    RDW 12.5 11.5 - 14.5 %    Platelets 198 150 - 450 x10*3/uL    Neutrophils % 54.2 40.0 - 80.0 %    Immature Granulocytes %, Automated 0.7 0.0 - 0.9 %    Lymphocytes % 29.8 13.0 - 44.0 %    Monocytes % 9.9 2.0 - 10.0 %    Eosinophils % 4.9 0.0 - 6.0 %    Basophils  % 0.5 0.0 - 2.0 %    Neutrophils Absolute 3.19 1.60 - 5.50 x10*3/uL    Immature Granulocytes Absolute, Automated 0.04 0.00 - 0.50 x10*3/uL    Lymphocytes Absolute 1.75 0.80 - 3.00 x10*3/uL    Monocytes Absolute 0.58 0.05 - 0.80 x10*3/uL    Eosinophils Absolute 0.29 0.00 - 0.40 x10*3/uL    Basophils Absolute 0.03 0.00 - 0.10 x10*3/uL   Basic metabolic panel   Result Value Ref Range    Glucose 89 74 - 99 mg/dL    Sodium 138 136 - 145 mmol/L    Potassium 3.9 3.5 - 5.3 mmol/L    Chloride 104 98 - 107 mmol/L    Bicarbonate 27 21 - 32 mmol/L    Anion Gap 11 10 - 20 mmol/L    Urea Nitrogen 19 6 - 23 mg/dL    Creatinine 0.88 0.50 - 1.30 mg/dL    eGFR 85 >60 mL/min/1.73m*2    Calcium 9.0 8.6 - 10.3 mg/dL     *Note: Due to a large number of results and/or encounters for the requested time period, some results have not been displayed. A complete set of results can be found in Results Review.     Assessment & Plan  Venous insufficiency of both lower extremities    Primary hypertension    Osteoarthritis    Idiopathic peripheral neuropathy    Dyslipidemia    Benign essential hypertension    (HFpEF) heart failure with preserved ejection fraction    Status post reverse total shoulder replacement, right    Venous insufficiency of bilateral lower extremities  - pt has swollen ankles and feet bilaterally   - preserved EF   - BNP 64  -Lasix 20 mg PO Daily started    HTN  HLD  HFpEF  - 5/29/25 echo (pre op) - nl EF 60-65% /Grade I diastolic dysfunction   - continue metoprolol, losartan    -Nuclear Stress 5/30/25  Summary:   1. Adequate level of stress achieved.   2. No clinical or electrocardiographic evidence for ischemia at a maximal infusion.    Primary osteoarthritis   Idiopathic peripheral neuropathy  Post reverse total shoulder replacement - right   - continue oxycodone 5mg, zanaflex   -PT/OT Eval and Treat     Depression  - continue cymbalta      BPH  - continue flomax, proscar     DVT: lovenox  Code status: Full   Dispo: pt  requires overnight stay      SHAD Delgado-CNP         [1] acetaminophen, 975 mg, oral, q8h  ammonium lactate, , Topical, BID  clotrimazole, , Topical, BID  DULoxetine, 30 mg, oral, Daily  enoxaparin, 40 mg, subcutaneous, q24h  finasteride, 5 mg, oral, Nightly  losartan, 50 mg, oral, Daily  lubricating eye drops, 1 drop, Both Eyes, TID  metoprolol succinate XL, 50 mg, oral, Daily  tamsulosin, 0.8 mg, oral, q PM  tiZANidine, 4 mg, oral, TID  zinc oxide, 1 Application, Topical, TID  [2] sodium chloride 0.9%, 10 mL/hr  [3] PRN medications: docusate sodium, melatonin, naloxone, ondansetron **OR** ondansetron, oxyCODONE, polyethylene glycol, sodium chloride 0.9%

## 2025-06-15 VITALS
BODY MASS INDEX: 26.51 KG/M2 | HEIGHT: 71 IN | RESPIRATION RATE: 16 BRPM | TEMPERATURE: 97.9 F | OXYGEN SATURATION: 97 % | DIASTOLIC BLOOD PRESSURE: 68 MMHG | HEART RATE: 66 BPM | SYSTOLIC BLOOD PRESSURE: 157 MMHG | WEIGHT: 189.38 LBS

## 2025-06-15 LAB
ANION GAP SERPL CALC-SCNC: 9 MMOL/L (ref 10–20)
BASOPHILS # BLD AUTO: 0.03 X10*3/UL (ref 0–0.1)
BASOPHILS NFR BLD AUTO: 0.5 %
BUN SERPL-MCNC: 19 MG/DL (ref 6–23)
CALCIUM SERPL-MCNC: 8.4 MG/DL (ref 8.6–10.3)
CHLORIDE SERPL-SCNC: 103 MMOL/L (ref 98–107)
CO2 SERPL-SCNC: 28 MMOL/L (ref 21–32)
CREAT SERPL-MCNC: 0.88 MG/DL (ref 0.5–1.3)
EGFRCR SERPLBLD CKD-EPI 2021: 85 ML/MIN/1.73M*2
EOSINOPHIL # BLD AUTO: 0.39 X10*3/UL (ref 0–0.4)
EOSINOPHIL NFR BLD AUTO: 6.1 %
ERYTHROCYTE [DISTWIDTH] IN BLOOD BY AUTOMATED COUNT: 12.4 % (ref 11.5–14.5)
GLUCOSE SERPL-MCNC: 85 MG/DL (ref 74–99)
HCT VFR BLD AUTO: 33.1 % (ref 41–52)
HGB BLD-MCNC: 11.1 G/DL (ref 13.5–17.5)
IMM GRANULOCYTES # BLD AUTO: 0.03 X10*3/UL (ref 0–0.5)
IMM GRANULOCYTES NFR BLD AUTO: 0.5 % (ref 0–0.9)
LYMPHOCYTES # BLD AUTO: 1.96 X10*3/UL (ref 0.8–3)
LYMPHOCYTES NFR BLD AUTO: 30.5 %
MCH RBC QN AUTO: 30.3 PG (ref 26–34)
MCHC RBC AUTO-ENTMCNC: 33.5 G/DL (ref 32–36)
MCV RBC AUTO: 90 FL (ref 80–100)
MONOCYTES # BLD AUTO: 0.55 X10*3/UL (ref 0.05–0.8)
MONOCYTES NFR BLD AUTO: 8.6 %
NEUTROPHILS # BLD AUTO: 3.47 X10*3/UL (ref 1.6–5.5)
NEUTROPHILS NFR BLD AUTO: 53.8 %
NRBC BLD-RTO: 0 /100 WBCS (ref 0–0)
PLATELET # BLD AUTO: 177 X10*3/UL (ref 150–450)
POTASSIUM SERPL-SCNC: 3.5 MMOL/L (ref 3.5–5.3)
RBC # BLD AUTO: 3.66 X10*6/UL (ref 4.5–5.9)
SODIUM SERPL-SCNC: 136 MMOL/L (ref 136–145)
WBC # BLD AUTO: 6.4 X10*3/UL (ref 4.4–11.3)

## 2025-06-15 PROCEDURE — 36415 COLL VENOUS BLD VENIPUNCTURE: CPT | Performed by: NURSE PRACTITIONER

## 2025-06-15 PROCEDURE — G0378 HOSPITAL OBSERVATION PER HR: HCPCS

## 2025-06-15 PROCEDURE — 2500000001 HC RX 250 WO HCPCS SELF ADMINISTERED DRUGS (ALT 637 FOR MEDICARE OP): Performed by: NURSE PRACTITIONER

## 2025-06-15 PROCEDURE — 85025 COMPLETE CBC W/AUTO DIFF WBC: CPT | Performed by: NURSE PRACTITIONER

## 2025-06-15 PROCEDURE — 2500000001 HC RX 250 WO HCPCS SELF ADMINISTERED DRUGS (ALT 637 FOR MEDICARE OP): Performed by: INTERNAL MEDICINE

## 2025-06-15 PROCEDURE — 94760 N-INVAS EAR/PLS OXIMETRY 1: CPT

## 2025-06-15 PROCEDURE — 2500000001 HC RX 250 WO HCPCS SELF ADMINISTERED DRUGS (ALT 637 FOR MEDICARE OP)

## 2025-06-15 PROCEDURE — 2500000002 HC RX 250 W HCPCS SELF ADMINISTERED DRUGS (ALT 637 FOR MEDICARE OP, ALT 636 FOR OP/ED)

## 2025-06-15 PROCEDURE — 99239 HOSP IP/OBS DSCHRG MGMT >30: CPT

## 2025-06-15 PROCEDURE — 2500000002 HC RX 250 W HCPCS SELF ADMINISTERED DRUGS (ALT 637 FOR MEDICARE OP, ALT 636 FOR OP/ED): Performed by: NURSE PRACTITIONER

## 2025-06-15 PROCEDURE — 2500000005 HC RX 250 GENERAL PHARMACY W/O HCPCS

## 2025-06-15 PROCEDURE — 80048 BASIC METABOLIC PNL TOTAL CA: CPT | Performed by: NURSE PRACTITIONER

## 2025-06-15 RX ORDER — CETIRIZINE HYDROCHLORIDE 10 MG/1
10 TABLET ORAL DAILY
Qty: 30 TABLET | Refills: 0 | Status: SHIPPED | OUTPATIENT
Start: 2025-06-15 | End: 2025-07-15

## 2025-06-15 RX ORDER — CETIRIZINE HYDROCHLORIDE 10 MG/1
10 TABLET ORAL DAILY
Status: DISCONTINUED | OUTPATIENT
Start: 2025-06-15 | End: 2025-06-15 | Stop reason: HOSPADM

## 2025-06-15 RX ORDER — TIZANIDINE 4 MG/1
4 TABLET ORAL EVERY 8 HOURS PRN
Qty: 9 TABLET | Refills: 0 | Status: SHIPPED | OUTPATIENT
Start: 2025-06-15 | End: 2025-06-18

## 2025-06-15 RX ORDER — POTASSIUM CHLORIDE 20 MEQ/1
40 TABLET, EXTENDED RELEASE ORAL ONCE
Status: COMPLETED | OUTPATIENT
Start: 2025-06-15 | End: 2025-06-15

## 2025-06-15 RX ORDER — FLUTICASONE PROPIONATE 50 MCG
2 SPRAY, SUSPENSION (ML) NASAL DAILY
Status: DISCONTINUED | OUTPATIENT
Start: 2025-06-15 | End: 2025-06-15 | Stop reason: HOSPADM

## 2025-06-15 RX ORDER — CLOTRIMAZOLE 1 %
CREAM (GRAM) TOPICAL 2 TIMES DAILY
Qty: 45 G | Refills: 0 | Status: SHIPPED | OUTPATIENT
Start: 2025-06-15 | End: 2025-07-15

## 2025-06-15 RX ORDER — FUROSEMIDE 20 MG/1
20 TABLET ORAL DAILY PRN
Qty: 30 TABLET | Refills: 0 | Status: SHIPPED | OUTPATIENT
Start: 2025-06-15 | End: 2025-07-15

## 2025-06-15 RX ADMIN — FUROSEMIDE 20 MG: 20 TABLET ORAL at 08:14

## 2025-06-15 RX ADMIN — ZINC OXIDE 1 APPLICATION: 200 OINTMENT TOPICAL at 08:16

## 2025-06-15 RX ADMIN — CETIRIZINE HYDROCHLORIDE 10 MG: 10 TABLET, FILM COATED ORAL at 09:14

## 2025-06-15 RX ADMIN — FLUTICASONE PROPIONATE 2 SPRAY: 50 SPRAY, METERED NASAL at 09:14

## 2025-06-15 RX ADMIN — CARBOXYMETHYLCELLULOSE SODIUM 1 DROP: 5 SOLUTION/ DROPS OPHTHALMIC at 08:14

## 2025-06-15 RX ADMIN — OXYCODONE 5 MG: 5 TABLET ORAL at 06:43

## 2025-06-15 RX ADMIN — DULOXETINE HYDROCHLORIDE 30 MG: 30 CAPSULE, DELAYED RELEASE ORAL at 08:14

## 2025-06-15 RX ADMIN — POTASSIUM CHLORIDE 40 MEQ: 1500 TABLET, EXTENDED RELEASE ORAL at 09:14

## 2025-06-15 RX ADMIN — METOPROLOL SUCCINATE 50 MG: 25 TABLET, EXTENDED RELEASE ORAL at 08:14

## 2025-06-15 RX ADMIN — OXYCODONE 5 MG: 5 TABLET ORAL at 10:35

## 2025-06-15 RX ADMIN — OXYCODONE 5 MG: 5 TABLET ORAL at 00:51

## 2025-06-15 RX ADMIN — Medication: at 08:16

## 2025-06-15 RX ADMIN — TIZANIDINE 4 MG: 4 TABLET ORAL at 08:14

## 2025-06-15 RX ADMIN — ACETAMINOPHEN 975 MG: 325 TABLET, FILM COATED ORAL at 08:14

## 2025-06-15 RX ADMIN — LOSARTAN POTASSIUM 50 MG: 50 TABLET, FILM COATED ORAL at 08:14

## 2025-06-15 RX ADMIN — CLOTRIMAZOLE: 1 CREAM TOPICAL at 08:16

## 2025-06-15 RX ADMIN — ACETAMINOPHEN 975 MG: 325 TABLET, FILM COATED ORAL at 00:51

## 2025-06-15 ASSESSMENT — PAIN SCALES - GENERAL
PAINLEVEL_OUTOF10: 0 - NO PAIN
PAINLEVEL_OUTOF10: 0 - NO PAIN
PAINLEVEL_OUTOF10: 6
PAINLEVEL_OUTOF10: 6

## 2025-06-15 ASSESSMENT — PAIN - FUNCTIONAL ASSESSMENT
PAIN_FUNCTIONAL_ASSESSMENT: WONG-BAKER FACES
PAIN_FUNCTIONAL_ASSESSMENT: 0-10
PAIN_FUNCTIONAL_ASSESSMENT: 0-10

## 2025-06-15 ASSESSMENT — PAIN SCALES - WONG BAKER
WONGBAKER_NUMERICALRESPONSE: NO HURT

## 2025-06-15 ASSESSMENT — PAIN DESCRIPTION - LOCATION
LOCATION: SHOULDER
LOCATION: SHOULDER

## 2025-06-15 ASSESSMENT — PAIN DESCRIPTION - ORIENTATION
ORIENTATION: RIGHT
ORIENTATION: RIGHT

## 2025-06-15 ASSESSMENT — PAIN DESCRIPTION - DESCRIPTORS
DESCRIPTORS: DULL;ACHING
DESCRIPTORS: DULL;ACHING

## 2025-06-15 NOTE — CARE PLAN
Problem: Pain - Adult  Goal: Verbalizes/displays adequate comfort level or baseline comfort level  Outcome: Progressing     Problem: Skin  Goal: Participates in plan/prevention/treatment measures  Outcome: Progressing  Goal: Prevent/manage excess moisture  Outcome: Progressing  Goal: Promote skin healing  Outcome: Progressing     Problem: Fall/Injury  Goal: Verbalize understanding of risk factor reduction measures to prevent injury from fall in the home  Outcome: Progressing     Problem: Safety - Adult  Goal: Free from fall injury  Outcome: Met     Problem: Skin  Goal: Prevent/minimize sheer/friction injuries  Outcome: Met     Problem: Fall/Injury  Goal: Not fall by end of shift  Outcome: Met  Goal: Be free from injury by end of the shift  Outcome: Met  Goal: Verbalize understanding of personal risk factors for fall in the hospital  Outcome: Met   The patient's goals for the shift include      The clinical goals for the shift include Pt will have no falls this shift.    Over the shift, the patient did make progress toward the following goals.

## 2025-06-15 NOTE — CONSULTS
Reason For Consult  COPD Navigator.     Patient stated he has been tested and was told he does not have COPD. Patient does not want education.        Pearl Tang, RRT

## 2025-06-15 NOTE — DISCHARGE SUMMARY
Discharge Diagnosis  Leg swelling       Issues Requiring Follow-Up  Patient to follow up with PCP, Orthopedics, and Cardiology.      Discharge Meds     Medication List      START taking these medications     cetirizine 10 mg tablet; Commonly known as: ZyrTEC; Take 1 tablet (10   mg) by mouth once daily.   clotrimazole 1 % cream; Commonly known as: Lotrimin; Apply topically 2   times a day.     CHANGE how you take these medications     furosemide 20 mg tablet; Commonly known as: Lasix; Take 1 tablet (20 mg)   by mouth once daily as needed (Leg Swelling).; What changed: medication   strength, how much to take, reasons to take this, additional instructions   metoprolol succinate XL 50 mg 24 hr tablet; Commonly known as:   Toprol-XL; TAKE 1 TABLET DAILY; What changed: Another medication with the   same name was removed. Continue taking this medication, and follow the   directions you see here.   tiZANidine 4 mg tablet; Commonly known as: Zanaflex; Take 1 tablet (4   mg) by mouth every 8 hours if needed for muscle spasms for up to 3 days.;   What changed: medication strength, how much to take     CONTINUE taking these medications     docusate sodium 100 mg capsule; Commonly known as: Colace   DULoxetine 30 mg DR capsule; Commonly known as: Cymbalta; Take 1 capsule   (30 mg) by mouth once daily. Do not crush or chew.   finasteride 5 mg tablet; Commonly known as: Proscar   hyoscyamine 0.125 mg disintegrating tablet; Commonly known as: Anaspaz   irbesartan 150 mg tablet; Commonly known as: Avapro; TAKE 1 TABLET DAILY   mupirocin 2 % ointment; Commonly known as: Bactroban   naloxone 4 mg/0.1 mL nasal spray; Commonly known as: Narcan; Administer   1 spray (4 mg) into affected nostril(s) if needed for opioid reversal or   respiratory depression.   ondansetron ODT 4 mg disintegrating tablet; Commonly known as:   Zofran-ODT   oxyCODONE 5 mg immediate release tablet; Commonly known as: Roxicodone;   Take 1 tablet (5 mg) by mouth  "every 6 hours if needed for severe pain (7 -   10) (Do not take within 3 hours of GABAPENTIN).   penicillin v potassium 500 mg tablet; Commonly known as: Veetid   polyvinyl alcohol 1.4 % ophthalmic solution; Commonly known as:   Liquifilm Tears   predniSONE 20 mg tablet; Commonly known as: Deltasone; Take one tab  po   the pm prior to procedure and take one tab the morning of procedure for   contrast allergy   tamsulosin 0.4 mg 24 hr capsule; Commonly known as: Flomax     STOP taking these medications     doxycycline 100 mg capsule; Commonly known as: Vibramycin   fluticasone 50 mcg/actuation nasal spray; Commonly known as: Flonase   gabapentin 300 mg capsule; Commonly known as: Neurontin   meloxicam 15 mg tablet; Commonly known as: Mobic       Test Results Pending At Discharge  Pending Labs       Order Current Status    Extra Urine Gray Tube Collected (06/13/25 1450)    Urinalysis with Reflex Culture and Microscopic In process          Patient sitting up in chair, reporting he is feeling \"okay\" and ready to go home.  Patient states daughter is preparing his home for his return which will be ready today.  All questions answered.      Hospital Course   Venous insufficiency of bilateral lower extremities  - pt has swollen ankles and feet bilaterally   - preserved EF   - BNP 64  -Lasix 20 mg PO Daily started will discharge on prn dosing  -Patient to follow up with Cardiology outpatient.     HTN  HLD  HFpEF  - 5/29/25 echo (pre op) - nl EF 60-65% /Grade I diastolic dysfunction   - continue metoprolol, losartan    -Nuclear Stress 5/30/25  Summary:   1. Adequate level of stress achieved.   2. No clinical or electrocardiographic evidence for ischemia at a maximal infusion.     Primary osteoarthritis   Idiopathic peripheral neuropathy  Post reverse total shoulder replacement - right   - continue oxycodone 5mg, zanaflex   -PT/OT Eval and Treat - Recommending Home PT/OT     Depression  - continue cymbalta      BPH  - continue " flomax, proscar     DVT: lovenox  Code status: Full     Pertinent Physical Exam At Time of Discharge  Physical Exam  Constitutional:       Appearance: Normal appearance.   HENT:      Head: Normocephalic and atraumatic.      Nose: Nose normal.      Mouth/Throat:      Mouth: Mucous membranes are moist.   Eyes:      Extraocular Movements: Extraocular movements intact.      Pupils: Pupils are equal, round, and reactive to light.   Cardiovascular:      Rate and Rhythm: Normal rate and regular rhythm.      Pulses: Normal pulses.      Heart sounds: Normal heart sounds.   Pulmonary:      Effort: Pulmonary effort is normal.      Breath sounds: Normal breath sounds.   Abdominal:      General: Bowel sounds are normal.      Palpations: Abdomen is soft.   Musculoskeletal:         General: Tenderness and signs of injury present. Normal range of motion.      Cervical back: Normal range of motion.   Skin:     General: Skin is warm and dry.      Capillary Refill: Capillary refill takes less than 2 seconds.   Neurological:      Mental Status: He is alert and oriented to person, place, and time. Mental status is at baseline.   Psychiatric:         Mood and Affect: Mood normal.         Behavior: Behavior normal.       Disposition: Patient was stable to be discharged to home. He will follow up with [facility].   He will follow up with PCP, Cardiology, and Orthopedics.      Total cumulative time spent in preparation of this discharge including documentation review, coordination of care with the medical team including PT/SW/care coordinators and treating consultants, discussion with patient and pertinent family members and finalization of prescriptions, follow-up appointments, and this discharge summary was approximately 45 minutes.     Outpatient Follow-Up  Future Appointments   Date Time Provider Department [place]   6/20/2025 11:00 AM Ingrid Rucker PA-C [id number] [facility]   6/30/2025 10:15 AM Macho Elizabeth MD [email or web address] [facility]   7/10/2025  11:20 AM SHAD Cortés-CNP [id number] [facility]     Patient seen and evaluated with attending Dr. Perez.     SHAD Delgado-CNP

## 2025-06-16 ENCOUNTER — TELEPHONE (OUTPATIENT)
Dept: PRIMARY CARE | Facility: CLINIC | Age: 85
End: 2025-06-16
Payer: COMMERCIAL

## 2025-06-16 ENCOUNTER — PATIENT OUTREACH (OUTPATIENT)
Dept: PRIMARY CARE | Facility: CLINIC | Age: 85
End: 2025-06-16
Payer: COMMERCIAL

## 2025-06-16 LAB
ATRIAL RATE: 70 BPM
P AXIS: 29 DEGREES
P OFFSET: 139 MS
P ONSET: 109 MS
PR INTERVAL: 200 MS
Q ONSET: 209 MS
QRS COUNT: 11 BEATS
QRS DURATION: 146 MS
QT INTERVAL: 422 MS
QTC CALCULATION(BAZETT): 455 MS
QTC FREDERICIA: 444 MS
R AXIS: -30 DEGREES
T AXIS: 3 DEGREES
T OFFSET: 420 MS
VENTRICULAR RATE: 70 BPM

## 2025-06-16 NOTE — PROGRESS NOTES
Discharge Facility: [facility]  Discharge Diagnosis: Venous insufficiency of bilateral lower extremities   Admission Date: 6/12/2025  Discharge Date: 6/15/2025    PCP Appointment Date: Appointment with ARCHANA Cortés (07/10/2025) [facility]  Specialist Appointment Date: Cardiology follow up recommended,    Appointment with Ingrid Rucker PA-C (06/20/2025) Orthopedic Surgery,  Appointment with Macho Elizabeth MD (06/30/2025) Pain Medicine  Hospital Encounter and Summary Linked: Yes  ED to Hosp-Admission (Discharged) with Kofi Perez MD (06/12/2025)     See discharge assessment below for further details:    Wrap Up  Wrap Up Additional Comments: 84yoM with PMHx of BPH, COPD, DJD, DVT, HFpEF (Grade I diastolic dysfunction), HLD, HTN, bilateral PE presented with ankle swelling that started a couple days ago, along with intermittent increasing sob. Patient is no acute distress on room air. After work-up, patient diagnosed with venous insufficiency of bilateral LE's and advised to follow up with cardiology outpaitent. Lasix 20 mg PRN prescribed for leg swelling and tizanidine dosage increased for muscle spasms. Patient discharged to home with family support and HHC to follow. Per daughter, the patient is doing as well as can be expected considering his age. Family purchased a lift chair to help with the patient's mobility. Daughter to ask patient if he would like a primary care hospital follow up. Daughter lives with patient, but was at work. Patient has an ortho follow up this week for his shoulder surgery. Confirmed primary care [facility] scheduled for 7/10/2025. (6/16/2025 12:28 PM)    Medications  Medications reviewed with patient/caregiver?: Yes (Daughter) (6/16/2025 12:28 PM)  Is the patient having any side effects they believe may be caused by any medication additions or changes?: No (6/16/2025 12:28 PM)  Does the patient have all medications ordered at discharge?: Yes (6/16/2025 12:28 PM)  Care  Management Interventions: No intervention needed (6/16/2025 12:28 PM)  Prescription Comments: NEW: cetirizine and Lotrimin. CHANGED: Lasix and tizanidine (6/16/2025 12:28 PM)  Is the patient taking all medications as directed (includes completed medication regime)?: Yes (6/16/2025 12:28 PM)  Care Management Interventions: Provided patient education (6/16/2025 12:28 PM)  Medication Comments: Verbalized understanding of medication changes. (6/16/2025 12:28 PM)    Appointments  Does the patient have a primary care provider?: Yes (6/16/2025 12:28 PM)  Care Management Interventions: -- (Declined to schedule a hospital follow up.) (6/16/2025 12:28 PM)  Has the patient kept scheduled appointments due by today?: Not applicable (6/16/2025 12:28 PM)    Self Management  What is the home health agency?: [facility] (6/16/2025 12:28 PM)  Has home health visited the patient within 72 hours of discharge?: Call prior to 72 hours (6/16/2025 12:28 PM)  What Durable Medical Equipment (DME) was ordered?: N/A (6/16/2025 12:28 PM)    Patient Teaching  Does the patient have access to their discharge instructions?: Yes (6/16/2025 12:28 PM)  Care Management Interventions: Reviewed instructions with patient (6/16/2025 12:28 PM)  What is the patient's perception of their health status since discharge?: Returned to baseline/stable (6/16/2025 12:28 PM)  Is the patient/caregiver able to teach back the hierarchy of who to call/visit for symptoms/problems? PCP, Specialist, Home Health nurse, Urgent Care, ED, 911: Yes (6/16/2025 12:28 PM)  Patient/Caregiver Education Comments: Caregiver verbalized understanding of discharge instructions. Provide contact information for nonurgent questions or concerns. (6/16/2025 12:28 PM)

## 2025-06-19 ENCOUNTER — TELEPHONE (OUTPATIENT)
Dept: ORTHOPEDIC SURGERY | Facility: CLINIC | Age: 85
End: 2025-06-19
Payer: COMMERCIAL

## 2025-06-19 DIAGNOSIS — Z96.611 STATUS POST REVERSE TOTAL SHOULDER REPLACEMENT, RIGHT: ICD-10-CM

## 2025-06-19 RX ORDER — OXYCODONE HYDROCHLORIDE 5 MG/1
5 TABLET ORAL EVERY 6 HOURS PRN
Qty: 28 TABLET | Refills: 0 | Status: SHIPPED | OUTPATIENT
Start: 2025-06-19

## 2025-06-19 NOTE — TELEPHONE ENCOUNTER
6/3/25 RT Shoulder REVERSE    Patient called regarding refill on pain medication, pain level 9-10.    Please call Rx to pharmacy:    [facility] in [place]

## 2025-06-20 ENCOUNTER — APPOINTMENT (OUTPATIENT)
Dept: ORTHOPEDIC SURGERY | Facility: CLINIC | Age: 85
End: 2025-06-20
Payer: MEDICARE

## 2025-06-20 ENCOUNTER — HOSPITAL ENCOUNTER (OUTPATIENT)
Dept: RADIOLOGY | Facility: CLINIC | Age: 85
Discharge: HOME | End: 2025-06-20
Payer: MEDICARE

## 2025-06-20 ENCOUNTER — TELEPHONE (OUTPATIENT)
Dept: ORTHOPEDIC SURGERY | Facility: CLINIC | Age: 85
End: 2025-06-20

## 2025-06-20 ENCOUNTER — DOCUMENTATION (OUTPATIENT)
Dept: HOME HEALTH SERVICES | Facility: HOME HEALTH | Age: 85
End: 2025-06-20

## 2025-06-20 ENCOUNTER — HOME HEALTH ADMISSION (OUTPATIENT)
Dept: HOME HEALTH SERVICES | Facility: HOME HEALTH | Age: 85
End: 2025-06-20
Payer: MEDICARE

## 2025-06-20 VITALS — WEIGHT: 189 LBS | BODY MASS INDEX: 27.06 KG/M2 | HEIGHT: 70 IN

## 2025-06-20 DIAGNOSIS — M25.511 RIGHT SHOULDER PAIN, UNSPECIFIED CHRONICITY: ICD-10-CM

## 2025-06-20 DIAGNOSIS — Z96.611 STATUS POST REVERSE TOTAL SHOULDER REPLACEMENT, RIGHT: Primary | ICD-10-CM

## 2025-06-20 DIAGNOSIS — Z96.611 STATUS POST REVERSE TOTAL SHOULDER REPLACEMENT, RIGHT: ICD-10-CM

## 2025-06-20 PROCEDURE — 73030 X-RAY EXAM OF SHOULDER: CPT | Mod: RIGHT SIDE | Performed by: RADIOLOGY

## 2025-06-20 PROCEDURE — 1159F MED LIST DOCD IN RCRD: CPT

## 2025-06-20 PROCEDURE — 99024 POSTOP FOLLOW-UP VISIT: CPT

## 2025-06-20 PROCEDURE — 73030 X-RAY EXAM OF SHOULDER: CPT | Mod: RT

## 2025-06-20 PROCEDURE — 1036F TOBACCO NON-USER: CPT

## 2025-06-20 PROCEDURE — 1160F RVW MEDS BY RX/DR IN RCRD: CPT

## 2025-06-20 ASSESSMENT — PAIN - FUNCTIONAL ASSESSMENT: PAIN_FUNCTIONAL_ASSESSMENT: NO/DENIES PAIN

## 2025-06-20 NOTE — PROGRESS NOTES
History of Present Illness  Chief Complaint   Patient presents with    Right Shoulder - Follow-up       84 y.o. male is 2-week status post right reverse total shoulder arthroplasty with biceps tenodesis.  Patient reports that he still has pain.  He notes that it is somewhat tolerable.  Unfortunately patient has been on chronic pain medication for a long time for his back issues so he does not see any significant benefit with oxycodone fives.  He states that he had previously been off oxycodone tens in the past and those are more effective for him.  He is also been taking Tylenol as needed for pain.  He denies any radicular symptoms.  No numbness tingling that he reports.  No neck pain.  No chest pain or shortness of breath that he reports today.  No calf pain.  No fever or chills.  Patient does see a pain management physician already for his chronic back pain.  He is scheduled to see him on the 30th.  Patient was just given a refill of oxycodone fives yesterday.  Discussed with him that there is no other pain medicine that we can prescribe him at this point..  They state pain is well controlled and continuing to improve. State they are continuing to progress well.    Review of Systems   GENERAL: Negative for malaise, significant weight loss, fever, chills  MUSCULOSKELETAL: see HPI  NEURO:  Negative    Exam  Right shoulder incisions are clean dry intact well-healed. No evidence of infection today.  Staples  were removed in the office today.  There is no erythema noted.  No significant bruising noted.  No tenderness palpation along his incision today.  No tenderness palpation over the sternoclavicular joint or AC joint today.  Axillary skin patches intact today.  Patient has mild discomfort with gentle range of motion of the right shoulder.  Elbow range of motion is nonirritable.  Radial pulses 2+.   strength is symmetric.  Sensation and neurovascular nation of the right upper extremities gross  intact.    Imaging:  X-rays of the patient were ordered by Ingrid Rucker PA-C and obtained today.  Ingrid Rucker PA-C personally reviewed the results of the x-rays.    In addition, Ingrid Rucker PA-C independently interpreted the patient's x-rays (performed by the Radiology department) by viewing the x-ray images and this is Ingrid Rucker PA-C's personal interpretation:       X-rays of the right shoulder demonstrate a well-positioned right reverse total shoulder arthroplasty without acute concerns.  Assessment  Patient is 2 weeks status post right reverse total shoulder arthroplasty with biceps tenodesis.    Plan  Overall, the patient is doing very well after their surgery and is pleased with their progress. Today we removed the stitches and applied Steri-Strips. It is okay for the patient to shower but avoid soaking the wounds (no pools or bathtubs) for 3-4 more weeks until the wounds are completely healed.  The patient can slowly increase their activity levels, but must do so slowly to avoid aggravating the joint.  We gave the patient a prescription for physical therapy to work on range of motion and strengthening of the extremity.  Patient was given a refill of his oxycodone yesterday.  We discussed with him this will be the last refill that we can give him from our office.  He does see pain management physician already for chronic back pain.  We encouraged him to see if there is anything else that his pain management doctor could prescribe him to help with his pain.  In the short-term he can continue with oxycodone and Tylenol as needed.  He can discontinue the sling and 2 weeks when he is 4 weeks postoperative.  We will plan on seeing the patient back in 8 more weeks. I told the patient to call with any questions or problems.

## 2025-06-20 NOTE — TELEPHONE ENCOUNTER
Patient called following his appointment today.  He did not get a copy of the OT/PT order and there is not one in the chart at this time.    Please mail the order to patient at his home address and he will get it to PT/OT provider.

## 2025-06-20 NOTE — PATIENT INSTRUCTIONS
BMI was above normal measurement. Current weight: 85.7 kg (189 lb)  Weight change since last visit (-) denotes wt loss -0.38 lbs   Weight loss needed to achieve BMI 25: 15.1 Lbs  Weight loss needed to achieve BMI 30: -19.6 Lbs  Advised to Increase physical activity.

## 2025-06-20 NOTE — HH CARE COORDINATION
Home Care received a Referral for Physical Therapy. We have processed the referral for a Start of Care on 6/22-6/23.     If you have any questions or concerns, please feel free to contact us at [phone]. Follow the prompts, enter your five digit zip code, and you will be directed to your care team on [facility].

## 2025-06-23 ENCOUNTER — HOME CARE VISIT (OUTPATIENT)
Dept: HOME HEALTH SERVICES | Facility: HOME HEALTH | Age: 85
End: 2025-06-23
Payer: MEDICARE

## 2025-06-23 VITALS
HEART RATE: 73 BPM | TEMPERATURE: 97.4 F | SYSTOLIC BLOOD PRESSURE: 140 MMHG | RESPIRATION RATE: 18 BRPM | DIASTOLIC BLOOD PRESSURE: 80 MMHG | OXYGEN SATURATION: 96 %

## 2025-06-23 PROCEDURE — 169592 NO-PAY CLAIM PROCEDURE

## 2025-06-23 PROCEDURE — G0151 HHCP-SERV OF PT,EA 15 MIN: HCPCS | Mod: HHH

## 2025-06-23 ASSESSMENT — ENCOUNTER SYMPTOMS
PAIN: 1
HYPERTENSION: 1
SUBJECTIVE PAIN PROGRESSION: UNCHANGED
LOWEST PAIN SEVERITY IN PAST 24 HOURS: 5/10
PAIN SEVERITY GOAL: 0/10
LOWER EXTREMITY EDEMA: 1
PAIN LOCATION: RIGHT SHOULDER
HIGHEST PAIN SEVERITY IN PAST 24 HOURS: 9/10
PERSON REPORTING PAIN: PATIENT

## 2025-06-23 ASSESSMENT — ACTIVITIES OF DAILY LIVING (ADL)
OASIS_M1830: 05
ENTERING_EXITING_HOME: CONTACT GUARD ASSIST

## 2025-06-24 ENCOUNTER — HOME CARE VISIT (OUTPATIENT)
Dept: HOME HEALTH SERVICES | Facility: HOME HEALTH | Age: 85
End: 2025-06-24
Payer: MEDICARE

## 2025-06-24 PROCEDURE — G0152 HHCP-SERV OF OT,EA 15 MIN: HCPCS | Mod: HHH

## 2025-06-24 ASSESSMENT — ENCOUNTER SYMPTOMS
DOUBLE VISION: 0
PAIN LOCATION - PAIN QUALITY: ACHY
SUBJECTIVE PAIN PROGRESSION: UNCHANGED
PAIN LOCATION - RELIEVING FACTORS: REST, PAIN MEDS
HIGHEST PAIN SEVERITY IN PAST 24 HOURS: 10/10
PAIN LOCATION - EXACERBATING FACTORS: R SHOULDER SURGERY
PAIN LOCATION - PAIN SEVERITY: 8/10
PAIN LOCATION: RIGHT SHOULDER
PERSON REPORTING PAIN: PATIENT
PAIN LOCATION - PAIN FREQUENCY: CONSTANT
NYSTAGMUS: 0
PAIN: 1
PAIN SEVERITY GOAL: 0/10
LOWEST PAIN SEVERITY IN PAST 24 HOURS: 8/10

## 2025-06-24 ASSESSMENT — ACTIVITIES OF DAILY LIVING (ADL)
BATHING ASSESSED: 1
DRESSING_UB_CURRENT_FUNCTION: SUPERVISION
TOILETING: INDEPENDENT
GROOMING ASSESSED: 1
BATHING_CURRENT_FUNCTION: STAND BY ASSIST
TOILETING: 1
PHYSICAL TRANSFERS ASSESSED: 1
GROOMING_CURRENT_FUNCTION: SUPERVISION
DRESSING_LB_CURRENT_FUNCTION: INDEPENDENT
CURRENT_FUNCTION: STAND BY ASSIST

## 2025-06-26 ENCOUNTER — HOME CARE VISIT (OUTPATIENT)
Dept: HOME HEALTH SERVICES | Facility: HOME HEALTH | Age: 85
End: 2025-06-26
Payer: MEDICARE

## 2025-06-26 VITALS
TEMPERATURE: 98.6 F | HEART RATE: 73 BPM | DIASTOLIC BLOOD PRESSURE: 55 MMHG | RESPIRATION RATE: 18 BRPM | SYSTOLIC BLOOD PRESSURE: 100 MMHG | OXYGEN SATURATION: 98 %

## 2025-06-26 PROCEDURE — G0157 HHC PT ASSISTANT EA 15: HCPCS | Mod: CQ,HHH

## 2025-06-26 ASSESSMENT — ENCOUNTER SYMPTOMS
PAIN SEVERITY GOAL: 0/10
PAIN: 1
PERSON REPORTING PAIN: PATIENT
PAIN LOCATION - RELIEVING FACTORS: REST, REPOSITIONING
LOWEST PAIN SEVERITY IN PAST 24 HOURS: 4/10
PAIN LOCATION - PAIN SEVERITY: 4/10
PAIN LOCATION - EXACERBATING FACTORS: MOVEMENT, POSITIONING
PAIN LOCATION - PAIN FREQUENCY: FREQUENT
PAIN LOCATION - PAIN QUALITY: SORE, ACHE
PAIN LOCATION: RIGHT ARM
SUBJECTIVE PAIN PROGRESSION: GRADUALLY IMPROVING
HIGHEST PAIN SEVERITY IN PAST 24 HOURS: 5/10

## 2025-06-26 NOTE — CASE COMMUNICATION
Start of care was completed the day prior. She had nursing added for an evaluation.  I was planning on seeing patient later this week, but PT felt he needed more assistance/possibly a sooner evaluation so I rearranged my schedule to see him today. I have called, texted, and left a message in attempt to see him, but have yet to hear back from him or family member.     At this time, I have rescheduled his evaluation to my next available t luke. Should I have any cancellations or other availability, I will try to see him again sooner. TRANG w/ PEK OU-The use/continuation of artificial tears were recommended.

## 2025-06-27 ENCOUNTER — HOME CARE VISIT (OUTPATIENT)
Dept: HOME HEALTH SERVICES | Facility: HOME HEALTH | Age: 85
End: 2025-06-27
Payer: MEDICARE

## 2025-06-27 VITALS
HEART RATE: 75 BPM | DIASTOLIC BLOOD PRESSURE: 62 MMHG | OXYGEN SATURATION: 97 % | SYSTOLIC BLOOD PRESSURE: 108 MMHG | RESPIRATION RATE: 17 BRPM | TEMPERATURE: 97.2 F

## 2025-06-27 PROCEDURE — G0158 HHC OT ASSISTANT EA 15: HCPCS | Mod: CO,HHH

## 2025-06-27 ASSESSMENT — ENCOUNTER SYMPTOMS
PAIN LOCATION - PAIN QUALITY: ACHING
SUBJECTIVE PAIN PROGRESSION: UNCHANGED
LOWEST PAIN SEVERITY IN PAST 24 HOURS: 7/10
PAIN LOCATION - PAIN SEVERITY: 7/10
HIGHEST PAIN SEVERITY IN PAST 24 HOURS: 7/10
PAIN: 1
PAIN LOCATION: RIGHT SHOULDER
PERSON REPORTING PAIN: PATIENT
PAIN SEVERITY GOAL: 0/10

## 2025-06-30 ENCOUNTER — OFFICE VISIT (OUTPATIENT)
Dept: PAIN MEDICINE | Facility: CLINIC | Age: 85
End: 2025-06-30
Payer: COMMERCIAL

## 2025-06-30 VITALS — DIASTOLIC BLOOD PRESSURE: 75 MMHG | SYSTOLIC BLOOD PRESSURE: 124 MMHG | RESPIRATION RATE: 20 BRPM | HEART RATE: 104 BPM

## 2025-06-30 DIAGNOSIS — M54.16 LUMBAR RADICULOPATHY: Primary | ICD-10-CM

## 2025-06-30 PROCEDURE — 3078F DIAST BP <80 MM HG: CPT | Performed by: ANESTHESIOLOGY

## 2025-06-30 PROCEDURE — 1125F AMNT PAIN NOTED PAIN PRSNT: CPT | Performed by: ANESTHESIOLOGY

## 2025-06-30 PROCEDURE — 99214 OFFICE O/P EST MOD 30 MIN: CPT | Performed by: ANESTHESIOLOGY

## 2025-06-30 PROCEDURE — 3074F SYST BP LT 130 MM HG: CPT | Performed by: ANESTHESIOLOGY

## 2025-06-30 PROCEDURE — 1036F TOBACCO NON-USER: CPT | Performed by: ANESTHESIOLOGY

## 2025-06-30 PROCEDURE — 1159F MED LIST DOCD IN RCRD: CPT | Performed by: ANESTHESIOLOGY

## 2025-06-30 ASSESSMENT — PAIN SCALES - GENERAL: PAINLEVEL_OUTOF10: 7

## 2025-06-30 ASSESSMENT — PAIN - FUNCTIONAL ASSESSMENT: PAIN_FUNCTIONAL_ASSESSMENT: 0-10

## 2025-06-30 ASSESSMENT — PAIN DESCRIPTION - DESCRIPTORS: DESCRIPTORS: RADIATING

## 2025-06-30 NOTE — PROGRESS NOTES
Subjective   Patient ID: Abraham Rodriguez is a 84 y.o. male with a past medical history of chronic low back pain.      HPI:   He reports right-sided low back pain that radiates anteriorly down the front of his leg to the level of his foot.  He reports that this pain is throbbing, worse with standing and walking.  He reports the pain comes and goes and is worse with motion.  He denies new numbness or tingling, weakness, bowel or bladder incontinence.  He is currently taking Cymbalta 30 mg daily with some relief of the pain.  He had a transforaminal steroid injection on the right at L4-L5 in mid April with good relief and continues to have at least 50% relief of his pain.  He recently had right shoulder surgery and is going to be starting PT for that soon.    Physical Therapy: Patient continues physician directed home exercises.  Other Conservative Measures he has tried: Heating Pad and Injections  Classes of medications tried in the past: Acetaminophen, NSAIDs, SNRIs , and Opioids          Review of Systems   13-point ROS done and negative except for HPI.     Current Outpatient Medications   Medication Instructions    cetirizine (ZYRTEC) 10 mg, oral, Daily    clotrimazole (Lotrimin) 1 % cream Topical, 2 times daily    docusate sodium (COLACE) 100 mg, 2 times daily PRN    DULoxetine (CYMBALTA) 30 mg, oral, Daily, Do not crush or chew.    finasteride (PROSCAR) 5 mg, Nightly    furosemide (LASIX) 20 mg, oral, Daily PRN    hyoscyamine 0.125 mg disintegrating tablet Place 1 tablet under the tongue every 6-8 hours as needed for abdominal pain.    irbesartan (AVAPRO) 150 mg, oral, Daily    metoprolol succinate XL (TOPROL-XL) 50 mg, oral, Daily    mupirocin (Bactroban) 2 % ointment 1 Application, Topical, Daily, Apply a pea size amount externally every day to feet.    naloxone (NARCAN) 4 mg, nasal, As needed    ondansetron ODT (Zofran-ODT) 4 mg disintegrating tablet Dissolve 1 tablet (4 mg) in the mouth once daily as needed.     oxyCODONE (ROXICODONE) 5 mg, oral, Every 6 hours PRN    penicillin v potassium (Veetid) 500 mg tablet TAKE 2 TABLETS BY MOUTH 1 HOUR PRIOR TO PROCEDURE AND 1 TAB 6 HOURS AFTER PROCEDURE FOR DENTAL WORK    polyvinyl alcohol (Liquifilm Tears) 1.4 % ophthalmic solution 1 drop, Both Eyes, 3 times daily    predniSONE (Deltasone) 20 mg tablet Take one tab  po the pm prior to procedure and take one tab the morning of procedure for contrast allergy    tamsulosin (FLOMAX) 0.8 mg, Every evening    tiZANidine (ZANAFLEX) 4 mg, oral, Every 8 hours PRN       Medical History[1]     Surgical History[2]     Family History[3]     RX Allergies[4]     Objective     Vitals:    06/30/25 1013   BP: 124/75   Pulse: 104   Resp: 20        Physical Exam  General: NAD, well groomed, well nourished  Eyes: Non-icteric sclera, EOMI  Ears, Nose, Mouth, and Throat: External ears and nose appear to be without deformity or rash. No lesions or masses noted. Hearing is grossly intact.   Neck: Trachea midline  Respiratory: Nonlabored breathing   Cardiovascular: trace peripheral edema   Skin: No rashes or open lesions/ulcers identified on skin.    Back:   Palpation: No tenderness to palpation over lumbar paraspinous muscles.     Neurologic:   Cranial nerves grossly intact.   Strength 5/5 and symmetric plantar/dorsiflexion   Sensation: Normal to light touch throughout    Psychiatric: Alert, orientation to person, place, and time. Cooperative.      Assessment/Plan   Abraham Rodriguez is a 84 y.o. male with a past medical history of chronic low back pain.  His pain was significantly improved with the last transforaminal injection.  Once he has regained adequate shoulder mobility for procedural positioning, he will likely benefit from a repeat injection.    Plan:  - Consider right L4 and L5 transforaminal steroid injection once shoulder mobility improved  - Continue physician directed home exercises  -Patient has tested positive to THC twice.  Patient informed  that he would not refill his oxycodone medication.    Medical Necessity  The patient has failed conservative treatment including different classes of medications and physical therapy.  Patient continues to rate their pain as moderate to severe, affecting quality of sleep, quality of life and  significantly impairing daily activities (ADLs)   We discussed  the risks, benefits and alternatives of the procedure including but not limited to: Lack of efficacy , Transiently worsening pain , Bleeding, Infection , and Nerve Damage and patient is amicable to the plan      Follow up: As needed     The patient was invited to contact us back anytime with any questions or concerns and follow-up with us in the office as needed.     There are no diagnoses linked to this encounter.    This note was generated with the aid of dictation software, there may be typos despite my attempts at proofreading.          [1]   Past Medical History:  Diagnosis Date    Allergic dermatitis 09/14/2023    Allergic rhinitis due to pollen 10/23/2014    Hay fever    Arthritis     Chronic pain disorder     Clotting disorder (Multi)     dvt april 2020     P.E. april 2020    COPD (chronic obstructive pulmonary disease) (Multi)     hx asbestos    Coronary artery disease     Encounter for other preprocedural examination 06/24/2014    Pre-procedural examination    Encounter for screening for malignant neoplasm of prostate     Encounter for screening for malignant neoplasm of prostate    Fissure in skin of foot 09/14/2023    Hyperlipidemia     Hypertension     Localized edema 05/11/2020    Pedal edema    Lung infiltrate 09/14/2023    Myocardial infarction (Multi)     Other conditions influencing health status     Carcinoma Of The Tongue    Pain in unspecified knee 12/22/2014    Joint pain, knee    Personal history of diseases of the skin and subcutaneous tissue 04/23/2013    History of eczema    Personal history of other diseases of the musculoskeletal system  and connective tissue 06/19/2014    Personal history of arthritis    Personal history of other diseases of the nervous system and sense organs 08/10/2021    History of Bell's palsy    Personal history of other diseases of the respiratory system 11/12/2014    History of asbestosis    Personal history of other diseases of the respiratory system 08/13/2014    History of chronic obstructive lung disease    Personal history of other specified conditions 08/20/2013    History of edema    Plantar fascial fibromatosis 07/22/2013    Plantar fasciitis    Polyp of colon     Polyp of sigmoid colon    Syncope and collapse 01/23/2015    Syncope and collapse    Unspecified abdominal pain 12/22/2014    Stomach pain    Unspecified disorder of eyelid 10/23/2014    Eyelid disorder   [2]   Past Surgical History:  Procedure Laterality Date    CARDIAC CATHETERIZATION      CHOLECYSTECTOMY  04/23/2013    Cholecystectomy Laparoscopic    HIP ARTHROPLASTY Right     OTHER SURGICAL HISTORY  04/27/2021    Meniscus repair    OTHER SURGICAL HISTORY Left 05/31/2023    Hip replacement    OTHER SURGICAL HISTORY  04/23/2013    Biopsy Tongue    SHOULDER SURGERY  04/23/2013    Shoulder Surgery    TOTAL KNEE ARTHROPLASTY Right 03/27/2024   [3]   Family History  Problem Relation Name Age of Onset    Hypothyroidism Brother      Diabetes Brother      Myasthenia gravis Brother     [4]   Allergies  Allergen Reactions    Iodinated Contrast Media Unknown and Anaphylaxis    Tobramycin-Dexamethasone Itching and Rash    Atorvastatin Other and Unknown     Felt like I was floating after taking    Cinnamon GI Upset    Bowbells GI Upset     Unable to eat cucumber with seeds only; seedless cucumbers ok.    Lisinopril Unknown

## 2025-07-01 ENCOUNTER — HOME CARE VISIT (OUTPATIENT)
Dept: HOME HEALTH SERVICES | Facility: HOME HEALTH | Age: 85
End: 2025-07-01
Payer: MEDICARE

## 2025-07-01 VITALS
OXYGEN SATURATION: 99 % | DIASTOLIC BLOOD PRESSURE: 72 MMHG | RESPIRATION RATE: 18 BRPM | SYSTOLIC BLOOD PRESSURE: 136 MMHG | HEART RATE: 77 BPM | TEMPERATURE: 98.3 F

## 2025-07-01 VITALS
OXYGEN SATURATION: 94 % | RESPIRATION RATE: 18 BRPM | SYSTOLIC BLOOD PRESSURE: 136 MMHG | HEART RATE: 77 BPM | DIASTOLIC BLOOD PRESSURE: 72 MMHG | TEMPERATURE: 98.3 F

## 2025-07-01 PROCEDURE — G0157 HHC PT ASSISTANT EA 15: HCPCS | Mod: CQ,HHH

## 2025-07-01 PROCEDURE — G0158 HHC OT ASSISTANT EA 15: HCPCS | Mod: CO,HHH

## 2025-07-01 ASSESSMENT — ENCOUNTER SYMPTOMS
PAIN LOCATION - PAIN FREQUENCY: WITH ACTIVITY
PERSON REPORTING PAIN: PATIENT
PAIN LOCATION - PAIN SEVERITY: 8/10
PAIN LOCATION - PAIN SEVERITY: 9/10
PAIN: 1
PAIN LOCATION - PAIN FREQUENCY: WITH ACTIVITY
PAIN LOCATION - PAIN QUALITY: ACHING STABBING
PAIN LOCATION - EXACERBATING FACTORS: STANDING, WALKING
PERSON REPORTING PAIN: PATIENT
SUBJECTIVE PAIN PROGRESSION: WAXING AND WANING
PAIN LOCATION - PAIN FREQUENCY: CONSTANT
PAIN LOCATION: BACK
HIGHEST PAIN SEVERITY IN PAST 24 HOURS: 9/10
LOWEST PAIN SEVERITY IN PAST 24 HOURS: 9/10
PAIN LOCATION - PAIN SEVERITY: 9/10
PAIN SEVERITY GOAL: 0/10
PAIN: 1
PAIN LOCATION: RIGHT KNEE
PAIN LOCATION: RIGHT SHOULDER
PAIN LOCATION - PAIN QUALITY: DULL
PAIN LOCATION - PAIN QUALITY: ACHING

## 2025-07-02 ENCOUNTER — HOME CARE VISIT (OUTPATIENT)
Dept: HOME HEALTH SERVICES | Facility: HOME HEALTH | Age: 85
End: 2025-07-02
Payer: MEDICARE

## 2025-07-02 ASSESSMENT — ENCOUNTER SYMPTOMS
PAIN SEVERITY GOAL: 0/10
LOWEST PAIN SEVERITY IN PAST 24 HOURS: 6/10
PAIN LOCATION - EXACERBATING FACTORS: PROLONGED ACTIVITY
PAIN LOCATION - RELIEVING FACTORS: REST MEDICATION
PAIN LOCATION - PAIN DURATION: CHRONIC
HIGHEST PAIN SEVERITY IN PAST 24 HOURS: 9/10
PAIN LOCATION - EXACERBATING FACTORS: POSITIONING
PAIN LOCATION - RELIEVING FACTORS: REST MEDICATION
PAIN LOCATION - PAIN DURATION: POST SURGERY

## 2025-07-03 ENCOUNTER — HOME CARE VISIT (OUTPATIENT)
Dept: HOME HEALTH SERVICES | Facility: HOME HEALTH | Age: 85
End: 2025-07-03
Payer: MEDICARE

## 2025-07-03 VITALS
OXYGEN SATURATION: 98 % | TEMPERATURE: 97 F | SYSTOLIC BLOOD PRESSURE: 134 MMHG | RESPIRATION RATE: 17 BRPM | HEART RATE: 72 BPM | DIASTOLIC BLOOD PRESSURE: 70 MMHG

## 2025-07-03 PROCEDURE — G0158 HHC OT ASSISTANT EA 15: HCPCS | Mod: CO,HHH

## 2025-07-03 ASSESSMENT — ENCOUNTER SYMPTOMS
PERSON REPORTING PAIN: PATIENT
PAIN: 1
LOWEST PAIN SEVERITY IN PAST 24 HOURS: 0/10
PAIN LOCATION: RIGHT SHOULDER
PAIN LOCATION - EXACERBATING FACTORS: EXERCISES
HIGHEST PAIN SEVERITY IN PAST 24 HOURS: 8/10
PAIN SEVERITY GOAL: 0/10
PAIN LOCATION - PAIN SEVERITY: 0/10
PAIN LOCATION - RELIEVING FACTORS: REST
PAIN LOCATION - PAIN QUALITY: ACHING
SUBJECTIVE PAIN PROGRESSION: GRADUALLY IMPROVING
PAIN LOCATION - PAIN FREQUENCY: WITH ACTIVITY

## 2025-07-03 NOTE — CASE COMMUNICATION
Nursing evaluation - scheduled/update - I attempted to reach patient or family member again while I am in the area of the patient's home. No answer.    Messages have been left prior and no one has called back nor called the office. Today, the phone continues to ring, no option to leave voicemail.    We will attempt one additional time to get in to see the patient. Should we not get ahold of him or family to schedule, the nursing evaluat ion will be cancelled at this time and will need new order for nursing evaluation if something should happen in the future.

## 2025-07-04 ENCOUNTER — HOME CARE VISIT (OUTPATIENT)
Dept: HOME HEALTH SERVICES | Facility: HOME HEALTH | Age: 85
End: 2025-07-04
Payer: MEDICARE

## 2025-07-04 ENCOUNTER — APPOINTMENT (OUTPATIENT)
Dept: HOME HEALTH SERVICES | Facility: HOME HEALTH | Age: 85
End: 2025-07-04
Payer: COMMERCIAL

## 2025-07-04 VITALS
RESPIRATION RATE: 18 BRPM | TEMPERATURE: 98.5 F | HEART RATE: 67 BPM | OXYGEN SATURATION: 100 % | SYSTOLIC BLOOD PRESSURE: 130 MMHG | DIASTOLIC BLOOD PRESSURE: 70 MMHG

## 2025-07-04 PROCEDURE — G0157 HHC PT ASSISTANT EA 15: HCPCS | Mod: CQ,HHH

## 2025-07-04 ASSESSMENT — ENCOUNTER SYMPTOMS
PAIN: 1
PERSON REPORTING PAIN: PATIENT
PAIN LOCATION - PAIN FREQUENCY: FREQUENT
PAIN LOCATION - RELIEVING FACTORS: REST, PAIN MEDICATION
PAIN LOCATION - EXACERBATING FACTORS: POSITIONING, ACTIVITY
PAIN LOCATION: GENERALIZED

## 2025-07-05 NOTE — CASE COMMUNICATION
Nursing evaluation is cancelled.    See previous communications.     To recap, multiple attempts have been made since last week to try and see patient. Have called, left messages, followed up with text messages, other calls.  Sometimes voicemails were left, sometimes no voicemail picked up.    I have received no return calls. The office to my knowledge hasnt received any calls.    I even moved him to sooner than planned when [facility] requested I go out sooner due to concerns with patient.    I have tried numerous times and he was scheduled for the weekend with a different nurse for one additional attempt, but I made room for him on the fourth of July /holiday to try one more time.     Cannot reach.    Did not do a driveby attempt as I have been instructed not to do so without any contact.    He can continue with therapy in the home and follow up with MDs if  there are any further needs.    Thanks.

## 2025-07-07 ENCOUNTER — HOME CARE VISIT (OUTPATIENT)
Dept: HOME HEALTH SERVICES | Facility: HOME HEALTH | Age: 85
End: 2025-07-07
Payer: MEDICARE

## 2025-07-07 VITALS
OXYGEN SATURATION: 98 % | TEMPERATURE: 97.2 F | RESPIRATION RATE: 17 BRPM | HEART RATE: 70 BPM | SYSTOLIC BLOOD PRESSURE: 132 MMHG | DIASTOLIC BLOOD PRESSURE: 72 MMHG

## 2025-07-07 VITALS
RESPIRATION RATE: 18 BRPM | OXYGEN SATURATION: 98 % | HEART RATE: 71 BPM | TEMPERATURE: 98.3 F | SYSTOLIC BLOOD PRESSURE: 130 MMHG | DIASTOLIC BLOOD PRESSURE: 70 MMHG

## 2025-07-07 PROCEDURE — G0157 HHC PT ASSISTANT EA 15: HCPCS | Mod: CQ,HHH

## 2025-07-07 PROCEDURE — G0158 HHC OT ASSISTANT EA 15: HCPCS | Mod: CO,HHH

## 2025-07-07 ASSESSMENT — ENCOUNTER SYMPTOMS
PAIN LOCATION - RELIEVING FACTORS: REST
PERSON REPORTING PAIN: PATIENT
LOWEST PAIN SEVERITY IN PAST 24 HOURS: 5/10
HIGHEST PAIN SEVERITY IN PAST 24 HOURS: 7/10
PAIN: 1
PAIN LOCATION - EXACERBATING FACTORS: MOVEMENT
PERSON REPORTING PAIN: PATIENT
PAIN LOCATION - PAIN QUALITY: ACHING
PAIN LOCATION - PAIN SEVERITY: 3/10
PAIN LOCATION - PAIN FREQUENCY: WITH ACTIVITY
PAIN SEVERITY GOAL: 0/10
PAIN LOCATION - PAIN DURATION: POST SURGERY
PAIN LOCATION: RIGHT SHOULDER
SUBJECTIVE PAIN PROGRESSION: UNCHANGED
PAIN LOCATION - RELIEVING FACTORS: REST, REPOSITIONING
PAIN LOCATION - PAIN QUALITY: SORE, ACHE
PAIN: 1
HIGHEST PAIN SEVERITY IN PAST 24 HOURS: 7/10
PAIN LOCATION - PAIN SEVERITY: 7/10
LOWEST PAIN SEVERITY IN PAST 24 HOURS: 3/10
PAIN LOCATION: BACK
PAIN SEVERITY GOAL: 0/10
SUBJECTIVE PAIN PROGRESSION: WAXING AND WANING
PAIN LOCATION - EXACERBATING FACTORS: STANDING, WALKING
PAIN LOCATION - PAIN FREQUENCY: CONSTANT

## 2025-07-09 ENCOUNTER — HOME CARE VISIT (OUTPATIENT)
Dept: HOME HEALTH SERVICES | Facility: HOME HEALTH | Age: 85
End: 2025-07-09
Payer: MEDICARE

## 2025-07-09 VITALS
SYSTOLIC BLOOD PRESSURE: 140 MMHG | DIASTOLIC BLOOD PRESSURE: 60 MMHG | OXYGEN SATURATION: 97 % | TEMPERATURE: 98.3 F | RESPIRATION RATE: 18 BRPM | HEART RATE: 78 BPM

## 2025-07-09 VITALS
DIASTOLIC BLOOD PRESSURE: 70 MMHG | SYSTOLIC BLOOD PRESSURE: 140 MMHG | RESPIRATION RATE: 18 BRPM | OXYGEN SATURATION: 97 % | TEMPERATURE: 98.3 F | HEART RATE: 78 BPM

## 2025-07-09 PROCEDURE — G0158 HHC OT ASSISTANT EA 15: HCPCS | Mod: CO,HHH

## 2025-07-09 PROCEDURE — G0157 HHC PT ASSISTANT EA 15: HCPCS | Mod: CQ,HHH

## 2025-07-09 ASSESSMENT — ENCOUNTER SYMPTOMS
PAIN LOCATION - EXACERBATING FACTORS: POSITIONING
PAIN LOCATION - PAIN SEVERITY: 0/10
PAIN: 1
PAIN LOCATION - PAIN FREQUENCY: WITH ACTIVITY
PAIN SEVERITY GOAL: 0/10
SUBJECTIVE PAIN PROGRESSION: UNCHANGED
PAIN LOCATION - RELIEVING FACTORS: REST, REPOSITIONING
PERSON REPORTING PAIN: PATIENT
SUBJECTIVE PAIN PROGRESSION: GRADUALLY IMPROVING
PAIN LOCATION - RELIEVING FACTORS: REST
LOWEST PAIN SEVERITY IN PAST 24 HOURS: 0/10
PAIN LOCATION - PAIN SEVERITY: 8/10
HIGHEST PAIN SEVERITY IN PAST 24 HOURS: 9/10
PAIN LOCATION - PAIN QUALITY: ACHING
PAIN SEVERITY GOAL: 0/10
PAIN LOCATION - PAIN FREQUENCY: CONSTANT
LOWEST PAIN SEVERITY IN PAST 24 HOURS: 8/10
HIGHEST PAIN SEVERITY IN PAST 24 HOURS: 7/10
PAIN LOCATION: BACK
PAIN: 1
PAIN LOCATION: RIGHT SHOULDER
PAIN LOCATION - EXACERBATING FACTORS: EXERCISES
PERSON REPORTING PAIN: PATIENT

## 2025-07-10 ENCOUNTER — APPOINTMENT (OUTPATIENT)
Dept: PRIMARY CARE | Facility: CLINIC | Age: 85
End: 2025-07-10
Payer: COMMERCIAL

## 2025-07-14 ENCOUNTER — HOME CARE VISIT (OUTPATIENT)
Dept: HOME HEALTH SERVICES | Facility: HOME HEALTH | Age: 85
End: 2025-07-14
Payer: MEDICARE

## 2025-07-14 VITALS
OXYGEN SATURATION: 98 % | HEART RATE: 80 BPM | RESPIRATION RATE: 18 BRPM | DIASTOLIC BLOOD PRESSURE: 62 MMHG | SYSTOLIC BLOOD PRESSURE: 138 MMHG | TEMPERATURE: 98 F

## 2025-07-14 PROCEDURE — G0158 HHC OT ASSISTANT EA 15: HCPCS | Mod: CO,HHH

## 2025-07-14 ASSESSMENT — ENCOUNTER SYMPTOMS
PAIN LOCATION - PAIN DURATION: 2 DAYS
PAIN LOCATION - EXACERBATING FACTORS: MOVEMENT
PAIN: 1
PAIN LOCATION - PAIN SEVERITY: 10/10
HIGHEST PAIN SEVERITY IN PAST 24 HOURS: 10/10
SUBJECTIVE PAIN PROGRESSION: UNCHANGED
PAIN SEVERITY GOAL: 0/10
PAIN LOCATION - PAIN FREQUENCY: FREQUENT
PAIN LOCATION - PAIN QUALITY: BURNING
LOWEST PAIN SEVERITY IN PAST 24 HOURS: 6/10
PAIN LOCATION - RELIEVING FACTORS: REST
PERSON REPORTING PAIN: PATIENT
PAIN LOCATION: RIGHT SHOULDER

## 2025-07-15 ENCOUNTER — HOME CARE VISIT (OUTPATIENT)
Dept: HOME HEALTH SERVICES | Facility: HOME HEALTH | Age: 85
End: 2025-07-15
Payer: MEDICARE

## 2025-07-15 PROCEDURE — G0151 HHCP-SERV OF PT,EA 15 MIN: HCPCS | Mod: HHH

## 2025-07-15 ASSESSMENT — ENCOUNTER SYMPTOMS
PAIN SEVERITY GOAL: 0/10
PAIN: 1
PERSON REPORTING PAIN: PATIENT
HIGHEST PAIN SEVERITY IN PAST 24 HOURS: 10/10
SUBJECTIVE PAIN PROGRESSION: UNCHANGED
LOWEST PAIN SEVERITY IN PAST 24 HOURS: 5/10

## 2025-07-15 ASSESSMENT — ACTIVITIES OF DAILY LIVING (ADL): AMBULATION ASSISTANCE ON FLAT SURFACES: 1

## 2025-07-15 NOTE — CASE COMMUNICATION
Patient discharged from PT services at this time. Pt instructed on s/s to report to physician, instructed to follow up with dr as directed and continue with HEP as directed. Pt is independent with gait, and HEP at the time of discharge. pt will continue independently with HEP until the follow up with physician. pt continues with OT at this time     pt reporting pain in left shoulder and states he has no pain medicine currently. contacte [facility] to notify them of patients report. incision shows no s/s of infection, VSS at the time of dc, and pt denies any falls.

## 2025-07-17 ENCOUNTER — HOME CARE VISIT (OUTPATIENT)
Dept: HOME HEALTH SERVICES | Facility: HOME HEALTH | Age: 85
End: 2025-07-17
Payer: MEDICARE

## 2025-07-17 VITALS
SYSTOLIC BLOOD PRESSURE: 140 MMHG | RESPIRATION RATE: 18 BRPM | DIASTOLIC BLOOD PRESSURE: 70 MMHG | HEART RATE: 82 BPM | TEMPERATURE: 97.8 F | OXYGEN SATURATION: 97 %

## 2025-07-17 PROCEDURE — G0158 HHC OT ASSISTANT EA 15: HCPCS | Mod: CO,HHH

## 2025-07-17 ASSESSMENT — ENCOUNTER SYMPTOMS
PAIN LOCATION - PAIN QUALITY: ACHING
HIGHEST PAIN SEVERITY IN PAST 24 HOURS: 10/10
PAIN LOCATION - PAIN QUALITY: SHARP
PAIN: 1
PAIN LOCATION: BACK
PAIN LOCATION: RIGHT SHOULDER
PAIN LOCATION - PAIN SEVERITY: 10/10
PAIN LOCATION - PAIN SEVERITY: 10/10
PAIN LOCATION - PAIN DURATION: CHRONIC
PERSON REPORTING PAIN: PATIENT

## 2025-07-18 ENCOUNTER — APPOINTMENT (OUTPATIENT)
Dept: RADIOLOGY | Facility: HOSPITAL | Age: 85
End: 2025-07-18
Payer: MEDICARE

## 2025-07-18 ENCOUNTER — HOSPITAL ENCOUNTER (EMERGENCY)
Facility: HOSPITAL | Age: 85
Discharge: HOME | End: 2025-07-18
Attending: EMERGENCY MEDICINE
Payer: MEDICARE

## 2025-07-18 VITALS
SYSTOLIC BLOOD PRESSURE: 148 MMHG | OXYGEN SATURATION: 98 % | TEMPERATURE: 98.1 F | HEART RATE: 72 BPM | WEIGHT: 177 LBS | BODY MASS INDEX: 24.78 KG/M2 | DIASTOLIC BLOOD PRESSURE: 74 MMHG | RESPIRATION RATE: 17 BRPM | HEIGHT: 71 IN

## 2025-07-18 DIAGNOSIS — K59.00 CONSTIPATION, UNSPECIFIED CONSTIPATION TYPE: Primary | ICD-10-CM

## 2025-07-18 LAB
ALBUMIN SERPL BCP-MCNC: 4.2 G/DL (ref 3.4–5)
ALP SERPL-CCNC: 67 U/L (ref 33–136)
ALT SERPL W P-5'-P-CCNC: 8 U/L (ref 10–52)
ANION GAP SERPL CALC-SCNC: 13 MMOL/L (ref 10–20)
AST SERPL W P-5'-P-CCNC: 14 U/L (ref 9–39)
BASOPHILS # BLD AUTO: 0.02 X10*3/UL (ref 0–0.1)
BASOPHILS NFR BLD AUTO: 0.2 %
BILIRUB SERPL-MCNC: 0.8 MG/DL (ref 0–1.2)
BUN SERPL-MCNC: 18 MG/DL (ref 6–23)
CALCIUM SERPL-MCNC: 9.5 MG/DL (ref 8.6–10.3)
CHLORIDE SERPL-SCNC: 100 MMOL/L (ref 98–107)
CO2 SERPL-SCNC: 27 MMOL/L (ref 21–32)
CREAT SERPL-MCNC: 0.92 MG/DL (ref 0.5–1.3)
EGFRCR SERPLBLD CKD-EPI 2021: 82 ML/MIN/1.73M*2
EOSINOPHIL # BLD AUTO: 0.04 X10*3/UL (ref 0–0.4)
EOSINOPHIL NFR BLD AUTO: 0.4 %
ERYTHROCYTE [DISTWIDTH] IN BLOOD BY AUTOMATED COUNT: 12.8 % (ref 11.5–14.5)
GLUCOSE SERPL-MCNC: 123 MG/DL (ref 74–99)
HCT VFR BLD AUTO: 40.6 % (ref 41–52)
HGB BLD-MCNC: 13.6 G/DL (ref 13.5–17.5)
IMM GRANULOCYTES # BLD AUTO: 0.04 X10*3/UL (ref 0–0.5)
IMM GRANULOCYTES NFR BLD AUTO: 0.4 % (ref 0–0.9)
LYMPHOCYTES # BLD AUTO: 1 X10*3/UL (ref 0.8–3)
LYMPHOCYTES NFR BLD AUTO: 10 %
MAGNESIUM SERPL-MCNC: 2.15 MG/DL (ref 1.6–2.4)
MCH RBC QN AUTO: 30.8 PG (ref 26–34)
MCHC RBC AUTO-ENTMCNC: 33.5 G/DL (ref 32–36)
MCV RBC AUTO: 92 FL (ref 80–100)
MONOCYTES # BLD AUTO: 0.65 X10*3/UL (ref 0.05–0.8)
MONOCYTES NFR BLD AUTO: 6.5 %
NEUTROPHILS # BLD AUTO: 8.25 X10*3/UL (ref 1.6–5.5)
NEUTROPHILS NFR BLD AUTO: 82.5 %
NRBC BLD-RTO: 0 /100 WBCS (ref 0–0)
PLATELET # BLD AUTO: 184 X10*3/UL (ref 150–450)
POTASSIUM SERPL-SCNC: 3.8 MMOL/L (ref 3.5–5.3)
PROT SERPL-MCNC: 7.3 G/DL (ref 6.4–8.2)
RBC # BLD AUTO: 4.41 X10*6/UL (ref 4.5–5.9)
SODIUM SERPL-SCNC: 136 MMOL/L (ref 136–145)
WBC # BLD AUTO: 10 X10*3/UL (ref 4.4–11.3)

## 2025-07-18 PROCEDURE — 74176 CT ABD & PELVIS W/O CONTRAST: CPT

## 2025-07-18 PROCEDURE — 96361 HYDRATE IV INFUSION ADD-ON: CPT

## 2025-07-18 PROCEDURE — 96375 TX/PRO/DX INJ NEW DRUG ADDON: CPT

## 2025-07-18 PROCEDURE — 85025 COMPLETE CBC W/AUTO DIFF WBC: CPT | Performed by: EMERGENCY MEDICINE

## 2025-07-18 PROCEDURE — 99284 EMERGENCY DEPT VISIT MOD MDM: CPT | Mod: 25 | Performed by: EMERGENCY MEDICINE

## 2025-07-18 PROCEDURE — 80053 COMPREHEN METABOLIC PANEL: CPT | Performed by: EMERGENCY MEDICINE

## 2025-07-18 PROCEDURE — 36415 COLL VENOUS BLD VENIPUNCTURE: CPT | Performed by: EMERGENCY MEDICINE

## 2025-07-18 PROCEDURE — 83735 ASSAY OF MAGNESIUM: CPT | Performed by: EMERGENCY MEDICINE

## 2025-07-18 PROCEDURE — 96374 THER/PROPH/DIAG INJ IV PUSH: CPT

## 2025-07-18 PROCEDURE — 2500000004 HC RX 250 GENERAL PHARMACY W/ HCPCS (ALT 636 FOR OP/ED): Mod: JZ,TB | Performed by: EMERGENCY MEDICINE

## 2025-07-18 PROCEDURE — 74176 CT ABD & PELVIS W/O CONTRAST: CPT | Performed by: RADIOLOGY

## 2025-07-18 RX ORDER — ONDANSETRON HYDROCHLORIDE 2 MG/ML
4 INJECTION, SOLUTION INTRAVENOUS ONCE
Status: COMPLETED | OUTPATIENT
Start: 2025-07-18 | End: 2025-07-18

## 2025-07-18 RX ORDER — KETOROLAC TROMETHAMINE 15 MG/ML
15 INJECTION, SOLUTION INTRAMUSCULAR; INTRAVENOUS ONCE
Status: COMPLETED | OUTPATIENT
Start: 2025-07-18 | End: 2025-07-18

## 2025-07-18 RX ADMIN — KETOROLAC TROMETHAMINE 15 MG: 15 INJECTION, SOLUTION INTRAMUSCULAR; INTRAVENOUS at 15:40

## 2025-07-18 RX ADMIN — ONDANSETRON 4 MG: 2 INJECTION INTRAMUSCULAR; INTRAVENOUS at 15:40

## 2025-07-18 RX ADMIN — SODIUM CHLORIDE, POTASSIUM CHLORIDE, SODIUM LACTATE AND CALCIUM CHLORIDE 1000 ML: 600; 310; 30; 20 INJECTION, SOLUTION INTRAVENOUS at 15:44

## 2025-07-18 ASSESSMENT — ENCOUNTER SYMPTOMS
PAIN LOCATION - RELIEVING FACTORS: REST MEDICATION
PAIN LOCATION - EXACERBATING FACTORS: MOVEMENT
PAIN LOCATION - RELIEVING FACTORS: REST
PAIN LOCATION - PAIN FREQUENCY: CONSTANT
PAIN LOCATION - PAIN FREQUENCY: WITH ACTIVITY
PAIN LOCATION - EXACERBATING FACTORS: ACTIVITY

## 2025-07-18 ASSESSMENT — PAIN SCALES - GENERAL
PAINLEVEL_OUTOF10: 0 - NO PAIN
PAINLEVEL_OUTOF10: 8

## 2025-07-18 ASSESSMENT — PAIN DESCRIPTION - PAIN TYPE: TYPE: ACUTE PAIN

## 2025-07-18 ASSESSMENT — PAIN - FUNCTIONAL ASSESSMENT
PAIN_FUNCTIONAL_ASSESSMENT: 0-10
PAIN_FUNCTIONAL_ASSESSMENT: 0-10

## 2025-07-18 ASSESSMENT — PAIN DESCRIPTION - LOCATION: LOCATION: ABDOMEN

## 2025-07-18 NOTE — ED PROVIDER NOTES
HPI   Chief Complaint   Patient presents with    Abdominal Pain       HPI        Patient History   Medical History[1]  Surgical History[2]  Family History[3]  Social History[4]    Physical Exam   ED Triage Vitals [07/18/25 1411]   Temperature Heart Rate Respirations BP   36.5 °C (97.7 °F) 69 16 151/71      Pulse Ox Temp Source Heart Rate Source Patient Position   97 % Temporal -- --      BP Location FiO2 (%)     -- --       Physical Exam  Constitutional:       General: He is not in acute distress.     Appearance: Normal appearance. He is not toxic-appearing.   HENT:      Head: Normocephalic and atraumatic.      Right Ear: Tympanic membrane normal.      Left Ear: Tympanic membrane normal.      Mouth/Throat:      Mouth: Mucous membranes are moist.      Pharynx: Oropharynx is clear.     Eyes:      Conjunctiva/sclera: Conjunctivae normal.      Pupils: Pupils are equal, round, and reactive to light.       Cardiovascular:      Rate and Rhythm: Normal rate and regular rhythm.      Pulses: Normal pulses.      Heart sounds: Normal heart sounds.   Pulmonary:      Effort: Pulmonary effort is normal. No respiratory distress.      Breath sounds: Normal breath sounds. No wheezing.   Abdominal:      General: Bowel sounds are normal.      Palpations: Abdomen is soft.      Tenderness: There is no abdominal tenderness. There is no guarding or rebound.     Musculoskeletal:         General: Normal range of motion.      Cervical back: Normal range of motion.     Skin:     General: Skin is warm and dry.     Neurological:      General: No focal deficit present.      Mental Status: He is alert and oriented to person, place, and time.           ED Course & MDM   Diagnoses as of 07/18/25 1835   Constipation, unspecified constipation type                 No data recorded     [place] Coma Scale Score: 15 (07/18/25 1548 : Vivi Bryson LPN)       NIH Stroke Scale: 0 (07/18/25 1548 : Vivi Bryson LPN)                   Medical Decision  Making  84-year-old male presents to the ER with chief complaint of abdominal pain.  No other complaints overall is well-appearing minimal abdominal tenderness but due to his age to obtain the CAT scan.  CAT scan does show large amount of fecal matter in the vault.  Patient did have soapsuds enema which he feels much better.  Patient reports he feels great patient has no other complaints at this time.  Workup did not show any emergent findings patient discharged home        Procedure  Procedures       [1]   Past Medical History:  Diagnosis Date    Allergic dermatitis 09/14/2023    Allergic rhinitis due to pollen 10/23/2014    Hay fever    Arthritis     Chronic pain disorder     Clotting disorder (Multi)     dvt april 2020     P.E. april 2020    COPD (chronic obstructive pulmonary disease) (Multi)     hx asbestos    Coronary artery disease     Encounter for other preprocedural examination 06/24/2014    Pre-procedural examination    Encounter for screening for malignant neoplasm of prostate     Encounter for screening for malignant neoplasm of prostate    Fissure in skin of foot 09/14/2023    Hyperlipidemia     Hypertension     Localized edema 05/11/2020    Pedal edema    Lung infiltrate 09/14/2023    Myocardial infarction (Multi)     Other conditions influencing health status     Carcinoma Of The Tongue    Pain in unspecified knee 12/22/2014    Joint pain, knee    Personal history of diseases of the skin and subcutaneous tissue 04/23/2013    History of eczema    Personal history of other diseases of the musculoskeletal system and connective tissue 06/19/2014    Personal history of arthritis    Personal history of other diseases of the nervous system and sense organs 08/10/2021    History of Bell's palsy    Personal history of other diseases of the respiratory system 11/12/2014    History of asbestosis    Personal history of other diseases of the respiratory system 08/13/2014    History of chronic obstructive lung  disease    Personal history of other specified conditions 2013    History of edema    Plantar fascial fibromatosis 2013    Plantar fasciitis    Polyp of colon     Polyp of sigmoid colon    Syncope and collapse 2015    Syncope and collapse    Unspecified abdominal pain 2014    Stomach pain    Unspecified disorder of eyelid 10/23/2014    Eyelid disorder   [2]   Past Surgical History:  Procedure Laterality Date    CARDIAC CATHETERIZATION      CHOLECYSTECTOMY  2013    Cholecystectomy Laparoscopic    HIP ARTHROPLASTY Right     OTHER SURGICAL HISTORY  2021    Meniscus repair    OTHER SURGICAL HISTORY Left 2023    Hip replacement    OTHER SURGICAL HISTORY  2013    Biopsy Tongue    SHOULDER SURGERY  2013    Shoulder Surgery    TOTAL KNEE ARTHROPLASTY Right 2024   [3]   Family History  Problem Relation Name Age of Onset    Hypothyroidism Brother      Diabetes Brother      Myasthenia gravis Brother     [4]   Social History  Tobacco Use    Smoking status: Former     Current packs/day: 0.00     Types: Cigarettes     Quit date:      Years since quittin.5    Smokeless tobacco: Never   Vaping Use    Vaping status: Never Used   Substance Use Topics    Alcohol use: Never    Drug use: Not Currently     Comment: smokes 1-2 x's per week        Garrett Al, DO  25 1835

## 2025-07-22 ENCOUNTER — HOME CARE VISIT (OUTPATIENT)
Dept: HOME HEALTH SERVICES | Facility: HOME HEALTH | Age: 85
End: 2025-07-22
Payer: MEDICARE

## 2025-07-22 PROCEDURE — G0152 HHCP-SERV OF OT,EA 15 MIN: HCPCS | Mod: HHH

## 2025-07-22 ASSESSMENT — ENCOUNTER SYMPTOMS
PAIN LOCATION - PAIN FREQUENCY: CONSTANT
PAIN LOCATION - RELIEVING FACTORS: REST, PAIN MEDICATION
PAIN LOCATION: RIGHT SHOULDER
PAIN LOCATION - PAIN SEVERITY: 9/10
LOWEST PAIN SEVERITY IN PAST 24 HOURS: 8/10
HIGHEST PAIN SEVERITY IN PAST 24 HOURS: 9/10
PAIN: 1
SUBJECTIVE PAIN PROGRESSION: GRADUALLY WORSENING
PERSON REPORTING PAIN: PATIENT

## 2025-07-22 ASSESSMENT — ACTIVITIES OF DAILY LIVING (ADL)
TOILETING: 1
GROOMING_CURRENT_FUNCTION: INDEPENDENT
BATHING ASSESSED: 1
GROOMING ASSESSED: 1
PHYSICAL TRANSFERS ASSESSED: 1
TOILETING: INDEPENDENT
DRESSING_LB_CURRENT_FUNCTION: INDEPENDENT
DRESSING_UB_CURRENT_FUNCTION: INDEPENDENT
PREPARING MEALS: INDEPENDENT
BATHING_CURRENT_FUNCTION: SUPERVISION
CURRENT_FUNCTION: INDEPENDENT

## 2025-07-23 ENCOUNTER — PATIENT OUTREACH (OUTPATIENT)
Dept: PRIMARY CARE | Facility: CLINIC | Age: 85
End: 2025-07-23
Payer: COMMERCIAL

## 2025-07-23 DIAGNOSIS — Z96.611 STATUS POST REVERSE TOTAL SHOULDER REPLACEMENT, RIGHT: Primary | ICD-10-CM

## 2025-07-23 RX ORDER — TRAMADOL HYDROCHLORIDE 50 MG/1
50 TABLET, FILM COATED ORAL EVERY 8 HOURS PRN
Qty: 15 TABLET | Refills: 0 | Status: SHIPPED | OUTPATIENT
Start: 2025-07-23 | End: 2025-07-28

## 2025-07-23 NOTE — PROGRESS NOTES
Unable to reach patient or daughter for monthly post discharge follow up. Left voicemail with call back number for patient's daughter to call if needed.

## 2025-07-25 ENCOUNTER — APPOINTMENT (OUTPATIENT)
Dept: ORTHOPEDIC SURGERY | Facility: CLINIC | Age: 85
End: 2025-07-25
Payer: MEDICARE

## 2025-07-25 ENCOUNTER — HOSPITAL ENCOUNTER (OUTPATIENT)
Dept: RADIOLOGY | Facility: CLINIC | Age: 85
Discharge: HOME | End: 2025-07-25
Payer: COMMERCIAL

## 2025-07-25 VITALS — HEIGHT: 71 IN | WEIGHT: 177 LBS | BODY MASS INDEX: 24.78 KG/M2

## 2025-07-25 DIAGNOSIS — Z96.611 STATUS POST REVERSE TOTAL SHOULDER REPLACEMENT, RIGHT: ICD-10-CM

## 2025-07-25 DIAGNOSIS — M25.511 RIGHT SHOULDER PAIN, UNSPECIFIED CHRONICITY: ICD-10-CM

## 2025-07-25 DIAGNOSIS — M54.12 CERVICAL RADICULOPATHY: Primary | ICD-10-CM

## 2025-07-25 PROCEDURE — 99024 POSTOP FOLLOW-UP VISIT: CPT

## 2025-07-25 PROCEDURE — 1159F MED LIST DOCD IN RCRD: CPT

## 2025-07-25 PROCEDURE — 1160F RVW MEDS BY RX/DR IN RCRD: CPT

## 2025-07-25 PROCEDURE — 73030 X-RAY EXAM OF SHOULDER: CPT | Mod: RT

## 2025-07-25 RX ORDER — METHYLPREDNISOLONE 4 MG/1
4 TABLET ORAL ONCE
Qty: 21 TABLET | Refills: 0 | Status: SHIPPED | OUTPATIENT
Start: 2025-07-25 | End: 2025-07-25

## 2025-07-25 ASSESSMENT — PAIN - FUNCTIONAL ASSESSMENT: PAIN_FUNCTIONAL_ASSESSMENT: NO/DENIES PAIN

## 2025-07-25 NOTE — PROGRESS NOTES
History of Present Illness  No chief complaint on file.      84 y.o. male is 7 weeks status post right reverse total shoulder arthroplasty with biceps tenodesis.  Patient reports that he was doing well until 1 week ago.  He started noting sharp pain over the lateral aspect of his arm that radiates all the way down to his hand.  He states that it always radiates to his hand.  He does have a chronic neck issue from a previous injury.  He states that the pain is never just local to the shoulder.  He denies any injury or fall that brought it on.  Since then he has been having difficulty doing physical therapy due to his severe pain that is becoming more constant.  He has been taking tramadol as needed for pain.  He denies any numbness.    Review of Systems   GENERAL: Negative for malaise, significant weight loss, fever, chills  MUSCULOSKELETAL: see HPI  NEURO:  Negative    Exam  Right shoulder demonstrates no concerns with incision.  No fever or chills that he reports.  Well-healed incision.  There is no erythema or signs of infection.  Patient has no tenderness palpation over the sternoclavicular joint or AC joint today.  He has diffuse tenderness palpation of the shoulder that causes radiating symptoms down to his hand.  He has severe tenderness to palpation along the C-spine.  He has significant pain with range of motion of the cervical spine.  Positive Spurling's.  Patient can adduct and forward flex to approximately 90 degrees but has quite a bit of pain with that.  Elbow and wrist range of motion are nonirritable.  Radial pulses 2+. SILT. UE is NVI.    Imaging:  X-rays of the patient were ordered by Ingrid Rucker PA-C and obtained today.  Ingrid Rucker PA-C personally reviewed the results of the x-rays.    In addition, Ingrid Rucker PA-C independently interpreted the patient's x-rays (performed by the Radiology department) by viewing the x-ray images and this is Ingrid Rucker PA-C's personal interpretation:        X-rays of the right shoulder demonstrate well-positioned right reverse total shoulder arthroplasty without any acute fractures or dislocation.  Assessment  Patient is 7 weeks status post right reverse total shoulder arthroplasty with biceps tenodesis.  Patient has had exacerbation of cervical radiculopathy symptoms.    Plan  Discussed management with patient today.  At this time discussed with him that his symptoms do not appear to be related to his shoulder replacement.  He has no concerns on his x-rays today.  Patient can continue to take tramadol as needed for pain.  In addition patient was sent a prescription for Medrol Dosepak.  He should continue with physical therapy and participate as tolerable.  Patient does see Dr. Elizabeth for his neck and back.  I would recommend that he follow-up with Dr. Elizabeth as he for possible injection into the cervical spine to treat his cervical radiculopathy symptoms that he is experienced today.  I will plan on following up with the patient in 6 weeks for recheck of his right shoulder.

## 2025-07-28 ENCOUNTER — HOME CARE VISIT (OUTPATIENT)
Dept: HOME HEALTH SERVICES | Facility: HOME HEALTH | Age: 85
End: 2025-07-28
Payer: MEDICARE

## 2025-07-28 PROCEDURE — G0152 HHCP-SERV OF OT,EA 15 MIN: HCPCS | Mod: HHH

## 2025-07-28 ASSESSMENT — ENCOUNTER SYMPTOMS
PAIN SEVERITY GOAL: 0/10
PERSON REPORTING PAIN: PATIENT
HIGHEST PAIN SEVERITY IN PAST 24 HOURS: 10/10
PAIN LOCATION - RELIEVING FACTORS: PAIN MEDS
PAIN LOCATION: NECK
PAIN LOCATION - PAIN FREQUENCY: CONSTANT
LOWEST PAIN SEVERITY IN PAST 24 HOURS: 7/10
PAIN: 1
PAIN LOCATION - PAIN QUALITY: ACHY
SUBJECTIVE PAIN PROGRESSION: RAPIDLY WORSENING
PAIN LOCATION - PAIN SEVERITY: 9/10

## 2025-07-30 ENCOUNTER — HOME CARE VISIT (OUTPATIENT)
Dept: HOME HEALTH SERVICES | Facility: HOME HEALTH | Age: 85
End: 2025-07-30
Payer: MEDICARE

## 2025-07-30 PROCEDURE — G0152 HHCP-SERV OF OT,EA 15 MIN: HCPCS | Mod: HHH

## 2025-07-30 ASSESSMENT — ENCOUNTER SYMPTOMS
PAIN LOCATION: RIGHT SHOULDER
LOWEST PAIN SEVERITY IN PAST 24 HOURS: 7/10
PAIN LOCATION - PAIN FREQUENCY: CONSTANT
PAIN LOCATION - RELIEVING FACTORS: REST
PAIN LOCATION - PAIN SEVERITY: 8/10
HIGHEST PAIN SEVERITY IN PAST 24 HOURS: 10/10
SUBJECTIVE PAIN PROGRESSION: UNCHANGED
PAIN LOCATION - PAIN QUALITY: ACHY
PERSON REPORTING PAIN: PATIENT
PAIN SEVERITY GOAL: 0/10
PAIN: 1
PAIN LOCATION - EXACERBATING FACTORS: CERVICAL RADICULOPATHY

## 2025-08-04 DIAGNOSIS — I10 BENIGN ESSENTIAL HYPERTENSION: Primary | ICD-10-CM

## 2025-08-04 RX ORDER — IRBESARTAN 150 MG/1
150 TABLET ORAL DAILY
Qty: 90 TABLET | Refills: 3 | Status: SHIPPED | OUTPATIENT
Start: 2025-08-04

## 2025-08-05 ENCOUNTER — HOME CARE VISIT (OUTPATIENT)
Dept: HOME HEALTH SERVICES | Facility: HOME HEALTH | Age: 85
End: 2025-08-05
Payer: MEDICARE

## 2025-08-05 VITALS
SYSTOLIC BLOOD PRESSURE: 90 MMHG | TEMPERATURE: 97.7 F | OXYGEN SATURATION: 96 % | DIASTOLIC BLOOD PRESSURE: 60 MMHG | HEART RATE: 82 BPM

## 2025-08-05 PROCEDURE — G0152 HHCP-SERV OF OT,EA 15 MIN: HCPCS | Mod: HHH

## 2025-08-05 ASSESSMENT — ENCOUNTER SYMPTOMS
PAIN LOCATION: NECK
LOWEST PAIN SEVERITY IN PAST 24 HOURS: 6/10
HIGHEST PAIN SEVERITY IN PAST 24 HOURS: 10/10
PERSON REPORTING PAIN: PATIENT
PAIN LOCATION - PAIN FREQUENCY: CONSTANT
PAIN: 1
SUBJECTIVE PAIN PROGRESSION: UNCHANGED
PAIN SEVERITY GOAL: 0/10
PAIN LOCATION - PAIN SEVERITY: 9/10
PAIN LOCATION - RELIEVING FACTORS: REST, PAIN MEDS
PAIN LOCATION - EXACERBATING FACTORS: RADICULOPATHY

## 2025-08-05 ASSESSMENT — ACTIVITIES OF DAILY LIVING (ADL)
HOME_HEALTH_OASIS: 00
OASIS_M1830: 01

## 2025-08-06 ENCOUNTER — DOCUMENTATION (OUTPATIENT)
Dept: PHYSICAL THERAPY | Facility: HOSPITAL | Age: 85
End: 2025-08-06
Payer: COMMERCIAL

## 2025-08-06 NOTE — PROGRESS NOTES
Physical Therapy  Discharge Summary    Name: Abraham Rodriguez  MRN: [id number]  : 1940  Date of DC: 25  Date of initial evaluation: 2025    Functional Status at Discharge: See last note for most recent functional status    Rehab Discharge Reason: Failed to schedule and/or keep follow-up appointment(s)    Discharge Plan: Unable to provide due to non-compliance    Was this episode resolved in Epic: Yes

## 2025-08-15 ENCOUNTER — APPOINTMENT (OUTPATIENT)
Dept: ORTHOPEDIC SURGERY | Facility: CLINIC | Age: 85
End: 2025-08-15
Payer: COMMERCIAL

## 2025-09-04 ENCOUNTER — APPOINTMENT (OUTPATIENT)
Dept: ORTHOPEDIC SURGERY | Facility: CLINIC | Age: 85
End: 2025-09-04
Payer: COMMERCIAL

## 2025-09-05 ENCOUNTER — HOSPITAL ENCOUNTER (OUTPATIENT)
Dept: RADIOLOGY | Facility: CLINIC | Age: 85
Discharge: HOME | End: 2025-09-05
Payer: MEDICARE

## 2025-09-05 ENCOUNTER — APPOINTMENT (OUTPATIENT)
Dept: ORTHOPEDIC SURGERY | Facility: CLINIC | Age: 85
End: 2025-09-05
Payer: COMMERCIAL

## 2025-09-05 DIAGNOSIS — M25.511 RIGHT SHOULDER PAIN, UNSPECIFIED CHRONICITY: ICD-10-CM

## 2025-09-05 PROCEDURE — 73030 X-RAY EXAM OF SHOULDER: CPT | Mod: RT

## 2025-09-05 ASSESSMENT — PAIN - FUNCTIONAL ASSESSMENT: PAIN_FUNCTIONAL_ASSESSMENT: NO/DENIES PAIN

## 2025-10-02 ENCOUNTER — APPOINTMENT (OUTPATIENT)
Dept: PRIMARY CARE | Facility: CLINIC | Age: 85
End: 2025-10-02
Payer: COMMERCIAL

## 2025-10-31 ENCOUNTER — APPOINTMENT (OUTPATIENT)
Dept: ORTHOPEDIC SURGERY | Facility: CLINIC | Age: 85
End: 2025-10-31
Payer: COMMERCIAL

## (undated) DEVICE — BLADE, SAGITTAL DUAL CUT, 25 X 90 X 1.27

## (undated) DEVICE — STOPCOCK, SAN ANTONIO, W/MODIFIED FITTING

## (undated) DEVICE — IRRIGATION KIT, PUMPING, SINGLE ACTION, SYRINGE, 10 CC

## (undated) DEVICE — CATHETER, URETERAL, POLLACK, OPEN END, 5.5 FR, 70 CM

## (undated) DEVICE — HEMOSTAT, ARISTA, ABSORBABLE, 3 GRAMS

## (undated) DEVICE — SOLUTION, IRRIGATION, USP, SODIUM CHLORIDE 0.9%, 3000 ML

## (undated) DEVICE — DRAPE, INCISE, ANTIMICROBIAL, IOBAN 2, LARGE, 17 X 23 IN, DISPOSABLE, STERILE

## (undated) DEVICE — ADHESIVE, SKIN, LIQUIBAND EXCEED

## (undated) DEVICE — DRAPE KIT, RIO

## (undated) DEVICE — CHECKPOINT KIT, FEMORAL/ TIBIAL

## (undated) DEVICE — PIN, BONE, 4MM X 140MM, STERILE

## (undated) DEVICE — GLOVE, SURGICAL, PROTEXIS NEOPRENE, 8.5, PF, LF

## (undated) DEVICE — ELECTRODE, ELECTROSURGICAL, BLADE, EXTENDED

## (undated) DEVICE — IRRIGATION SYSTEM, WOUND, PULSAVAC PLUS

## (undated) DEVICE — GLOVE, PROTEXIS PI CLASSIC, SZ-7.5, PF, LF

## (undated) DEVICE — STOCKINETTE, IMPERVIOUS, 9 X 48 IN, STERILE

## (undated) DEVICE — CATHETER TRAY, SURESTEP, 14FR, PRECONNECTED DRAIN BAG

## (undated) DEVICE — DRAPE PACK, TOTAL KNEE, CUSTOM, GEAUGA

## (undated) DEVICE — BLADE, OSCILLATOR 19.5 X 86 X 1.27MM

## (undated) DEVICE — DRESSING, GAUZE, SPONGE, VERSALON, ALL PURPOSE, 4 X 4 IN, SOFT

## (undated) DEVICE — DRAPE, SHEET, U, W/ADHESIVE STRIP, IMPERVIOUS, 60 X 70 IN, DISPOSABLE, LF, STERILE

## (undated) DEVICE — TOWEL PACK, STERILE, 4/PACK, BLUE

## (undated) DEVICE — HANDPIECE, INTERPULSE, W/FAN SPRAY TIP AND SUCTION TUBE

## (undated) DEVICE — SUTURE, CTD, VICRYL, 2-0, UND, BR, CT-2

## (undated) DEVICE — TRACKING KIT, TM KNEE PROCEDURES, VIZADISC

## (undated) DEVICE — SYRINGE, 60 CC, LUER LOCK, MONOJECT, W/O CAP, LF

## (undated) DEVICE — STRIP, SKIN CLOSURE, STERI STRIP, REINFORCED, 0.5 X 4 IN

## (undated) DEVICE — SUTURE, V-LOC, 3-0, 23IN, P-12, 90 ABS

## (undated) DEVICE — ADHESIVE, SKIN, MASTISOL, 2/3 CC VIAL

## (undated) DEVICE — TUBING, SUCTION, CONNECTING, NON-CONDUCTIVE, SURE GRIP CONNECTORS, 3/16 IN X 10 FT

## (undated) DEVICE — GLOVE, SURGICAL, PROTEXIS PI BLUE W/NEUTHERA, 8.0, PF, LF

## (undated) DEVICE — GUIDEWIRE, BENTSON, COATED, 0.035 IN X 145 CM

## (undated) DEVICE — SUTURE, VICRYL, 0, 36 IN, CT-1, UNDYED

## (undated) DEVICE — BLADE, MAKO, SAGITTAL, NARROW

## (undated) DEVICE — KIT, BEACH CHAIR TRIMANO

## (undated) DEVICE — SUTURE, VICRYL, 2-0, 36 IN, CT-1, UNDYED

## (undated) DEVICE — DRAPE, SHEET, THREE QUARTER, FAN FOLD, 57 X 77 IN

## (undated) DEVICE — DRESSING, GAUZE, SPONGE, KERLIX, SUPER, 6 X 6.75 IN, STERILE 10PK

## (undated) DEVICE — KIT, OPEN SHOULDER, CUSTOM, PORTAGE

## (undated) DEVICE — APPLICATOR, CHLORAPREP, W/ORANGE TINT, 26ML

## (undated) DEVICE — TIP, SUCTION, SUPER SUCKER, KAM, 1

## (undated) DEVICE — KIT, MINOR, DOUBLE BASIN

## (undated) DEVICE — MASK, FACE, TENET, FOAM POSITIONING, DISPOSABLE

## (undated) DEVICE — DRAPE, SHEET, 17 X 23 IN

## (undated) DEVICE — NEEDLE, SPINAL, 20 G X 3.5 IN, YELLOW HUB

## (undated) DEVICE — DRESSING, MEPILEX BORDER, POST-OP AG, 4 X 10 IN

## (undated) DEVICE — DRAPE, INSTRUMENT, W/POUCH, STERI DRAPE, 7 X 11 IN, DISPOSABLE, STERILE

## (undated) DEVICE — TIP, SUCTION, YANKAUER, FLEXIBLE

## (undated) DEVICE — SCREW DRILL, PERIPHERAL, 2.7MM

## (undated) DEVICE — GLOVE, SURGICAL, PROTEXIS NEOPRENE, 8.0, PF, LF

## (undated) DEVICE — CUFF, TOURNIQUET, 30 X 4, SNGL PORT/SNGL BLADDER, DISP, LF

## (undated) DEVICE — CUFF, TOURNIQUET, 34 X 4, SNGL PORT/SNGL BLADDER, DISP, LF

## (undated) DEVICE — SET-UP PACK, BASIC, MAYO STAND COVER, SUTURE BAG, TABLE COVER, LF

## (undated) DEVICE — PROTECTOR, NERVE, ULNAR, PINK

## (undated) DEVICE — COVER, TABLE, 44 X 75 IN, DISPOSABLE, LF, STERILE

## (undated) DEVICE — SOLUTION, IRRIGATION, SODIUM CHLORIDE 0.9%, 1000 ML, POUR BOTTLE

## (undated) DEVICE — UC T-MAX

## (undated) DEVICE — COVER HANDLE LIGHT, STERIS, BLUE, STERILE

## (undated) DEVICE — CLEANER, ELECTROSURGICAL, TIP, 5 X 5 CM, LF

## (undated) DEVICE — WOUND SYSTEM, DEBRIDEMENT & CLEANING, O.R DUOPAK

## (undated) DEVICE — COLLECTION BAG, FLUID, NON-STERILE

## (undated) DEVICE — HOOD, SURGICAL, FLYTE, T7 PLUS, PEEL AWAY SHIELD

## (undated) DEVICE — GOWN, ASTOUND, XL

## (undated) DEVICE — PAD, GROUNDING, ELECTROSURGICAL, W/9 FT CABLE, POLYHESIVE II, ADULT, LF

## (undated) DEVICE — SUTURE, VICRYL, 1, 36 IN, CT-1, UNDYED

## (undated) DEVICE — SUTURE, FIBERWIRE 2, T-5 TAPER NEEDLE, 38"

## (undated) DEVICE — DRAPE, SHEET, U, STERI DRAPE, 47 X 51 IN, DISPOSABLE, STERILE